# Patient Record
Sex: FEMALE | Race: WHITE | NOT HISPANIC OR LATINO | Employment: OTHER | URBAN - METROPOLITAN AREA
[De-identification: names, ages, dates, MRNs, and addresses within clinical notes are randomized per-mention and may not be internally consistent; named-entity substitution may affect disease eponyms.]

---

## 2022-09-14 ENCOUNTER — HOSPITAL ENCOUNTER (INPATIENT)
Facility: HOSPITAL | Age: 67
LOS: 15 days | Discharge: HOME HEALTH | End: 2022-09-29
Attending: EMERGENCY MEDICINE | Admitting: INTERNAL MEDICINE

## 2022-09-14 DIAGNOSIS — R53.1 GENERALIZED WEAKNESS: ICD-10-CM

## 2022-09-14 DIAGNOSIS — R09.02 HYPOXIA: ICD-10-CM

## 2022-09-14 DIAGNOSIS — N39.0 ACUTE UTI: Primary | ICD-10-CM

## 2022-09-14 DIAGNOSIS — M25.562 CHRONIC PAIN OF LEFT KNEE: ICD-10-CM

## 2022-09-14 DIAGNOSIS — Z09 FOLLOW-UP EXAM: ICD-10-CM

## 2022-09-14 DIAGNOSIS — M47.12 CERVICAL SPONDYLOSIS WITH MYELOPATHY: ICD-10-CM

## 2022-09-14 DIAGNOSIS — G89.29 CHRONIC PAIN OF LEFT KNEE: ICD-10-CM

## 2022-09-14 DIAGNOSIS — R20.2 PARESTHESIAS: ICD-10-CM

## 2022-09-14 LAB
ALBUMIN SERPL-MCNC: 4.5 G/DL (ref 3.5–5.2)
ALBUMIN/GLOB SERPL: 1.7 G/DL
ALP SERPL-CCNC: 59 U/L (ref 39–117)
ALT SERPL W P-5'-P-CCNC: 29 U/L (ref 1–33)
ANION GAP SERPL CALCULATED.3IONS-SCNC: 14 MMOL/L (ref 5–15)
AST SERPL-CCNC: 32 U/L (ref 1–32)
BACTERIA UR QL AUTO: ABNORMAL /HPF
BASOPHILS # BLD AUTO: 0.05 10*3/MM3 (ref 0–0.2)
BASOPHILS NFR BLD AUTO: 0.5 % (ref 0–1.5)
BILIRUB SERPL-MCNC: 0.4 MG/DL (ref 0–1.2)
BILIRUB UR QL STRIP: NEGATIVE
BUN SERPL-MCNC: 17 MG/DL (ref 8–23)
BUN/CREAT SERPL: 21.3 (ref 7–25)
CALCIUM SPEC-SCNC: 9.9 MG/DL (ref 8.6–10.5)
CHLORIDE SERPL-SCNC: 99 MMOL/L (ref 98–107)
CLARITY UR: ABNORMAL
CO2 SERPL-SCNC: 27 MMOL/L (ref 22–29)
COLOR UR: ABNORMAL
CREAT SERPL-MCNC: 0.8 MG/DL (ref 0.57–1)
DEPRECATED RDW RBC AUTO: 43 FL (ref 37–54)
EGFRCR SERPLBLD CKD-EPI 2021: 81.4 ML/MIN/1.73
EOSINOPHIL # BLD AUTO: 0.12 10*3/MM3 (ref 0–0.4)
EOSINOPHIL NFR BLD AUTO: 1.2 % (ref 0.3–6.2)
ERYTHROCYTE [DISTWIDTH] IN BLOOD BY AUTOMATED COUNT: 12.6 % (ref 12.3–15.4)
GLOBULIN UR ELPH-MCNC: 2.6 GM/DL
GLUCOSE BLDC GLUCOMTR-MCNC: 118 MG/DL (ref 70–130)
GLUCOSE BLDC GLUCOMTR-MCNC: 132 MG/DL (ref 70–130)
GLUCOSE SERPL-MCNC: 142 MG/DL (ref 65–99)
GLUCOSE UR STRIP-MCNC: NEGATIVE MG/DL
HCT VFR BLD AUTO: 43.4 % (ref 34–46.6)
HGB BLD-MCNC: 14.9 G/DL (ref 12–15.9)
HGB UR QL STRIP.AUTO: ABNORMAL
HYALINE CASTS UR QL AUTO: ABNORMAL /LPF
IMM GRANULOCYTES # BLD AUTO: 0.04 10*3/MM3 (ref 0–0.05)
IMM GRANULOCYTES NFR BLD AUTO: 0.4 % (ref 0–0.5)
KETONES UR QL STRIP: ABNORMAL
LEUKOCYTE ESTERASE UR QL STRIP.AUTO: ABNORMAL
LYMPHOCYTES # BLD AUTO: 1.84 10*3/MM3 (ref 0.7–3.1)
LYMPHOCYTES NFR BLD AUTO: 18.1 % (ref 19.6–45.3)
MCH RBC QN AUTO: 32.2 PG (ref 26.6–33)
MCHC RBC AUTO-ENTMCNC: 34.3 G/DL (ref 31.5–35.7)
MCV RBC AUTO: 93.7 FL (ref 79–97)
MONOCYTES # BLD AUTO: 0.66 10*3/MM3 (ref 0.1–0.9)
MONOCYTES NFR BLD AUTO: 6.5 % (ref 5–12)
NEUTROPHILS NFR BLD AUTO: 7.47 10*3/MM3 (ref 1.7–7)
NEUTROPHILS NFR BLD AUTO: 73.3 % (ref 42.7–76)
NITRITE UR QL STRIP: POSITIVE
NRBC BLD AUTO-RTO: 0 /100 WBC (ref 0–0.2)
PH UR STRIP.AUTO: 5.5 [PH] (ref 5–8)
PLATELET # BLD AUTO: 195 10*3/MM3 (ref 140–450)
PMV BLD AUTO: 11.1 FL (ref 6–12)
POTASSIUM SERPL-SCNC: 3.7 MMOL/L (ref 3.5–5.2)
PROT SERPL-MCNC: 7.1 G/DL (ref 6–8.5)
PROT UR QL STRIP: ABNORMAL
QT INTERVAL: 437 MS
RBC # BLD AUTO: 4.63 10*6/MM3 (ref 3.77–5.28)
RBC # UR STRIP: ABNORMAL /HPF
REF LAB TEST METHOD: ABNORMAL
SODIUM SERPL-SCNC: 140 MMOL/L (ref 136–145)
SP GR UR STRIP: 1.02 (ref 1–1.03)
SQUAMOUS #/AREA URNS HPF: ABNORMAL /HPF
UROBILINOGEN UR QL STRIP: ABNORMAL
WBC # UR STRIP: ABNORMAL /HPF
WBC NRBC COR # BLD: 10.18 10*3/MM3 (ref 3.4–10.8)

## 2022-09-14 PROCEDURE — 93010 ELECTROCARDIOGRAM REPORT: CPT | Performed by: INTERNAL MEDICINE

## 2022-09-14 PROCEDURE — 85025 COMPLETE CBC W/AUTO DIFF WBC: CPT | Performed by: EMERGENCY MEDICINE

## 2022-09-14 PROCEDURE — 25010000002 CEFTRIAXONE PER 250 MG: Performed by: EMERGENCY MEDICINE

## 2022-09-14 PROCEDURE — 80053 COMPREHEN METABOLIC PANEL: CPT | Performed by: EMERGENCY MEDICINE

## 2022-09-14 PROCEDURE — 87086 URINE CULTURE/COLONY COUNT: CPT | Performed by: EMERGENCY MEDICINE

## 2022-09-14 PROCEDURE — 36415 COLL VENOUS BLD VENIPUNCTURE: CPT

## 2022-09-14 PROCEDURE — 99285 EMERGENCY DEPT VISIT HI MDM: CPT

## 2022-09-14 PROCEDURE — 87040 BLOOD CULTURE FOR BACTERIA: CPT | Performed by: EMERGENCY MEDICINE

## 2022-09-14 PROCEDURE — 87186 SC STD MICRODIL/AGAR DIL: CPT | Performed by: EMERGENCY MEDICINE

## 2022-09-14 PROCEDURE — 87077 CULTURE AEROBIC IDENTIFY: CPT | Performed by: EMERGENCY MEDICINE

## 2022-09-14 PROCEDURE — 81001 URINALYSIS AUTO W/SCOPE: CPT | Performed by: EMERGENCY MEDICINE

## 2022-09-14 PROCEDURE — 93005 ELECTROCARDIOGRAM TRACING: CPT | Performed by: EMERGENCY MEDICINE

## 2022-09-14 PROCEDURE — 82962 GLUCOSE BLOOD TEST: CPT

## 2022-09-14 RX ORDER — INSULIN LISPRO 100 [IU]/ML
0-9 INJECTION, SOLUTION INTRAVENOUS; SUBCUTANEOUS
Status: DISCONTINUED | OUTPATIENT
Start: 2022-09-15 | End: 2022-09-29 | Stop reason: HOSPADM

## 2022-09-14 RX ORDER — BUPROPION HYDROCHLORIDE 150 MG/1
300 TABLET ORAL DAILY
Status: ON HOLD | COMMUNITY
End: 2022-09-20

## 2022-09-14 RX ORDER — ESCITALOPRAM OXALATE 10 MG/1
10 TABLET ORAL DAILY
Status: ON HOLD | COMMUNITY
End: 2022-09-20

## 2022-09-14 RX ORDER — LISINOPRIL 10 MG/1
10 TABLET ORAL DAILY
Status: ON HOLD | COMMUNITY
End: 2022-09-14

## 2022-09-14 RX ORDER — UREA 10 %
3 LOTION (ML) TOPICAL NIGHTLY PRN
Status: DISCONTINUED | OUTPATIENT
Start: 2022-09-14 | End: 2022-09-29 | Stop reason: HOSPADM

## 2022-09-14 RX ORDER — LOSARTAN POTASSIUM 50 MG/1
100 TABLET ORAL DAILY
Status: ON HOLD | COMMUNITY
End: 2022-09-20

## 2022-09-14 RX ORDER — DEXTROSE MONOHYDRATE 25 G/50ML
25 INJECTION, SOLUTION INTRAVENOUS
Status: DISCONTINUED | OUTPATIENT
Start: 2022-09-14 | End: 2022-09-29 | Stop reason: HOSPADM

## 2022-09-14 RX ORDER — LEVOTHYROXINE SODIUM 0.12 MG/1
125 TABLET ORAL DAILY
COMMUNITY

## 2022-09-14 RX ORDER — NICOTINE POLACRILEX 4 MG
15 LOZENGE BUCCAL
Status: DISCONTINUED | OUTPATIENT
Start: 2022-09-14 | End: 2022-09-29 | Stop reason: HOSPADM

## 2022-09-14 RX ORDER — ATORVASTATIN CALCIUM 20 MG/1
10 TABLET, FILM COATED ORAL DAILY
Status: DISCONTINUED | OUTPATIENT
Start: 2022-09-15 | End: 2022-09-29 | Stop reason: HOSPADM

## 2022-09-14 RX ORDER — ALBUTEROL SULFATE 90 UG/1
AEROSOL, METERED RESPIRATORY (INHALATION)
COMMUNITY
Start: 2022-08-11 | End: 2023-01-17 | Stop reason: HOSPADM

## 2022-09-14 RX ORDER — ONDANSETRON 4 MG/1
4 TABLET, FILM COATED ORAL EVERY 6 HOURS PRN
Status: DISCONTINUED | OUTPATIENT
Start: 2022-09-14 | End: 2022-09-29 | Stop reason: HOSPADM

## 2022-09-14 RX ORDER — NITROGLYCERIN 0.4 MG/1
0.4 TABLET SUBLINGUAL
Status: DISCONTINUED | OUTPATIENT
Start: 2022-09-14 | End: 2022-09-29 | Stop reason: HOSPADM

## 2022-09-14 RX ORDER — ONDANSETRON 2 MG/ML
4 INJECTION INTRAMUSCULAR; INTRAVENOUS EVERY 6 HOURS PRN
Status: DISCONTINUED | OUTPATIENT
Start: 2022-09-14 | End: 2022-09-29 | Stop reason: HOSPADM

## 2022-09-14 RX ORDER — LISINOPRIL 10 MG/1
10 TABLET ORAL DAILY
Status: DISCONTINUED | OUTPATIENT
Start: 2022-09-15 | End: 2022-09-15

## 2022-09-14 RX ORDER — LOVASTATIN 10 MG/1
40 TABLET ORAL NIGHTLY
Status: ON HOLD | COMMUNITY
End: 2022-09-20

## 2022-09-14 RX ORDER — ACETAMINOPHEN 325 MG/1
650 TABLET ORAL EVERY 4 HOURS PRN
Status: DISCONTINUED | OUTPATIENT
Start: 2022-09-14 | End: 2022-09-20 | Stop reason: SDUPTHER

## 2022-09-14 RX ORDER — BUPROPION HYDROCHLORIDE 150 MG/1
150 TABLET ORAL DAILY
Status: DISCONTINUED | OUTPATIENT
Start: 2022-09-15 | End: 2022-09-29 | Stop reason: HOSPADM

## 2022-09-14 RX ORDER — AMLODIPINE BESYLATE 5 MG/1
5 TABLET ORAL DAILY
COMMUNITY
End: 2022-09-29 | Stop reason: HOSPADM

## 2022-09-14 RX ADMIN — CEFTRIAXONE SODIUM 1 G: 1 INJECTION, POWDER, FOR SOLUTION INTRAMUSCULAR; INTRAVENOUS at 16:32

## 2022-09-14 RX ADMIN — Medication 3 MG: at 21:21

## 2022-09-15 PROBLEM — M13.0 HYPERTROPHIC POLYARTHRITIS: Status: ACTIVE | Noted: 2022-09-15

## 2022-09-15 PROBLEM — M47.12 CERVICAL SPONDYLOSIS WITH MYELOPATHY: Status: ACTIVE | Noted: 2022-06-27

## 2022-09-15 PROBLEM — I10 HTN (HYPERTENSION): Status: ACTIVE | Noted: 2022-09-15

## 2022-09-15 PROBLEM — K76.0 FATTY LIVER: Status: ACTIVE | Noted: 2022-09-15

## 2022-09-15 PROBLEM — E66.01 OBESITY, CLASS III, BMI 40-49.9 (MORBID OBESITY): Status: ACTIVE | Noted: 2022-09-15

## 2022-09-15 LAB
ANION GAP SERPL CALCULATED.3IONS-SCNC: 13 MMOL/L (ref 5–15)
BUN SERPL-MCNC: 16 MG/DL (ref 8–23)
BUN/CREAT SERPL: 20.8 (ref 7–25)
CALCIUM SPEC-SCNC: 9.3 MG/DL (ref 8.6–10.5)
CHLORIDE SERPL-SCNC: 101 MMOL/L (ref 98–107)
CO2 SERPL-SCNC: 25 MMOL/L (ref 22–29)
CREAT SERPL-MCNC: 0.77 MG/DL (ref 0.57–1)
DEPRECATED RDW RBC AUTO: 43 FL (ref 37–54)
EGFRCR SERPLBLD CKD-EPI 2021: 85.2 ML/MIN/1.73
ERYTHROCYTE [DISTWIDTH] IN BLOOD BY AUTOMATED COUNT: 12.6 % (ref 12.3–15.4)
GLUCOSE BLDC GLUCOMTR-MCNC: 136 MG/DL (ref 70–130)
GLUCOSE BLDC GLUCOMTR-MCNC: 150 MG/DL (ref 70–130)
GLUCOSE BLDC GLUCOMTR-MCNC: 178 MG/DL (ref 70–130)
GLUCOSE BLDC GLUCOMTR-MCNC: 221 MG/DL (ref 70–130)
GLUCOSE SERPL-MCNC: 159 MG/DL (ref 65–99)
HBA1C MFR BLD: 6.8 % (ref 4.8–5.6)
HCT VFR BLD AUTO: 42.1 % (ref 34–46.6)
HGB BLD-MCNC: 14 G/DL (ref 12–15.9)
MCH RBC QN AUTO: 31.3 PG (ref 26.6–33)
MCHC RBC AUTO-ENTMCNC: 33.3 G/DL (ref 31.5–35.7)
MCV RBC AUTO: 94 FL (ref 79–97)
PLATELET # BLD AUTO: 184 10*3/MM3 (ref 140–450)
PMV BLD AUTO: 10.7 FL (ref 6–12)
POTASSIUM SERPL-SCNC: 3.5 MMOL/L (ref 3.5–5.2)
RBC # BLD AUTO: 4.48 10*6/MM3 (ref 3.77–5.28)
SODIUM SERPL-SCNC: 139 MMOL/L (ref 136–145)
WBC NRBC COR # BLD: 10.69 10*3/MM3 (ref 3.4–10.8)

## 2022-09-15 PROCEDURE — 63710000001 INSULIN LISPRO (HUMAN) PER 5 UNITS: Performed by: INTERNAL MEDICINE

## 2022-09-15 PROCEDURE — 25010000002 CEFTRIAXONE PER 250 MG: Performed by: HOSPITALIST

## 2022-09-15 PROCEDURE — 83036 HEMOGLOBIN GLYCOSYLATED A1C: CPT | Performed by: INTERNAL MEDICINE

## 2022-09-15 PROCEDURE — 82962 GLUCOSE BLOOD TEST: CPT

## 2022-09-15 PROCEDURE — 80048 BASIC METABOLIC PNL TOTAL CA: CPT | Performed by: INTERNAL MEDICINE

## 2022-09-15 PROCEDURE — 85027 COMPLETE CBC AUTOMATED: CPT | Performed by: INTERNAL MEDICINE

## 2022-09-15 RX ORDER — LOSARTAN POTASSIUM 100 MG/1
100 TABLET ORAL DAILY
Status: DISCONTINUED | OUTPATIENT
Start: 2022-09-16 | End: 2022-09-29 | Stop reason: HOSPADM

## 2022-09-15 RX ORDER — LEVOTHYROXINE SODIUM 0.12 MG/1
125 TABLET ORAL DAILY
Status: DISCONTINUED | OUTPATIENT
Start: 2022-09-15 | End: 2022-09-29 | Stop reason: HOSPADM

## 2022-09-15 RX ORDER — IBUPROFEN 800 MG/1
800 TABLET ORAL EVERY 8 HOURS PRN
Status: DISCONTINUED | OUTPATIENT
Start: 2022-09-15 | End: 2022-09-20

## 2022-09-15 RX ORDER — DIPHENHYDRAMINE HCL 25 MG
25 CAPSULE ORAL EVERY 6 HOURS PRN
COMMUNITY

## 2022-09-15 RX ORDER — AMLODIPINE BESYLATE 5 MG/1
5 TABLET ORAL DAILY
Status: DISCONTINUED | OUTPATIENT
Start: 2022-09-15 | End: 2022-09-25

## 2022-09-15 RX ORDER — FLUTICASONE PROPIONATE 50 MCG
2 SPRAY, SUSPENSION (ML) NASAL DAILY
Status: DISCONTINUED | OUTPATIENT
Start: 2022-09-15 | End: 2022-09-29 | Stop reason: HOSPADM

## 2022-09-15 RX ORDER — IBUPROFEN 800 MG/1
800 TABLET ORAL
Status: DISCONTINUED | OUTPATIENT
Start: 2022-09-15 | End: 2022-09-15

## 2022-09-15 RX ORDER — PYRIDOXINE HCL (VITAMIN B6) 100 MG
100 TABLET ORAL DAILY
COMMUNITY
Start: 2022-04-30

## 2022-09-15 RX ORDER — FLUTICASONE PROPIONATE 50 MCG
SPRAY, SUSPENSION (ML) NASAL
COMMUNITY
Start: 2022-08-10

## 2022-09-15 RX ORDER — ESCITALOPRAM OXALATE 10 MG/1
10 TABLET ORAL DAILY
Status: DISCONTINUED | OUTPATIENT
Start: 2022-09-15 | End: 2022-09-29 | Stop reason: HOSPADM

## 2022-09-15 RX ORDER — DIMENHYDRINATE 50 MG
TABLET ORAL
COMMUNITY
End: 2023-01-17 | Stop reason: HOSPADM

## 2022-09-15 RX ORDER — IBUPROFEN 800 MG/1
TABLET ORAL
COMMUNITY
Start: 2013-02-15 | End: 2023-01-17 | Stop reason: HOSPADM

## 2022-09-15 RX ADMIN — IBUPROFEN 800 MG: 400 TABLET, FILM COATED ORAL at 20:42

## 2022-09-15 RX ADMIN — ATORVASTATIN CALCIUM 10 MG: 20 TABLET, FILM COATED ORAL at 08:40

## 2022-09-15 RX ADMIN — BUPROPION HYDROCHLORIDE 150 MG: 150 TABLET, EXTENDED RELEASE ORAL at 08:38

## 2022-09-15 RX ADMIN — CEFTRIAXONE SODIUM 1 G: 1 INJECTION, POWDER, FOR SOLUTION INTRAMUSCULAR; INTRAVENOUS at 12:43

## 2022-09-15 RX ADMIN — AMLODIPINE BESYLATE 5 MG: 5 TABLET ORAL at 11:19

## 2022-09-15 RX ADMIN — FLUTICASONE PROPIONATE 2 SPRAY: 50 SPRAY, METERED NASAL at 11:19

## 2022-09-15 RX ADMIN — INSULIN LISPRO 2 UNITS: 100 INJECTION, SOLUTION INTRAVENOUS; SUBCUTANEOUS at 08:38

## 2022-09-15 RX ADMIN — IBUPROFEN 800 MG: 800 TABLET, FILM COATED ORAL at 11:19

## 2022-09-15 RX ADMIN — LISINOPRIL 10 MG: 10 TABLET ORAL at 08:38

## 2022-09-15 RX ADMIN — Medication 3 MG: at 20:43

## 2022-09-15 RX ADMIN — INSULIN LISPRO 2 UNITS: 100 INJECTION, SOLUTION INTRAVENOUS; SUBCUTANEOUS at 12:43

## 2022-09-15 RX ADMIN — ESCITALOPRAM 10 MG: 10 TABLET, FILM COATED ORAL at 11:19

## 2022-09-15 RX ADMIN — LEVOTHYROXINE SODIUM 125 MCG: 0.12 TABLET ORAL at 11:19

## 2022-09-16 ENCOUNTER — APPOINTMENT (OUTPATIENT)
Dept: OTHER | Facility: HOSPITAL | Age: 67
End: 2022-09-16

## 2022-09-16 ENCOUNTER — APPOINTMENT (OUTPATIENT)
Dept: MRI IMAGING | Facility: HOSPITAL | Age: 67
End: 2022-09-16

## 2022-09-16 LAB
ALBUMIN SERPL-MCNC: 3.8 G/DL (ref 3.5–5.2)
ANION GAP SERPL CALCULATED.3IONS-SCNC: 10 MMOL/L (ref 5–15)
BACTERIA SPEC AEROBE CULT: ABNORMAL
BASOPHILS # BLD AUTO: 0.04 10*3/MM3 (ref 0–0.2)
BASOPHILS NFR BLD AUTO: 0.4 % (ref 0–1.5)
BUN SERPL-MCNC: 16 MG/DL (ref 8–23)
BUN/CREAT SERPL: 21.6 (ref 7–25)
CALCIUM SPEC-SCNC: 9.3 MG/DL (ref 8.6–10.5)
CHLORIDE SERPL-SCNC: 99 MMOL/L (ref 98–107)
CO2 SERPL-SCNC: 27 MMOL/L (ref 22–29)
CREAT SERPL-MCNC: 0.74 MG/DL (ref 0.57–1)
DEPRECATED RDW RBC AUTO: 43.4 FL (ref 37–54)
EGFRCR SERPLBLD CKD-EPI 2021: 89.4 ML/MIN/1.73
EOSINOPHIL # BLD AUTO: 0.24 10*3/MM3 (ref 0–0.4)
EOSINOPHIL NFR BLD AUTO: 2.5 % (ref 0.3–6.2)
ERYTHROCYTE [DISTWIDTH] IN BLOOD BY AUTOMATED COUNT: 12.5 % (ref 12.3–15.4)
GLUCOSE BLDC GLUCOMTR-MCNC: 126 MG/DL (ref 70–130)
GLUCOSE BLDC GLUCOMTR-MCNC: 143 MG/DL (ref 70–130)
GLUCOSE BLDC GLUCOMTR-MCNC: 157 MG/DL (ref 70–130)
GLUCOSE BLDC GLUCOMTR-MCNC: 181 MG/DL (ref 70–130)
GLUCOSE SERPL-MCNC: 248 MG/DL (ref 65–99)
HCT VFR BLD AUTO: 38.2 % (ref 34–46.6)
HGB BLD-MCNC: 12.8 G/DL (ref 12–15.9)
IMM GRANULOCYTES # BLD AUTO: 0.05 10*3/MM3 (ref 0–0.05)
IMM GRANULOCYTES NFR BLD AUTO: 0.5 % (ref 0–0.5)
LYMPHOCYTES # BLD AUTO: 1.87 10*3/MM3 (ref 0.7–3.1)
LYMPHOCYTES NFR BLD AUTO: 19.4 % (ref 19.6–45.3)
MCH RBC QN AUTO: 31.7 PG (ref 26.6–33)
MCHC RBC AUTO-ENTMCNC: 33.5 G/DL (ref 31.5–35.7)
MCV RBC AUTO: 94.6 FL (ref 79–97)
MONOCYTES # BLD AUTO: 0.75 10*3/MM3 (ref 0.1–0.9)
MONOCYTES NFR BLD AUTO: 7.8 % (ref 5–12)
NEUTROPHILS NFR BLD AUTO: 6.69 10*3/MM3 (ref 1.7–7)
NEUTROPHILS NFR BLD AUTO: 69.4 % (ref 42.7–76)
NRBC BLD AUTO-RTO: 0 /100 WBC (ref 0–0.2)
PHOSPHATE SERPL-MCNC: 3.3 MG/DL (ref 2.5–4.5)
PLATELET # BLD AUTO: 167 10*3/MM3 (ref 140–450)
PMV BLD AUTO: 11 FL (ref 6–12)
POTASSIUM SERPL-SCNC: 3.4 MMOL/L (ref 3.5–5.2)
RBC # BLD AUTO: 4.04 10*6/MM3 (ref 3.77–5.28)
SODIUM SERPL-SCNC: 136 MMOL/L (ref 136–145)
WBC NRBC COR # BLD: 9.64 10*3/MM3 (ref 3.4–10.8)

## 2022-09-16 PROCEDURE — 25010000002 CEFTRIAXONE PER 250 MG: Performed by: HOSPITALIST

## 2022-09-16 PROCEDURE — 85025 COMPLETE CBC W/AUTO DIFF WBC: CPT | Performed by: HOSPITALIST

## 2022-09-16 PROCEDURE — 82962 GLUCOSE BLOOD TEST: CPT

## 2022-09-16 PROCEDURE — 80069 RENAL FUNCTION PANEL: CPT | Performed by: HOSPITALIST

## 2022-09-16 PROCEDURE — 63710000001 INSULIN LISPRO (HUMAN) PER 5 UNITS: Performed by: INTERNAL MEDICINE

## 2022-09-16 PROCEDURE — 72141 MRI NECK SPINE W/O DYE: CPT

## 2022-09-16 PROCEDURE — 99221 1ST HOSP IP/OBS SF/LOW 40: CPT

## 2022-09-16 RX ADMIN — IBUPROFEN 800 MG: 400 TABLET, FILM COATED ORAL at 08:40

## 2022-09-16 RX ADMIN — LEVOTHYROXINE SODIUM 125 MCG: 0.12 TABLET ORAL at 08:32

## 2022-09-16 RX ADMIN — INSULIN LISPRO 2 UNITS: 100 INJECTION, SOLUTION INTRAVENOUS; SUBCUTANEOUS at 12:10

## 2022-09-16 RX ADMIN — CEFTRIAXONE SODIUM 1 G: 1 INJECTION, POWDER, FOR SOLUTION INTRAMUSCULAR; INTRAVENOUS at 12:10

## 2022-09-16 RX ADMIN — Medication 3 MG: at 20:58

## 2022-09-16 RX ADMIN — IBUPROFEN 800 MG: 400 TABLET, FILM COATED ORAL at 18:12

## 2022-09-16 RX ADMIN — FLUTICASONE PROPIONATE 2 SPRAY: 50 SPRAY, METERED NASAL at 08:32

## 2022-09-16 RX ADMIN — LOSARTAN POTASSIUM 100 MG: 100 TABLET, FILM COATED ORAL at 08:32

## 2022-09-16 RX ADMIN — ESCITALOPRAM 10 MG: 10 TABLET, FILM COATED ORAL at 08:32

## 2022-09-16 RX ADMIN — BUPROPION HYDROCHLORIDE 150 MG: 150 TABLET, EXTENDED RELEASE ORAL at 08:31

## 2022-09-16 RX ADMIN — ATORVASTATIN CALCIUM 10 MG: 20 TABLET, FILM COATED ORAL at 08:31

## 2022-09-16 RX ADMIN — AMLODIPINE BESYLATE 5 MG: 5 TABLET ORAL at 08:31

## 2022-09-17 ENCOUNTER — APPOINTMENT (OUTPATIENT)
Dept: GENERAL RADIOLOGY | Facility: HOSPITAL | Age: 67
End: 2022-09-17

## 2022-09-17 LAB
GLUCOSE BLDC GLUCOMTR-MCNC: 142 MG/DL (ref 70–130)
GLUCOSE BLDC GLUCOMTR-MCNC: 157 MG/DL (ref 70–130)
GLUCOSE BLDC GLUCOMTR-MCNC: 157 MG/DL (ref 70–130)
GLUCOSE BLDC GLUCOMTR-MCNC: 158 MG/DL (ref 70–130)

## 2022-09-17 PROCEDURE — 73560 X-RAY EXAM OF KNEE 1 OR 2: CPT

## 2022-09-17 PROCEDURE — 63710000001 INSULIN LISPRO (HUMAN) PER 5 UNITS: Performed by: INTERNAL MEDICINE

## 2022-09-17 PROCEDURE — 99233 SBSQ HOSP IP/OBS HIGH 50: CPT | Performed by: NEUROLOGICAL SURGERY

## 2022-09-17 PROCEDURE — 82962 GLUCOSE BLOOD TEST: CPT

## 2022-09-17 PROCEDURE — 25010000002 CEFTRIAXONE PER 250 MG: Performed by: HOSPITALIST

## 2022-09-17 RX ADMIN — ATORVASTATIN CALCIUM 10 MG: 20 TABLET, FILM COATED ORAL at 08:26

## 2022-09-17 RX ADMIN — INSULIN LISPRO 2 UNITS: 100 INJECTION, SOLUTION INTRAVENOUS; SUBCUTANEOUS at 08:26

## 2022-09-17 RX ADMIN — LEVOTHYROXINE SODIUM 125 MCG: 0.12 TABLET ORAL at 08:26

## 2022-09-17 RX ADMIN — INSULIN LISPRO 2 UNITS: 100 INJECTION, SOLUTION INTRAVENOUS; SUBCUTANEOUS at 12:14

## 2022-09-17 RX ADMIN — IBUPROFEN 800 MG: 400 TABLET, FILM COATED ORAL at 05:51

## 2022-09-17 RX ADMIN — AMLODIPINE BESYLATE 5 MG: 5 TABLET ORAL at 08:26

## 2022-09-17 RX ADMIN — CEFTRIAXONE SODIUM 1 G: 1 INJECTION, POWDER, FOR SOLUTION INTRAMUSCULAR; INTRAVENOUS at 12:18

## 2022-09-17 RX ADMIN — BUPROPION HYDROCHLORIDE 150 MG: 150 TABLET, EXTENDED RELEASE ORAL at 08:26

## 2022-09-17 RX ADMIN — LOSARTAN POTASSIUM 100 MG: 100 TABLET, FILM COATED ORAL at 08:26

## 2022-09-17 RX ADMIN — ESCITALOPRAM 10 MG: 10 TABLET, FILM COATED ORAL at 08:26

## 2022-09-17 RX ADMIN — Medication 3 MG: at 21:42

## 2022-09-17 RX ADMIN — IBUPROFEN 800 MG: 400 TABLET, FILM COATED ORAL at 15:01

## 2022-09-18 LAB
ANION GAP SERPL CALCULATED.3IONS-SCNC: 10.6 MMOL/L (ref 5–15)
BASOPHILS # BLD AUTO: 0.03 10*3/MM3 (ref 0–0.2)
BASOPHILS NFR BLD AUTO: 0.5 % (ref 0–1.5)
BUN SERPL-MCNC: 17 MG/DL (ref 8–23)
BUN/CREAT SERPL: 26.2 (ref 7–25)
CALCIUM SPEC-SCNC: 9 MG/DL (ref 8.6–10.5)
CHLORIDE SERPL-SCNC: 101 MMOL/L (ref 98–107)
CO2 SERPL-SCNC: 28.4 MMOL/L (ref 22–29)
CREAT SERPL-MCNC: 0.65 MG/DL (ref 0.57–1)
DEPRECATED RDW RBC AUTO: 43.2 FL (ref 37–54)
EGFRCR SERPLBLD CKD-EPI 2021: 97.2 ML/MIN/1.73
EOSINOPHIL # BLD AUTO: 0.16 10*3/MM3 (ref 0–0.4)
EOSINOPHIL NFR BLD AUTO: 2.5 % (ref 0.3–6.2)
ERYTHROCYTE [DISTWIDTH] IN BLOOD BY AUTOMATED COUNT: 12.5 % (ref 12.3–15.4)
GLUCOSE BLDC GLUCOMTR-MCNC: 128 MG/DL (ref 70–130)
GLUCOSE BLDC GLUCOMTR-MCNC: 132 MG/DL (ref 70–130)
GLUCOSE BLDC GLUCOMTR-MCNC: 167 MG/DL (ref 70–130)
GLUCOSE BLDC GLUCOMTR-MCNC: 179 MG/DL (ref 70–130)
GLUCOSE SERPL-MCNC: 162 MG/DL (ref 65–99)
HCT VFR BLD AUTO: 38.5 % (ref 34–46.6)
HGB BLD-MCNC: 13 G/DL (ref 12–15.9)
IMM GRANULOCYTES # BLD AUTO: 0.04 10*3/MM3 (ref 0–0.05)
IMM GRANULOCYTES NFR BLD AUTO: 0.6 % (ref 0–0.5)
LYMPHOCYTES # BLD AUTO: 0.55 10*3/MM3 (ref 0.7–3.1)
LYMPHOCYTES NFR BLD AUTO: 8.8 % (ref 19.6–45.3)
MCH RBC QN AUTO: 31.6 PG (ref 26.6–33)
MCHC RBC AUTO-ENTMCNC: 33.8 G/DL (ref 31.5–35.7)
MCV RBC AUTO: 93.4 FL (ref 79–97)
MONOCYTES # BLD AUTO: 0.63 10*3/MM3 (ref 0.1–0.9)
MONOCYTES NFR BLD AUTO: 10 % (ref 5–12)
NEUTROPHILS NFR BLD AUTO: 4.87 10*3/MM3 (ref 1.7–7)
NEUTROPHILS NFR BLD AUTO: 77.6 % (ref 42.7–76)
NRBC BLD AUTO-RTO: 0 /100 WBC (ref 0–0.2)
PLATELET # BLD AUTO: 146 10*3/MM3 (ref 140–450)
PMV BLD AUTO: 10.9 FL (ref 6–12)
POTASSIUM SERPL-SCNC: 3.8 MMOL/L (ref 3.5–5.2)
RBC # BLD AUTO: 4.12 10*6/MM3 (ref 3.77–5.28)
SODIUM SERPL-SCNC: 140 MMOL/L (ref 136–145)
WBC NRBC COR # BLD: 6.28 10*3/MM3 (ref 3.4–10.8)

## 2022-09-18 PROCEDURE — 82962 GLUCOSE BLOOD TEST: CPT

## 2022-09-18 PROCEDURE — 25010000002 CEFTRIAXONE PER 250 MG: Performed by: HOSPITALIST

## 2022-09-18 PROCEDURE — 63710000001 INSULIN LISPRO (HUMAN) PER 5 UNITS: Performed by: INTERNAL MEDICINE

## 2022-09-18 PROCEDURE — 85025 COMPLETE CBC W/AUTO DIFF WBC: CPT | Performed by: INTERNAL MEDICINE

## 2022-09-18 PROCEDURE — 80048 BASIC METABOLIC PNL TOTAL CA: CPT | Performed by: INTERNAL MEDICINE

## 2022-09-18 PROCEDURE — S0260 H&P FOR SURGERY: HCPCS | Performed by: NEUROLOGICAL SURGERY

## 2022-09-18 RX ADMIN — BUPROPION HYDROCHLORIDE 150 MG: 150 TABLET, EXTENDED RELEASE ORAL at 08:38

## 2022-09-18 RX ADMIN — CEFTRIAXONE SODIUM 1 G: 1 INJECTION, POWDER, FOR SOLUTION INTRAMUSCULAR; INTRAVENOUS at 12:17

## 2022-09-18 RX ADMIN — AMLODIPINE BESYLATE 5 MG: 5 TABLET ORAL at 08:39

## 2022-09-18 RX ADMIN — IBUPROFEN 800 MG: 400 TABLET, FILM COATED ORAL at 15:39

## 2022-09-18 RX ADMIN — ESCITALOPRAM 10 MG: 10 TABLET, FILM COATED ORAL at 08:38

## 2022-09-18 RX ADMIN — Medication 3 MG: at 20:41

## 2022-09-18 RX ADMIN — ATORVASTATIN CALCIUM 10 MG: 20 TABLET, FILM COATED ORAL at 08:38

## 2022-09-18 RX ADMIN — FLUTICASONE PROPIONATE 2 SPRAY: 50 SPRAY, METERED NASAL at 08:39

## 2022-09-18 RX ADMIN — LOSARTAN POTASSIUM 100 MG: 100 TABLET, FILM COATED ORAL at 08:38

## 2022-09-18 RX ADMIN — INSULIN LISPRO 2 UNITS: 100 INJECTION, SOLUTION INTRAVENOUS; SUBCUTANEOUS at 08:39

## 2022-09-18 RX ADMIN — LEVOTHYROXINE SODIUM 125 MCG: 0.12 TABLET ORAL at 08:38

## 2022-09-18 RX ADMIN — IBUPROFEN 800 MG: 400 TABLET, FILM COATED ORAL at 05:14

## 2022-09-19 LAB
ANION GAP SERPL CALCULATED.3IONS-SCNC: 8 MMOL/L (ref 5–15)
BACTERIA SPEC AEROBE CULT: NORMAL
BACTERIA SPEC AEROBE CULT: NORMAL
BASOPHILS # BLD AUTO: 0.02 10*3/MM3 (ref 0–0.2)
BASOPHILS NFR BLD AUTO: 0.5 % (ref 0–1.5)
BUN SERPL-MCNC: 16 MG/DL (ref 8–23)
BUN/CREAT SERPL: 25.4 (ref 7–25)
CALCIUM SPEC-SCNC: 9.3 MG/DL (ref 8.6–10.5)
CHLORIDE SERPL-SCNC: 100 MMOL/L (ref 98–107)
CO2 SERPL-SCNC: 30 MMOL/L (ref 22–29)
CREAT SERPL-MCNC: 0.63 MG/DL (ref 0.57–1)
DEPRECATED RDW RBC AUTO: 42.8 FL (ref 37–54)
EGFRCR SERPLBLD CKD-EPI 2021: 98 ML/MIN/1.73
EOSINOPHIL # BLD AUTO: 0.08 10*3/MM3 (ref 0–0.4)
EOSINOPHIL NFR BLD AUTO: 1.8 % (ref 0.3–6.2)
ERYTHROCYTE [DISTWIDTH] IN BLOOD BY AUTOMATED COUNT: 12.4 % (ref 12.3–15.4)
GLUCOSE BLDC GLUCOMTR-MCNC: 143 MG/DL (ref 70–130)
GLUCOSE BLDC GLUCOMTR-MCNC: 160 MG/DL (ref 70–130)
GLUCOSE BLDC GLUCOMTR-MCNC: 165 MG/DL (ref 70–130)
GLUCOSE SERPL-MCNC: 194 MG/DL (ref 65–99)
HCT VFR BLD AUTO: 39.7 % (ref 34–46.6)
HGB BLD-MCNC: 13.3 G/DL (ref 12–15.9)
IMM GRANULOCYTES # BLD AUTO: 0.02 10*3/MM3 (ref 0–0.05)
IMM GRANULOCYTES NFR BLD AUTO: 0.5 % (ref 0–0.5)
LYMPHOCYTES # BLD AUTO: 0.9 10*3/MM3 (ref 0.7–3.1)
LYMPHOCYTES NFR BLD AUTO: 20.6 % (ref 19.6–45.3)
MCH RBC QN AUTO: 31.4 PG (ref 26.6–33)
MCHC RBC AUTO-ENTMCNC: 33.5 G/DL (ref 31.5–35.7)
MCV RBC AUTO: 93.6 FL (ref 79–97)
MONOCYTES # BLD AUTO: 0.71 10*3/MM3 (ref 0.1–0.9)
MONOCYTES NFR BLD AUTO: 16.2 % (ref 5–12)
NEUTROPHILS NFR BLD AUTO: 2.64 10*3/MM3 (ref 1.7–7)
NEUTROPHILS NFR BLD AUTO: 60.4 % (ref 42.7–76)
NRBC BLD AUTO-RTO: 0 /100 WBC (ref 0–0.2)
PLATELET # BLD AUTO: 144 10*3/MM3 (ref 140–450)
PMV BLD AUTO: 10.7 FL (ref 6–12)
POTASSIUM SERPL-SCNC: 3.4 MMOL/L (ref 3.5–5.2)
RBC # BLD AUTO: 4.24 10*6/MM3 (ref 3.77–5.28)
SODIUM SERPL-SCNC: 138 MMOL/L (ref 136–145)
WBC NRBC COR # BLD: 4.37 10*3/MM3 (ref 3.4–10.8)

## 2022-09-19 PROCEDURE — 80048 BASIC METABOLIC PNL TOTAL CA: CPT | Performed by: INTERNAL MEDICINE

## 2022-09-19 PROCEDURE — S0260 H&P FOR SURGERY: HCPCS

## 2022-09-19 PROCEDURE — 85025 COMPLETE CBC W/AUTO DIFF WBC: CPT | Performed by: INTERNAL MEDICINE

## 2022-09-19 PROCEDURE — 63710000001 INSULIN LISPRO (HUMAN) PER 5 UNITS: Performed by: INTERNAL MEDICINE

## 2022-09-19 PROCEDURE — 82962 GLUCOSE BLOOD TEST: CPT

## 2022-09-19 RX ORDER — ALPRAZOLAM 0.25 MG/1
0.25 TABLET ORAL 3 TIMES DAILY PRN
Status: DISPENSED | OUTPATIENT
Start: 2022-09-19 | End: 2022-09-26

## 2022-09-19 RX ADMIN — AMLODIPINE BESYLATE 5 MG: 5 TABLET ORAL at 08:42

## 2022-09-19 RX ADMIN — FLUTICASONE PROPIONATE 2 SPRAY: 50 SPRAY, METERED NASAL at 08:43

## 2022-09-19 RX ADMIN — INSULIN LISPRO 2 UNITS: 100 INJECTION, SOLUTION INTRAVENOUS; SUBCUTANEOUS at 12:28

## 2022-09-19 RX ADMIN — IBUPROFEN 800 MG: 400 TABLET, FILM COATED ORAL at 04:08

## 2022-09-19 RX ADMIN — Medication 3 MG: at 21:05

## 2022-09-19 RX ADMIN — LOSARTAN POTASSIUM 100 MG: 100 TABLET, FILM COATED ORAL at 08:42

## 2022-09-19 RX ADMIN — ESCITALOPRAM 10 MG: 10 TABLET, FILM COATED ORAL at 08:42

## 2022-09-19 RX ADMIN — BUPROPION HYDROCHLORIDE 150 MG: 150 TABLET, EXTENDED RELEASE ORAL at 08:42

## 2022-09-19 RX ADMIN — LEVOTHYROXINE SODIUM 125 MCG: 0.12 TABLET ORAL at 08:42

## 2022-09-19 RX ADMIN — ALPRAZOLAM 0.25 MG: 0.25 TABLET ORAL at 21:05

## 2022-09-19 RX ADMIN — ALPRAZOLAM 0.25 MG: 0.25 TABLET ORAL at 12:28

## 2022-09-19 RX ADMIN — INSULIN LISPRO 2 UNITS: 100 INJECTION, SOLUTION INTRAVENOUS; SUBCUTANEOUS at 08:42

## 2022-09-19 RX ADMIN — IBUPROFEN 800 MG: 400 TABLET, FILM COATED ORAL at 12:28

## 2022-09-19 RX ADMIN — IBUPROFEN 800 MG: 400 TABLET, FILM COATED ORAL at 19:20

## 2022-09-19 RX ADMIN — ATORVASTATIN CALCIUM 10 MG: 20 TABLET, FILM COATED ORAL at 08:42

## 2022-09-20 ENCOUNTER — ANESTHESIA (OUTPATIENT)
Dept: PERIOP | Facility: HOSPITAL | Age: 67
End: 2022-09-20

## 2022-09-20 ENCOUNTER — ANESTHESIA EVENT (OUTPATIENT)
Dept: PERIOP | Facility: HOSPITAL | Age: 67
End: 2022-09-20

## 2022-09-20 ENCOUNTER — APPOINTMENT (OUTPATIENT)
Dept: GENERAL RADIOLOGY | Facility: HOSPITAL | Age: 67
End: 2022-09-20

## 2022-09-20 LAB
ABO GROUP BLD: NORMAL
ANION GAP SERPL CALCULATED.3IONS-SCNC: 9.2 MMOL/L (ref 5–15)
BASOPHILS # BLD AUTO: 0.02 10*3/MM3 (ref 0–0.2)
BASOPHILS NFR BLD AUTO: 0.5 % (ref 0–1.5)
BLD GP AB SCN SERPL QL: NEGATIVE
BUN SERPL-MCNC: 18 MG/DL (ref 8–23)
BUN/CREAT SERPL: 27.7 (ref 7–25)
CALCIUM SPEC-SCNC: 9.2 MG/DL (ref 8.6–10.5)
CHLORIDE SERPL-SCNC: 98 MMOL/L (ref 98–107)
CO2 SERPL-SCNC: 31.8 MMOL/L (ref 22–29)
CREAT SERPL-MCNC: 0.65 MG/DL (ref 0.57–1)
DEPRECATED RDW RBC AUTO: 43.5 FL (ref 37–54)
EGFRCR SERPLBLD CKD-EPI 2021: 97.2 ML/MIN/1.73
EOSINOPHIL # BLD AUTO: 0.16 10*3/MM3 (ref 0–0.4)
EOSINOPHIL NFR BLD AUTO: 3.7 % (ref 0.3–6.2)
ERYTHROCYTE [DISTWIDTH] IN BLOOD BY AUTOMATED COUNT: 12.5 % (ref 12.3–15.4)
GLUCOSE BLDC GLUCOMTR-MCNC: 177 MG/DL (ref 70–130)
GLUCOSE BLDC GLUCOMTR-MCNC: 200 MG/DL (ref 70–130)
GLUCOSE SERPL-MCNC: 171 MG/DL (ref 65–99)
HCT VFR BLD AUTO: 40.9 % (ref 34–46.6)
HGB BLD-MCNC: 13.6 G/DL (ref 12–15.9)
IMM GRANULOCYTES # BLD AUTO: 0.02 10*3/MM3 (ref 0–0.05)
IMM GRANULOCYTES NFR BLD AUTO: 0.5 % (ref 0–0.5)
LYMPHOCYTES # BLD AUTO: 1.02 10*3/MM3 (ref 0.7–3.1)
LYMPHOCYTES NFR BLD AUTO: 23.9 % (ref 19.6–45.3)
MCH RBC QN AUTO: 31.5 PG (ref 26.6–33)
MCHC RBC AUTO-ENTMCNC: 33.3 G/DL (ref 31.5–35.7)
MCV RBC AUTO: 94.7 FL (ref 79–97)
MONOCYTES # BLD AUTO: 0.8 10*3/MM3 (ref 0.1–0.9)
MONOCYTES NFR BLD AUTO: 18.7 % (ref 5–12)
NEUTROPHILS NFR BLD AUTO: 2.25 10*3/MM3 (ref 1.7–7)
NEUTROPHILS NFR BLD AUTO: 52.7 % (ref 42.7–76)
NRBC BLD AUTO-RTO: 0 /100 WBC (ref 0–0.2)
PLATELET # BLD AUTO: 147 10*3/MM3 (ref 140–450)
PMV BLD AUTO: 10.9 FL (ref 6–12)
POTASSIUM SERPL-SCNC: 3.8 MMOL/L (ref 3.5–5.2)
RBC # BLD AUTO: 4.32 10*6/MM3 (ref 3.77–5.28)
RH BLD: POSITIVE
SODIUM SERPL-SCNC: 139 MMOL/L (ref 136–145)
T&S EXPIRATION DATE: NORMAL
WBC NRBC COR # BLD: 4.27 10*3/MM3 (ref 3.4–10.8)

## 2022-09-20 PROCEDURE — L0120 CERV FLEX N/ADJ FOAM PRE OTS: HCPCS | Performed by: NEUROLOGICAL SURGERY

## 2022-09-20 PROCEDURE — 22585 ARTHRD ANT NTRBD MIN DSC EA: CPT | Performed by: SPECIALIST/TECHNOLOGIST, OTHER

## 2022-09-20 PROCEDURE — 00NW0ZZ RELEASE CERVICAL SPINAL CORD, OPEN APPROACH: ICD-10-PCS | Performed by: NEUROLOGICAL SURGERY

## 2022-09-20 PROCEDURE — 25010000002 DEXAMETHASONE SODIUM PHOSPHATE 20 MG/5ML SOLUTION: Performed by: NURSE ANESTHETIST, CERTIFIED REGISTERED

## 2022-09-20 PROCEDURE — 22854 INSJ BIOMECHANICAL DEVICE: CPT | Performed by: SPECIALIST/TECHNOLOGIST, OTHER

## 2022-09-20 PROCEDURE — C1713 ANCHOR/SCREW BN/BN,TIS/BN: HCPCS | Performed by: NEUROLOGICAL SURGERY

## 2022-09-20 PROCEDURE — 25010000002 HYDROMORPHONE PER 4 MG: Performed by: NEUROLOGICAL SURGERY

## 2022-09-20 PROCEDURE — 25010000002 CEFAZOLIN IN DEXTROSE 2-4 GM/100ML-% SOLUTION: Performed by: NEUROLOGICAL SURGERY

## 2022-09-20 PROCEDURE — 82962 GLUCOSE BLOOD TEST: CPT

## 2022-09-20 PROCEDURE — 22554 ARTHRD ANT NTRBD MIN DSC CRV: CPT | Performed by: SPECIALIST/TECHNOLOGIST, OTHER

## 2022-09-20 PROCEDURE — 25010000002 FENTANYL CITRATE (PF) 100 MCG/2ML SOLUTION: Performed by: NURSE ANESTHETIST, CERTIFIED REGISTERED

## 2022-09-20 PROCEDURE — 25010000002 ONDANSETRON PER 1 MG: Performed by: NURSE ANESTHETIST, CERTIFIED REGISTERED

## 2022-09-20 PROCEDURE — 94761 N-INVAS EAR/PLS OXIMETRY MLT: CPT

## 2022-09-20 PROCEDURE — 25010000002 PHENYLEPHRINE 10 MG/ML SOLUTION 5 ML VIAL: Performed by: NURSE ANESTHETIST, CERTIFIED REGISTERED

## 2022-09-20 PROCEDURE — 86901 BLOOD TYPING SEROLOGIC RH(D): CPT | Performed by: NEUROLOGICAL SURGERY

## 2022-09-20 PROCEDURE — 0RG20AJ FUSION OF 2 OR MORE CERVICAL VERTEBRAL JOINTS WITH INTERBODY FUSION DEVICE, POSTERIOR APPROACH, ANTERIOR COLUMN, OPEN APPROACH: ICD-10-PCS | Performed by: NEUROLOGICAL SURGERY

## 2022-09-20 PROCEDURE — 85025 COMPLETE CBC W/AUTO DIFF WBC: CPT | Performed by: INTERNAL MEDICINE

## 2022-09-20 PROCEDURE — 25010000002 METHOCARBAMOL 1000 MG/10ML SOLUTION: Performed by: NEUROLOGICAL SURGERY

## 2022-09-20 PROCEDURE — 22854 INSJ BIOMECHANICAL DEVICE: CPT | Performed by: NEUROLOGICAL SURGERY

## 2022-09-20 PROCEDURE — 22585 ARTHRD ANT NTRBD MIN DSC EA: CPT | Performed by: NEUROLOGICAL SURGERY

## 2022-09-20 PROCEDURE — 86900 BLOOD TYPING SEROLOGIC ABO: CPT | Performed by: NEUROLOGICAL SURGERY

## 2022-09-20 PROCEDURE — 25010000002 CEFAZOLIN PER 500 MG: Performed by: NEUROLOGICAL SURGERY

## 2022-09-20 PROCEDURE — 63081 REMOVE VERT BODY DCMPRN CRVL: CPT | Performed by: SPECIALIST/TECHNOLOGIST, OTHER

## 2022-09-20 PROCEDURE — 80048 BASIC METABOLIC PNL TOTAL CA: CPT | Performed by: INTERNAL MEDICINE

## 2022-09-20 PROCEDURE — 0RB30ZZ EXCISION OF CERVICAL VERTEBRAL DISC, OPEN APPROACH: ICD-10-PCS | Performed by: NEUROLOGICAL SURGERY

## 2022-09-20 PROCEDURE — 94640 AIRWAY INHALATION TREATMENT: CPT

## 2022-09-20 PROCEDURE — 63082 REMOVE VERTEBRAL BODY ADD-ON: CPT | Performed by: SPECIALIST/TECHNOLOGIST, OTHER

## 2022-09-20 PROCEDURE — 72040 X-RAY EXAM NECK SPINE 2-3 VW: CPT

## 2022-09-20 PROCEDURE — 25010000002 FENTANYL CITRATE (PF) 50 MCG/ML SOLUTION: Performed by: NURSE ANESTHETIST, CERTIFIED REGISTERED

## 2022-09-20 PROCEDURE — 25010000002 PROPOFOL 10 MG/ML EMULSION: Performed by: NURSE ANESTHETIST, CERTIFIED REGISTERED

## 2022-09-20 PROCEDURE — 63082 REMOVE VERTEBRAL BODY ADD-ON: CPT | Performed by: NEUROLOGICAL SURGERY

## 2022-09-20 PROCEDURE — 76000 FLUOROSCOPY <1 HR PHYS/QHP: CPT

## 2022-09-20 PROCEDURE — 63081 REMOVE VERT BODY DCMPRN CRVL: CPT | Performed by: NEUROLOGICAL SURGERY

## 2022-09-20 PROCEDURE — 86850 RBC ANTIBODY SCREEN: CPT | Performed by: NEUROLOGICAL SURGERY

## 2022-09-20 PROCEDURE — 22846 INSERT SPINE FIXATION DEVICE: CPT | Performed by: NEUROLOGICAL SURGERY

## 2022-09-20 PROCEDURE — 01N10ZZ RELEASE CERVICAL NERVE, OPEN APPROACH: ICD-10-PCS | Performed by: NEUROLOGICAL SURGERY

## 2022-09-20 PROCEDURE — 22846 INSERT SPINE FIXATION DEVICE: CPT | Performed by: SPECIALIST/TECHNOLOGIST, OTHER

## 2022-09-20 PROCEDURE — 22554 ARTHRD ANT NTRBD MIN DSC CRV: CPT | Performed by: NEUROLOGICAL SURGERY

## 2022-09-20 PROCEDURE — 94799 UNLISTED PULMONARY SVC/PX: CPT

## 2022-09-20 PROCEDURE — 25010000002 HYDROMORPHONE PER 4 MG: Performed by: NURSE ANESTHETIST, CERTIFIED REGISTERED

## 2022-09-20 DEVICE — PUTTY DBF GRAFTON 3CC: Type: IMPLANTABLE DEVICE | Site: SPINE CERVICAL | Status: FUNCTIONAL

## 2022-09-20 DEVICE — SCREW 3120314 4.0 X 14 SELF TAP VAR
Type: IMPLANTABLE DEVICE | Site: SPINE CERVICAL | Status: FUNCTIONAL
Brand: ATLANTIS® ANTERIOR CERVICAL PLATE SYSTEM

## 2022-09-20 DEVICE — SPACER 6287541 PSR LAT PORTS 5X14X11
Type: IMPLANTABLE DEVICE | Site: SPINE CERVICAL | Status: FUNCTIONAL
Brand: VERTE-STACK® SPINAL SYSTEM

## 2022-09-20 DEVICE — SSC BONE WAX
Type: IMPLANTABLE DEVICE | Site: SPINE CERVICAL | Status: FUNCTIONAL
Brand: SSC BONE WAX

## 2022-09-20 DEVICE — FLOSEAL HEMOSTATIC MATRIX, 10ML
Type: IMPLANTABLE DEVICE | Site: SPINE CERVICAL | Status: FUNCTIONAL
Brand: FLOSEAL HEMOSTATIC MATRIX

## 2022-09-20 RX ORDER — HYDROCODONE BITARTRATE AND ACETAMINOPHEN 7.5; 325 MG/1; MG/1
2 TABLET ORAL EVERY 4 HOURS PRN
Status: DISCONTINUED | OUTPATIENT
Start: 2022-09-20 | End: 2022-09-20 | Stop reason: HOSPADM

## 2022-09-20 RX ORDER — TRAZODONE HYDROCHLORIDE 50 MG/1
TABLET ORAL
Status: ON HOLD | COMMUNITY
Start: 2013-03-14 | End: 2022-09-20

## 2022-09-20 RX ORDER — AMLODIPINE BESYLATE 5 MG/1
1 TABLET ORAL DAILY
COMMUNITY
Start: 2022-08-10 | End: 2022-12-20

## 2022-09-20 RX ORDER — EPHEDRINE SULFATE 50 MG/ML
INJECTION INTRAVENOUS AS NEEDED
Status: DISCONTINUED | OUTPATIENT
Start: 2022-09-20 | End: 2022-09-20 | Stop reason: SURG

## 2022-09-20 RX ORDER — ONDANSETRON 2 MG/ML
4 INJECTION INTRAMUSCULAR; INTRAVENOUS ONCE AS NEEDED
Status: DISCONTINUED | OUTPATIENT
Start: 2022-09-20 | End: 2022-09-20 | Stop reason: HOSPADM

## 2022-09-20 RX ORDER — HYDRALAZINE HYDROCHLORIDE 20 MG/ML
5 INJECTION INTRAMUSCULAR; INTRAVENOUS
Status: DISCONTINUED | OUTPATIENT
Start: 2022-09-20 | End: 2022-09-20 | Stop reason: HOSPADM

## 2022-09-20 RX ORDER — LIDOCAINE HYDROCHLORIDE 20 MG/ML
INJECTION, SOLUTION EPIDURAL; INFILTRATION; INTRACAUDAL; PERINEURAL AS NEEDED
Status: DISCONTINUED | OUTPATIENT
Start: 2022-09-20 | End: 2022-09-20 | Stop reason: SURG

## 2022-09-20 RX ORDER — HYDROCODONE BITARTRATE AND ACETAMINOPHEN 7.5; 325 MG/1; MG/1
1 TABLET ORAL EVERY 4 HOURS PRN
Status: DISCONTINUED | OUTPATIENT
Start: 2022-09-20 | End: 2022-09-24

## 2022-09-20 RX ORDER — LEVOTHYROXINE SODIUM 0.12 MG/1
1 TABLET ORAL DAILY
COMMUNITY
Start: 2022-08-10 | End: 2022-12-20

## 2022-09-20 RX ORDER — IPRATROPIUM BROMIDE AND ALBUTEROL SULFATE 2.5; .5 MG/3ML; MG/3ML
3 SOLUTION RESPIRATORY (INHALATION) ONCE
Status: DISCONTINUED | OUTPATIENT
Start: 2022-09-20 | End: 2022-09-20 | Stop reason: HOSPADM

## 2022-09-20 RX ORDER — HYDROMORPHONE HYDROCHLORIDE 1 MG/ML
0.5 INJECTION, SOLUTION INTRAMUSCULAR; INTRAVENOUS; SUBCUTANEOUS
Status: DISCONTINUED | OUTPATIENT
Start: 2022-09-20 | End: 2022-09-20 | Stop reason: HOSPADM

## 2022-09-20 RX ORDER — METHOCARBAMOL 100 MG/ML
1000 INJECTION, SOLUTION INTRAMUSCULAR; INTRAVENOUS ONCE
Status: COMPLETED | OUTPATIENT
Start: 2022-09-20 | End: 2022-09-20

## 2022-09-20 RX ORDER — LABETALOL HYDROCHLORIDE 5 MG/ML
5 INJECTION, SOLUTION INTRAVENOUS
Status: DISCONTINUED | OUTPATIENT
Start: 2022-09-20 | End: 2022-09-20 | Stop reason: HOSPADM

## 2022-09-20 RX ORDER — PROMETHAZINE HYDROCHLORIDE 25 MG/1
25 SUPPOSITORY RECTAL ONCE AS NEEDED
Status: DISCONTINUED | OUTPATIENT
Start: 2022-09-20 | End: 2022-09-20 | Stop reason: HOSPADM

## 2022-09-20 RX ORDER — PROMETHAZINE HYDROCHLORIDE 25 MG/1
25 TABLET ORAL ONCE AS NEEDED
Status: DISCONTINUED | OUTPATIENT
Start: 2022-09-20 | End: 2022-09-20 | Stop reason: HOSPADM

## 2022-09-20 RX ORDER — HYDROCODONE BITARTRATE AND ACETAMINOPHEN 7.5; 325 MG/1; MG/1
1 TABLET ORAL ONCE AS NEEDED
Status: DISCONTINUED | OUTPATIENT
Start: 2022-09-20 | End: 2022-09-20 | Stop reason: HOSPADM

## 2022-09-20 RX ORDER — DOCUSATE SODIUM 100 MG/1
100 CAPSULE, LIQUID FILLED ORAL 2 TIMES DAILY PRN
Status: DISCONTINUED | OUTPATIENT
Start: 2022-09-20 | End: 2022-09-29 | Stop reason: HOSPADM

## 2022-09-20 RX ORDER — SODIUM CHLORIDE 0.9 % (FLUSH) 0.9 %
10 SYRINGE (ML) INJECTION AS NEEDED
Status: DISCONTINUED | OUTPATIENT
Start: 2022-09-20 | End: 2022-09-20 | Stop reason: HOSPADM

## 2022-09-20 RX ORDER — NALOXONE HCL 0.4 MG/ML
0.2 VIAL (ML) INJECTION AS NEEDED
Status: DISCONTINUED | OUTPATIENT
Start: 2022-09-20 | End: 2022-09-20 | Stop reason: HOSPADM

## 2022-09-20 RX ORDER — EPHEDRINE SULFATE 50 MG/ML
5 INJECTION, SOLUTION INTRAVENOUS ONCE AS NEEDED
Status: DISCONTINUED | OUTPATIENT
Start: 2022-09-20 | End: 2022-09-20 | Stop reason: HOSPADM

## 2022-09-20 RX ORDER — FLUMAZENIL 0.1 MG/ML
0.2 INJECTION INTRAVENOUS AS NEEDED
Status: DISCONTINUED | OUTPATIENT
Start: 2022-09-20 | End: 2022-09-20 | Stop reason: HOSPADM

## 2022-09-20 RX ORDER — AMOXICILLIN 250 MG
1 CAPSULE ORAL NIGHTLY PRN
Status: DISCONTINUED | OUTPATIENT
Start: 2022-09-20 | End: 2022-09-29 | Stop reason: HOSPADM

## 2022-09-20 RX ORDER — SODIUM CHLORIDE 0.9 % (FLUSH) 0.9 %
10 SYRINGE (ML) INJECTION EVERY 12 HOURS SCHEDULED
Status: DISCONTINUED | OUTPATIENT
Start: 2022-09-20 | End: 2022-09-29 | Stop reason: HOSPADM

## 2022-09-20 RX ORDER — SODIUM CHLORIDE 0.9 % (FLUSH) 0.9 %
10 SYRINGE (ML) INJECTION AS NEEDED
Status: DISCONTINUED | OUTPATIENT
Start: 2022-09-20 | End: 2022-09-29 | Stop reason: HOSPADM

## 2022-09-20 RX ORDER — CEFAZOLIN SODIUM 2 G/100ML
2 INJECTION, SOLUTION INTRAVENOUS EVERY 8 HOURS
Status: DISPENSED | OUTPATIENT
Start: 2022-09-20 | End: 2022-09-21

## 2022-09-20 RX ORDER — DIPHENHYDRAMINE HYDROCHLORIDE 50 MG/ML
12.5 INJECTION INTRAMUSCULAR; INTRAVENOUS
Status: DISCONTINUED | OUTPATIENT
Start: 2022-09-20 | End: 2022-09-20 | Stop reason: HOSPADM

## 2022-09-20 RX ORDER — CYCLOBENZAPRINE HCL 10 MG
10 TABLET ORAL 3 TIMES DAILY PRN
Status: DISCONTINUED | OUTPATIENT
Start: 2022-09-20 | End: 2022-09-24

## 2022-09-20 RX ORDER — LOVASTATIN 40 MG/1
TABLET ORAL
COMMUNITY
Start: 2022-07-06

## 2022-09-20 RX ORDER — FAMOTIDINE 10 MG/ML
20 INJECTION, SOLUTION INTRAVENOUS
Status: COMPLETED | OUTPATIENT
Start: 2022-09-20 | End: 2022-09-20

## 2022-09-20 RX ORDER — PRAVASTATIN SODIUM 40 MG
TABLET ORAL
Status: ON HOLD | COMMUNITY
Start: 2013-02-15 | End: 2022-09-20

## 2022-09-20 RX ORDER — HYDROCODONE BITARTRATE AND ACETAMINOPHEN 5; 325 MG/1; MG/1
1 TABLET ORAL ONCE AS NEEDED
Status: DISCONTINUED | OUTPATIENT
Start: 2022-09-20 | End: 2022-09-20 | Stop reason: HOSPADM

## 2022-09-20 RX ORDER — DIPHENHYDRAMINE HCL 25 MG
25 CAPSULE ORAL
Status: DISCONTINUED | OUTPATIENT
Start: 2022-09-20 | End: 2022-09-20 | Stop reason: HOSPADM

## 2022-09-20 RX ORDER — IBUPROFEN 600 MG/1
600 TABLET ORAL ONCE AS NEEDED
Status: DISCONTINUED | OUTPATIENT
Start: 2022-09-20 | End: 2022-09-20 | Stop reason: HOSPADM

## 2022-09-20 RX ORDER — OXYCODONE AND ACETAMINOPHEN 7.5; 325 MG/1; MG/1
1 TABLET ORAL EVERY 4 HOURS PRN
Status: DISCONTINUED | OUTPATIENT
Start: 2022-09-20 | End: 2022-09-20 | Stop reason: HOSPADM

## 2022-09-20 RX ORDER — ONDANSETRON 2 MG/ML
INJECTION INTRAMUSCULAR; INTRAVENOUS AS NEEDED
Status: DISCONTINUED | OUTPATIENT
Start: 2022-09-20 | End: 2022-09-20 | Stop reason: SURG

## 2022-09-20 RX ORDER — BUPROPION HYDROCHLORIDE 300 MG/1
1 TABLET ORAL DAILY
COMMUNITY
Start: 2022-08-11 | End: 2022-09-29 | Stop reason: HOSPADM

## 2022-09-20 RX ORDER — ROCURONIUM BROMIDE 10 MG/ML
INJECTION, SOLUTION INTRAVENOUS AS NEEDED
Status: DISCONTINUED | OUTPATIENT
Start: 2022-09-20 | End: 2022-09-20 | Stop reason: SURG

## 2022-09-20 RX ORDER — MIDAZOLAM HYDROCHLORIDE 1 MG/ML
0.5 INJECTION INTRAMUSCULAR; INTRAVENOUS
Status: DISCONTINUED | OUTPATIENT
Start: 2022-09-20 | End: 2022-09-20 | Stop reason: HOSPADM

## 2022-09-20 RX ORDER — FENTANYL CITRATE 50 UG/ML
50 INJECTION, SOLUTION INTRAMUSCULAR; INTRAVENOUS
Status: DISCONTINUED | OUTPATIENT
Start: 2022-09-20 | End: 2022-09-20 | Stop reason: HOSPADM

## 2022-09-20 RX ORDER — IPRATROPIUM BROMIDE AND ALBUTEROL SULFATE 2.5; .5 MG/3ML; MG/3ML
3 SOLUTION RESPIRATORY (INHALATION) ONCE
Status: COMPLETED | OUTPATIENT
Start: 2022-09-20 | End: 2022-09-20

## 2022-09-20 RX ORDER — NALOXONE HCL 0.4 MG/ML
0.4 VIAL (ML) INJECTION
Status: DISCONTINUED | OUTPATIENT
Start: 2022-09-20 | End: 2022-09-24

## 2022-09-20 RX ORDER — SODIUM CHLORIDE, SODIUM LACTATE, POTASSIUM CHLORIDE, CALCIUM CHLORIDE 600; 310; 30; 20 MG/100ML; MG/100ML; MG/100ML; MG/100ML
50 INJECTION, SOLUTION INTRAVENOUS CONTINUOUS
Status: DISCONTINUED | OUTPATIENT
Start: 2022-09-20 | End: 2022-09-23

## 2022-09-20 RX ORDER — HYDROMORPHONE HYDROCHLORIDE 1 MG/ML
0.5 INJECTION, SOLUTION INTRAMUSCULAR; INTRAVENOUS; SUBCUTANEOUS
Status: DISCONTINUED | OUTPATIENT
Start: 2022-09-20 | End: 2022-09-24

## 2022-09-20 RX ORDER — SODIUM CHLORIDE 0.9 % (FLUSH) 0.9 %
10 SYRINGE (ML) INJECTION EVERY 12 HOURS SCHEDULED
Status: DISCONTINUED | OUTPATIENT
Start: 2022-09-20 | End: 2022-09-20 | Stop reason: HOSPADM

## 2022-09-20 RX ORDER — FENTANYL CITRATE 50 UG/ML
INJECTION, SOLUTION INTRAMUSCULAR; INTRAVENOUS AS NEEDED
Status: DISCONTINUED | OUTPATIENT
Start: 2022-09-20 | End: 2022-09-20 | Stop reason: SURG

## 2022-09-20 RX ORDER — ACETAMINOPHEN 325 MG/1
650 TABLET ORAL EVERY 4 HOURS PRN
Status: DISCONTINUED | OUTPATIENT
Start: 2022-09-20 | End: 2022-09-24

## 2022-09-20 RX ORDER — ONDANSETRON 2 MG/ML
4 INJECTION INTRAMUSCULAR; INTRAVENOUS EVERY 6 HOURS PRN
Status: DISCONTINUED | OUTPATIENT
Start: 2022-09-20 | End: 2022-09-20 | Stop reason: HOSPADM

## 2022-09-20 RX ORDER — LOSARTAN POTASSIUM 100 MG/1
1 TABLET ORAL NIGHTLY
COMMUNITY
Start: 2022-06-08

## 2022-09-20 RX ORDER — SODIUM CHLORIDE, SODIUM LACTATE, POTASSIUM CHLORIDE, CALCIUM CHLORIDE 600; 310; 30; 20 MG/100ML; MG/100ML; MG/100ML; MG/100ML
9 INJECTION, SOLUTION INTRAVENOUS CONTINUOUS PRN
Status: DISCONTINUED | OUTPATIENT
Start: 2022-09-20 | End: 2022-09-20 | Stop reason: HOSPADM

## 2022-09-20 RX ORDER — DEXAMETHASONE SODIUM PHOSPHATE 4 MG/ML
INJECTION, SOLUTION INTRA-ARTICULAR; INTRALESIONAL; INTRAMUSCULAR; INTRAVENOUS; SOFT TISSUE AS NEEDED
Status: DISCONTINUED | OUTPATIENT
Start: 2022-09-20 | End: 2022-09-20 | Stop reason: SURG

## 2022-09-20 RX ORDER — ONDANSETRON 4 MG/1
4 TABLET, FILM COATED ORAL EVERY 6 HOURS PRN
Status: DISCONTINUED | OUTPATIENT
Start: 2022-09-20 | End: 2022-09-20 | Stop reason: HOSPADM

## 2022-09-20 RX ORDER — SODIUM CHLORIDE, SODIUM LACTATE, POTASSIUM CHLORIDE, CALCIUM CHLORIDE 600; 310; 30; 20 MG/100ML; MG/100ML; MG/100ML; MG/100ML
INJECTION, SOLUTION INTRAVENOUS CONTINUOUS PRN
Status: DISCONTINUED | OUTPATIENT
Start: 2022-09-20 | End: 2022-09-20 | Stop reason: SURG

## 2022-09-20 RX ORDER — VALSARTAN 160 MG/1
TABLET ORAL
Status: ON HOLD | COMMUNITY
Start: 2013-03-23 | End: 2022-09-20

## 2022-09-20 RX ORDER — CEFAZOLIN SODIUM 2 G/100ML
2 INJECTION, SOLUTION INTRAVENOUS ONCE
Status: COMPLETED | OUTPATIENT
Start: 2022-09-20 | End: 2022-09-20

## 2022-09-20 RX ORDER — PROPOFOL 10 MG/ML
VIAL (ML) INTRAVENOUS AS NEEDED
Status: DISCONTINUED | OUTPATIENT
Start: 2022-09-20 | End: 2022-09-20 | Stop reason: SURG

## 2022-09-20 RX ORDER — BUPIVACAINE HCL/0.9 % NACL/PF 0.125 %
PLASTIC BAG, INJECTION (ML) EPIDURAL AS NEEDED
Status: DISCONTINUED | OUTPATIENT
Start: 2022-09-20 | End: 2022-09-20 | Stop reason: SURG

## 2022-09-20 RX ADMIN — Medication 200 MCG: at 09:02

## 2022-09-20 RX ADMIN — EPHEDRINE SULFATE 10 MG: 50 INJECTION INTRAVENOUS at 09:03

## 2022-09-20 RX ADMIN — Medication 20 MG: at 08:02

## 2022-09-20 RX ADMIN — EPHEDRINE SULFATE 10 MG: 50 INJECTION INTRAVENOUS at 08:58

## 2022-09-20 RX ADMIN — PROPOFOL 130 MG: 10 INJECTION, EMULSION INTRAVENOUS at 08:20

## 2022-09-20 RX ADMIN — Medication 200 MCG: at 08:31

## 2022-09-20 RX ADMIN — ONDANSETRON 4 MG: 2 INJECTION INTRAMUSCULAR; INTRAVENOUS at 11:24

## 2022-09-20 RX ADMIN — LIDOCAINE HYDROCHLORIDE 80 MG: 20 INJECTION, SOLUTION EPIDURAL; INFILTRATION; INTRACAUDAL; PERINEURAL at 08:20

## 2022-09-20 RX ADMIN — SODIUM CHLORIDE, POTASSIUM CHLORIDE, SODIUM LACTATE AND CALCIUM CHLORIDE: 600; 310; 30; 20 INJECTION, SOLUTION INTRAVENOUS at 08:30

## 2022-09-20 RX ADMIN — HYDROMORPHONE HYDROCHLORIDE 0.5 MG: 1 INJECTION, SOLUTION INTRAMUSCULAR; INTRAVENOUS; SUBCUTANEOUS at 16:05

## 2022-09-20 RX ADMIN — HYDROMORPHONE HYDROCHLORIDE 0.5 MG: 1 INJECTION, SOLUTION INTRAMUSCULAR; INTRAVENOUS; SUBCUTANEOUS at 23:14

## 2022-09-20 RX ADMIN — IPRATROPIUM BROMIDE AND ALBUTEROL SULFATE 3 ML: .5; 3 SOLUTION RESPIRATORY (INHALATION) at 07:59

## 2022-09-20 RX ADMIN — PHENYLEPHRINE HYDROCHLORIDE 0.4 MCG/KG/MIN: 10 INJECTION INTRAVENOUS at 09:46

## 2022-09-20 RX ADMIN — Medication 100 MCG: at 08:58

## 2022-09-20 RX ADMIN — CEFAZOLIN SODIUM 2 G: 2 INJECTION, SOLUTION INTRAVENOUS at 23:15

## 2022-09-20 RX ADMIN — SODIUM CHLORIDE, POTASSIUM CHLORIDE, SODIUM LACTATE AND CALCIUM CHLORIDE 100 ML/HR: 600; 310; 30; 20 INJECTION, SOLUTION INTRAVENOUS at 15:41

## 2022-09-20 RX ADMIN — FENTANYL CITRATE 50 MCG: 50 INJECTION, SOLUTION INTRAMUSCULAR; INTRAVENOUS at 11:38

## 2022-09-20 RX ADMIN — FENTANYL CITRATE 50 MCG: 50 INJECTION INTRAMUSCULAR; INTRAVENOUS at 15:06

## 2022-09-20 RX ADMIN — DEXAMETHASONE SODIUM PHOSPHATE 10 MG: 4 INJECTION, SOLUTION INTRAMUSCULAR; INTRAVENOUS at 08:40

## 2022-09-20 RX ADMIN — Medication 200 MCG: at 09:42

## 2022-09-20 RX ADMIN — CEFAZOLIN SODIUM 2 G: 2 INJECTION, SOLUTION INTRAVENOUS at 15:20

## 2022-09-20 RX ADMIN — ROCURONIUM BROMIDE 20 MG: 100 INJECTION, SOLUTION INTRAVENOUS at 09:12

## 2022-09-20 RX ADMIN — SODIUM CHLORIDE, POTASSIUM CHLORIDE, SODIUM LACTATE AND CALCIUM CHLORIDE 9 ML/HR: 600; 310; 30; 20 INJECTION, SOLUTION INTRAVENOUS at 08:02

## 2022-09-20 RX ADMIN — SODIUM CHLORIDE, POTASSIUM CHLORIDE, SODIUM LACTATE AND CALCIUM CHLORIDE 100 ML/HR: 600; 310; 30; 20 INJECTION, SOLUTION INTRAVENOUS at 21:40

## 2022-09-20 RX ADMIN — SUGAMMADEX 200 MG: 100 INJECTION, SOLUTION INTRAVENOUS at 11:57

## 2022-09-20 RX ADMIN — IBUPROFEN 800 MG: 400 TABLET, FILM COATED ORAL at 04:14

## 2022-09-20 RX ADMIN — ROCURONIUM BROMIDE 50 MG: 100 INJECTION, SOLUTION INTRAVENOUS at 08:20

## 2022-09-20 RX ADMIN — HYDROMORPHONE HYDROCHLORIDE 0.5 MG: 1 INJECTION, SOLUTION INTRAMUSCULAR; INTRAVENOUS; SUBCUTANEOUS at 18:11

## 2022-09-20 RX ADMIN — ROCURONIUM BROMIDE 10 MG: 100 INJECTION, SOLUTION INTRAVENOUS at 10:30

## 2022-09-20 RX ADMIN — CEFAZOLIN SODIUM 2 G: 2 INJECTION, SOLUTION INTRAVENOUS at 08:05

## 2022-09-20 RX ADMIN — Medication 10 ML: at 21:40

## 2022-09-20 RX ADMIN — FENTANYL CITRATE 50 MCG: 50 INJECTION, SOLUTION INTRAMUSCULAR; INTRAVENOUS at 08:20

## 2022-09-20 RX ADMIN — METHOCARBAMOL 1000 MG: 100 INJECTION INTRAMUSCULAR; INTRAVENOUS at 13:25

## 2022-09-20 NOTE — ANESTHESIA PREPROCEDURE EVALUATION
Anesthesia Evaluation     Patient summary reviewed     NPO Liquid Status: > 8 hours           Airway   Mallampati: II  Neck ROM: limited  No difficulty expected  Dental    (+) edentulous    Pulmonary    (+) shortness of breath,   Cardiovascular     ECG reviewed  Rhythm: regular    (+) hypertension, CHF ,       Neuro/Psych  GI/Hepatic/Renal/Endo    (+) obesity,   diabetes mellitus, thyroid problem     Musculoskeletal     Abdominal    Substance History      OB/GYN          Other   arthritis,                      Anesthesia Plan    ASA 3     general       Anesthetic plan, risks, benefits, and alternatives have been provided, discussed and informed consent has been obtained with: patient.    Use of blood products discussed with patient .       CODE STATUS:    Code Status (Patient has no pulse and is not breathing): CPR (Attempt to Resuscitate)  Medical Interventions (Patient has pulse or is breathing): Full

## 2022-09-20 NOTE — ANESTHESIA PROCEDURE NOTES
Airway  Urgency: elective    Date/Time: 9/20/2022 8:23 AM  Airway not difficult    General Information and Staff    Patient location during procedure: OR  Anesthesiologist: Santos Curry MD  CRNA/CAA: Taylor Arambula CRNA    Indications and Patient Condition  Indications for airway management: airway protection    Preoxygenated: yes  Mask difficulty assessment: 1 - vent by mask    Final Airway Details  Final airway type: endotracheal airway      Successful airway: ETT  Cuffed: yes   Successful intubation technique: direct laryngoscopy  Facilitating devices/methods: intubating stylet  Endotracheal tube insertion site: oral  Blade: [place]  Blade size: 2  ETT size (mm): 7.0  Cormack-Lehane Classification: grade I - full view of glottis  Placement verified by: chest auscultation and capnometry   Cuff volume (mL): 6  Measured from: lips  ETT/EBT  to lips (cm): 21  Number of attempts at approach: 1  Assessment: lips, teeth, and gum same as pre-op and atraumatic intubation    Additional Comments  Airway exam prior to DL, teeth/lips inspected. Preoxygenated with 100% O2; sniffing position, easy mask ventilation. Eyes taped. Atraumatic intubation. Lips and teeth intact, no damage. ETT connected to vent. Confirmed EBBS, +EtCO2.

## 2022-09-20 NOTE — ANESTHESIA POSTPROCEDURE EVALUATION
Patient: Gabby Acosta    Procedure Summary     Date: 09/20/22 Room / Location: [facility] OR  / [facility] MAIN OR    Anesthesia Start: 0810 Anesthesia Stop: 1218    Procedure: Anterior cervical corpectomy, cervical four/five/six, with cage and plate from cervical three to cervical seven (N/A ) Diagnosis:       Cervical spondylosis with myelopathy      (Cervical spondylosis with myelopathy [M47.12])    Surgeons: David Cheung MD Provider: Santos Curry MD    Anesthesia Type: general ASA Status: 3          Anesthesia Type: general    Vitals  Vitals Value Taken Time   /72 09/20/22 1346   Temp 36.7 °C (98 °F) 09/20/22 1217   Pulse 81 09/20/22 1354   Resp 16 09/20/22 1315   SpO2 93 % 09/20/22 1354   Vitals shown include unvalidated device data.        Post Anesthesia Care and Evaluation    Patient location during evaluation: PACU  Patient participation: complete - patient participated  Level of consciousness: awake and alert  Pain management: adequate    Airway patency: patent  Anesthetic complications: No anesthetic complications    Cardiovascular status: acceptable  Respiratory status: acceptable  Hydration status: acceptable    Comments: --------------------            09/20/22               1315     --------------------   BP:       159/85     Pulse:      82       Resp:       16       Temp:                SpO2:      92%      --------------------

## 2022-09-21 DIAGNOSIS — M47.12 CERVICAL SPONDYLOSIS WITH MYELOPATHY: Primary | ICD-10-CM

## 2022-09-21 DIAGNOSIS — Z98.1 HISTORY OF FUSION OF CERVICAL SPINE: ICD-10-CM

## 2022-09-21 LAB
ANION GAP SERPL CALCULATED.3IONS-SCNC: 6 MMOL/L (ref 5–15)
BUN SERPL-MCNC: 18 MG/DL (ref 8–23)
BUN/CREAT SERPL: 31.6 (ref 7–25)
CALCIUM SPEC-SCNC: 8.3 MG/DL (ref 8.6–10.5)
CHLORIDE SERPL-SCNC: 99 MMOL/L (ref 98–107)
CO2 SERPL-SCNC: 32 MMOL/L (ref 22–29)
CREAT SERPL-MCNC: 0.57 MG/DL (ref 0.57–1)
DEPRECATED RDW RBC AUTO: 42.4 FL (ref 37–54)
EGFRCR SERPLBLD CKD-EPI 2021: 100.4 ML/MIN/1.73
ERYTHROCYTE [DISTWIDTH] IN BLOOD BY AUTOMATED COUNT: 12.3 % (ref 12.3–15.4)
GLUCOSE BLDC GLUCOMTR-MCNC: 122 MG/DL (ref 70–130)
GLUCOSE BLDC GLUCOMTR-MCNC: 157 MG/DL (ref 70–130)
GLUCOSE BLDC GLUCOMTR-MCNC: 177 MG/DL (ref 70–130)
GLUCOSE SERPL-MCNC: 135 MG/DL (ref 65–99)
HCT VFR BLD AUTO: 34.7 % (ref 34–46.6)
HGB BLD-MCNC: 11.5 G/DL (ref 12–15.9)
LYMPHOCYTES # BLD MANUAL: 1.37 10*3/MM3 (ref 0.7–3.1)
LYMPHOCYTES NFR BLD MANUAL: 11 % (ref 5–12)
MCH RBC QN AUTO: 31.2 PG (ref 26.6–33)
MCHC RBC AUTO-ENTMCNC: 33.1 G/DL (ref 31.5–35.7)
MCV RBC AUTO: 94 FL (ref 79–97)
MONOCYTES # BLD: 0.79 10*3/MM3 (ref 0.1–0.9)
NEUTROPHILS # BLD AUTO: 5.05 10*3/MM3 (ref 1.7–7)
NEUTROPHILS NFR BLD MANUAL: 70 % (ref 42.7–76)
NRBC BLD AUTO-RTO: 0 /100 WBC (ref 0–0.2)
PLAT MORPH BLD: NORMAL
PLATELET # BLD AUTO: 148 10*3/MM3 (ref 140–450)
PMV BLD AUTO: 10.7 FL (ref 6–12)
POTASSIUM SERPL-SCNC: 3.9 MMOL/L (ref 3.5–5.2)
RBC # BLD AUTO: 3.69 10*6/MM3 (ref 3.77–5.28)
RBC MORPH BLD: NORMAL
SODIUM SERPL-SCNC: 137 MMOL/L (ref 136–145)
VARIANT LYMPHS NFR BLD MANUAL: 19 % (ref 19.6–45.3)
WBC MORPH BLD: NORMAL
WBC NRBC COR # BLD: 7.22 10*3/MM3 (ref 3.4–10.8)

## 2022-09-21 PROCEDURE — 85025 COMPLETE CBC W/AUTO DIFF WBC: CPT | Performed by: NEUROLOGICAL SURGERY

## 2022-09-21 PROCEDURE — 63710000001 INSULIN LISPRO (HUMAN) PER 5 UNITS: Performed by: NEUROLOGICAL SURGERY

## 2022-09-21 PROCEDURE — 99024 POSTOP FOLLOW-UP VISIT: CPT

## 2022-09-21 PROCEDURE — 82962 GLUCOSE BLOOD TEST: CPT

## 2022-09-21 PROCEDURE — 25010000002 ENOXAPARIN PER 10 MG: Performed by: INTERNAL MEDICINE

## 2022-09-21 PROCEDURE — 25010000002 HYDROMORPHONE PER 4 MG: Performed by: NEUROLOGICAL SURGERY

## 2022-09-21 PROCEDURE — 97162 PT EVAL MOD COMPLEX 30 MIN: CPT

## 2022-09-21 PROCEDURE — 97530 THERAPEUTIC ACTIVITIES: CPT

## 2022-09-21 PROCEDURE — 85007 BL SMEAR W/DIFF WBC COUNT: CPT | Performed by: NEUROLOGICAL SURGERY

## 2022-09-21 PROCEDURE — 80048 BASIC METABOLIC PNL TOTAL CA: CPT | Performed by: NEUROLOGICAL SURGERY

## 2022-09-21 RX ORDER — ENOXAPARIN SODIUM 100 MG/ML
40 INJECTION SUBCUTANEOUS EVERY 24 HOURS
Status: DISCONTINUED | OUTPATIENT
Start: 2022-09-21 | End: 2022-09-23

## 2022-09-21 RX ADMIN — LEVOTHYROXINE SODIUM 125 MCG: 0.12 TABLET ORAL at 08:40

## 2022-09-21 RX ADMIN — HYDROMORPHONE HYDROCHLORIDE 0.5 MG: 1 INJECTION, SOLUTION INTRAMUSCULAR; INTRAVENOUS; SUBCUTANEOUS at 08:40

## 2022-09-21 RX ADMIN — HYDROMORPHONE HYDROCHLORIDE 0.5 MG: 1 INJECTION, SOLUTION INTRAMUSCULAR; INTRAVENOUS; SUBCUTANEOUS at 23:56

## 2022-09-21 RX ADMIN — Medication 10 ML: at 20:08

## 2022-09-21 RX ADMIN — ESCITALOPRAM 10 MG: 10 TABLET, FILM COATED ORAL at 08:40

## 2022-09-21 RX ADMIN — ATORVASTATIN CALCIUM 10 MG: 20 TABLET, FILM COATED ORAL at 08:39

## 2022-09-21 RX ADMIN — Medication 10 ML: at 08:51

## 2022-09-21 RX ADMIN — SODIUM CHLORIDE, POTASSIUM CHLORIDE, SODIUM LACTATE AND CALCIUM CHLORIDE 100 ML/HR: 600; 310; 30; 20 INJECTION, SOLUTION INTRAVENOUS at 17:13

## 2022-09-21 RX ADMIN — INSULIN LISPRO 2 UNITS: 100 INJECTION, SOLUTION INTRAVENOUS; SUBCUTANEOUS at 12:22

## 2022-09-21 RX ADMIN — INSULIN LISPRO 2 UNITS: 100 INJECTION, SOLUTION INTRAVENOUS; SUBCUTANEOUS at 17:06

## 2022-09-21 RX ADMIN — HYDROMORPHONE HYDROCHLORIDE 0.5 MG: 1 INJECTION, SOLUTION INTRAMUSCULAR; INTRAVENOUS; SUBCUTANEOUS at 03:19

## 2022-09-21 RX ADMIN — HYDROMORPHONE HYDROCHLORIDE 0.5 MG: 1 INJECTION, SOLUTION INTRAMUSCULAR; INTRAVENOUS; SUBCUTANEOUS at 20:07

## 2022-09-21 RX ADMIN — LOSARTAN POTASSIUM 100 MG: 100 TABLET, FILM COATED ORAL at 08:40

## 2022-09-21 RX ADMIN — HYDROMORPHONE HYDROCHLORIDE 0.5 MG: 1 INJECTION, SOLUTION INTRAMUSCULAR; INTRAVENOUS; SUBCUTANEOUS at 12:03

## 2022-09-21 RX ADMIN — BUPROPION HYDROCHLORIDE 150 MG: 150 TABLET, EXTENDED RELEASE ORAL at 08:40

## 2022-09-21 RX ADMIN — HYDROMORPHONE HYDROCHLORIDE 0.5 MG: 1 INJECTION, SOLUTION INTRAMUSCULAR; INTRAVENOUS; SUBCUTANEOUS at 17:07

## 2022-09-21 RX ADMIN — HYDROMORPHONE HYDROCHLORIDE 0.5 MG: 1 INJECTION, SOLUTION INTRAMUSCULAR; INTRAVENOUS; SUBCUTANEOUS at 05:57

## 2022-09-21 RX ADMIN — AMLODIPINE BESYLATE 5 MG: 5 TABLET ORAL at 08:40

## 2022-09-21 RX ADMIN — ENOXAPARIN SODIUM 40 MG: 100 INJECTION SUBCUTANEOUS at 17:06

## 2022-09-21 RX ADMIN — SODIUM CHLORIDE, POTASSIUM CHLORIDE, SODIUM LACTATE AND CALCIUM CHLORIDE 100 ML/HR: 600; 310; 30; 20 INJECTION, SOLUTION INTRAVENOUS at 08:35

## 2022-09-22 ENCOUNTER — APPOINTMENT (OUTPATIENT)
Dept: GENERAL RADIOLOGY | Facility: HOSPITAL | Age: 67
End: 2022-09-22

## 2022-09-22 LAB
ALBUMIN SERPL-MCNC: 3.2 G/DL (ref 3.5–5.2)
ANION GAP SERPL CALCULATED.3IONS-SCNC: 9 MMOL/L (ref 5–15)
BASOPHILS # BLD AUTO: 0.01 10*3/MM3 (ref 0–0.2)
BASOPHILS NFR BLD AUTO: 0.1 % (ref 0–1.5)
BUN SERPL-MCNC: 14 MG/DL (ref 8–23)
BUN/CREAT SERPL: 28 (ref 7–25)
CALCIUM SPEC-SCNC: 8.1 MG/DL (ref 8.6–10.5)
CHLORIDE SERPL-SCNC: 97 MMOL/L (ref 98–107)
CO2 SERPL-SCNC: 28 MMOL/L (ref 22–29)
CREAT SERPL-MCNC: 0.5 MG/DL (ref 0.57–1)
DEPRECATED RDW RBC AUTO: 41.6 FL (ref 37–54)
EGFRCR SERPLBLD CKD-EPI 2021: 103.6 ML/MIN/1.73
EOSINOPHIL # BLD AUTO: 0.02 10*3/MM3 (ref 0–0.4)
EOSINOPHIL NFR BLD AUTO: 0.3 % (ref 0.3–6.2)
ERYTHROCYTE [DISTWIDTH] IN BLOOD BY AUTOMATED COUNT: 12.3 % (ref 12.3–15.4)
GLUCOSE BLDC GLUCOMTR-MCNC: 139 MG/DL (ref 70–130)
GLUCOSE BLDC GLUCOMTR-MCNC: 151 MG/DL (ref 70–130)
GLUCOSE BLDC GLUCOMTR-MCNC: 152 MG/DL (ref 70–130)
GLUCOSE BLDC GLUCOMTR-MCNC: 152 MG/DL (ref 70–130)
GLUCOSE SERPL-MCNC: 156 MG/DL (ref 65–99)
HCT VFR BLD AUTO: 33.5 % (ref 34–46.6)
HGB BLD-MCNC: 11.4 G/DL (ref 12–15.9)
LYMPHOCYTES # BLD AUTO: 1.17 10*3/MM3 (ref 0.7–3.1)
LYMPHOCYTES NFR BLD AUTO: 16.2 % (ref 19.6–45.3)
MCH RBC QN AUTO: 31.6 PG (ref 26.6–33)
MCHC RBC AUTO-ENTMCNC: 34 G/DL (ref 31.5–35.7)
MCV RBC AUTO: 92.8 FL (ref 79–97)
MONOCYTES # BLD AUTO: 0.9 10*3/MM3 (ref 0.1–0.9)
MONOCYTES NFR BLD AUTO: 12.5 % (ref 5–12)
NEUTROPHILS NFR BLD AUTO: 5.09 10*3/MM3 (ref 1.7–7)
NEUTROPHILS NFR BLD AUTO: 70.5 % (ref 42.7–76)
NT-PROBNP SERPL-MCNC: 245 PG/ML (ref 0–900)
PHOSPHATE SERPL-MCNC: 2 MG/DL (ref 2.5–4.5)
PLATELET # BLD AUTO: 132 10*3/MM3 (ref 140–450)
PMV BLD AUTO: 10.6 FL (ref 6–12)
POTASSIUM SERPL-SCNC: 4 MMOL/L (ref 3.5–5.2)
RBC # BLD AUTO: 3.61 10*6/MM3 (ref 3.77–5.28)
SODIUM SERPL-SCNC: 134 MMOL/L (ref 136–145)
WBC NRBC COR # BLD: 7.22 10*3/MM3 (ref 3.4–10.8)

## 2022-09-22 PROCEDURE — 85025 COMPLETE CBC W/AUTO DIFF WBC: CPT | Performed by: NEUROLOGICAL SURGERY

## 2022-09-22 PROCEDURE — 63710000001 INSULIN LISPRO (HUMAN) PER 5 UNITS: Performed by: NEUROLOGICAL SURGERY

## 2022-09-22 PROCEDURE — 83880 ASSAY OF NATRIURETIC PEPTIDE: CPT | Performed by: INTERNAL MEDICINE

## 2022-09-22 PROCEDURE — 97167 OT EVAL HIGH COMPLEX 60 MIN: CPT

## 2022-09-22 PROCEDURE — 97530 THERAPEUTIC ACTIVITIES: CPT

## 2022-09-22 PROCEDURE — 99024 POSTOP FOLLOW-UP VISIT: CPT

## 2022-09-22 PROCEDURE — 25010000002 HYDROMORPHONE PER 4 MG: Performed by: NEUROLOGICAL SURGERY

## 2022-09-22 PROCEDURE — 80069 RENAL FUNCTION PANEL: CPT | Performed by: INTERNAL MEDICINE

## 2022-09-22 PROCEDURE — 82962 GLUCOSE BLOOD TEST: CPT

## 2022-09-22 PROCEDURE — 71045 X-RAY EXAM CHEST 1 VIEW: CPT

## 2022-09-22 PROCEDURE — 97535 SELF CARE MNGMENT TRAINING: CPT

## 2022-09-22 PROCEDURE — 25010000002 ONDANSETRON PER 1 MG: Performed by: NEUROLOGICAL SURGERY

## 2022-09-22 PROCEDURE — 25010000002 ENOXAPARIN PER 10 MG: Performed by: INTERNAL MEDICINE

## 2022-09-22 RX ORDER — METHYLPREDNISOLONE ACETATE 40 MG/ML
40 INJECTION, SUSPENSION INTRA-ARTICULAR; INTRALESIONAL; INTRAMUSCULAR; SOFT TISSUE ONCE
Status: DISCONTINUED | OUTPATIENT
Start: 2022-09-22 | End: 2022-09-27

## 2022-09-22 RX ORDER — LIDOCAINE HYDROCHLORIDE 10 MG/ML
10 INJECTION, SOLUTION INFILTRATION; PERINEURAL ONCE
Status: DISCONTINUED | OUTPATIENT
Start: 2022-09-22 | End: 2022-09-27

## 2022-09-22 RX ADMIN — CYCLOBENZAPRINE 10 MG: 10 TABLET, FILM COATED ORAL at 17:18

## 2022-09-22 RX ADMIN — SODIUM CHLORIDE, POTASSIUM CHLORIDE, SODIUM LACTATE AND CALCIUM CHLORIDE 100 ML/HR: 600; 310; 30; 20 INJECTION, SOLUTION INTRAVENOUS at 03:23

## 2022-09-22 RX ADMIN — ESCITALOPRAM 10 MG: 10 TABLET, FILM COATED ORAL at 08:24

## 2022-09-22 RX ADMIN — LOSARTAN POTASSIUM 100 MG: 100 TABLET, FILM COATED ORAL at 08:24

## 2022-09-22 RX ADMIN — INSULIN LISPRO 2 UNITS: 100 INJECTION, SOLUTION INTRAVENOUS; SUBCUTANEOUS at 17:18

## 2022-09-22 RX ADMIN — BUPROPION HYDROCHLORIDE 150 MG: 150 TABLET, EXTENDED RELEASE ORAL at 08:24

## 2022-09-22 RX ADMIN — AMLODIPINE BESYLATE 5 MG: 5 TABLET ORAL at 08:23

## 2022-09-22 RX ADMIN — ATORVASTATIN CALCIUM 10 MG: 20 TABLET, FILM COATED ORAL at 08:23

## 2022-09-22 RX ADMIN — Medication 10 ML: at 08:25

## 2022-09-22 RX ADMIN — LEVOTHYROXINE SODIUM 125 MCG: 0.12 TABLET ORAL at 08:24

## 2022-09-22 RX ADMIN — DOCUSATE SODIUM 50MG AND SENNOSIDES 8.6MG 1 TABLET: 8.6; 5 TABLET, FILM COATED ORAL at 19:58

## 2022-09-22 RX ADMIN — INSULIN LISPRO 2 UNITS: 100 INJECTION, SOLUTION INTRAVENOUS; SUBCUTANEOUS at 08:23

## 2022-09-22 RX ADMIN — ONDANSETRON 4 MG: 2 INJECTION INTRAMUSCULAR; INTRAVENOUS at 00:03

## 2022-09-22 RX ADMIN — HYDROMORPHONE HYDROCHLORIDE 0.5 MG: 1 INJECTION, SOLUTION INTRAMUSCULAR; INTRAVENOUS; SUBCUTANEOUS at 19:58

## 2022-09-22 RX ADMIN — AMLODIPINE BESYLATE 5 MG: 5 TABLET ORAL at 20:06

## 2022-09-22 RX ADMIN — CYCLOBENZAPRINE 10 MG: 10 TABLET, FILM COATED ORAL at 08:23

## 2022-09-22 RX ADMIN — HYDROMORPHONE HYDROCHLORIDE 0.5 MG: 1 INJECTION, SOLUTION INTRAMUSCULAR; INTRAVENOUS; SUBCUTANEOUS at 17:17

## 2022-09-22 RX ADMIN — HYDROMORPHONE HYDROCHLORIDE 0.5 MG: 1 INJECTION, SOLUTION INTRAMUSCULAR; INTRAVENOUS; SUBCUTANEOUS at 12:15

## 2022-09-22 RX ADMIN — ENOXAPARIN SODIUM 40 MG: 100 INJECTION SUBCUTANEOUS at 15:14

## 2022-09-22 RX ADMIN — Medication 3 MG: at 19:58

## 2022-09-22 RX ADMIN — HYDROCODONE BITARTRATE AND ACETAMINOPHEN 1 TABLET: 7.5; 325 TABLET ORAL at 08:24

## 2022-09-22 RX ADMIN — HYDROMORPHONE HYDROCHLORIDE 0.5 MG: 1 INJECTION, SOLUTION INTRAMUSCULAR; INTRAVENOUS; SUBCUTANEOUS at 03:23

## 2022-09-22 RX ADMIN — ALPRAZOLAM 0.25 MG: 0.25 TABLET ORAL at 17:18

## 2022-09-23 LAB
ANION GAP SERPL CALCULATED.3IONS-SCNC: 6 MMOL/L (ref 5–15)
BASOPHILS # BLD AUTO: 0.02 10*3/MM3 (ref 0–0.2)
BASOPHILS NFR BLD AUTO: 0.3 % (ref 0–1.5)
BUN SERPL-MCNC: 15 MG/DL (ref 8–23)
BUN/CREAT SERPL: 35.7 (ref 7–25)
CALCIUM SPEC-SCNC: 8 MG/DL (ref 8.6–10.5)
CHLORIDE SERPL-SCNC: 99 MMOL/L (ref 98–107)
CO2 SERPL-SCNC: 31 MMOL/L (ref 22–29)
CREAT SERPL-MCNC: 0.42 MG/DL (ref 0.57–1)
D DIMER PPP FEU-MCNC: 0.93 MCGFEU/ML (ref 0–0.49)
DEPRECATED RDW RBC AUTO: 42.9 FL (ref 37–54)
EGFRCR SERPLBLD CKD-EPI 2021: 108 ML/MIN/1.73
EOSINOPHIL # BLD AUTO: 0.03 10*3/MM3 (ref 0–0.4)
EOSINOPHIL NFR BLD AUTO: 0.4 % (ref 0.3–6.2)
ERYTHROCYTE [DISTWIDTH] IN BLOOD BY AUTOMATED COUNT: 12.4 % (ref 12.3–15.4)
GLUCOSE BLDC GLUCOMTR-MCNC: 136 MG/DL (ref 70–130)
GLUCOSE BLDC GLUCOMTR-MCNC: 144 MG/DL (ref 70–130)
GLUCOSE BLDC GLUCOMTR-MCNC: 156 MG/DL (ref 70–130)
GLUCOSE BLDC GLUCOMTR-MCNC: 175 MG/DL (ref 70–130)
GLUCOSE SERPL-MCNC: 126 MG/DL (ref 65–99)
HCT VFR BLD AUTO: 31.5 % (ref 34–46.6)
HGB BLD-MCNC: 10.5 G/DL (ref 12–15.9)
IMM GRANULOCYTES # BLD AUTO: 0.03 10*3/MM3 (ref 0–0.05)
IMM GRANULOCYTES NFR BLD AUTO: 0.4 % (ref 0–0.5)
LYMPHOCYTES # BLD AUTO: 1.53 10*3/MM3 (ref 0.7–3.1)
LYMPHOCYTES NFR BLD AUTO: 20.7 % (ref 19.6–45.3)
MCH RBC QN AUTO: 31.3 PG (ref 26.6–33)
MCHC RBC AUTO-ENTMCNC: 33.3 G/DL (ref 31.5–35.7)
MCV RBC AUTO: 94 FL (ref 79–97)
MONOCYTES # BLD AUTO: 0.97 10*3/MM3 (ref 0.1–0.9)
MONOCYTES NFR BLD AUTO: 13.1 % (ref 5–12)
NEUTROPHILS NFR BLD AUTO: 4.81 10*3/MM3 (ref 1.7–7)
NEUTROPHILS NFR BLD AUTO: 65.1 % (ref 42.7–76)
NRBC BLD AUTO-RTO: 0 /100 WBC (ref 0–0.2)
PLATELET # BLD AUTO: 120 10*3/MM3 (ref 140–450)
PMV BLD AUTO: 10.5 FL (ref 6–12)
POTASSIUM SERPL-SCNC: 3.8 MMOL/L (ref 3.5–5.2)
RBC # BLD AUTO: 3.35 10*6/MM3 (ref 3.77–5.28)
SODIUM SERPL-SCNC: 136 MMOL/L (ref 136–145)
WBC NRBC COR # BLD: 7.39 10*3/MM3 (ref 3.4–10.8)

## 2022-09-23 PROCEDURE — 25010000002 HYDROMORPHONE PER 4 MG: Performed by: NEUROLOGICAL SURGERY

## 2022-09-23 PROCEDURE — 85025 COMPLETE CBC W/AUTO DIFF WBC: CPT | Performed by: NEUROLOGICAL SURGERY

## 2022-09-23 PROCEDURE — 25010000002 ONDANSETRON PER 1 MG: Performed by: NEUROLOGICAL SURGERY

## 2022-09-23 PROCEDURE — 82962 GLUCOSE BLOOD TEST: CPT

## 2022-09-23 PROCEDURE — 94799 UNLISTED PULMONARY SVC/PX: CPT

## 2022-09-23 PROCEDURE — 87070 CULTURE OTHR SPECIMN AEROBIC: CPT | Performed by: INTERNAL MEDICINE

## 2022-09-23 PROCEDURE — 97530 THERAPEUTIC ACTIVITIES: CPT

## 2022-09-23 PROCEDURE — 92610 EVALUATE SWALLOWING FUNCTION: CPT

## 2022-09-23 PROCEDURE — 94664 DEMO&/EVAL PT USE INHALER: CPT

## 2022-09-23 PROCEDURE — 80048 BASIC METABOLIC PNL TOTAL CA: CPT | Performed by: NEUROLOGICAL SURGERY

## 2022-09-23 PROCEDURE — 63710000001 INSULIN LISPRO (HUMAN) PER 5 UNITS: Performed by: NEUROLOGICAL SURGERY

## 2022-09-23 PROCEDURE — 85379 FIBRIN DEGRADATION QUANT: CPT | Performed by: INTERNAL MEDICINE

## 2022-09-23 PROCEDURE — 94761 N-INVAS EAR/PLS OXIMETRY MLT: CPT

## 2022-09-23 PROCEDURE — 99024 POSTOP FOLLOW-UP VISIT: CPT | Performed by: NURSE PRACTITIONER

## 2022-09-23 PROCEDURE — 87205 SMEAR GRAM STAIN: CPT | Performed by: INTERNAL MEDICINE

## 2022-09-23 RX ORDER — IPRATROPIUM BROMIDE AND ALBUTEROL SULFATE 2.5; .5 MG/3ML; MG/3ML
3 SOLUTION RESPIRATORY (INHALATION)
Status: DISCONTINUED | OUTPATIENT
Start: 2022-09-23 | End: 2022-09-29 | Stop reason: HOSPADM

## 2022-09-23 RX ORDER — GUAIFENESIN 600 MG/1
600 TABLET, EXTENDED RELEASE ORAL EVERY 12 HOURS SCHEDULED
Status: DISCONTINUED | OUTPATIENT
Start: 2022-09-23 | End: 2022-09-29 | Stop reason: HOSPADM

## 2022-09-23 RX ADMIN — HYDROCODONE BITARTRATE AND ACETAMINOPHEN 1 TABLET: 7.5; 325 TABLET ORAL at 14:58

## 2022-09-23 RX ADMIN — BUPROPION HYDROCHLORIDE 150 MG: 150 TABLET, EXTENDED RELEASE ORAL at 09:28

## 2022-09-23 RX ADMIN — Medication 3 MG: at 20:32

## 2022-09-23 RX ADMIN — HYDROMORPHONE HYDROCHLORIDE 0.5 MG: 1 INJECTION, SOLUTION INTRAMUSCULAR; INTRAVENOUS; SUBCUTANEOUS at 03:59

## 2022-09-23 RX ADMIN — INSULIN LISPRO 2 UNITS: 100 INJECTION, SOLUTION INTRAVENOUS; SUBCUTANEOUS at 12:30

## 2022-09-23 RX ADMIN — IPRATROPIUM BROMIDE AND ALBUTEROL SULFATE 3 ML: .5; 3 SOLUTION RESPIRATORY (INHALATION) at 17:33

## 2022-09-23 RX ADMIN — LOSARTAN POTASSIUM 100 MG: 100 TABLET, FILM COATED ORAL at 09:28

## 2022-09-23 RX ADMIN — HYDROCODONE BITARTRATE AND ACETAMINOPHEN 1 TABLET: 7.5; 325 TABLET ORAL at 09:29

## 2022-09-23 RX ADMIN — ATORVASTATIN CALCIUM 10 MG: 20 TABLET, FILM COATED ORAL at 09:29

## 2022-09-23 RX ADMIN — CYCLOBENZAPRINE 10 MG: 10 TABLET, FILM COATED ORAL at 01:31

## 2022-09-23 RX ADMIN — Medication 10 ML: at 20:37

## 2022-09-23 RX ADMIN — ESCITALOPRAM 10 MG: 10 TABLET, FILM COATED ORAL at 09:28

## 2022-09-23 RX ADMIN — HYDROCODONE BITARTRATE AND ACETAMINOPHEN 1 TABLET: 7.5; 325 TABLET ORAL at 01:31

## 2022-09-23 RX ADMIN — GUAIFENESIN 600 MG: 600 TABLET, EXTENDED RELEASE ORAL at 20:32

## 2022-09-23 RX ADMIN — HYDROMORPHONE HYDROCHLORIDE 0.5 MG: 1 INJECTION, SOLUTION INTRAMUSCULAR; INTRAVENOUS; SUBCUTANEOUS at 20:32

## 2022-09-23 RX ADMIN — ALPRAZOLAM 0.25 MG: 0.25 TABLET ORAL at 01:31

## 2022-09-23 RX ADMIN — ALPRAZOLAM 0.25 MG: 0.25 TABLET ORAL at 20:32

## 2022-09-23 RX ADMIN — CYCLOBENZAPRINE 10 MG: 10 TABLET, FILM COATED ORAL at 20:32

## 2022-09-23 RX ADMIN — IPRATROPIUM BROMIDE AND ALBUTEROL SULFATE 3 ML: .5; 3 SOLUTION RESPIRATORY (INHALATION) at 20:21

## 2022-09-23 RX ADMIN — ONDANSETRON 4 MG: 2 INJECTION INTRAMUSCULAR; INTRAVENOUS at 03:59

## 2022-09-23 RX ADMIN — IPRATROPIUM BROMIDE AND ALBUTEROL SULFATE 3 ML: .5; 3 SOLUTION RESPIRATORY (INHALATION) at 07:54

## 2022-09-23 RX ADMIN — ALPRAZOLAM 0.25 MG: 0.25 TABLET ORAL at 09:29

## 2022-09-23 RX ADMIN — DOCUSATE SODIUM 50MG AND SENNOSIDES 8.6MG 1 TABLET: 8.6; 5 TABLET, FILM COATED ORAL at 20:32

## 2022-09-23 RX ADMIN — Medication 10 ML: at 09:40

## 2022-09-23 RX ADMIN — CYCLOBENZAPRINE 10 MG: 10 TABLET, FILM COATED ORAL at 09:29

## 2022-09-23 RX ADMIN — GUAIFENESIN 600 MG: 600 TABLET, EXTENDED RELEASE ORAL at 11:12

## 2022-09-23 RX ADMIN — LEVOTHYROXINE SODIUM 125 MCG: 0.12 TABLET ORAL at 09:28

## 2022-09-23 RX ADMIN — FLUTICASONE PROPIONATE 2 SPRAY: 50 SPRAY, METERED NASAL at 09:40

## 2022-09-23 RX ADMIN — AMLODIPINE BESYLATE 5 MG: 5 TABLET ORAL at 09:28

## 2022-09-24 LAB
ALBUMIN SERPL-MCNC: 3 G/DL (ref 3.5–5.2)
ANION GAP SERPL CALCULATED.3IONS-SCNC: 8.2 MMOL/L (ref 5–15)
BASOPHILS # BLD AUTO: 0.01 10*3/MM3 (ref 0–0.2)
BASOPHILS NFR BLD AUTO: 0.1 % (ref 0–1.5)
BUN SERPL-MCNC: 13 MG/DL (ref 8–23)
BUN/CREAT SERPL: 34.2 (ref 7–25)
CALCIUM SPEC-SCNC: 8.7 MG/DL (ref 8.6–10.5)
CHLORIDE SERPL-SCNC: 95 MMOL/L (ref 98–107)
CO2 SERPL-SCNC: 32.8 MMOL/L (ref 22–29)
CREAT SERPL-MCNC: 0.38 MG/DL (ref 0.57–1)
DEPRECATED RDW RBC AUTO: 38.7 FL (ref 37–54)
EGFRCR SERPLBLD CKD-EPI 2021: 110.7 ML/MIN/1.73
EOSINOPHIL # BLD AUTO: 0.03 10*3/MM3 (ref 0–0.4)
EOSINOPHIL NFR BLD AUTO: 0.4 % (ref 0.3–6.2)
ERYTHROCYTE [DISTWIDTH] IN BLOOD BY AUTOMATED COUNT: 11.8 % (ref 12.3–15.4)
GLUCOSE BLDC GLUCOMTR-MCNC: 137 MG/DL (ref 70–130)
GLUCOSE BLDC GLUCOMTR-MCNC: 148 MG/DL (ref 70–130)
GLUCOSE BLDC GLUCOMTR-MCNC: 169 MG/DL (ref 70–130)
GLUCOSE BLDC GLUCOMTR-MCNC: 170 MG/DL (ref 70–130)
GLUCOSE SERPL-MCNC: 176 MG/DL (ref 65–99)
HCT VFR BLD AUTO: 31.6 % (ref 34–46.6)
HGB BLD-MCNC: 10.9 G/DL (ref 12–15.9)
IMM GRANULOCYTES # BLD AUTO: 0.04 10*3/MM3 (ref 0–0.05)
IMM GRANULOCYTES NFR BLD AUTO: 0.6 % (ref 0–0.5)
LYMPHOCYTES # BLD AUTO: 0.69 10*3/MM3 (ref 0.7–3.1)
LYMPHOCYTES NFR BLD AUTO: 9.9 % (ref 19.6–45.3)
MCH RBC QN AUTO: 30.9 PG (ref 26.6–33)
MCHC RBC AUTO-ENTMCNC: 34.5 G/DL (ref 31.5–35.7)
MCV RBC AUTO: 89.5 FL (ref 79–97)
MONOCYTES # BLD AUTO: 0.76 10*3/MM3 (ref 0.1–0.9)
MONOCYTES NFR BLD AUTO: 10.9 % (ref 5–12)
NEUTROPHILS NFR BLD AUTO: 5.45 10*3/MM3 (ref 1.7–7)
NEUTROPHILS NFR BLD AUTO: 78.1 % (ref 42.7–76)
NRBC BLD AUTO-RTO: 0 /100 WBC (ref 0–0.2)
PHOSPHATE SERPL-MCNC: 1.5 MG/DL (ref 2.5–4.5)
PLATELET # BLD AUTO: 115 10*3/MM3 (ref 140–450)
PMV BLD AUTO: 11.1 FL (ref 6–12)
POTASSIUM SERPL-SCNC: 3.8 MMOL/L (ref 3.5–5.2)
RBC # BLD AUTO: 3.53 10*6/MM3 (ref 3.77–5.28)
SODIUM SERPL-SCNC: 136 MMOL/L (ref 136–145)
WBC NRBC COR # BLD: 6.98 10*3/MM3 (ref 3.4–10.8)

## 2022-09-24 PROCEDURE — 94799 UNLISTED PULMONARY SVC/PX: CPT

## 2022-09-24 PROCEDURE — 82962 GLUCOSE BLOOD TEST: CPT

## 2022-09-24 PROCEDURE — 94664 DEMO&/EVAL PT USE INHALER: CPT

## 2022-09-24 PROCEDURE — 63710000001 INSULIN LISPRO (HUMAN) PER 5 UNITS: Performed by: NEUROLOGICAL SURGERY

## 2022-09-24 PROCEDURE — 25010000002 HYDROMORPHONE PER 4 MG: Performed by: NEUROLOGICAL SURGERY

## 2022-09-24 PROCEDURE — 94761 N-INVAS EAR/PLS OXIMETRY MLT: CPT

## 2022-09-24 PROCEDURE — 85025 COMPLETE CBC W/AUTO DIFF WBC: CPT | Performed by: NEUROLOGICAL SURGERY

## 2022-09-24 PROCEDURE — 94760 N-INVAS EAR/PLS OXIMETRY 1: CPT

## 2022-09-24 PROCEDURE — 25010000002 ONDANSETRON PER 1 MG: Performed by: NEUROLOGICAL SURGERY

## 2022-09-24 PROCEDURE — 80069 RENAL FUNCTION PANEL: CPT | Performed by: INTERNAL MEDICINE

## 2022-09-24 RX ORDER — HYDROMORPHONE HYDROCHLORIDE 1 MG/ML
0.5 INJECTION, SOLUTION INTRAMUSCULAR; INTRAVENOUS; SUBCUTANEOUS
Status: DISCONTINUED | OUTPATIENT
Start: 2022-09-24 | End: 2022-09-24 | Stop reason: SDUPTHER

## 2022-09-24 RX ORDER — HYDROMORPHONE HYDROCHLORIDE 1 MG/ML
0.5 INJECTION, SOLUTION INTRAMUSCULAR; INTRAVENOUS; SUBCUTANEOUS EVERY 4 HOURS PRN
Status: DISCONTINUED | OUTPATIENT
Start: 2022-09-24 | End: 2022-09-29 | Stop reason: HOSPADM

## 2022-09-24 RX ORDER — HYDROCODONE BITARTRATE AND ACETAMINOPHEN 5; 325 MG/1; MG/1
1 TABLET ORAL EVERY 4 HOURS PRN
Status: DISCONTINUED | OUTPATIENT
Start: 2022-09-24 | End: 2022-09-29 | Stop reason: HOSPADM

## 2022-09-24 RX ADMIN — LEVOTHYROXINE SODIUM 125 MCG: 0.12 TABLET ORAL at 08:29

## 2022-09-24 RX ADMIN — HYDROCODONE BITARTRATE AND ACETAMINOPHEN 1 TABLET: 5; 325 TABLET ORAL at 18:56

## 2022-09-24 RX ADMIN — IPRATROPIUM BROMIDE AND ALBUTEROL SULFATE 3 ML: .5; 3 SOLUTION RESPIRATORY (INHALATION) at 19:28

## 2022-09-24 RX ADMIN — POTASSIUM PHOSPHATE, MONOBASIC AND POTASSIUM PHOSPHATE, DIBASIC 30 MMOL: 224; 236 INJECTION, SOLUTION, CONCENTRATE INTRAVENOUS at 13:28

## 2022-09-24 RX ADMIN — LOSARTAN POTASSIUM 100 MG: 100 TABLET, FILM COATED ORAL at 08:29

## 2022-09-24 RX ADMIN — FLUTICASONE PROPIONATE 2 SPRAY: 50 SPRAY, METERED NASAL at 08:30

## 2022-09-24 RX ADMIN — ESCITALOPRAM 10 MG: 10 TABLET, FILM COATED ORAL at 08:29

## 2022-09-24 RX ADMIN — GUAIFENESIN 600 MG: 600 TABLET, EXTENDED RELEASE ORAL at 20:45

## 2022-09-24 RX ADMIN — HYDROCODONE BITARTRATE AND ACETAMINOPHEN 1 TABLET: 7.5; 325 TABLET ORAL at 05:48

## 2022-09-24 RX ADMIN — ONDANSETRON 4 MG: 2 INJECTION INTRAMUSCULAR; INTRAVENOUS at 02:00

## 2022-09-24 RX ADMIN — CYCLOBENZAPRINE 10 MG: 10 TABLET, FILM COATED ORAL at 08:30

## 2022-09-24 RX ADMIN — BUPROPION HYDROCHLORIDE 150 MG: 150 TABLET, EXTENDED RELEASE ORAL at 08:30

## 2022-09-24 RX ADMIN — INSULIN LISPRO 2 UNITS: 100 INJECTION, SOLUTION INTRAVENOUS; SUBCUTANEOUS at 08:40

## 2022-09-24 RX ADMIN — AMLODIPINE BESYLATE 5 MG: 5 TABLET ORAL at 08:30

## 2022-09-24 RX ADMIN — ALPRAZOLAM 0.25 MG: 0.25 TABLET ORAL at 18:56

## 2022-09-24 RX ADMIN — ATORVASTATIN CALCIUM 10 MG: 20 TABLET, FILM COATED ORAL at 08:30

## 2022-09-24 RX ADMIN — HYDROMORPHONE HYDROCHLORIDE 0.5 MG: 1 INJECTION, SOLUTION INTRAMUSCULAR; INTRAVENOUS; SUBCUTANEOUS at 08:29

## 2022-09-24 RX ADMIN — ONDANSETRON 4 MG: 2 INJECTION INTRAMUSCULAR; INTRAVENOUS at 18:56

## 2022-09-24 RX ADMIN — IPRATROPIUM BROMIDE AND ALBUTEROL SULFATE 3 ML: .5; 3 SOLUTION RESPIRATORY (INHALATION) at 10:51

## 2022-09-24 RX ADMIN — ALPRAZOLAM 0.25 MG: 0.25 TABLET ORAL at 05:48

## 2022-09-24 RX ADMIN — HYDROCODONE BITARTRATE AND ACETAMINOPHEN 1 TABLET: 5; 325 TABLET ORAL at 23:34

## 2022-09-24 RX ADMIN — GUAIFENESIN 600 MG: 600 TABLET, EXTENDED RELEASE ORAL at 08:29

## 2022-09-24 RX ADMIN — Medication 10 ML: at 08:30

## 2022-09-24 RX ADMIN — HYDROMORPHONE HYDROCHLORIDE 0.5 MG: 1 INJECTION, SOLUTION INTRAMUSCULAR; INTRAVENOUS; SUBCUTANEOUS at 02:00

## 2022-09-24 RX ADMIN — IPRATROPIUM BROMIDE AND ALBUTEROL SULFATE 3 ML: .5; 3 SOLUTION RESPIRATORY (INHALATION) at 15:09

## 2022-09-25 ENCOUNTER — APPOINTMENT (OUTPATIENT)
Dept: CT IMAGING | Facility: HOSPITAL | Age: 67
End: 2022-09-25

## 2022-09-25 LAB
ANION GAP SERPL CALCULATED.3IONS-SCNC: 8.3 MMOL/L (ref 5–15)
BACTERIA SPEC RESP CULT: NORMAL
BASOPHILS # BLD AUTO: 0.02 10*3/MM3 (ref 0–0.2)
BASOPHILS NFR BLD AUTO: 0.3 % (ref 0–1.5)
BUN SERPL-MCNC: 12 MG/DL (ref 8–23)
BUN/CREAT SERPL: 31.6 (ref 7–25)
CALCIUM SPEC-SCNC: 8.9 MG/DL (ref 8.6–10.5)
CHLORIDE SERPL-SCNC: 96 MMOL/L (ref 98–107)
CO2 SERPL-SCNC: 31.7 MMOL/L (ref 22–29)
CREAT SERPL-MCNC: 0.38 MG/DL (ref 0.57–1)
D-LACTATE SERPL-SCNC: 0.9 MMOL/L (ref 0.5–2)
DEPRECATED RDW RBC AUTO: 41.7 FL (ref 37–54)
EGFRCR SERPLBLD CKD-EPI 2021: 110.7 ML/MIN/1.73
EOSINOPHIL # BLD AUTO: 0.05 10*3/MM3 (ref 0–0.4)
EOSINOPHIL NFR BLD AUTO: 0.7 % (ref 0.3–6.2)
ERYTHROCYTE [DISTWIDTH] IN BLOOD BY AUTOMATED COUNT: 12.2 % (ref 12.3–15.4)
GLUCOSE BLDC GLUCOMTR-MCNC: 146 MG/DL (ref 70–130)
GLUCOSE BLDC GLUCOMTR-MCNC: 146 MG/DL (ref 70–130)
GLUCOSE BLDC GLUCOMTR-MCNC: 161 MG/DL (ref 70–130)
GLUCOSE SERPL-MCNC: 135 MG/DL (ref 65–99)
GRAM STN SPEC: NORMAL
HCT VFR BLD AUTO: 34.3 % (ref 34–46.6)
HGB BLD-MCNC: 11.4 G/DL (ref 12–15.9)
IMM GRANULOCYTES # BLD AUTO: 0.03 10*3/MM3 (ref 0–0.05)
IMM GRANULOCYTES NFR BLD AUTO: 0.4 % (ref 0–0.5)
L PNEUMO1 AG UR QL IA: NEGATIVE
LYMPHOCYTES # BLD AUTO: 0.77 10*3/MM3 (ref 0.7–3.1)
LYMPHOCYTES NFR BLD AUTO: 11.4 % (ref 19.6–45.3)
MCH RBC QN AUTO: 30.8 PG (ref 26.6–33)
MCHC RBC AUTO-ENTMCNC: 33.2 G/DL (ref 31.5–35.7)
MCV RBC AUTO: 92.7 FL (ref 79–97)
MONOCYTES # BLD AUTO: 0.82 10*3/MM3 (ref 0.1–0.9)
MONOCYTES NFR BLD AUTO: 12.1 % (ref 5–12)
MRSA DNA SPEC QL NAA+PROBE: NORMAL
NEUTROPHILS NFR BLD AUTO: 5.06 10*3/MM3 (ref 1.7–7)
NEUTROPHILS NFR BLD AUTO: 75.1 % (ref 42.7–76)
NRBC BLD AUTO-RTO: 0.1 /100 WBC (ref 0–0.2)
PLATELET # BLD AUTO: 137 10*3/MM3 (ref 140–450)
PMV BLD AUTO: 10.7 FL (ref 6–12)
POTASSIUM SERPL-SCNC: 3.7 MMOL/L (ref 3.5–5.2)
PROCALCITONIN SERPL-MCNC: 0.07 NG/ML (ref 0–0.25)
RBC # BLD AUTO: 3.7 10*6/MM3 (ref 3.77–5.28)
S PNEUM AG SPEC QL LA: NEGATIVE
SODIUM SERPL-SCNC: 136 MMOL/L (ref 136–145)
WBC NRBC COR # BLD: 6.75 10*3/MM3 (ref 3.4–10.8)

## 2022-09-25 PROCEDURE — 94669 MECHANICAL CHEST WALL OSCILL: CPT

## 2022-09-25 PROCEDURE — 87449 NOS EACH ORGANISM AG IA: CPT | Performed by: INTERNAL MEDICINE

## 2022-09-25 PROCEDURE — 63710000001 INSULIN LISPRO (HUMAN) PER 5 UNITS: Performed by: NEUROLOGICAL SURGERY

## 2022-09-25 PROCEDURE — 94664 DEMO&/EVAL PT USE INHALER: CPT

## 2022-09-25 PROCEDURE — 25010000002 PIPERACILLIN SOD-TAZOBACTAM PER 1 G: Performed by: INTERNAL MEDICINE

## 2022-09-25 PROCEDURE — 94761 N-INVAS EAR/PLS OXIMETRY MLT: CPT

## 2022-09-25 PROCEDURE — 87899 AGENT NOS ASSAY W/OPTIC: CPT | Performed by: INTERNAL MEDICINE

## 2022-09-25 PROCEDURE — 0 IOPAMIDOL PER 1 ML: Performed by: INTERNAL MEDICINE

## 2022-09-25 PROCEDURE — 94799 UNLISTED PULMONARY SVC/PX: CPT

## 2022-09-25 PROCEDURE — 82962 GLUCOSE BLOOD TEST: CPT

## 2022-09-25 PROCEDURE — 83605 ASSAY OF LACTIC ACID: CPT | Performed by: INTERNAL MEDICINE

## 2022-09-25 PROCEDURE — 85025 COMPLETE CBC W/AUTO DIFF WBC: CPT | Performed by: NEUROLOGICAL SURGERY

## 2022-09-25 PROCEDURE — 84145 PROCALCITONIN (PCT): CPT | Performed by: INTERNAL MEDICINE

## 2022-09-25 PROCEDURE — 25010000002 HYDRALAZINE PER 20 MG: Performed by: INTERNAL MEDICINE

## 2022-09-25 PROCEDURE — 87040 BLOOD CULTURE FOR BACTERIA: CPT | Performed by: INTERNAL MEDICINE

## 2022-09-25 PROCEDURE — 87641 MR-STAPH DNA AMP PROBE: CPT | Performed by: INTERNAL MEDICINE

## 2022-09-25 PROCEDURE — 71275 CT ANGIOGRAPHY CHEST: CPT

## 2022-09-25 PROCEDURE — 25010000002 LORAZEPAM PER 2 MG: Performed by: INTERNAL MEDICINE

## 2022-09-25 PROCEDURE — 80048 BASIC METABOLIC PNL TOTAL CA: CPT | Performed by: NEUROLOGICAL SURGERY

## 2022-09-25 PROCEDURE — 99024 POSTOP FOLLOW-UP VISIT: CPT | Performed by: NURSE PRACTITIONER

## 2022-09-25 RX ORDER — AMLODIPINE BESYLATE 10 MG/1
10 TABLET ORAL DAILY
Status: DISCONTINUED | OUTPATIENT
Start: 2022-09-26 | End: 2022-09-29 | Stop reason: HOSPADM

## 2022-09-25 RX ORDER — LORAZEPAM 2 MG/ML
1 INJECTION INTRAMUSCULAR ONCE
Status: COMPLETED | OUTPATIENT
Start: 2022-09-25 | End: 2022-09-25

## 2022-09-25 RX ORDER — HYDRALAZINE HYDROCHLORIDE 20 MG/ML
20 INJECTION INTRAMUSCULAR; INTRAVENOUS ONCE
Status: COMPLETED | OUTPATIENT
Start: 2022-09-25 | End: 2022-09-25

## 2022-09-25 RX ORDER — SODIUM CHLORIDE FOR INHALATION 7 %
4 VIAL, NEBULIZER (ML) INHALATION
Status: DISCONTINUED | OUTPATIENT
Start: 2022-09-25 | End: 2022-09-29 | Stop reason: HOSPADM

## 2022-09-25 RX ORDER — ACETYLCYSTEINE 200 MG/ML
4 SOLUTION ORAL; RESPIRATORY (INHALATION)
Status: DISCONTINUED | OUTPATIENT
Start: 2022-09-25 | End: 2022-09-29 | Stop reason: HOSPADM

## 2022-09-25 RX ADMIN — ESCITALOPRAM 10 MG: 10 TABLET, FILM COATED ORAL at 09:27

## 2022-09-25 RX ADMIN — IPRATROPIUM BROMIDE AND ALBUTEROL SULFATE 3 ML: .5; 3 SOLUTION RESPIRATORY (INHALATION) at 20:23

## 2022-09-25 RX ADMIN — HYDRALAZINE HYDROCHLORIDE 20 MG: 20 INJECTION INTRAMUSCULAR; INTRAVENOUS at 15:10

## 2022-09-25 RX ADMIN — HYDROCODONE BITARTRATE AND ACETAMINOPHEN 1 TABLET: 5; 325 TABLET ORAL at 03:12

## 2022-09-25 RX ADMIN — LEVOTHYROXINE SODIUM 125 MCG: 0.12 TABLET ORAL at 09:27

## 2022-09-25 RX ADMIN — BUPROPION HYDROCHLORIDE 150 MG: 150 TABLET, EXTENDED RELEASE ORAL at 09:27

## 2022-09-25 RX ADMIN — HYDROCODONE BITARTRATE AND ACETAMINOPHEN 1 TABLET: 5; 325 TABLET ORAL at 09:42

## 2022-09-25 RX ADMIN — METOPROLOL TARTRATE 25 MG: 25 TABLET, FILM COATED ORAL at 16:38

## 2022-09-25 RX ADMIN — GUAIFENESIN 600 MG: 600 TABLET, EXTENDED RELEASE ORAL at 21:50

## 2022-09-25 RX ADMIN — IPRATROPIUM BROMIDE AND ALBUTEROL SULFATE 3 ML: .5; 3 SOLUTION RESPIRATORY (INHALATION) at 15:46

## 2022-09-25 RX ADMIN — IPRATROPIUM BROMIDE AND ALBUTEROL SULFATE 3 ML: .5; 3 SOLUTION RESPIRATORY (INHALATION) at 11:12

## 2022-09-25 RX ADMIN — ATORVASTATIN CALCIUM 10 MG: 20 TABLET, FILM COATED ORAL at 09:30

## 2022-09-25 RX ADMIN — IPRATROPIUM BROMIDE AND ALBUTEROL SULFATE 3 ML: .5; 3 SOLUTION RESPIRATORY (INHALATION) at 22:25

## 2022-09-25 RX ADMIN — TAZOBACTAM SODIUM AND PIPERACILLIN SODIUM 3.38 G: 375; 3 INJECTION, SOLUTION INTRAVENOUS at 21:30

## 2022-09-25 RX ADMIN — IPRATROPIUM BROMIDE AND ALBUTEROL SULFATE 3 ML: .5; 3 SOLUTION RESPIRATORY (INHALATION) at 08:01

## 2022-09-25 RX ADMIN — Medication 10 ML: at 21:50

## 2022-09-25 RX ADMIN — SODIUM CHLORIDE SOLN NEBU 7% 4 ML: 7 NEBU SOLN at 20:23

## 2022-09-25 RX ADMIN — AMLODIPINE BESYLATE 5 MG: 5 TABLET ORAL at 09:27

## 2022-09-25 RX ADMIN — LORAZEPAM 1 MG: 2 INJECTION INTRAMUSCULAR; INTRAVENOUS at 16:38

## 2022-09-25 RX ADMIN — GUAIFENESIN 600 MG: 600 TABLET, EXTENDED RELEASE ORAL at 09:27

## 2022-09-25 RX ADMIN — LOSARTAN POTASSIUM 100 MG: 100 TABLET, FILM COATED ORAL at 09:27

## 2022-09-25 RX ADMIN — TAZOBACTAM SODIUM AND PIPERACILLIN SODIUM 3.38 G: 375; 3 INJECTION, SOLUTION INTRAVENOUS at 14:06

## 2022-09-25 RX ADMIN — FLUTICASONE PROPIONATE 2 SPRAY: 50 SPRAY, METERED NASAL at 09:27

## 2022-09-25 RX ADMIN — HYDROCODONE BITARTRATE AND ACETAMINOPHEN 1 TABLET: 5; 325 TABLET ORAL at 14:06

## 2022-09-25 RX ADMIN — ALPRAZOLAM 0.25 MG: 0.25 TABLET ORAL at 21:50

## 2022-09-25 RX ADMIN — IOPAMIDOL 95 ML: 755 INJECTION, SOLUTION INTRAVENOUS at 11:34

## 2022-09-25 RX ADMIN — INSULIN LISPRO 2 UNITS: 100 INJECTION, SOLUTION INTRAVENOUS; SUBCUTANEOUS at 17:23

## 2022-09-26 PROBLEM — E87.6 HYPOKALEMIA: Status: ACTIVE | Noted: 2022-09-26

## 2022-09-26 PROBLEM — E83.39 HYPOPHOSPHATEMIA: Status: ACTIVE | Noted: 2022-09-26

## 2022-09-26 LAB
ABO GROUP BLD: NORMAL
ANION GAP SERPL CALCULATED.3IONS-SCNC: 11 MMOL/L (ref 5–15)
BASOPHILS # BLD AUTO: 0.01 10*3/MM3 (ref 0–0.2)
BASOPHILS NFR BLD AUTO: 0.2 % (ref 0–1.5)
BLD GP AB SCN SERPL QL: NEGATIVE
BUN SERPL-MCNC: 9 MG/DL (ref 8–23)
BUN/CREAT SERPL: 22 (ref 7–25)
CALCIUM SPEC-SCNC: 8.4 MG/DL (ref 8.6–10.5)
CHLORIDE SERPL-SCNC: 94 MMOL/L (ref 98–107)
CO2 SERPL-SCNC: 34 MMOL/L (ref 22–29)
CREAT SERPL-MCNC: 0.41 MG/DL (ref 0.57–1)
DEPRECATED RDW RBC AUTO: 39.7 FL (ref 37–54)
EGFRCR SERPLBLD CKD-EPI 2021: 108.7 ML/MIN/1.73
EOSINOPHIL # BLD AUTO: 0.04 10*3/MM3 (ref 0–0.4)
EOSINOPHIL NFR BLD AUTO: 0.7 % (ref 0.3–6.2)
ERYTHROCYTE [DISTWIDTH] IN BLOOD BY AUTOMATED COUNT: 11.9 % (ref 12.3–15.4)
GLUCOSE BLDC GLUCOMTR-MCNC: 123 MG/DL (ref 70–130)
GLUCOSE BLDC GLUCOMTR-MCNC: 144 MG/DL (ref 70–130)
GLUCOSE BLDC GLUCOMTR-MCNC: 153 MG/DL (ref 70–130)
GLUCOSE BLDC GLUCOMTR-MCNC: 154 MG/DL (ref 70–130)
GLUCOSE SERPL-MCNC: 147 MG/DL (ref 65–99)
HCT VFR BLD AUTO: 34 % (ref 34–46.6)
HGB BLD-MCNC: 11.5 G/DL (ref 12–15.9)
IMM GRANULOCYTES # BLD AUTO: 0.06 10*3/MM3 (ref 0–0.05)
IMM GRANULOCYTES NFR BLD AUTO: 1 % (ref 0–0.5)
LYMPHOCYTES # BLD AUTO: 0.9 10*3/MM3 (ref 0.7–3.1)
LYMPHOCYTES NFR BLD AUTO: 14.7 % (ref 19.6–45.3)
MCH RBC QN AUTO: 30.7 PG (ref 26.6–33)
MCHC RBC AUTO-ENTMCNC: 33.8 G/DL (ref 31.5–35.7)
MCV RBC AUTO: 90.7 FL (ref 79–97)
MONOCYTES # BLD AUTO: 0.79 10*3/MM3 (ref 0.1–0.9)
MONOCYTES NFR BLD AUTO: 12.9 % (ref 5–12)
NEUTROPHILS NFR BLD AUTO: 4.33 10*3/MM3 (ref 1.7–7)
NEUTROPHILS NFR BLD AUTO: 70.5 % (ref 42.7–76)
NRBC BLD AUTO-RTO: 0 /100 WBC (ref 0–0.2)
PHOSPHATE SERPL-MCNC: 1.8 MG/DL (ref 2.5–4.5)
PHOSPHATE SERPL-MCNC: 2.6 MG/DL (ref 2.5–4.5)
PLATELET # BLD AUTO: 171 10*3/MM3 (ref 140–450)
PMV BLD AUTO: 10.4 FL (ref 6–12)
POTASSIUM SERPL-SCNC: 3.3 MMOL/L (ref 3.5–5.2)
POTASSIUM SERPL-SCNC: 3.8 MMOL/L (ref 3.5–5.2)
RBC # BLD AUTO: 3.75 10*6/MM3 (ref 3.77–5.28)
RH BLD: POSITIVE
SODIUM SERPL-SCNC: 139 MMOL/L (ref 136–145)
T&S EXPIRATION DATE: NORMAL
WBC NRBC COR # BLD: 6.13 10*3/MM3 (ref 3.4–10.8)

## 2022-09-26 PROCEDURE — 94664 DEMO&/EVAL PT USE INHALER: CPT

## 2022-09-26 PROCEDURE — 94799 UNLISTED PULMONARY SVC/PX: CPT

## 2022-09-26 PROCEDURE — 94669 MECHANICAL CHEST WALL OSCILL: CPT

## 2022-09-26 PROCEDURE — 94761 N-INVAS EAR/PLS OXIMETRY MLT: CPT

## 2022-09-26 PROCEDURE — 97530 THERAPEUTIC ACTIVITIES: CPT

## 2022-09-26 PROCEDURE — 84132 ASSAY OF SERUM POTASSIUM: CPT | Performed by: INTERNAL MEDICINE

## 2022-09-26 PROCEDURE — 86850 RBC ANTIBODY SCREEN: CPT | Performed by: NURSE PRACTITIONER

## 2022-09-26 PROCEDURE — 86900 BLOOD TYPING SEROLOGIC ABO: CPT | Performed by: NURSE PRACTITIONER

## 2022-09-26 PROCEDURE — 82962 GLUCOSE BLOOD TEST: CPT

## 2022-09-26 PROCEDURE — 80048 BASIC METABOLIC PNL TOTAL CA: CPT | Performed by: NEUROLOGICAL SURGERY

## 2022-09-26 PROCEDURE — 94760 N-INVAS EAR/PLS OXIMETRY 1: CPT

## 2022-09-26 PROCEDURE — 97110 THERAPEUTIC EXERCISES: CPT

## 2022-09-26 PROCEDURE — 99024 POSTOP FOLLOW-UP VISIT: CPT | Performed by: NURSE PRACTITIONER

## 2022-09-26 PROCEDURE — 63710000001 INSULIN LISPRO (HUMAN) PER 5 UNITS: Performed by: NEUROLOGICAL SURGERY

## 2022-09-26 PROCEDURE — 84100 ASSAY OF PHOSPHORUS: CPT | Performed by: INTERNAL MEDICINE

## 2022-09-26 PROCEDURE — 25010000002 PIPERACILLIN SOD-TAZOBACTAM PER 1 G: Performed by: INTERNAL MEDICINE

## 2022-09-26 PROCEDURE — 86901 BLOOD TYPING SEROLOGIC RH(D): CPT | Performed by: NURSE PRACTITIONER

## 2022-09-26 PROCEDURE — 85025 COMPLETE CBC W/AUTO DIFF WBC: CPT | Performed by: NEUROLOGICAL SURGERY

## 2022-09-26 RX ORDER — POTASSIUM CHLORIDE 1.5 G/1.77G
40 POWDER, FOR SOLUTION ORAL AS NEEDED
Status: DISCONTINUED | OUTPATIENT
Start: 2022-09-26 | End: 2022-09-29 | Stop reason: HOSPADM

## 2022-09-26 RX ORDER — MAGNESIUM SULFATE HEPTAHYDRATE 40 MG/ML
4 INJECTION, SOLUTION INTRAVENOUS AS NEEDED
Status: DISCONTINUED | OUTPATIENT
Start: 2022-09-26 | End: 2022-09-29 | Stop reason: HOSPADM

## 2022-09-26 RX ORDER — MAGNESIUM SULFATE HEPTAHYDRATE 40 MG/ML
2 INJECTION, SOLUTION INTRAVENOUS AS NEEDED
Status: DISCONTINUED | OUTPATIENT
Start: 2022-09-26 | End: 2022-09-29 | Stop reason: HOSPADM

## 2022-09-26 RX ORDER — NYSTATIN 100000 [USP'U]/G
POWDER TOPICAL EVERY 12 HOURS SCHEDULED
Status: DISCONTINUED | OUTPATIENT
Start: 2022-09-26 | End: 2022-09-26

## 2022-09-26 RX ORDER — POTASSIUM CHLORIDE 750 MG/1
40 TABLET, FILM COATED, EXTENDED RELEASE ORAL AS NEEDED
Status: DISCONTINUED | OUTPATIENT
Start: 2022-09-26 | End: 2022-09-29 | Stop reason: HOSPADM

## 2022-09-26 RX ORDER — POTASSIUM CHLORIDE 7.45 MG/ML
10 INJECTION INTRAVENOUS
Status: DISCONTINUED | OUTPATIENT
Start: 2022-09-26 | End: 2022-09-29 | Stop reason: HOSPADM

## 2022-09-26 RX ADMIN — LOSARTAN POTASSIUM 100 MG: 100 TABLET, FILM COATED ORAL at 09:46

## 2022-09-26 RX ADMIN — ACETYLCYSTEINE 4 ML: 200 SOLUTION ORAL; RESPIRATORY (INHALATION) at 08:21

## 2022-09-26 RX ADMIN — ACETYLCYSTEINE 4 ML: 200 SOLUTION ORAL; RESPIRATORY (INHALATION) at 16:04

## 2022-09-26 RX ADMIN — Medication 10 ML: at 21:59

## 2022-09-26 RX ADMIN — AMLODIPINE BESYLATE 10 MG: 10 TABLET ORAL at 12:45

## 2022-09-26 RX ADMIN — INSULIN LISPRO 2 UNITS: 100 INJECTION, SOLUTION INTRAVENOUS; SUBCUTANEOUS at 12:46

## 2022-09-26 RX ADMIN — IPRATROPIUM BROMIDE AND ALBUTEROL SULFATE 3 ML: .5; 3 SOLUTION RESPIRATORY (INHALATION) at 23:30

## 2022-09-26 RX ADMIN — POTASSIUM CHLORIDE 40 MEQ: 750 TABLET, EXTENDED RELEASE ORAL at 12:45

## 2022-09-26 RX ADMIN — ALPRAZOLAM 0.25 MG: 0.25 TABLET ORAL at 06:05

## 2022-09-26 RX ADMIN — IPRATROPIUM BROMIDE AND ALBUTEROL SULFATE 3 ML: .5; 3 SOLUTION RESPIRATORY (INHALATION) at 16:03

## 2022-09-26 RX ADMIN — TAZOBACTAM SODIUM AND PIPERACILLIN SODIUM 3.38 G: 375; 3 INJECTION, SOLUTION INTRAVENOUS at 05:07

## 2022-09-26 RX ADMIN — IPRATROPIUM BROMIDE AND ALBUTEROL SULFATE 3 ML: .5; 3 SOLUTION RESPIRATORY (INHALATION) at 08:20

## 2022-09-26 RX ADMIN — IPRATROPIUM BROMIDE AND ALBUTEROL SULFATE 3 ML: .5; 3 SOLUTION RESPIRATORY (INHALATION) at 11:12

## 2022-09-26 RX ADMIN — GUAIFENESIN 600 MG: 600 TABLET, EXTENDED RELEASE ORAL at 21:59

## 2022-09-26 RX ADMIN — LEVOTHYROXINE SODIUM 125 MCG: 0.12 TABLET ORAL at 09:45

## 2022-09-26 RX ADMIN — BUPROPION HYDROCHLORIDE 150 MG: 150 TABLET, EXTENDED RELEASE ORAL at 09:45

## 2022-09-26 RX ADMIN — Medication 10 ML: at 09:46

## 2022-09-26 RX ADMIN — ACETYLCYSTEINE 4 ML: 200 SOLUTION ORAL; RESPIRATORY (INHALATION) at 23:31

## 2022-09-26 RX ADMIN — GUAIFENESIN 600 MG: 600 TABLET, EXTENDED RELEASE ORAL at 09:46

## 2022-09-26 RX ADMIN — SODIUM CHLORIDE SOLN NEBU 7% 4 ML: 7 NEBU SOLN at 11:13

## 2022-09-26 RX ADMIN — FLUTICASONE PROPIONATE 2 SPRAY: 50 SPRAY, METERED NASAL at 09:46

## 2022-09-26 RX ADMIN — INSULIN LISPRO 2 UNITS: 100 INJECTION, SOLUTION INTRAVENOUS; SUBCUTANEOUS at 17:59

## 2022-09-26 RX ADMIN — ATORVASTATIN CALCIUM 10 MG: 20 TABLET, FILM COATED ORAL at 09:45

## 2022-09-26 RX ADMIN — ESCITALOPRAM 10 MG: 10 TABLET, FILM COATED ORAL at 09:45

## 2022-09-26 RX ADMIN — HYDROCODONE BITARTRATE AND ACETAMINOPHEN 1 TABLET: 5; 325 TABLET ORAL at 18:09

## 2022-09-26 RX ADMIN — POTASSIUM PHOSPHATE, MONOBASIC AND POTASSIUM PHOSPHATE, DIBASIC 15 MMOL: 224; 236 INJECTION, SOLUTION, CONCENTRATE INTRAVENOUS at 16:15

## 2022-09-26 RX ADMIN — HYDROCODONE BITARTRATE AND ACETAMINOPHEN 1 TABLET: 5; 325 TABLET ORAL at 05:06

## 2022-09-26 RX ADMIN — POTASSIUM CHLORIDE 40 MEQ: 750 TABLET, EXTENDED RELEASE ORAL at 16:15

## 2022-09-27 ENCOUNTER — ANESTHESIA EVENT (OUTPATIENT)
Dept: PERIOP | Facility: HOSPITAL | Age: 67
End: 2022-09-27

## 2022-09-27 ENCOUNTER — ANESTHESIA (OUTPATIENT)
Dept: PERIOP | Facility: HOSPITAL | Age: 67
End: 2022-09-27

## 2022-09-27 LAB
ANION GAP SERPL CALCULATED.3IONS-SCNC: 8 MMOL/L (ref 5–15)
BASOPHILS # BLD AUTO: 0.02 10*3/MM3 (ref 0–0.2)
BASOPHILS NFR BLD AUTO: 0.3 % (ref 0–1.5)
BUN SERPL-MCNC: 10 MG/DL (ref 8–23)
BUN/CREAT SERPL: 25 (ref 7–25)
CALCIUM SPEC-SCNC: 8.5 MG/DL (ref 8.6–10.5)
CHLORIDE SERPL-SCNC: 96 MMOL/L (ref 98–107)
CO2 SERPL-SCNC: 33 MMOL/L (ref 22–29)
CREAT SERPL-MCNC: 0.4 MG/DL (ref 0.57–1)
DEPRECATED RDW RBC AUTO: 38 FL (ref 37–54)
EGFRCR SERPLBLD CKD-EPI 2021: 109.3 ML/MIN/1.73
EOSINOPHIL # BLD AUTO: 0.04 10*3/MM3 (ref 0–0.4)
EOSINOPHIL NFR BLD AUTO: 0.7 % (ref 0.3–6.2)
ERYTHROCYTE [DISTWIDTH] IN BLOOD BY AUTOMATED COUNT: 11.7 % (ref 12.3–15.4)
GLUCOSE BLDC GLUCOMTR-MCNC: 114 MG/DL (ref 70–130)
GLUCOSE BLDC GLUCOMTR-MCNC: 214 MG/DL (ref 70–130)
GLUCOSE BLDC GLUCOMTR-MCNC: 220 MG/DL (ref 70–130)
GLUCOSE SERPL-MCNC: 142 MG/DL (ref 65–99)
HCT VFR BLD AUTO: 35.1 % (ref 34–46.6)
HGB BLD-MCNC: 12.3 G/DL (ref 12–15.9)
IMM GRANULOCYTES # BLD AUTO: 0.08 10*3/MM3 (ref 0–0.05)
IMM GRANULOCYTES NFR BLD AUTO: 1.4 % (ref 0–0.5)
LYMPHOCYTES # BLD AUTO: 0.97 10*3/MM3 (ref 0.7–3.1)
LYMPHOCYTES NFR BLD AUTO: 16.4 % (ref 19.6–45.3)
MCH RBC QN AUTO: 30.9 PG (ref 26.6–33)
MCHC RBC AUTO-ENTMCNC: 35 G/DL (ref 31.5–35.7)
MCV RBC AUTO: 88.2 FL (ref 79–97)
MONOCYTES # BLD AUTO: 0.81 10*3/MM3 (ref 0.1–0.9)
MONOCYTES NFR BLD AUTO: 13.7 % (ref 5–12)
NEUTROPHILS NFR BLD AUTO: 3.99 10*3/MM3 (ref 1.7–7)
NEUTROPHILS NFR BLD AUTO: 67.5 % (ref 42.7–76)
NRBC BLD AUTO-RTO: 0 /100 WBC (ref 0–0.2)
PLATELET # BLD AUTO: 189 10*3/MM3 (ref 140–450)
PMV BLD AUTO: 9.9 FL (ref 6–12)
POTASSIUM SERPL-SCNC: 4 MMOL/L (ref 3.5–5.2)
RBC # BLD AUTO: 3.98 10*6/MM3 (ref 3.77–5.28)
SODIUM SERPL-SCNC: 137 MMOL/L (ref 136–145)
WBC NRBC COR # BLD: 5.91 10*3/MM3 (ref 3.4–10.8)

## 2022-09-27 PROCEDURE — 94799 UNLISTED PULMONARY SVC/PX: CPT

## 2022-09-27 PROCEDURE — G0463 HOSPITAL OUTPT CLINIC VISIT: HCPCS | Performed by: NEUROLOGICAL SURGERY

## 2022-09-27 PROCEDURE — 94664 DEMO&/EVAL PT USE INHALER: CPT

## 2022-09-27 PROCEDURE — 82962 GLUCOSE BLOOD TEST: CPT

## 2022-09-27 PROCEDURE — 80048 BASIC METABOLIC PNL TOTAL CA: CPT | Performed by: NEUROLOGICAL SURGERY

## 2022-09-27 PROCEDURE — 94761 N-INVAS EAR/PLS OXIMETRY MLT: CPT

## 2022-09-27 PROCEDURE — 63710000001 INSULIN LISPRO (HUMAN) PER 5 UNITS

## 2022-09-27 PROCEDURE — 94760 N-INVAS EAR/PLS OXIMETRY 1: CPT

## 2022-09-27 PROCEDURE — 85025 COMPLETE CBC W/AUTO DIFF WBC: CPT | Performed by: NEUROLOGICAL SURGERY

## 2022-09-27 PROCEDURE — 94669 MECHANICAL CHEST WALL OSCILL: CPT

## 2022-09-27 RX ORDER — FAMOTIDINE 10 MG/ML
20 INJECTION, SOLUTION INTRAVENOUS ONCE
Status: COMPLETED | OUTPATIENT
Start: 2022-09-27 | End: 2022-09-27

## 2022-09-27 RX ORDER — SODIUM CHLORIDE 0.9 % (FLUSH) 0.9 %
3 SYRINGE (ML) INJECTION EVERY 12 HOURS SCHEDULED
Status: DISCONTINUED | OUTPATIENT
Start: 2022-09-27 | End: 2022-09-27 | Stop reason: HOSPADM

## 2022-09-27 RX ORDER — MIDAZOLAM HYDROCHLORIDE 1 MG/ML
0.5 INJECTION INTRAMUSCULAR; INTRAVENOUS
Status: DISCONTINUED | OUTPATIENT
Start: 2022-09-27 | End: 2022-09-27 | Stop reason: HOSPADM

## 2022-09-27 RX ORDER — LIDOCAINE HYDROCHLORIDE 10 MG/ML
0.5 INJECTION, SOLUTION EPIDURAL; INFILTRATION; INTRACAUDAL; PERINEURAL ONCE AS NEEDED
Status: DISCONTINUED | OUTPATIENT
Start: 2022-09-27 | End: 2022-09-27 | Stop reason: HOSPADM

## 2022-09-27 RX ORDER — SODIUM CHLORIDE, SODIUM LACTATE, POTASSIUM CHLORIDE, CALCIUM CHLORIDE 600; 310; 30; 20 MG/100ML; MG/100ML; MG/100ML; MG/100ML
9 INJECTION, SOLUTION INTRAVENOUS CONTINUOUS
Status: DISCONTINUED | OUTPATIENT
Start: 2022-09-27 | End: 2022-09-29 | Stop reason: HOSPADM

## 2022-09-27 RX ORDER — FENTANYL CITRATE 50 UG/ML
50 INJECTION, SOLUTION INTRAMUSCULAR; INTRAVENOUS
Status: DISCONTINUED | OUTPATIENT
Start: 2022-09-27 | End: 2022-09-27 | Stop reason: HOSPADM

## 2022-09-27 RX ORDER — SODIUM CHLORIDE 0.9 % (FLUSH) 0.9 %
3-10 SYRINGE (ML) INJECTION AS NEEDED
Status: DISCONTINUED | OUTPATIENT
Start: 2022-09-27 | End: 2022-09-27 | Stop reason: HOSPADM

## 2022-09-27 RX ADMIN — LOSARTAN POTASSIUM 100 MG: 100 TABLET, FILM COATED ORAL at 09:00

## 2022-09-27 RX ADMIN — HYDROCODONE BITARTRATE AND ACETAMINOPHEN 1 TABLET: 5; 325 TABLET ORAL at 03:33

## 2022-09-27 RX ADMIN — SODIUM CHLORIDE SOLN NEBU 7% 4 ML: 7 NEBU SOLN at 22:02

## 2022-09-27 RX ADMIN — AMLODIPINE BESYLATE 10 MG: 10 TABLET ORAL at 08:58

## 2022-09-27 RX ADMIN — ESCITALOPRAM 10 MG: 10 TABLET, FILM COATED ORAL at 08:59

## 2022-09-27 RX ADMIN — IPRATROPIUM BROMIDE AND ALBUTEROL SULFATE 3 ML: .5; 3 SOLUTION RESPIRATORY (INHALATION) at 12:29

## 2022-09-27 RX ADMIN — FLUTICASONE PROPIONATE 2 SPRAY: 50 SPRAY, METERED NASAL at 08:59

## 2022-09-27 RX ADMIN — IPRATROPIUM BROMIDE AND ALBUTEROL SULFATE 3 ML: .5; 3 SOLUTION RESPIRATORY (INHALATION) at 08:11

## 2022-09-27 RX ADMIN — Medication 10 ML: at 20:12

## 2022-09-27 RX ADMIN — Medication 10 ML: at 09:00

## 2022-09-27 RX ADMIN — INSULIN LISPRO 4 UNITS: 100 INJECTION, SOLUTION INTRAVENOUS; SUBCUTANEOUS at 18:35

## 2022-09-27 RX ADMIN — GUAIFENESIN 600 MG: 600 TABLET, EXTENDED RELEASE ORAL at 09:00

## 2022-09-27 RX ADMIN — HYDROCODONE BITARTRATE AND ACETAMINOPHEN 1 TABLET: 5; 325 TABLET ORAL at 18:35

## 2022-09-27 RX ADMIN — IPRATROPIUM BROMIDE AND ALBUTEROL SULFATE 3 ML: .5; 3 SOLUTION RESPIRATORY (INHALATION) at 15:50

## 2022-09-27 RX ADMIN — GUAIFENESIN 600 MG: 600 TABLET, EXTENDED RELEASE ORAL at 20:13

## 2022-09-27 RX ADMIN — IPRATROPIUM BROMIDE AND ALBUTEROL SULFATE 3 ML: .5; 3 SOLUTION RESPIRATORY (INHALATION) at 21:56

## 2022-09-27 RX ADMIN — FAMOTIDINE 20 MG: 10 INJECTION INTRAVENOUS at 13:46

## 2022-09-27 RX ADMIN — ATORVASTATIN CALCIUM 10 MG: 20 TABLET, FILM COATED ORAL at 08:58

## 2022-09-27 RX ADMIN — BUPROPION HYDROCHLORIDE 150 MG: 150 TABLET, EXTENDED RELEASE ORAL at 08:58

## 2022-09-27 RX ADMIN — LEVOTHYROXINE SODIUM 125 MCG: 0.12 TABLET ORAL at 09:00

## 2022-09-27 RX ADMIN — ACETYLCYSTEINE 4 ML: 200 SOLUTION ORAL; RESPIRATORY (INHALATION) at 08:19

## 2022-09-27 NOTE — ANESTHESIA PREPROCEDURE EVALUATION
Anesthesia Evaluation     Patient summary reviewed   no history of anesthetic complications:  NPO Solid Status: > 6 hours  NPO Liquid Status: > 2 hours           Airway   Mallampati: II  Neck ROM: limited  Possible difficult intubation and Large neck circumference  Comment: Soft Neck brace  Dental    (+) edentulous    Pulmonary    (+) pneumonia , shortness of breath, rhonchi,   (-) recent URI, not a smoker    ROS comment: CT with lung consolidation, pulm following; potential PNA however no fever, white count; treating as atelectasis with incr pulm toliet; Steady O2 requirement 6L NC for sats >90%  Cardiovascular     ECG reviewed  Rhythm: regular  Rate: normal    (+) hypertension, CHF , hyperlipidemia,   (-) past MI, dysrhythmias, angina      Neuro/Psych  (-) seizures, CVA  GI/Hepatic/Renal/Endo    (+) morbid obesity,  liver disease fatty liver disease, diabetes mellitus type 2, thyroid problem hypothyroidism    Musculoskeletal     Abdominal    Substance History      OB/GYN          Other   arthritis,      ROS/Med Hx Other: Last PO 0900                    Anesthesia Plan    ASA 3     general and [facility]     ([facility] available    --------  Pt cancelled herself with Dr. BEEBE wished to wait for surgery)  intravenous induction     Anesthetic plan, risks, benefits, and alternatives have been provided, discussed and informed consent has been obtained with: patient.    Use of blood products discussed with patient .   Plan discussed with CRNA and attending.        CODE STATUS:

## 2022-09-28 ENCOUNTER — TELEPHONE (OUTPATIENT)
Dept: NEUROSURGERY | Facility: CLINIC | Age: 67
End: 2022-09-28

## 2022-09-28 DIAGNOSIS — Z98.1 HISTORY OF FUSION OF CERVICAL SPINE: ICD-10-CM

## 2022-09-28 DIAGNOSIS — M47.12 CERVICAL SPONDYLOSIS WITH MYELOPATHY: Primary | ICD-10-CM

## 2022-09-28 LAB
ANION GAP SERPL CALCULATED.3IONS-SCNC: 9.8 MMOL/L (ref 5–15)
BASOPHILS # BLD AUTO: 0.01 10*3/MM3 (ref 0–0.2)
BASOPHILS NFR BLD AUTO: 0.2 % (ref 0–1.5)
BUN SERPL-MCNC: 10 MG/DL (ref 8–23)
BUN/CREAT SERPL: 20.8 (ref 7–25)
CALCIUM SPEC-SCNC: 9.1 MG/DL (ref 8.6–10.5)
CHLORIDE SERPL-SCNC: 93 MMOL/L (ref 98–107)
CO2 SERPL-SCNC: 36.2 MMOL/L (ref 22–29)
CREAT SERPL-MCNC: 0.48 MG/DL (ref 0.57–1)
DEPRECATED RDW RBC AUTO: 38.3 FL (ref 37–54)
EGFRCR SERPLBLD CKD-EPI 2021: 104.6 ML/MIN/1.73
EOSINOPHIL # BLD AUTO: 0.09 10*3/MM3 (ref 0–0.4)
EOSINOPHIL NFR BLD AUTO: 1.6 % (ref 0.3–6.2)
ERYTHROCYTE [DISTWIDTH] IN BLOOD BY AUTOMATED COUNT: 11.8 % (ref 12.3–15.4)
GLUCOSE BLDC GLUCOMTR-MCNC: 113 MG/DL (ref 70–130)
GLUCOSE BLDC GLUCOMTR-MCNC: 138 MG/DL (ref 70–130)
GLUCOSE BLDC GLUCOMTR-MCNC: 156 MG/DL (ref 70–130)
GLUCOSE SERPL-MCNC: 116 MG/DL (ref 65–99)
HCT VFR BLD AUTO: 34.2 % (ref 34–46.6)
HGB BLD-MCNC: 12.1 G/DL (ref 12–15.9)
IMM GRANULOCYTES # BLD AUTO: 0.14 10*3/MM3 (ref 0–0.05)
IMM GRANULOCYTES NFR BLD AUTO: 2.4 % (ref 0–0.5)
LYMPHOCYTES # BLD AUTO: 1.22 10*3/MM3 (ref 0.7–3.1)
LYMPHOCYTES NFR BLD AUTO: 21.1 % (ref 19.6–45.3)
MCH RBC QN AUTO: 31.2 PG (ref 26.6–33)
MCHC RBC AUTO-ENTMCNC: 35.4 G/DL (ref 31.5–35.7)
MCV RBC AUTO: 88.1 FL (ref 79–97)
MONOCYTES # BLD AUTO: 0.86 10*3/MM3 (ref 0.1–0.9)
MONOCYTES NFR BLD AUTO: 14.9 % (ref 5–12)
NEUTROPHILS NFR BLD AUTO: 3.45 10*3/MM3 (ref 1.7–7)
NEUTROPHILS NFR BLD AUTO: 59.8 % (ref 42.7–76)
NRBC BLD AUTO-RTO: 0.2 /100 WBC (ref 0–0.2)
PLATELET # BLD AUTO: 215 10*3/MM3 (ref 140–450)
PMV BLD AUTO: 10.2 FL (ref 6–12)
POTASSIUM SERPL-SCNC: 3.5 MMOL/L (ref 3.5–5.2)
RBC # BLD AUTO: 3.88 10*6/MM3 (ref 3.77–5.28)
SODIUM SERPL-SCNC: 139 MMOL/L (ref 136–145)
WBC NRBC COR # BLD: 5.77 10*3/MM3 (ref 3.4–10.8)

## 2022-09-28 PROCEDURE — 97530 THERAPEUTIC ACTIVITIES: CPT

## 2022-09-28 PROCEDURE — 80048 BASIC METABOLIC PNL TOTAL CA: CPT | Performed by: NEUROLOGICAL SURGERY

## 2022-09-28 PROCEDURE — 94760 N-INVAS EAR/PLS OXIMETRY 1: CPT

## 2022-09-28 PROCEDURE — 94799 UNLISTED PULMONARY SVC/PX: CPT

## 2022-09-28 PROCEDURE — 85025 COMPLETE CBC W/AUTO DIFF WBC: CPT | Performed by: NEUROLOGICAL SURGERY

## 2022-09-28 PROCEDURE — 94761 N-INVAS EAR/PLS OXIMETRY MLT: CPT

## 2022-09-28 PROCEDURE — 82962 GLUCOSE BLOOD TEST: CPT

## 2022-09-28 PROCEDURE — 94664 DEMO&/EVAL PT USE INHALER: CPT

## 2022-09-28 PROCEDURE — 99024 POSTOP FOLLOW-UP VISIT: CPT

## 2022-09-28 PROCEDURE — 63710000001 INSULIN LISPRO (HUMAN) PER 5 UNITS

## 2022-09-28 PROCEDURE — 92526 ORAL FUNCTION THERAPY: CPT

## 2022-09-28 RX ADMIN — LOSARTAN POTASSIUM 100 MG: 100 TABLET, FILM COATED ORAL at 09:36

## 2022-09-28 RX ADMIN — GUAIFENESIN 600 MG: 600 TABLET, EXTENDED RELEASE ORAL at 09:36

## 2022-09-28 RX ADMIN — LEVOTHYROXINE SODIUM 125 MCG: 0.12 TABLET ORAL at 09:36

## 2022-09-28 RX ADMIN — IPRATROPIUM BROMIDE AND ALBUTEROL SULFATE 3 ML: .5; 3 SOLUTION RESPIRATORY (INHALATION) at 22:46

## 2022-09-28 RX ADMIN — IPRATROPIUM BROMIDE AND ALBUTEROL SULFATE 3 ML: .5; 3 SOLUTION RESPIRATORY (INHALATION) at 15:35

## 2022-09-28 RX ADMIN — HYDROCODONE BITARTRATE AND ACETAMINOPHEN 1 TABLET: 5; 325 TABLET ORAL at 00:33

## 2022-09-28 RX ADMIN — IPRATROPIUM BROMIDE AND ALBUTEROL SULFATE 3 ML: .5; 3 SOLUTION RESPIRATORY (INHALATION) at 11:39

## 2022-09-28 RX ADMIN — ACETYLCYSTEINE 4 ML: 200 SOLUTION ORAL; RESPIRATORY (INHALATION) at 07:24

## 2022-09-28 RX ADMIN — FLUTICASONE PROPIONATE 2 SPRAY: 50 SPRAY, METERED NASAL at 09:35

## 2022-09-28 RX ADMIN — HYDROCODONE BITARTRATE AND ACETAMINOPHEN 1 TABLET: 5; 325 TABLET ORAL at 10:36

## 2022-09-28 RX ADMIN — HYDROCODONE BITARTRATE AND ACETAMINOPHEN 1 TABLET: 5; 325 TABLET ORAL at 20:21

## 2022-09-28 RX ADMIN — INSULIN LISPRO 2 UNITS: 100 INJECTION, SOLUTION INTRAVENOUS; SUBCUTANEOUS at 17:54

## 2022-09-28 RX ADMIN — Medication 10 ML: at 09:35

## 2022-09-28 RX ADMIN — IPRATROPIUM BROMIDE AND ALBUTEROL SULFATE 3 ML: .5; 3 SOLUTION RESPIRATORY (INHALATION) at 07:21

## 2022-09-28 RX ADMIN — ESCITALOPRAM 10 MG: 10 TABLET, FILM COATED ORAL at 09:36

## 2022-09-28 RX ADMIN — AMLODIPINE BESYLATE 10 MG: 10 TABLET ORAL at 09:36

## 2022-09-28 RX ADMIN — HYDROCODONE BITARTRATE AND ACETAMINOPHEN 1 TABLET: 5; 325 TABLET ORAL at 04:23

## 2022-09-28 RX ADMIN — BUPROPION HYDROCHLORIDE 150 MG: 150 TABLET, EXTENDED RELEASE ORAL at 09:36

## 2022-09-28 RX ADMIN — Medication 3 MG: at 20:21

## 2022-09-28 RX ADMIN — GUAIFENESIN 600 MG: 600 TABLET, EXTENDED RELEASE ORAL at 20:21

## 2022-09-28 RX ADMIN — ATORVASTATIN CALCIUM 10 MG: 20 TABLET, FILM COATED ORAL at 09:37

## 2022-09-28 RX ADMIN — HYDROCODONE BITARTRATE AND ACETAMINOPHEN 1 TABLET: 5; 325 TABLET ORAL at 16:07

## 2022-09-28 RX ADMIN — Medication 10 ML: at 20:22

## 2022-09-28 RX ADMIN — ACETYLCYSTEINE 4 ML: 200 SOLUTION ORAL; RESPIRATORY (INHALATION) at 15:37

## 2022-09-28 RX ADMIN — SODIUM CHLORIDE SOLN NEBU 7% 4 ML: 7 NEBU SOLN at 22:52

## 2022-09-28 NOTE — TELEPHONE ENCOUNTER
----- Message from David Cheung MD sent at 9/28/2022  3:26 PM EDT -----  4 weeks with AP/lat C spine xrays  ----- Message -----  From: Christelle Kerns MA  Sent: 9/28/2022  12:23 PM EDT  To: David Cheung MD    When do you want to see her?  ----- Message -----  From: Taylor Jansen PA-C  Sent: 9/28/2022  12:16 PM EDT  To: Christelle Kerns MA    Schedule this patient for a 4 to 6-week follow-up with Dr. Cheung.  It needs to be with Dr. Cheung as they will be talking about the second stage of her surgery. Thank you!

## 2022-09-29 ENCOUNTER — READMISSION MANAGEMENT (OUTPATIENT)
Dept: CALL CENTER | Facility: HOSPITAL | Age: 67
End: 2022-09-29

## 2022-09-29 VITALS
TEMPERATURE: 98.2 F | WEIGHT: 275.57 LBS | HEART RATE: 89 BPM | BODY MASS INDEX: 50.71 KG/M2 | HEIGHT: 62 IN | RESPIRATION RATE: 20 BRPM | DIASTOLIC BLOOD PRESSURE: 95 MMHG | SYSTOLIC BLOOD PRESSURE: 157 MMHG | OXYGEN SATURATION: 93 %

## 2022-09-29 LAB
ANION GAP SERPL CALCULATED.3IONS-SCNC: 9.9 MMOL/L (ref 5–15)
BASOPHILS # BLD AUTO: 0.02 10*3/MM3 (ref 0–0.2)
BASOPHILS NFR BLD AUTO: 0.3 % (ref 0–1.5)
BUN SERPL-MCNC: 11 MG/DL (ref 8–23)
BUN/CREAT SERPL: 23.4 (ref 7–25)
CALCIUM SPEC-SCNC: 8.4 MG/DL (ref 8.6–10.5)
CHLORIDE SERPL-SCNC: 91 MMOL/L (ref 98–107)
CO2 SERPL-SCNC: 34.1 MMOL/L (ref 22–29)
CREAT SERPL-MCNC: 0.47 MG/DL (ref 0.57–1)
DEPRECATED RDW RBC AUTO: 39.1 FL (ref 37–54)
EGFRCR SERPLBLD CKD-EPI 2021: 105.1 ML/MIN/1.73
EOSINOPHIL # BLD AUTO: 0.16 10*3/MM3 (ref 0–0.4)
EOSINOPHIL NFR BLD AUTO: 2.3 % (ref 0.3–6.2)
ERYTHROCYTE [DISTWIDTH] IN BLOOD BY AUTOMATED COUNT: 11.9 % (ref 12.3–15.4)
GLUCOSE BLDC GLUCOMTR-MCNC: 136 MG/DL (ref 70–130)
GLUCOSE BLDC GLUCOMTR-MCNC: 258 MG/DL (ref 70–130)
GLUCOSE SERPL-MCNC: 121 MG/DL (ref 65–99)
HCT VFR BLD AUTO: 35 % (ref 34–46.6)
HGB BLD-MCNC: 12.3 G/DL (ref 12–15.9)
IMM GRANULOCYTES # BLD AUTO: 0.2 10*3/MM3 (ref 0–0.05)
IMM GRANULOCYTES NFR BLD AUTO: 2.8 % (ref 0–0.5)
LYMPHOCYTES # BLD AUTO: 1.36 10*3/MM3 (ref 0.7–3.1)
LYMPHOCYTES NFR BLD AUTO: 19.2 % (ref 19.6–45.3)
MCH RBC QN AUTO: 31.1 PG (ref 26.6–33)
MCHC RBC AUTO-ENTMCNC: 35.1 G/DL (ref 31.5–35.7)
MCV RBC AUTO: 88.4 FL (ref 79–97)
MONOCYTES # BLD AUTO: 0.95 10*3/MM3 (ref 0.1–0.9)
MONOCYTES NFR BLD AUTO: 13.4 % (ref 5–12)
NEUTROPHILS NFR BLD AUTO: 4.41 10*3/MM3 (ref 1.7–7)
NEUTROPHILS NFR BLD AUTO: 62 % (ref 42.7–76)
NRBC BLD AUTO-RTO: 0 /100 WBC (ref 0–0.2)
PLATELET # BLD AUTO: 246 10*3/MM3 (ref 140–450)
PMV BLD AUTO: 9.6 FL (ref 6–12)
POTASSIUM SERPL-SCNC: 3.5 MMOL/L (ref 3.5–5.2)
RBC # BLD AUTO: 3.96 10*6/MM3 (ref 3.77–5.28)
SODIUM SERPL-SCNC: 135 MMOL/L (ref 136–145)
WBC NRBC COR # BLD: 7.1 10*3/MM3 (ref 3.4–10.8)

## 2022-09-29 PROCEDURE — 97530 THERAPEUTIC ACTIVITIES: CPT

## 2022-09-29 PROCEDURE — 97110 THERAPEUTIC EXERCISES: CPT

## 2022-09-29 PROCEDURE — 0 LIDOCAINE 1 % SOLUTION: Performed by: ORTHOPAEDIC SURGERY

## 2022-09-29 PROCEDURE — 94799 UNLISTED PULMONARY SVC/PX: CPT

## 2022-09-29 PROCEDURE — 82962 GLUCOSE BLOOD TEST: CPT

## 2022-09-29 PROCEDURE — 94664 DEMO&/EVAL PT USE INHALER: CPT

## 2022-09-29 PROCEDURE — 85025 COMPLETE CBC W/AUTO DIFF WBC: CPT | Performed by: NEUROLOGICAL SURGERY

## 2022-09-29 PROCEDURE — 80048 BASIC METABOLIC PNL TOTAL CA: CPT | Performed by: NEUROLOGICAL SURGERY

## 2022-09-29 PROCEDURE — 25010000002 METHYLPREDNISOLONE PER 80 MG: Performed by: ORTHOPAEDIC SURGERY

## 2022-09-29 RX ORDER — IPRATROPIUM BROMIDE AND ALBUTEROL SULFATE 2.5; .5 MG/3ML; MG/3ML
3 SOLUTION RESPIRATORY (INHALATION)
Qty: 360 ML | Refills: 0 | Status: SHIPPED | OUTPATIENT
Start: 2022-09-29

## 2022-09-29 RX ORDER — ALPRAZOLAM 0.25 MG/1
0.25 TABLET ORAL 3 TIMES DAILY PRN
Status: DISCONTINUED | OUTPATIENT
Start: 2022-09-29 | End: 2022-09-29 | Stop reason: HOSPADM

## 2022-09-29 RX ORDER — BUPROPION HYDROCHLORIDE 150 MG/1
150 TABLET ORAL DAILY
Qty: 30 TABLET | Refills: 0 | Status: SHIPPED | OUTPATIENT
Start: 2022-09-30

## 2022-09-29 RX ORDER — AMLODIPINE BESYLATE 10 MG/1
10 TABLET ORAL DAILY
Qty: 30 TABLET | Refills: 1 | Status: SHIPPED | OUTPATIENT
Start: 2022-09-30 | End: 2023-02-02 | Stop reason: DRUGHIGH

## 2022-09-29 RX ORDER — HYDROCODONE BITARTRATE AND ACETAMINOPHEN 5; 325 MG/1; MG/1
1 TABLET ORAL EVERY 6 HOURS PRN
Qty: 12 TABLET | Refills: 0 | Status: SHIPPED | OUTPATIENT
Start: 2022-09-29 | End: 2022-10-02

## 2022-09-29 RX ORDER — METHYLPREDNISOLONE SODIUM SUCCINATE 125 MG/2ML
80 INJECTION, POWDER, LYOPHILIZED, FOR SOLUTION INTRAMUSCULAR; INTRAVENOUS ONCE
Status: DISCONTINUED | OUTPATIENT
Start: 2022-09-29 | End: 2022-09-29 | Stop reason: HOSPADM

## 2022-09-29 RX ORDER — GUAIFENESIN 600 MG/1
600 TABLET, EXTENDED RELEASE ORAL EVERY 12 HOURS SCHEDULED
Qty: 14 TABLET | Refills: 0 | Status: SHIPPED | OUTPATIENT
Start: 2022-09-29

## 2022-09-29 RX ORDER — METHYLPREDNISOLONE ACETATE 80 MG/ML
40 INJECTION, SUSPENSION INTRA-ARTICULAR; INTRALESIONAL; INTRAMUSCULAR; SOFT TISSUE ONCE
Status: COMPLETED | OUTPATIENT
Start: 2022-09-29 | End: 2022-09-29

## 2022-09-29 RX ORDER — LIDOCAINE HYDROCHLORIDE 10 MG/ML
10 INJECTION, SOLUTION INFILTRATION; PERINEURAL ONCE
Status: COMPLETED | OUTPATIENT
Start: 2022-09-29 | End: 2022-09-29

## 2022-09-29 RX ADMIN — HYDROCODONE BITARTRATE AND ACETAMINOPHEN 1 TABLET: 5; 325 TABLET ORAL at 05:23

## 2022-09-29 RX ADMIN — ACETYLCYSTEINE 4 ML: 200 SOLUTION ORAL; RESPIRATORY (INHALATION) at 07:50

## 2022-09-29 RX ADMIN — LOSARTAN POTASSIUM 100 MG: 100 TABLET, FILM COATED ORAL at 08:58

## 2022-09-29 RX ADMIN — Medication 10 ML: at 08:58

## 2022-09-29 RX ADMIN — FLUTICASONE PROPIONATE 2 SPRAY: 50 SPRAY, METERED NASAL at 09:00

## 2022-09-29 RX ADMIN — ATORVASTATIN CALCIUM 10 MG: 20 TABLET, FILM COATED ORAL at 08:58

## 2022-09-29 RX ADMIN — IPRATROPIUM BROMIDE AND ALBUTEROL SULFATE 3 ML: .5; 3 SOLUTION RESPIRATORY (INHALATION) at 07:50

## 2022-09-29 RX ADMIN — ACETYLCYSTEINE 4 ML: 200 SOLUTION ORAL; RESPIRATORY (INHALATION) at 16:10

## 2022-09-29 RX ADMIN — SODIUM CHLORIDE SOLN NEBU 7% 4 ML: 7 NEBU SOLN at 12:41

## 2022-09-29 RX ADMIN — LEVOTHYROXINE SODIUM 125 MCG: 0.12 TABLET ORAL at 08:58

## 2022-09-29 RX ADMIN — HYDROCODONE BITARTRATE AND ACETAMINOPHEN 1 TABLET: 5; 325 TABLET ORAL at 16:53

## 2022-09-29 RX ADMIN — BUPROPION HYDROCHLORIDE 150 MG: 150 TABLET, EXTENDED RELEASE ORAL at 08:58

## 2022-09-29 RX ADMIN — IPRATROPIUM BROMIDE AND ALBUTEROL SULFATE 3 ML: .5; 3 SOLUTION RESPIRATORY (INHALATION) at 16:10

## 2022-09-29 RX ADMIN — GUAIFENESIN 600 MG: 600 TABLET, EXTENDED RELEASE ORAL at 08:58

## 2022-09-29 RX ADMIN — LIDOCAINE HYDROCHLORIDE 10 ML: 10 INJECTION, SOLUTION INFILTRATION; PERINEURAL at 18:40

## 2022-09-29 RX ADMIN — IPRATROPIUM BROMIDE AND ALBUTEROL SULFATE 3 ML: .5; 3 SOLUTION RESPIRATORY (INHALATION) at 12:41

## 2022-09-29 RX ADMIN — ALPRAZOLAM 0.25 MG: 0.25 TABLET ORAL at 08:58

## 2022-09-29 RX ADMIN — ESCITALOPRAM 10 MG: 10 TABLET, FILM COATED ORAL at 08:58

## 2022-09-29 RX ADMIN — METHYLPREDNISOLONE ACETATE 40 MG: 80 INJECTION, SUSPENSION INTRA-ARTICULAR; INTRALESIONAL; INTRAMUSCULAR; SOFT TISSUE at 18:40

## 2022-09-29 RX ADMIN — AMLODIPINE BESYLATE 10 MG: 10 TABLET ORAL at 09:01

## 2022-09-30 ENCOUNTER — HOME HEALTH ADMISSION (OUTPATIENT)
Dept: HOME HEALTH SERVICES | Facility: HOME HEALTHCARE | Age: 67
End: 2022-09-30

## 2022-09-30 LAB — BACTERIA SPEC AEROBE CULT: NORMAL

## 2022-09-30 NOTE — OUTREACH NOTE
Prep Survey    Flowsheet Row Responses   [facility] facility patient discharged from? [place]   Is LACE score < 7 ? No   Emergency Room discharge w/ pulse ox? No   Eligibility Readm Mgmt   Discharge diagnosis Anterior cervical corpectomy   Does the patient have one of the following disease processes/diagnoses(primary or secondary)? General Surgery   Does the patient have Home health ordered? Yes   What is the Home health agency?  [facility]   Is there a DME ordered? Yes   What DME was ordered? Continuous O2, nebulizer, walker and bedside commode  via [employer]   Prep survey completed? Yes          SCOTT CUELLO - Registered Nurse

## 2022-10-03 ENCOUNTER — TELEPHONE (OUTPATIENT)
Dept: NEUROSURGERY | Facility: CLINIC | Age: 67
End: 2022-10-03

## 2022-10-03 NOTE — TELEPHONE ENCOUNTER
Pt notices that when she uses her bone growth stimulator, clear liquid oozes out of her incision site bandage.  Is this normal?  Otherwise she is doing great and has no pain. [phone].

## 2022-10-03 NOTE — TELEPHONE ENCOUNTER
Spoke with pt's daughter and relayed message that she will need to d/c BGS and come in to see an APC to check her incision site.

## 2022-10-04 NOTE — TELEPHONE ENCOUNTER
Per Ju Napoles:  Pt refused appt and will see Dr BEEBE at the end of the month.   Patient asymptomatic   VS stable   /70   HR 66  O2 95%  Resp 18

## 2022-10-14 ENCOUNTER — READMISSION MANAGEMENT (OUTPATIENT)
Dept: CALL CENTER | Facility: HOSPITAL | Age: 67
End: 2022-10-14

## 2022-10-14 NOTE — OUTREACH NOTE
General Surgery Week 3 Survey    Flowsheet Row Responses   [facility] patient discharged from? [place]   Does the patient have one of the following disease processes/diagnoses(primary or secondary)? General Surgery   Week 3 attempt successful? Yes   Call start time 1626   Call end time 1629   Discharge diagnosis Anterior cervical corpectomy   Does the patient have all medications related to this admission filled (includes all antibiotics, pain medications, etc.) Yes   Is the patient taking all medications as directed (includes completed medication regime)? Yes   Does the patient have a follow up appointment scheduled with their surgeon? Yes   Has the patient kept scheduled appointments due by today? N/A   Comments  f/u with neurosurgery on 10/27   What is the Home health agency?  [facility]   Has home health visited the patient within 72 hours of discharge? Yes   What DME was ordered? Continuous O2, nebulizer, walker and bedside commode  via [employer]   Has all DME been delivered? Yes   Psychosocial issues? No   Did the patient receive a copy of their discharge instructions? Yes   What is the patient's perception of their health status since discharge? Improving   Nursing interventions Nurse provided patient education  [advisedto follow discharge instructions]   Is the patient/caregiver able to teach back signs and symptoms of incisional infection? Increased drainage or bleeding, Increased redness, swelling or pain at the incisonal site, Incisional warmth, Pus or odor from incision   Is the patient/caregiver able to teach back the hierarchy of who to call/visit for symptoms/problems? PCP, Specialist, Home health nurse, Urgent Care, ED, 911 Yes   Week 3 call completed? Yes   Wrap up additional comments Doing well, no questions.          BRADLY GUTIERREZ - Registered Nurse

## 2022-10-18 ENCOUNTER — TELEPHONE (OUTPATIENT)
Dept: NEUROSURGERY | Facility: CLINIC | Age: 67
End: 2022-10-18

## 2022-10-18 NOTE — TELEPHONE ENCOUNTER
[facility] PT/ home therapy called about patients incision. She is s/p anterior cervical corpectomy C4-5,6 with cage plate from C3-C7  9/20/22. She states it is still draining at the incision the drainage is clear and constant.     I let her know per previous messages patient was asked to come into the office for incision check but she refused due to mobility at her home and stairs. She does not have [employer] to upload pictures.      Therapist is asking for home health wound care orders. She denies any fever or chills. There is no redness,but it is pink. The therapist said the bandage is saturated every time she see her twice a week( every other day) . Family has not been changing the bandage. She has a drain stitch that also needs to be removed.     Patient is scheduled for 1st p/o appointment 10/27/22.     Ree Eckert -[phone]

## 2022-10-18 NOTE — TELEPHONE ENCOUNTER
I spoke to therapist and patient. She is fearful of falling that is it reason why she does not want to come to the appointment. She has about 8 steps to get inside her apartment. Per Dr. Cheung he wanted her to be seen ASAP first thing tomorrow morning. Patient still refused. Phone call transferred to Dr. Smith.

## 2022-10-18 NOTE — TELEPHONE ENCOUNTER
"Patient's daughter called said there is no way they can get her to the appointment tomorrow. She asked if it was \"life or death\". I let her know that depending on what her mother incision looks like it's a big possibility that it could be infected and could lead to more issues. The incision has been draining for weeks. Initial phone call received to our office was 10/3/22.  Patients daughter said her car is small.     I spoke to Dr. Cheung,  he again voiced patient will need to be seen ASAP in the morning as scheduled in office for a wound check or come by ambulance to the ER to be evaluated.    "

## 2022-10-18 NOTE — TELEPHONE ENCOUNTER
Per Dr. BEEBE after he spoke with the patient she said she would come in just let her know when.     I called and advised Ms. Karla Cheung wanted her to come in the office first thing, and scheduled her with gretta. She voiced shes gonna try to get here.

## 2022-10-19 ENCOUNTER — TELEPHONE (OUTPATIENT)
Dept: NEUROSURGERY | Facility: CLINIC | Age: 67
End: 2022-10-19

## 2022-10-19 NOTE — TELEPHONE ENCOUNTER
I called and spoke with Ms. Acosta and she states that she told whoever she spoike with yesterday that she would not be coming to her appointment. She said she has not been able to use stairs in quite some time. She said that the physical therapist will be coming tomorrow and they are suppose to tackle the stairs then. She again said she would not be in the office today. I advised her she will need to go to the ER if she is not coming into the office. She said that she is not going to the ER either because she will not have a ride home. I informed her that there could be in infection going with her wound and it could cause her to become very sick. She verbally understood but still said that she is not going to the ER.    I did speak with Dr. Cheung and he would like patients daughter to send a picture of the incision. I called and LVM for Yolanda to call me back. Please transfer to me if she is to call back.    I also called Ree with patients home health. Please transfer call to me if she is to call back. I just wanted to give her an update and see if there was anything more that could be done.

## 2022-10-19 NOTE — TELEPHONE ENCOUNTER
Ree called me with home health. She said that the incision drainage is clear. She does not have a fever or any redness. I did give verbal order to remove drain stitch.

## 2022-10-27 ENCOUNTER — TELEPHONE (OUTPATIENT)
Dept: NEUROSURGERY | Facility: CLINIC | Age: 67
End: 2022-10-27

## 2022-10-27 ENCOUNTER — OFFICE VISIT (OUTPATIENT)
Dept: NEUROSURGERY | Facility: CLINIC | Age: 67
End: 2022-10-27

## 2022-10-27 ENCOUNTER — HOSPITAL ENCOUNTER (OUTPATIENT)
Dept: GENERAL RADIOLOGY | Facility: HOSPITAL | Age: 67
Discharge: HOME | End: 2022-10-27
Admitting: NEUROLOGICAL SURGERY

## 2022-10-27 VITALS
HEIGHT: 62 IN | SYSTOLIC BLOOD PRESSURE: 132 MMHG | HEART RATE: 78 BPM | TEMPERATURE: 96.4 F | DIASTOLIC BLOOD PRESSURE: 98 MMHG | WEIGHT: 275 LBS | BODY MASS INDEX: 50.61 KG/M2 | OXYGEN SATURATION: 94 %

## 2022-10-27 DIAGNOSIS — Z09 SURGICAL FOLLOWUP VISIT: Primary | ICD-10-CM

## 2022-10-27 DIAGNOSIS — Z98.1 HISTORY OF FUSION OF CERVICAL SPINE: ICD-10-CM

## 2022-10-27 DIAGNOSIS — R27.0 ATAXIA: ICD-10-CM

## 2022-10-27 DIAGNOSIS — M47.12 CERVICAL SPONDYLOSIS WITH MYELOPATHY: ICD-10-CM

## 2022-10-27 PROCEDURE — 99024 POSTOP FOLLOW-UP VISIT: CPT | Performed by: NURSE PRACTITIONER

## 2022-10-27 PROCEDURE — 72040 X-RAY EXAM NECK SPINE 2-3 VW: CPT

## 2022-10-27 NOTE — PROGRESS NOTES
"Subjective   Patient ID: Gabby Acosta is a 67 y.o. female is here today for follow-up of cervical spondylosis with myelopathy. She is status post anterior cervical corpectomy, cervical four/five/six, with cage and plate from cervical three to cervical seven on 09/20/2022 with Dr. Smith. She is here today with new cervical X-rays.      History of Present Illness     Ms. Acosta reports some soreness in her left shoulder but denies any arm pain. She denies any fever or chills. She denies any redness, swelling or drainage around incision.  She denies any shortness of breath or chest pain. The drainage that she was having from the old drain site has resolved.  She denies any difficulty with swallowing.  She reports that her gait difficulties are still present but overall she feels that she is doing about 50% better.  She is at least able to walk with a walker.  She is currently getting home health physical therapy.  She is wearing her cervical collar.  She reports she is taking ibuprofen for pain.  She also states that she is wearing a bone growth stimulator at home.      Review of Systems   Constitutional: Positive for activity change. Negative for chills and fever.   Musculoskeletal: Positive for neck pain.   Neurological: Positive for weakness and numbness.   Psychiatric/Behavioral: Positive for sleep disturbance.       Objective     Vitals:    10/27/22 1520   BP: 132/98   BP Location: Left arm   Patient Position: Sitting   Cuff Size: Adult   Pulse: 78   Temp: 96.4 °F (35.8 °C)   TempSrc: Infrared   SpO2: 94%   Weight: 125 kg (275 lb)   Height: 157.5 cm (62\")     Body mass index is 50.3 kg/m².      Physical Exam  Constitutional:       General: She is not in acute distress.     Appearance: She is well-developed.   Musculoskeletal:      Comments: No calf swelling or tenderness to bilateral palpation.   Skin:     General: Skin is warm and dry.      Comments: The right anterior cervical incision is well approximated. " No redness, swelling or drainage.        Assessment & Plan   Independent Review of Radiographic Studies:      I personally reviewed the images from the following studies.    Cervical spine x-rays performed today, 10/27/2022 were reviewed independently.  There is no radiographic report available as of yet however the x-ray showed good position of anterior plate C3 down through C7. The C3 vertebral body screw is in satisfactory position however the few the C7 screw is obscured.    Medical Decision Making:       is doing quite well.  She reports improvement in her ability to get around.  She is at least able to walk with a walker once she is assisted to a standing position.  She is currently getting home health but I think further outpatient therapy for gait strengthening is needed.  She will continue wearing the cervical collar and the bone growth stimulator.  Dr. Cheung spoke with the patient preoperatively about the possibility of a need for posterior cervical fusion.  We will see how she does over the next 6 weeks and have her follow-up at that point with repeat cervical spine x-rays.  She will see Dr. Cheung at the next appointment.    Diagnoses and all orders for this visit:    1. Surgical followup visit (Primary)  -     XR spine cervical ap and lat w flex and ext; Future  -     Ambulatory Referral to Physical Therapy Evaluate and treat (for gait strengthening and mild core strengthening ), POST OP; (currently using walker - needs gait strengthening due to cervical myelopathy)    2. Ataxia  -     Ambulatory Referral to Physical Therapy Evaluate and treat (for gait strengthening and mild core strengthening ), POST OP; (currently using walker - needs gait strengthening due to cervical myelopathy)      Return in about 6 weeks (around 12/8/2022) for Dr. Cheung to review Xrays and determine need for posterior cervical surgery.

## 2022-10-27 NOTE — TELEPHONE ENCOUNTER
Per Amber please schedule 6 week f/up with xrays prior with Dr. BEEBE. She had cervical corpectomy with Dr. BEEBE on 9/20/22. Thank you!

## 2022-10-27 NOTE — TELEPHONE ENCOUNTER
I S/W PT TO INFORM HER THAT DR ALMEIDA IS NOT GOING TO BE ABLE TO MAKE IT TO THE OFFICE TODAY FOR HER POST OP APPT AND PER DR ALMEIDA HE WANTS THE PT TO SEE DEMETRA ANNAD. PT VOICED UNDERSTANDING.

## 2022-11-10 ENCOUNTER — TELEPHONE (OUTPATIENT)
Dept: NEUROSURGERY | Facility: CLINIC | Age: 67
End: 2022-11-10

## 2022-11-10 NOTE — TELEPHONE ENCOUNTER
The [facility] received a fax that requires your attention. The document has been indexed to the patient’s chart for your review.      Reason for sending: SIGNATURE REQUEST    Documents Description: PLAN OF CARE_[facility]_11.03.22    Name of Sender: [facility]    Date Indexed: 11.10.22    Notes (if needed):

## 2022-11-15 NOTE — PROGRESS NOTES
"Subjective   History of Present Illness: Gabby Acosta is a 67 y.o. female is here today for follow-up of cervical spondylosis with myelopathy. She is status post anterior cervical corpectomy, cervical four/five/six, with cage and plate from cervical three to cervical seven on 09/20/2022 with Dr. Smith. She was last seen, in October, by CHENG Torres, she reported that her gait difficulties were still present but overall she feels that she was doing about 50% better.  She had been able to walk with a walker.  She was receiving home health physical therapy. She reported ibuprofen for pain.  She also stated that she had been wearing a bone growth stimulator at home.    Today Ms. Acosta reports she is not having any neck pain. She denies any arm pain. She reports of numbness and tingling in her hands. She reports of weakness in her arms and hands. She denies any new headaches. She reports she is not taking anything for pain. She is continuing to go to PT, and states that she is walking better.  She states that she lives in a small apartment and does continue to \"counter/furniture surf,\" but her gait is better and she feels that she is stronger.  She feels that she is in better shape, over all.  She states that she is walking with a walker, unable to use her cane, at this time. She will be d/c'd from PT next week, and she will continue to do her HEP.  She states that she is not taking any pain medications, other than Tylenol.  She states that she has not had any more drainage from her incision.      She states that she has had some issues with hallucinations, in the past, after anesthesia, that seem to get worse after every surgery.  She has not smoked in over 1 year.  She does state that she has a history of fibromyalgia, and she states that she does not have any pain from her surgery.  She continues to use her bone growth stimulator nightly, 4 hours.      The following portions of the patient's history were " "reviewed and updated as appropriate: allergies, current medications, past family history, past medical history, past social history, past surgical history and problem list.    Review of Systems   Constitutional: Positive for activity change.   Musculoskeletal: Negative for neck pain.   Neurological: Positive for weakness and numbness. Negative for headaches.   Psychiatric/Behavioral: Negative for sleep disturbance.   All other systems reviewed and are negative.        Objective     Vitals:    11/16/22 1155   BP: 132/72   BP Location: Left arm   Patient Position: Sitting   Cuff Size: Large Adult   Pulse: 72   Temp: 97.1 °F (36.2 °C)   TempSrc: Infrared   SpO2: 94%   Weight: 125 kg (275 lb)   Height: 157.5 cm (62\")     Body mass index is 50.3 kg/m².      Physical Exam  Vitals and nursing note reviewed.   Constitutional:       Appearance: Normal appearance. She is well-groomed.   HENT:      Head: Normocephalic and atraumatic.   Eyes:      Conjunctiva/sclera: Conjunctivae normal.      Pupils: Pupils are equal, round, and reactive to light.      Visual Fields: Right eye visual fields normal and left eye visual fields normal.   Cardiovascular:      Rate and Rhythm: Normal rate.   Pulmonary:      Effort: Pulmonary effort is normal.      Breath sounds: Normal air entry.   Musculoskeletal:         General: Normal range of motion.      Cervical back: Normal range of motion and neck supple.      Right lower leg: No edema.      Left lower leg: No edema.   Skin:     General: Skin is warm and dry.   Neurological:      Mental Status: She is alert and oriented to person, place, and time.      GCS: GCS eye subscore is 4. GCS verbal subscore is 5. GCS motor subscore is 6.      Deep Tendon Reflexes: Reflexes are normal and symmetric.      Comments: Presents in wheelchair   Psychiatric:         Attention and Perception: Attention normal.         Mood and Affect: Mood normal.         Speech: Speech normal.         Behavior: Behavior is " cooperative.         Cognition and Memory: Cognition normal.       Neurologic Exam     Mental Status   Oriented to person, place, and time.   Attention: normal.   Speech: speech is normal   Level of consciousness: alert    Cranial Nerves     CN III, IV, VI   Pupils are equal, round, and reactive to light.    Motor Exam   Muscle bulk: normal    Sensory Exam   Right leg light touch: decreased from toes  Left leg light touch: decreased from toes    Gait, Coordination, and Reflexes     Tremor   Resting tremor: absent  Intention tremor: absent  Action tremor: absent    Reflexes   Right Witt: absent  Left Witt: absent  Right ankle clonus: absent  Left ankle clonus: absent      Assessment & Plan   Independent Review of Radiographic Studies:      I personally reviewed the images from the cervical imaging with Dr. Cheung that revealed some shifting of the hardware at C7.     Medical Decision Making:    I have explained to her, and since there has been some shifting at the C7 level hardware, that she will need to start wearing her hard cervical collar again, she can wear the soft cervical collar at night, in bed.  I have explained that she needs to continue to use her stimulator nightly, as she has been doing.  Continue to refrain from smoking, she states that she has not smoked in 1 year. I have ordered a CT of the thoracic and cervical spine to further evaluate the hardware and have explained that she will need to follow up with Dr. Cheung in approximately 2 - 3 weeks, after the imaging to further discuss a treatment plan.  She will call for any new onset weakness, pain, or questions/concerns.      Diagnoses and all orders for this visit:    1. Status post cervical spinal fusion (Primary)  -     CT Cervical Spine Without Contrast; Future  -     CT Thoracic Spine Without Contrast; Future      Return in about 3 weeks (around 12/7/2022) for imaging, Dr. Cheung.

## 2022-11-16 ENCOUNTER — TELEPHONE (OUTPATIENT)
Dept: NEUROSURGERY | Facility: CLINIC | Age: 67
End: 2022-11-16

## 2022-11-16 ENCOUNTER — HOSPITAL ENCOUNTER (OUTPATIENT)
Dept: GENERAL RADIOLOGY | Facility: HOSPITAL | Age: 67
Discharge: HOME | End: 2022-11-16
Admitting: NURSE PRACTITIONER

## 2022-11-16 ENCOUNTER — OFFICE VISIT (OUTPATIENT)
Dept: NEUROSURGERY | Facility: CLINIC | Age: 67
End: 2022-11-16

## 2022-11-16 VITALS
WEIGHT: 275 LBS | HEART RATE: 72 BPM | SYSTOLIC BLOOD PRESSURE: 132 MMHG | OXYGEN SATURATION: 94 % | HEIGHT: 62 IN | TEMPERATURE: 97.1 F | DIASTOLIC BLOOD PRESSURE: 72 MMHG | BODY MASS INDEX: 50.61 KG/M2

## 2022-11-16 DIAGNOSIS — Z09 SURGICAL FOLLOWUP VISIT: ICD-10-CM

## 2022-11-16 DIAGNOSIS — Z98.1 STATUS POST CERVICAL SPINAL FUSION: Primary | ICD-10-CM

## 2022-11-16 PROCEDURE — 72050 X-RAY EXAM NECK SPINE 4/5VWS: CPT

## 2022-11-16 PROCEDURE — 99024 POSTOP FOLLOW-UP VISIT: CPT | Performed by: NURSE PRACTITIONER

## 2022-11-16 NOTE — TELEPHONE ENCOUNTER
----- Message from Christelle Kerns MA sent at 11/16/2022 12:59 PM EST -----  12-01-22 @ 12. Appt could change depending on OR.

## 2022-11-21 ENCOUNTER — APPOINTMENT (OUTPATIENT)
Dept: CT IMAGING | Facility: HOSPITAL | Age: 67
End: 2022-11-21

## 2022-11-22 ENCOUNTER — APPOINTMENT (OUTPATIENT)
Dept: CT IMAGING | Facility: HOSPITAL | Age: 67
End: 2022-11-22

## 2022-11-29 ENCOUNTER — HOSPITAL ENCOUNTER (OUTPATIENT)
Dept: CT IMAGING | Facility: HOSPITAL | Age: 67
Discharge: HOME | End: 2022-11-29
Admitting: NURSE PRACTITIONER

## 2022-11-29 DIAGNOSIS — Z98.1 STATUS POST CERVICAL SPINAL FUSION: ICD-10-CM

## 2022-11-29 PROCEDURE — 72125 CT NECK SPINE W/O DYE: CPT

## 2022-11-29 PROCEDURE — 72128 CT CHEST SPINE W/O DYE: CPT

## 2022-12-01 ENCOUNTER — OFFICE VISIT (OUTPATIENT)
Dept: NEUROSURGERY | Facility: CLINIC | Age: 67
End: 2022-12-01

## 2022-12-01 VITALS
WEIGHT: 275 LBS | SYSTOLIC BLOOD PRESSURE: 138 MMHG | HEIGHT: 62 IN | DIASTOLIC BLOOD PRESSURE: 74 MMHG | BODY MASS INDEX: 50.61 KG/M2

## 2022-12-01 DIAGNOSIS — Z98.1 STATUS POST CERVICAL SPINAL FUSION: Primary | ICD-10-CM

## 2022-12-01 DIAGNOSIS — M47.12 CERVICAL SPONDYLOSIS WITH MYELOPATHY: ICD-10-CM

## 2022-12-01 PROCEDURE — 99024 POSTOP FOLLOW-UP VISIT: CPT

## 2022-12-01 NOTE — PROGRESS NOTES
Subjective   Patient ID: Gabby Acosta is a 67 y.o. female is here today for follow-up with a new cervical and thoracic CT. Ms. Acosta had a anterior cervical corpectomy, cervical four/five/six, with cage and plate from cervical three to cervical seven on 09/20/2022.    Today, Ms. Acosta reports she is doing well. She denies any pain.     History of Present Illness    67-year-old female who is here for a follow-up on CT.  She had C4, 5, 6 corpectomy with ACDF C3-C7 with Dr. Cheung 9/20/2022.  X-ray performed last month revealed movement with one of her cages as well as the inferior portion of the plate.  Today she denies any new numbness/tingling, weakness, pain.  She states she has been doing home health physical therapy and she is now walking.  She never received the steroid injections in her knees ordered during her hospitalization.  During her initial hospitalization, the plan was for an anterior surgery followed by a posterior surgery a few days later however, she decided she did not want to do both surgeries at that time.  We told her at that time that she would likely need the posterior surgery and she said that she understood that but wanted to recover from the first surgery first.  Patient denies smoking and states that she has not smoked since November of last year.  I discussed with her that we are concerned for nicotine  decreasing the bone healing with her surgery.  She denied smoking and said that when she is around people who smoke, they only smoke outside.    Review of Systems   Respiratory: Negative for chest tightness and shortness of breath.    Cardiovascular: Negative for chest pain.       Tobacco Use: Medium Risk   • Smoking Tobacco Use: Former   • Smokeless Tobacco Use: Never   • Passive Exposure: Not on file     Gabby Acosta  reports that she quit smoking about 2 months ago. Her smoking use included cigarettes. She has never used smokeless tobacco.      Objective     Vitals:    12/01/22 1421  "  BP: 138/74   Weight: 125 kg (275 lb)   Height: 157.5 cm (62\")     Body mass index is 50.3 kg/m².    Tobacco Use: Medium Risk   • Smoking Tobacco Use: Former   • Smokeless Tobacco Use: Never   • Passive Exposure: Not on file          Physical Exam  Vitals reviewed.   Constitutional:       General: She is not in acute distress.     Appearance: Normal appearance. She is ill-appearing (chronically). She is not toxic-appearing or diaphoretic.   HENT:      Head: Normocephalic and atraumatic.   Neck:      Comments: Hard cervical collar in place  Incision edges well healed  Pulmonary:      Effort: Pulmonary effort is normal. No respiratory distress.   Neurological:      Mental Status: She is alert and oriented to person, place, and time.      Deep Tendon Reflexes:      Reflex Scores:       Tricep reflexes are 3+ on the right side and 3+ on the left side.       Bicep reflexes are 2+ on the right side and 2+ on the left side.       Brachioradialis reflexes are 2+ on the right side and 2+ on the left side.  Psychiatric:         Mood and Affect: Mood normal.         Speech: Speech normal.         Behavior: Behavior normal.         Thought Content: Thought content normal.         Judgment: Judgment normal.       Neurologic Exam     Mental Status   Oriented to person, place, and time.   Attention: normal. Concentration: normal.   Speech: speech is normal   Level of consciousness: alert  Knowledge: good.   Normal comprehension.     Motor Exam   Muscle bulk: normal  Overall muscle tone: normal  Right arm tone: normal  Left arm tone: normal  Right leg tone: normal  Left leg tone: normal    Strength   Right deltoid: 5/5  Left deltoid: 5/5  Right biceps: 5/5  Left biceps: 5/5  Right triceps: 5/5  Left triceps: 5/5  Right interossei: 5/5  Left interossei: 5/5  Right iliopsoas: 5/5  Left iliopsoas: 5/5  Right quadriceps: 5/5  Left quadriceps: 5/5  Right hamstrin/5  Left hamstrin/5    Sensory Exam   Right arm light touch: " normal  Left arm light touch: normal    Gait, Coordination, and Reflexes     Reflexes   Right brachioradialis: 2+  Left brachioradialis: 2+  Right biceps: 2+  Left biceps: 2+  Right triceps: 3+  Left triceps: 3+  Right Witt: absent  Left Witt: absent  Right ankle clonus: absent  Left ankle clonus: absent          Assessment & Plan   Independent Review of Radiographic Studies:      I personally reviewed the images from the CT cervical spine and x-ray cervical spine, agree with radiology.  I personally reviewed the report from the CT thoracic spine.    CT Cervical Spine Without Contrast    Result Date: 12/1/2022    1. Interval C4-C6 corpectomy with placement of a graft and anterior fusion plate.  The inferior screws appear to be within the C7-T1 disc and the plate is elevated from the anterior cortical surface by up to 13 mm inferiorly. 2. Residual cervical degenerative changes resulting in varying degrees of neural foraminal narrowing as described in detail above.     OSVALDO ARITA MD       Electronically Signed and Approved By: OSVALDO ARITA MD on 12/01/2022 at 8:34             CT Thoracic Spine Without Contrast    Result Date: 12/1/2022    1. Mild diffuse thoracic disc degeneration without evidence of neural foraminal or spinal canal stenosis. 2. No acute osseous abnormality. 3. Mildly enlarged right tracheoesophageal groove lymph node and markedly enlarged subcarinal lymph node.  Etiology is not entirely clear.  Consider dedicated imaging of the chest. 4. Extensive aortic valvular and coronary artery calcifications.  Correlate for symptoms of angina or aortic valve stenosis.     OSVALDO ARITA MD       Electronically Signed and Approved By: OSVALDO ARITA MD on 12/01/2022 at 8:40             XR spine cervical ap and lat w flex and ext  FINDINGS:     Intact appearing plate anterior plate and screw fusion hardware is seen  at C3, C7, with intervertebral disc spacers at C3/4, C6/7. No acute  fracture or  malalignment. Follow-up as indications persist.    Result Date: 11/16/2022   As described.  This report was finalized on 11/16/2022 1:45 PM by Dr. Harshad Castro M.D.        Medical Decision Making:      Shift in her plate and pistoning of cage appears to be stable from the x-ray to CT.  I discussed with this patient that she will need her posterior cervical fusion done within the next month.  Given that she has no new symptoms, we will have Dr. Cheung call her to discuss surgery.  He is planning to call her tomorrow to discuss the surgical plan.  She will need a C3-T4 posterior fusion.  Patient denies smoking but the room but the room smelled very pungently of smoke.  I discussed with her that it is very important and that she does not smoke and it could be part of the reason why her fusion did not heal properly.  She voiced understanding of this and continued to deny smoking since November 2021.    Diagnoses and all orders for this visit:    1. Status post cervical spinal fusion (Primary)    2. Cervical spondylosis with myelopathy      Return in about 1 day (around 12/2/2022) for To discuss surgery with Dr. Cheung.

## 2022-12-02 ENCOUNTER — TELEPHONE (OUTPATIENT)
Dept: NEUROSURGERY | Facility: CLINIC | Age: 67
End: 2022-12-02

## 2022-12-02 NOTE — TELEPHONE ENCOUNTER
I spoke with pt and moved her telephone message to Monday 12/5/22 because Dr. BEEBE has been called into and ER surgery. She was very kind and gracious.

## 2022-12-05 ENCOUNTER — OFFICE VISIT (OUTPATIENT)
Dept: NEUROSURGERY | Facility: CLINIC | Age: 67
End: 2022-12-05

## 2022-12-05 DIAGNOSIS — T84.226D DISPLACEMENT OF INTERNAL FIXATION DEVICE OF VERTEBRAE, SUBSEQUENT ENCOUNTER: ICD-10-CM

## 2022-12-05 DIAGNOSIS — Z98.1 HISTORY OF FUSION OF CERVICAL SPINE: Primary | ICD-10-CM

## 2022-12-05 DIAGNOSIS — M53.2X3: ICD-10-CM

## 2022-12-05 PROBLEM — T84.226A: Status: ACTIVE | Noted: 2022-12-05

## 2022-12-05 PROCEDURE — 99024 POSTOP FOLLOW-UP VISIT: CPT | Performed by: NEUROLOGICAL SURGERY

## 2022-12-05 RX ORDER — CEFAZOLIN SODIUM IN 0.9 % NACL 3 G/100 ML
3 INTRAVENOUS SOLUTION, PIGGYBACK (ML) INTRAVENOUS ONCE
Status: CANCELLED | OUTPATIENT
Start: 2023-01-12

## 2022-12-05 NOTE — PROGRESS NOTES
Subjective   Patient ID: Gabby Acosta is a 67 y.o. female is here today for follow-up via telephone visit to discuss surgery.    You have chosen to receive care through a telephone visit. Do you consent to use a telephone visit for your medical care today? Yes    Unable to complete visit using a video connection to the patient. A phone visit was used to complete this visits. Total time of discussion was 11 minutes.    I was calling from the office.  The patient and her daughter were at home.  I was unable to come to the office to see her last Thursday.  The CT scan of the cervical thoracic spine shows some further displacement of the end of body cage so that underscores the importance of moving forward with a C3-T3 instrumented fusion with lateral mass screws, thoracic pedicle screws and Stealth navigation.  She understands that we are doing this mainly to maintain the stability of her spine.  She is been otherwise making decent progress and is now walking.  She escaped fortunately any infectious issues in the front but that is going to be a major concern in the back.  She is likely going to need some inpatient rehab afterwards.  We will do this later this week.  The goals risks and benefits are described below.    History of Present Illness    The following portions of the patient's history were reviewed and updated as appropriate: allergies, current medications, past family history, past medical history, past social history, past surgical history and problem list.    Review of Systems        Objective     There were no vitals filed for this visit.  There is no height or weight on file to calculate BMI.    Tobacco Use: Medium Risk   • Smoking Tobacco Use: Former   • Smokeless Tobacco Use: Never   • Passive Exposure: Not on file          Physical Exam  Neurologic Exam        Assessment & Plan   Independent Review of Radiographic Studies:      I personally reviewed the images from the following studies.    The  cervical and thoracic CT scan showed some pistoning of the cage into the C7 vertebral body and angulation of the screw in C7 upward.  This shows some spinal instability.  Agree with the report.    Medical Decision Making:      I described and recommended a C3-T3 fusion using lateral mass screws, thoracic pedicle screws, rods, cross-links.  This will be done with Stealth navigation later on this week.  The goal of surgery is stabilization of the cervical thoracic spine to prevent further migration of the cage and spinal cord injury.  The risks include, but are not limited to, infection, hemorrhage requiring transfusion or reoperation, CSF leak requiring reoperation, incomplete relief of symptoms, potential need for additional surgeries in the future, stroke, paralysis, coma, and death.  She is not taking any blood thinners.  We will proceed with this later this week.      Diagnoses and all orders for this visit:    1. History of fusion of cervical spine (Primary)  -     Case Request; Standing  -     Type & Screen; Future  -     ceFAZolin (ANCEF) 2 g in sodium chloride 0.9 % 100 mL IVPB  -     Case Request    2. Displacement of internal fixation device of vertebrae, subsequent encounter  -     Case Request; Standing  -     Type & Screen; Future  -     ceFAZolin (ANCEF) 2 g in sodium chloride 0.9 % 100 mL IVPB  -     Case Request    3. Spinal instability of cervicothoracic region  -     Case Request; Standing  -     Type & Screen; Future  -     ceFAZolin (ANCEF) 2 g in sodium chloride 0.9 % 100 mL IVPB  -     Case Request    Other orders  -     Follow Anesthesia Guidelines / Protocol; Future  -     Obtain informed consent  -     Provide NPO Instructions to Patient; Future  -     Chlorhexidine Skin Prep; Future  -     Follow Anesthesia Guidelines / Protocol; Standing  -     SCD (sequential compression device)- to be placed on patient in Pre-op; Standing  -     Verify / Perform Chlorhexidine Skin Prep;  Standing      Return for 2 weeks after surgery with an APC.

## 2022-12-07 ENCOUNTER — TELEPHONE (OUTPATIENT)
Dept: NEUROSURGERY | Facility: CLINIC | Age: 67
End: 2022-12-07

## 2022-12-07 NOTE — TELEPHONE ENCOUNTER
Pt is calling to inquire about PAT's for her surgery on 12/9/22.  I do not see an appt for it.  [phone] (Home Phone)

## 2022-12-08 NOTE — TELEPHONE ENCOUNTER
Called patient and informed her that we are currently working on getting her admitted for the procedure and that we will call her back when we have more details.  She was understanding.

## 2022-12-12 ENCOUNTER — TELEPHONE (OUTPATIENT)
Dept: NEUROSURGERY | Facility: CLINIC | Age: 67
End: 2022-12-12

## 2022-12-19 NOTE — PROGRESS NOTES
Subjective   Patient ID: Gabby Acosta is a 67 y.o. female is here today for follow-up via telephone visit to discuss surgery.    Today, Ms. Acosta reports that she has gotten over her diarrhea.    You have chosen to receive care through a telephone visit. Do you consent to use a telephone visit for your medical care today? Yes    Unable to complete visit using a video connection to the patient. A phone visit was used to complete this visits. Total time of discussion was 11 minutes.    I was calling from the office.  The patient was at home.  She is gotten over her GI virus and her diarrhea.  She is ready to be brought into the hospital next week and move forward with a C3-T3 fusion.  She is still using her collar.      History of Present Illness    The following portions of the patient's history were reviewed and updated as appropriate: allergies, current medications, past family history, past medical history, past social history, past surgical history and problem list.    Review of Systems   Musculoskeletal: Positive for gait problem.   Neurological: Positive for weakness and numbness.           Objective     There were no vitals filed for this visit.  There is no height or weight on file to calculate BMI.    Tobacco Use: Medium Risk   • Smoking Tobacco Use: Former   • Smokeless Tobacco Use: Never   • Passive Exposure: Not on file          Physical Exam  Neurologic Exam        Assessment & Plan   Independent Review of Radiographic Studies:      I personally reviewed the images from the following studies.       The cervical and thoracic CT scan showed some pistoning of the cage into the C7 vertebral body and angulation of the screw in C7 upward.  This shows some spinal instability.  Agree with the report.       Medical Decision Making:      We will arrange for her to be brought in next week so that we can move forward with the C3-T3 fusion that I described in my previous note.      Diagnoses and all orders for this  visit:    1. History of fusion of cervical spine (Primary)    2. Spinal instability of cervicothoracic region    3. Displacement of internal fixation device of vertebrae, subsequent encounter      Return for We will set up the surgery and admission for next week.

## 2022-12-20 ENCOUNTER — OFFICE VISIT (OUTPATIENT)
Dept: NEUROSURGERY | Facility: CLINIC | Age: 67
End: 2022-12-20

## 2022-12-20 DIAGNOSIS — Z98.1 HISTORY OF FUSION OF CERVICAL SPINE: Primary | ICD-10-CM

## 2022-12-20 DIAGNOSIS — T84.226D DISPLACEMENT OF INTERNAL FIXATION DEVICE OF VERTEBRAE, SUBSEQUENT ENCOUNTER: ICD-10-CM

## 2022-12-20 DIAGNOSIS — M53.2X3: ICD-10-CM

## 2022-12-20 PROCEDURE — 99442 PR PHYS/QHP TELEPHONE EVALUATION 11-20 MIN: CPT | Performed by: NEUROLOGICAL SURGERY

## 2023-01-03 ENCOUNTER — TELEPHONE (OUTPATIENT)
Dept: NEUROSURGERY | Facility: CLINIC | Age: 68
End: 2023-01-03

## 2023-01-03 ENCOUNTER — TELEPHONE (OUTPATIENT)
Dept: NEUROSURGERY | Facility: CLINIC | Age: 68
End: 2023-01-03
Payer: MEDICARE

## 2023-01-03 NOTE — TELEPHONE ENCOUNTER
Called patient and patient wants to wait until next week or after to schedule surgery.  She does not want Dr. BEEBE to be mad but she is concern about the flu and covid.  She would like a call when Dr. BEEBE is able to do her surgery.

## 2023-01-05 ENCOUNTER — TELEPHONE (OUTPATIENT)
Dept: NEUROSURGERY | Facility: CLINIC | Age: 68
End: 2023-01-05

## 2023-01-05 NOTE — TELEPHONE ENCOUNTER
Patient's [birth year] is [birth year] the day you suggested [birth year]), How does some time around the 1st of February sound to reschedule her surgery? Do you have a time frame in mind?

## 2023-01-10 ENCOUNTER — HOSPITAL ENCOUNTER (INPATIENT)
Facility: HOSPITAL | Age: 68
LOS: 5 days | Discharge: HOME | DRG: 552 | End: 2023-01-17
Attending: STUDENT IN AN ORGANIZED HEALTH CARE EDUCATION/TRAINING PROGRAM
Payer: MEDICARE

## 2023-01-10 ENCOUNTER — APPOINTMENT (OUTPATIENT)
Dept: GENERAL RADIOLOGY | Facility: HOSPITAL | Age: 68
DRG: 552 | End: 2023-01-10
Payer: MEDICARE

## 2023-01-10 DIAGNOSIS — C34.90 NON-SMALL CELL LUNG CANCER, UNSPECIFIED LATERALITY: ICD-10-CM

## 2023-01-10 DIAGNOSIS — I35.0 NONRHEUMATIC AORTIC VALVE STENOSIS: Primary | ICD-10-CM

## 2023-01-10 PROBLEM — M48.02 CERVICAL STENOSIS OF SPINAL CANAL: Status: ACTIVE | Noted: 2023-01-10

## 2023-01-10 LAB
ALBUMIN SERPL-MCNC: 4 G/DL (ref 3.5–5.2)
ALP SERPL-CCNC: 57 U/L (ref 39–117)
ALT SERPL W P-5'-P-CCNC: 13 U/L (ref 1–33)
ANION GAP SERPL CALCULATED.3IONS-SCNC: 9.8 MMOL/L (ref 5–15)
AST SERPL-CCNC: 15 U/L (ref 1–32)
BASOPHILS # BLD AUTO: 0.04 10*3/MM3 (ref 0–0.2)
BASOPHILS NFR BLD AUTO: 0.5 % (ref 0–1.5)
BILIRUB CONJ SERPL-MCNC: <0.2 MG/DL (ref 0–0.3)
BILIRUB INDIRECT SERPL-MCNC: NORMAL MG/DL
BILIRUB SERPL-MCNC: 0.3 MG/DL (ref 0–1.2)
BUN SERPL-MCNC: 19 MG/DL (ref 8–23)
BUN/CREAT SERPL: 24.4 (ref 7–25)
CALCIUM SPEC-SCNC: 9.4 MG/DL (ref 8.6–10.5)
CHLORIDE SERPL-SCNC: 99 MMOL/L (ref 98–107)
CO2 SERPL-SCNC: 26.2 MMOL/L (ref 22–29)
CREAT SERPL-MCNC: 0.78 MG/DL (ref 0.57–1)
DEPRECATED RDW RBC AUTO: 41 FL (ref 37–54)
EGFRCR SERPLBLD CKD-EPI 2021: 83.9 ML/MIN/1.73
EOSINOPHIL # BLD AUTO: 0.2 10*3/MM3 (ref 0–0.4)
EOSINOPHIL NFR BLD AUTO: 2.3 % (ref 0.3–6.2)
ERYTHROCYTE [DISTWIDTH] IN BLOOD BY AUTOMATED COUNT: 11.8 % (ref 12.3–15.4)
GLUCOSE BLDC GLUCOMTR-MCNC: 101 MG/DL (ref 70–130)
GLUCOSE SERPL-MCNC: 86 MG/DL (ref 65–99)
HCT VFR BLD AUTO: 43.5 % (ref 34–46.6)
HGB BLD-MCNC: 14.4 G/DL (ref 12–15.9)
IMM GRANULOCYTES # BLD AUTO: 0.04 10*3/MM3 (ref 0–0.05)
IMM GRANULOCYTES NFR BLD AUTO: 0.5 % (ref 0–0.5)
LYMPHOCYTES # BLD AUTO: 2.11 10*3/MM3 (ref 0.7–3.1)
LYMPHOCYTES NFR BLD AUTO: 24.4 % (ref 19.6–45.3)
MAGNESIUM SERPL-MCNC: 1.3 MG/DL (ref 1.6–2.4)
MCH RBC QN AUTO: 31.2 PG (ref 26.6–33)
MCHC RBC AUTO-ENTMCNC: 33.1 G/DL (ref 31.5–35.7)
MCV RBC AUTO: 94.4 FL (ref 79–97)
MONOCYTES # BLD AUTO: 0.71 10*3/MM3 (ref 0.1–0.9)
MONOCYTES NFR BLD AUTO: 8.2 % (ref 5–12)
NEUTROPHILS NFR BLD AUTO: 5.55 10*3/MM3 (ref 1.7–7)
NEUTROPHILS NFR BLD AUTO: 64.1 % (ref 42.7–76)
NRBC BLD AUTO-RTO: 0 /100 WBC (ref 0–0.2)
PHOSPHATE SERPL-MCNC: 3.4 MG/DL (ref 2.5–4.5)
PLATELET # BLD AUTO: 194 10*3/MM3 (ref 140–450)
PMV BLD AUTO: 11.1 FL (ref 6–12)
POTASSIUM SERPL-SCNC: 4.3 MMOL/L (ref 3.5–5.2)
PROT SERPL-MCNC: 7.1 G/DL (ref 6–8.5)
RBC # BLD AUTO: 4.61 10*6/MM3 (ref 3.77–5.28)
SODIUM SERPL-SCNC: 135 MMOL/L (ref 136–145)
WBC NRBC COR # BLD: 8.65 10*3/MM3 (ref 3.4–10.8)

## 2023-01-10 PROCEDURE — 72040 X-RAY EXAM NECK SPINE 2-3 VW: CPT

## 2023-01-10 PROCEDURE — 85025 COMPLETE CBC W/AUTO DIFF WBC: CPT | Performed by: STUDENT IN AN ORGANIZED HEALTH CARE EDUCATION/TRAINING PROGRAM

## 2023-01-10 PROCEDURE — 80048 BASIC METABOLIC PNL TOTAL CA: CPT | Performed by: STUDENT IN AN ORGANIZED HEALTH CARE EDUCATION/TRAINING PROGRAM

## 2023-01-10 PROCEDURE — 71046 X-RAY EXAM CHEST 2 VIEWS: CPT

## 2023-01-10 PROCEDURE — 94761 N-INVAS EAR/PLS OXIMETRY MLT: CPT

## 2023-01-10 PROCEDURE — 99213 OFFICE O/P EST LOW 20 MIN: CPT | Performed by: NURSE PRACTITIONER

## 2023-01-10 PROCEDURE — 84100 ASSAY OF PHOSPHORUS: CPT | Performed by: STUDENT IN AN ORGANIZED HEALTH CARE EDUCATION/TRAINING PROGRAM

## 2023-01-10 PROCEDURE — 94799 UNLISTED PULMONARY SVC/PX: CPT

## 2023-01-10 PROCEDURE — 94640 AIRWAY INHALATION TREATMENT: CPT

## 2023-01-10 PROCEDURE — 82962 GLUCOSE BLOOD TEST: CPT

## 2023-01-10 PROCEDURE — 80076 HEPATIC FUNCTION PANEL: CPT | Performed by: STUDENT IN AN ORGANIZED HEALTH CARE EDUCATION/TRAINING PROGRAM

## 2023-01-10 PROCEDURE — 83735 ASSAY OF MAGNESIUM: CPT | Performed by: STUDENT IN AN ORGANIZED HEALTH CARE EDUCATION/TRAINING PROGRAM

## 2023-01-10 PROCEDURE — 94760 N-INVAS EAR/PLS OXIMETRY 1: CPT

## 2023-01-10 RX ORDER — NICOTINE POLACRILEX 4 MG
15 LOZENGE BUCCAL
Status: DISCONTINUED | OUTPATIENT
Start: 2023-01-10 | End: 2023-01-17 | Stop reason: HOSPADM

## 2023-01-10 RX ORDER — FLUTICASONE PROPIONATE 50 MCG
1 SPRAY, SUSPENSION (ML) NASAL 2 TIMES DAILY
Status: DISCONTINUED | OUTPATIENT
Start: 2023-01-10 | End: 2023-01-17 | Stop reason: HOSPADM

## 2023-01-10 RX ORDER — IPRATROPIUM BROMIDE AND ALBUTEROL SULFATE 2.5; .5 MG/3ML; MG/3ML
3 SOLUTION RESPIRATORY (INHALATION)
Status: DISCONTINUED | OUTPATIENT
Start: 2023-01-10 | End: 2023-01-17 | Stop reason: HOSPADM

## 2023-01-10 RX ORDER — ALBUTEROL SULFATE 2.5 MG/3ML
2.5 SOLUTION RESPIRATORY (INHALATION) EVERY 6 HOURS PRN
Status: DISCONTINUED | OUTPATIENT
Start: 2023-01-10 | End: 2023-01-17 | Stop reason: HOSPADM

## 2023-01-10 RX ORDER — MELATONIN
5000 DAILY
Status: DISCONTINUED | OUTPATIENT
Start: 2023-01-10 | End: 2023-01-17 | Stop reason: HOSPADM

## 2023-01-10 RX ORDER — INSULIN LISPRO 100 [IU]/ML
0-7 INJECTION, SOLUTION INTRAVENOUS; SUBCUTANEOUS
Status: DISCONTINUED | OUTPATIENT
Start: 2023-01-10 | End: 2023-01-17 | Stop reason: HOSPADM

## 2023-01-10 RX ORDER — BUPROPION HYDROCHLORIDE 150 MG/1
150 TABLET ORAL DAILY
Status: DISCONTINUED | OUTPATIENT
Start: 2023-01-10 | End: 2023-01-17 | Stop reason: HOSPADM

## 2023-01-10 RX ORDER — ATORVASTATIN CALCIUM 20 MG/1
10 TABLET, FILM COATED ORAL DAILY
Status: DISCONTINUED | OUTPATIENT
Start: 2023-01-10 | End: 2023-01-17 | Stop reason: HOSPADM

## 2023-01-10 RX ORDER — SODIUM CHLORIDE 0.9 % (FLUSH) 0.9 %
10 SYRINGE (ML) INJECTION EVERY 12 HOURS SCHEDULED
Status: DISCONTINUED | OUTPATIENT
Start: 2023-01-10 | End: 2023-01-17 | Stop reason: HOSPADM

## 2023-01-10 RX ORDER — LEVOTHYROXINE SODIUM 0.12 MG/1
125 TABLET ORAL DAILY
Status: DISCONTINUED | OUTPATIENT
Start: 2023-01-10 | End: 2023-01-17 | Stop reason: HOSPADM

## 2023-01-10 RX ORDER — SODIUM CHLORIDE 0.9 % (FLUSH) 0.9 %
10 SYRINGE (ML) INJECTION AS NEEDED
Status: DISCONTINUED | OUTPATIENT
Start: 2023-01-10 | End: 2023-01-17 | Stop reason: HOSPADM

## 2023-01-10 RX ORDER — LOSARTAN POTASSIUM 100 MG/1
100 TABLET ORAL DAILY
Status: DISCONTINUED | OUTPATIENT
Start: 2023-01-10 | End: 2023-01-17 | Stop reason: HOSPADM

## 2023-01-10 RX ORDER — DIPHENHYDRAMINE HCL 25 MG
25 CAPSULE ORAL EVERY 6 HOURS PRN
Status: DISCONTINUED | OUTPATIENT
Start: 2023-01-10 | End: 2023-01-17 | Stop reason: HOSPADM

## 2023-01-10 RX ORDER — DEXTROSE MONOHYDRATE 25 G/50ML
25 INJECTION, SOLUTION INTRAVENOUS
Status: DISCONTINUED | OUTPATIENT
Start: 2023-01-10 | End: 2023-01-17 | Stop reason: HOSPADM

## 2023-01-10 RX ORDER — GUAIFENESIN 600 MG/1
600 TABLET, EXTENDED RELEASE ORAL EVERY 12 HOURS SCHEDULED
Status: DISCONTINUED | OUTPATIENT
Start: 2023-01-10 | End: 2023-01-17 | Stop reason: HOSPADM

## 2023-01-10 RX ORDER — LANOLIN ALCOHOL/MO/W.PET/CERES
100 CREAM (GRAM) TOPICAL DAILY
Status: DISCONTINUED | OUTPATIENT
Start: 2023-01-10 | End: 2023-01-17 | Stop reason: HOSPADM

## 2023-01-10 RX ORDER — NITROGLYCERIN 0.4 MG/1
0.4 TABLET SUBLINGUAL
Status: DISCONTINUED | OUTPATIENT
Start: 2023-01-10 | End: 2023-01-17 | Stop reason: HOSPADM

## 2023-01-10 RX ORDER — SODIUM CHLORIDE 9 MG/ML
40 INJECTION, SOLUTION INTRAVENOUS AS NEEDED
Status: DISCONTINUED | OUTPATIENT
Start: 2023-01-10 | End: 2023-01-17 | Stop reason: HOSPADM

## 2023-01-10 RX ADMIN — ATORVASTATIN CALCIUM 10 MG: 20 TABLET, FILM COATED ORAL at 17:46

## 2023-01-10 RX ADMIN — IPRATROPIUM BROMIDE AND ALBUTEROL SULFATE 3 ML: 2.5; .5 SOLUTION RESPIRATORY (INHALATION) at 20:34

## 2023-01-10 RX ADMIN — BUPROPION HYDROCHLORIDE 150 MG: 150 TABLET, EXTENDED RELEASE ORAL at 17:47

## 2023-01-10 RX ADMIN — LEVOTHYROXINE SODIUM 125 MCG: 0.12 TABLET ORAL at 17:46

## 2023-01-10 RX ADMIN — Medication 10 ML: at 21:00

## 2023-01-10 RX ADMIN — Medication 5000 UNITS: at 17:46

## 2023-01-10 RX ADMIN — IPRATROPIUM BROMIDE AND ALBUTEROL SULFATE 3 ML: 2.5; .5 SOLUTION RESPIRATORY (INHALATION) at 15:36

## 2023-01-10 RX ADMIN — Medication 100 MG: at 17:46

## 2023-01-10 RX ADMIN — GUAIFENESIN 600 MG: 600 TABLET, EXTENDED RELEASE ORAL at 17:46

## 2023-01-10 RX ADMIN — LOSARTAN POTASSIUM 100 MG: 100 TABLET, FILM COATED ORAL at 17:46

## 2023-01-10 RX ADMIN — FLUTICASONE PROPIONATE 1 SPRAY: 50 SPRAY, METERED NASAL at 20:59

## 2023-01-10 RX ADMIN — GUAIFENESIN 600 MG: 600 TABLET, EXTENDED RELEASE ORAL at 20:59

## 2023-01-10 NOTE — PLAN OF CARE
Problem: Adult Inpatient Plan of Care  Goal: Plan of Care Review  Outcome: Ongoing, Progressing  Flowsheets (Taken 1/10/2023 1820)  Plan of Care Reviewed With:   patient   daughter  Goal: Patient-Specific Goal (Individualized)  Outcome: Ongoing, Progressing  Goal: Absence of Hospital-Acquired Illness or Injury  Outcome: Ongoing, Progressing  Intervention: Identify and Manage Fall Risk  Recent Flowsheet Documentation  Taken 1/10/2023 1800 by Joey Crowder, RN  Safety Promotion/Fall Prevention:   activity supervised   assistive device/personal items within reach   clutter free environment maintained   fall prevention program maintained   gait belt   lighting adjusted   mobility aid in reach   muscle strengthening facilitated   nonskid shoes/slippers when out of bed   room organization consistent   safety round/check completed  Taken 1/10/2023 1600 by Joey Crowder RN  Safety Promotion/Fall Prevention:   activity supervised   assistive device/personal items within reach   clutter free environment maintained   fall prevention program maintained   gait belt   lighting adjusted   mobility aid in reach   muscle strengthening facilitated   nonskid shoes/slippers when out of bed   room organization consistent   safety round/check completed  Taken 1/10/2023 1400 by Joey Crowder RN  Safety Promotion/Fall Prevention:   activity supervised   assistive device/personal items within reach   clutter free environment maintained   fall prevention program maintained   gait belt   lighting adjusted   mobility aid in reach   muscle strengthening facilitated   nonskid shoes/slippers when out of bed   room organization consistent   safety round/check completed  Intervention: Prevent and Manage VTE (Venous Thromboembolism) Risk  Recent Flowsheet Documentation  Taken 1/10/2023 1306 by Joey Crowder, RN  Activity Management: activity adjusted per tolerance  Goal: Optimal Comfort and Wellbeing  Outcome: Ongoing,  Progressing  Intervention: Provide Person-Centered Care  Recent Flowsheet Documentation  Taken 1/10/2023 1306 by Joey Crowder RN  Trust Relationship/Rapport:   care explained   choices provided   questions answered   questions encouraged  Goal: Readiness for Transition of Care  Outcome: Ongoing, Progressing  Intervention: Mutually Develop Transition Plan  Recent Flowsheet Documentation  Taken 1/10/2023 1320 by Joey Crowder RN  Transportation Anticipated: family or friend will provide  Patient/Family Anticipated Services at Transition:   Patient/Family Anticipates Transition to: home with family  Taken 1/10/2023 1316 by Joey Crowder RN  Equipment Currently Used at Home: walker, rolling     Problem: Fall Injury Risk  Goal: Absence of Fall and Fall-Related Injury  Outcome: Ongoing, Progressing  Intervention: Identify and Manage Contributors  Recent Flowsheet Documentation  Taken 1/10/2023 1600 by Joey Crowder RN  Medication Review/Management: medications reviewed  Taken 1/10/2023 1400 by Joey Crowder RN  Medication Review/Management: medications reviewed  Taken 1/10/2023 1306 by Joey Crowder RN  Medication Review/Management: medications reviewed  Intervention: Promote Injury-Free Environment  Recent Flowsheet Documentation  Taken 1/10/2023 1800 by Joey Crowder RN  Safety Promotion/Fall Prevention:   activity supervised   assistive device/personal items within reach   clutter free environment maintained   fall prevention program maintained   gait belt   lighting adjusted   mobility aid in reach   muscle strengthening facilitated   nonskid shoes/slippers when out of bed   room organization consistent   safety round/check completed  Taken 1/10/2023 1600 by Joey Crowder RN  Safety Promotion/Fall Prevention:   activity supervised   assistive device/personal items within reach   clutter free environment maintained   fall prevention program maintained   gait belt   lighting  adjusted   mobility aid in reach   muscle strengthening facilitated   nonskid shoes/slippers when out of bed   room organization consistent   safety round/check completed  Taken 1/10/2023 1400 by Joey Crowder RN  Safety Promotion/Fall Prevention:   activity supervised   assistive device/personal items within reach   clutter free environment maintained   fall prevention program maintained   gait belt   lighting adjusted   mobility aid in reach   muscle strengthening facilitated   nonskid shoes/slippers when out of bed   room organization consistent   safety round/check completed   Goal Outcome Evaluation:  Plan of Care Reviewed With: patient, daughter

## 2023-01-10 NOTE — H&P
Patient Name:  Gabby Acosta  YOB: 1955  MRN:  [id number]  Admit Date:  1/10/2023  Patient Care Team:  Danyelle Brush MD as PCP - General (Pediatrics)      Subjective   History Present Illness     67-year-old woman with a past medical history of type 2 diabetes and hypertension who presented to the hospital initially for numbness of her arms and legs as well as an inability to ambulate.  An MRI was done that showed multilevel stenosis of the cervical spine.  Neurosurgery was consulted and she underwent an anterior cervical corpectomy at C4, C5, C6 with a cage and anterior cervical plate from C3-C7 on 9/21/2022.  She was initially supposed to undergo a posterior cervical fusion on 9/27/2022 however she refused the procedure and so she was discharged with a hard cervical collar and he was supposed to follow-up with neurosurgery as an outpatient.    Of note, during that admission she underwent a CT of the chest that showed pulmonary nodules, largest of which being 1.4 cm in the left lower lobe and the recommendation at that time was to obtain a PET CT.      During one of the follow-up appointments neurosurgery, an x-ray was done that showed movement with one of the cages as well as an inferior portion of the plate.      Review of Systems   Constitutional: Negative for chills and fever.   Respiratory: Negative for chest tightness and shortness of breath.    Cardiovascular: Negative for chest pain and palpitations.   Gastrointestinal: Negative for abdominal pain and constipation.   Genitourinary: Negative for difficulty urinating and dysuria.   Musculoskeletal: Negative for arthralgias and joint swelling.   Skin: Negative for color change and wound.   Neurological: Positive for weakness and numbness.   Psychiatric/Behavioral: Negative for agitation and behavioral problems.        Personal History     Past Medical History:   Diagnosis Date   • Cervical spondylosis with myelopathy    • Cervical stenosis  of spine    • Diabetes mellitus (HCC)    • Diabetes mellitus with diabetic dermatitis (HCC)    • Disease of thyroid gland    • Elevated cholesterol    • Fatty liver      Past Surgical History:   Procedure Laterality Date   • ANTERIOR CHANNEL VERTEBRECTOMY/CORPECTOMY N/A 2022    Procedure: Anterior cervical corpectomy, cervical four/five/six, with cage and plate from cervical three to cervical seven;  Surgeon: David Cheung MD;  Location: [facility] OR;  Service: Neurosurgery;  Laterality: N/A;   • CATARACT EXTRACTION     •  SECTION     • CHOLECYSTECTOMY       Family History   Problem Relation Age of Onset   • Malig Hyperthermia Neg Hx      Social History     Tobacco Use   • Smoking status: Former     Types: Cigarettes     Quit date: 2022     Years since quittin.3   • Smokeless tobacco: Never   Vaping Use   • Vaping Use: Never used   Substance Use Topics   • Alcohol use: Never   • Drug use: Never     No current facility-administered medications on file prior to encounter.     Current Outpatient Medications on File Prior to Encounter   Medication Sig Dispense Refill   • albuterol sulfate  (90 Base) MCG/ACT inhaler INHALE TWO (2) PUFFS INTO THE LUNGS EVERY SIX (6) (SIX) HOURS AS NEEDED FOR WHEEZING.     • buPROPion XL (WELLBUTRIN XL) 150 MG 24 hr tablet Take 1 tablet by mouth Daily. 30 tablet 0   • diphenhydrAMINE (BENADRYL) 25 mg capsule Take 25 mg by mouth Every 6 (Six) Hours As Needed for Allergies.     • Flaxseed, Linseed, (Flax Seed Oil) 1000 MG capsule Take  by mouth.     • fluticasone (FLONASE) 50 MCG/ACT nasal spray INSTILL TWO (2) SPRAYS INTO NOSE DAILY.     • ibuprofen (ADVIL,MOTRIN) 800 MG tablet Take  by mouth.     • ipratropium-albuterol (DUO-NEB) 0.5-2.5 mg/3 ml nebulizer Take 3 mL by nebulization 4 (Four) Times a Day. 360 mL 0   • levothyroxine (SYNTHROID, LEVOTHROID) 125 MCG tablet Take 125 mcg by mouth Daily.     • losartan (COZAAR) 100 MG tablet Take 1 tablet by mouth  Daily.     • lovastatin (MEVACOR) 40 MG tablet TAKE ONE (1) TABLET BY MOUTH NIGHTLY.     • metFORMIN (GLUCOPHAGE) 850 MG tablet TAKE ONE (1) TABLET BY MOUTH TWO (2) (TWO) TIMES DAILY WITH MEALS SHOULD BE DUE 90 DAY SUPPLY.     • pyridoxine (VITAMIN B-6) 100 MG tablet Take 100 mg by mouth Daily.     • vitamin D3 125 MCG (5000 UT) capsule capsule Take 5,000 Units by mouth Daily.     • amLODIPine (NORVASC) 10 MG tablet Take 1 tablet by mouth Daily. 30 tablet 1   • guaiFENesin (MUCINEX) 600 MG 12 hr tablet Take 1 tablet by mouth Every 12 (Twelve) Hours. 14 tablet 0     No Known Allergies    Objective    Objective     Vital Signs  Temp:  [98.3 °F (36.8 °C)] 98.3 °F (36.8 °C)  Heart Rate:  [73] 73  Resp:  [18] 18  BP: (154)/(94) 154/94  SpO2:  [94 %] 94 %  on   ;      There is no height or weight on file to calculate BMI.    Physical Exam  Constitutional:       Appearance: Normal appearance.   HENT:      Head: Normocephalic and atraumatic.   Cardiovascular:      Rate and Rhythm: Normal rate and regular rhythm.   Pulmonary:      Effort: Pulmonary effort is normal. No respiratory distress.   Abdominal:      General: There is no distension.      Palpations: Abdomen is soft.      Tenderness: There is no abdominal tenderness.   Musculoskeletal:      Right lower leg: No edema.      Left lower leg: No edema.   Skin:     General: Skin is warm and dry.   Neurological:      General: No focal deficit present.      Mental Status: She is alert and oriented to person, place, and time.   Psychiatric:         Mood and Affect: Mood normal.         Behavior: Behavior normal.         Results Review:  I reviewed the patient's new clinical results.  I reviewed the patient's new imaging results and agree with the interpretation.  I reviewed the patient's other test results and agree with the interpretation  I personally viewed and interpreted the patient's EKG/Telemetry data  Discussed with ED provider.    Lab Results (last 24 hours)     ** No  results found for the last 24 hours. **          Imaging Results (Last 24 Hours)     ** No results found for the last 24 hours. **              No orders to display        Assessment/Plan     Active Hospital Problems    Diagnosis  POA   • **Cervical stenosis of spinal canal [M48.02]  Yes   • History of fusion of cervical spine [Z98.1]  Not Applicable   • HTN (hypertension) [I10]  Yes   • Obesity, Class III, BMI 40-49.9 (morbid obesity) ([facility]) [E66.01]  Yes   • Cervical spondylosis with myelopathy [M47.12]  Yes   • Hyperlipidemia associated with type 2 diabetes mellitus ([facility]) [E11.69, E78.5]  Yes   • Hypothyroidism [E03.9]  Yes      Resolved Hospital Problems   No resolved problems to display.       Assessment and plan:    Cervical spinal stenosis  Status post ACDF C3-C7 on 9/20/2022  Direct admission today for posterior approach with neurosurgery.    NSG consultation  Pain control     COPD  Continue albuterol as needed along with scheduled DuoNeb    Type 2 diabetes  Takes metformin 850 mg twice daily at home.  Hold while inpatient and start sliding scale insulin    Hypertension  Amlodipine 10 and losartan 100 mg.  Continue for now.    hyperlipidemia  Continue lovastatin      · I discussed the patient's findings and my recommendations with patient and consulting provider.    VTE Prophylaxis - SCDs.  Code Status - Full code.       Dariusz Marrufo MD  [facility]  01/10/23  13:34 EST

## 2023-01-11 ENCOUNTER — APPOINTMENT (OUTPATIENT)
Dept: CARDIOLOGY | Facility: HOSPITAL | Age: 68
DRG: 552 | End: 2023-01-11
Payer: MEDICARE

## 2023-01-11 LAB
ANION GAP SERPL CALCULATED.3IONS-SCNC: 12 MMOL/L (ref 5–15)
AORTIC DIMENSIONLESS INDEX: 0.3 (DI)
BASOPHILS # BLD AUTO: 0.04 10*3/MM3 (ref 0–0.2)
BASOPHILS NFR BLD AUTO: 0.5 % (ref 0–1.5)
BH CV ECHO MEAS - AO MAX PG: 77.5 MMHG
BH CV ECHO MEAS - AO MEAN PG: 39.2 MMHG
BH CV ECHO MEAS - AO V2 MAX: 440.1 CM/SEC
BH CV ECHO MEAS - AO V2 VTI: 74.7 CM
BH CV ECHO MEAS - AVA(I,D): 0.99 CM2
BH CV ECHO MEAS - EDV(MOD-SP2): 93 ML
BH CV ECHO MEAS - EDV(MOD-SP4): 93 ML
BH CV ECHO MEAS - EF(MOD-BP): 66.2 %
BH CV ECHO MEAS - EF(MOD-SP2): 67.7 %
BH CV ECHO MEAS - EF(MOD-SP4): 63.4 %
BH CV ECHO MEAS - ESV(MOD-SP2): 30 ML
BH CV ECHO MEAS - ESV(MOD-SP4): 34 ML
BH CV ECHO MEAS - LAT PEAK E' VEL: 6.7 CM/SEC
BH CV ECHO MEAS - LV MAX PG: 5.2 MMHG
BH CV ECHO MEAS - LV MEAN PG: 2.9 MMHG
BH CV ECHO MEAS - LV V1 MAX: 114.5 CM/SEC
BH CV ECHO MEAS - LV V1 VTI: 23.3 CM
BH CV ECHO MEAS - LVOT AREA: 3.2 CM2
BH CV ECHO MEAS - LVOT DIAM: 2.01 CM
BH CV ECHO MEAS - MED PEAK E' VEL: 6.1 CM/SEC
BH CV ECHO MEAS - MV A DUR: 0.13 SEC
BH CV ECHO MEAS - MV A MAX VEL: 112.6 CM/SEC
BH CV ECHO MEAS - MV DEC SLOPE: 295.8 CM/SEC2
BH CV ECHO MEAS - MV DEC TIME: 286 MSEC
BH CV ECHO MEAS - MV E MAX VEL: 80.8 CM/SEC
BH CV ECHO MEAS - MV E/A: 0.72
BH CV ECHO MEAS - MV MAX PG: 6.5 MMHG
BH CV ECHO MEAS - MV MEAN PG: 2.29 MMHG
BH CV ECHO MEAS - MV P1/2T: 98.1 MSEC
BH CV ECHO MEAS - MV V2 VTI: 23.3 CM
BH CV ECHO MEAS - MVA(P1/2T): 2.24 CM2
BH CV ECHO MEAS - MVA(VTI): 3.2 CM2
BH CV ECHO MEAS - PA ACC TIME: 0.07 SEC
BH CV ECHO MEAS - PA PR(ACCEL): 49.3 MMHG
BH CV ECHO MEAS - PA V2 MAX: 150.7 CM/SEC
BH CV ECHO MEAS - PULM A REVS DUR: 0.12 SEC
BH CV ECHO MEAS - PULM A REVS VEL: 27.4 CM/SEC
BH CV ECHO MEAS - PULM DIAS VEL: 42.1 CM/SEC
BH CV ECHO MEAS - PULM S/D: 1.28
BH CV ECHO MEAS - PULM SYS VEL: 53.9 CM/SEC
BH CV ECHO MEAS - RAP SYSTOLE: 3 MMHG
BH CV ECHO MEAS - RV MAX PG: 4.5 MMHG
BH CV ECHO MEAS - RV V1 MAX: 106.5 CM/SEC
BH CV ECHO MEAS - RV V1 VTI: 18.2 CM
BH CV ECHO MEAS - RVSP: 21 MMHG
BH CV ECHO MEAS - SV(LVOT): 73.9 ML
BH CV ECHO MEAS - SV(MOD-SP2): 63 ML
BH CV ECHO MEAS - SV(MOD-SP4): 59 ML
BH CV ECHO MEAS - TR MAX PG: 18.3 MMHG
BH CV ECHO MEAS - TR MAX VEL: 214.2 CM/SEC
BH CV ECHO MEASUREMENTS AVERAGE E/E' RATIO: 12.63
BH CV XLRA - TDI S': 15.3 CM/SEC
BUN SERPL-MCNC: 14 MG/DL (ref 8–23)
BUN/CREAT SERPL: 22.2 (ref 7–25)
CALCIUM SPEC-SCNC: 9.5 MG/DL (ref 8.6–10.5)
CHLORIDE SERPL-SCNC: 96 MMOL/L (ref 98–107)
CO2 SERPL-SCNC: 26 MMOL/L (ref 22–29)
CREAT SERPL-MCNC: 0.63 MG/DL (ref 0.57–1)
DEPRECATED RDW RBC AUTO: 38.9 FL (ref 37–54)
EGFRCR SERPLBLD CKD-EPI 2021: 98 ML/MIN/1.73
EOSINOPHIL # BLD AUTO: 0.1 10*3/MM3 (ref 0–0.4)
EOSINOPHIL NFR BLD AUTO: 1.3 % (ref 0.3–6.2)
ERYTHROCYTE [DISTWIDTH] IN BLOOD BY AUTOMATED COUNT: 11.7 % (ref 12.3–15.4)
GLUCOSE BLDC GLUCOMTR-MCNC: 121 MG/DL (ref 70–130)
GLUCOSE BLDC GLUCOMTR-MCNC: 165 MG/DL (ref 70–130)
GLUCOSE BLDC GLUCOMTR-MCNC: 192 MG/DL (ref 70–130)
GLUCOSE BLDC GLUCOMTR-MCNC: 213 MG/DL (ref 70–130)
GLUCOSE SERPL-MCNC: 166 MG/DL (ref 65–99)
HCT VFR BLD AUTO: 43.3 % (ref 34–46.6)
HGB BLD-MCNC: 14.8 G/DL (ref 12–15.9)
IMM GRANULOCYTES # BLD AUTO: 0.04 10*3/MM3 (ref 0–0.05)
IMM GRANULOCYTES NFR BLD AUTO: 0.5 % (ref 0–0.5)
LEFT ATRIUM VOLUME INDEX: 17.2 ML/M2
LYMPHOCYTES # BLD AUTO: 1.42 10*3/MM3 (ref 0.7–3.1)
LYMPHOCYTES NFR BLD AUTO: 18 % (ref 19.6–45.3)
MAGNESIUM SERPL-MCNC: 1.8 MG/DL (ref 1.6–2.4)
MAXIMAL PREDICTED HEART RATE: 154 BPM
MCH RBC QN AUTO: 31.5 PG (ref 26.6–33)
MCHC RBC AUTO-ENTMCNC: 34.2 G/DL (ref 31.5–35.7)
MCV RBC AUTO: 92.1 FL (ref 79–97)
MONOCYTES # BLD AUTO: 0.64 10*3/MM3 (ref 0.1–0.9)
MONOCYTES NFR BLD AUTO: 8.1 % (ref 5–12)
NEUTROPHILS NFR BLD AUTO: 5.67 10*3/MM3 (ref 1.7–7)
NEUTROPHILS NFR BLD AUTO: 71.6 % (ref 42.7–76)
NRBC BLD AUTO-RTO: 0 /100 WBC (ref 0–0.2)
PHOSPHATE SERPL-MCNC: 2.8 MG/DL (ref 2.5–4.5)
PLATELET # BLD AUTO: 167 10*3/MM3 (ref 140–450)
PMV BLD AUTO: 11.5 FL (ref 6–12)
POTASSIUM SERPL-SCNC: 3.9 MMOL/L (ref 3.5–5.2)
QT INTERVAL: 393 MS
RBC # BLD AUTO: 4.7 10*6/MM3 (ref 3.77–5.28)
SODIUM SERPL-SCNC: 134 MMOL/L (ref 136–145)
STRESS TARGET HR: 131 BPM
WBC NRBC COR # BLD: 7.91 10*3/MM3 (ref 3.4–10.8)

## 2023-01-11 PROCEDURE — 80048 BASIC METABOLIC PNL TOTAL CA: CPT | Performed by: STUDENT IN AN ORGANIZED HEALTH CARE EDUCATION/TRAINING PROGRAM

## 2023-01-11 PROCEDURE — 85025 COMPLETE CBC W/AUTO DIFF WBC: CPT | Performed by: STUDENT IN AN ORGANIZED HEALTH CARE EDUCATION/TRAINING PROGRAM

## 2023-01-11 PROCEDURE — 94799 UNLISTED PULMONARY SVC/PX: CPT

## 2023-01-11 PROCEDURE — 93306 TTE W/DOPPLER COMPLETE: CPT

## 2023-01-11 PROCEDURE — 93005 ELECTROCARDIOGRAM TRACING: CPT | Performed by: INTERNAL MEDICINE

## 2023-01-11 PROCEDURE — 25010000002 PERFLUTREN (DEFINITY) 8.476 MG IN SODIUM CHLORIDE (PF) 0.9 % 10 ML INJECTION: Performed by: INTERNAL MEDICINE

## 2023-01-11 PROCEDURE — 63710000001 INSULIN LISPRO (HUMAN) PER 5 UNITS: Performed by: STUDENT IN AN ORGANIZED HEALTH CARE EDUCATION/TRAINING PROGRAM

## 2023-01-11 PROCEDURE — 94761 N-INVAS EAR/PLS OXIMETRY MLT: CPT

## 2023-01-11 PROCEDURE — 93306 TTE W/DOPPLER COMPLETE: CPT | Performed by: INTERNAL MEDICINE

## 2023-01-11 PROCEDURE — 94664 DEMO&/EVAL PT USE INHALER: CPT

## 2023-01-11 PROCEDURE — 83735 ASSAY OF MAGNESIUM: CPT | Performed by: STUDENT IN AN ORGANIZED HEALTH CARE EDUCATION/TRAINING PROGRAM

## 2023-01-11 PROCEDURE — 63710000001 DIPHENHYDRAMINE PER 50 MG: Performed by: STUDENT IN AN ORGANIZED HEALTH CARE EDUCATION/TRAINING PROGRAM

## 2023-01-11 PROCEDURE — 94760 N-INVAS EAR/PLS OXIMETRY 1: CPT

## 2023-01-11 PROCEDURE — 99214 OFFICE O/P EST MOD 30 MIN: CPT | Performed by: NURSE PRACTITIONER

## 2023-01-11 PROCEDURE — 82962 GLUCOSE BLOOD TEST: CPT

## 2023-01-11 PROCEDURE — G0378 HOSPITAL OBSERVATION PER HR: HCPCS

## 2023-01-11 PROCEDURE — 99222 1ST HOSP IP/OBS MODERATE 55: CPT | Performed by: INTERNAL MEDICINE

## 2023-01-11 PROCEDURE — 84100 ASSAY OF PHOSPHORUS: CPT | Performed by: STUDENT IN AN ORGANIZED HEALTH CARE EDUCATION/TRAINING PROGRAM

## 2023-01-11 RX ORDER — IPRATROPIUM BROMIDE AND ALBUTEROL SULFATE 2.5; .5 MG/3ML; MG/3ML
3 SOLUTION RESPIRATORY (INHALATION)
Status: DISCONTINUED | OUTPATIENT
Start: 2023-01-11 | End: 2023-01-11 | Stop reason: SDUPTHER

## 2023-01-11 RX ORDER — BUDESONIDE AND FORMOTEROL FUMARATE DIHYDRATE 160; 4.5 UG/1; UG/1
2 AEROSOL RESPIRATORY (INHALATION)
Status: DISCONTINUED | OUTPATIENT
Start: 2023-01-11 | End: 2023-01-17 | Stop reason: HOSPADM

## 2023-01-11 RX ORDER — AMLODIPINE BESYLATE 10 MG/1
10 TABLET ORAL
Status: DISCONTINUED | OUTPATIENT
Start: 2023-01-11 | End: 2023-01-17 | Stop reason: HOSPADM

## 2023-01-11 RX ORDER — LORAZEPAM 0.5 MG/1
0.5 TABLET ORAL EVERY 6 HOURS PRN
Status: DISCONTINUED | OUTPATIENT
Start: 2023-01-11 | End: 2023-01-17

## 2023-01-11 RX ADMIN — BUDESONIDE AND FORMOTEROL FUMARATE DIHYDRATE 2 PUFF: 160; 4.5 AEROSOL RESPIRATORY (INHALATION) at 21:09

## 2023-01-11 RX ADMIN — LORAZEPAM 0.5 MG: 0.5 TABLET ORAL at 21:21

## 2023-01-11 RX ADMIN — Medication 10 ML: at 08:52

## 2023-01-11 RX ADMIN — AMLODIPINE BESYLATE 10 MG: 10 TABLET ORAL at 08:54

## 2023-01-11 RX ADMIN — INSULIN LISPRO 3 UNITS: 100 INJECTION, SOLUTION INTRAVENOUS; SUBCUTANEOUS at 08:51

## 2023-01-11 RX ADMIN — INSULIN LISPRO 2 UNITS: 100 INJECTION, SOLUTION INTRAVENOUS; SUBCUTANEOUS at 17:46

## 2023-01-11 RX ADMIN — Medication 5000 UNITS: at 08:51

## 2023-01-11 RX ADMIN — Medication 10 ML: at 21:24

## 2023-01-11 RX ADMIN — LEVOTHYROXINE SODIUM 125 MCG: 0.12 TABLET ORAL at 08:51

## 2023-01-11 RX ADMIN — IPRATROPIUM BROMIDE AND ALBUTEROL SULFATE 3 ML: 2.5; .5 SOLUTION RESPIRATORY (INHALATION) at 11:32

## 2023-01-11 RX ADMIN — GUAIFENESIN 600 MG: 600 TABLET, EXTENDED RELEASE ORAL at 08:50

## 2023-01-11 RX ADMIN — Medication 100 MG: at 08:51

## 2023-01-11 RX ADMIN — BUPROPION HYDROCHLORIDE 150 MG: 150 TABLET, EXTENDED RELEASE ORAL at 08:51

## 2023-01-11 RX ADMIN — FLUTICASONE PROPIONATE 1 SPRAY: 50 SPRAY, METERED NASAL at 08:52

## 2023-01-11 RX ADMIN — IPRATROPIUM BROMIDE AND ALBUTEROL SULFATE 3 ML: 2.5; .5 SOLUTION RESPIRATORY (INHALATION) at 07:51

## 2023-01-11 RX ADMIN — PERFLUTREN 2 ML: 6.52 INJECTION, SUSPENSION INTRAVENOUS at 15:25

## 2023-01-11 RX ADMIN — DIPHENHYDRAMINE HYDROCHLORIDE 25 MG: 25 CAPSULE ORAL at 21:21

## 2023-01-11 RX ADMIN — LOSARTAN POTASSIUM 100 MG: 100 TABLET, FILM COATED ORAL at 08:51

## 2023-01-11 RX ADMIN — GUAIFENESIN 600 MG: 600 TABLET, EXTENDED RELEASE ORAL at 21:21

## 2023-01-11 RX ADMIN — FLUTICASONE PROPIONATE 1 SPRAY: 50 SPRAY, METERED NASAL at 21:24

## 2023-01-11 RX ADMIN — INSULIN GLARGINE-YFGN 5 UNITS: 100 INJECTION, SOLUTION SUBCUTANEOUS at 21:20

## 2023-01-11 RX ADMIN — ATORVASTATIN CALCIUM 10 MG: 20 TABLET, FILM COATED ORAL at 08:51

## 2023-01-11 NOTE — DISCHARGE PLACEMENT REQUEST
"Gabby Kelly (67 y.o. Female)     Date of Birth   1955    Social Security Number       Address   [address] [place] [address] [address] [place] [id number]    Home Phone   [phone]    MRN   [id number]       Congregation   Confucianist    Marital Status                               Admission Date   1/10/23    Admission Type   Urgent    Admitting Provider   Dariusz Marrufo MD    Attending Provider   Dariusz Marrufo MD    Department, Room/Bed   [address], P591/1       Discharge Date       Discharge Disposition       Discharge Destination                               Attending Provider: Dariusz Marrufo MD    Allergies: No Known Allergies    Isolation: None   Infection: None   Code Status: CPR    Ht: 157.5 cm (62\")   Wt: 125 kg (275 lb 12.7 oz)    Admission Cmt: CHRISTOPHER REPL   Principal Problem: Cervical stenosis of spinal canal [M48.02]                 Active Insurance as of 1/10/2023     Primary Coverage     Payor Plan Insurance Group Employer/Plan Group    ANTHEM MEDICARE REPLACEMENT ANTHEM MEDICARE ADVANTAGE [id number]     Payor Plan Address Payor Plan Phone Number Payor Plan Fax Number Effective Dates    [address] [phone]  1/1/2022 - None Entered    [facility] [address]       Subscriber Name Subscriber Birth Date Member ID       GABBY KELLY 1955 [id number]           Secondary Coverage     Payor Plan Insurance Group Employer/Plan Group    [id number]      Payor Plan Address Payor Plan Phone Number Payor Plan Fax Number Effective Dates    [address]   1/1/2022 - None Entered    [facility] [id number]       Subscriber Name Subscriber Birth Date Member ID       GABBY KELLY 1955 [id number]                 Emergency Contacts      (Rel.) Home Phone Work Phone Mobile Phone    VANESSA GUERRERO (Significant Other) [phone] -- [phone]    Yolanda Amanda (Daughter) -- -- [phone]              "

## 2023-01-11 NOTE — PROGRESS NOTES
NEUROSURGERY PROGRESS NOTE     LOS: 1 day   Patient Care Team:  Danyelle Brush MD as PCP - General (Pediatrics)    Chief Complaint: cervical myelopathy with prior anterior cervical spine surgery.     Subjective     Interval History: Today's chest x-ray again shows hilar and subcarinal node enlargement.  Dr. Ring's note states patient refused chest CT for further work-up. Spoke with Dr. Fernandes by phone after his evaluation of the patient. His work-up reveals severe aortic stenosis.  In the setting of probable lung cancer and the severe aortic stenosis, she is at very high non-modifiable risk for surgery.         Vital Signs  Temp:  [97.7 °F (36.5 °C)-99 °F (37.2 °C)] 99 °F (37.2 °C)  Heart Rate:  [69-89] 89  Resp:  [18-20] 20  BP: (129-180)/(78-99) 139/82      Assessment & Plan       Cervical stenosis of spinal canal    HTN (hypertension)    Cervical spondylosis with myelopathy    Hypothyroidism    Hyperlipidemia associated with type 2 diabetes mellitus (HCC)    Obesity, Class III, BMI 40-49.9 (morbid obesity) (HCC)    History of fusion of cervical spine      Probable lung CA with evidence of hilar and subcarinal node enlargement.  Discussed with patient by pulmonary.  Patient refusing further work-up at this time but now agreeable when I spoke to patient. Patient is even agreeable to biopsy of lung if needed.    Newly diagnosed severe aortic stenosis per cardiology work-up.  Surgery would be of very high non-modifiable risk. Patient willing to consider surgical intervention for this if needed.    Above discussed with Dr. Cheung.    The probable lung cancer and aortic stenosis take a much higher priority and therefore OR for tomorrow is cancelled. Dr. Cheung plans to talk to patient further tomorrow. Patient is aware and in agreement.     Dr Cheung approved soft cervical collar for patient.      Await further recommendations from pulmonary and cardiology regarding further work up for the severe  aortic stenosis and lung mass.     Amber Lui, APRN  01/11/23  16:28 EST

## 2023-01-11 NOTE — CONSULTS
[facility]   Consult Note    Patient Name: Gabby Acosta  : 1955  MRN: [id number]  Primary Care Physician:  Danyelle Brush MD  Referring Physician: Dariusz Marrufo MD  Date of admission: 1/10/2023    Inpatient Neurosurgery Consult  Consult performed by: Amber Lui APRN  Consult ordered by: Dariusz Marrufo MD  Reason for consult: cervical myelopathy with prior anterior cervical spine surgery.        Subjective   Subjective     Reason for Consult/ Chief Complaint: cervical myelopatthy    History of Present Illness  Gabby Acosta is a 67 y.o. female who was seen initially by neurosurgery 2022 for chronic neck pain, upper extremity weakness, gait difficulties.  Work-up revealed two-level cervical stenosis with cord signal change.  She had myelopathic signs on exam.  She was taken to the operating room 2022 by Dr. Cheung for anterior cervical corpectomy C4, C5, C6 with cage and anterior cervical plate from C3-C7.  She had a difficult postoperative course due to acute exacerbation of her COPD, acute hypoxemic respiratory failure requiring 6 L of oxygen to maintain acceptable saturations.  She also had atelectasis.  She was hypertensive.  There was finding of pulmonary nodules and there is also finding of mediastinal adenopathy.  Pulmonary had recommended a PET scan to follow this at a later time.  The patient states that she has not smoked since 2021.  The patient was evaluated postoperatively by the pulmonary service and eventually her lung issues improved.  She has many risk factors including tobacco abuse, COPD, type 2 diabetes, hypertension, hyperlipidemia.  She has never undergone cardiac evaluation.  Given the severity of her stenosis, Dr. Cheung had discussed the possibility of the need for a posterior cervical fusion for further stabilization.  She was seen in the office in late October with a cervical spine x-ray which revealed good position of the C3 vertebral  body screws and no malalignment however the C7 screws were obscured from view.  Cervical spine x-rays were repeated and at the time of reevaluation 3 weeks later, the x-rays showed some shifting of hardware at C7.  At that point a CT of the cervical and thoracic spine was ordered.  These images were reviewed and the patient was evaluated at a office visit December 5, 2022.  The CT showed pistoning of cage into the C7 vertebral body with angulation of the screw of C7 into an upward position.  Due to this finding there was elevated concern for spinal instability and therefore Dr. Cheung discussed posterior cervical fusion surgery with the patient.  The surgery had been scheduled in December however the patient called to cancel due to an upper respiratory infection.  The surgery was rescheduled a couple of weeks later but then had to be canceled due to other emergency surgeries that came up.    Due to her multiple medical problems including diabetes, COPD, prior respiratory failure, hypertension.  The patient was admitted to the hospitalist for presurgical laboratory studies and evaluation to prepare for surgery on January 12, 2023.  Patient has been wearing a hard cervical collar since the time of her last surgery.  She reports that her cervical pain is doing much better.  She has also noticed some improvement in the strength of her right arm.  She is now able to write with her right hand and has been pleased with her progress up to this point.  Her only difficulty has continued to be some issues with balance although she feels that her gait is improving.    Review of Systems   Constitutional: Positive for activity change. Negative for chills, diaphoresis and fever.   HENT: Negative.  Negative for sinus pressure, sore throat and trouble swallowing.    Eyes: Negative.    Respiratory: Negative.  Negative for cough, choking, chest tightness and shortness of breath.    Cardiovascular: Negative for chest pain and  palpitations.   Gastrointestinal: Negative.  Negative for nausea and vomiting.   Endocrine: Negative.    Genitourinary: Negative.  Negative for difficulty urinating.   Musculoskeletal: Positive for gait problem and neck pain.   Skin: Negative.    Neurological: Negative for facial asymmetry and headaches.   Hematological: Negative.    All other systems reviewed and are negative.       Personal History     Past Medical History:   Diagnosis Date   • Cervical spondylosis with myelopathy    • Cervical stenosis of spine    • Diabetes mellitus (HCC)    • Diabetes mellitus with diabetic dermatitis (HCC)    • Disease of thyroid gland    • Elevated cholesterol    • Fatty liver        Past Surgical History:   Procedure Laterality Date   • ANTERIOR CHANNEL VERTEBRECTOMY/CORPECTOMY N/A 2022    Procedure: Anterior cervical corpectomy, cervical four/five/six, with cage and plate from cervical three to cervical seven;  Surgeon: David Cheung MD;  Location: [facility];  Service: Neurosurgery;  Laterality: N/A;   • CATARACT EXTRACTION     •  SECTION     • CHOLECYSTECTOMY         Family History: family history is not on file. Otherwise pertinent FHx was reviewed and not pertinent to current issue.    Social History:  reports that she quit smoking about 4 months ago. Her smoking use included cigarettes. She has never used smokeless tobacco. She reports that she does not drink alcohol and does not use drugs.    Home Medications:   Flax Seed Oil, albuterol sulfate HFA, amLODIPine, buPROPion XL, diphenhydrAMINE, fluticasone, guaiFENesin, ibuprofen, ipratropium-albuterol, levothyroxine, losartan, lovastatin, metFORMIN, pyridoxine, and vitamin D3    Allergies:  No Known Allergies    Objective    Objective     Vitals:  Temp:  [98.1 °F (36.7 °C)-98.3 °F (36.8 °C)] 98.1 °F (36.7 °C)  Heart Rate:  [70-78] 70  Resp:  [18] 18  BP: (129-154)/(89-94) 129/89    Physical Exam  Vitals reviewed.   Constitutional:       General:  She is not in acute distress.     Appearance: Normal appearance. She is well-developed. She is not ill-appearing or diaphoretic.      Comments: Wearing [place] collar.   HENT:      Head: Normocephalic and atraumatic.      Nose: Nose normal.      Mouth/Throat:      Mouth: Mucous membranes are moist.      Pharynx: Oropharynx is clear.   Eyes:      General:         Right eye: No discharge.         Left eye: No discharge.      Extraocular Movements: Extraocular movements intact.      Conjunctiva/sclera: Conjunctivae normal.   Neck:      Trachea: No tracheal deviation.   Cardiovascular:      Rate and Rhythm: Normal rate.   Pulmonary:      Effort: Pulmonary effort is normal. No respiratory distress.   Abdominal:      General: There is no distension.      Palpations: Abdomen is soft.      Tenderness: There is no abdominal tenderness.   Musculoskeletal:         General: No tenderness. Normal range of motion.      Cervical back: Normal range of motion and neck supple. No rigidity or tenderness.   Skin:     General: Skin is warm and dry.      Findings: No erythema.   Neurological:      General: No focal deficit present.      Mental Status: She is alert and oriented to person, place, and time.      GCS: GCS eye subscore is 4. GCS verbal subscore is 5. GCS motor subscore is 6.      Sensory: No sensory deficit.      Motor: No weakness or abnormal muscle tone.      Coordination: Coordination normal.      Deep Tendon Reflexes: Reflexes are normal and symmetric. Reflexes normal.      Comments: No motor or sensory deficits. DTR's normal. Negative Witt's; negative clonus.    Psychiatric:         Behavior: Behavior is cooperative.         Thought Content: Thought content normal.         Result Review    Result Review:  I have personally reviewed the results from the time of this admission to 1/10/2023 19:36 EST and agree with these findings:  [x]  Laboratory list / accordion  [x]  Microbiology  [x]  Radiology  []  EKG/Telemetry    []  Cardiology/Vascular   []  Pathology  []  Old records  []  Other:  Most notable findings include:     Results from last 7 days   Lab Units 01/10/23  1439   SODIUM mmol/L 135*   POTASSIUM mmol/L 4.3   CHLORIDE mmol/L 99   CO2 mmol/L 26.2   BUN mg/dL 19   CREATININE mg/dL 0.78   GLUCOSE mg/dL 86   CALCIUM mg/dL 9.4       Results from last 7 days   Lab Units 01/10/23  1439   WBC 10*3/mm3 8.65   HEMOGLOBIN g/dL 14.4   HEMATOCRIT % 43.5   PLATELETS 10*3/mm3 194       Assessment & Plan   Assessment / Plan     Brief Patient Summary:  Gabby Acosta is a 67 y.o. female who has a history of multiple medical problems as listed above.  She has a history of prior three-level anterior cervical corpectomy and fusion due to severe cervical stenosis with abnormal cord signal and myelopathy.  She is a heavy smoker although she states she quit smoking just over a year ago.  She had a difficult postoperative course with respiratory failure, hypoxia, COPD exacerbation, finding of abnormality in the mediastinum concerning for malignancy.  She unfortunately piston the cage at C7 and there is elevated concern about stability of the cervical spine.  The patient was evaluated by Dr. Cheung who recommended posterior cervical fusion for stabilization of the cervical spine.  Patient was admitted to the hospital today for evaluation and management of her medical problems in the hopes that she would be ready for surgery on 3/12/2023.  She has been followed by the hospitalist service.  She is wearing a hard cervical collar and has been doing so since the discovery of the piston cage on cervical CT.    Active Hospital Problems:  Active Hospital Problems    Diagnosis    • **Cervical stenosis of spinal canal    • History of fusion of cervical spine    • HTN (hypertension)    • Obesity, Class III, BMI 40-49.9 (morbid obesity) ([facility])    • Cervical spondylosis with myelopathy    • Hyperlipidemia associated with type 2 diabetes mellitus (HCC)    •  Hypothyroidism      Plan:     Due to patient's history of COPD and previous postoperative respiratory issues as well as the previous finding of possible malignancy in the chest, a pulmonology consult will be requested for surgical clearance. CXR will be ordered for pulmonology to review when they evaluate patient.     Cardiology will also be consulted given her multiple comorbidities for preoperative assessment as well. EKG is pending.       CHENG Elkins

## 2023-01-11 NOTE — PROGRESS NOTES
Discharge Planning Assessment  [facility]     Patient Name: Gabby Acosta  MRN: [id number]  Today's Date: 1/11/2023    Admit Date: 1/10/2023    Plan: Home with ex-, referral to [facility] home therapy if needed   Discharge Needs Assessment     Row Name 01/11/23 1501       Living Environment    People in Home other relative(s)    Name(s) of People in Home ex Prince harden    Current Living Arrangements home    Primary Care Provided by self    Provides Primary Care For no one    Family Caregiver if Needed child(neil), adult    Family Caregiver Names daughter Yolanda    Quality of Family Relationships helpful;involved;supportive    Able to Return to Prior Arrangements yes       Resource/Environmental Concerns    Resource/Environmental Concerns none       Transition Planning    Patient/Family Anticipates Transition to home with family;home with help/services    Patient/Family Anticipated Services at Transition home health care;rehabilitation services    Transportation Anticipated family or friend will provide       Discharge Needs Assessment    Equipment Currently Used at Home walker, rolling;wheelchair    Concerns to be Addressed discharge planning    Outpatient/Agency/Support Group Needs homecare agency    Discharge Facility/Level of Care Needs home with home health    Provided Post Acute Provider List? Yes    Post Acute Provider List Home Health;Outpatient Therapy    Discharge Coordination/Progress Referral to [facility]               Discharge Plan     Row Name 01/11/23 1502       Plan    Plan Home with ex-, referral to [facility] therapy if needed    Patient/Family in Agreement with Plan yes    Plan Comments CCP met with pt at bedside to discuss d/c planning. Pt resides with her ex- in a ground floor apartment with 5 exterior steps, and uses walker if needed with access to wheelchair for long distances. Pt has used [facility] therapy and prefers again if needed at d/c (referral pending). Pt has  no h/o SNF, and agrees to Meds to Beds enrollment. Plan noted for surgery 1/12 per [facility]. CCP to follow. Priscila Dow LCSW              Continued Care and Services - Admitted Since 1/10/2023     Home Medical Care     Service Provider Request Status Selected Services Address Phone Fax Patient Preferred    [facility] Accepted N/A [address] [facility] [id number] [phone] --              Expected Discharge Date and Time     Expected Discharge Date Expected Discharge Time    Jan 16, 2023          Demographic Summary     Row Name 01/11/23 1500       General Information    Admission Type inpatient    Arrived From home    Required Notices Provided Important Message from Medicare    Referral Source admission list    Reason for Consult discharge planning    Preferred Language English               Functional Status     Row Name 01/11/23 1500       Functional Status    Usual Activity Tolerance good    Current Activity Tolerance moderate       Functional Status, IADL    Medications independent    Meal Preparation independent    Housekeeping independent    Laundry independent    Shopping independent       Mental Status Summary    Recent Changes in Mental Status/Cognitive Functioning no changes               Psychosocial    No documentation.                Abuse/Neglect    No documentation.                Legal    No documentation.                Substance Abuse    No documentation.                Patient Forms    No documentation.                   Kia Dow LCSW

## 2023-01-11 NOTE — NURSING NOTE
"Placed consult call to Pulmonology . Placed pt on O2 during the night. Pt  Was dropping in the low 80\"s placed on 2-3 litters pt continue to drop placed call to Respiratory she voiced place pt on 5L. Pt maintain O2 90-92 percent. Pt sitting in chair this am with no on R/A Pt pulse ox  91-92 Respiratory at bedside he voiced he believes the cord has a shortage when he lift cord up pt respirations increase.  "

## 2023-01-11 NOTE — CONSULTS
CONSULT NOTE    Patient Identification:  Gabby Acosta  67 y.o.  female  1955  [id number]            Requesting physician: Amber ANAND    Reason for Consultation:  Lung nodule, lad, preop eval, copd    CC: Numbness in her arms    History of Present Illness:  Patient is a 67-year-old former smoker without a formal diagnosis of COPD diabetes hypertension as well as cervical spine stenosis who presented to the hospital with weakness and numbness.  She denied any worsening shortness of breath cough sputum production.     Prior hospitalization patient was found to have lung nodule and lymphadenopathy with PET scan as an outpatient recommended however patient failed to follow-up.    We have been consulted for preoperative pulmonary evaluation and lung nodules lymphadenopathy.    A current time the patient takes only albuterol as needed for respiratory issues    Patient denies formal pulmonary function testing.    Review of prior hospitalization she did have some respiratory insufficiency postoperatively previously postoperatively she said it was much improved when she sat upright in a chair instead of flat in bed.  She denies any formal testing for obstructive sleep apnea.  She actually says her biggest issue was reaction to anesthesia.  She says she felt as though she was hallucinating for up to 4 days after she was even discharged from the hospital.  At present time she has no worsening wheezing cough shortness of breath.  Carries on conversation without any shortness of breath.  She says at home prior to arrival she was able to ambulate and had no shortness of breath with her daily activities.    She says after hospital discharge she elected to follow-up with her primary care physician.  She did not have a formal CT scan of the chest.    She does share that she has multiple siblings with lung cancer.  She says that she does not want to know of any diagnosis.  I suggested a CT scan to further  evaluating tell her that this is highly suspicious for lung cancer.  She refuses CT scan imaging at this time.  I asked her if she has discussed this with her family she requested I do not mention this to any of her family members.    Have been very honest with her and let her know that if this is indeed lung cancer that by not evaluating this it would delay her care and potentially lead to shortening of her life.  She says he like to think about this.              Review of Systems:  CONSTITUTIONAL:  Denies fevers or chills  EYE:  No new vision changes  EAR:  No change in hearing  CARDIAC:  No chest pain  PULMONARY:  No productive cough or shortness of breath  GI:  No diarrhea, hematemesis or hematochezia,  RENAL:  No dysuria or urinary frequency  MUSCULOSKELETAL:  No musculoskeletal complaints  ENDOCRINE:  No heat or cold intolerance  INTEGUMENTARY: No skin rashes  NEUROLOGICAL:  No dizziness or confusion.  No seizure activity  PSYCHIATRIC:  No new anxiety or depression  12 system review of systems performed and all else negative    Past Medical History:   Diagnosis Date   • Cervical spondylosis with myelopathy    • Cervical stenosis of spine    • Diabetes mellitus (HCC)    • Diabetes mellitus with diabetic dermatitis (HCC)    • Disease of thyroid gland    • Elevated cholesterol    • Fatty liver        Past Surgical History:   Procedure Laterality Date   • ANTERIOR CHANNEL VERTEBRECTOMY/CORPECTOMY N/A 2022    Procedure: Anterior cervical corpectomy, cervical four/five/six, with cage and plate from cervical three to cervical seven;  Surgeon: David Cheung MD;  Location: [facility];  Service: Neurosurgery;  Laterality: N/A;   • CATARACT EXTRACTION     •  SECTION     • CHOLECYSTECTOMY          Medications Prior to Admission   Medication Sig Dispense Refill Last Dose   • albuterol sulfate  (90 Base) MCG/ACT inhaler INHALE TWO (2) PUFFS INTO THE LUNGS EVERY SIX (6) (SIX) HOURS AS NEEDED FOR  WHEEZING.   1/10/2023 at 0900   • buPROPion XL (WELLBUTRIN XL) 150 MG 24 hr tablet Take 1 tablet by mouth Daily. 30 tablet 0 1/10/2023 at 0900   • diphenhydrAMINE (BENADRYL) 25 mg capsule Take 25 mg by mouth Every 6 (Six) Hours As Needed for Allergies.   1/10/2023 at 0900   • fluticasone (FLONASE) 50 MCG/ACT nasal spray INSTILL TWO (2) SPRAYS INTO NOSE DAILY.   1/10/2023 at 090   • ibuprofen (ADVIL,MOTRIN) 800 MG tablet Take  by mouth.   1/10/2023 at 0900   • ipratropium-albuterol (DUO-NEB) 0.5-2.5 mg/3 ml nebulizer Take 3 mL by nebulization 4 (Four) Times a Day. 360 mL 0 Past Month   • levothyroxine (SYNTHROID, LEVOTHROID) 125 MCG tablet Take 125 mcg by mouth Daily.   1/10/2023 at 0900   • losartan (COZAAR) 100 MG tablet Take 1 tablet by mouth Daily.   1/10/2023 at 0900   • lovastatin (MEVACOR) 40 MG tablet TAKE ONE (1) TABLET BY MOUTH NIGHTLY.   Past Week at 2100   • metFORMIN (GLUCOPHAGE) 850 MG tablet TAKE ONE (1) TABLET BY MOUTH TWO (2) (TWO) TIMES DAILY WITH MEALS SHOULD BE DUE 90 DAY SUPPLY.   1/10/2023 at 0900   • pyridoxine (VITAMIN B-6) 100 MG tablet Take 100 mg by mouth Daily.   Past Week at    • vitamin D3 125 MCG (5000 UT) capsule capsule Take 5,000 Units by mouth Daily.   Past Week at    • amLODIPine (NORVASC) 10 MG tablet Take 1 tablet by mouth Daily. 30 tablet 1    • Flaxseed, Linseed, (Flax Seed Oil) 1000 MG capsule Take  by mouth.   More than a month at    • guaiFENesin (MUCINEX) 600 MG 12 hr tablet Take 1 tablet by mouth Every 12 (Twelve) Hours. 14 tablet 0 Unknown       No Known Allergies    Social History     Socioeconomic History   • Marital status:    Tobacco Use   • Smoking status: Former     Types: Cigarettes     Quit date: 2022     Years since quittin.3   • Smokeless tobacco: Never   Vaping Use   • Vaping Use: Never used   Substance and Sexual Activity   • Alcohol use: Never   • Drug use: Never   • Sexual activity: Yes       Family History   Problem Relation Age  "of Onset   • Malig Hyperthermia Neg Hx        Physical Exam:  /84   Pulse 86   Temp 97.7 °F (36.5 °C)   Resp 20   Ht 157.5 cm (62\")   Wt 125 kg (275 lb 12.7 oz)   SpO2 93%   BMI 50.44 kg/m²   Body mass index is 50.44 kg/m².   General appearance: NAD, conversant   Eyes: anicteric sclerae, moist conjunctivae; no lid-lag; PERRLA  HENT: Atraumatic; oropharynx clear with moist mucous membranes and no mucosal ulcerations; normal hard and soft palate  Neck: Trachea midline; FROM, supple, no thyromegaly or lymphadenopathy  Lungs: CTA, with normal respiratory effort and no intercostal retractions  CV: RRR, no MRGs   Abdomen: Soft, non-tender; no masses or HSM  Extremities: No peripheral edema or extremity lymphadenopathy  Skin: Normal temperature, turgor and texture; no rash, ulcers or subcutaneous nodules  Psych: Appropriate affect, alert and oriented to person, place and time    LABS:  Results from last 7 days   Lab Units 01/11/23  0609 01/10/23  1439   WBC 10*3/mm3 7.91 8.65   HEMOGLOBIN g/dL 14.8 14.4   PLATELETS 10*3/mm3 167 194     Results from last 7 days   Lab Units 01/11/23  0609 01/10/23  1439   SODIUM mmol/L 134* 135*   POTASSIUM mmol/L 3.9 4.3   CHLORIDE mmol/L 96* 99   CO2 mmol/L 26.0 26.2   BUN mg/dL 14 19   CREATININE mg/dL 0.63 0.78   GLUCOSE mg/dL 166* 86   CALCIUM mg/dL 9.5 9.4   MAGNESIUM mg/dL 1.8 1.3*   PHOSPHORUS mg/dL 2.8 3.4   Estimated Creatinine Clearance: 111.1 mL/min (by C-G formula based on SCr of 0.63 mg/dL).    Imaging: I personally visualized the images of scans/x-rays performed within last 3 days.  Imaging Results (Most Recent)     Procedure Component Value Units Date/Time    XR Chest PA & Lateral [[id number]] Collected: 01/10/23 2057     Updated: 01/10/23 2101    Narrative:      CHEST: 2 VIEWS     HISTORY: Preoperative exam.     COMPARISON: CT angiogram chest 09/25/2022     FINDINGS:Heart size within normal limits. The thoracic aorta appears  tortuous. There is elevation of the " right hemidiaphragm and there is  right basilar atelectasis. There is cervical collar limiting evaluation  of the lung apices. There is no evidence for pulmonary edema or pleural  effusion. On the lateral view, there is a bulbous configuration of the  calvin and this likely correlates with mild hilar and subcarinal jonn  enlargement demonstrated with previous CT.       Impression:      On the lateral view, there is a bulbous configuration of the  calvin and this likely correlates with hilar and subcarinal jonn  enlargement demonstrated on previous CT though this would be more  accurately directly compared with prior CT 09/25/2022. Lungs appear  clear.     This report was finalized on 1/10/2023 8:58 PM by Dr. Irving Christian M.D.       XR Spine Cervical 2 View [[id number]] Collected: 01/10/23 2031     Updated: 01/10/23 2042    Narrative:      CERVICAL SPINE: AP, LATERAL, SWIMMER'S VIEW     HISTORY: Evaluate alignment. Previous cervical spine surgery.     COMPARISON: Cervical spine 11/16/2022     FINDINGS: Unfortunately, this evaluation is very limited due to  difficulties with patient positioning and overlapping densities. There  has been anterior cervical fusion with placement of plates and screws  and anterior plate extends from C3 to C7. Interbody spacers are present  at C3-4 and C6-7. There is no evidence for loosening though the caudal  aspect of the plate visualization is severely limited. Cervical collar  is present contributing to artifact.       Impression:      Severely limited exam demonstrates that there has been  anterior plate and screw fusion from C3 to C7. The caudal aspect of the  plate is not well visualized though alignment appears grossly within  normal limits. Overlying cervical collar artifact is noted. CT may be  necessary for more definitive evaluation depending on the clinical  scenario.     This report was finalized on 1/10/2023 8:39 PM by Dr. Irving Christian M.D.             Assessment /  Recommendations:  Former smoker  Pulmonary nodule  Lymphadenopathy  Family history of lung cancer  Patient Active Problem List   Diagnosis   • Acute UTI   • HTN (hypertension)   • Cervical spondylosis with myelopathy   • Fibromyalgia   • Fatty liver   • Hypothyroidism   • Hyperlipidemia associated with type 2 diabetes mellitus (HCC)   • Hypertrophic polyarthritis   • Proteinuria   • Obesity, Class III, BMI 40-49.9 (morbid obesity) (HCC)   • Hypokalemia   • Hypophosphatemia   • History of fusion of cervical spine   • Displacement of internal fixation device of vertebrae (HCC)   • Spinal instability of cervicothoracic region   • Cervical stenosis of spinal canal       Needs repeat ct chest with contrast to evaluate LAD and lung nodules however despite extensive counseling the patient refusing CT scan.    She says that she does not want to know of any diagnosis.  I suggested a CT scan to further evaluating tell her that this is highly suspicious for lung cancer.  She refuses CT scan imaging at this time.  I asked her if she has discussed this with her family she requested I do not mention this to any of her family members.    Have been very honest with her and let her know that if this is indeed lung cancer that by not evaluating this it would delay her care and potentially lead to shortening of her life.  She says he like to think about this.    From a pulmonary perspective her smoking history, obesity, and likely underlying JUDY and COPD place her at higher risk for post op pulmonary complications.  Ill start duonebs prn and symbicort.  Would suggest IS and aggressive pulm toileting post operatively.            Dwight Ring MD  [facility]  01/11/23  09:18 EST

## 2023-01-11 NOTE — PLAN OF CARE
Goal Outcome Evaluation:     Problem: Adult Inpatient Plan of Care  Goal: Plan of Care Review  Outcome: Ongoing, Progressing     Problem: Fall Injury Risk  Goal: Absence of Fall and Fall-Related Injury  Outcome: Ongoing, Progressing  Intervention: Promote Injury-Free Environment  Recent Flowsheet Documentation  Taken 1/10/2023 2200 by Helio Mccracken RN  Safety Promotion/Fall Prevention:   activity supervised   safety round/check completed   toileting scheduled   nonskid shoes/slippers when out of bed   lighting adjusted  Taken 1/10/2023 2015 by Helio Mccracken RN  Safety Promotion/Fall Prevention: patient off unit     Pt alert x 4 no complaints of pain or discomfort. Pt had xray done this evening of cervical spine and chest.Will continue to monitor.

## 2023-01-11 NOTE — PROGRESS NOTES
Name: Gabby Acosta ADMIT: 1/10/2023   : 1955  PCP: Danyelle Brush MD    MRN: [id number] LOS: 1 days   AGE/SEX: 67 y.o. female  ROOM: [facility]     Subjective   Subjective   No acute events overnight. Patient does not have new complaints today     Review of Systems   Constitutional: Negative for chills and fever.   Respiratory: Negative for chest tightness and shortness of breath.    Cardiovascular: Negative for chest pain and palpitations.   Gastrointestinal: Negative for abdominal pain and constipation.        Objective   Objective   Vital Signs  Temp:  [97.7 °F (36.5 °C)-98.3 °F (36.8 °C)] 97.7 °F (36.5 °C)  Heart Rate:  [69-86] 86  Resp:  [18-20] 20  BP: (129-180)/(78-99) 180/84  SpO2:  [90 %-95 %] 93 %  on   ;   Device (Oxygen Therapy): room air  Body mass index is 50.44 kg/m².  Physical Exam  Constitutional:       Appearance: Normal appearance.   HENT:      Head: Normocephalic and atraumatic.   Neck:      Comments: In soft cervical collar   Cardiovascular:      Rate and Rhythm: Normal rate and regular rhythm.   Pulmonary:      Effort: Pulmonary effort is normal. No respiratory distress.   Abdominal:      General: There is no distension.      Palpations: Abdomen is soft.      Tenderness: There is no abdominal tenderness.   Neurological:      General: No focal deficit present.      Mental Status: She is alert and oriented to person, place, and time.         Results Review     I reviewed the patient's new clinical results.  Results from last 7 days   Lab Units 23  0609 01/10/23  1439   WBC 10*3/mm3 7.91 8.65   HEMOGLOBIN g/dL 14.8 14.4   PLATELETS 10*3/mm3 167 194     Results from last 7 days   Lab Units 23  0609 01/10/23  1439   SODIUM mmol/L 134* 135*   POTASSIUM mmol/L 3.9 4.3   CHLORIDE mmol/L 96* 99   CO2 mmol/L 26.0 26.2   BUN mg/dL 14 19   CREATININE mg/dL 0.63 0.78   GLUCOSE mg/dL 166* 86   Estimated Creatinine Clearance: 111.1 mL/min (by C-G formula based on SCr of 0.63  mg/dL).  Results from last 7 days   Lab Units 01/10/23  1439   ALBUMIN g/dL 4.0   BILIRUBIN mg/dL 0.3   ALK PHOS U/L 57   AST (SGOT) U/L 15   ALT (SGPT) U/L 13     Results from last 7 days   Lab Units 01/11/23  0609 01/10/23  1439   CALCIUM mg/dL 9.5 9.4   ALBUMIN g/dL  --  4.0   MAGNESIUM mg/dL 1.8 1.3*   PHOSPHORUS mg/dL 2.8 3.4     No results found for: COVID19  Glucose   Date/Time Value Ref Range Status   01/11/2023 0823 213 (H) 70 - 130 mg/dL Final     Comment:     Meter: [id number] : [id number] Matthew Clarke BAO   01/10/2023 2138 101 70 - 130 mg/dL Final     Comment:     Meter: [id number] : [email or web address] Nawaf Cadet RN       XR Chest PA & Lateral  Narrative: CHEST: 2 VIEWS     HISTORY: Preoperative exam.     COMPARISON: CT angiogram chest 09/25/2022     FINDINGS:Heart size within normal limits. The thoracic aorta appears  tortuous. There is elevation of the right hemidiaphragm and there is  right basilar atelectasis. There is cervical collar limiting evaluation  of the lung apices. There is no evidence for pulmonary edema or pleural  effusion. On the lateral view, there is a bulbous configuration of the  calvin and this likely correlates with mild hilar and subcarinal jonn  enlargement demonstrated with previous CT.     Impression: On the lateral view, there is a bulbous configuration of the  calvin and this likely correlates with hilar and subcarinal jonn  enlargement demonstrated on previous CT though this would be more  accurately directly compared with prior CT 09/25/2022. Lungs appear  clear.     This report was finalized on 1/10/2023 8:58 PM by Dr. Irving Christian M.D.     XR Spine Cervical 2 View  Narrative: CERVICAL SPINE: AP, LATERAL, SWIMMER'S VIEW     HISTORY: Evaluate alignment. Previous cervical spine surgery.     COMPARISON: Cervical spine 11/16/2022     FINDINGS: Unfortunately, this evaluation is very limited due to  difficulties with patient positioning and overlapping densities.  There  has been anterior cervical fusion with placement of plates and screws  and anterior plate extends from C3 to C7. Interbody spacers are present  at C3-4 and C6-7. There is no evidence for loosening though the caudal  aspect of the plate visualization is severely limited. Cervical collar  is present contributing to artifact.     Impression: Severely limited exam demonstrates that there has been  anterior plate and screw fusion from C3 to C7. The caudal aspect of the  plate is not well visualized though alignment appears grossly within  normal limits. Overlying cervical collar artifact is noted. CT may be  necessary for more definitive evaluation depending on the clinical  scenario.     This report was finalized on 1/10/2023 8:39 PM by Dr. Irving Christian M.D.       Scheduled Medications  amLODIPine, 10 mg, Oral, Q24H  atorvastatin, 10 mg, Oral, Daily  buPROPion XL, 150 mg, Oral, Daily  cholecalciferol, 5,000 Units, Oral, Daily  fluticasone, 1 spray, Each Nare, BID  guaiFENesin, 600 mg, Oral, Q12H  insulin lispro, 0-7 Units, Subcutaneous, TID AC  ipratropium-albuterol, 3 mL, Nebulization, 4x Daily - RT  levothyroxine, 125 mcg, Oral, Daily  losartan, 100 mg, Oral, Daily  sodium chloride, 10 mL, Intravenous, Q12H  pyridoxine, 100 mg, Oral, Daily    Infusions   Diet  Diet: Regular/House Diet; Texture: Regular Texture (IDDSI 7); Fluid Consistency: Thin (IDDSI 0)       Assessment/Plan     Active Hospital Problems    Diagnosis  POA   • **Cervical stenosis of spinal canal [M48.02]  Yes   • History of fusion of cervical spine [Z98.1]  Not Applicable   • HTN (hypertension) [I10]  Yes   • Obesity, Class III, BMI 40-49.9 (morbid obesity) ([facility]) [E66.01]  Yes   • Cervical spondylosis with myelopathy [M47.12]  Yes   • Hyperlipidemia associated with type 2 diabetes mellitus ([facility]) [E11.69, E78.5]  Yes   • Hypothyroidism [E03.9]  Yes      Resolved Hospital Problems   No resolved problems to display.       67 y.o. female  admitted with Cervical stenosis of spinal canal.       Type 2 diabetes  Takes metformin 850 mg twice daily at home.    Sugars elevated  Start glargine 5u Qhs + sliding scale     Cervical spinal stenosis  Status post ACDF C3-C7 on 9/20/2022  Direct admission today for posterior approach with neurosurgery.    Pain control   Pulm and Cardiology consulted for surgical clearance      COPD  Pulmonary nodules   Continue albuterol as needed along with scheduled DuoNeb  Pulm consulted for surgical clearance      Hypertension  Amlodipine 10 and losartan 100 mg.  Continue for now.     hyperlipidemia  Resume statin    · SCDs for DVT prophylaxis.  · Full code.  · Discussed with patient and nursing staff.  · Anticipate discharge home later this week.      Dariusz Marrufo MD  [facility]  01/11/23  09:05 EST    I wore protective equipment throughout this patient encounter including a face mask, gloves and protective eyewear.  Hand hygiene was performed before donning protective equipment and after removal when leaving the room.

## 2023-01-11 NOTE — PLAN OF CARE
Problem: Adult Inpatient Plan of Care  Goal: Plan of Care Review  Outcome: Ongoing, Not Progressing  Flowsheets (Taken 1/11/2023 1401)  Progress: no change  Plan of Care Reviewed With: patient  Goal: Patient-Specific Goal (Individualized)  Outcome: Ongoing, Not Progressing  Goal: Absence of Hospital-Acquired Illness or Injury  Outcome: Ongoing, Not Progressing  Intervention: Identify and Manage Fall Risk  Recent Flowsheet Documentation  Taken 1/11/2023 1400 by Estee Palomares RN  Safety Promotion/Fall Prevention:   safety round/check completed   room organization consistent   nonskid shoes/slippers when out of bed   lighting adjusted   fall prevention program maintained   clutter free environment maintained   assistive device/personal items within reach   activity supervised  Taken 1/11/2023 1200 by Estee Palomares RN  Safety Promotion/Fall Prevention:   safety round/check completed   room organization consistent   nonskid shoes/slippers when out of bed   lighting adjusted   fall prevention program maintained   clutter free environment maintained   assistive device/personal items within reach   activity supervised  Taken 1/11/2023 1000 by Estee Palomares, RN  Safety Promotion/Fall Prevention:   safety round/check completed   room organization consistent   nonskid shoes/slippers when out of bed   lighting adjusted   fall prevention program maintained   clutter free environment maintained   assistive device/personal items within reach   activity supervised  Taken 1/11/2023 0800 by Estee Palomares, RN  Safety Promotion/Fall Prevention:   safety round/check completed   room organization consistent   nonskid shoes/slippers when out of bed   lighting adjusted   fall prevention program maintained   clutter free environment maintained   activity supervised   assistive device/personal items within reach  Intervention: Prevent and Manage VTE (Venous Thromboembolism) Risk  Recent Flowsheet Documentation  Taken  1/11/2023 0800 by Estee Palomares RN  VTE Prevention/Management: patient refused intervention  Range of Motion: active ROM (range of motion) encouraged  Intervention: Prevent Infection  Recent Flowsheet Documentation  Taken 1/11/2023 1400 by Estee Palomares RN  Infection Prevention:   single patient room provided   hand hygiene promoted  Taken 1/11/2023 1200 by Estee Palomares RN  Infection Prevention:   single patient room provided   hand hygiene promoted  Taken 1/11/2023 1000 by Estee Palomares RN  Infection Prevention:   single patient room provided   hand hygiene promoted  Taken 1/11/2023 0800 by Estee Palomares RN  Infection Prevention:   single patient room provided   hand hygiene promoted  Goal: Optimal Comfort and Wellbeing  Outcome: Ongoing, Not Progressing  Intervention: Provide Person-Centered Care  Recent Flowsheet Documentation  Taken 1/11/2023 0800 by Estee Palomares RN  Trust Relationship/Rapport:   care explained   choices provided   emotional support provided   empathic listening provided   questions answered   questions encouraged   reassurance provided   thoughts/feelings acknowledged  Goal: Readiness for Transition of Care  Outcome: Ongoing, Not Progressing     Problem: Fall Injury Risk  Goal: Absence of Fall and Fall-Related Injury  Outcome: Ongoing, Not Progressing  Intervention: Identify and Manage Contributors  Recent Flowsheet Documentation  Taken 1/11/2023 1400 by Estee Palomares RN  Medication Review/Management: medications reviewed  Taken 1/11/2023 1200 by Estee Palomares RN  Medication Review/Management: medications reviewed  Taken 1/11/2023 1000 by Estee Palomares RN  Medication Review/Management: medications reviewed  Taken 1/11/2023 0800 by Estee Palomares RN  Medication Review/Management: medications reviewed  Intervention: Promote Injury-Free Environment  Recent Flowsheet Documentation  Taken 1/11/2023 1400 by Estee Palomares RN  Safety Promotion/Fall  Prevention:   safety round/check completed   room organization consistent   nonskid shoes/slippers when out of bed   lighting adjusted   fall prevention program maintained   clutter free environment maintained   assistive device/personal items within reach   activity supervised  Taken 1/11/2023 1200 by Estee Palomares RN  Safety Promotion/Fall Prevention:   safety round/check completed   room organization consistent   nonskid shoes/slippers when out of bed   lighting adjusted   fall prevention program maintained   clutter free environment maintained   assistive device/personal items within reach   activity supervised  Taken 1/11/2023 1000 by Estee Palomares, RN  Safety Promotion/Fall Prevention:   safety round/check completed   room organization consistent   nonskid shoes/slippers when out of bed   lighting adjusted   fall prevention program maintained   clutter free environment maintained   assistive device/personal items within reach   activity supervised  Taken 1/11/2023 0800 by Estee Palomares, RN  Safety Promotion/Fall Prevention:   safety round/check completed   room organization consistent   nonskid shoes/slippers when out of bed   lighting adjusted   fall prevention program maintained   clutter free environment maintained   activity supervised   assistive device/personal items within reach   Goal Outcome Evaluation:  Plan of Care Reviewed With: patient        Progress: no change

## 2023-01-11 NOTE — CONSULTS
Date of Consultation: 23    Referral Provider: Dariusz Marrufo MD     Reason for Consultation: Pre-operative clearance, murmur.    Encounter Provider: Grabiel Garcia MD    Group of Service: [facility]     Patient Name: Gabby Acosta    :1955    Chief complaint: Numbness in the arms and legs.    History of Present Illness:      This is a very pleasant 67 year-old female with a history of COPD, diabetes, and hypertension.  She recently underwent an anterior cervical corpectomy at C4, C5, and C6 with anterior cervical plate from C3-C7 on 2022.  She was initially supposed to undergo a posterior cervical fusion on 2022, but refused the procedure, and was discharged with a hard cervical collar.  She was admitted for the surgery as she has still had some numbness in her arms and legs.  Cardiology was consulted for preoperative clearance.    Dr. Dwight Ring also saw the patient from pulmonology for clearance.  He feels that she may have lung cancer based on lymphadenopathy and lung nodules on previous imaging.  However, the patient does not want a CT scan or further work-up for this per his note for now.  She evidently also had significant issues with her oxygenation and recovery from her initial surgery in 2022.  She has not had any chest pain.  However, she does have shortness of breath at baseline.  She had a significant murmur on examination, and a subsequent echocardiogram revealed severe aortic stenosis.    Past Medical History:   Diagnosis Date   • Cervical spondylosis with myelopathy    • Cervical stenosis of spine    • Diabetes mellitus (HCC)    • Diabetes mellitus with diabetic dermatitis (HCC)    • Disease of thyroid gland    • Elevated cholesterol    • Fatty liver          Past Surgical History:   Procedure Laterality Date   • ANTERIOR CHANNEL VERTEBRECTOMY/CORPECTOMY N/A 2022    Procedure: Anterior cervical corpectomy, cervical four/five/six, with  cage and plate from cervical three to cervical seven;  Surgeon: David Cheung MD;  Location: [facility] MAIN OR;  Service: Neurosurgery;  Laterality: N/A;   • CATARACT EXTRACTION     •  SECTION     • CHOLECYSTECTOMY           No Known Allergies      No current facility-administered medications on file prior to encounter.     Current Outpatient Medications on File Prior to Encounter   Medication Sig Dispense Refill   • albuterol sulfate  (90 Base) MCG/ACT inhaler INHALE TWO (2) PUFFS INTO THE LUNGS EVERY SIX (6) (SIX) HOURS AS NEEDED FOR WHEEZING.     • buPROPion XL (WELLBUTRIN XL) 150 MG 24 hr tablet Take 1 tablet by mouth Daily. 30 tablet 0   • diphenhydrAMINE (BENADRYL) 25 mg capsule Take 25 mg by mouth Every 6 (Six) Hours As Needed for Allergies.     • fluticasone (FLONASE) 50 MCG/ACT nasal spray INSTILL TWO (2) SPRAYS INTO NOSE DAILY.     • ibuprofen (ADVIL,MOTRIN) 800 MG tablet Take  by mouth.     • ipratropium-albuterol (DUO-NEB) 0.5-2.5 mg/3 ml nebulizer Take 3 mL by nebulization 4 (Four) Times a Day. 360 mL 0   • levothyroxine (SYNTHROID, LEVOTHROID) 125 MCG tablet Take 125 mcg by mouth Daily.     • losartan (COZAAR) 100 MG tablet Take 1 tablet by mouth Daily.     • lovastatin (MEVACOR) 40 MG tablet TAKE ONE (1) TABLET BY MOUTH NIGHTLY.     • metFORMIN (GLUCOPHAGE) 850 MG tablet TAKE ONE (1) TABLET BY MOUTH TWO (2) (TWO) TIMES DAILY WITH MEALS SHOULD BE DUE 90 DAY SUPPLY.     • pyridoxine (VITAMIN B-6) 100 MG tablet Take 100 mg by mouth Daily.     • vitamin D3 125 MCG (5000 UT) capsule capsule Take 5,000 Units by mouth Daily.     • amLODIPine (NORVASC) 10 MG tablet Take 1 tablet by mouth Daily. 30 tablet 1   • Flaxseed, Linseed, (Flax Seed Oil) 1000 MG capsule Take  by mouth.     • guaiFENesin (MUCINEX) 600 MG 12 hr tablet Take 1 tablet by mouth Every 12 (Twelve) Hours. 14 tablet 0         Social History     Socioeconomic History   • Marital status:    Tobacco Use   • Smoking status:  "Former     Types: Cigarettes     Quit date: 2022     Years since quittin.3   • Smokeless tobacco: Never   Vaping Use   • Vaping Use: Never used   Substance and Sexual Activity   • Alcohol use: Never   • Drug use: Never   • Sexual activity: Yes         Family History   Problem Relation Age of Onset   • Malig Hyperthermia Neg Hx        REVIEW OF SYSTEMS:   Pertinent positives were noted in the HPI above.  Otherwise, all other systems were reviewed, and are negative.     Objective:     Vitals:    23 0851 23 1132 23 1311 23 1524   BP: 180/84  139/82 139/82   BP Location:   Left arm Left arm   Patient Position:   Lying Sitting   Pulse: 86  89 89   Resp: 20 18 20 20   Temp: 97.9 °F (36.6 °C)  99 °F (37.2 °C) 98.5 °F (36.9 °C)   TempSrc: Oral  Oral Oral   SpO2:   92% 93%   Weight:    125 kg (275 lb)   Height:    157.5 cm (62\")     Body mass index is 50.3 kg/m².  Flowsheet Rows    Flowsheet Row First Filed Value   Admission Height 157.5 cm (62\") Documented at 01/10/2023 1255   Admission Weight 125 kg (275 lb 12.7 oz) Documented at 01/10/2023 1255           General:    No acute distress, alert and oriented x4, pleasant                   Head:    Normocephalic, atraumatic.   Eyes:          Conjunctivae and sclerae normal, no icterus, PERRLA   Throat:   No oral lesions, no thrush, oral mucosa moist.    Neck:   In c-collar.   Lungs:    Coarse breath sounds bilaterally    Heart:    Regular rhythm and normal rate. III/VI harsh SM RUSB and LUSB.   Abdomen:     Soft, non-tender, non-distended, positive bowel sounds.    Extremities:  Trace edema of the lower extremities   Pulses:   Pulses palpable and equal bilaterally.    Skin:   No bleeding or rash.   Neuro:   Non-focal.  Moves all extremities well.    Psychiatric:   Normal mood and affect.                 Lab Review:                Results from last 7 days   Lab Units 23  0609   SODIUM mmol/L 134*   POTASSIUM mmol/L 3.9   CHLORIDE mmol/L 96* "   CO2 mmol/L 26.0   BUN mg/dL 14   CREATININE mg/dL 0.63   GLUCOSE mg/dL 166*   CALCIUM mg/dL 9.5         Results from last 7 days   Lab Units 01/11/23  0609   WBC 10*3/mm3 7.91   HEMOGLOBIN g/dL 14.8   HEMATOCRIT % 43.3   PLATELETS 10*3/mm3 167             Results from last 7 days   Lab Units 01/11/23  0609   MAGNESIUM mg/dL 1.8                       EKG (reviewed by me personally):      Assessment:   1.  Status post anterior cervical corpectomy at C4, C5, and C6 with anterior cervical plate from C3-C7 on 9/21/2022.  With need for posterior cervical fusion.  2.  Postoperative respiratory failure in September 2022  3.  COPD, without acute exacerbation  4.  Lymphadenopathy and lung nodules, concerning for lung cancer  5.  Severe aortic stenosis (new diagnosis)  6.  Hypertension  7.  Diabetes    Plan:       Unfortunately, her harsh systolic murmur turned out to be from severe aortic stenosis.  After I saw her, I ordered the echocardiogram preoperatively, and confirmed the diagnosis.  I called CHENG Torres, and told her my concerns.  With severe aortic stenosis and her multiple other medical issues, I feel that she is at high risk for complications intraoperatively and postoperatively.  She evidently developed significant respiratory failure and had a very difficult time recovering from her surgery in September 2022, and I suspect that this would be as bad or worse.  The severe aortic stenosis also places her at higher risk for death with anesthesia.  Unless this is absolutely necessary and urgent, I would recommend delaying the surgery for further evaluation of a potential TAVR (if she is a candidate).  I would also highly recommend getting the potential diagnosis of lung cancer worked up, which Dr. Ring spoke with her about earlier today.    I called the floor nurse to let her know.  We will discuss the aortic stenosis with her further tomorrow.  I also asked CHENG Torres, to discuss this with   Jonatan.  I think there is a high likelihood that her surgery is going to be canceled tomorrow.    Thank you very much for this consult.    Kishan Garcia MD

## 2023-01-12 ENCOUNTER — APPOINTMENT (OUTPATIENT)
Dept: CT IMAGING | Facility: HOSPITAL | Age: 68
DRG: 552 | End: 2023-01-12
Payer: MEDICARE

## 2023-01-12 PROBLEM — I35.0 SEVERE AORTIC STENOSIS: Status: ACTIVE | Noted: 2023-01-12

## 2023-01-12 LAB
ANION GAP SERPL CALCULATED.3IONS-SCNC: 11 MMOL/L (ref 5–15)
BASOPHILS # BLD AUTO: 0.03 10*3/MM3 (ref 0–0.2)
BASOPHILS NFR BLD AUTO: 0.3 % (ref 0–1.5)
BUN SERPL-MCNC: 18 MG/DL (ref 8–23)
BUN/CREAT SERPL: 22.5 (ref 7–25)
CALCIUM SPEC-SCNC: 9.6 MG/DL (ref 8.6–10.5)
CHLORIDE SERPL-SCNC: 96 MMOL/L (ref 98–107)
CO2 SERPL-SCNC: 29 MMOL/L (ref 22–29)
CREAT SERPL-MCNC: 0.8 MG/DL (ref 0.57–1)
DEPRECATED RDW RBC AUTO: 40 FL (ref 37–54)
EGFRCR SERPLBLD CKD-EPI 2021: 81.4 ML/MIN/1.73
EOSINOPHIL # BLD AUTO: 0.15 10*3/MM3 (ref 0–0.4)
EOSINOPHIL NFR BLD AUTO: 1.6 % (ref 0.3–6.2)
ERYTHROCYTE [DISTWIDTH] IN BLOOD BY AUTOMATED COUNT: 11.6 % (ref 12.3–15.4)
GLUCOSE BLDC GLUCOMTR-MCNC: 112 MG/DL (ref 70–130)
GLUCOSE BLDC GLUCOMTR-MCNC: 117 MG/DL (ref 70–130)
GLUCOSE BLDC GLUCOMTR-MCNC: 133 MG/DL (ref 70–130)
GLUCOSE BLDC GLUCOMTR-MCNC: 161 MG/DL (ref 70–130)
GLUCOSE BLDC GLUCOMTR-MCNC: 164 MG/DL (ref 70–130)
GLUCOSE SERPL-MCNC: 160 MG/DL (ref 65–99)
HCT VFR BLD AUTO: 42.3 % (ref 34–46.6)
HGB BLD-MCNC: 14.2 G/DL (ref 12–15.9)
IMM GRANULOCYTES # BLD AUTO: 0.05 10*3/MM3 (ref 0–0.05)
IMM GRANULOCYTES NFR BLD AUTO: 0.5 % (ref 0–0.5)
LYMPHOCYTES # BLD AUTO: 1.6 10*3/MM3 (ref 0.7–3.1)
LYMPHOCYTES NFR BLD AUTO: 17 % (ref 19.6–45.3)
MAGNESIUM SERPL-MCNC: 2.1 MG/DL (ref 1.6–2.4)
MCH RBC QN AUTO: 31.5 PG (ref 26.6–33)
MCHC RBC AUTO-ENTMCNC: 33.6 G/DL (ref 31.5–35.7)
MCV RBC AUTO: 93.8 FL (ref 79–97)
MONOCYTES # BLD AUTO: 0.94 10*3/MM3 (ref 0.1–0.9)
MONOCYTES NFR BLD AUTO: 10 % (ref 5–12)
NEUTROPHILS NFR BLD AUTO: 6.66 10*3/MM3 (ref 1.7–7)
NEUTROPHILS NFR BLD AUTO: 70.6 % (ref 42.7–76)
NRBC BLD AUTO-RTO: 0 /100 WBC (ref 0–0.2)
NT-PROBNP SERPL-MCNC: 331 PG/ML (ref 0–900)
PHOSPHATE SERPL-MCNC: 4.4 MG/DL (ref 2.5–4.5)
PLATELET # BLD AUTO: 172 10*3/MM3 (ref 140–450)
PMV BLD AUTO: 11.4 FL (ref 6–12)
POTASSIUM SERPL-SCNC: 4.1 MMOL/L (ref 3.5–5.2)
RBC # BLD AUTO: 4.51 10*6/MM3 (ref 3.77–5.28)
SODIUM SERPL-SCNC: 136 MMOL/L (ref 136–145)
WBC NRBC COR # BLD: 9.43 10*3/MM3 (ref 3.4–10.8)

## 2023-01-12 PROCEDURE — 85025 COMPLETE CBC W/AUTO DIFF WBC: CPT | Performed by: STUDENT IN AN ORGANIZED HEALTH CARE EDUCATION/TRAINING PROGRAM

## 2023-01-12 PROCEDURE — G0378 HOSPITAL OBSERVATION PER HR: HCPCS

## 2023-01-12 PROCEDURE — 94799 UNLISTED PULMONARY SVC/PX: CPT

## 2023-01-12 PROCEDURE — 94761 N-INVAS EAR/PLS OXIMETRY MLT: CPT

## 2023-01-12 PROCEDURE — 84100 ASSAY OF PHOSPHORUS: CPT | Performed by: STUDENT IN AN ORGANIZED HEALTH CARE EDUCATION/TRAINING PROGRAM

## 2023-01-12 PROCEDURE — 83880 ASSAY OF NATRIURETIC PEPTIDE: CPT | Performed by: INTERNAL MEDICINE

## 2023-01-12 PROCEDURE — 99213 OFFICE O/P EST LOW 20 MIN: CPT | Performed by: NURSE PRACTITIONER

## 2023-01-12 PROCEDURE — 0 IOPAMIDOL PER 1 ML: Performed by: INTERNAL MEDICINE

## 2023-01-12 PROCEDURE — 71260 CT THORAX DX C+: CPT

## 2023-01-12 PROCEDURE — 83735 ASSAY OF MAGNESIUM: CPT | Performed by: STUDENT IN AN ORGANIZED HEALTH CARE EDUCATION/TRAINING PROGRAM

## 2023-01-12 PROCEDURE — 63710000001 INSULIN LISPRO (HUMAN) PER 5 UNITS: Performed by: STUDENT IN AN ORGANIZED HEALTH CARE EDUCATION/TRAINING PROGRAM

## 2023-01-12 PROCEDURE — 94664 DEMO&/EVAL PT USE INHALER: CPT

## 2023-01-12 PROCEDURE — 82962 GLUCOSE BLOOD TEST: CPT

## 2023-01-12 PROCEDURE — 80048 BASIC METABOLIC PNL TOTAL CA: CPT | Performed by: STUDENT IN AN ORGANIZED HEALTH CARE EDUCATION/TRAINING PROGRAM

## 2023-01-12 PROCEDURE — 99232 SBSQ HOSP IP/OBS MODERATE 35: CPT | Performed by: INTERNAL MEDICINE

## 2023-01-12 RX ADMIN — Medication 100 MG: at 08:06

## 2023-01-12 RX ADMIN — BUPROPION HYDROCHLORIDE 150 MG: 150 TABLET, EXTENDED RELEASE ORAL at 08:07

## 2023-01-12 RX ADMIN — LEVOTHYROXINE SODIUM 125 MCG: 0.12 TABLET ORAL at 08:07

## 2023-01-12 RX ADMIN — LORAZEPAM 0.5 MG: 0.5 TABLET ORAL at 08:19

## 2023-01-12 RX ADMIN — LOSARTAN POTASSIUM 100 MG: 100 TABLET, FILM COATED ORAL at 08:07

## 2023-01-12 RX ADMIN — FLUTICASONE PROPIONATE 1 SPRAY: 50 SPRAY, METERED NASAL at 08:08

## 2023-01-12 RX ADMIN — INSULIN LISPRO 2 UNITS: 100 INJECTION, SOLUTION INTRAVENOUS; SUBCUTANEOUS at 08:08

## 2023-01-12 RX ADMIN — FLUTICASONE PROPIONATE 1 SPRAY: 50 SPRAY, METERED NASAL at 20:35

## 2023-01-12 RX ADMIN — ATORVASTATIN CALCIUM 10 MG: 20 TABLET, FILM COATED ORAL at 08:07

## 2023-01-12 RX ADMIN — INSULIN GLARGINE-YFGN 5 UNITS: 100 INJECTION, SOLUTION SUBCUTANEOUS at 20:35

## 2023-01-12 RX ADMIN — GUAIFENESIN 600 MG: 600 TABLET, EXTENDED RELEASE ORAL at 20:36

## 2023-01-12 RX ADMIN — Medication 5000 UNITS: at 08:07

## 2023-01-12 RX ADMIN — Medication 10 ML: at 08:08

## 2023-01-12 RX ADMIN — IOPAMIDOL 75 ML: 755 INJECTION, SOLUTION INTRAVENOUS at 10:07

## 2023-01-12 RX ADMIN — BUDESONIDE AND FORMOTEROL FUMARATE DIHYDRATE 2 PUFF: 160; 4.5 AEROSOL RESPIRATORY (INHALATION) at 08:22

## 2023-01-12 RX ADMIN — AMLODIPINE BESYLATE 10 MG: 10 TABLET ORAL at 08:07

## 2023-01-12 RX ADMIN — Medication 10 ML: at 20:36

## 2023-01-12 RX ADMIN — BUDESONIDE AND FORMOTEROL FUMARATE DIHYDRATE 2 PUFF: 160; 4.5 AEROSOL RESPIRATORY (INHALATION) at 21:11

## 2023-01-12 RX ADMIN — LORAZEPAM 0.5 MG: 0.5 TABLET ORAL at 18:47

## 2023-01-12 RX ADMIN — GUAIFENESIN 600 MG: 600 TABLET, EXTENDED RELEASE ORAL at 08:07

## 2023-01-12 NOTE — PROGRESS NOTES
[facility], [place], operated by [facility] NEUROSURGERY PROGRESS NOTE      CC: cervical hardware failure      Subjective     Interval History: Patient's cervical surgery canceled secondary to lung and cardiac issues.  She has now agreed to proceed with CT chest to evaluate lung lesion as well as cardiac intervention if recommended.  She denies neck pain.  She states as compared to prior to her anterior cervical surgery in September, her right arm is much improved.  She is able to write again.  She has no significant neck pain.  She has some dexterity and coordination issues still with the left arm.  She continues to have imbalance issues.    ROS:  Constitutional: No fever, chills  Neck: no neck pain  GI: No nausea, vomiting, no swallow difficulties  Neuro: Imbalance  : no difficulty voiding, no incontinence    Objective     Vital signs in last 24 hours:  Temp:  [97.8 °F (36.6 °C)-98.6 °F (37 °C)] 98.6 °F (37 °C)  Heart Rate:  [84-91] 89  Resp:  [18-20] 20  BP: (122-171)/(69-92) 122/79    Intake/Output this shift:  No intake/output data recorded.    LABS:  Results from last 7 days   Lab Units 01/12/23  0741 01/11/23  0609 01/10/23  1439   WBC 10*3/mm3 9.43 7.91 8.65   HEMOGLOBIN g/dL 14.2 14.8 14.4   HEMATOCRIT % 42.3 43.3 43.5   PLATELETS 10*3/mm3 172 167 194     Results from last 7 days   Lab Units 01/12/23  0741 01/11/23  0609 01/10/23  1439   SODIUM mmol/L 136 134* 135*   POTASSIUM mmol/L 4.1 3.9 4.3   CHLORIDE mmol/L 96* 96* 99   CO2 mmol/L 29.0 26.0 26.2   BUN mg/dL 18 14 19   CREATININE mg/dL 0.80 0.63 0.78   CALCIUM mg/dL 9.6 9.5 9.4   BILIRUBIN mg/dL  --   --  0.3   ALK PHOS U/L  --   --  57   ALT (SGPT) U/L  --   --  13   AST (SGOT) U/L  --   --  15   GLUCOSE mg/dL 160* 166* 86     IMAGING STUDIES:  No new spine imaging  CT chest pending    I personally viewed and interpreted the patient's chart.    Meds reviewed/changed: Yes    Current Facility-Administered Medications:   •  albuterol (PROVENTIL) nebulizer solution 0.083% 2.5 mg/3mL, 2.5  mg, Nebulization, Q6H PRN, Dariusz Marrufo MD  •  amLODIPine (NORVASC) tablet 10 mg, 10 mg, Oral, Q24H, Dariusz Marrufo MD, 10 mg at 01/12/23 0807  •  atorvastatin (LIPITOR) tablet 10 mg, 10 mg, Oral, Daily, Dariusz Marrufo MD, 10 mg at 01/12/23 0807  •  budesonide-formoterol (SYMBICORT) 160-4.5 MCG/ACT inhaler 2 puff, 2 puff, Inhalation, BID - RT, Dwight Rnig MD, 2 puff at 01/12/23 0822  •  buPROPion XL (WELLBUTRIN XL) 24 hr tablet 150 mg, 150 mg, Oral, Daily, Dariusz Marrufo MD, 150 mg at 01/12/23 0807  •  cholecalciferol (VITAMIN D3) tablet 5,000 Units, 5,000 Units, Oral, Daily, Dariusz Marrufo MD, 5,000 Units at 01/12/23 0807  •  dextrose (D50W) (25 g/50 mL) IV injection 25 g, 25 g, Intravenous, Q15 Min PRN, Dariusz Marrufo MD  •  dextrose (GLUTOSE) oral gel 15 g, 15 g, Oral, Q15 Min PRN, Dariusz Marrufo MD  •  diphenhydrAMINE (BENADRYL) capsule 25 mg, 25 mg, Oral, Q6H PRN, Dariusz Marrufo MD, 25 mg at 01/11/23 2121  •  fluticasone (FLONASE) 50 MCG/ACT nasal spray 1 spray, 1 spray, Each Nare, BID, Dariusz Marrufo MD, 1 spray at 01/12/23 0808  •  glucagon (human recombinant) (GLUCAGEN DIAGNOSTIC) injection 1 mg, 1 mg, Intramuscular, Q15 Min PRN, Dariusz Marrufo MD  •  guaiFENesin (MUCINEX) 12 hr tablet 600 mg, 600 mg, Oral, Q12H, Dariusz Marrufo MD, 600 mg at 01/12/23 0807  •  insulin glargine (LANTUS, SEMGLEE) injection 5 Units, 5 Units, Subcutaneous, Nightly, Dariusz Marrufo MD, 5 Units at 01/11/23 2120  •  insulin lispro (ADMELOG) injection 0-7 Units, 0-7 Units, Subcutaneous, TID AC, Dariusz Marrufo MD, 2 Units at 01/12/23 0808  •  ipratropium-albuterol (DUO-NEB) nebulizer solution 3 mL, 3 mL, Nebulization, 4x Daily - RT, Dariusz Marrufo MD, 3 mL at 01/11/23 1132  •  levothyroxine (SYNTHROID, LEVOTHROID) tablet 125 mcg, 125 mcg, Oral, Daily, Dariusz Marrufo MD, 125 mcg at 01/12/23 0807  •  LORazepam (ATIVAN) tablet 0.5 mg, 0.5 mg, Oral, Q6H PRN, Dariusz Marrufo MD, 0.5 mg at 01/12/23 0819  •  losartan (COZAAR) tablet 100 mg, 100  mg, Oral, Daily, Dariusz Marrufo MD, 100 mg at 01/12/23 0807  •  nitroglycerin (NITROSTAT) SL tablet 0.4 mg, 0.4 mg, Sublingual, Q5 Min PRN, Dariusz Marrufo MD  •  sodium chloride 0.9 % flush 10 mL, 10 mL, Intravenous, Q12H, Dariusz Marrufo MD, 10 mL at 01/12/23 0808  •  sodium chloride 0.9 % flush 10 mL, 10 mL, Intravenous, PRN, Dariusz Marrufo MD  •  sodium chloride 0.9 % infusion 40 mL, 40 mL, Intravenous, PRN, Dariusz Marrufo MD  •  vitamin B-6 (PYRIDOXINE) tablet 100 mg, 100 mg, Oral, Daily, Dariusz Marrufo MD, 100 mg at 01/12/23 0806      Physical Exam:    General:   Awake, alert, oriented x3. Speech clear with no aphasia.  Sitting up in chair visiting with family  Neck:    Soft collar On ; ROM deferred; incision right anterior neck well approximated and healed.  Motor: Mild  strength weakness left hand otherwise good strength bilateral upper and lower extremity   reflexes: No Witt  Sensation: Normal to light touch except left hand diminished  Station and Gait:             Up with walker      Assessment & Plan     ASSESSMENT:      Cervical stenosis of spinal canal    HTN (hypertension)    Cervical spondylosis with myelopathy    Hypothyroidism    Hyperlipidemia associated with type 2 diabetes mellitus (HCC)    Obesity, Class III, BMI 40-49.9 (morbid obesity) ([facility])    History of fusion of cervical spine    Patient with history of cervical stenosis status post anterior corpectomy and fusion with chronic myelopathy.  Had hardware failure and surgery has been delayed for numerous reasons.  She was admitted to undergo posterior cervical fusion at this time; however work-up by pulmonary and cardiology has revealed significant lung and heart issues which preclude proceeding with surgery unless emergency situation which it is not.  Patient is stable neurologically however chronically myelopathic and has ongoing gait issues and fall risk.  Surgical intervention does not change this although would stabilize the hardware.  She  "is now on a soft collar.  She denies neck pain.  She has good strength except for mild left  weakness.  At this time, neurosurgical intervention is not prudent.  We will follow along the chart and plan for outpatient follow-up to determine if she will be able to have posterior cervical intervention down the road.  I did advise her to avoid lifting pushing pulling greater than 5 pounds, using her shoulders and arms significantly to pull herself up and down.  We will have PT see her to help with some instructions for patient and family regarding modifications to avoid further neck injury as possible    PLAN:   Soft collar at all times.  May be removed for showers  No lifting pushing pulling more than 5 pounds.  Avoid use of her arms for lifting and positioning.  No overhead activity.    I discussed the patient's findings and my recommendations with patient, family and Dr. Cheung       LOS: 1 day       Casie Cummings, APRN  1/12/2023  14:33 EST    \"Dictated utilizing Dragon dictation\".      "

## 2023-01-12 NOTE — PROGRESS NOTES
"[facility]    Patient Name: Gabby Acosta  :1955  67 y.o.  LOS: 1  Encounter Provider: Carmelo Gates Jr, MD      Patient Care Team:  Danyelle Brush MD as PCP - General (Pediatrics)    Chief Complaint: Follow-up severe aortic stenosis, multiple lung nodules and thoracic lymphadenopathy, COPD, hypoxic respiratory failure, cervical stenosis    Interval History: No acute issues overnight       Objective   Vital Signs  Temp:  [97.8 °F (36.6 °C)-99 °F (37.2 °C)] 98 °F (36.7 °C)  Heart Rate:  [84-91] 84  Resp:  [18-20] 18  BP: (139-171)/(69-92) 165/92  No intake or output data in the 24 hours ending 23 0951  Flowsheet Rows    Flowsheet Row First Filed Value   Admission Height 157.5 cm (62\") Documented at 01/10/2023 1255   Admission Weight 125 kg (275 lb 12.7 oz) Documented at 01/10/2023 1255            Vitals reviewed.   Constitutional:       Appearance: Not in distress. Frail. Chronically ill-appearing.   Neck:      Vascular: No JVR. JVD normal.   Pulmonary:      Effort: Pulmonary effort is normal.      Breath sounds: No wheezing. No rhonchi. No rales.      Comments: Poor air entry bibasilar zones  Chest:      Chest wall: Not tender to palpatation.   Cardiovascular:      PMI at left midclavicular line. Normal rate. Regular rhythm. Normal S1. Normal S2.      Murmurs: There is a grade 3/6 harsh midsystolic murmur at the URSB.      No gallop. No click. No rub.   Pulses:     Intact distal pulses.   Edema:     Peripheral edema absent.   Abdominal:      General: Bowel sounds are normal.      Palpations: Abdomen is soft.      Tenderness: There is no abdominal tenderness.   Musculoskeletal: Normal range of motion.         General: No tenderness. Skin:     General: Skin is warm and dry.   Neurological:      General: No focal deficit present.      Mental Status: Alert and oriented to person, place and time.           Pertinent Test Results:  Results from last 7 days   Lab Units 23  0741 " 01/11/23  0609 01/10/23  1439   SODIUM mmol/L 136 134* 135*   POTASSIUM mmol/L 4.1 3.9 4.3   CHLORIDE mmol/L 96* 96* 99   CO2 mmol/L 29.0 26.0 26.2   BUN mg/dL 18 14 19   CREATININE mg/dL 0.80 0.63 0.78   GLUCOSE mg/dL 160* 166* 86   CALCIUM mg/dL 9.6 9.5 9.4   AST (SGOT) U/L  --   --  15   ALT (SGPT) U/L  --   --  13         Results from last 7 days   Lab Units 01/12/23  0741 01/11/23  0609 01/10/23  1439   WBC 10*3/mm3 9.43 7.91 8.65   HEMOGLOBIN g/dL 14.2 14.8 14.4   HEMATOCRIT % 42.3 43.3 43.5   PLATELETS 10*3/mm3 172 167 194         Results from last 7 days   Lab Units 01/12/23  0741 01/11/23  0609 01/10/23  1439   MAGNESIUM mg/dL 2.1 1.8 1.3*           Invalid input(s): LDLCALC                Medication Review:   amLODIPine, 10 mg, Oral, Q24H  atorvastatin, 10 mg, Oral, Daily  budesonide-formoterol, 2 puff, Inhalation, BID - RT  buPROPion XL, 150 mg, Oral, Daily  cholecalciferol, 5,000 Units, Oral, Daily  fluticasone, 1 spray, Each Nare, BID  guaiFENesin, 600 mg, Oral, Q12H  insulin glargine, 5 Units, Subcutaneous, Nightly  insulin lispro, 0-7 Units, Subcutaneous, TID AC  ipratropium-albuterol, 3 mL, Nebulization, 4x Daily - RT  levothyroxine, 125 mcg, Oral, Daily  losartan, 100 mg, Oral, Daily  sodium chloride, 10 mL, Intravenous, Q12H  pyridoxine, 100 mg, Oral, Daily              Assessment & Plan     Active Hospital Problems    Diagnosis  POA   • **Cervical stenosis of spinal canal [M48.02]  Yes   • History of fusion of cervical spine [Z98.1]  Not Applicable   • HTN (hypertension) [I10]  Yes   • Obesity, Class III, BMI 40-49.9 (morbid obesity) (HCC) [E66.01]  Yes   • Cervical spondylosis with myelopathy [M47.12]  Yes   • Hyperlipidemia associated with type 2 diabetes mellitus (HCC) [E11.69, E78.5]  Yes   • Hypothyroidism [E03.9]  Yes      Resolved Hospital Problems   No resolved problems to display.        1. Severe aortic stenosis -Limited visualization of valve morphology on transthoracic echocardiogram.   Patient seems fairly euvolemic however sitting in the chair with the oxygen off her saturation drops to 80 to 85%.  We will add a BNP to the morning labs.  She does not seem significantly hypervolemic on exam but evaluation is limited by body habitus.  Although she refused CT chest yesterday proposed by Dr. Ring with conversation today she seems to understand the need for recommended testing.  We will follow images on CT.  Lung cancer work-up is absolutely necessary for prognostication prior to consideration for valvular intervention.  Once we have a better idea of lung pathology we will discuss case details with structural cardiology for work-up of SAVR versus TAVR.  Patient noted to have severe calcification in all coronary distributions and will need ischemic work-up prior to consideration of valve intervention.  2. Cervical stenosis -surgical intervention is on hold for now.  Dr. Garcia discussed case details with Dr. Cheung yesterday.  3. COPD  4. Lung nodules  5. Hypertension  6. Diabetes      Carmelo Gates Jr, MD  [facility]  01/12/23  09:51 EST

## 2023-01-12 NOTE — NURSING NOTE
[facility] consult called for Dr Dwight Ring to see the patient 1/12 per order @ [phone]. Message taken by answering service

## 2023-01-12 NOTE — PROGRESS NOTES
Name: Gabby Acosta ADMIT: 1/10/2023   : 1955  PCP: Danyelle Brush MD    MRN: [id number] LOS: 1 days   AGE/SEX: 67 y.o. female  ROOM: [facility]     Subjective   Subjective   CC: cervical myelopathy  No acute events. Patient has no new complaints. She denies pain, cough, and dyspnea. No f/c/n/v/d.    Objective   Objective   Vital Signs  Temp:  [97.8 °F (36.6 °C)-98.6 °F (37 °C)] 98.6 °F (37 °C)  Heart Rate:  [84-91] 89  Resp:  [18-20] 20  BP: (122-171)/(69-92) 122/79  SpO2:  [91 %-97 %] 92 %  on  Flow (L/min):  [3] 3;   Device (Oxygen Therapy): nasal cannula  Body mass index is 50.3 kg/m².  Physical Exam  Vitals and nursing note reviewed.   Constitutional:       General: She is not in acute distress.     Appearance: She is ill-appearing (chronically). She is not toxic-appearing or diaphoretic.   HENT:      Head: Normocephalic and atraumatic.      Nose: Nose normal.      Mouth/Throat:      Mouth: Mucous membranes are moist.      Pharynx: Oropharynx is clear.   Eyes:      Conjunctiva/sclera: Conjunctivae normal.      Pupils: Pupils are equal, round, and reactive to light.   Cardiovascular:      Rate and Rhythm: Normal rate and regular rhythm.      Pulses: Normal pulses.   Pulmonary:      Effort: Pulmonary effort is normal.      Breath sounds: Examination of the right-lower field reveals decreased breath sounds. Examination of the left-lower field reveals decreased breath sounds. Decreased breath sounds present.   Abdominal:      General: Bowel sounds are normal.      Palpations: Abdomen is soft.      Tenderness: There is no abdominal tenderness.   Musculoskeletal:         General: Swelling (trace BLE) present. No tenderness.      Cervical back: Normal range of motion and neck supple.   Skin:     General: Skin is warm and dry.      Capillary Refill: Capillary refill takes less than 2 seconds.   Neurological:      Mental Status: She is alert and oriented to person, place, and time.   Psychiatric:         Mood  and Affect: Mood normal.         Behavior: Behavior normal.       Results Review     I reviewed the patient's new clinical results.  I reviewed the patient's telemetry  I reviewed the patient's CT chest  Results from last 7 days   Lab Units 01/12/23  0741 01/11/23  0609 01/10/23  1439   WBC 10*3/mm3 9.43 7.91 8.65   HEMOGLOBIN g/dL 14.2 14.8 14.4   PLATELETS 10*3/mm3 172 167 194     Results from last 7 days   Lab Units 01/12/23  0741 01/11/23  0609 01/10/23  1439   SODIUM mmol/L 136 134* 135*   POTASSIUM mmol/L 4.1 3.9 4.3   CHLORIDE mmol/L 96* 96* 99   CO2 mmol/L 29.0 26.0 26.2   BUN mg/dL 18 14 19   CREATININE mg/dL 0.80 0.63 0.78   GLUCOSE mg/dL 160* 166* 86   EGFR mL/min/1.73 81.4 98.0 83.9     Results from last 7 days   Lab Units 01/10/23  1439   ALBUMIN g/dL 4.0   BILIRUBIN mg/dL 0.3   ALK PHOS U/L 57   AST (SGOT) U/L 15   ALT (SGPT) U/L 13     Results from last 7 days   Lab Units 01/12/23  0741 01/11/23  0609 01/10/23  1439   CALCIUM mg/dL 9.6 9.5 9.4   ALBUMIN g/dL  --   --  4.0   MAGNESIUM mg/dL 2.1 1.8 1.3*   PHOSPHORUS mg/dL 4.4 2.8 3.4       Glucose   Date/Time Value Ref Range Status   01/12/2023 1716 112 70 - 130 mg/dL Final     Comment:     Meter: [id number] : [id number] Matthew Tricia BAO   01/12/2023 1213 133 (H) 70 - 130 mg/dL Final     Comment:     Meter: [id number] : [id number] Matthew Tricia BAO   01/12/2023 0757 161 (H) 70 - 130 mg/dL Final     Comment:     Meter: [id number] : [id number] Matthew Tricia BAO   01/12/2023 0625 164 (H) 70 - 130 mg/dL Final     Comment:     Meter: [id number] : [id number] Gordo Fuller NA   01/11/2023 2119 192 (H) 70 - 130 mg/dL Final     Comment:     Meter: [id number] : [id number] Oscar Little RN   01/11/2023 1634 165 (H) 70 - 130 mg/dL Final     Comment:     Meter: [id number] : [id number] Matthew GORE   01/11/2023 1159 121 70 - 130 mg/dL Final     Comment:     Meter: [id number] : [id number] Matthew Clarke BAO       CT Chest With  Contrast Diagnostic    Result Date: 1/12/2023   1. Enlarging left lower lobe pulmonary nodule and right paratracheal/subcarinal adenopathy. Further characterization with PET/CT or tissue diagnosis could be considered if not previously performed. 2. Improved aeration of the lung bases. 3. Filling defect in the midthoracic trachea favoring retained secretions but can further be evaluated as indicated. 4. Please see above for additional findings.  This report was finalized on 1/12/2023 11:59 AM by Dr. Susan Greer M.D.      XR Chest PA & Lateral    Result Date: 1/10/2023  On the lateral view, there is a bulbous configuration of the calvin and this likely correlates with hilar and subcarinal jonn enlargement demonstrated on previous CT though this would be more accurately directly compared with prior CT 09/25/2022. Lungs appear clear.  This report was finalized on 1/10/2023 8:58 PM by Dr. Irving Christian M.D.      XR Spine Cervical 2 View    Result Date: 1/10/2023  Severely limited exam demonstrates that there has been anterior plate and screw fusion from C3 to C7. The caudal aspect of the plate is not well visualized though alignment appears grossly within normal limits. Overlying cervical collar artifact is noted. CT may be necessary for more definitive evaluation depending on the clinical scenario.  This report was finalized on 1/10/2023 8:39 PM by Dr. Irving Christian M.D.      Scheduled Medications  amLODIPine, 10 mg, Oral, Q24H  atorvastatin, 10 mg, Oral, Daily  budesonide-formoterol, 2 puff, Inhalation, BID - RT  buPROPion XL, 150 mg, Oral, Daily  cholecalciferol, 5,000 Units, Oral, Daily  fluticasone, 1 spray, Each Nare, BID  guaiFENesin, 600 mg, Oral, Q12H  insulin glargine, 5 Units, Subcutaneous, Nightly  insulin lispro, 0-7 Units, Subcutaneous, TID AC  ipratropium-albuterol, 3 mL, Nebulization, 4x Daily - RT  levothyroxine, 125 mcg, Oral, Daily  losartan, 100 mg, Oral, Daily  sodium chloride, 10 mL,  Intravenous, Q12H  pyridoxine, 100 mg, Oral, Daily    Infusions   Diet  Diet: Regular/House Diet; Texture: Regular Texture (IDDSI 7); Fluid Consistency: Thin (IDDSI 0)       Assessment/Plan     Active Hospital Problems    Diagnosis  POA   • **Cervical stenosis of spinal canal [M48.02]  Yes   • Severe aortic stenosis [I35.0]  Yes   • History of fusion of cervical spine [Z98.1]  Not Applicable   • HTN (hypertension) [I10]  Yes   • Obesity, Class III, BMI 40-49.9 (morbid obesity) (HCC) [E66.01]  Yes   • Cervical spondylosis with myelopathy [M47.12]  Yes   • Hyperlipidemia associated with type 2 diabetes mellitus (HCC) [E11.69, E78.5]  Yes   • Hypothyroidism [E03.9]  Yes      Resolved Hospital Problems   No resolved problems to display.   Cervical spinal stenosis with myelopathy  - Status post ACDF C3-C7 on 9/20/2022  - posterior fusion recommended, however this is delayed due to her multiple medical issues  - pain control as needed, PT/OT  - appreciate [facility] recs    Severe Aortic Stenosis  - monitor volume status closely-she looks euvolemic currently  - appreciate cardiology recs-possible evaluation for SAVR/TAVR pending workup for lung nodule    COPD  - no exacerbation  - continue bronchodilators  - appreciate pulm recs    LLL Pulmonary Nodule/Paratracheal Adenopathy  - enlargement demonstrated on CT  - further workup per pulm    Type 2 DM  - holding home metformin  - BG are acceptable  - continue lantus 5 units nightly  - continue ssi/hypoglycemia protocol     Hypertension  - BP acceptable  - continue on norvasc and losartan     Hyperlipidemia  - continue statin    Hypothyroidism  - clinically euthyroid  - continue levothyroxine    SCDs for DVT prophylaxis.  Full code.  Discussed with patient and nursing staff.  Anticipate discharge TBD timing yet to be determined.      John Khan MD  [facility]  01/12/23  17:41 EST

## 2023-01-12 NOTE — PROGRESS NOTES
"  Daily Progress Note.   [facility]  1/12/2023    Patient:  Name:  Gabby Acosta  MRN:  [id number]  1955  67 y.o.  female         Requesting physician: Amber ANAND  Reason for Consultation:  Lung nodule, lad, preop eval, copd  CC: Numbness in her arms    Interval History:    Denies any acute changes of worsening shortness of breath hemoptysis or difficulties breathing overnight.  Alert and oriented today.  She says she is now ready to pursue further work-up evaluation of her lung nodules      Physical Exam:  /92   Pulse 84   Temp 98 °F (36.7 °C) (Oral)   Resp 18   Ht 157.5 cm (62\")   Wt 125 kg (275 lb)   SpO2 94%   BMI 50.30 kg/m²   Body mass index is 50.3 kg/m².  No intake or output data in the 24 hours ending 01/12/23 0929  General appearance: NAD, conversant   Eyes: anicteric sclerae, moist conjunctivae; no lid-lag;   HENT: Atraumatic; oropharynx limited by soft collar  Neck: Limited by soft collar  Lungs: CTA, without wheeze or rhonchi with normal respiratory effort and no intercostal retractions  CV: RRR, no MRGs   Abdomen: Obese bowel sounds positive  Skin: Warm dry well-perfused  Psych: Appropriate affect, alert and oriented  Neuro cranials 2 through gross intact speech today moves all extremities       Data Review:  Notable Labs:  Results from last 7 days   Lab Units 01/12/23  0741 01/11/23  0609 01/10/23  1439   WBC 10*3/mm3 9.43 7.91 8.65   HEMOGLOBIN g/dL 14.2 14.8 14.4   PLATELETS 10*3/mm3 172 167 194     Results from last 7 days   Lab Units 01/12/23  0741 01/11/23  0609 01/10/23  1439   SODIUM mmol/L 136 134* 135*   POTASSIUM mmol/L 4.1 3.9 4.3   CHLORIDE mmol/L 96* 96* 99   CO2 mmol/L 29.0 26.0 26.2   BUN mg/dL 18 14 19   CREATININE mg/dL 0.80 0.63 0.78   GLUCOSE mg/dL 160* 166* 86   CALCIUM mg/dL 9.6 9.5 9.4   MAGNESIUM mg/dL 2.1 1.8 1.3*   PHOSPHORUS mg/dL 4.4 2.8 3.4   Estimated Creatinine Clearance: 87.5 mL/min (by C-G formula based on SCr of 0.8 " mg/dL).    Results from last 7 days   Lab Units 01/12/23  0741 01/11/23  0609 01/10/23  1439   AST (SGOT) U/L  --   --  15   ALT (SGPT) U/L  --   --  13   PLATELETS 10*3/mm3 172 167 194             Imaging:  Reviewed chest images personally from past 3 days    Assessment / Recommendations:  Former smoker  Pulmonary nodule  Lymphadenopathy  Family history of lung cancer      Patient Active Problem List   Diagnosis   • Acute UTI   • HTN (hypertension)   • Cervical spondylosis with myelopathy   • Fibromyalgia   • Fatty liver   • Hypothyroidism   • Hyperlipidemia associated with type 2 diabetes mellitus (HCC)   • Hypertrophic polyarthritis   • Proteinuria   • Obesity, Class III, BMI 40-49.9 (morbid obesity) (HCC)   • Hypokalemia   • Hypophosphatemia   • History of fusion of cervical spine   • Displacement of internal fixation device of vertebrae (HCC)   • Spinal instability of cervicothoracic region   • Cervical stenosis of spinal canal      Await the Ct chest with contrast to evaluate 14mm lung nodule lad in former smoker with + fam hx of lung cancer.      Dwight Ring MD  [facility]  01/12/23  10:45 EST

## 2023-01-13 ENCOUNTER — APPOINTMENT (OUTPATIENT)
Dept: GENERAL RADIOLOGY | Facility: HOSPITAL | Age: 68
DRG: 552 | End: 2023-01-13
Payer: MEDICARE

## 2023-01-13 ENCOUNTER — APPOINTMENT (OUTPATIENT)
Dept: CT IMAGING | Facility: HOSPITAL | Age: 68
DRG: 552 | End: 2023-01-13
Payer: MEDICARE

## 2023-01-13 LAB
ANION GAP SERPL CALCULATED.3IONS-SCNC: 6.8 MMOL/L (ref 5–15)
BASOPHILS # BLD AUTO: 0.05 10*3/MM3 (ref 0–0.2)
BASOPHILS NFR BLD AUTO: 0.6 % (ref 0–1.5)
BUN SERPL-MCNC: 19 MG/DL (ref 8–23)
BUN/CREAT SERPL: 22.9 (ref 7–25)
CALCIUM SPEC-SCNC: 9.1 MG/DL (ref 8.6–10.5)
CHLORIDE SERPL-SCNC: 99 MMOL/L (ref 98–107)
CO2 SERPL-SCNC: 31.2 MMOL/L (ref 22–29)
CREAT SERPL-MCNC: 0.83 MG/DL (ref 0.57–1)
DEPRECATED RDW RBC AUTO: 39 FL (ref 37–54)
EGFRCR SERPLBLD CKD-EPI 2021: 77.9 ML/MIN/1.73
EOSINOPHIL # BLD AUTO: 0.23 10*3/MM3 (ref 0–0.4)
EOSINOPHIL NFR BLD AUTO: 2.8 % (ref 0.3–6.2)
ERYTHROCYTE [DISTWIDTH] IN BLOOD BY AUTOMATED COUNT: 11.6 % (ref 12.3–15.4)
GLUCOSE BLDC GLUCOMTR-MCNC: 105 MG/DL (ref 70–130)
GLUCOSE BLDC GLUCOMTR-MCNC: 127 MG/DL (ref 70–130)
GLUCOSE BLDC GLUCOMTR-MCNC: 167 MG/DL (ref 70–130)
GLUCOSE BLDC GLUCOMTR-MCNC: 242 MG/DL (ref 70–130)
GLUCOSE SERPL-MCNC: 151 MG/DL (ref 65–99)
HCT VFR BLD AUTO: 41.6 % (ref 34–46.6)
HGB BLD-MCNC: 13.6 G/DL (ref 12–15.9)
IMM GRANULOCYTES # BLD AUTO: 0.04 10*3/MM3 (ref 0–0.05)
IMM GRANULOCYTES NFR BLD AUTO: 0.5 % (ref 0–0.5)
INR PPP: 0.96 (ref 0.9–1.1)
LYMPHOCYTES # BLD AUTO: 1.58 10*3/MM3 (ref 0.7–3.1)
LYMPHOCYTES NFR BLD AUTO: 19.6 % (ref 19.6–45.3)
MCH RBC QN AUTO: 30.4 PG (ref 26.6–33)
MCHC RBC AUTO-ENTMCNC: 32.7 G/DL (ref 31.5–35.7)
MCV RBC AUTO: 93.1 FL (ref 79–97)
MONOCYTES # BLD AUTO: 0.84 10*3/MM3 (ref 0.1–0.9)
MONOCYTES NFR BLD AUTO: 10.4 % (ref 5–12)
NEUTROPHILS NFR BLD AUTO: 5.34 10*3/MM3 (ref 1.7–7)
NEUTROPHILS NFR BLD AUTO: 66.1 % (ref 42.7–76)
NRBC BLD AUTO-RTO: 0 /100 WBC (ref 0–0.2)
PLATELET # BLD AUTO: 171 10*3/MM3 (ref 140–450)
PMV BLD AUTO: 11.1 FL (ref 6–12)
POTASSIUM SERPL-SCNC: 4 MMOL/L (ref 3.5–5.2)
PROTHROMBIN TIME: 12.9 SECONDS (ref 11.7–14.2)
RBC # BLD AUTO: 4.47 10*6/MM3 (ref 3.77–5.28)
SODIUM SERPL-SCNC: 137 MMOL/L (ref 136–145)
WBC NRBC COR # BLD: 8.08 10*3/MM3 (ref 3.4–10.8)

## 2023-01-13 PROCEDURE — 88341 IMHCHEM/IMCYTCHM EA ADD ANTB: CPT | Performed by: INTERNAL MEDICINE

## 2023-01-13 PROCEDURE — 94664 DEMO&/EVAL PT USE INHALER: CPT

## 2023-01-13 PROCEDURE — 82962 GLUCOSE BLOOD TEST: CPT

## 2023-01-13 PROCEDURE — 71045 X-RAY EXAM CHEST 1 VIEW: CPT

## 2023-01-13 PROCEDURE — 25010000002 FENTANYL CITRATE (PF) 50 MCG/ML SOLUTION: Performed by: RADIOLOGY

## 2023-01-13 PROCEDURE — 88342 IMHCHEM/IMCYTCHM 1ST ANTB: CPT | Performed by: INTERNAL MEDICINE

## 2023-01-13 PROCEDURE — 85025 COMPLETE CBC W/AUTO DIFF WBC: CPT | Performed by: INTERNAL MEDICINE

## 2023-01-13 PROCEDURE — 25010000002 MIDAZOLAM PER 1 MG: Performed by: RADIOLOGY

## 2023-01-13 PROCEDURE — 99153 MOD SED SAME PHYS/QHP EA: CPT

## 2023-01-13 PROCEDURE — 87205 SMEAR GRAM STAIN: CPT | Performed by: INTERNAL MEDICINE

## 2023-01-13 PROCEDURE — 88300 SURGICAL PATH GROSS: CPT | Performed by: INTERNAL MEDICINE

## 2023-01-13 PROCEDURE — 0BBJ3ZX EXCISION OF LEFT LOWER LUNG LOBE, PERCUTANEOUS APPROACH, DIAGNOSTIC: ICD-10-PCS | Performed by: RADIOLOGY

## 2023-01-13 PROCEDURE — 94761 N-INVAS EAR/PLS OXIMETRY MLT: CPT

## 2023-01-13 PROCEDURE — 88323 CONSLTJ&REPRT MATRL PREP SLD: CPT

## 2023-01-13 PROCEDURE — 94799 UNLISTED PULMONARY SVC/PX: CPT

## 2023-01-13 PROCEDURE — 85610 PROTHROMBIN TIME: CPT | Performed by: INTERNAL MEDICINE

## 2023-01-13 PROCEDURE — 87176 TISSUE HOMOGENIZATION CULTR: CPT | Performed by: INTERNAL MEDICINE

## 2023-01-13 PROCEDURE — 88341 IMHCHEM/IMCYTCHM EA ADD ANTB: CPT

## 2023-01-13 PROCEDURE — 88313 SPECIAL STAINS GROUP 2: CPT

## 2023-01-13 PROCEDURE — 80048 BASIC METABOLIC PNL TOTAL CA: CPT | Performed by: INTERNAL MEDICINE

## 2023-01-13 PROCEDURE — 87070 CULTURE OTHR SPECIMN AEROBIC: CPT | Performed by: INTERNAL MEDICINE

## 2023-01-13 PROCEDURE — 97530 THERAPEUTIC ACTIVITIES: CPT

## 2023-01-13 PROCEDURE — 88305 TISSUE EXAM BY PATHOLOGIST: CPT | Performed by: INTERNAL MEDICINE

## 2023-01-13 PROCEDURE — 97162 PT EVAL MOD COMPLEX 30 MIN: CPT

## 2023-01-13 PROCEDURE — 63710000001 INSULIN LISPRO (HUMAN) PER 5 UNITS: Performed by: STUDENT IN AN ORGANIZED HEALTH CARE EDUCATION/TRAINING PROGRAM

## 2023-01-13 PROCEDURE — 99152 MOD SED SAME PHYS/QHP 5/>YRS: CPT

## 2023-01-13 PROCEDURE — 88360 TUMOR IMMUNOHISTOCHEM/MANUAL: CPT

## 2023-01-13 RX ORDER — LIDOCAINE HYDROCHLORIDE 10 MG/ML
20 INJECTION, SOLUTION INFILTRATION; PERINEURAL ONCE
Status: DISCONTINUED | OUTPATIENT
Start: 2023-01-13 | End: 2023-01-17 | Stop reason: HOSPADM

## 2023-01-13 RX ORDER — MIDAZOLAM HYDROCHLORIDE 1 MG/ML
INJECTION INTRAMUSCULAR; INTRAVENOUS AS NEEDED
Status: COMPLETED | OUTPATIENT
Start: 2023-01-13 | End: 2023-01-13

## 2023-01-13 RX ORDER — FENTANYL CITRATE 50 UG/ML
INJECTION, SOLUTION INTRAMUSCULAR; INTRAVENOUS AS NEEDED
Status: COMPLETED | OUTPATIENT
Start: 2023-01-13 | End: 2023-01-13

## 2023-01-13 RX ORDER — ACETAMINOPHEN 325 MG/1
650 TABLET ORAL EVERY 6 HOURS PRN
Status: DISCONTINUED | OUTPATIENT
Start: 2023-01-13 | End: 2023-01-17 | Stop reason: HOSPADM

## 2023-01-13 RX ADMIN — Medication 10 ML: at 21:05

## 2023-01-13 RX ADMIN — INSULIN LISPRO 3 UNITS: 100 INJECTION, SOLUTION INTRAVENOUS; SUBCUTANEOUS at 17:15

## 2023-01-13 RX ADMIN — ACETAMINOPHEN 650 MG: 325 TABLET, FILM COATED ORAL at 16:34

## 2023-01-13 RX ADMIN — Medication 5000 UNITS: at 08:48

## 2023-01-13 RX ADMIN — LORAZEPAM 0.5 MG: 0.5 TABLET ORAL at 04:18

## 2023-01-13 RX ADMIN — FENTANYL CITRATE 25 MCG: 50 INJECTION INTRAMUSCULAR; INTRAVENOUS at 12:59

## 2023-01-13 RX ADMIN — FLUTICASONE PROPIONATE 1 SPRAY: 50 SPRAY, METERED NASAL at 21:05

## 2023-01-13 RX ADMIN — BUDESONIDE AND FORMOTEROL FUMARATE DIHYDRATE 2 PUFF: 160; 4.5 AEROSOL RESPIRATORY (INHALATION) at 08:56

## 2023-01-13 RX ADMIN — ATORVASTATIN CALCIUM 10 MG: 20 TABLET, FILM COATED ORAL at 08:47

## 2023-01-13 RX ADMIN — INSULIN GLARGINE-YFGN 5 UNITS: 100 INJECTION, SOLUTION SUBCUTANEOUS at 21:05

## 2023-01-13 RX ADMIN — BUPROPION HYDROCHLORIDE 150 MG: 150 TABLET, EXTENDED RELEASE ORAL at 08:49

## 2023-01-13 RX ADMIN — AMLODIPINE BESYLATE 10 MG: 10 TABLET ORAL at 08:48

## 2023-01-13 RX ADMIN — LORAZEPAM 0.5 MG: 0.5 TABLET ORAL at 09:56

## 2023-01-13 RX ADMIN — LORAZEPAM 0.5 MG: 0.5 TABLET ORAL at 21:05

## 2023-01-13 RX ADMIN — LOSARTAN POTASSIUM 100 MG: 100 TABLET, FILM COATED ORAL at 08:48

## 2023-01-13 RX ADMIN — GUAIFENESIN 600 MG: 600 TABLET, EXTENDED RELEASE ORAL at 08:49

## 2023-01-13 RX ADMIN — FLUTICASONE PROPIONATE 1 SPRAY: 50 SPRAY, METERED NASAL at 08:49

## 2023-01-13 RX ADMIN — LEVOTHYROXINE SODIUM 125 MCG: 0.12 TABLET ORAL at 08:49

## 2023-01-13 RX ADMIN — IPRATROPIUM BROMIDE AND ALBUTEROL SULFATE 3 ML: 2.5; .5 SOLUTION RESPIRATORY (INHALATION) at 16:12

## 2023-01-13 RX ADMIN — MIDAZOLAM 0.5 MG: 1 INJECTION INTRAMUSCULAR; INTRAVENOUS at 12:59

## 2023-01-13 RX ADMIN — GUAIFENESIN 600 MG: 600 TABLET, EXTENDED RELEASE ORAL at 21:05

## 2023-01-13 RX ADMIN — BUDESONIDE AND FORMOTEROL FUMARATE DIHYDRATE 2 PUFF: 160; 4.5 AEROSOL RESPIRATORY (INHALATION) at 20:10

## 2023-01-13 RX ADMIN — INSULIN LISPRO 2 UNITS: 100 INJECTION, SOLUTION INTRAVENOUS; SUBCUTANEOUS at 08:48

## 2023-01-13 RX ADMIN — Medication 10 ML: at 08:52

## 2023-01-13 RX ADMIN — Medication 100 MG: at 08:49

## 2023-01-13 NOTE — PLAN OF CARE
Problem: Adult Inpatient Plan of Care  Goal: Absence of Hospital-Acquired Illness or Injury  Outcome: Ongoing, Progressing  Intervention: Identify and Manage Fall Risk  Recent Flowsheet Documentation  Taken 1/13/2023 0540 by Suki Jaime RN  Safety Promotion/Fall Prevention: safety round/check completed  Taken 1/13/2023 0320 by Suki Jaime RN  Safety Promotion/Fall Prevention: safety round/check completed  Taken 1/13/2023 0135 by Suki Jaime RN  Safety Promotion/Fall Prevention: safety round/check completed  Taken 1/12/2023 2310 by Suki Jaime RN  Safety Promotion/Fall Prevention: safety round/check completed  Taken 1/12/2023 2115 by Suki Jaime RN  Safety Promotion/Fall Prevention:   safety round/check completed   fall prevention program maintained   clutter free environment maintained   assistive device/personal items within reach   activity supervised  Intervention: Prevent Skin Injury  Recent Flowsheet Documentation  Taken 1/12/2023 2115 by Suki Jaime RN  Body Position:   position changed independently   weight shifting  Intervention: Prevent and Manage VTE (Venous Thromboembolism) Risk  Recent Flowsheet Documentation  Taken 1/12/2023 2115 by Suki Jaime RN  Activity Management: up in chair  VTE Prevention/Management: patient refused intervention  Intervention: Prevent Infection  Recent Flowsheet Documentation  Taken 1/12/2023 2115 by Suki Jaime RN  Infection Prevention:   environmental surveillance performed   hand hygiene promoted   rest/sleep promoted   single patient room provided     Problem: Adult Inpatient Plan of Care  Goal: Optimal Comfort and Wellbeing  Outcome: Ongoing, Progressing  Intervention: Provide Person-Centered Care  Recent Flowsheet Documentation  Taken 1/12/2023 2115 by Suki Jaime RN  Trust Relationship/Rapport:   care explained   choices provided   questions answered   questions encouraged     Problem: Fall Injury Risk  Goal:  Absence of Fall and Fall-Related Injury  Outcome: Ongoing, Progressing  Intervention: Identify and Manage Contributors  Recent Flowsheet Documentation  Taken 1/12/2023 2115 by Suki Jaime, RN  Medication Review/Management: medications reviewed  Intervention: Promote Injury-Free Environment  Recent Flowsheet Documentation  Taken 1/13/2023 0540 by Suki Jaime, RN  Safety Promotion/Fall Prevention: safety round/check completed  Taken 1/13/2023 0320 by Suki Jaime, RN  Safety Promotion/Fall Prevention: safety round/check completed  Taken 1/13/2023 0135 by Suki Jaime, RN  Safety Promotion/Fall Prevention: safety round/check completed  Taken 1/12/2023 2310 by Suki Jaime, RN  Safety Promotion/Fall Prevention: safety round/check completed  Taken 1/12/2023 2115 by Suki Jaime, RN  Safety Promotion/Fall Prevention:   safety round/check completed   fall prevention program maintained   clutter free environment maintained   assistive device/personal items within reach   activity supervised   Goal Outcome Evaluation:

## 2023-01-13 NOTE — PROGRESS NOTES
Continued Stay Note  [facility]     Patient Name: Gabby Acosta  MRN: [id number]  Today's Date: 1/13/2023    Admit Date: 1/10/2023    Plan: Home with ex-, [facility] in home PT following   Discharge Plan     Row Name 01/13/23 1119       Plan    Plan Home with ex-, [facility] in home PT following    Patient/Family in Agreement with Plan yes    Plan Comments [facility] no longer plans scheduled surgery. Pulmonary plans lung biopsy. Pt plans home with family support and [facility] in home PT following if needed. CCP to follow for additional needs pending clinical course. Priscila Dow LCSW               Discharge Codes    No documentation.               Expected Discharge Date and Time     Expected Discharge Date Expected Discharge Time    Jan 16, 2023             Kia Dow LCSW

## 2023-01-13 NOTE — PROGRESS NOTES
"  Daily Progress Note.   [address]  1/13/2023    Patient:  Name:  Gabby Acosta  MRN:  [id number]  1955  67 y.o.  female         Requesting physician: Amber ANAND  Reason for Consultation:  Lung nodule, lad, preop eval, copd  CC: Numbness in her arms    Interval History:  No acute issues overnight.  Some anxiety.  No worsening shortness of breath. No confusion, she is awake alert appropriate affirms statements and asks insightful and appropriate questions.    Physical Exam:  /59 (BP Location: Left arm, Patient Position: Lying)   Pulse 83   Temp 98.1 °F (36.7 °C) (Oral)   Resp 18   Ht 157.5 cm (62\")   Wt 125 kg (275 lb)   SpO2 93%   BMI 50.30 kg/m²   Body mass index is 50.3 kg/m².  No intake or output data in the 24 hours ending 01/13/23 0820  General appearance: NAD, conversant   Eyes: anicteric sclerae, moist conjunctivae; no lid-lag;   HENT: Atraumatic; oropharynx limited by soft collar  Neck: Limited by soft collar  Lungs: CTA, without wheeze or rhonchi with normal respiratory effort and no intercostal retractions  CV: RRR, +anastasia mild holosystolic  Abdomen: Obese bowel sounds positive  Skin: Warm dry well-perfused  Psych: Appropriate affect, alert and oriented  Neuro cranials 2 through gross intact speech today moves all extremities       Data Review:  Notable Labs:  Results from last 7 days   Lab Units 01/13/23  0659 01/12/23  0741 01/11/23  0609 01/10/23  1439   WBC 10*3/mm3 8.08 9.43 7.91 8.65   HEMOGLOBIN g/dL 13.6 14.2 14.8 14.4   PLATELETS 10*3/mm3 171 172 167 194     Results from last 7 days   Lab Units 01/13/23  0659 01/12/23  0741 01/11/23  0609 01/10/23  1439   SODIUM mmol/L 137 136 134* 135*   POTASSIUM mmol/L 4.0 4.1 3.9 4.3   CHLORIDE mmol/L 99 96* 96* 99   CO2 mmol/L 31.2* 29.0 26.0 26.2   BUN mg/dL 19 18 14 19   CREATININE mg/dL 0.83 0.80 0.63 0.78   GLUCOSE mg/dL 151* 160* 166* 86   CALCIUM mg/dL 9.1 9.6 9.5 9.4   MAGNESIUM mg/dL  --  2.1 1.8 1.3* " "  PHOSPHORUS mg/dL  --  4.4 2.8 3.4   Estimated Creatinine Clearance: 84.3 mL/min (by C-G formula based on SCr of 0.83 mg/dL).    Results from last 7 days   Lab Units 01/13/23  0659 01/12/23  0741 01/11/23  0609 01/10/23  1439   AST (SGOT) U/L  --   --   --  15   ALT (SGPT) U/L  --   --   --  13   PLATELETS 10*3/mm3 171 172 167 194             Imaging:  Reviewed chest images personally from past 3 days    CT with lymphadenopathy mediastinum and hilar abnormal endobronchial airways and enlarging left lower lobe nodule    Assessment / Recommendations:  Former smoker  Pulmonary nodule  Lymphadenopathy  Family history of lung cancer      Patient Active Problem List   Diagnosis   • Acute UTI   • HTN (hypertension)   • Cervical spondylosis with myelopathy   • Fibromyalgia   • Fatty liver   • Hypothyroidism   • Hyperlipidemia associated with type 2 diabetes mellitus (HCC)   • Hypertrophic polyarthritis   • Proteinuria   • Obesity, Class III, BMI 40-49.9 (morbid obesity) (HCC)   • Hypokalemia   • Hypophosphatemia   • History of fusion of cervical spine   • Displacement of internal fixation device of vertebrae (HCC)   • Spinal instability of cervicothoracic region   • Cervical stenosis of spinal canal      Ct chest with LAD and enlarging 15mm left lower lobe nodule.  Ideally an EBUS TBNA would be an option but note that cardiology states that\"   With severe aortic stenosis and her multiple other medical issues, I feel that she is at high risk for complications intraoperatively and postoperatively.      The severe aortic stenosis also places her at higher risk for death with anesthesia.  Unless this is absolutely necessary and urgent, I would recommend delaying the surgery for further evaluation of a potential TAVR (if she is a candidate).  I would also highly recommend getting the potential diagnosis of lung cancer worked up, which Dr. Ring spoke with her about earlier today.\"    With this in mind we will send for ct " guided needle biopsy that will not need anesthesia to see if we can obtain a tissue diagnosis.    D/w pt procedure reasoning behind this approach and risks of the procedure which included bleeding and pneumothorax.      Dwight Ring MD  [facility]  01/13/23  09:01 EST

## 2023-01-13 NOTE — PLAN OF CARE
"Goal Outcome Evaluation:  Plan of Care Reviewed With: patient           Outcome Evaluation: Pt admitted with planned posterior cervical fusion. Upon workup, pt not cleared by cardio/pulm for sx due to need for TAVR and lung biopsy. Pt underwent anterior cervical fusion several months ago and has completed rehab. She reports using a walker, has 5 step to enter her home and has assist from family members as needed. Per [facility] notes, Pt \"is to wear a soft collar at all times. No lifting pushing pulling more than 5 pounds.  Avoid use of her arms for lifting and positioning.  No overhead activity.\" Pt initially declined working with PT today as she is emotionally drained and overwhelmed with the recent medical news. PT provided education regarding her precautions and the need to maintain them to minimize neck injury as her sx is delayed. Pt eventually was agreeable to complete sit<>stand. Cues provided to not use UE to come to standing, was educated on rocking for momentum. Unable to assess gait today as pt declined. PT will plan to follow up once this weekend to further address mobility. Pt is hopeful to return home at [place] with assist and HH.  "

## 2023-01-13 NOTE — THERAPY EVALUATION
"Patient Name: Gabby Acosta  : 1955    MRN: [id number]                              Today's Date: 2023       Admit Date: 1/10/2023    Visit Dx: No diagnosis found.  Patient Active Problem List   Diagnosis   • Acute UTI   • HTN (hypertension)   • Cervical spondylosis with myelopathy   • Fibromyalgia   • Fatty liver   • Hypothyroidism   • Hyperlipidemia associated with type 2 diabetes mellitus (HCC)   • Hypertrophic polyarthritis   • Proteinuria   • Obesity, Class III, BMI 40-49.9 (morbid obesity) (HCC)   • Hypokalemia   • Hypophosphatemia   • History of fusion of cervical spine   • Displacement of internal fixation device of vertebrae (HCC)   • Spinal instability of cervicothoracic region   • Cervical stenosis of spinal canal   • Severe aortic stenosis     Past Medical History:   Diagnosis Date   • Cervical spondylosis with myelopathy    • Cervical stenosis of spine    • Diabetes mellitus (HCC)    • Diabetes mellitus with diabetic dermatitis (HCC)    • Disease of thyroid gland    • Elevated cholesterol    • Fatty liver      Past Surgical History:   Procedure Laterality Date   • ANTERIOR CHANNEL VERTEBRECTOMY/CORPECTOMY N/A 2022    Procedure: Anterior cervical corpectomy, cervical four/five/six, with cage and plate from cervical three to cervical seven;  Surgeon: David Cheung MD;  Location: [facility];  Service: Neurosurgery;  Laterality: N/A;   • CATARACT EXTRACTION     •  SECTION     • CHOLECYSTECTOMY        General Information     Row Name 23 1510          Physical Therapy Time and Intention    Document Type evaluation  -CW     Mode of Treatment individual therapy;physical therapy  -CW     Row Name 23 1510          General Information    Prior Level of Function independent:;transfer;min assist:;all household mobility;bed mobility  uses rolling walker  -CW     Existing Precautions/Restrictions cervical collar;fall;spinal   \"Soft collar at all times. No lifting pushing " "pulling more than 5 pounds.  Avoid use of her arms for lifting and positioning.  No overhead activity\" per [facility] notes  -CW     Barriers to Rehab previous functional deficit;medically complex  -     Row Name 01/13/23 1510          Living Environment    People in Home other relative(s)  -[facility] Name 01/13/23 1510          Home Main Entrance    Number of Stairs, Main Entrance five  -CW     Row Name 01/13/23 1510          Stairs Within Home, Primary    Number of Stairs, Within Home, Primary none  -CW     [facility] Name 01/13/23 1510          Cognition    Orientation Status (Cognition) oriented x 3  -CW     Row Name 01/13/23 1510          Safety Issues, Functional Mobility    Safety Issues Affecting Function (Mobility) insight into deficits/self-awareness  -     Impairments Affecting Function (Mobility) endurance/activity tolerance;range of motion (ROM);strength;balance  -           User Key  (r) = Recorded By, (t) = Taken By, (c) = Cosigned By    Initials Name Provider Type    CW Jennifer Bailon, PT Physical Therapist               Mobility     [facility] Name 01/13/23 1511          Bed Mobility    Comment, (Bed Mobility) UIC at arrival  -[facility] Name 01/13/23 1511          Transfers    Comment, (Transfers) Pt not agreeable to further mobility, she is emotionally drained from recent bad medical news  -[facility] Name 01/13/23 1511          Bed-Chair Transfer    Bed-Chair [place] (Transfers) unable to assess  -[facility] Name 01/13/23 1511          Sit-Stand Transfer    Sit-Stand [place] (Transfers) verbal cues;minimum assist (75% patient effort);nonverbal cues (demo/gesture);contact guard  -     Comment, (Sit-Stand Transfer) x3 trials total, cues to rock for momentum to be able to stand without pushing through arms  -[facility] Name 01/13/23 1511          Gait/Stairs (Locomotion)    [place] Level (Gait) unable to assess  -           User Key  (r) = Recorded By, (t) = Taken By, (c) = Cosigned By    " Initials Name Provider Type    CW Jennifer Bailon PT Physical Therapist               Obj/Interventions     Row Name 01/13/23 1512          Range of Motion Comprehensive    General Range of Motion bilateral lower extremity ROM WFL  -CW     Row Name 01/13/23 1512          Strength Comprehensive (MMT)    Comment, General Manual Muscle Testing (MMT) Assessment BLE grossly 3+/5  -CW     Row Name 01/13/23 1512          Balance    Balance Assessment sitting static balance;sitting dynamic balance;standing static balance  -CW     Static Sitting Balance supervision  -CW     Dynamic Sitting Balance supervision  -CW     Static Standing Balance contact guard  -CW     Position/Device Used, Standing Balance supported;walker, front-wheeled  -CW     Row Name 01/13/23 1512          Sensory Assessment (Somatosensory)    Sensory Assessment (Somatosensory) other (see comments)  reports some L hand numbness  -CW           User Key  (r) = Recorded By, (t) = Taken By, (c) = Cosigned By    Initials Name Provider Type    CW Jennifer Bailon PT Physical Therapist               Goals/Plan     Row Name 01/13/23 1521          Bed Mobility Goal 1 (PT)    Activity/Assistive Device (Bed Mobility Goal 1, PT) bed mobility activities, all  -CW     [place] Level/Cues Needed (Bed Mobility Goal 1, PT) standby assist  -CW     Time Frame (Bed Mobility Goal 1, PT) 2 weeks  -CW     Row Name 01/13/23 1521          Transfer Goal 1 (PT)    Activity/Assistive Device (Transfer Goal 1, PT) sit-to-stand/stand-to-sit;bed-to-chair/chair-to-bed;walker, rolling  -CW     [place] Level/Cues Needed (Transfer Goal 1, PT) standby assist  -CW     Time Frame (Transfer Goal 1, PT) 2 weeks  -CW     Row Name 01/13/23 1521          Gait Training Goal 1 (PT)    Activity/Assistive Device (Gait Training Goal 1, PT) gait (walking locomotion);walker, rolling  -CW     [place] Level (Gait Training Goal 1, PT) standby assist  -CW     Distance (Gait Training Goal 1,  "PT) 50'  -CW     Time Frame (Gait Training Goal 1, PT) 2 weeks  -CW     Row Name 01/13/23 1521          Stairs Goal 1 (PT)    Activity/Assistive Device (Stairs Goal 1, PT) ascending stairs;descending stairs  -CW     [place] Level/Cues Needed (Stairs Goal 1, PT) contact guard required  -CW     Number of Stairs (Stairs Goal 1, PT) 5  -CW     Time Frame (Stairs Goal 1, PT) 2 weeks  -CW     Row Name 01/13/23 1521          Therapy Assessment/Plan (PT)    Planned Therapy Interventions (PT) balance training;bed mobility training;gait training;postural re-education;transfer training;ROM (range of motion);strengthening;patient/family education  -CW           User Key  (r) = Recorded By, (t) = Taken By, (c) = Cosigned By    Initials Name Provider Type    CW Jennifer Bailon, PT Physical Therapist               Clinical Impression     Row Name 01/13/23 1515          Pain    Pretreatment Pain Rating 0/10 - no pain  -CW     Posttreatment Pain Rating 0/10 - no pain  -CW     Pre/Posttreatment Pain Comment denies any cervical pain  -CW     Row Name 01/13/23 1515          Plan of Care Review    Plan of Care Reviewed With patient  -CW     Outcome Evaluation Pt admitted with planned posterior cervical fusion. Upon workup, pt not cleared by cardio/pulm for sx due to need for TAVR and lung biopsy. Pt underwent anterior cervical fusion several months ago and has completed rehab. She reports using a walker, has 5 step to enter her home and has assist from family members as needed. Per [facility] notes, Pt \"is to wear a soft collar at all times. No lifting pushing pulling more than 5 pounds.  Avoid use of her arms for lifting and positioning.  No overhead activity.\" Pt initially declined working with PT today as she is emotionally drained and overwhelmed with the recent medical news. PT provided education regarding her precautions and the need to maintain them to minimize neck injury as her sx is delayed. Pt eventually was agreeable to " complete sit<>stand. Cues provided to not use UE to come to standing, was educated on rocking for momentum. Unable to assess gait today as pt declined. PT will plan to follow up once this weekend to further address mobility. Pt is hopeful to return home at [place] with assist and HH.  -     Row Name 01/13/23 1515          Therapy Assessment/Plan (PT)    Rehab Potential (PT) good, to achieve stated therapy goals  -CW     Criteria for Skilled Interventions Met (PT) yes  -CW     Therapy Frequency (PT) 6 times/wk  -CW     Row Name 01/13/23 1515          Vital Signs    O2 Delivery Pre Treatment room air  -     Row Name 01/13/23 1515          Positioning and Restraints    Pre-Treatment Position sitting in chair/recliner  -CW     Post Treatment Position chair  -CW     In Chair reclined;call light within reach;encouraged to call for assist;exit alarm on;notified nsg;legs elevated  -CW           User Key  (r) = Recorded By, (t) = Taken By, (c) = Cosigned By    Initials Name Provider Type    CW Jennifer Bailon, PT Physical Therapist               Outcome Measures     Row Name 01/13/23 1522 01/13/23 0830       How much help from another person do you currently need...    Turning from your back to your side while in flat bed without using bedrails? 3  -CW 4  -JM    Moving from lying on back to sitting on the side of a flat bed without bedrails? 3  -CW 4  -JM    Moving to and from a bed to a chair (including a wheelchair)? 3  -CW 4  -JM    Standing up from a chair using your arms (e.g., wheelchair, bedside chair)? 3  -CW 4  -JM    Climbing 3-5 steps with a railing? 2  -CW 4  -JM    To walk in hospital room? 3  -CW 4  -JM    AM-PAC 6 Clicks Score (PT) 17  -CW 24  -JM    Highest level of mobility 5 --> Static standing  - 8 --> Walked 250 feet or more  -    Row Name 01/13/23 1522          Functional Assessment    Outcome Measure Options AM-PAC 6 Clicks Basic Mobility (PT)  -CW           User Key  (r) = Recorded By, (t) = Taken  "By, (c) = Cosigned By    Initials Name Provider Type    May Dean RN Registered Nurse    Jennifer Shetty PT Physical Therapist                             Physical Therapy Education     Title: PT OT SLP Therapies (Done)     Topic: Physical Therapy (Done)     Point: Mobility training (Done)     Learning Progress Summary           Patient Acceptance, E, VU,NR by CW at 1/13/2023 1522                   Point: Home exercise program (Done)     Learning Progress Summary           Patient Acceptance, E, VU,NR by CW at 1/13/2023 1522                   Point: Body mechanics (Done)     Learning Progress Summary           Patient Acceptance, E, VU,NR by CW at 1/13/2023 1522                   Point: Precautions (Done)     Learning Progress Summary           Patient Acceptance, E, VU,NR by CW at 1/13/2023 1522                               User Key     Initials Effective Dates Name Provider Type Discipline    MORENITA 12/13/22 -  Jennifer Bailon PT Physical Therapist PT              PT Recommendation and Plan  Planned Therapy Interventions (PT): balance training, bed mobility training, gait training, postural re-education, transfer training, ROM (range of motion), strengthening, patient/family education  Plan of Care Reviewed With: patient  Outcome Evaluation: Pt admitted with planned posterior cervical fusion. Upon workup, pt not cleared by cardio/pulm for sx due to need for TAVR and lung biopsy. Pt underwent anterior cervical fusion several months ago and has completed rehab. She reports using a walker, has 5 step to enter her home and has assist from family members as needed. Per [facility] notes, Pt \"is to wear a soft collar at all times. No lifting pushing pulling more than 5 pounds.  Avoid use of her arms for lifting and positioning.  No overhead activity.\" Pt initially declined working with PT today as she is emotionally drained and overwhelmed with the recent medical news. PT provided education regarding her " precautions and the need to maintain them to minimize neck injury as her sx is delayed. Pt eventually was agreeable to complete sit<>stand. Cues provided to not use UE to come to standing, was educated on rocking for momentum. Unable to assess gait today as pt declined. PT will plan to follow up once this weekend to further address mobility. Pt is hopeful to return home at [place] with assist and .     Time Calculation:    PT Charges     Row Name 01/13/23 1508             Time Calculation    Start Time 0945  -CW      Stop Time 1006  -CW      Time Calculation (min) 21 min  -CW      PT Received On 01/13/23  -CW      PT - Next Appointment 01/14/23  -CW      PT Goal Re-Cert Due Date 01/27/23  -CW         Time Calculation- PT    Total Timed Code Minutes- PT 12 minute(s)  -CW         Timed Charges    75340 - PT Therapeutic Activity Minutes 12  -CW         Total Minutes    Timed Charges Total Minutes 12  -CW       Total Minutes 12  -CW            User Key  (r) = Recorded By, (t) = Taken By, (c) = Cosigned By    Initials Name Provider Type    CW Jennifer Bailon, PT Physical Therapist              Therapy Charges for Today     Code Description Service Date Service Provider Modifiers Qty    16348338046 HC PT EVAL MOD COMPLEXITY 3 1/13/2023 Jennifer Bailon, PT GP 1    01588861425 HC PT THERAPEUTIC ACT EA 15 MIN 1/13/2023 Jennifer Bailon, PT GP 1          PT G-Codes  Outcome Measure Options: AM-PAC 6 Clicks Basic Mobility (PT)  AM-PAC 6 Clicks Score (PT): 17  PT Discharge Summary  Anticipated Discharge Disposition (PT): home with assist, home with home health    Jennifer Bailon PT  1/13/2023

## 2023-01-13 NOTE — POST-PROCEDURE NOTE
POST PROCEDURE NOTE    Procedure: ct left lung biopsy    Pre-Procedure Diagnosis: LLL nodule    Post-procedure Diagnosis: same    Findings: technically successful ct guided biopsy of left lung nodule    Complications: no immediate    Blood loss: none    Specimen Removed: multiple 19 gauge cores    Disposition:   Transfer back to inpatient room

## 2023-01-13 NOTE — PLAN OF CARE
Patient off the floor for CT-guided biopsy.  We will see the patient tomorrow to develop further planning and management of severe aortic stenosis.

## 2023-01-13 NOTE — PROGRESS NOTES
Progress note:    Post-biopsy CXR shows a 7mm left apical pneumothorax.  Vital signs, oxygen requirement unchanged. Patient is on room air.    Repeat CXR ordered

## 2023-01-14 ENCOUNTER — APPOINTMENT (OUTPATIENT)
Dept: GENERAL RADIOLOGY | Facility: HOSPITAL | Age: 68
DRG: 552 | End: 2023-01-14
Payer: MEDICARE

## 2023-01-14 PROBLEM — J95.811 POSTPROCEDURAL PNEUMOTHORAX: Status: ACTIVE | Noted: 2023-01-14

## 2023-01-14 LAB
ANION GAP SERPL CALCULATED.3IONS-SCNC: 11.5 MMOL/L (ref 5–15)
BASOPHILS # BLD AUTO: 0.05 10*3/MM3 (ref 0–0.2)
BASOPHILS NFR BLD AUTO: 0.6 % (ref 0–1.5)
BUN SERPL-MCNC: 19 MG/DL (ref 8–23)
BUN/CREAT SERPL: 24.4 (ref 7–25)
CALCIUM SPEC-SCNC: 8.9 MG/DL (ref 8.6–10.5)
CHLORIDE SERPL-SCNC: 99 MMOL/L (ref 98–107)
CO2 SERPL-SCNC: 28.5 MMOL/L (ref 22–29)
CREAT SERPL-MCNC: 0.78 MG/DL (ref 0.57–1)
DEPRECATED RDW RBC AUTO: 39.1 FL (ref 37–54)
EGFRCR SERPLBLD CKD-EPI 2021: 83.9 ML/MIN/1.73
EOSINOPHIL # BLD AUTO: 0.24 10*3/MM3 (ref 0–0.4)
EOSINOPHIL NFR BLD AUTO: 3 % (ref 0.3–6.2)
ERYTHROCYTE [DISTWIDTH] IN BLOOD BY AUTOMATED COUNT: 11.5 % (ref 12.3–15.4)
GLUCOSE BLDC GLUCOMTR-MCNC: 117 MG/DL (ref 70–130)
GLUCOSE BLDC GLUCOMTR-MCNC: 135 MG/DL (ref 70–130)
GLUCOSE BLDC GLUCOMTR-MCNC: 155 MG/DL (ref 70–130)
GLUCOSE BLDC GLUCOMTR-MCNC: 177 MG/DL (ref 70–130)
GLUCOSE SERPL-MCNC: 133 MG/DL (ref 65–99)
HCT VFR BLD AUTO: 40.4 % (ref 34–46.6)
HGB BLD-MCNC: 13.1 G/DL (ref 12–15.9)
IMM GRANULOCYTES # BLD AUTO: 0.03 10*3/MM3 (ref 0–0.05)
IMM GRANULOCYTES NFR BLD AUTO: 0.4 % (ref 0–0.5)
LYMPHOCYTES # BLD AUTO: 1.59 10*3/MM3 (ref 0.7–3.1)
LYMPHOCYTES NFR BLD AUTO: 20.2 % (ref 19.6–45.3)
MCH RBC QN AUTO: 30.4 PG (ref 26.6–33)
MCHC RBC AUTO-ENTMCNC: 32.4 G/DL (ref 31.5–35.7)
MCV RBC AUTO: 93.7 FL (ref 79–97)
MONOCYTES # BLD AUTO: 0.85 10*3/MM3 (ref 0.1–0.9)
MONOCYTES NFR BLD AUTO: 10.8 % (ref 5–12)
NEUTROPHILS NFR BLD AUTO: 5.11 10*3/MM3 (ref 1.7–7)
NEUTROPHILS NFR BLD AUTO: 65 % (ref 42.7–76)
NRBC BLD AUTO-RTO: 0 /100 WBC (ref 0–0.2)
PLATELET # BLD AUTO: 168 10*3/MM3 (ref 140–450)
PMV BLD AUTO: 11.6 FL (ref 6–12)
POTASSIUM SERPL-SCNC: 3.6 MMOL/L (ref 3.5–5.2)
RBC # BLD AUTO: 4.31 10*6/MM3 (ref 3.77–5.28)
SODIUM SERPL-SCNC: 139 MMOL/L (ref 136–145)
WBC NRBC COR # BLD: 7.87 10*3/MM3 (ref 3.4–10.8)

## 2023-01-14 PROCEDURE — 71045 X-RAY EXAM CHEST 1 VIEW: CPT

## 2023-01-14 PROCEDURE — 94799 UNLISTED PULMONARY SVC/PX: CPT

## 2023-01-14 PROCEDURE — 80048 BASIC METABOLIC PNL TOTAL CA: CPT | Performed by: INTERNAL MEDICINE

## 2023-01-14 PROCEDURE — 97530 THERAPEUTIC ACTIVITIES: CPT

## 2023-01-14 PROCEDURE — 94760 N-INVAS EAR/PLS OXIMETRY 1: CPT

## 2023-01-14 PROCEDURE — 99232 SBSQ HOSP IP/OBS MODERATE 35: CPT | Performed by: INTERNAL MEDICINE

## 2023-01-14 PROCEDURE — 94664 DEMO&/EVAL PT USE INHALER: CPT

## 2023-01-14 PROCEDURE — 82962 GLUCOSE BLOOD TEST: CPT

## 2023-01-14 PROCEDURE — 94761 N-INVAS EAR/PLS OXIMETRY MLT: CPT

## 2023-01-14 PROCEDURE — 85025 COMPLETE CBC W/AUTO DIFF WBC: CPT | Performed by: INTERNAL MEDICINE

## 2023-01-14 RX ADMIN — BUPROPION HYDROCHLORIDE 150 MG: 150 TABLET, EXTENDED RELEASE ORAL at 09:15

## 2023-01-14 RX ADMIN — Medication 10 ML: at 21:00

## 2023-01-14 RX ADMIN — IPRATROPIUM BROMIDE AND ALBUTEROL SULFATE 3 ML: 2.5; .5 SOLUTION RESPIRATORY (INHALATION) at 15:58

## 2023-01-14 RX ADMIN — INSULIN GLARGINE-YFGN 5 UNITS: 100 INJECTION, SOLUTION SUBCUTANEOUS at 20:37

## 2023-01-14 RX ADMIN — Medication 5000 UNITS: at 09:16

## 2023-01-14 RX ADMIN — FLUTICASONE PROPIONATE 1 SPRAY: 50 SPRAY, METERED NASAL at 09:17

## 2023-01-14 RX ADMIN — Medication 10 ML: at 09:17

## 2023-01-14 RX ADMIN — Medication 100 MG: at 09:16

## 2023-01-14 RX ADMIN — ATORVASTATIN CALCIUM 10 MG: 20 TABLET, FILM COATED ORAL at 09:15

## 2023-01-14 RX ADMIN — LORAZEPAM 0.5 MG: 0.5 TABLET ORAL at 20:37

## 2023-01-14 RX ADMIN — GUAIFENESIN 600 MG: 600 TABLET, EXTENDED RELEASE ORAL at 09:17

## 2023-01-14 RX ADMIN — IPRATROPIUM BROMIDE AND ALBUTEROL SULFATE 3 ML: 2.5; .5 SOLUTION RESPIRATORY (INHALATION) at 11:21

## 2023-01-14 RX ADMIN — LEVOTHYROXINE SODIUM 125 MCG: 0.12 TABLET ORAL at 09:15

## 2023-01-14 RX ADMIN — GUAIFENESIN 600 MG: 600 TABLET, EXTENDED RELEASE ORAL at 20:37

## 2023-01-14 RX ADMIN — BUDESONIDE AND FORMOTEROL FUMARATE DIHYDRATE 2 PUFF: 160; 4.5 AEROSOL RESPIRATORY (INHALATION) at 08:08

## 2023-01-14 RX ADMIN — AMLODIPINE BESYLATE 10 MG: 10 TABLET ORAL at 09:15

## 2023-01-14 RX ADMIN — FLUTICASONE PROPIONATE 1 SPRAY: 50 SPRAY, METERED NASAL at 20:37

## 2023-01-14 RX ADMIN — BUDESONIDE AND FORMOTEROL FUMARATE DIHYDRATE 2 PUFF: 160; 4.5 AEROSOL RESPIRATORY (INHALATION) at 21:07

## 2023-01-14 RX ADMIN — LOSARTAN POTASSIUM 100 MG: 100 TABLET, FILM COATED ORAL at 09:16

## 2023-01-14 NOTE — PROGRESS NOTES
LOS: 2 days   Patient Care Team:  Danyelle Brush MD as PCP - General (Pediatrics)    Chief Complaint:     F/u AS    Interval History:     She has no chest pain or difficulty breathing.  She does not feel heart racing or skipping.  She has had no syncope or falls.    Echo 1/11/23  Interpretation Summary       •  Left ventricular ejection fraction appears to be greater than 70%.  •  Left ventricular wall thickness is consistent with mild concentric hypertrophy.  •  Left ventricular diastolic function is consistent with (grade I) impaired relaxation.  •  Normal right ventricular cavity size noted. Hyperdynamic right ventricular systolic function noted.  •  The aortic valve leaflets are not well visualized, although appear heavily calcified  •  Severe aortic valve stenosis is present. Aortic valve area is 0.99 cm2. Aortic valve maximum pressure gradient is 77.5 mmHg. Aortic valve mean pressure gradient is 39.2 mmHg.  •  Calculated right ventricular systolic pressure from tricuspid regurgitation is 21 mmHg.  •  There is no evidence of pericardial effusion. . There is evidence of a fat pad present.         Objective   Vital Signs  Temp:  [97.4 °F (36.3 °C)-98.6 °F (37 °C)] 97.9 °F (36.6 °C)  Heart Rate:  [75-90] 83  Resp:  [10-24] 18  BP: ()/(41-93) 135/93  No intake or output data in the 24 hours ending 01/14/23 0858    Comfortable NAD, neck brace in place  Neck supple, no JVD or thyromegaly appreciated  S1/S2 RRR, no /r/g, 3/6 BENNETT  Lungs CTA B, normal effort  Abdomen S/NT/ND (+) BS, no HSM appreciated  Extremities warm, no clubbing, cyanosis, or edema  No visible or palpable skin lesions  A/Ox4, mood and affect appropriate    Results Review:      Results from last 7 days   Lab Units 01/13/23  0659 01/12/23  0741 01/11/23  0609   SODIUM mmol/L 137 136 134*   POTASSIUM mmol/L 4.0 4.1 3.9   CHLORIDE mmol/L 99 96* 96*   CO2 mmol/L 31.2* 29.0 26.0   BUN mg/dL 19 18 14   CREATININE mg/dL 0.83 0.80 0.63   GLUCOSE  mg/dL 151* 160* 166*   CALCIUM mg/dL 9.1 9.6 9.5         Results from last 7 days   Lab Units 01/13/23  0659 01/12/23  0741 01/11/23  0609   WBC 10*3/mm3 8.08 9.43 7.91   HEMOGLOBIN g/dL 13.6 14.2 14.8   HEMATOCRIT % 41.6 42.3 43.3   PLATELETS 10*3/mm3 171 172 167     Results from last 7 days   Lab Units 01/13/23  1117   INR  0.96         Results from last 7 days   Lab Units 01/12/23  0741   MAGNESIUM mg/dL 2.1           I reviewed the patient's new clinical results.  I personally viewed and interpreted the patient's EKG/Telemetry data        Medication Review:   amLODIPine, 10 mg, Oral, Q24H  atorvastatin, 10 mg, Oral, Daily  budesonide-formoterol, 2 puff, Inhalation, BID - RT  buPROPion XL, 150 mg, Oral, Daily  cholecalciferol, 5,000 Units, Oral, Daily  fluticasone, 1 spray, Each Nare, BID  guaiFENesin, 600 mg, Oral, Q12H  insulin glargine, 5 Units, Subcutaneous, Nightly  insulin lispro, 0-7 Units, Subcutaneous, TID AC  ipratropium-albuterol, 3 mL, Nebulization, 4x Daily - RT  levothyroxine, 125 mcg, Oral, Daily  lidocaine, 20 mL, Infiltration, Once  losartan, 100 mg, Oral, Daily  sodium chloride, 10 mL, Intravenous, Q12H  pyridoxine, 100 mg, Oral, Daily             Assessment & Plan       Cervical stenosis of spinal canal    HTN (hypertension)    Cervical spondylosis with myelopathy    Hypothyroidism    Hyperlipidemia associated with type 2 diabetes mellitus (HCC)    Obesity, Class III, BMI 40-49.9 (morbid obesity) (HCC)    History of fusion of cervical spine    Severe aortic stenosis    1. Severe aortic stenosis -Limited visualization of valve morphology on transthoracic echocardiogram.  Patient seems fairly euvolemic however sitting in the chair with the oxygen off her saturation drops to 80 to 85%.  normal BNP 1/13/23. Lung cancer work-up ongoing.  Once we have a better idea of lung pathology we will discuss case details with structural cardiology for work-up of SAVR versus TAVR.  Patient noted to have severe  calcification in all coronary distributions and will need ischemic work-up prior to consideration of valve intervention.  2. Cervical stenosis -surgical intervention is on hold for now.  Dr. Garcia discussed case details with Dr. Cheung yesterday.  3. COPD  4. Lung nodules - biopsy 1/13/23 -  PTX.   5. Hypertension  6. Diabetes    Her cardiac status is very stable and she is euvolemic.  No other testing needs to be done this weekend.  We will see Monday.    Rocio Galicia MD  01/14/23  08:58 EST

## 2023-01-14 NOTE — PAYOR COMM NOTE
"Gabby Kelly (67 y.o. Female)     ATTN: NURSE REVIEWER  RE: INITIAL INPATIENT AUTH REQUEST  AUTH: [id number]  PLEASE REPLY TO DEEPALI THOMPSON [phone] OR FAX# [phone]      Date of Birth   1955    Social Security Number       Address   [address] [place] [address] [address] [place] [id number]    Home Phone   [phone]    MRN   [id number]       Zoroastrian   Nondenominational    Marital Status                               Admission Date   1/10/23    Admission Type   Urgent    Admitting Provider   Dariusz Marrufo MD    Attending Provider   John Khan MD    Department, Room/Bed   [address], P591/1       Discharge Date       Discharge Disposition       Discharge Destination                               Attending Provider: John Khan MD    Allergies: No Known Allergies    Isolation: None   Infection: None   Code Status: CPR    Ht: 157.5 cm (62\")   Wt: 125 kg (275 lb)    Admission Cmt: ANTHEM REPL   Principal Problem: Cervical stenosis of spinal canal [M48.02]                 Active Insurance as of 1/10/2023     Primary Coverage     Payor Plan Insurance Group Employer/Plan Group    ANTHEM MEDICARE REPLACEMENT ANTHEM MEDICARE ADVANTAGE [id number]     Payor Plan Address Payor Plan Phone Number Payor Plan Fax Number Effective Dates    [address] [phone]  1/1/2022 - None Entered    [facility] [address]       Subscriber Name Subscriber Birth Date Member ID       GABBY KELLY 1955 [id number]           Secondary Coverage     Payor Plan Insurance Group Employer/Plan Group    [id number]      Payor Plan Address Payor Plan Phone Number Payor Plan Fax Number Effective Dates    [address]   1/1/2022 - None Entered    [facility] [address]       Subscriber Name Subscriber Birth Date Member ID       GABBY KELLY 1955 [id number]                 Emergency Contacts      (Rel.) Home Phone Work Phone Mobile Phone    VANESSA GUERRERO " (Significant Other) [phone] -- [phone]    Yolanda Amanda (Daughter) -- -- [phone]               History & Physical      Dariusz Marrufo MD at 01/10/23 1220              Patient Name:  Gabby Acosta  YOB: 1955  MRN:  [id number]  Admit Date:  1/10/2023  Patient Care Team:  Danyelle Brush MD as PCP - General (Pediatrics)      Subjective    History Present Illness     67-year-old woman with a past medical history of type 2 diabetes and hypertension who presented to the hospital initially for numbness of her arms and legs as well as an inability to ambulate.  An MRI was done that showed multilevel stenosis of the cervical spine.  Neurosurgery was consulted and she underwent an anterior cervical corpectomy at C4, C5, C6 with a cage and anterior cervical plate from C3-C7 on 9/21/2022.  She was initially supposed to undergo a posterior cervical fusion on 9/27/2022 however she refused the procedure and so she was discharged with a hard cervical collar and he was supposed to follow-up with neurosurgery as an outpatient.    Of note, during that admission she underwent a CT of the chest that showed pulmonary nodules, largest of which being 1.4 cm in the left lower lobe and the recommendation at that time was to obtain a PET CT.      During one of the follow-up appointments neurosurgery, an x-ray was done that showed movement with one of the cages as well as an inferior portion of the plate.      Review of Systems   Constitutional: Negative for chills and fever.   Respiratory: Negative for chest tightness and shortness of breath.    Cardiovascular: Negative for chest pain and palpitations.   Gastrointestinal: Negative for abdominal pain and constipation.   Genitourinary: Negative for difficulty urinating and dysuria.   Musculoskeletal: Negative for arthralgias and joint swelling.   Skin: Negative for color change and wound.   Neurological: Positive for weakness and numbness.    Psychiatric/Behavioral: Negative for agitation and behavioral problems.        Personal History     Past Medical History:   Diagnosis Date   • Cervical spondylosis with myelopathy    • Cervical stenosis of spine    • Diabetes mellitus (HCC)    • Diabetes mellitus with diabetic dermatitis (HCC)    • Disease of thyroid gland    • Elevated cholesterol    • Fatty liver      Past Surgical History:   Procedure Laterality Date   • ANTERIOR CHANNEL VERTEBRECTOMY/CORPECTOMY N/A 2022    Procedure: Anterior cervical corpectomy, cervical four/five/six, with cage and plate from cervical three to cervical seven;  Surgeon: David Cheung MD;  Location: [facility];  Service: Neurosurgery;  Laterality: N/A;   • CATARACT EXTRACTION     •  SECTION     • CHOLECYSTECTOMY       Family History   Problem Relation Age of Onset   • Malig Hyperthermia Neg Hx      Social History     Tobacco Use   • Smoking status: Former     Types: Cigarettes     Quit date: 2022     Years since quittin.3   • Smokeless tobacco: Never   Vaping Use   • Vaping Use: Never used   Substance Use Topics   • Alcohol use: Never   • Drug use: Never     No current facility-administered medications on file prior to encounter.     Current Outpatient Medications on File Prior to Encounter   Medication Sig Dispense Refill   • albuterol sulfate  (90 Base) MCG/ACT inhaler INHALE TWO (2) PUFFS INTO THE LUNGS EVERY SIX (6) (SIX) HOURS AS NEEDED FOR WHEEZING.     • buPROPion XL (WELLBUTRIN XL) 150 MG 24 hr tablet Take 1 tablet by mouth Daily. 30 tablet 0   • diphenhydrAMINE (BENADRYL) 25 mg capsule Take 25 mg by mouth Every 6 (Six) Hours As Needed for Allergies.     • Flaxseed, Linseed, (Flax Seed Oil) 1000 MG capsule Take  by mouth.     • fluticasone (FLONASE) 50 MCG/ACT nasal spray INSTILL TWO (2) SPRAYS INTO NOSE DAILY.     • ibuprofen (ADVIL,MOTRIN) 800 MG tablet Take  by mouth.     • ipratropium-albuterol (DUO-NEB) 0.5-2.5 mg/3 ml nebulizer  Take 3 mL by nebulization 4 (Four) Times a Day. 360 mL 0   • levothyroxine (SYNTHROID, LEVOTHROID) 125 MCG tablet Take 125 mcg by mouth Daily.     • losartan (COZAAR) 100 MG tablet Take 1 tablet by mouth Daily.     • lovastatin (MEVACOR) 40 MG tablet TAKE ONE (1) TABLET BY MOUTH NIGHTLY.     • metFORMIN (GLUCOPHAGE) 850 MG tablet TAKE ONE (1) TABLET BY MOUTH TWO (2) (TWO) TIMES DAILY WITH MEALS SHOULD BE DUE 90 DAY SUPPLY.     • pyridoxine (VITAMIN B-6) 100 MG tablet Take 100 mg by mouth Daily.     • vitamin D3 125 MCG (5000 UT) capsule capsule Take 5,000 Units by mouth Daily.     • amLODIPine (NORVASC) 10 MG tablet Take 1 tablet by mouth Daily. 30 tablet 1   • guaiFENesin (MUCINEX) 600 MG 12 hr tablet Take 1 tablet by mouth Every 12 (Twelve) Hours. 14 tablet 0     No Known Allergies    Objective     Objective     Vital Signs  Temp:  [98.3 °F (36.8 °C)] 98.3 °F (36.8 °C)  Heart Rate:  [73] 73  Resp:  [18] 18  BP: (154)/(94) 154/94  SpO2:  [94 %] 94 %  on   ;      There is no height or weight on file to calculate BMI.    Physical Exam  Constitutional:       Appearance: Normal appearance.   HENT:      Head: Normocephalic and atraumatic.   Cardiovascular:      Rate and Rhythm: Normal rate and regular rhythm.   Pulmonary:      Effort: Pulmonary effort is normal. No respiratory distress.   Abdominal:      General: There is no distension.      Palpations: Abdomen is soft.      Tenderness: There is no abdominal tenderness.   Musculoskeletal:      Right lower leg: No edema.      Left lower leg: No edema.   Skin:     General: Skin is warm and dry.   Neurological:      General: No focal deficit present.      Mental Status: She is alert and oriented to person, place, and time.   Psychiatric:         Mood and Affect: Mood normal.         Behavior: Behavior normal.         Results Review:  I reviewed the patient's new clinical results.  I reviewed the patient's new imaging results and agree with the interpretation.  I reviewed  the patient's other test results and agree with the interpretation  I personally viewed and interpreted the patient's EKG/Telemetry data  Discussed with ED provider.    Lab Results (last 24 hours)     ** No results found for the last 24 hours. **          Imaging Results (Last 24 Hours)     ** No results found for the last 24 hours. **              No orders to display       Assessment/Plan     Active Hospital Problems    Diagnosis  POA   • **Cervical stenosis of spinal canal [M48.02]  Yes   • History of fusion of cervical spine [Z98.1]  Not Applicable   • HTN (hypertension) [I10]  Yes   • Obesity, Class III, BMI 40-49.9 (morbid obesity) ([facility]) [E66.01]  Yes   • Cervical spondylosis with myelopathy [M47.12]  Yes   • Hyperlipidemia associated with type 2 diabetes mellitus ([facility]) [E11.69, E78.5]  Yes   • Hypothyroidism [E03.9]  Yes      Resolved Hospital Problems   No resolved problems to display.       Assessment and plan:    Cervical spinal stenosis  Status post ACDF C3-C7 on 9/20/2022  Direct admission today for posterior approach with neurosurgery.    NSG consultation  Pain control     COPD  Continue albuterol as needed along with scheduled DuoNeb    Type 2 diabetes  Takes metformin 850 mg twice daily at home.  Hold while inpatient and start sliding scale insulin    Hypertension  Amlodipine 10 and losartan 100 mg.  Continue for now.    hyperlipidemia  Continue lovastatin      · I discussed the patient's findings and my recommendations with patient and consulting provider.    VTE Prophylaxis - SCDs.  Code Status - Full code.       Dariusz Marrufo MD  [facility]  01/10/23  13:34 EST      Electronically signed by Dariusz Marrufo MD at 01/10/23 1335         Facility-Administered Medications as of 1/14/2023   Medication Dose Route Frequency Provider Last Rate Last Admin   • acetaminophen (TYLENOL) tablet 650 mg  650 mg Oral Q6H PRN John Khan MD   650 mg at 01/13/23 1634   • albuterol  (PROVENTIL) nebulizer solution 0.083% 2.5 mg/3mL  2.5 mg Nebulization Q6H PRN Dariusz Marrufo MD       • amLODIPine (NORVASC) tablet 10 mg  10 mg Oral Q24H Dariusz Marrufo MD   10 mg at 01/14/23 0915   • atorvastatin (LIPITOR) tablet 10 mg  10 mg Oral Daily Dariusz Marrufo MD   10 mg at 01/14/23 0915   • budesonide-formoterol (SYMBICORT) 160-4.5 MCG/ACT inhaler 2 puff  2 puff Inhalation BID - RT Dwight Ring MD   2 puff at 01/14/23 0808   • buPROPion XL (WELLBUTRIN XL) 24 hr tablet 150 mg  150 mg Oral Daily Dairusz Marrufo MD   150 mg at 01/14/23 0915   • cholecalciferol (VITAMIN D3) tablet 5,000 Units  5,000 Units Oral Daily Dariusz Marrufo MD   5,000 Units at 01/14/23 0916   • dextrose (D50W) (25 g/50 mL) IV injection 25 g  25 g Intravenous Q15 Min PRN Dariusz Marrufo MD       • dextrose (GLUTOSE) oral gel 15 g  15 g Oral Q15 Min PRN Dariusz Marrufo MD       • diphenhydrAMINE (BENADRYL) capsule 25 mg  25 mg Oral Q6H PRN Dariusz Marrufo MD   25 mg at 01/11/23 2121   • [COMPLETED] fentaNYL citrate (PF) (SUBLIMAZE) injection   Intravenous PRN Everette Chavez MD   25 mcg at 01/13/23 1259   • fluticasone (FLONASE) 50 MCG/ACT nasal spray 1 spray  1 spray Each Nare BID Dariusz Marrufo MD   1 spray at 01/14/23 0917   • glucagon (human recombinant) (GLUCAGEN DIAGNOSTIC) injection 1 mg  1 mg Intramuscular Q15 Min PRN Dariusz Marrufo MD       • guaiFENesin (MUCINEX) 12 hr tablet 600 mg  600 mg Oral Q12H Dariusz Marrufo MD   600 mg at 01/14/23 0917   • insulin glargine (LANTUS, SEMGLEE) injection 5 Units  5 Units Subcutaneous Nightly Dariusz Marrufo MD   5 Units at 01/13/23 2105   • insulin lispro (ADMELOG) injection 0-7 Units  0-7 Units Subcutaneous TID AC Dariusz Marrufo MD   3 Units at 01/13/23 1715   • [COMPLETED] iopamidol (ISOVUE-370) 76 % injection 100 mL  100 mL Intravenous Once in imaging John Khan MD   75 mL at 01/12/23 1007   • ipratropium-albuterol (DUO-NEB) nebulizer solution 3 mL  3 mL Nebulization 4x Daily - RT  Dariusz Marrufo MD   3 mL at 01/14/23 1558   • levothyroxine (SYNTHROID, LEVOTHROID) tablet 125 mcg  125 mcg Oral Daily Dariusz Marrufo MD   125 mcg at 01/14/23 0915   • lidocaine (XYLOCAINE) 1 % injection 20 mL  20 mL Infiltration Once Everette Chavez MD       • LORazepam (ATIVAN) tablet 0.5 mg  0.5 mg Oral Q6H PRN Dariusz Marrufo MD   0.5 mg at 01/13/23 2105   • losartan (COZAAR) tablet 100 mg  100 mg Oral Daily Dariusz Marrufo MD   100 mg at 01/14/23 0916   • [COMPLETED] midazolam (VERSED) injection   Intravenous PRN Everette Chavez MD   0.5 mg at 01/13/23 1259   • nitroglycerin (NITROSTAT) SL tablet 0.4 mg  0.4 mg Sublingual Q5 Min PRN Dariusz Marrufo MD       • [COMPLETED] perflutren (DEFINITY) 8.476 mg in Sodium Chloride (PF) 0.9 % 10 mL injection  2 mL Intravenous Once in imaging Grabiel Garcia MD   2 mL at 01/11/23 1525   • sodium chloride 0.9 % flush 10 mL  10 mL Intravenous Q12H Dariusz Marrufo MD   10 mL at 01/14/23 0917   • sodium chloride 0.9 % flush 10 mL  10 mL Intravenous PRN Dariusz Marrufo MD       • sodium chloride 0.9 % infusion 40 mL  40 mL Intravenous PRN Dariusz Marrufo MD       • vitamin B-6 (PYRIDOXINE) tablet 100 mg  100 mg Oral Daily Dariusz Marrufo MD   100 mg at 01/14/23 0916     Lab Results (last 72 hours)     Procedure Component Value Units Date/Time    POC Glucose Once [[id number]]  (Abnormal) Collected: 01/14/23 1659    Specimen: Blood Updated: 01/14/23 1716     Glucose 135 mg/dL      Comment: Meter: [id number] : [id number] Matthew GORE       Tissue / Bone Culture - Tissue, Lung, L [[id number]] Collected: 01/13/23 1329    Specimen: Tissue from Lung, L Updated: 01/14/23 1257     Tissue Culture No growth     Gram Stain Occasional WBCs per low power field      No organisms seen    POC Glucose Once [[id number]]  (Normal) Collected: 01/14/23 1157    Specimen: Blood Updated: 01/14/23 1219     Glucose 117 mg/dL      Comment: Meter: [id number] : [id number] Matthew GORE        Basic Metabolic Panel [[id number]]  (Abnormal) Collected: 01/14/23 0649    Specimen: Blood Updated: 01/14/23 1143     Glucose 133 mg/dL      BUN 19 mg/dL      Creatinine 0.78 mg/dL      Sodium 139 mmol/L      Potassium 3.6 mmol/L      Comment: Slight hemolysis detected by analyzer. Results may be affected.        Chloride 99 mmol/L      CO2 28.5 mmol/L      Calcium 8.9 mg/dL      BUN/Creatinine Ratio 24.4     Anion Gap 11.5 mmol/L      eGFR 83.9 mL/min/1.73      Comment: National Kidney Foundation and American Society of Nephrology (ASN) Task Force recommended calculation based on the Chronic Kidney Disease Epidemiology Collaboration (CKD-EPI) equation refit without adjustment for race.       Narrative:      GFR Normal >60  Chronic Kidney Disease <60  Kidney Failure <15      CBC & Differential [[id number]]  (Abnormal) Collected: 01/14/23 0649    Specimen: Blood Updated: 01/14/23 1126    Narrative:      The following orders were created for panel order CBC & Differential.  Procedure                               Abnormality         Status                     ---------                               -----------         ------                     CBC Auto Differential[[id number]]        Abnormal            Final result                 Please view results for these tests on the individual orders.    CBC Auto Differential [[id number]]  (Abnormal) Collected: 01/14/23 0649    Specimen: Blood Updated: 01/14/23 1126     WBC 7.87 10*3/mm3      RBC 4.31 10*6/mm3      Hemoglobin 13.1 g/dL      Hematocrit 40.4 %      MCV 93.7 fL      MCH 30.4 pg      MCHC 32.4 g/dL      RDW 11.5 %      RDW-SD 39.1 fl      MPV 11.6 fL      Platelets 168 10*3/mm3      Neutrophil % 65.0 %      Lymphocyte % 20.2 %      Monocyte % 10.8 %      Eosinophil % 3.0 %      Basophil % 0.6 %      Immature Grans % 0.4 %      Neutrophils, Absolute 5.11 10*3/mm3      Lymphocytes, Absolute 1.59 10*3/mm3      Monocytes, Absolute 0.85 10*3/mm3      Eosinophils, Absolute  0.24 10*3/mm3      Basophils, Absolute 0.05 10*3/mm3      Immature Grans, Absolute 0.03 10*3/mm3      nRBC 0.0 /100 WBC     POC Glucose Once [[id number]]  (Abnormal) Collected: 01/14/23 0744    Specimen: Blood Updated: 01/14/23 0755     Glucose 155 mg/dL      Comment: Meter: [id number] : [id number] Matthew Clarke BAO       POC Glucose Once [[id number]]  (Normal) Collected: 01/13/23 2034    Specimen: Blood Updated: 01/13/23 2035     Glucose 105 mg/dL      Comment: Meter: [id number] : [id number] Yony Riley NA       POC Glucose Once [[id number]]  (Abnormal) Collected: 01/13/23 1645    Specimen: Blood Updated: 01/13/23 1646     Glucose 242 mg/dL      Comment: Meter: [id number] : [id number] Hatchett Sherrish NA       Tissue Pathology Exam [[id number]] Collected: 01/13/23 1320    Specimen: Tissue from Lung, Left Lower Lobe; Tissue from Lung, Left Lower Lobe Updated: 01/13/23 1356    Protime-INR [[id number]]  (Normal) Collected: 01/13/23 1117    Specimen: Blood Updated: 01/13/23 1154     Protime 12.9 Seconds      INR 0.96    POC Glucose Once [[id number]]  (Normal) Collected: 01/13/23 1114    Specimen: Blood Updated: 01/13/23 1116     Glucose 127 mg/dL      Comment: Meter: [id number] : [id number] Clay GORE       Basic Metabolic Panel [[id number]]  (Abnormal) Collected: 01/13/23 0659    Specimen: Blood Updated: 01/13/23 0751     Glucose 151 mg/dL      BUN 19 mg/dL      Creatinine 0.83 mg/dL      Sodium 137 mmol/L      Potassium 4.0 mmol/L      Chloride 99 mmol/L      CO2 31.2 mmol/L      Calcium 9.1 mg/dL      BUN/Creatinine Ratio 22.9     Anion Gap 6.8 mmol/L      eGFR 77.9 mL/min/1.73      Comment: National Kidney Foundation and American Society of Nephrology (ASN) Task Force recommended calculation based on the Chronic Kidney Disease Epidemiology Collaboration (CKD-EPI) equation refit without adjustment for race.       Narrative:      GFR Normal >60  Chronic Kidney Disease <60  Kidney Failure <15      POC  Glucose Once [[id number]]  (Abnormal) Collected: 01/13/23 0739    Specimen: Blood Updated: 01/13/23 0740     Glucose 167 mg/dL      Comment: Meter: [id number] : [email or web address] Jc Hathaway RN       CBC & Differential [[id number]]  (Abnormal) Collected: 01/13/23 0659    Specimen: Blood Updated: 01/13/23 0733    Narrative:      The following orders were created for panel order CBC & Differential.  Procedure                               Abnormality         Status                     ---------                               -----------         ------                     CBC Auto Differential[[id number]]        Abnormal            Final result                 Please view results for these tests on the individual orders.    CBC Auto Differential [[id number]]  (Abnormal) Collected: 01/13/23 0659    Specimen: Blood Updated: 01/13/23 0733     WBC 8.08 10*3/mm3      RBC 4.47 10*6/mm3      Hemoglobin 13.6 g/dL      Hematocrit 41.6 %      MCV 93.1 fL      MCH 30.4 pg      MCHC 32.7 g/dL      RDW 11.6 %      RDW-SD 39.0 fl      MPV 11.1 fL      Platelets 171 10*3/mm3      Neutrophil % 66.1 %      Lymphocyte % 19.6 %      Monocyte % 10.4 %      Eosinophil % 2.8 %      Basophil % 0.6 %      Immature Grans % 0.5 %      Neutrophils, Absolute 5.34 10*3/mm3      Lymphocytes, Absolute 1.58 10*3/mm3      Monocytes, Absolute 0.84 10*3/mm3      Eosinophils, Absolute 0.23 10*3/mm3      Basophils, Absolute 0.05 10*3/mm3      Immature Grans, Absolute 0.04 10*3/mm3      nRBC 0.0 /100 WBC     POC Glucose Once [[id number]]  (Normal) Collected: 01/12/23 2047    Specimen: Blood Updated: 01/12/23 2050     Glucose 117 mg/dL      Comment: Meter: [id number] : [id number] Remy Ott CNA       POC Glucose Once [[id number]]  (Normal) Collected: 01/12/23 1716    Specimen: Blood Updated: 01/12/23 1721     Glucose 112 mg/dL      Comment: Meter: [id number] : [id number] Matthew GORE       POC Glucose Once [[id number]]  (Abnormal) Collected:  01/12/23 1213    Specimen: Blood Updated: 01/12/23 1218     Glucose 133 mg/dL      Comment: Meter: [id number] : [id number] Matthew GORE       BNP [[id number]]  (Normal) Collected: 01/12/23 0741    Specimen: Blood Updated: 01/12/23 1031     proBNP 331.0 pg/mL     Narrative:      Among patients with dyspnea, NT-proBNP is highly sensitive for the detection of acute congestive heart failure. In addition NT-proBNP of <300 pg/ml effectively rules out acute congestive heart failure with 99% negative predictive value.    Results may be falsely decreased if patient taking Biotin.      Phosphorus [[id number]]  (Normal) Collected: 01/12/23 0741    Specimen: Blood Updated: 01/12/23 0912     Phosphorus 4.4 mg/dL     Basic Metabolic Panel [[id number]]  (Abnormal) Collected: 01/12/23 0741    Specimen: Blood Updated: 01/12/23 0906     Glucose 160 mg/dL      BUN 18 mg/dL      Creatinine 0.80 mg/dL      Sodium 136 mmol/L      Potassium 4.1 mmol/L      Chloride 96 mmol/L      CO2 29.0 mmol/L      Calcium 9.6 mg/dL      BUN/Creatinine Ratio 22.5     Anion Gap 11.0 mmol/L      eGFR 81.4 mL/min/1.73      Comment: National Kidney Foundation and American Society of Nephrology (ASN) Task Force recommended calculation based on the Chronic Kidney Disease Epidemiology Collaboration (CKD-EPI) equation refit without adjustment for race.       Narrative:      GFR Normal >60  Chronic Kidney Disease <60  Kidney Failure <15      Magnesium [[id number]]  (Normal) Collected: 01/12/23 0741    Specimen: Blood Updated: 01/12/23 0906     Magnesium 2.1 mg/dL     CBC & Differential [[id number]]  (Abnormal) Collected: 01/12/23 0741    Specimen: Blood Updated: 01/12/23 0839    Narrative:      The following orders were created for panel order CBC & Differential.  Procedure                               Abnormality         Status                     ---------                               -----------         ------                     CBC Auto  Differential[[id number]]        Abnormal            Final result                 Please view results for these tests on the individual orders.    CBC Auto Differential [[id number]]  (Abnormal) Collected: 01/12/23 0741    Specimen: Blood Updated: 01/12/23 0839     WBC 9.43 10*3/mm3      RBC 4.51 10*6/mm3      Hemoglobin 14.2 g/dL      Hematocrit 42.3 %      MCV 93.8 fL      MCH 31.5 pg      MCHC 33.6 g/dL      RDW 11.6 %      RDW-SD 40.0 fl      MPV 11.4 fL      Platelets 172 10*3/mm3      Neutrophil % 70.6 %      Lymphocyte % 17.0 %      Monocyte % 10.0 %      Eosinophil % 1.6 %      Basophil % 0.3 %      Immature Grans % 0.5 %      Neutrophils, Absolute 6.66 10*3/mm3      Lymphocytes, Absolute 1.60 10*3/mm3      Monocytes, Absolute 0.94 10*3/mm3      Eosinophils, Absolute 0.15 10*3/mm3      Basophils, Absolute 0.03 10*3/mm3      Immature Grans, Absolute 0.05 10*3/mm3      nRBC 0.0 /100 WBC     POC Glucose Once [[id number]]  (Abnormal) Collected: 01/12/23 0757    Specimen: Blood Updated: 01/12/23 0812     Glucose 161 mg/dL      Comment: Meter: [id number] : [id number] Matthew GORE       POC Glucose Once [[id number]]  (Abnormal) Collected: 01/12/23 0625    Specimen: Blood Updated: 01/12/23 0628     Glucose 164 mg/dL      Comment: Meter: [id number] : [id number] oGrdo HERNANDEZ       POC Glucose Once [[id number]]  (Abnormal) Collected: 01/11/23 2119    Specimen: Blood Updated: 01/11/23 2203     Glucose 192 mg/dL      Comment: Meter: [id number] : [email or web address] Oscar Little RN             Imaging Results (Last 72 Hours)     Procedure Component Value Units Date/Time    XR Chest 1 View [[id number]] Collected: 01/14/23 1442     Updated: 01/14/23 1446    Narrative:      XR CHEST 1 VW-     HISTORY: Female who is 67 years-old,  pneumothorax     TECHNIQUE: Frontal view of the chest     COMPARISON: 01/13/2023     FINDINGS: The heart size is normal. Minimal left basilar atelectasis or  infiltrate. Minimal left  apical pneumothorax does not appear  significantly changed. No pleural effusion or right pneumothorax. No  acute osseous process.       Impression:      No significant change.     This report was finalized on 1/14/2023 2:43 PM by Dr. Harshad Castro M.D.       XR Chest 1 View [[id number]] Collected: 01/13/23 1852     Updated: 01/13/23 1901    Narrative:      PORTABLE CHEST X-RAY     HISTORY: Follow-up lung biopsy.     Portable chest x-ray is provided. Correlation: Chest x-ray earlier today  and preoperative CT chest yesterday.     FINDINGS: The cardiomediastinal silhouette is normal. Subtle density  posterior to the vascular markings in the left infrahilar region  corresponds to the mass lesion that was biopsied today. The costophrenic  sulci are dry and the bones appear normal.     A 5 mm left apical pneumothorax is unchanged.       Impression:      Stable small left apical pneumothorax following lung biopsy  earlier today.     This report was finalized on 1/13/2023 6:58 PM by Dr. Carmelo Farias M.D.       XR Chest 1 View [[id number]] Collected: 01/13/23 1703     Updated: 01/13/23 1710    Narrative:      AP CHEST     HISTORY: Status post left lung biopsy     COMPARISON: Earlier today     FINDINGS: There is a tiny left apical pneumothorax not clearly seen on  the comparison study. It measures about 7-8 mm. Nodular opacity in the  left lung base is less well apparent. Right lung is clear. Heart size  stable.       Impression:      Tiny left apical pneumothorax not clearly seen on the comparison study.  Follow-up chest x-ray recommended in about 1 hour to evaluate for  stability.      Findings were discussed with the patient's RN at 1709 hours on  01/13/2023     This report was finalized on 1/13/2023 5:07 PM by Dr. Everette Chavez M.D.       CT Needle Biopsy Lung [[id number]] Collected: 01/13/23 1406     Updated: 01/13/23 1505    Narrative:      PROCEDURE: CT guided left lung biopsy     HISTORY: Left lung nodule      TECHNIQUE:  Radiation dose reduction techniques were utilized, including automated  exposure control and exposure modulation based on body size.     The procedural risks, benefits, and alternatives were discussed with the  patient, including risks of pneumothorax and bleeding and possibility of  chest tube placement. Informed consent was obtained.        The patient was placed in the left side of position on the CT procedure  table. Axial CT scan was performed to localize the nodule in the left  lower lobe. The overlying skin was prepped and draped in the usual  sterile fashion. 1% buffered lidocaine was used for local anesthesia.     Next, a 19-gauge coaxial needle was advanced into the lesion under CT  guidance. Multiple 20-gauge core biopsies were then obtained and sent to  pathology. The needle was removed and sterile dressing applied. No  immediate complications.       Impression:      Technically successful CT guided left lung biopsy.     Moderate sedation was provided under my direct supervision using 0.5 mg  IV Versed and 25 mcg IV Fentanyl. The patient was independently  monitored by a trained Department of Radiology RN using automated blood  pressure, EKG, and pulse oximetry. My total intra-service time was 20  minutes.               Radiation dose reduction techniques were utilized, including automated  exposure control and exposure modulation based on body size.     This report was finalized on 1/13/2023 3:02 PM by Dr. Everette Chavez M.D.       XR Chest 1 View [[id number]] Collected: 01/13/23 1442     Updated: 01/13/23 1446    Narrative:      AP CHEST     HISTORY: Post left lung biopsy     COMPARISON: CT biopsy images from earlier today     FINDINGS: There is no appreciable pneumothorax. There is a nodule in the  base of the left lung that was biopsied earlier today. Right lung  appears clear. Right hemidiaphragm is elevated. Heart size within normal  limits. Postop changes of the cervical spine.        Impression:      No appreciable pneumothorax     This report was finalized on 1/13/2023 2:43 PM by Dr. Everette Chavez M.D.       CT Chest With Contrast Diagnostic [[id number]] Collected: 01/12/23 1144     Updated: 01/12/23 1202    Narrative:      CT CHEST WITH IV CONTRAST     HISTORY: Evaluate for adenopathy     TECHNIQUE: Radiation dose reduction techniques were utilized, including  automated exposure control and exposure modulation based on body size.   3 mm images were obtained through the chest after the administration of  IV contrast.      COMPARISON: Chest x-ray 1 2023, 09/25/2022     FINDINGS:     The thoracic inlet is within normal limits. Calcification right thyroid  lobe, unchanged. The heart size is stable. Atherosclerosis including  coronary artery calcifications. Filling defect in the midthoracic  trachea extending to the fly the fly favoring retained secretions  but could further be evaluated as clinically indicated.     Enlarging right posterior paratracheal node (1.5 x 2.3 cm (previously  1.4 x 2.1 cm) and subcarinal adenopathy measuring 3.2 x 5.8 cm  (previously 2.7 x 5.2 cm)), 1.4 cm left hilar node (previously 1.2 cm).  1.7 Cm left infrahilar node, unchanged.     Emphysema. No focal consolidations, effusions, or pneumothorax. 1.5 cm  left lower lobe nodule (image 62) previously 1.4 cm). Ground glass  nodule in the periphery of the left lower lobe is less conspicuous with  near complete resolution of previously described reticulonodular  opacities in the right lower lobe with residual mild bronchial wall  thickening. Hepatic steatosis with morphologic changes of chronic liver  disease. Cholecystectomy. Moderate calcific atherosclerosis abdominal  aorta and branch vessels. Stable indeterminate nodular thickening  adrenal glands.     Hypertrophic degenerative changes are again noted in thoracolumbar  spine. Postsurgical changes cervical spine.          Impression:         1. Enlarging left  lower lobe pulmonary nodule and right  paratracheal/subcarinal adenopathy. Further characterization with PET/CT  or tissue diagnosis could be considered if not previously performed.  2. Improved aeration of the lung bases.  3. Filling defect in the midthoracic trachea favoring retained  secretions but can further be evaluated as indicated.  4. Please see above for additional findings.     This report was finalized on 2023 11:59 AM by Dr. Susan Greer M.D.           ECG/EMG Results (last 72 hours)     ** No results found for the last 72 hours. **        Orders (last 72 hrs)      Start     Ordered    23 1717  POC Glucose Once  PROCEDURE ONCE         23 1659    23 1220  POC Glucose Once  PROCEDURE ONCE         23 1157    23 0934  XR Chest 1 View  1 Time Imaging         23 0933    23 0905  Standing weight today pls - place results in vitals  Nursing Communication  Once        Comments: Standing weight today pls - place results in vitals    23 0905    23 0756  POC Glucose Once  PROCEDURE ONCE         23 0744    23 0600  CBC Auto Differential  PROCEDURE ONCE         23 2203    23 2036  POC Glucose Once  PROCEDURE ONCE         23 2034    23 1800  XR Chest 1 View  1 Time Imaging         23 1710    23 1647  POC Glucose Once  PROCEDURE ONCE         23 1645    23 1544  acetaminophen (TYLENOL) tablet 650 mg  Every 6 Hours PRN         23 1544    23 1535  Inpatient Admission  Once         23 1534    23 1524  PT Plan of Care Cert / Re-Cert  Once        Comments: Physical Therapy Plan of Care  Initial Certification  Certification Period: 2023 - 2023    Patient Name: Gabby Acosta  : 1955    No diagnosis found.                  Assessment  PT Assessment  Rehab Potential (PT): good, to achieve stated therapy goals  Criteria for Skilled Interventions Met (PT): yes         PT  Rehab Goals     Row Name 01/13/23 1521             Bed Mobility Goal 1 (PT)    Activity/Assistive Device (Bed Mobility Goal 1, PT) bed mobility   activities, all  -CW      [place] Level/Cues Needed (Bed Mobility Goal 1, PT) standby assist    -CW      Time Frame (Bed Mobility Goal 1, PT) 2 weeks  -CW         Transfer Goal 1 (PT)    Activity/Assistive Device (Transfer Goal 1, PT)   sit-to-stand/stand-to-sit;bed-to-chair/chair-to-bed;walker, rolling  -CW        [place] Level/Cues Needed (Transfer Goal 1, PT) standby assist  -CW        Time Frame (Transfer Goal 1, PT) 2 weeks  -CW         Gait Training Goal 1 (PT)    Activity/Assistive Device (Gait Training Goal 1, PT) gait (walking   locomotion);walker, rolling  -CW      [place] Level (Gait Training Goal 1, PT) standby assist  -CW      Distance (Gait Training Goal 1, PT) 50'  -CW      Time Frame (Gait Training Goal 1, PT) 2 weeks  -CW         Stairs Goal 1 (PT)    Activity/Assistive Device (Stairs Goal 1, PT) ascending stairs;descending   stairs  -CW      [place] Level/Cues Needed (Stairs Goal 1, PT) contact guard required    -CW      Number of Stairs (Stairs Goal 1, PT) 5  -CW      Time Frame (Stairs Goal 1, PT) 2 weeks  -CW            User Key  (r) = Recorded By, (t) = Taken By, (c) = Cosigned By    Initials Name Provider Type Discipline    CW Jnenifer Bailon, PT Physical Therapist PT               PT OP Goals     Row Name 01/13/23 1508          Time Calculation    PT Goal Re-Cert Due Date 01/27/23  -CW           User Key  (r) = Recorded By, (t) = Taken By, (c) = Cosigned By    Initials Name Provider Type    CW Jennifer Bailon, PT Physical Therapist                  Plan  PT Plan  Therapy Frequency (PT): 6 times/wk  Planned Therapy Interventions (PT): balance training, bed mobility training, gait training, postural re-education, transfer training, ROM (range of motion), strengthening, patient/family education  Outcome Evaluation: Pt admitted  "with planned posterior cervical fusion. Upon workup, pt not cleared by cardio/pulm for sx due to need for TAVR and lung biopsy. Pt underwent anterior cervical fusion several months ago and has completed rehab. She reports using a walker, has 5 step to enter her home and has assist from family members as needed. Per [facility] notes, Pt \"is to wear a soft collar at all times. No lifting pushing pulling more than 5 pounds.  Avoid use of her arms for lifting and positioning.  No overhead activity.\" Pt initially declined working with PT today as she is emotionally drained and overwhelmed with the recent medical news. PT provided education regarding her precautions and the need to maintain them to minimize neck injury as her sx is delayed. Pt eventually was agreeable to complete sit<>stand. Cues provided to not use UE to come to standing, was educated on rocking for momentum. Unable to assess gait today as pt declined. PT will plan to follow up once this weekend to further address mobility. Pt is hopeful to return home at [place] with assist and HH.        Jennifer Bailon, PT  1/13/2023            By cosigning this order, either electronically or physically, I certify that the therapy services are furnished while this patient is under my care, the services outline above are required by this patient, and will be reviewed every 90 days.        M.D.:__________________________________________ Date: ______________    01/13/23 1524    01/13/23 1346  XR Chest 1 View  1 Time Imaging        Comments: 1 hour post bx    01/13/23 1345    01/13/23 1346  XR Chest 1 View  1 Time Imaging        Comments: 3 hours post bx    01/13/23 1346    01/13/23 1336  Vital Signs (Specify Frequency)  Per Order Details         01/13/23 1335    01/13/23 1330  Tissue Pathology Exam  Once         01/13/23 1329    01/13/23 1330  Flow Cytometry  Once,   Status:  Canceled        Comments: Left Lung      01/13/23 1329    01/13/23 1330  Tissue / Bone Culture - Tissue, " Lung, L  Once         01/13/23 1329    01/13/23 1300  lidocaine (XYLOCAINE) 1 % injection 20 mL  Once         01/13/23 1329    01/13/23 1259  fentaNYL citrate (PF) (SUBLIMAZE) injection  As Needed         01/13/23 1259    01/13/23 1259  midazolam (VERSED) injection  As Needed         01/13/23 1259    01/13/23 1117  POC Glucose Once  PROCEDURE ONCE         01/13/23 1114    01/13/23 1005  Protime-INR  STAT         01/13/23 1004    01/13/23 0858  CT Needle Biopsy Lung  1 Time Imaging         01/13/23 0900    01/13/23 0741  POC Glucose Once  PROCEDURE ONCE         01/13/23 0739    01/13/23 0600  CBC & Differential  Daily       01/12/23 1801    01/13/23 0600  Basic Metabolic Panel  Daily       01/12/23 1801    01/13/23 0600  CBC Auto Differential  PROCEDURE ONCE         01/12/23 2205    01/12/23 2051  POC Glucose Once  PROCEDURE ONCE         01/12/23 2047    01/12/23 1722  POC Glucose Once  PROCEDURE ONCE         01/12/23 1716    01/12/23 1518  PT Consult: Eval & Treat Other; See below  Once        Comments: Chronic myelopathy with gait issues.  Patient has cervical hardware issues but surgery has to be delayed due to cardiac and pulmonary issues.  Now with soft collar.  Please assist with training on getting up and down out of chair, and stair climbing to avoid as much pressure on neck muscles and neck use as possible    01/12/23 1518    01/12/23 1219  POC Glucose Once  PROCEDURE ONCE         01/12/23 1213    01/12/23 1100  iopamidol (ISOVUE-370) 76 % injection 100 mL  Once in Imaging         01/12/23 1007    01/12/23 0957  BNP  Once         01/12/23 0956    01/12/23 0813  POC Glucose Once  PROCEDURE ONCE         01/12/23 0757    01/12/23 0629  POC Glucose Once  PROCEDURE ONCE         01/12/23 0625    01/12/23 0600  CBC (No Diff)  Morning Draw,   Status:  Canceled         01/11/23 0909    01/12/23 0600  CBC Auto Differential  PROCEDURE ONCE         01/11/23 2203    01/11/23 2204  POC Glucose Once  PROCEDURE ONCE          01/11/23 2119    01/11/23 2100  insulin glargine (LANTUS, SEMGLEE) injection 5 Units  Nightly         01/11/23 0909    01/11/23 2048  CT Chest With Contrast Diagnostic  1 Time Imaging         01/11/23 2047 01/11/23 1903  Please contact Dr. Dwight Ring and let him know that patient now agrees to further work up of lung mass and also agree to biopsy or whatever is necessary. Ask pulmonary to see patient on 1/12.  Physician Communication Order  Per Order Details        Comments: Please contact Dr. Dwight Ring and let him know that patient now agrees to further work up of lung mass and also agree to biopsy or whatever is necessary. Ask pulmonary to see patient on 1/12.    01/11/23 1904 01/11/23 1902  Apply Collar  Until Discontinued        Comments: Continue in soft cervical collar at all times.    01/11/23 1904 01/11/23 1821  LORazepam (ATIVAN) tablet 0.5 mg  Every 6 Hours PRN         01/11/23 1821    01/11/23 1145  budesonide-formoterol (SYMBICORT) 160-4.5 MCG/ACT inhaler 2 puff  2 Times Daily - RT         01/11/23 1048    01/11/23 0900  amLODIPine (NORVASC) tablet 10 mg  Every 24 Hours Scheduled         01/11/23 0720    01/10/23 2100  fluticasone (FLONASE) 50 MCG/ACT nasal spray 1 spray  2 Times Daily         01/10/23 1334    01/10/23 1800  Oral Care  2 Times Daily       01/10/23 1327    01/10/23 1730  insulin lispro (ADMELOG) injection 0-7 Units  3 Times Daily Before Meals         01/10/23 1327    01/10/23 1712  nitroglycerin (NITROSTAT) SL tablet 0.4 mg  Every 5 Minutes PRN         01/10/23 1713    01/10/23 1700  POC Glucose TID AC  3 Times Daily Before Meals       01/10/23 1327    01/10/23 1630  ipratropium-albuterol (DUO-NEB) nebulizer solution 3 mL  4 Times Daily - RT         01/10/23 1334    01/10/23 1600  Vital Signs  Every 4 Hours       01/10/23 1327    01/10/23 1430  buPROPion XL (WELLBUTRIN XL) 24 hr tablet 150 mg  Daily         01/10/23 1334    01/10/23 1430  guaiFENesin (MUCINEX) 12 hr  tablet 600 mg  Every 12 Hours Scheduled         01/10/23 1334    01/10/23 1430  levothyroxine (SYNTHROID, LEVOTHROID) tablet 125 mcg  Daily         01/10/23 1334    01/10/23 1430  losartan (COZAAR) tablet 100 mg  Daily         01/10/23 1334    01/10/23 1430  atorvastatin (LIPITOR) tablet 10 mg  Daily         01/10/23 1334    01/10/23 1430  vitamin B-6 (PYRIDOXINE) tablet 100 mg  Daily         01/10/23 1334    01/10/23 1430  cholecalciferol (VITAMIN D3) tablet 5,000 Units  Daily         01/10/23 1334    01/10/23 1415  sodium chloride 0.9 % flush 10 mL  Every 12 Hours Scheduled         01/10/23 1327    01/10/23 1334  diphenhydrAMINE (BENADRYL) capsule 25 mg  Every 6 Hours PRN         01/10/23 1334    01/10/23 1333  albuterol (PROVENTIL) nebulizer solution 0.083% 2.5 mg/3mL  Every 6 Hours PRN         01/10/23 1334    01/10/23 1328  Intake & Output  Every Shift       01/10/23 1327    01/10/23 1328  Basic Metabolic Panel  Daily       01/10/23 1327    01/10/23 1328  CBC & Differential  Daily       01/10/23 1327    01/10/23 1328  Magnesium  Daily       01/10/23 1327    01/10/23 1328  Phosphorus  Daily       01/10/23 1327    01/10/23 1327  sodium chloride 0.9 % flush 10 mL  As Needed         01/10/23 1327    01/10/23 1327  sodium chloride 0.9 % infusion 40 mL  As Needed         01/10/23 1327    01/10/23 1327  dextrose (GLUTOSE) oral gel 15 g  Every 15 Minutes PRN         01/10/23 1327    01/10/23 1327  dextrose (D50W) (25 g/50 mL) IV injection 25 g  Every 15 Minutes PRN         01/10/23 1327    01/10/23 1327  glucagon (human recombinant) (GLUCAGEN DIAGNOSTIC) injection 1 mg  Every 15 Minutes PRN         01/10/23 1327    Unscheduled  Follow Hypoglycemia Standing Orders For Blood Glucose <70 & Notify Provider of Treatment  As Needed      Comments: Follow Hypoglycemia Orders As Outlined in Process Instructions (Open Order Report to View Full Instructions)  Notify Provider Any Time Hypoglycemia Treatment is Administered     01/10/23 1327    Unscheduled  Telemetry - Pulse Oximetry  Continuous PRN      Comments: If Patient Develops Unresponsiveness, Acute Dyspnea, Cyanosis or Suspected Hypoxemia Start Continuous Pulse Ox Monitoring, Apply Oxygen & Notify Provider    01/10/23 1713    Unscheduled  ECG 12 Lead Other; Acute Chest Pain or Dysrhythmia  As Needed      Comments: Nurse to Release if Patient Expericences Acute Chest Pain or Dysrhythmias    01/10/23 1713    Unscheduled  Potassium  As Needed      Comments: For Ventricular Arrhythmias      01/10/23 1713    Unscheduled  Troponin  As Needed      Comments: For Chest Pain      01/10/23 1713    Unscheduled  Blood Gas, Arterial -  As Needed      Comments: Draw for Acute Dyspnea, Cyanosis, Suspected Hypoxemia or UnresponsivenessNotify Provider of Results      01/10/23 1713    Unscheduled  Initiate ACLS Protocol For Life Threatening Dysrhythmias (If Appropriate for Patient)  As Needed       01/10/23 1713    Signed and Held  Obtain Informed Consent  Once         Signed and Held    Signed and Held  Bed Rest  Per Order Details      Comments: Post lung biopsy    Signed and Held                Operative/Procedure Notes (last 72 hours)  Notes from 23 1843 through 23 1843   No notes of this type exist for this encounter.            Physician Progress Notes (last 72 hours)      John Khan MD at 23 1503              Name: Gabby Acosta ADMIT: 1/10/2023   : 1955  PCP: Danyelle Brush MD    MRN: [id number] LOS: 2 days   AGE/SEX: 67 y.o. female  [facility]     Subjective   Subjective   CC: cervical myelopathy  No acute events. Patient denies new complaints. She denies pain, cough, and dyspnea. No f/c/n/v/d.  Her  is at bedside.    Objective   Objective   Vital Signs  Temp:  [97.4 °F (36.3 °C)-98.8 °F (37.1 °C)] 98.8 °F (37.1 °C)  Heart Rate:  [66-90] 80  Resp:  [16-20] 20  BP: (116-141)/(54-93) 120/54  SpO2:  [89 %-95 %] 89 %  on  Flow (L/min):  [3-4] 4;    Device (Oxygen Therapy): room air  Body mass index is 50.3 kg/m².  Physical Exam  Vitals and nursing note reviewed.   Constitutional:       General: She is not in acute distress.     Appearance: She is ill-appearing (chronically). She is not toxic-appearing or diaphoretic.   HENT:      Head: Normocephalic and atraumatic.      Nose: Nose normal.      Mouth/Throat:      Mouth: Mucous membranes are moist.      Pharynx: Oropharynx is clear.   Eyes:      Conjunctiva/sclera: Conjunctivae normal.      Pupils: Pupils are equal, round, and reactive to light.   Cardiovascular:      Rate and Rhythm: Normal rate and regular rhythm.      Pulses: Normal pulses.   Pulmonary:      Effort: Pulmonary effort is normal.      Breath sounds: Examination of the right-lower field reveals decreased breath sounds. Examination of the left-lower field reveals decreased breath sounds. Decreased breath sounds present.      Comments: Left-sided biopsy site dressed c/d/i no hematoma  Abdominal:      General: Bowel sounds are normal.      Palpations: Abdomen is soft.      Tenderness: There is no abdominal tenderness.   Musculoskeletal:         General: Swelling (trace BLE) present. No tenderness.      Cervical back: Normal range of motion and neck supple.   Skin:     General: Skin is warm and dry.      Capillary Refill: Capillary refill takes less than 2 seconds.   Neurological:      Mental Status: She is alert and oriented to person, place, and time.   Psychiatric:         Mood and Affect: Mood normal.         Behavior: Behavior normal.       Results Review     I reviewed the patient's new clinical results.  I reviewed the patient's telemetry  I reviewed the patient's chest xray  Results from last 7 days   Lab Units 01/14/23  0649 01/13/23  0659 01/12/23  0741 01/11/23  0609   WBC 10*3/mm3 7.87 8.08 9.43 7.91   HEMOGLOBIN g/dL 13.1 13.6 14.2 14.8   PLATELETS 10*3/mm3 168 171 172 167     Results from last 7 days   Lab Units 01/14/23  0649  01/13/23  0659 01/12/23  0741 01/11/23  0609   SODIUM mmol/L 139 137 136 134*   POTASSIUM mmol/L 3.6 4.0 4.1 3.9   CHLORIDE mmol/L 99 99 96* 96*   CO2 mmol/L 28.5 31.2* 29.0 26.0   BUN mg/dL 19 19 18 14   CREATININE mg/dL 0.78 0.83 0.80 0.63   GLUCOSE mg/dL 133* 151* 160* 166*   EGFR mL/min/1.73 83.9 77.9 81.4 98.0     Results from last 7 days   Lab Units 01/10/23  1439   ALBUMIN g/dL 4.0   BILIRUBIN mg/dL 0.3   ALK PHOS U/L 57   AST (SGOT) U/L 15   ALT (SGPT) U/L 13     Results from last 7 days   Lab Units 01/14/23  0649 01/13/23  0659 01/12/23  0741 01/11/23  0609 01/10/23  1439   CALCIUM mg/dL 8.9 9.1 9.6 9.5 9.4   ALBUMIN g/dL  --   --   --   --  4.0   MAGNESIUM mg/dL  --   --  2.1 1.8 1.3*   PHOSPHORUS mg/dL  --   --  4.4 2.8 3.4       Glucose   Date/Time Value Ref Range Status   01/14/2023 1157 117 70 - 130 mg/dL Final     Comment:     Meter: [id number] : [id number] Matthew Tricia BAO   01/14/2023 0744 155 (H) 70 - 130 mg/dL Final     Comment:     Meter: [id number] : [id number] Matthew Tricia BAO   01/13/2023 2034 105 70 - 130 mg/dL Final     Comment:     Meter: [id number] : [id number] Yony Riley NA   01/13/2023 1645 242 (H) 70 - 130 mg/dL Final     Comment:     Meter: [id number] : [id number] Hatchettmart Duarte NA   01/13/2023 1114 127 70 - 130 mg/dL Final     Comment:     Meter: [id number] : [id number] Clay Cardenas BAO   01/13/2023 0739 167 (H) 70 - 130 mg/dL Final     Comment:     Meter: [id number] : [email or web address] Jc Hathaway RN   01/12/2023 2047 117 70 - 130 mg/dL Final     Comment:     Meter: [id number] : [id number] Remy Ott CNA       XR Chest 1 View    Result Date: 1/14/2023  No significant change.  This report was finalized on 1/14/2023 2:43 PM by Dr. Harshad Castro M.D.      XR Chest 1 View    Result Date: 1/13/2023  Stable small left apical pneumothorax following lung biopsy earlier today.  This report was finalized on 1/13/2023 6:58 PM by Dr. Carmelo Farias M.D.       XR Chest 1 View    Result Date: 1/13/2023  Tiny left apical pneumothorax not clearly seen on the comparison study. Follow-up chest x-ray recommended in about 1 hour to evaluate for stability.  Findings were discussed with the patient's RN at 1709 hours on 01/13/2023  This report was finalized on 1/13/2023 5:07 PM by Dr. Everette Chavez M.D.      XR Chest 1 View    Result Date: 1/13/2023  No appreciable pneumothorax  This report was finalized on 1/13/2023 2:43 PM by Dr. Everette Chavez M.D.      CT Needle Biopsy Lung    Result Date: 1/13/2023  Technically successful CT guided left lung biopsy.  Moderate sedation was provided under my direct supervision using 0.5 mg IV Versed and 25 mcg IV Fentanyl. The patient was independently monitored by a trained Department of Radiology RN using automated blood pressure, EKG, and pulse oximetry. My total intra-service time was 20 minutes.     Radiation dose reduction techniques were utilized, including automated exposure control and exposure modulation based on body size.  This report was finalized on 1/13/2023 3:02 PM by Dr. Everette Chavez M.D.      Scheduled Medications  amLODIPine, 10 mg, Oral, Q24H  atorvastatin, 10 mg, Oral, Daily  budesonide-formoterol, 2 puff, Inhalation, BID - RT  buPROPion XL, 150 mg, Oral, Daily  cholecalciferol, 5,000 Units, Oral, Daily  fluticasone, 1 spray, Each Nare, BID  guaiFENesin, 600 mg, Oral, Q12H  insulin glargine, 5 Units, Subcutaneous, Nightly  insulin lispro, 0-7 Units, Subcutaneous, TID AC  ipratropium-albuterol, 3 mL, Nebulization, 4x Daily - RT  levothyroxine, 125 mcg, Oral, Daily  lidocaine, 20 mL, Infiltration, Once  losartan, 100 mg, Oral, Daily  sodium chloride, 10 mL, Intravenous, Q12H  pyridoxine, 100 mg, Oral, Daily    Infusions   Diet  Diet: Regular/House Diet; Texture: Regular Texture (IDDSI 7); Fluid Consistency: Thin (IDDSI 0)      Assessment/Plan     Active Hospital Problems    Diagnosis  POA   • **Cervical stenosis of  spinal canal [M48.02]  Yes   • Postprocedural pneumothorax [J95.811]  No   • Severe aortic stenosis [I35.0]  Yes   • History of fusion of cervical spine [Z98.1]  Not Applicable   • HTN (hypertension) [I10]  Yes   • Obesity, Class III, BMI 40-49.9 (morbid obesity) (HCC) [E66.01]  Yes   • Cervical spondylosis with myelopathy [M47.12]  Yes   • Hyperlipidemia associated with type 2 diabetes mellitus (HCC) [E11.69, E78.5]  Yes   • Hypothyroidism [E03.9]  Yes      Resolved Hospital Problems   No resolved problems to display.   Cervical spinal stenosis with myelopathy  - Status post ACDF C3-C7 on 9/20/2022  - posterior fusion recommended, however this is delayed due to her multiple medical issues  - pain control as needed, PT/OT  - appreciate RICH recs    Severe Aortic Stenosis  - monitor volume status closely-she looks euvolemic currently  - appreciate cardiology recs-possible evaluation for SAVR/TAVR pending workup for lung nodule    COPD  - no exacerbation  - continue bronchodilators  - appreciate pulm recs    LLL Pulmonary Nodule/Paratracheal Adenopathy  - enlargement demonstrated on CT  - s/p CT-guided biopsy 1/13/23, complication of small left apical pneumothorax which will be monitored-this appears stable on follow up chest xray and will likely resolve on its own  - appreciate pulm recs    Type 2 DM  - holding home metformin  - BG are acceptable  - continue lantus 5 units nightly  - continue ssi/hypoglycemia protocol     Hypertension  - BP acceptable  - continue on norvasc and losartan     Hyperlipidemia  - continue statin    Hypothyroidism  - clinically euthyroid  - continue levothyroxine    SCDs for DVT prophylaxis.  Full code.  Discussed with patient and nursing staff.  Anticipate discharge home when cleared by consultants.      John Khan MD  [facility]  01/14/23  15:06 EST      Electronically signed by John Khan MD at 01/14/23 3276     Nathan Ring MD at 01/14/23 3562         pulm stable, awaiting biopsy results  cxr ordered for today, but suspect small pntx requires nothing further  Will follow at a distance until biopsy back    Electronically signed by Nathan Ring MD at 01/14/23 1239     Rocio Galicia MD at 01/14/23 0858             LOS: 2 days   Patient Care Team:  Danyelle Brush MD as PCP - General (Pediatrics)    Chief Complaint:     F/u AS    Interval History:     She has no chest pain or difficulty breathing.  She does not feel heart racing or skipping.  She has had no syncope or falls.    Echo 1/11/23  Interpretation Summary       •  Left ventricular ejection fraction appears to be greater than 70%.  •  Left ventricular wall thickness is consistent with mild concentric hypertrophy.  •  Left ventricular diastolic function is consistent with (grade I) impaired relaxation.  •  Normal right ventricular cavity size noted. Hyperdynamic right ventricular systolic function noted.  •  The aortic valve leaflets are not well visualized, although appear heavily calcified  •  Severe aortic valve stenosis is present. Aortic valve area is 0.99 cm2. Aortic valve maximum pressure gradient is 77.5 mmHg. Aortic valve mean pressure gradient is 39.2 mmHg.  •  Calculated right ventricular systolic pressure from tricuspid regurgitation is 21 mmHg.  •  There is no evidence of pericardial effusion. . There is evidence of a fat pad present.         Objective   Vital Signs  Temp:  [97.4 °F (36.3 °C)-98.6 °F (37 °C)] 97.9 °F (36.6 °C)  Heart Rate:  [75-90] 83  Resp:  [10-24] 18  BP: ()/(41-93) 135/93  No intake or output data in the 24 hours ending 01/14/23 0858    Comfortable NAD, neck brace in place  Neck supple, no JVD or thyromegaly appreciated  S1/S2 RRR, no /r/g, 3/6 BENNETT  Lungs CTA B, normal effort  Abdomen S/NT/ND (+) BS, no HSM appreciated  Extremities warm, no clubbing, cyanosis, or edema  No visible or palpable skin lesions  A/Ox4, mood and affect appropriate    Results  Review:      Results from last 7 days   Lab Units 01/13/23  0659 01/12/23  0741 01/11/23  0609   SODIUM mmol/L 137 136 134*   POTASSIUM mmol/L 4.0 4.1 3.9   CHLORIDE mmol/L 99 96* 96*   CO2 mmol/L 31.2* 29.0 26.0   BUN mg/dL 19 18 14   CREATININE mg/dL 0.83 0.80 0.63   GLUCOSE mg/dL 151* 160* 166*   CALCIUM mg/dL 9.1 9.6 9.5         Results from last 7 days   Lab Units 01/13/23  0659 01/12/23  0741 01/11/23  0609   WBC 10*3/mm3 8.08 9.43 7.91   HEMOGLOBIN g/dL 13.6 14.2 14.8   HEMATOCRIT % 41.6 42.3 43.3   PLATELETS 10*3/mm3 171 172 167     Results from last 7 days   Lab Units 01/13/23  1117   INR  0.96         Results from last 7 days   Lab Units 01/12/23  0741   MAGNESIUM mg/dL 2.1           I reviewed the patient's new clinical results.  I personally viewed and interpreted the patient's EKG/Telemetry data        Medication Review:   amLODIPine, 10 mg, Oral, Q24H  atorvastatin, 10 mg, Oral, Daily  budesonide-formoterol, 2 puff, Inhalation, BID - RT  buPROPion XL, 150 mg, Oral, Daily  cholecalciferol, 5,000 Units, Oral, Daily  fluticasone, 1 spray, Each Nare, BID  guaiFENesin, 600 mg, Oral, Q12H  insulin glargine, 5 Units, Subcutaneous, Nightly  insulin lispro, 0-7 Units, Subcutaneous, TID AC  ipratropium-albuterol, 3 mL, Nebulization, 4x Daily - RT  levothyroxine, 125 mcg, Oral, Daily  lidocaine, 20 mL, Infiltration, Once  losartan, 100 mg, Oral, Daily  sodium chloride, 10 mL, Intravenous, Q12H  pyridoxine, 100 mg, Oral, Daily             Assessment & Plan       Cervical stenosis of spinal canal    HTN (hypertension)    Cervical spondylosis with myelopathy    Hypothyroidism    Hyperlipidemia associated with type 2 diabetes mellitus (HCC)    Obesity, Class III, BMI 40-49.9 (morbid obesity) (HCC)    History of fusion of cervical spine    Severe aortic stenosis    1. Severe aortic stenosis -Limited visualization of valve morphology on transthoracic echocardiogram.  Patient seems fairly euvolemic however sitting in  the chair with the oxygen off her saturation drops to 80 to 85%.  normal BNP 23. Lung cancer work-up ongoing.  Once we have a better idea of lung pathology we will discuss case details with structural cardiology for work-up of SAVR versus TAVR.  Patient noted to have severe calcification in all coronary distributions and will need ischemic work-up prior to consideration of valve intervention.  2. Cervical stenosis -surgical intervention is on hold for now.  Dr. Garcia discussed case details with Dr. Cheung yesterday.  3. COPD  4. Lung nodules - biopsy 23 - sm PTX.   5. Hypertension  6. Diabetes    Her cardiac status is very stable and she is euvolemic.  No other testing needs to be done this weekend.  We will see Monday.    Rocio Galicia MD  23  08:58 EST        Electronically signed by Rocio Galicia MD at 23 1055     John Khan MD at 23 191              Name: Gabby Acosta ADMIT: 1/10/2023   : 1955  PCP: Danyelle Brush MD    MRN: [id number] LOS: 1 days   AGE/SEX: 67 y.o. female  ROOM: [facility]     Subjective   Subjective   CC: cervical myelopathy  No acute events. Patient had CT guided lung biopsy today which she tolerated well. She has no new complaints. She denies pain, cough, and dyspnea. No f/c/n/v/d.    Objective   Objective   Vital Signs  Temp:  [97.8 °F (36.6 °C)-98.6 °F (37 °C)] 98.5 °F (36.9 °C)  Heart Rate:  [78-90] 89  Resp:  [10-24] 18  BP: ()/(41-89) 118/73  SpO2:  [90 %-97 %] 91 %  on  Flow (L/min):  [2-4] 4;   Device (Oxygen Therapy): room air  Body mass index is 50.3 kg/m².  Physical Exam  Vitals and nursing note reviewed.   Constitutional:       General: She is not in acute distress.     Appearance: She is ill-appearing (chronically). She is not toxic-appearing or diaphoretic.   HENT:      Head: Normocephalic and atraumatic.      Nose: Nose normal.      Mouth/Throat:      Mouth: Mucous membranes are moist.      Pharynx:  Oropharynx is clear.   Eyes:      Conjunctiva/sclera: Conjunctivae normal.      Pupils: Pupils are equal, round, and reactive to light.   Cardiovascular:      Rate and Rhythm: Normal rate and regular rhythm.      Pulses: Normal pulses.   Pulmonary:      Effort: Pulmonary effort is normal.      Breath sounds: Examination of the right-lower field reveals decreased breath sounds. Examination of the left-lower field reveals decreased breath sounds. Decreased breath sounds present.      Comments: Left-sided biopsy site dressed c/d/i no hematoma  Abdominal:      General: Bowel sounds are normal.      Palpations: Abdomen is soft.      Tenderness: There is no abdominal tenderness.   Musculoskeletal:         General: Swelling (trace BLE) present. No tenderness.      Cervical back: Normal range of motion and neck supple.   Skin:     General: Skin is warm and dry.      Capillary Refill: Capillary refill takes less than 2 seconds.   Neurological:      Mental Status: She is alert and oriented to person, place, and time.   Psychiatric:         Mood and Affect: Mood normal.         Behavior: Behavior normal.       Results Review     I reviewed the patient's new clinical results.  I reviewed the patient's telemetry  I reviewed the patient's chest xray  Results from last 7 days   Lab Units 01/13/23  0659 01/12/23  0741 01/11/23  0609 01/10/23  1439   WBC 10*3/mm3 8.08 9.43 7.91 8.65   HEMOGLOBIN g/dL 13.6 14.2 14.8 14.4   PLATELETS 10*3/mm3 171 172 167 194     Results from last 7 days   Lab Units 01/13/23  0659 01/12/23  0741 01/11/23  0609 01/10/23  1439   SODIUM mmol/L 137 136 134* 135*   POTASSIUM mmol/L 4.0 4.1 3.9 4.3   CHLORIDE mmol/L 99 96* 96* 99   CO2 mmol/L 31.2* 29.0 26.0 26.2   BUN mg/dL 19 18 14 19   CREATININE mg/dL 0.83 0.80 0.63 0.78   GLUCOSE mg/dL 151* 160* 166* 86   EGFR mL/min/1.73 77.9 81.4 98.0 83.9     Results from last 7 days   Lab Units 01/10/23  1439   ALBUMIN g/dL 4.0   BILIRUBIN mg/dL 0.3   ALK PHOS U/L  57   AST (SGOT) U/L 15   ALT (SGPT) U/L 13     Results from last 7 days   Lab Units 01/13/23  0659 01/12/23  0741 01/11/23  0609 01/10/23  1439   CALCIUM mg/dL 9.1 9.6 9.5 9.4   ALBUMIN g/dL  --   --   --  4.0   MAGNESIUM mg/dL  --  2.1 1.8 1.3*   PHOSPHORUS mg/dL  --  4.4 2.8 3.4       Glucose   Date/Time Value Ref Range Status   01/13/2023 1645 242 (H) 70 - 130 mg/dL Final     Comment:     Meter: [id number] : [id number] Hatchettmart Butcherdominique NA   01/13/2023 1114 127 70 - 130 mg/dL Final     Comment:     Meter: [id number] : [id number] Clay Cardenas BAO   01/13/2023 0739 167 (H) 70 - 130 mg/dL Final     Comment:     Meter: [id number] : [email or web address] Jc Hathaway RN   01/12/2023 2047 117 70 - 130 mg/dL Final     Comment:     Meter: [id number] : [id number] Remy Ott CNA   01/12/2023 1716 112 70 - 130 mg/dL Final     Comment:     Meter: [id number] : [id number] Matthew Tricia BAO   01/12/2023 1213 133 (H) 70 - 130 mg/dL Final     Comment:     Meter: [id number] : [id number] Matthew Tricia BAO   01/12/2023 0757 161 (H) 70 - 130 mg/dL Final     Comment:     Meter: [id number] : [id number] Matthew Tricia BAO       CT Chest With Contrast Diagnostic    Result Date: 1/12/2023   1. Enlarging left lower lobe pulmonary nodule and right paratracheal/subcarinal adenopathy. Further characterization with PET/CT or tissue diagnosis could be considered if not previously performed. 2. Improved aeration of the lung bases. 3. Filling defect in the midthoracic trachea favoring retained secretions but can further be evaluated as indicated. 4. Please see above for additional findings.  This report was finalized on 1/12/2023 11:59 AM by Dr. Susan Greer M.D.      XR Chest 1 View    Result Date: 1/13/2023  Stable small left apical pneumothorax following lung biopsy earlier today.  This report was finalized on 1/13/2023 6:58 PM by Dr. Carmelo Farias M.D.      XR Chest 1 View    Result Date: 1/13/2023  Tiny left apical  pneumothorax not clearly seen on the comparison study. Follow-up chest x-ray recommended in about 1 hour to evaluate for stability.  Findings were discussed with the patient's RN at 1709 hours on 01/13/2023  This report was finalized on 1/13/2023 5:07 PM by Dr. Everette Chavez M.D.      XR Chest 1 View    Result Date: 1/13/2023  No appreciable pneumothorax  This report was finalized on 1/13/2023 2:43 PM by Dr. Everette Chavez M.D.      CT Needle Biopsy Lung    Result Date: 1/13/2023  Technically successful CT guided left lung biopsy.  Moderate sedation was provided under my direct supervision using 0.5 mg IV Versed and 25 mcg IV Fentanyl. The patient was independently monitored by a trained Department of Radiology RN using automated blood pressure, EKG, and pulse oximetry. My total intra-service time was 20 minutes.     Radiation dose reduction techniques were utilized, including automated exposure control and exposure modulation based on body size.  This report was finalized on 1/13/2023 3:02 PM by Dr. Everette Chavez M.D.      Scheduled Medications  amLODIPine, 10 mg, Oral, Q24H  atorvastatin, 10 mg, Oral, Daily  budesonide-formoterol, 2 puff, Inhalation, BID - RT  buPROPion XL, 150 mg, Oral, Daily  cholecalciferol, 5,000 Units, Oral, Daily  fluticasone, 1 spray, Each Nare, BID  guaiFENesin, 600 mg, Oral, Q12H  insulin glargine, 5 Units, Subcutaneous, Nightly  insulin lispro, 0-7 Units, Subcutaneous, TID AC  ipratropium-albuterol, 3 mL, Nebulization, 4x Daily - RT  levothyroxine, 125 mcg, Oral, Daily  lidocaine, 20 mL, Infiltration, Once  losartan, 100 mg, Oral, Daily  sodium chloride, 10 mL, Intravenous, Q12H  pyridoxine, 100 mg, Oral, Daily    Infusions   Diet  Diet: Regular/House Diet; Texture: Regular Texture (IDDSI 7); Fluid Consistency: Thin (IDDSI 0)      Assessment/Plan     Active Hospital Problems    Diagnosis  POA   • **Cervical stenosis of spinal canal [M48.02]  Yes   • Severe aortic stenosis [I35.0]   Yes   • History of fusion of cervical spine [Z98.1]  Not Applicable   • HTN (hypertension) [I10]  Yes   • Obesity, Class III, BMI 40-49.9 (morbid obesity) ([facility]) [E66.01]  Yes   • Cervical spondylosis with myelopathy [M47.12]  Yes   • Hyperlipidemia associated with type 2 diabetes mellitus (HCC) [E11.69, E78.5]  Yes   • Hypothyroidism [E03.9]  Yes      Resolved Hospital Problems   No resolved problems to display.   Cervical spinal stenosis with myelopathy  - Status post ACDF C3-C7 on 9/20/2022  - posterior fusion recommended, however this is delayed due to her multiple medical issues  - pain control as needed, PT/OT  - appreciate RICH recs    Severe Aortic Stenosis  - monitor volume status closely-she looks euvolemic currently  - appreciate cardiology recs-possible evaluation for SAVR/TAVR pending workup for lung nodule    COPD  - no exacerbation  - continue bronchodilators  - appreciate pulm recs    LLL Pulmonary Nodule/Paratracheal Adenopathy  - enlargement demonstrated on CT  - s/p CT-guided biopsy 1/13/23  - follow-up chest xray shows small left apical pneumothorax which will be monitored  - appreciate pulm recs    Type 2 DM  - holding home metformin  - BG are acceptable  - continue lantus 5 units nightly  - continue ssi/hypoglycemia protocol     Hypertension  - BP acceptable  - continue on norvasc and losartan     Hyperlipidemia  - continue statin    Hypothyroidism  - clinically euthyroid  - continue levothyroxine    SCDs for DVT prophylaxis.  Full code.  Discussed with patient and nursing staff.  Anticipate discharge TBD timing yet to be determined.      John Khan MD  [facility]  01/13/23  19:10 EST      Electronically signed by John Khan MD at 01/13/23 1913     Casie Perez, CHENG at 01/13/23 1711        Progress note:    Post-biopsy CXR shows a 7mm left apical pneumothorax.  Vital signs, oxygen requirement unchanged. Patient is on room air.    Repeat CXR  "ordered    Electronically signed by Casie Perez APRN at 01/13/23 1712     Dwight Ring MD at 01/13/23 0820            Daily Progress Note.   [address]  1/13/2023    Patient:  Name:  Gabby Acosta  MRN:  [id number]  1955  67 y.o.  female         Requesting physician: Amber ANAND  Reason for Consultation:  Lung nodule, lad, preop eval, copd  CC: Numbness in her arms    Interval History:  No acute issues overnight.  Some anxiety.  No worsening shortness of breath. No confusion, she is awake alert appropriate affirms statements and asks insightful and appropriate questions.    Physical Exam:  /59 (BP Location: Left arm, Patient Position: Lying)   Pulse 83   Temp 98.1 °F (36.7 °C) (Oral)   Resp 18   Ht 157.5 cm (62\")   Wt 125 kg (275 lb)   SpO2 93%   BMI 50.30 kg/m²   Body mass index is 50.3 kg/m².  No intake or output data in the 24 hours ending 01/13/23 0820  General appearance: NAD, conversant   Eyes: anicteric sclerae, moist conjunctivae; no lid-lag;   HENT: Atraumatic; oropharynx limited by soft collar  Neck: Limited by soft collar  Lungs: CTA, without wheeze or rhonchi with normal respiratory effort and no intercostal retractions  CV: RRR, +anastasia mild holosystolic  Abdomen: Obese bowel sounds positive  Skin: Warm dry well-perfused  Psych: Appropriate affect, alert and oriented  Neuro cranials 2 through gross intact speech today moves all extremities       Data Review:  Notable Labs:  Results from last 7 days   Lab Units 01/13/23  0659 01/12/23  0741 01/11/23  0609 01/10/23  1439   WBC 10*3/mm3 8.08 9.43 7.91 8.65   HEMOGLOBIN g/dL 13.6 14.2 14.8 14.4   PLATELETS 10*3/mm3 171 172 167 194     Results from last 7 days   Lab Units 01/13/23  0659 01/12/23  0741 01/11/23  0609 01/10/23  1439   SODIUM mmol/L 137 136 134* 135*   POTASSIUM mmol/L 4.0 4.1 3.9 4.3   CHLORIDE mmol/L 99 96* 96* 99   CO2 mmol/L 31.2* 29.0 26.0 26.2   BUN mg/dL 19 18 14 19   CREATININE " "mg/dL 0.83 0.80 0.63 0.78   GLUCOSE mg/dL 151* 160* 166* 86   CALCIUM mg/dL 9.1 9.6 9.5 9.4   MAGNESIUM mg/dL  --  2.1 1.8 1.3*   PHOSPHORUS mg/dL  --  4.4 2.8 3.4   Estimated Creatinine Clearance: 84.3 mL/min (by C-G formula based on SCr of 0.83 mg/dL).    Results from last 7 days   Lab Units 01/13/23  0659 01/12/23  0741 01/11/23  0609 01/10/23  1439   AST (SGOT) U/L  --   --   --  15   ALT (SGPT) U/L  --   --   --  13   PLATELETS 10*3/mm3 171 172 167 194             Imaging:  Reviewed chest images personally from past 3 days    CT with lymphadenopathy mediastinum and hilar abnormal endobronchial airways and enlarging left lower lobe nodule    Assessment / Recommendations:  Former smoker  Pulmonary nodule  Lymphadenopathy  Family history of lung cancer      Patient Active Problem List   Diagnosis   • Acute UTI   • HTN (hypertension)   • Cervical spondylosis with myelopathy   • Fibromyalgia   • Fatty liver   • Hypothyroidism   • Hyperlipidemia associated with type 2 diabetes mellitus (HCC)   • Hypertrophic polyarthritis   • Proteinuria   • Obesity, Class III, BMI 40-49.9 (morbid obesity) (HCC)   • Hypokalemia   • Hypophosphatemia   • History of fusion of cervical spine   • Displacement of internal fixation device of vertebrae (HCC)   • Spinal instability of cervicothoracic region   • Cervical stenosis of spinal canal      Ct chest with LAD and enlarging 15mm left lower lobe nodule.  Ideally an EBUS TBNA would be an option but note that cardiology states that\"   With severe aortic stenosis and her multiple other medical issues, I feel that she is at high risk for complications intraoperatively and postoperatively.      The severe aortic stenosis also places her at higher risk for death with anesthesia.  Unless this is absolutely necessary and urgent, I would recommend delaying the surgery for further evaluation of a potential TAVR (if she is a candidate).  I would also highly recommend getting the potential " "diagnosis of lung cancer worked up, which Dr. Ring spoke with her about earlier today.\"    With this in mind we will send for ct guided needle biopsy that will not need anesthesia to see if we can obtain a tissue diagnosis.    D/w pt procedure reasoning behind this approach and risks of the procedure which included bleeding and pneumothorax.      Dwight Ring MD  [facility]  23  09:01 EST        Electronically signed by Dwight Ring MD at 23 0902     John Khan MD at 23 1741              Name: Gabby Acosta ADMIT: 1/10/2023   : 1955  PCP: Danyelle Brush MD    MRN: [id number] LOS: 1 days   AGE/SEX: 67 y.o. female  ROOM: [facility]     Subjective   Subjective   CC: cervical myelopathy  No acute events. Patient has no new complaints. She denies pain, cough, and dyspnea. No f/c/n/v/d.    Objective   Objective   Vital Signs  Temp:  [97.8 °F (36.6 °C)-98.6 °F (37 °C)] 98.6 °F (37 °C)  Heart Rate:  [84-91] 89  Resp:  [18-20] 20  BP: (122-171)/(69-92) 122/79  SpO2:  [91 %-97 %] 92 %  on  Flow (L/min):  [3] 3;   Device (Oxygen Therapy): nasal cannula  Body mass index is 50.3 kg/m².  Physical Exam  Vitals and nursing note reviewed.   Constitutional:       General: She is not in acute distress.     Appearance: She is ill-appearing (chronically). She is not toxic-appearing or diaphoretic.   HENT:      Head: Normocephalic and atraumatic.      Nose: Nose normal.      Mouth/Throat:      Mouth: Mucous membranes are moist.      Pharynx: Oropharynx is clear.   Eyes:      Conjunctiva/sclera: Conjunctivae normal.      Pupils: Pupils are equal, round, and reactive to light.   Cardiovascular:      Rate and Rhythm: Normal rate and regular rhythm.      Pulses: Normal pulses.   Pulmonary:      Effort: Pulmonary effort is normal.      Breath sounds: Examination of the right-lower field reveals decreased breath sounds. Examination of the left-lower field reveals decreased breath " sounds. Decreased breath sounds present.   Abdominal:      General: Bowel sounds are normal.      Palpations: Abdomen is soft.      Tenderness: There is no abdominal tenderness.   Musculoskeletal:         General: Swelling (trace BLE) present. No tenderness.      Cervical back: Normal range of motion and neck supple.   Skin:     General: Skin is warm and dry.      Capillary Refill: Capillary refill takes less than 2 seconds.   Neurological:      Mental Status: She is alert and oriented to person, place, and time.   Psychiatric:         Mood and Affect: Mood normal.         Behavior: Behavior normal.       Results Review     I reviewed the patient's new clinical results.  I reviewed the patient's telemetry  I reviewed the patient's CT chest  Results from last 7 days   Lab Units 01/12/23  0741 01/11/23  0609 01/10/23  1439   WBC 10*3/mm3 9.43 7.91 8.65   HEMOGLOBIN g/dL 14.2 14.8 14.4   PLATELETS 10*3/mm3 172 167 194     Results from last 7 days   Lab Units 01/12/23  0741 01/11/23  0609 01/10/23  1439   SODIUM mmol/L 136 134* 135*   POTASSIUM mmol/L 4.1 3.9 4.3   CHLORIDE mmol/L 96* 96* 99   CO2 mmol/L 29.0 26.0 26.2   BUN mg/dL 18 14 19   CREATININE mg/dL 0.80 0.63 0.78   GLUCOSE mg/dL 160* 166* 86   EGFR mL/min/1.73 81.4 98.0 83.9     Results from last 7 days   Lab Units 01/10/23  1439   ALBUMIN g/dL 4.0   BILIRUBIN mg/dL 0.3   ALK PHOS U/L 57   AST (SGOT) U/L 15   ALT (SGPT) U/L 13     Results from last 7 days   Lab Units 01/12/23  0741 01/11/23  0609 01/10/23  1439   CALCIUM mg/dL 9.6 9.5 9.4   ALBUMIN g/dL  --   --  4.0   MAGNESIUM mg/dL 2.1 1.8 1.3*   PHOSPHORUS mg/dL 4.4 2.8 3.4       Glucose   Date/Time Value Ref Range Status   01/12/2023 1716 112 70 - 130 mg/dL Final     Comment:     Meter: [id number] : [id number] Matthew Clarke BAO   01/12/2023 1213 133 (H) 70 - 130 mg/dL Final     Comment:     Meter: [id number] : [id number] Matthew GORE   01/12/2023 0757 161 (H) 70 - 130 mg/dL Final     Comment:      Meter: [id number] : [id number] Matthew Tricia BAO   01/12/2023 0625 164 (H) 70 - 130 mg/dL Final     Comment:     Meter: [id number] : [id number] Gordo Fuller NA   01/11/2023 2119 192 (H) 70 - 130 mg/dL Final     Comment:     Meter: [id number] : [email or web address] Oscar Little RN   01/11/2023 1634 165 (H) 70 - 130 mg/dL Final     Comment:     Meter: [id number] : [id number] Matthew Tricia BAO   01/11/2023 1159 121 70 - 130 mg/dL Final     Comment:     Meter: [id number] : [id number] Matthew Tricia BAO       CT Chest With Contrast Diagnostic    Result Date: 1/12/2023   1. Enlarging left lower lobe pulmonary nodule and right paratracheal/subcarinal adenopathy. Further characterization with PET/CT or tissue diagnosis could be considered if not previously performed. 2. Improved aeration of the lung bases. 3. Filling defect in the midthoracic trachea favoring retained secretions but can further be evaluated as indicated. 4. Please see above for additional findings.  This report was finalized on 1/12/2023 11:59 AM by Dr. Susan Greer M.D.      XR Chest PA & Lateral    Result Date: 1/10/2023  On the lateral view, there is a bulbous configuration of the calvin and this likely correlates with hilar and subcarinal jonn enlargement demonstrated on previous CT though this would be more accurately directly compared with prior CT 09/25/2022. Lungs appear clear.  This report was finalized on 1/10/2023 8:58 PM by Dr. Irving Christian M.D.      XR Spine Cervical 2 View    Result Date: 1/10/2023  Severely limited exam demonstrates that there has been anterior plate and screw fusion from C3 to C7. The caudal aspect of the plate is not well visualized though alignment appears grossly within normal limits. Overlying cervical collar artifact is noted. CT may be necessary for more definitive evaluation depending on the clinical scenario.  This report was finalized on 1/10/2023 8:39 PM by Dr. Irving Christian M.D.       Scheduled Medications  amLODIPine, 10 mg, Oral, Q24H  atorvastatin, 10 mg, Oral, Daily  budesonide-formoterol, 2 puff, Inhalation, BID - RT  buPROPion XL, 150 mg, Oral, Daily  cholecalciferol, 5,000 Units, Oral, Daily  fluticasone, 1 spray, Each Nare, BID  guaiFENesin, 600 mg, Oral, Q12H  insulin glargine, 5 Units, Subcutaneous, Nightly  insulin lispro, 0-7 Units, Subcutaneous, TID AC  ipratropium-albuterol, 3 mL, Nebulization, 4x Daily - RT  levothyroxine, 125 mcg, Oral, Daily  losartan, 100 mg, Oral, Daily  sodium chloride, 10 mL, Intravenous, Q12H  pyridoxine, 100 mg, Oral, Daily    Infusions   Diet  Diet: Regular/House Diet; Texture: Regular Texture (IDDSI 7); Fluid Consistency: Thin (IDDSI 0)      Assessment/Plan     Active Hospital Problems    Diagnosis  POA   • **Cervical stenosis of spinal canal [M48.02]  Yes   • Severe aortic stenosis [I35.0]  Yes   • History of fusion of cervical spine [Z98.1]  Not Applicable   • HTN (hypertension) [I10]  Yes   • Obesity, Class III, BMI 40-49.9 (morbid obesity) (HCC) [E66.01]  Yes   • Cervical spondylosis with myelopathy [M47.12]  Yes   • Hyperlipidemia associated with type 2 diabetes mellitus (HCC) [E11.69, E78.5]  Yes   • Hypothyroidism [E03.9]  Yes      Resolved Hospital Problems   No resolved problems to display.   Cervical spinal stenosis with myelopathy  - Status post ACDF C3-C7 on 9/20/2022  - posterior fusion recommended, however this is delayed due to her multiple medical issues  - pain control as needed, PT/OT  - appreciate RICH recs    Severe Aortic Stenosis  - monitor volume status closely-she looks euvolemic currently  - appreciate cardiology recs-possible evaluation for SAVR/TAVR pending workup for lung nodule    COPD  - no exacerbation  - continue bronchodilators  - appreciate pulm recs    LLL Pulmonary Nodule/Paratracheal Adenopathy  - enlargement demonstrated on CT  - further workup per pulm    Type 2 DM  - holding home metformin  - BG are acceptable  -  continue lantus 5 units nightly  - continue ssi/hypoglycemia protocol     Hypertension  - BP acceptable  - continue on norvasc and losartan     Hyperlipidemia  - continue statin    Hypothyroidism  - clinically euthyroid  - continue levothyroxine    SCDs for DVT prophylaxis.  Full code.  Discussed with patient and nursing staff.  Anticipate discharge TBD timing yet to be determined.      John Khan MD  [facility]  01/12/23  17:41 EST      Electronically signed by John Khan MD at 01/12/23 1800     Casie Cummings, APRN at 01/12/23 1433     Attestation signed by David Cheung MD at 01/12/23 1706    I have reviewed this documentation and agree.                  [facility] NEUROSURGERY PROGRESS NOTE      CC: cervical hardware failure      Subjective     Interval History: Patient's cervical surgery canceled secondary to lung and cardiac issues.  She has now agreed to proceed with CT chest to evaluate lung lesion as well as cardiac intervention if recommended.  She denies neck pain.  She states as compared to prior to her anterior cervical surgery in September, her right arm is much improved.  She is able to write again.  She has no significant neck pain.  She has some dexterity and coordination issues still with the left arm.  She continues to have imbalance issues.    ROS:  Constitutional: No fever, chills  Neck: no neck pain  GI: No nausea, vomiting, no swallow difficulties  Neuro: Imbalance  : no difficulty voiding, no incontinence    Objective     Vital signs in last 24 hours:  Temp:  [97.8 °F (36.6 °C)-98.6 °F (37 °C)] 98.6 °F (37 °C)  Heart Rate:  [84-91] 89  Resp:  [18-20] 20  BP: (122-171)/(69-92) 122/79    Intake/Output this shift:  No intake/output data recorded.    LABS:  Results from last 7 days   Lab Units 01/12/23  0741 01/11/23  0609 01/10/23  1439   WBC 10*3/mm3 9.43 7.91 8.65   HEMOGLOBIN g/dL 14.2 14.8 14.4   HEMATOCRIT % 42.3 43.3 43.5   PLATELETS 10*3/mm3  172 167 194     Results from last 7 days   Lab Units 01/12/23  0741 01/11/23  0609 01/10/23  1439   SODIUM mmol/L 136 134* 135*   POTASSIUM mmol/L 4.1 3.9 4.3   CHLORIDE mmol/L 96* 96* 99   CO2 mmol/L 29.0 26.0 26.2   BUN mg/dL 18 14 19   CREATININE mg/dL 0.80 0.63 0.78   CALCIUM mg/dL 9.6 9.5 9.4   BILIRUBIN mg/dL  --   --  0.3   ALK PHOS U/L  --   --  57   ALT (SGPT) U/L  --   --  13   AST (SGOT) U/L  --   --  15   GLUCOSE mg/dL 160* 166* 86     IMAGING STUDIES:  No new spine imaging  CT chest pending    I personally viewed and interpreted the patient's chart.    Meds reviewed/changed: Yes    Current Facility-Administered Medications:   •  albuterol (PROVENTIL) nebulizer solution 0.083% 2.5 mg/3mL, 2.5 mg, Nebulization, Q6H PRN, Dariusz Marrufo MD  •  amLODIPine (NORVASC) tablet 10 mg, 10 mg, Oral, Q24H, Dariusz Marrufo MD, 10 mg at 01/12/23 0807  •  atorvastatin (LIPITOR) tablet 10 mg, 10 mg, Oral, Daily, Dariusz Marrufo MD, 10 mg at 01/12/23 0807  •  budesonide-formoterol (SYMBICORT) 160-4.5 MCG/ACT inhaler 2 puff, 2 puff, Inhalation, BID - RT, Dwight Ring MD, 2 puff at 01/12/23 0822  •  buPROPion XL (WELLBUTRIN XL) 24 hr tablet 150 mg, 150 mg, Oral, Daily, Dariusz Marrufo MD, 150 mg at 01/12/23 0807  •  cholecalciferol (VITAMIN D3) tablet 5,000 Units, 5,000 Units, Oral, Daily, Dariusz Marrufo MD, 5,000 Units at 01/12/23 0807  •  dextrose (D50W) (25 g/50 mL) IV injection 25 g, 25 g, Intravenous, Q15 Min PRN, Dariusz Marrufo MD  •  dextrose (GLUTOSE) oral gel 15 g, 15 g, Oral, Q15 Min PRN, Dariusz Marrufo MD  •  diphenhydrAMINE (BENADRYL) capsule 25 mg, 25 mg, Oral, Q6H PRN, Dariusz Marrufo MD, 25 mg at 01/11/23 2121  •  fluticasone (FLONASE) 50 MCG/ACT nasal spray 1 spray, 1 spray, Each Nare, BID, Dariusz Marrufo MD, 1 spray at 01/12/23 0808  •  glucagon (human recombinant) (GLUCAGEN DIAGNOSTIC) injection 1 mg, 1 mg, Intramuscular, Q15 Min PRN, Dariusz Marrufo MD  •  guaiFENesin (MUCINEX) 12 hr tablet 600 mg, 600 mg,  Oral, Q12H, Dariusz Marrufo MD, 600 mg at 01/12/23 0807  •  insulin glargine (LANTUS, SEMGLEE) injection 5 Units, 5 Units, Subcutaneous, Nightly, Dariusz Marrufo MD, 5 Units at 01/11/23 2120  •  insulin lispro (ADMELOG) injection 0-7 Units, 0-7 Units, Subcutaneous, TID AC, Dariusz Marrufo MD, 2 Units at 01/12/23 0808  •  ipratropium-albuterol (DUO-NEB) nebulizer solution 3 mL, 3 mL, Nebulization, 4x Daily - RT, Dariusz Marrufo MD, 3 mL at 01/11/23 1132  •  levothyroxine (SYNTHROID, LEVOTHROID) tablet 125 mcg, 125 mcg, Oral, Daily, Dariusz Marrufo MD, 125 mcg at 01/12/23 0807  •  LORazepam (ATIVAN) tablet 0.5 mg, 0.5 mg, Oral, Q6H PRN, Dariusz Marrufo MD, 0.5 mg at 01/12/23 0819  •  losartan (COZAAR) tablet 100 mg, 100 mg, Oral, Daily, Dariusz Marrufo MD, 100 mg at 01/12/23 0807  •  nitroglycerin (NITROSTAT) SL tablet 0.4 mg, 0.4 mg, Sublingual, Q5 Min PRN, Dariusz Marrufo MD  •  sodium chloride 0.9 % flush 10 mL, 10 mL, Intravenous, Q12H, Dariusz Marrufo MD, 10 mL at 01/12/23 0808  •  sodium chloride 0.9 % flush 10 mL, 10 mL, Intravenous, PRN, Dariusz Marrufo MD  •  sodium chloride 0.9 % infusion 40 mL, 40 mL, Intravenous, PRN, Dariusz Marrufo MD  •  vitamin B-6 (PYRIDOXINE) tablet 100 mg, 100 mg, Oral, Daily, Dariusz Marrufo MD, 100 mg at 01/12/23 0806      Physical Exam:    General:   Awake, alert, oriented x3. Speech clear with no aphasia.  Sitting up in chair visiting with family  Neck:    Soft collar On ; ROM deferred; incision right anterior neck well approximated and healed.  Motor: Mild  strength weakness left hand otherwise good strength bilateral upper and lower extremity   reflexes: No Witt  Sensation: Normal to light touch except left hand diminished  Station and Gait:             Up with walker      Assessment & Plan     ASSESSMENT:      Cervical stenosis of spinal canal    HTN (hypertension)    Cervical spondylosis with myelopathy    Hypothyroidism    Hyperlipidemia associated with type 2 diabetes mellitus (HCC)     "Obesity, Class III, BMI 40-49.9 (morbid obesity) (HCC)    History of fusion of cervical spine    Patient with history of cervical stenosis status post anterior corpectomy and fusion with chronic myelopathy.  Had hardware failure and surgery has been delayed for numerous reasons.  She was admitted to undergo posterior cervical fusion at this time; however work-up by pulmonary and cardiology has revealed significant lung and heart issues which preclude proceeding with surgery unless emergency situation which it is not.  Patient is stable neurologically however chronically myelopathic and has ongoing gait issues and fall risk.  Surgical intervention does not change this although would stabilize the hardware.  She is now on a soft collar.  She denies neck pain.  She has good strength except for mild left  weakness.  At this time, neurosurgical intervention is not prudent.  We will follow along the chart and plan for outpatient follow-up to determine if she will be able to have posterior cervical intervention down the road.  I did advise her to avoid lifting pushing pulling greater than 5 pounds, using her shoulders and arms significantly to pull herself up and down.  We will have PT see her to help with some instructions for patient and family regarding modifications to avoid further neck injury as possible    PLAN:   Soft collar at all times.  May be removed for showers  No lifting pushing pulling more than 5 pounds.  Avoid use of her arms for lifting and positioning.  No overhead activity.    I discussed the patient's findings and my recommendations with patient, family and Dr. Cheung       LOS: 1 day       Casie Cummings, APRN  2023  14:33 EST    \"Dictated utilizing Dragon dictation\".        Electronically signed by David Cheung MD at 23 1706     Carmelo Gates Jr., MD at 23 0949                [facility]    Patient Name: Gabby Acosta  :1955  67 y.o.  LOS: " "1  Encounter Provider: Carmelo Gates Jr, MD      Patient Care Team:  Danyelle Brush MD as PCP - General (Pediatrics)    Chief Complaint: Follow-up severe aortic stenosis, multiple lung nodules and thoracic lymphadenopathy, COPD, hypoxic respiratory failure, cervical stenosis    Interval History: No acute issues overnight       Objective   Vital Signs  Temp:  [97.8 °F (36.6 °C)-99 °F (37.2 °C)] 98 °F (36.7 °C)  Heart Rate:  [84-91] 84  Resp:  [18-20] 18  BP: (139-171)/(69-92) 165/92  No intake or output data in the 24 hours ending 01/12/23 0951  Flowsheet Rows    Flowsheet Row First Filed Value   Admission Height 157.5 cm (62\") Documented at 01/10/2023 1255   Admission Weight 125 kg (275 lb 12.7 oz) Documented at 01/10/2023 1255            Vitals reviewed.   Constitutional:       Appearance: Not in distress. Frail. Chronically ill-appearing.   Neck:      Vascular: No JVR. JVD normal.   Pulmonary:      Effort: Pulmonary effort is normal.      Breath sounds: No wheezing. No rhonchi. No rales.      Comments: Poor air entry bibasilar zones  Chest:      Chest wall: Not tender to palpatation.   Cardiovascular:      PMI at left midclavicular line. Normal rate. Regular rhythm. Normal S1. Normal S2.      Murmurs: There is a grade 3/6 harsh midsystolic murmur at the URSB.      No gallop. No click. No rub.   Pulses:     Intact distal pulses.   Edema:     Peripheral edema absent.   Abdominal:      General: Bowel sounds are normal.      Palpations: Abdomen is soft.      Tenderness: There is no abdominal tenderness.   Musculoskeletal: Normal range of motion.         General: No tenderness. Skin:     General: Skin is warm and dry.   Neurological:      General: No focal deficit present.      Mental Status: Alert and oriented to person, place and time.           Pertinent Test Results:  Results from last 7 days   Lab Units 01/12/23  0741 01/11/23  0609 01/10/23  1439   SODIUM mmol/L 136 134* 135*   POTASSIUM mmol/L 4.1 3.9 4.3 "   CHLORIDE mmol/L 96* 96* 99   CO2 mmol/L 29.0 26.0 26.2   BUN mg/dL 18 14 19   CREATININE mg/dL 0.80 0.63 0.78   GLUCOSE mg/dL 160* 166* 86   CALCIUM mg/dL 9.6 9.5 9.4   AST (SGOT) U/L  --   --  15   ALT (SGPT) U/L  --   --  13         Results from last 7 days   Lab Units 01/12/23  0741 01/11/23  0609 01/10/23  1439   WBC 10*3/mm3 9.43 7.91 8.65   HEMOGLOBIN g/dL 14.2 14.8 14.4   HEMATOCRIT % 42.3 43.3 43.5   PLATELETS 10*3/mm3 172 167 194         Results from last 7 days   Lab Units 01/12/23  0741 01/11/23  0609 01/10/23  1439   MAGNESIUM mg/dL 2.1 1.8 1.3*           Invalid input(s): LDLCALC                Medication Review:   amLODIPine, 10 mg, Oral, Q24H  atorvastatin, 10 mg, Oral, Daily  budesonide-formoterol, 2 puff, Inhalation, BID - RT  buPROPion XL, 150 mg, Oral, Daily  cholecalciferol, 5,000 Units, Oral, Daily  fluticasone, 1 spray, Each Nare, BID  guaiFENesin, 600 mg, Oral, Q12H  insulin glargine, 5 Units, Subcutaneous, Nightly  insulin lispro, 0-7 Units, Subcutaneous, TID AC  ipratropium-albuterol, 3 mL, Nebulization, 4x Daily - RT  levothyroxine, 125 mcg, Oral, Daily  losartan, 100 mg, Oral, Daily  sodium chloride, 10 mL, Intravenous, Q12H  pyridoxine, 100 mg, Oral, Daily              Assessment & Plan     Active Hospital Problems    Diagnosis  POA   • **Cervical stenosis of spinal canal [M48.02]  Yes   • History of fusion of cervical spine [Z98.1]  Not Applicable   • HTN (hypertension) [I10]  Yes   • Obesity, Class III, BMI 40-49.9 (morbid obesity) ([facility]) [E66.01]  Yes   • Cervical spondylosis with myelopathy [M47.12]  Yes   • Hyperlipidemia associated with type 2 diabetes mellitus (HCC) [E11.69, E78.5]  Yes   • Hypothyroidism [E03.9]  Yes      Resolved Hospital Problems   No resolved problems to display.        1. Severe aortic stenosis -Limited visualization of valve morphology on transthoracic echocardiogram.  Patient seems fairly euvolemic however sitting in the chair with the oxygen off her  "saturation drops to 80 to 85%.  We will add a BNP to the morning labs.  She does not seem significantly hypervolemic on exam but evaluation is limited by body habitus.  Although she refused CT chest yesterday proposed by Dr. Ring with conversation today she seems to understand the need for recommended testing.  We will follow images on CT.  Lung cancer work-up is absolutely necessary for prognostication prior to consideration for valvular intervention.  Once we have a better idea of lung pathology we will discuss case details with structural cardiology for work-up of SAVR versus TAVR.  Patient noted to have severe calcification in all coronary distributions and will need ischemic work-up prior to consideration of valve intervention.  2. Cervical stenosis -surgical intervention is on hold for now.  Dr. Garcia discussed case details with Dr. Cheung yesterday.  3. COPD  4. Lung nodules  5. Hypertension  6. Diabetes      Carmelo Gates Jr, MD  [facility]  01/12/23  09:51 EST          Electronically signed by Carmelo Gates Jr., MD at 01/12/23 0956     Dwight Ring MD at 01/12/23 0929            Daily Progress Note.   [address]  1/12/2023    Patient:  Name:  Gabby Acosta  MRN:  [id number]  1955  67 y.o.  female         Requesting physician: Amber ANAND  Reason for Consultation:  Lung nodule, lad, preop eval, copd  CC: Numbness in her arms    Interval History:    Denies any acute changes of worsening shortness of breath hemoptysis or difficulties breathing overnight.  Alert and oriented today.  She says she is now ready to pursue further work-up evaluation of her lung nodules      Physical Exam:  /92   Pulse 84   Temp 98 °F (36.7 °C) (Oral)   Resp 18   Ht 157.5 cm (62\")   Wt 125 kg (275 lb)   SpO2 94%   BMI 50.30 kg/m²   Body mass index is 50.3 kg/m².  No intake or output data in the 24 hours ending 01/12/23 0929  General appearance: NAD, " conversant   Eyes: anicteric sclerae, moist conjunctivae; no lid-lag;   HENT: Atraumatic; oropharynx limited by soft collar  Neck: Limited by soft collar  Lungs: CTA, without wheeze or rhonchi with normal respiratory effort and no intercostal retractions  CV: RRR, no MRGs   Abdomen: Obese bowel sounds positive  Skin: Warm dry well-perfused  Psych: Appropriate affect, alert and oriented  Neuro cranials 2 through gross intact speech today moves all extremities       Data Review:  Notable Labs:  Results from last 7 days   Lab Units 01/12/23  0741 01/11/23  0609 01/10/23  1439   WBC 10*3/mm3 9.43 7.91 8.65   HEMOGLOBIN g/dL 14.2 14.8 14.4   PLATELETS 10*3/mm3 172 167 194     Results from last 7 days   Lab Units 01/12/23  0741 01/11/23  0609 01/10/23  1439   SODIUM mmol/L 136 134* 135*   POTASSIUM mmol/L 4.1 3.9 4.3   CHLORIDE mmol/L 96* 96* 99   CO2 mmol/L 29.0 26.0 26.2   BUN mg/dL 18 14 19   CREATININE mg/dL 0.80 0.63 0.78   GLUCOSE mg/dL 160* 166* 86   CALCIUM mg/dL 9.6 9.5 9.4   MAGNESIUM mg/dL 2.1 1.8 1.3*   PHOSPHORUS mg/dL 4.4 2.8 3.4   Estimated Creatinine Clearance: 87.5 mL/min (by C-G formula based on SCr of 0.8 mg/dL).    Results from last 7 days   Lab Units 01/12/23  0741 01/11/23  0609 01/10/23  1439   AST (SGOT) U/L  --   --  15   ALT (SGPT) U/L  --   --  13   PLATELETS 10*3/mm3 172 167 194             Imaging:  Reviewed chest images personally from past 3 days    Assessment / Recommendations:  Former smoker  Pulmonary nodule  Lymphadenopathy  Family history of lung cancer      Patient Active Problem List   Diagnosis   • Acute UTI   • HTN (hypertension)   • Cervical spondylosis with myelopathy   • Fibromyalgia   • Fatty liver   • Hypothyroidism   • Hyperlipidemia associated with type 2 diabetes mellitus (HCC)   • Hypertrophic polyarthritis   • Proteinuria   • Obesity, Class III, BMI 40-49.9 (morbid obesity) (HCC)   • Hypokalemia   • Hypophosphatemia   • History of fusion of cervical spine   • Displacement  of internal fixation device of vertebrae (HCC)   • Spinal instability of cervicothoracic region   • Cervical stenosis of spinal canal      Await the Ct chest with contrast to evaluate 14mm lung nodule lad in former smoker with + fam hx of lung cancer.      Dwight Ring MD  [facility]  01/12/23  10:45 EST      Electronically signed by Dwight Ring MD at 01/12/23 1045       Consult Notes (last 72 hours)  Notes from 01/11/23 1843 through 01/14/23 1843   No notes of this type exist for this encounter.

## 2023-01-14 NOTE — PLAN OF CARE
Goal Outcome Evaluation:  Plan of Care Reviewed With: patient        Progress: no change  Outcome Evaluation: Pt is 66 yo female admitted for planned posterior cervical fusion. Now awaiting clearance for surgery and lung biopsy results due to need for TAVR. Pt has been ambulating with RW for last few months avoiding UE push/pull for transfers and using only for balance. She has 3 steps to enter at home and has been using handrail and  assist to negotiate. She did well with ambulation distance of 150' using RW and CGA. No issues of LOB or pain. Pt remains appropriate for skilled PT services to ensure safety with gait and prepare for upcoming surgery. Recommend home with assist if d/c at this time. Reassess post-operatively to determine if this remains appropriate.

## 2023-01-14 NOTE — THERAPY TREATMENT NOTE
Patient Name: Gabby Acosta  : 1955    MRN: [id number]                              Today's Date: 2023       Admit Date: 1/10/2023    Visit Dx: No diagnosis found.  Patient Active Problem List   Diagnosis   • Acute UTI   • HTN (hypertension)   • Cervical spondylosis with myelopathy   • Fibromyalgia   • Fatty liver   • Hypothyroidism   • Hyperlipidemia associated with type 2 diabetes mellitus (HCC)   • Hypertrophic polyarthritis   • Proteinuria   • Obesity, Class III, BMI 40-49.9 (morbid obesity) (HCC)   • Hypokalemia   • Hypophosphatemia   • History of fusion of cervical spine   • Displacement of internal fixation device of vertebrae (HCC)   • Spinal instability of cervicothoracic region   • Cervical stenosis of spinal canal   • Severe aortic stenosis     Past Medical History:   Diagnosis Date   • Cervical spondylosis with myelopathy    • Cervical stenosis of spine    • Diabetes mellitus (HCC)    • Diabetes mellitus with diabetic dermatitis (HCC)    • Disease of thyroid gland    • Elevated cholesterol    • Fatty liver      Past Surgical History:   Procedure Laterality Date   • ANTERIOR CHANNEL VERTEBRECTOMY/CORPECTOMY N/A 2022    Procedure: Anterior cervical corpectomy, cervical four/five/six, with cage and plate from cervical three to cervical seven;  Surgeon: David Cheung MD;  Location: [facility];  Service: Neurosurgery;  Laterality: N/A;   • CATARACT EXTRACTION     •  SECTION     • CHOLECYSTECTOMY        General Information     Row Name 23 1448          Physical Therapy Time and Intention    Document Type therapy note (daily note)  -LB     Mode of Treatment individual therapy;physical therapy  -LB     Row Name 23 1448          General Information    Patient Profile Reviewed yes  -LB     Prior Level of Function independent:  using RW for gait only; not for transfers to avoid push/pull  -LB     Existing Precautions/Restrictions cervical collar;fall;spinal  -LB     Row  Name 01/14/23 1448          Cognition    Orientation Status (Cognition) oriented x 4  -LB     Row Name 01/14/23 1448          Safety Issues, Functional Mobility    Impairments Affecting Function (Mobility) endurance/activity tolerance;range of motion (ROM);strength;balance  -LB     Comment, Safety Issues/Impairments (Mobility) gait belt and non-skid socks donned throughout  -LB           User Key  (r) = Recorded By, (t) = Taken By, (c) = Cosigned By    Initials Name Provider Type    LB Latasha Garcia, PT Physical Therapist               Mobility     Row Name 01/14/23 1448          Bed Mobility    Bed Mobility bed mobility (all) activities  -LB     All Activities, [place] (Bed Mobility) not tested  -LB     Comment, (Bed Mobility) UIC and returned to chair  -LB     Row Name 01/14/23 1448          Bed-Chair Transfer    Bed-Chair [place] (Transfers) not tested  -LB     Row Name 01/14/23 1448          Sit-Stand Transfer    Sit-Stand [place] (Transfers) contact guard  -LB     Row Name 01/14/23 1448          Gait/Stairs (Locomotion)    [place]ner Level (Gait) contact guard  -LB     Assistive Device (Gait) walker, front-wheeled  -LB     Distance in Feet (Gait) 150'  -LB     Deviations/Abnormal Patterns (Gait) base of support, wide  -LB     Comment, (Gait/Stairs) no LOB, pt using RW for balance only, unable to use UE for pushing/pulling  -LB           User Key  (r) = Recorded By, (t) = Taken By, (c) = Cosigned By    Initials Name Provider Type    LB Latasha Garcia, PT Physical Therapist               Obj/Interventions    No documentation.                Goals/Plan     Row Name 01/14/23 1453          Gait Training Goal 1 (PT)    Activity/Assistive Device (Gait Training Goal 1, PT) gait (walking locomotion);walker, rolling  -LB     [place]ner Level (Gait Training Goal 1, PT) standby assist  -LB     Distance (Gait Training Goal 1, PT) 250'  -LB     Time Frame (Gait Training Goal 1, PT) 1 week  -LB            User Key  (r) = Recorded By, (t) = Taken By, (c) = Cosigned By    Initials Name Provider Type    LB Latasha Garcia, PT Physical Therapist               Clinical Impression     Row Name 01/14/23 1449          Pain    Pretreatment Pain Rating 0/10 - no pain  -LB     Posttreatment Pain Rating 0/10 - no pain  -LB     Pain Intervention(s) Repositioned;Ambulation/increased activity  -LB     Row Name 01/14/23 1449          Plan of Care Review    Plan of Care Reviewed With patient  -LB     Progress no change  -LB     Outcome Evaluation Pt is 66 yo female admitted for planned posterior cervical fusion. Now awaiting clearance for surgery and lung biopsy results due to need for TAVR. Pt has been ambulating with RW for last few months avoiding UE push/pull for transfers and using only for balance. She has 3 steps to enter at home and has been using handrail and  assist to negotiate. She did well with ambulation distance of 150' using RW and CGA. No issues of LOB or pain. Pt remains appropriate for skilled PT services to ensure safety with gait and prepare for upcoming surgery. Recommend home with assist if d/c at this time. Reassess post-operatively to determine if this remains appropriate.  -LB     Row Name 01/14/23 1449          Therapy Assessment/Plan (PT)    Patient/Family Therapy Goals Statement (PT) home with assist  -LB     Rehab Potential (PT) good, to achieve stated therapy goals  -LB     Criteria for Skilled Interventions Met (PT) yes  -LB     Therapy Frequency (PT) 3 times/wk  -LB     Row Name 01/14/23 1449          Vital Signs    O2 Delivery Pre Treatment room air  -LB     O2 Delivery Intra Treatment room air  -LB     O2 Delivery Post Treatment room air  -LB     Pre Patient Position Sitting  -LB     Intra Patient Position Standing  -LB     Post Patient Position Sitting  -LB     Row Name 01/14/23 1449          Positioning and Restraints    Pre-Treatment Position sitting in chair/recliner  -LB     Post Treatment  Position chair  -LB     In Chair sitting;call light within reach;encouraged to call for assist;with other staff  -LB           User Key  (r) = Recorded By, (t) = Taken By, (c) = Cosigned By    Initials Name Provider Type    Latasha Torres, PT Physical Therapist               Outcome Measures     Row Name 01/14/23 0900          How much help from another person do you currently need...    Turning from your back to your side while in flat bed without using bedrails? 3  -BC     Moving from lying on back to sitting on the side of a flat bed without bedrails? 3  -BC     Moving to and from a bed to a chair (including a wheelchair)? 3  -BC     Standing up from a chair using your arms (e.g., wheelchair, bedside chair)? 3  -BC     Climbing 3-5 steps with a railing? 2  -BC     To walk in hospital room? 3  -BC     AM-PAC 6 Clicks Score (PT) 17  -BC     Highest level of mobility 5 --> Static standing  -BC           User Key  (r) = Recorded By, (t) = Taken By, (c) = Cosigned By    Initials Name Provider Type    Shalini Cotter, RN Registered Nurse                             Physical Therapy Education     Title: PT OT SLP Therapies (Done)     Topic: Physical Therapy (Done)     Point: Mobility training (Done)     Learning Progress Summary           Patient Acceptance, TB, VU by LB at 1/14/2023 1453    Acceptance, E, VU,NR by CW at 1/13/2023 1522                   Point: Home exercise program (Done)     Learning Progress Summary           Patient Acceptance, TB, VU by LB at 1/14/2023 1453    Acceptance, E, VU,NR by CW at 1/13/2023 1522                   Point: Body mechanics (Done)     Learning Progress Summary           Patient Acceptance, TB, VU by LB at 1/14/2023 1453    Acceptance, E, VU,NR by CW at 1/13/2023 1522                   Point: Precautions (Done)     Learning Progress Summary           Patient Acceptance, TB, VU by LB at 1/14/2023 1453    Acceptance, E, VU,NR by CW at 1/13/2023 1522                                User Key     Initials Effective Dates Name Provider Type Discipline    LB 08/09/20 -  Latasha Garcia, PT Physical Therapist PT    CW 12/13/22 -  Jennifer Bailon PT Physical Therapist PT              PT Recommendation and Plan     Plan of Care Reviewed With: patient  Progress: no change  Outcome Evaluation: Pt is 68 yo female admitted for planned posterior cervical fusion. Now awaiting clearance for surgery and lung biopsy results due to need for TAVR. Pt has been ambulating with RW for last few months avoiding UE push/pull for transfers and using only for balance. She has 3 steps to enter at home and has been using handrail and  assist to negotiate. She did well with ambulation distance of 150' using RW and CGA. No issues of LOB or pain. Pt remains appropriate for skilled PT services to ensure safety with gait and prepare for upcoming surgery. Recommend home with assist if d/c at this time. Reassess post-operatively to determine if this remains appropriate.     Time Calculation:    PT Charges     Row Name 01/14/23 1454             Time Calculation    Start Time 1415  -LB      Stop Time 1430  -LB      Time Calculation (min) 15 min  -LB      PT Received On 01/14/23  -LB      PT - Next Appointment 01/16/23  -LB         Time Calculation- PT    Total Timed Code Minutes- PT 15 minute(s)  -LB            User Key  (r) = Recorded By, (t) = Taken By, (c) = Cosigned By    Initials Name Provider Type    Latasha Torres PT Physical Therapist              Therapy Charges for Today     Code Description Service Date Service Provider Modifiers Qty    [id number]  PT THERAPEUTIC ACT EA 15 MIN 1/14/2023 Latasha Garcia PT GP 1          PT G-Codes  Outcome Measure Options: AM-PAC 6 Clicks Basic Mobility (PT)  AM-PAC 6 Clicks Score (PT): 17  PT Discharge Summary  Anticipated Discharge Disposition (PT): home with assist, home with home health    Latasha Garcia PT  1/14/2023

## 2023-01-14 NOTE — PROGRESS NOTES
pulm stable, awaiting biopsy results  cxr ordered for today, but suspect small pntx requires nothing further  Will follow at a distance until biopsy back

## 2023-01-14 NOTE — PROGRESS NOTES
Name: Gabby Acosta ADMIT: 1/10/2023   : 1955  PCP: Danyelle Brush MD    MRN: [id number] LOS: 2 days   AGE/SEX: 67 y.o. female  ROOM: 911     Subjective   Subjective   CC: cervical myelopathy  No acute events. Patient denies new complaints. She denies pain, cough, and dyspnea. No f/c/n/v/d.  Her  is at bedside.    Objective   Objective   Vital Signs  Temp:  [97.4 °F (36.3 °C)-98.8 °F (37.1 °C)] 98.8 °F (37.1 °C)  Heart Rate:  [66-90] 80  Resp:  [16-20] 20  BP: (116-141)/(54-93) 120/54  SpO2:  [89 %-95 %] 89 %  on  Flow (L/min):  [3-4] 4;   Device (Oxygen Therapy): room air  Body mass index is 50.3 kg/m².  Physical Exam  Vitals and nursing note reviewed.   Constitutional:       General: She is not in acute distress.     Appearance: She is ill-appearing (chronically). She is not toxic-appearing or diaphoretic.   HENT:      Head: Normocephalic and atraumatic.      Nose: Nose normal.      Mouth/Throat:      Mouth: Mucous membranes are moist.      Pharynx: Oropharynx is clear.   Eyes:      Conjunctiva/sclera: Conjunctivae normal.      Pupils: Pupils are equal, round, and reactive to light.   Cardiovascular:      Rate and Rhythm: Normal rate and regular rhythm.      Pulses: Normal pulses.   Pulmonary:      Effort: Pulmonary effort is normal.      Breath sounds: Examination of the right-lower field reveals decreased breath sounds. Examination of the left-lower field reveals decreased breath sounds. Decreased breath sounds present.      Comments: Left-sided biopsy site dressed c/d/i no hematoma  Abdominal:      General: Bowel sounds are normal.      Palpations: Abdomen is soft.      Tenderness: There is no abdominal tenderness.   Musculoskeletal:         General: Swelling (trace BLE) present. No tenderness.      Cervical back: Normal range of motion and neck supple.   Skin:     General: Skin is warm and dry.      Capillary Refill: Capillary refill takes less than 2 seconds.   Neurological:      Mental  Status: She is alert and oriented to person, place, and time.   Psychiatric:         Mood and Affect: Mood normal.         Behavior: Behavior normal.       Results Review     I reviewed the patient's new clinical results.  I reviewed the patient's telemetry  I reviewed the patient's chest xray  Results from last 7 days   Lab Units 01/14/23  0649 01/13/23  0659 01/12/23  0741 01/11/23  0609   WBC 10*3/mm3 7.87 8.08 9.43 7.91   HEMOGLOBIN g/dL 13.1 13.6 14.2 14.8   PLATELETS 10*3/mm3 168 171 172 167     Results from last 7 days   Lab Units 01/14/23  0649 01/13/23  0659 01/12/23  0741 01/11/23  0609   SODIUM mmol/L 139 137 136 134*   POTASSIUM mmol/L 3.6 4.0 4.1 3.9   CHLORIDE mmol/L 99 99 96* 96*   CO2 mmol/L 28.5 31.2* 29.0 26.0   BUN mg/dL 19 19 18 14   CREATININE mg/dL 0.78 0.83 0.80 0.63   GLUCOSE mg/dL 133* 151* 160* 166*   EGFR mL/min/1.73 83.9 77.9 81.4 98.0     Results from last 7 days   Lab Units 01/10/23  1439   ALBUMIN g/dL 4.0   BILIRUBIN mg/dL 0.3   ALK PHOS U/L 57   AST (SGOT) U/L 15   ALT (SGPT) U/L 13     Results from last 7 days   Lab Units 01/14/23  0649 01/13/23  0659 01/12/23  0741 01/11/23  0609 01/10/23  1439   CALCIUM mg/dL 8.9 9.1 9.6 9.5 9.4   ALBUMIN g/dL  --   --   --   --  4.0   MAGNESIUM mg/dL  --   --  2.1 1.8 1.3*   PHOSPHORUS mg/dL  --   --  4.4 2.8 3.4       Glucose   Date/Time Value Ref Range Status   01/14/2023 1157 117 70 - 130 mg/dL Final     Comment:     Meter: [id number] : [id number] Matthew Clarke BAO   01/14/2023 0744 155 (H) 70 - 130 mg/dL Final     Comment:     Meter: [id number] : [id number] Matthewrobin Clarke BAO   01/13/2023 2034 105 70 - 130 mg/dL Final     Comment:     Meter: [id number] : [id number] Yony Riley NA   01/13/2023 1645 242 (H) 70 - 130 mg/dL Final     Comment:     Meter: [id number] : [id number] Hatchett Sherrish NA   01/13/2023 1114 127 70 - 130 mg/dL Final     Comment:     Meter: [id number] : [id number] Clay Cardenas BAO   01/13/2023 0739 167  (H) 70 - 130 mg/dL Final     Comment:     Meter: [id number] : [email or web address] Jc Mag RN   01/12/2023 2047 117 70 - 130 mg/dL Final     Comment:     Meter: [id number] : [id number] Remy Ott CNA       XR Chest 1 View    Result Date: 1/14/2023  No significant change.  This report was finalized on 1/14/2023 2:43 PM by Dr. Harshad Castro M.D.      XR Chest 1 View    Result Date: 1/13/2023  Stable small left apical pneumothorax following lung biopsy earlier today.  This report was finalized on 1/13/2023 6:58 PM by Dr. Carmelo Farias M.D.      XR Chest 1 View    Result Date: 1/13/2023  Tiny left apical pneumothorax not clearly seen on the comparison study. Follow-up chest x-ray recommended in about 1 hour to evaluate for stability.  Findings were discussed with the patient's RN at 1709 hours on 01/13/2023  This report was finalized on 1/13/2023 5:07 PM by Dr. Everetet Chavez M.D.      XR Chest 1 View    Result Date: 1/13/2023  No appreciable pneumothorax  This report was finalized on 1/13/2023 2:43 PM by Dr. Everette Chavez M.D.      CT Needle Biopsy Lung    Result Date: 1/13/2023  Technically successful CT guided left lung biopsy.  Moderate sedation was provided under my direct supervision using 0.5 mg IV Versed and 25 mcg IV Fentanyl. The patient was independently monitored by a trained Department of Radiology RN using automated blood pressure, EKG, and pulse oximetry. My total intra-service time was 20 minutes.     Radiation dose reduction techniques were utilized, including automated exposure control and exposure modulation based on body size.  This report was finalized on 1/13/2023 3:02 PM by Dr. Everette Chavez M.D.      Scheduled Medications  amLODIPine, 10 mg, Oral, Q24H  atorvastatin, 10 mg, Oral, Daily  budesonide-formoterol, 2 puff, Inhalation, BID - RT  buPROPion XL, 150 mg, Oral, Daily  cholecalciferol, 5,000 Units, Oral, Daily  fluticasone, 1 spray, Each Nare, BID  guaiFENesin, 600 mg,  Oral, Q12H  insulin glargine, 5 Units, Subcutaneous, Nightly  insulin lispro, 0-7 Units, Subcutaneous, TID AC  ipratropium-albuterol, 3 mL, Nebulization, 4x Daily - RT  levothyroxine, 125 mcg, Oral, Daily  lidocaine, 20 mL, Infiltration, Once  losartan, 100 mg, Oral, Daily  sodium chloride, 10 mL, Intravenous, Q12H  pyridoxine, 100 mg, Oral, Daily    Infusions   Diet  Diet: Regular/House Diet; Texture: Regular Texture (IDDSI 7); Fluid Consistency: Thin (IDDSI 0)       Assessment/Plan     Active Hospital Problems    Diagnosis  POA   • **Cervical stenosis of spinal canal [M48.02]  Yes   • Postprocedural pneumothorax [J95.811]  No   • Severe aortic stenosis [I35.0]  Yes   • History of fusion of cervical spine [Z98.1]  Not Applicable   • HTN (hypertension) [I10]  Yes   • Obesity, Class III, BMI 40-49.9 (morbid obesity) ([facility]) [E66.01]  Yes   • Cervical spondylosis with myelopathy [M47.12]  Yes   • Hyperlipidemia associated with type 2 diabetes mellitus ([facility]) [E11.69, E78.5]  Yes   • Hypothyroidism [E03.9]  Yes      Resolved Hospital Problems   No resolved problems to display.   Cervical spinal stenosis with myelopathy  - Status post ACDF C3-C7 on 9/20/2022  - posterior fusion recommended, however this is delayed due to her multiple medical issues  - pain control as needed, PT/OT  - appreciate RICH recs    Severe Aortic Stenosis  - monitor volume status closely-she looks euvolemic currently  - appreciate cardiology recs-possible evaluation for SAVR/TAVR pending workup for lung nodule    COPD  - no exacerbation  - continue bronchodilators  - appreciate pulm recs    LLL Pulmonary Nodule/Paratracheal Adenopathy  - enlargement demonstrated on CT  - s/p CT-guided biopsy 1/13/23, complication of small left apical pneumothorax which will be monitored-this appears stable on follow up chest xray and will likely resolve on its own  - appreciate pulm recs    Type 2 DM  - holding home metformin  - BG are acceptable  - continue lantus  5 units nightly  - continue ssi/hypoglycemia protocol     Hypertension  - BP acceptable  - continue on norvasc and losartan     Hyperlipidemia  - continue statin    Hypothyroidism  - clinically euthyroid  - continue levothyroxine    SCDs for DVT prophylaxis.  Full code.  Discussed with patient and nursing staff.  Anticipate discharge home when cleared by consultants.      John Khan MD  [facility]  01/14/23  15:06 EST

## 2023-01-14 NOTE — PROGRESS NOTES
Name: Gabby Acosta ADMIT: 1/10/2023   : 1955  PCP: Danyelle Brush MD    MRN: [id number] LOS: 1 days   AGE/SEX: 67 y.o. female  ROOM: [facility]     Subjective   Subjective   CC: cervical myelopathy  No acute events. Patient had CT guided lung biopsy today which she tolerated well. She has no new complaints. She denies pain, cough, and dyspnea. No f/c/n/v/d.    Objective   Objective   Vital Signs  Temp:  [97.8 °F (36.6 °C)-98.6 °F (37 °C)] 98.5 °F (36.9 °C)  Heart Rate:  [78-90] 89  Resp:  [10-24] 18  BP: ()/(41-89) 118/73  SpO2:  [90 %-97 %] 91 %  on  Flow (L/min):  [2-4] 4;   Device (Oxygen Therapy): room air  Body mass index is 50.3 kg/m².  Physical Exam  Vitals and nursing note reviewed.   Constitutional:       General: She is not in acute distress.     Appearance: She is ill-appearing (chronically). She is not toxic-appearing or diaphoretic.   HENT:      Head: Normocephalic and atraumatic.      Nose: Nose normal.      Mouth/Throat:      Mouth: Mucous membranes are moist.      Pharynx: Oropharynx is clear.   Eyes:      Conjunctiva/sclera: Conjunctivae normal.      Pupils: Pupils are equal, round, and reactive to light.   Cardiovascular:      Rate and Rhythm: Normal rate and regular rhythm.      Pulses: Normal pulses.   Pulmonary:      Effort: Pulmonary effort is normal.      Breath sounds: Examination of the right-lower field reveals decreased breath sounds. Examination of the left-lower field reveals decreased breath sounds. Decreased breath sounds present.      Comments: Left-sided biopsy site dressed c/d/i no hematoma  Abdominal:      General: Bowel sounds are normal.      Palpations: Abdomen is soft.      Tenderness: There is no abdominal tenderness.   Musculoskeletal:         General: Swelling (trace BLE) present. No tenderness.      Cervical back: Normal range of motion and neck supple.   Skin:     General: Skin is warm and dry.      Capillary Refill: Capillary refill takes less than 2  seconds.   Neurological:      Mental Status: She is alert and oriented to person, place, and time.   Psychiatric:         Mood and Affect: Mood normal.         Behavior: Behavior normal.       Results Review     I reviewed the patient's new clinical results.  I reviewed the patient's telemetry  I reviewed the patient's chest xray  Results from last 7 days   Lab Units 01/13/23  0659 01/12/23  0741 01/11/23  0609 01/10/23  1439   WBC 10*3/mm3 8.08 9.43 7.91 8.65   HEMOGLOBIN g/dL 13.6 14.2 14.8 14.4   PLATELETS 10*3/mm3 171 172 167 194     Results from last 7 days   Lab Units 01/13/23  0659 01/12/23  0741 01/11/23  0609 01/10/23  1439   SODIUM mmol/L 137 136 134* 135*   POTASSIUM mmol/L 4.0 4.1 3.9 4.3   CHLORIDE mmol/L 99 96* 96* 99   CO2 mmol/L 31.2* 29.0 26.0 26.2   BUN mg/dL 19 18 14 19   CREATININE mg/dL 0.83 0.80 0.63 0.78   GLUCOSE mg/dL 151* 160* 166* 86   EGFR mL/min/1.73 77.9 81.4 98.0 83.9     Results from last 7 days   Lab Units 01/10/23  1439   ALBUMIN g/dL 4.0   BILIRUBIN mg/dL 0.3   ALK PHOS U/L 57   AST (SGOT) U/L 15   ALT (SGPT) U/L 13     Results from last 7 days   Lab Units 01/13/23  0659 01/12/23  0741 01/11/23  0609 01/10/23  1439   CALCIUM mg/dL 9.1 9.6 9.5 9.4   ALBUMIN g/dL  --   --   --  4.0   MAGNESIUM mg/dL  --  2.1 1.8 1.3*   PHOSPHORUS mg/dL  --  4.4 2.8 3.4       Glucose   Date/Time Value Ref Range Status   01/13/2023 1645 242 (H) 70 - 130 mg/dL Final     Comment:     Meter: [id number] : [id number] Hatchett Sherrish NA   01/13/2023 1114 127 70 - 130 mg/dL Final     Comment:     Meter: [id number] : [id number] Clay Cardenas BAO   01/13/2023 0739 167 (H) 70 - 130 mg/dL Final     Comment:     Meter: [id number] : hwillileonardo Hathaway RN   01/12/2023 2047 117 70 - 130 mg/dL Final     Comment:     Meter: [id number] : [id number] Remy Ott CNA   01/12/2023 1716 112 70 - 130 mg/dL Final     Comment:     Meter: [id number] : [id number] Matthew Clarke BAO   01/12/2023 1213  133 (H) 70 - 130 mg/dL Final     Comment:     Meter: [id number] : [id number] Matthew Clarke BAO   01/12/2023 0757 161 (H) 70 - 130 mg/dL Final     Comment:     Meter: [id number] : [id number] Matthew Clarke BAO       CT Chest With Contrast Diagnostic    Result Date: 1/12/2023   1. Enlarging left lower lobe pulmonary nodule and right paratracheal/subcarinal adenopathy. Further characterization with PET/CT or tissue diagnosis could be considered if not previously performed. 2. Improved aeration of the lung bases. 3. Filling defect in the midthoracic trachea favoring retained secretions but can further be evaluated as indicated. 4. Please see above for additional findings.  This report was finalized on 1/12/2023 11:59 AM by Dr. Susan Greer M.D.      XR Chest 1 View    Result Date: 1/13/2023  Stable small left apical pneumothorax following lung biopsy earlier today.  This report was finalized on 1/13/2023 6:58 PM by Dr. Carmelo Farias M.D.      XR Chest 1 View    Result Date: 1/13/2023  Tiny left apical pneumothorax not clearly seen on the comparison study. Follow-up chest x-ray recommended in about 1 hour to evaluate for stability.  Findings were discussed with the patient's RN at 1709 hours on 01/13/2023  This report was finalized on 1/13/2023 5:07 PM by Dr. Everette Chavez M.D.      XR Chest 1 View    Result Date: 1/13/2023  No appreciable pneumothorax  This report was finalized on 1/13/2023 2:43 PM by Dr. Everette Chavez M.D.      CT Needle Biopsy Lung    Result Date: 1/13/2023  Technically successful CT guided left lung biopsy.  Moderate sedation was provided under my direct supervision using 0.5 mg IV Versed and 25 mcg IV Fentanyl. The patient was independently monitored by a trained Department of Radiology RN using automated blood pressure, EKG, and pulse oximetry. My total intra-service time was 20 minutes.     Radiation dose reduction techniques were utilized, including automated exposure control and  exposure modulation based on body size.  This report was finalized on 1/13/2023 3:02 PM by Dr. Everette Chavez M.D.      Scheduled Medications  amLODIPine, 10 mg, Oral, Q24H  atorvastatin, 10 mg, Oral, Daily  budesonide-formoterol, 2 puff, Inhalation, BID - RT  buPROPion XL, 150 mg, Oral, Daily  cholecalciferol, 5,000 Units, Oral, Daily  fluticasone, 1 spray, Each Nare, BID  guaiFENesin, 600 mg, Oral, Q12H  insulin glargine, 5 Units, Subcutaneous, Nightly  insulin lispro, 0-7 Units, Subcutaneous, TID AC  ipratropium-albuterol, 3 mL, Nebulization, 4x Daily - RT  levothyroxine, 125 mcg, Oral, Daily  lidocaine, 20 mL, Infiltration, Once  losartan, 100 mg, Oral, Daily  sodium chloride, 10 mL, Intravenous, Q12H  pyridoxine, 100 mg, Oral, Daily    Infusions   Diet  Diet: Regular/House Diet; Texture: Regular Texture (IDDSI 7); Fluid Consistency: Thin (IDDSI 0)       Assessment/Plan     Active Hospital Problems    Diagnosis  POA   • **Cervical stenosis of spinal canal [M48.02]  Yes   • Severe aortic stenosis [I35.0]  Yes   • History of fusion of cervical spine [Z98.1]  Not Applicable   • HTN (hypertension) [I10]  Yes   • Obesity, Class III, BMI 40-49.9 (morbid obesity) ([facility]) [E66.01]  Yes   • Cervical spondylosis with myelopathy [M47.12]  Yes   • Hyperlipidemia associated with type 2 diabetes mellitus ([facility]) [E11.69, E78.5]  Yes   • Hypothyroidism [E03.9]  Yes      Resolved Hospital Problems   No resolved problems to display.   Cervical spinal stenosis with myelopathy  - Status post ACDF C3-C7 on 9/20/2022  - posterior fusion recommended, however this is delayed due to her multiple medical issues  - pain control as needed, PT/OT  - appreciate RICH recs    Severe Aortic Stenosis  - monitor volume status closely-she looks euvolemic currently  - appreciate cardiology recs-possible evaluation for SAVR/TAVR pending workup for lung nodule    COPD  - no exacerbation  - continue bronchodilators  - appreciate pulm recs    LLL  Pulmonary Nodule/Paratracheal Adenopathy  - enlargement demonstrated on CT  - s/p CT-guided biopsy 1/13/23  - follow-up chest xray shows small left apical pneumothorax which will be monitored  - appreciate pulm recs    Type 2 DM  - holding home metformin  - BG are acceptable  - continue lantus 5 units nightly  - continue ssi/hypoglycemia protocol     Hypertension  - BP acceptable  - continue on norvasc and losartan     Hyperlipidemia  - continue statin    Hypothyroidism  - clinically euthyroid  - continue levothyroxine    SCDs for DVT prophylaxis.  Full code.  Discussed with patient and nursing staff.  Anticipate discharge TBD timing yet to be determined.      John Khan MD  [facility]  01/13/23  19:10 EST

## 2023-01-14 NOTE — PLAN OF CARE
Problem: Adult Inpatient Plan of Care  Goal: Absence of Hospital-Acquired Illness or Injury  Outcome: Ongoing, Progressing  Intervention: Identify and Manage Fall Risk  Recent Flowsheet Documentation  Taken 1/14/2023 0420 by Suki Jaime RN  Safety Promotion/Fall Prevention: safety round/check completed  Taken 1/14/2023 0200 by Suki Jaime RN  Safety Promotion/Fall Prevention: safety round/check completed  Taken 1/14/2023 0045 by Suki Jaime RN  Safety Promotion/Fall Prevention: safety round/check completed  Taken 1/13/2023 2240 by Suki Jaime RN  Safety Promotion/Fall Prevention: safety round/check completed  Taken 1/13/2023 2100 by Suki Jaime RN  Safety Promotion/Fall Prevention:   safety round/check completed   fall prevention program maintained   clutter free environment maintained   assistive device/personal items within reach   activity supervised  Taken 1/13/2023 2000 by Suki Jaime RN  Safety Promotion/Fall Prevention: safety round/check completed  Intervention: Prevent Skin Injury  Recent Flowsheet Documentation  Taken 1/13/2023 2100 by Suki Jaime RN  Body Position:   position changed independently   legs elevated  Intervention: Prevent and Manage VTE (Venous Thromboembolism) Risk  Recent Flowsheet Documentation  Taken 1/13/2023 2100 by Suki Jaime RN  Activity Management:   activity adjusted per tolerance   activity encouraged   sitting, edge of bed  VTE Prevention/Management: patient refused intervention  Intervention: Prevent Infection  Recent Flowsheet Documentation  Taken 1/13/2023 2100 by Suki Jaime RN  Infection Prevention:   environmental surveillance performed   hand hygiene promoted   rest/sleep promoted   single patient room provided     Problem: Adult Inpatient Plan of Care  Goal: Optimal Comfort and Wellbeing  Outcome: Ongoing, Progressing  Intervention: Provide Person-Centered Care  Recent Flowsheet Documentation  Taken 1/13/2023 2100  by Suki Jaime, RN  Trust Relationship/Rapport:   care explained   choices provided   questions answered   questions encouraged     Problem: Fall Injury Risk  Goal: Absence of Fall and Fall-Related Injury  Outcome: Ongoing, Progressing  Intervention: Identify and Manage Contributors  Recent Flowsheet Documentation  Taken 1/13/2023 2100 by Suki Jaime RN  Medication Review/Management: medications reviewed  Self-Care Promotion: independence encouraged  Intervention: Promote Injury-Free Environment  Recent Flowsheet Documentation  Taken 1/14/2023 0420 by Suki Jaime, RN  Safety Promotion/Fall Prevention: safety round/check completed  Taken 1/14/2023 0200 by Suki Jaime, RN  Safety Promotion/Fall Prevention: safety round/check completed  Taken 1/14/2023 0045 by Suki Jaime, RN  Safety Promotion/Fall Prevention: safety round/check completed  Taken 1/13/2023 2240 by Suki Jaime, RN  Safety Promotion/Fall Prevention: safety round/check completed  Taken 1/13/2023 2100 by Suki Jaime RN  Safety Promotion/Fall Prevention:   safety round/check completed   fall prevention program maintained   clutter free environment maintained   assistive device/personal items within reach   activity supervised  Taken 1/13/2023 2000 by Suki Jaime, RN  Safety Promotion/Fall Prevention: safety round/check completed   Goal Outcome Evaluation:

## 2023-01-15 LAB
ANION GAP SERPL CALCULATED.3IONS-SCNC: 8.7 MMOL/L (ref 5–15)
BASOPHILS # BLD AUTO: 0.03 10*3/MM3 (ref 0–0.2)
BASOPHILS NFR BLD AUTO: 0.4 % (ref 0–1.5)
BUN SERPL-MCNC: 17 MG/DL (ref 8–23)
BUN/CREAT SERPL: 20.7 (ref 7–25)
CALCIUM SPEC-SCNC: 8.9 MG/DL (ref 8.6–10.5)
CHLORIDE SERPL-SCNC: 99 MMOL/L (ref 98–107)
CO2 SERPL-SCNC: 27.3 MMOL/L (ref 22–29)
CREAT SERPL-MCNC: 0.82 MG/DL (ref 0.57–1)
DEPRECATED RDW RBC AUTO: 37 FL (ref 37–54)
EGFRCR SERPLBLD CKD-EPI 2021: 79 ML/MIN/1.73
EOSINOPHIL # BLD AUTO: 0.2 10*3/MM3 (ref 0–0.4)
EOSINOPHIL NFR BLD AUTO: 2.8 % (ref 0.3–6.2)
ERYTHROCYTE [DISTWIDTH] IN BLOOD BY AUTOMATED COUNT: 11.3 % (ref 12.3–15.4)
GLUCOSE BLDC GLUCOMTR-MCNC: 126 MG/DL (ref 70–130)
GLUCOSE BLDC GLUCOMTR-MCNC: 140 MG/DL (ref 70–130)
GLUCOSE BLDC GLUCOMTR-MCNC: 189 MG/DL (ref 70–130)
GLUCOSE SERPL-MCNC: 142 MG/DL (ref 65–99)
HCT VFR BLD AUTO: 37.5 % (ref 34–46.6)
HGB BLD-MCNC: 12.9 G/DL (ref 12–15.9)
IMM GRANULOCYTES # BLD AUTO: 0.05 10*3/MM3 (ref 0–0.05)
IMM GRANULOCYTES NFR BLD AUTO: 0.7 % (ref 0–0.5)
LYMPHOCYTES # BLD AUTO: 1.77 10*3/MM3 (ref 0.7–3.1)
LYMPHOCYTES NFR BLD AUTO: 25 % (ref 19.6–45.3)
MCH RBC QN AUTO: 30.9 PG (ref 26.6–33)
MCHC RBC AUTO-ENTMCNC: 34.4 G/DL (ref 31.5–35.7)
MCV RBC AUTO: 89.7 FL (ref 79–97)
MONOCYTES # BLD AUTO: 0.81 10*3/MM3 (ref 0.1–0.9)
MONOCYTES NFR BLD AUTO: 11.4 % (ref 5–12)
NEUTROPHILS NFR BLD AUTO: 4.23 10*3/MM3 (ref 1.7–7)
NEUTROPHILS NFR BLD AUTO: 59.7 % (ref 42.7–76)
NRBC BLD AUTO-RTO: 0 /100 WBC (ref 0–0.2)
PLATELET # BLD AUTO: 164 10*3/MM3 (ref 140–450)
PMV BLD AUTO: 10.9 FL (ref 6–12)
POTASSIUM SERPL-SCNC: 3.6 MMOL/L (ref 3.5–5.2)
RBC # BLD AUTO: 4.18 10*6/MM3 (ref 3.77–5.28)
SODIUM SERPL-SCNC: 135 MMOL/L (ref 136–145)
WBC NRBC COR # BLD: 7.09 10*3/MM3 (ref 3.4–10.8)

## 2023-01-15 PROCEDURE — 80048 BASIC METABOLIC PNL TOTAL CA: CPT | Performed by: INTERNAL MEDICINE

## 2023-01-15 PROCEDURE — 94664 DEMO&/EVAL PT USE INHALER: CPT

## 2023-01-15 PROCEDURE — 94799 UNLISTED PULMONARY SVC/PX: CPT

## 2023-01-15 PROCEDURE — 82962 GLUCOSE BLOOD TEST: CPT

## 2023-01-15 PROCEDURE — 85025 COMPLETE CBC W/AUTO DIFF WBC: CPT | Performed by: INTERNAL MEDICINE

## 2023-01-15 PROCEDURE — 63710000001 INSULIN LISPRO (HUMAN) PER 5 UNITS: Performed by: STUDENT IN AN ORGANIZED HEALTH CARE EDUCATION/TRAINING PROGRAM

## 2023-01-15 RX ADMIN — ATORVASTATIN CALCIUM 10 MG: 20 TABLET, FILM COATED ORAL at 09:46

## 2023-01-15 RX ADMIN — LORAZEPAM 0.5 MG: 0.5 TABLET ORAL at 09:54

## 2023-01-15 RX ADMIN — FLUTICASONE PROPIONATE 1 SPRAY: 50 SPRAY, METERED NASAL at 09:46

## 2023-01-15 RX ADMIN — Medication 100 MG: at 09:46

## 2023-01-15 RX ADMIN — LORAZEPAM 0.5 MG: 0.5 TABLET ORAL at 21:04

## 2023-01-15 RX ADMIN — INSULIN LISPRO 2 UNITS: 100 INJECTION, SOLUTION INTRAVENOUS; SUBCUTANEOUS at 17:45

## 2023-01-15 RX ADMIN — LEVOTHYROXINE SODIUM 125 MCG: 0.12 TABLET ORAL at 09:46

## 2023-01-15 RX ADMIN — LORAZEPAM 0.5 MG: 0.5 TABLET ORAL at 03:23

## 2023-01-15 RX ADMIN — BUDESONIDE AND FORMOTEROL FUMARATE DIHYDRATE 2 PUFF: 160; 4.5 AEROSOL RESPIRATORY (INHALATION) at 21:00

## 2023-01-15 RX ADMIN — IPRATROPIUM BROMIDE AND ALBUTEROL SULFATE 3 ML: 2.5; .5 SOLUTION RESPIRATORY (INHALATION) at 12:43

## 2023-01-15 RX ADMIN — GUAIFENESIN 600 MG: 600 TABLET, EXTENDED RELEASE ORAL at 21:04

## 2023-01-15 RX ADMIN — BUDESONIDE AND FORMOTEROL FUMARATE DIHYDRATE 2 PUFF: 160; 4.5 AEROSOL RESPIRATORY (INHALATION) at 09:34

## 2023-01-15 RX ADMIN — GUAIFENESIN 600 MG: 600 TABLET, EXTENDED RELEASE ORAL at 09:46

## 2023-01-15 RX ADMIN — BUPROPION HYDROCHLORIDE 150 MG: 150 TABLET, EXTENDED RELEASE ORAL at 09:46

## 2023-01-15 RX ADMIN — AMLODIPINE BESYLATE 10 MG: 10 TABLET ORAL at 09:46

## 2023-01-15 RX ADMIN — LOSARTAN POTASSIUM 100 MG: 100 TABLET, FILM COATED ORAL at 09:46

## 2023-01-15 RX ADMIN — Medication 10 ML: at 21:04

## 2023-01-15 RX ADMIN — FLUTICASONE PROPIONATE 1 SPRAY: 50 SPRAY, METERED NASAL at 21:04

## 2023-01-15 RX ADMIN — IPRATROPIUM BROMIDE AND ALBUTEROL SULFATE 3 ML: 2.5; .5 SOLUTION RESPIRATORY (INHALATION) at 16:30

## 2023-01-15 RX ADMIN — INSULIN GLARGINE-YFGN 5 UNITS: 100 INJECTION, SOLUTION SUBCUTANEOUS at 21:04

## 2023-01-15 RX ADMIN — Medication 5000 UNITS: at 09:46

## 2023-01-15 RX ADMIN — Medication 10 ML: at 09:46

## 2023-01-15 NOTE — PLAN OF CARE
Goal Outcome Evaluation:               VSS. Up to chair all day. Refuses SCDs. On room air to 3 liters depending on tolerance.

## 2023-01-15 NOTE — PROGRESS NOTES
Name: Gabby Acosta ADMIT: 1/10/2023   : 1955  PCP: Danyelle Brush MD    MRN: [id number] LOS: 3 days   AGE/SEX: 67 y.o. female  ROOM: [facility]     Subjective   Subjective   CC: cervical myelopathy  No acute events. Patient has no new complaints. She denies pain, cough, and dyspnea. No f/c/n/v/d.  No family at bedside.    Objective   Objective   Vital Signs  Temp:  [97.8 °F (36.6 °C)-98.6 °F (37 °C)] 98.5 °F (36.9 °C)  Heart Rate:  [68-85] 77  Resp:  [20] 20  BP: (104-163)/(51-79) 143/71  SpO2:  [85 %-100 %] 92 %  on  Flow (L/min):  [3] 3;   Device (Oxygen Therapy): room air  Body mass index is 50.3 kg/m².  Physical Exam  Vitals and nursing note reviewed.   Constitutional:       General: She is not in acute distress.     Appearance: She is ill-appearing (chronically). She is not toxic-appearing or diaphoretic.   HENT:      Head: Normocephalic and atraumatic.      Nose: Nose normal.      Mouth/Throat:      Mouth: Mucous membranes are moist.      Pharynx: Oropharynx is clear.   Eyes:      Conjunctiva/sclera: Conjunctivae normal.      Pupils: Pupils are equal, round, and reactive to light.   Cardiovascular:      Rate and Rhythm: Normal rate and regular rhythm.      Pulses: Normal pulses.   Pulmonary:      Effort: Pulmonary effort is normal.      Breath sounds: Examination of the right-lower field reveals decreased breath sounds. Examination of the left-lower field reveals decreased breath sounds. Decreased breath sounds present.      Comments: Left-sided biopsy site dressed c/d/i no hematoma  Abdominal:      General: Bowel sounds are normal.      Palpations: Abdomen is soft.      Tenderness: There is no abdominal tenderness.   Musculoskeletal:         General: Swelling (trace BLE) present. No tenderness.      Cervical back: Normal range of motion and neck supple.   Skin:     General: Skin is warm and dry.      Capillary Refill: Capillary refill takes less than 2 seconds.   Neurological:      Mental Status: She  is alert and oriented to person, place, and time.   Psychiatric:         Mood and Affect: Mood normal.         Behavior: Behavior normal.       Results Review     I reviewed the patient's new clinical results.  I reviewed the patient's telemetry  I reviewed the patient's chest xray  Results from last 7 days   Lab Units 01/15/23  0549 01/14/23  0649 01/13/23  0659 01/12/23  0741   WBC 10*3/mm3 7.09 7.87 8.08 9.43   HEMOGLOBIN g/dL 12.9 13.1 13.6 14.2   PLATELETS 10*3/mm3 164 168 171 172     Results from last 7 days   Lab Units 01/15/23  0549 01/14/23  0649 01/13/23  0659 01/12/23  0741   SODIUM mmol/L 135* 139 137 136   POTASSIUM mmol/L 3.6 3.6 4.0 4.1   CHLORIDE mmol/L 99 99 99 96*   CO2 mmol/L 27.3 28.5 31.2* 29.0   BUN mg/dL 17 19 19 18   CREATININE mg/dL 0.82 0.78 0.83 0.80   GLUCOSE mg/dL 142* 133* 151* 160*   EGFR mL/min/1.73 79.0 83.9 77.9 81.4     Results from last 7 days   Lab Units 01/10/23  1439   ALBUMIN g/dL 4.0   BILIRUBIN mg/dL 0.3   ALK PHOS U/L 57   AST (SGOT) U/L 15   ALT (SGPT) U/L 13     Results from last 7 days   Lab Units 01/15/23  0549 01/14/23  0649 01/13/23  0659 01/12/23  0741 01/11/23  0609 01/10/23  1439   CALCIUM mg/dL 8.9 8.9 9.1 9.6 9.5 9.4   ALBUMIN g/dL  --   --   --   --   --  4.0   MAGNESIUM mg/dL  --   --   --  2.1 1.8 1.3*   PHOSPHORUS mg/dL  --   --   --  4.4 2.8 3.4       Glucose   Date/Time Value Ref Range Status   01/15/2023 1704 189 (H) 70 - 130 mg/dL Final     Comment:     Meter: [id number] : [id number] Matthewrobin Clarke BAO   01/15/2023 1156 126 70 - 130 mg/dL Final     Comment:     Meter: [id number] : [id number] Matthew Stephensha BAO   01/15/2023 0841 140 (H) 70 - 130 mg/dL Final     Comment:     Meter: [id number] : [id number] Matthew Clarke BAO   01/14/2023 2103 177 (H) 70 - 130 mg/dL Final     Comment:     Meter: [id number] : [id number] Addison Cohn RN   01/14/2023 1659 135 (H) 70 - 130 mg/dL Final     Comment:     Meter: [id number] : [id number] Matthew Clarke  BAO   01/14/2023 1157 117 70 - 130 mg/dL Final     Comment:     Meter: [id number] : [id number] Matthew Clarke BAO   01/14/2023 0744 155 (H) 70 - 130 mg/dL Final     Comment:     Meter: [id number] : [id number] Matthew Clarke BAO       XR Chest 1 View    Result Date: 1/14/2023  No significant change.  This report was finalized on 1/14/2023 2:43 PM by Dr. Harshad Castro M.D.      XR Chest 1 View    Result Date: 1/13/2023  Stable small left apical pneumothorax following lung biopsy earlier today.  This report was finalized on 1/13/2023 6:58 PM by Dr. Carmelo Farias M.D.      Scheduled Medications  amLODIPine, 10 mg, Oral, Q24H  atorvastatin, 10 mg, Oral, Daily  budesonide-formoterol, 2 puff, Inhalation, BID - RT  buPROPion XL, 150 mg, Oral, Daily  cholecalciferol, 5,000 Units, Oral, Daily  fluticasone, 1 spray, Each Nare, BID  guaiFENesin, 600 mg, Oral, Q12H  insulin glargine, 5 Units, Subcutaneous, Nightly  insulin lispro, 0-7 Units, Subcutaneous, TID AC  ipratropium-albuterol, 3 mL, Nebulization, 4x Daily - RT  levothyroxine, 125 mcg, Oral, Daily  lidocaine, 20 mL, Infiltration, Once  losartan, 100 mg, Oral, Daily  sodium chloride, 10 mL, Intravenous, Q12H  pyridoxine, 100 mg, Oral, Daily    Infusions   Diet  Diet: Regular/House Diet; Texture: Regular Texture (IDDSI 7); Fluid Consistency: Thin (IDDSI 0)       Assessment/Plan     Active Hospital Problems    Diagnosis  POA   • **Cervical stenosis of spinal canal [M48.02]  Yes   • Postprocedural pneumothorax [J95.811]  No   • Severe aortic stenosis [I35.0]  Yes   • History of fusion of cervical spine [Z98.1]  Not Applicable   • HTN (hypertension) [I10]  Yes   • Obesity, Class III, BMI 40-49.9 (morbid obesity) ([facility]) [E66.01]  Yes   • Cervical spondylosis with myelopathy [M47.12]  Yes   • Hyperlipidemia associated with type 2 diabetes mellitus ([facility]) [E11.69, E78.5]  Yes   • Hypothyroidism [E03.9]  Yes      Resolved Hospital Problems   No resolved problems to  display.   Cervical spinal stenosis with myelopathy  - Status post ACDF C3-C7 on 9/20/2022  - posterior fusion recommended, however this is delayed due to her multiple medical issues  - pain control as needed, PT/OT  - appreciate RICH recs    Severe Aortic Stenosis  - monitor volume status closely-she looks euvolemic currently  - appreciate cardiology recs-possible evaluation for SAVR/TAVR pending workup for lung nodule    COPD  - no exacerbation  - continue bronchodilators  - appreciate pulm recs    LLL Pulmonary Nodule/Paratracheal Adenopathy  - enlargement demonstrated on CT  - s/p CT-guided biopsy 1/13/23, complication of small left apical pneumothorax-stable on chest xray  - appreciate pulm recs    Type 2 DM  - holding home metformin  - BG are acceptable  - continue lantus 5 units nightly  - continue ssi/hypoglycemia protocol     Hypertension  - BP acceptable  - continue on norvasc and losartan     Hyperlipidemia  - continue statin    Hypothyroidism  - clinically euthyroid  - continue levothyroxine    SCDs for DVT prophylaxis. (Patient refusing)  Full code.  Discussed with patient and nursing staff.  Anticipate discharge home when cleared by consultants.      John Khna MD  [facility]  01/15/23  17:29 EST

## 2023-01-16 ENCOUNTER — APPOINTMENT (OUTPATIENT)
Dept: GENERAL RADIOLOGY | Facility: HOSPITAL | Age: 68
DRG: 552 | End: 2023-01-16
Payer: MEDICARE

## 2023-01-16 LAB
ANION GAP SERPL CALCULATED.3IONS-SCNC: 11.3 MMOL/L (ref 5–15)
BACTERIA SPEC AEROBE CULT: NORMAL
BASOPHILS # BLD AUTO: 0.04 10*3/MM3 (ref 0–0.2)
BASOPHILS NFR BLD AUTO: 0.6 % (ref 0–1.5)
BUN SERPL-MCNC: 15 MG/DL (ref 8–23)
BUN/CREAT SERPL: 18.8 (ref 7–25)
CALCIUM SPEC-SCNC: 9.1 MG/DL (ref 8.6–10.5)
CHLORIDE SERPL-SCNC: 101 MMOL/L (ref 98–107)
CO2 SERPL-SCNC: 26.7 MMOL/L (ref 22–29)
CREAT SERPL-MCNC: 0.8 MG/DL (ref 0.57–1)
DEPRECATED RDW RBC AUTO: 36.6 FL (ref 37–54)
EGFRCR SERPLBLD CKD-EPI 2021: 81.4 ML/MIN/1.73
EOSINOPHIL # BLD AUTO: 0.21 10*3/MM3 (ref 0–0.4)
EOSINOPHIL NFR BLD AUTO: 3.3 % (ref 0.3–6.2)
ERYTHROCYTE [DISTWIDTH] IN BLOOD BY AUTOMATED COUNT: 11.3 % (ref 12.3–15.4)
GLUCOSE BLDC GLUCOMTR-MCNC: 120 MG/DL (ref 70–130)
GLUCOSE BLDC GLUCOMTR-MCNC: 157 MG/DL (ref 70–130)
GLUCOSE BLDC GLUCOMTR-MCNC: 207 MG/DL (ref 70–130)
GLUCOSE SERPL-MCNC: 130 MG/DL (ref 65–99)
GRAM STN SPEC: NORMAL
GRAM STN SPEC: NORMAL
HCT VFR BLD AUTO: 37 % (ref 34–46.6)
HGB BLD-MCNC: 12.6 G/DL (ref 12–15.9)
IMM GRANULOCYTES # BLD AUTO: 0.01 10*3/MM3 (ref 0–0.05)
IMM GRANULOCYTES NFR BLD AUTO: 0.2 % (ref 0–0.5)
LYMPHOCYTES # BLD AUTO: 1.74 10*3/MM3 (ref 0.7–3.1)
LYMPHOCYTES NFR BLD AUTO: 27.3 % (ref 19.6–45.3)
MCH RBC QN AUTO: 30.7 PG (ref 26.6–33)
MCHC RBC AUTO-ENTMCNC: 34.1 G/DL (ref 31.5–35.7)
MCV RBC AUTO: 90 FL (ref 79–97)
MONOCYTES # BLD AUTO: 0.73 10*3/MM3 (ref 0.1–0.9)
MONOCYTES NFR BLD AUTO: 11.5 % (ref 5–12)
NEUTROPHILS NFR BLD AUTO: 3.64 10*3/MM3 (ref 1.7–7)
NEUTROPHILS NFR BLD AUTO: 57.1 % (ref 42.7–76)
NRBC BLD AUTO-RTO: 0 /100 WBC (ref 0–0.2)
PLATELET # BLD AUTO: 157 10*3/MM3 (ref 140–450)
PMV BLD AUTO: 11.2 FL (ref 6–12)
POTASSIUM SERPL-SCNC: 3.9 MMOL/L (ref 3.5–5.2)
RBC # BLD AUTO: 4.11 10*6/MM3 (ref 3.77–5.28)
SODIUM SERPL-SCNC: 139 MMOL/L (ref 136–145)
WBC NRBC COR # BLD: 6.37 10*3/MM3 (ref 3.4–10.8)

## 2023-01-16 PROCEDURE — 63710000001 INSULIN LISPRO (HUMAN) PER 5 UNITS: Performed by: STUDENT IN AN ORGANIZED HEALTH CARE EDUCATION/TRAINING PROGRAM

## 2023-01-16 PROCEDURE — 94761 N-INVAS EAR/PLS OXIMETRY MLT: CPT

## 2023-01-16 PROCEDURE — 71045 X-RAY EXAM CHEST 1 VIEW: CPT

## 2023-01-16 PROCEDURE — 80048 BASIC METABOLIC PNL TOTAL CA: CPT | Performed by: INTERNAL MEDICINE

## 2023-01-16 PROCEDURE — 82962 GLUCOSE BLOOD TEST: CPT

## 2023-01-16 PROCEDURE — 94799 UNLISTED PULMONARY SVC/PX: CPT

## 2023-01-16 PROCEDURE — 63710000001 DIPHENHYDRAMINE PER 50 MG: Performed by: STUDENT IN AN ORGANIZED HEALTH CARE EDUCATION/TRAINING PROGRAM

## 2023-01-16 PROCEDURE — 94664 DEMO&/EVAL PT USE INHALER: CPT

## 2023-01-16 PROCEDURE — 85025 COMPLETE CBC W/AUTO DIFF WBC: CPT | Performed by: INTERNAL MEDICINE

## 2023-01-16 PROCEDURE — 99232 SBSQ HOSP IP/OBS MODERATE 35: CPT | Performed by: STUDENT IN AN ORGANIZED HEALTH CARE EDUCATION/TRAINING PROGRAM

## 2023-01-16 RX ADMIN — Medication 100 MG: at 08:23

## 2023-01-16 RX ADMIN — ATORVASTATIN CALCIUM 10 MG: 20 TABLET, FILM COATED ORAL at 08:23

## 2023-01-16 RX ADMIN — BUPROPION HYDROCHLORIDE 150 MG: 150 TABLET, EXTENDED RELEASE ORAL at 08:23

## 2023-01-16 RX ADMIN — GUAIFENESIN 600 MG: 600 TABLET, EXTENDED RELEASE ORAL at 21:16

## 2023-01-16 RX ADMIN — INSULIN LISPRO 2 UNITS: 100 INJECTION, SOLUTION INTRAVENOUS; SUBCUTANEOUS at 13:34

## 2023-01-16 RX ADMIN — LORAZEPAM 0.5 MG: 0.5 TABLET ORAL at 08:23

## 2023-01-16 RX ADMIN — LEVOTHYROXINE SODIUM 125 MCG: 0.12 TABLET ORAL at 08:24

## 2023-01-16 RX ADMIN — GUAIFENESIN 600 MG: 600 TABLET, EXTENDED RELEASE ORAL at 08:24

## 2023-01-16 RX ADMIN — BUDESONIDE AND FORMOTEROL FUMARATE DIHYDRATE 2 PUFF: 160; 4.5 AEROSOL RESPIRATORY (INHALATION) at 08:11

## 2023-01-16 RX ADMIN — DIPHENHYDRAMINE HYDROCHLORIDE 25 MG: 25 CAPSULE ORAL at 02:11

## 2023-01-16 RX ADMIN — Medication 5000 UNITS: at 08:23

## 2023-01-16 RX ADMIN — INSULIN GLARGINE-YFGN 5 UNITS: 100 INJECTION, SOLUTION SUBCUTANEOUS at 21:16

## 2023-01-16 RX ADMIN — IPRATROPIUM BROMIDE AND ALBUTEROL SULFATE 3 ML: 2.5; .5 SOLUTION RESPIRATORY (INHALATION) at 11:07

## 2023-01-16 RX ADMIN — IPRATROPIUM BROMIDE AND ALBUTEROL SULFATE 3 ML: 2.5; .5 SOLUTION RESPIRATORY (INHALATION) at 16:18

## 2023-01-16 RX ADMIN — Medication 10 ML: at 21:16

## 2023-01-16 RX ADMIN — LORAZEPAM 0.5 MG: 0.5 TABLET ORAL at 21:16

## 2023-01-16 RX ADMIN — Medication 10 ML: at 08:26

## 2023-01-16 RX ADMIN — LOSARTAN POTASSIUM 100 MG: 100 TABLET, FILM COATED ORAL at 08:24

## 2023-01-16 RX ADMIN — INSULIN LISPRO 3 UNITS: 100 INJECTION, SOLUTION INTRAVENOUS; SUBCUTANEOUS at 16:56

## 2023-01-16 RX ADMIN — AMLODIPINE BESYLATE 10 MG: 10 TABLET ORAL at 08:23

## 2023-01-16 RX ADMIN — BUDESONIDE AND FORMOTEROL FUMARATE DIHYDRATE 2 PUFF: 160; 4.5 AEROSOL RESPIRATORY (INHALATION) at 19:33

## 2023-01-16 RX ADMIN — LORAZEPAM 0.5 MG: 0.5 TABLET ORAL at 15:08

## 2023-01-16 RX ADMIN — FLUTICASONE PROPIONATE 1 SPRAY: 50 SPRAY, METERED NASAL at 08:22

## 2023-01-16 NOTE — PROGRESS NOTES
"   LOS: 4 days   Patient Care Team:  Danyelle Brush MD as PCP - General (Pediatrics)    Chief Complaint:     F/u AS    Interval History:     She is in good spirits today.  She is anxiously awaiting her biopsy results and states that she is \"nervous.\"    On further questioning, she states that she does have some dyspnea on exertion when she does rigorous activities like cleaning her bathtub.  She states that she usually has to stop jail through due to dyspnea.  But she does state that with small ambulation on her rolling walker, she does not have to stop due to shortness of breath.  She states that she could \"unhook herself from these monitors, walk to the bathroom, and walk back and not be short of breath\".    Echo 1/11/23  Interpretation Summary       •  Left ventricular ejection fraction appears to be greater than 70%.  •  Left ventricular wall thickness is consistent with mild concentric hypertrophy.  •  Left ventricular diastolic function is consistent with (grade I) impaired relaxation.  •  Normal right ventricular cavity size noted. Hyperdynamic right ventricular systolic function noted.  •  The aortic valve leaflets are not well visualized, although appear heavily calcified  •  Severe aortic valve stenosis is present. Aortic valve area is 0.99 cm2. Aortic valve maximum pressure gradient is 77.5 mmHg. Aortic valve mean pressure gradient is 39.2 mmHg.  •  Calculated right ventricular systolic pressure from tricuspid regurgitation is 21 mmHg.  •  There is no evidence of pericardial effusion. . There is evidence of a fat pad present.         Objective   Vital Signs  Temp:  [97.7 °F (36.5 °C)-98.5 °F (36.9 °C)] 98.5 °F (36.9 °C)  Heart Rate:  [65-85] 65  Resp:  [16-20] 16  BP: (105-143)/(51-71) 140/71  No intake or output data in the 24 hours ending 01/16/23 1037    Comfortable NAD, neck brace in place  Neck supple, no JVD or thyromegaly appreciated  S1/S2 RRR, no /r/g, 3/6 BENNETT  Lungs CTA B, normal " effort  Abdomen S/NT/ND (+) BS, no HSM appreciated  Extremities warm, no clubbing, cyanosis, or edema  No visible or palpable skin lesions  A/Ox4, mood and affect appropriate    Results Review:      Results from last 7 days   Lab Units 01/16/23  0700 01/15/23  0549 01/14/23  0649   SODIUM mmol/L 139 135* 139   POTASSIUM mmol/L 3.9 3.6 3.6   CHLORIDE mmol/L 101 99 99   CO2 mmol/L 26.7 27.3 28.5   BUN mg/dL 15 17 19   CREATININE mg/dL 0.80 0.82 0.78   GLUCOSE mg/dL 130* 142* 133*   CALCIUM mg/dL 9.1 8.9 8.9         Results from last 7 days   Lab Units 01/16/23  0700 01/15/23  0549 01/14/23  0649   WBC 10*3/mm3 6.37 7.09 7.87   HEMOGLOBIN g/dL 12.6 12.9 13.1   HEMATOCRIT % 37.0 37.5 40.4   PLATELETS 10*3/mm3 157 164 168     Results from last 7 days   Lab Units 01/13/23  1117   INR  0.96         Results from last 7 days   Lab Units 01/12/23  0741   MAGNESIUM mg/dL 2.1           I reviewed the patient's new clinical results.  I personally viewed and interpreted the patient's EKG/Telemetry data        Medication Review:   amLODIPine, 10 mg, Oral, Q24H  atorvastatin, 10 mg, Oral, Daily  budesonide-formoterol, 2 puff, Inhalation, BID - RT  buPROPion XL, 150 mg, Oral, Daily  cholecalciferol, 5,000 Units, Oral, Daily  fluticasone, 1 spray, Each Nare, BID  guaiFENesin, 600 mg, Oral, Q12H  insulin glargine, 5 Units, Subcutaneous, Nightly  insulin lispro, 0-7 Units, Subcutaneous, TID AC  ipratropium-albuterol, 3 mL, Nebulization, 4x Daily - RT  levothyroxine, 125 mcg, Oral, Daily  lidocaine, 20 mL, Infiltration, Once  losartan, 100 mg, Oral, Daily  sodium chloride, 10 mL, Intravenous, Q12H  pyridoxine, 100 mg, Oral, Daily             Assessment & Plan       Cervical stenosis of spinal canal    HTN (hypertension)    Cervical spondylosis with myelopathy    Hypothyroidism    Hyperlipidemia associated with type 2 diabetes mellitus (HCC)    Obesity, Class III, BMI 40-49.9 (morbid obesity) (HCC)    History of fusion of cervical  spine    Severe aortic stenosis    Postprocedural pneumothorax    1. Severe aortic stenosis   1. Limited visualization of valve morphology on transthoracic echocardiogram.     2. normal BNP 1/13/23, intracardiac pressures normal, no signs of heart failure.  3. Lung cancer work-up ongoing.  I think at this point, we will follow peripherally regarding the lung biopsy results.  This could  drastically.  4. Will discuss with our structural heart team once lung biopsy results are back.   Her aortic pressure regimens are consistent with severe AS, although on the lower side of severe.  Given her overall clinical status, assessment of true symptomatology of this aortic stenosis is nearly impossible.  The preoperative echocardiogram was not obtained for reasons of severe dyspnea or heart failure, rather it was done for murmur and we essentially stumbled upon severe aortic stenosis.    5. I will discuss this with our structural cardiologist, after biopsy results, to determine if intervention is required prior to her potential cervical spine operation.  Patient noted to have severe calcification in all coronary distributions and will likely need ischemic work-up prior to consideration of valve intervention.  2. Cervical stenosis -surgical intervention is on hold for now pending above workup  3. COPD  4. Lung nodules - biopsy 1/13/23 -- path pending   5. Hypertension  6. Diabetes         Lawson Clement MD  01/16/23  10:37 EST

## 2023-01-16 NOTE — PLAN OF CARE
Problem: Adult Inpatient Plan of Care  Goal: Plan of Care Review  Outcome: Ongoing, Progressing  Goal: Patient-Specific Goal (Individualized)  Outcome: Ongoing, Progressing  Goal: Absence of Hospital-Acquired Illness or Injury  Outcome: Ongoing, Progressing  Intervention: Identify and Manage Fall Risk  Recent Flowsheet Documentation  Taken 1/16/2023 0017 by Tevin Joshi RN  Safety Promotion/Fall Prevention:   activity supervised   assistive device/personal items within reach   safety round/check completed  Taken 1/15/2023 2210 by Tevin Joshi RN  Safety Promotion/Fall Prevention:   activity supervised   assistive device/personal items within reach   safety round/check completed  Taken 1/15/2023 2010 by Tevin Joshi RN  Safety Promotion/Fall Prevention:   activity supervised   assistive device/personal items within reach   safety round/check completed  Intervention: Prevent Skin Injury  Recent Flowsheet Documentation  Taken 1/16/2023 0017 by Tevin Joshi RN  Skin Protection: adhesive use limited  Taken 1/15/2023 2010 by Tevin Joshi RN  Skin Protection:   adhesive use limited   incontinence pads utilized  Intervention: Prevent and Manage VTE (Venous Thromboembolism) Risk  Recent Flowsheet Documentation  Taken 1/16/2023 0017 by Tevin Joshi RN  VTE Prevention/Management: patient refused intervention  Range of Motion: active ROM (range of motion) encouraged  Taken 1/15/2023 2010 by Tevin Joshi RN  VTE Prevention/Management: patient refused intervention  Range of Motion: active ROM (range of motion) encouraged  Intervention: Prevent Infection  Recent Flowsheet Documentation  Taken 1/16/2023 0017 by Tevin Joshi RN  Infection Prevention: rest/sleep promoted  Taken 1/15/2023 2210 by Tevin Joshi RN  Infection Prevention: rest/sleep promoted  Taken 1/15/2023 2010 by Tevin Joshi RN  Infection Prevention: rest/sleep promoted  Goal: Optimal Comfort and Wellbeing  Outcome:  Ongoing, Progressing  Intervention: Monitor Pain and Promote Comfort  Recent Flowsheet Documentation  Taken 1/15/2023 2010 by Tevin Joshi RN  Pain Management Interventions: (medication available as needed) see MAR  Intervention: Provide Person-Centered Care  Recent Flowsheet Documentation  Taken 1/16/2023 0017 by Tevin Joshi RN  Trust Relationship/Rapport:   care explained   choices provided   questions answered   questions encouraged  Taken 1/15/2023 2010 by Tevin Joshi RN  Trust Relationship/Rapport:   care explained   choices provided   questions answered   questions encouraged  Goal: Readiness for Transition of Care  Outcome: Ongoing, Progressing     Problem: Fall Injury Risk  Goal: Absence of Fall and Fall-Related Injury  Outcome: Ongoing, Progressing  Intervention: Identify and Manage Contributors  Recent Flowsheet Documentation  Taken 1/16/2023 0017 by Tevin Joshi RN  Medication Review/Management: medications reviewed  Taken 1/15/2023 2210 by Tevin Joshi RN  Medication Review/Management: medications reviewed  Taken 1/15/2023 2010 by Tevin Joshi RN  Medication Review/Management: medications reviewed  Intervention: Promote Injury-Free Environment  Recent Flowsheet Documentation  Taken 1/16/2023 0017 by Tevin Joshi RN  Safety Promotion/Fall Prevention:   activity supervised   assistive device/personal items within reach   safety round/check completed  Taken 1/15/2023 2210 by Tevin Joshi RN  Safety Promotion/Fall Prevention:   activity supervised   assistive device/personal items within reach   safety round/check completed  Taken 1/15/2023 2010 by Tevin Joshi RN  Safety Promotion/Fall Prevention:   activity supervised   assistive device/personal items within reach   safety round/check completed   Goal Outcome Evaluation:

## 2023-01-16 NOTE — PROGRESS NOTES
LOS: 4 days   Patient Care Team:  Danyelle Brush MD as PCP - General (Pediatrics)    Subjective     Following patient for lung nodule with mediastinal lymphadenopathy, COPD.  Patient new to me today I have reviewed records former smoker with known COPD with a pulmonary nodule and the left lower lobe 15 mm and enlarging with mediastinal adenopathy she also has severe aortic stenosis and multiple other issues cardiology felt she was high risk for complications intraoperatively or postoperatively therefore she was not taken to the OR for EBUS biopsy she had a percutaneous CT-guided biopsy of the nodule the pathology is still pending.  Patient reports just a little shortness of breath and little bit of cough she says mostly she is just anxious waiting for the pathology results.  Review of Systems:          Objective     Vital Signs  Vital Sign Min/Max for last 24 hours  Temp  Min: 97.7 °F (36.5 °C)  Max: 98.5 °F (36.9 °C)   BP  Min: 105/62  Max: 143/71   Pulse  Min: 65  Max: 85   Resp  Min: 16  Max: 20   SpO2  Min: 89 %  Max: 100 %   Flow (L/min)  Min: 3  Max: 3   No data recorded        Ventilator/Non-Invasive Ventilation Settings (From admission, onward)    None                       Body mass index is 50.3 kg/m².  No intake/output data recorded.  No intake/output data recorded.        Physical Exam:  General Appearance: Obese white female sitting in a chair on room air oxygen saturations 97 to 98% and does not appear in acute distress  Eyes: Conjunctiva are clear and anicteric  ENT: Mucous membranes are moist she is a dentulous no exudates Mallampati type II airway, nasal septum midline  Neck: No adenopathy or thyromegaly no visible jugular venous tension trachea midline  Lungs: Clear I do not hear any wheezes rales or rhonchi nonlabored symmetric expansion no dullness on percussion  Cardiac: Regular rate rhythm no murmur  Abdomen: Obese soft nontender no palpable hepatosplenomegaly or masses  : Not  examined  Musculoskeletal: Mild thoracic kyphosis  Skin: Warm dry no jaundice no petechiae  Neuro: Alert and oriented cooperative following commands and grossly intact  Extremities/P Vascular: No clubbing no cyanosis no edema palpable radial dorsalis pedis pulses  MSE:  she is she is anxious very anxious about the biopsy results.       Labs:  Results from last 7 days   Lab Units 01/16/23  0700 01/15/23  0549 01/14/23  0649 01/13/23  0659 01/12/23  0741 01/11/23  0609 01/10/23  1439   GLUCOSE mg/dL 130* 142* 133* 151* 160* 166* 86   SODIUM mmol/L 139 135* 139 137 136 134* 135*   POTASSIUM mmol/L 3.9 3.6 3.6 4.0 4.1 3.9 4.3   MAGNESIUM mg/dL  --   --   --   --  2.1 1.8 1.3*   CO2 mmol/L 26.7 27.3 28.5 31.2* 29.0 26.0 26.2   CHLORIDE mmol/L 101 99 99 99 96* 96* 99   ANION GAP mmol/L 11.3 8.7 11.5 6.8 11.0 12.0 9.8   CREATININE mg/dL 0.80 0.82 0.78 0.83 0.80 0.63 0.78   BUN mg/dL 15 17 19 19 18 14 19   BUN / CREAT RATIO  18.8 20.7 24.4 22.9 22.5 22.2 24.4   CALCIUM mg/dL 9.1 8.9 8.9 9.1 9.6 9.5 9.4   ALK PHOS U/L  --   --   --   --   --   --  57   TOTAL PROTEIN g/dL  --   --   --   --   --   --  7.1   ALT (SGPT) U/L  --   --   --   --   --   --  13   AST (SGOT) U/L  --   --   --   --   --   --  15   BILIRUBIN mg/dL  --   --   --   --   --   --  0.3   ALBUMIN g/dL  --   --   --   --   --   --  4.0     Estimated Creatinine Clearance: 87.5 mL/min (by C-G formula based on SCr of 0.8 mg/dL).      Results from last 7 days   Lab Units 01/16/23  0700 01/15/23  0549 01/14/23  0649 01/13/23  0659 01/12/23  0741 01/11/23  0609 01/10/23  1439   WBC 10*3/mm3 6.37 7.09 7.87 8.08 9.43 7.91 8.65   RBC 10*6/mm3 4.11 4.18 4.31 4.47 4.51 4.70 4.61   HEMOGLOBIN g/dL 12.6 12.9 13.1 13.6 14.2 14.8 14.4   HEMATOCRIT % 37.0 37.5 40.4 41.6 42.3 43.3 43.5   MCV fL 90.0 89.7 93.7 93.1 93.8 92.1 94.4   MCH pg 30.7 30.9 30.4 30.4 31.5 31.5 31.2   MCHC g/dL 34.1 34.4 32.4 32.7 33.6 34.2 33.1   RDW % 11.3* 11.3* 11.5* 11.6* 11.6* 11.7* 11.8*   RDW-SD  fl 36.6* 37.0 39.1 39.0 40.0 38.9 41.0   MPV fL 11.2 10.9 11.6 11.1 11.4 11.5 11.1   PLATELETS 10*3/mm3 157 164 168 171 172 167 194   NEUTROPHIL % % 57.1 59.7 65.0 66.1 70.6 71.6 64.1   LYMPHOCYTE % % 27.3 25.0 20.2 19.6 17.0* 18.0* 24.4   MONOCYTES % % 11.5 11.4 10.8 10.4 10.0 8.1 8.2   EOSINOPHIL % % 3.3 2.8 3.0 2.8 1.6 1.3 2.3   BASOPHIL % % 0.6 0.4 0.6 0.6 0.3 0.5 0.5   IMM GRAN % % 0.2 0.7* 0.4 0.5 0.5 0.5 0.5   NEUTROS ABS 10*3/mm3 3.64 4.23 5.11 5.34 6.66 5.67 5.55   LYMPHS ABS 10*3/mm3 1.74 1.77 1.59 1.58 1.60 1.42 2.11   MONOS ABS 10*3/mm3 0.73 0.81 0.85 0.84 0.94* 0.64 0.71   EOS ABS 10*3/mm3 0.21 0.20 0.24 0.23 0.15 0.10 0.20   BASOS ABS 10*3/mm3 0.04 0.03 0.05 0.05 0.03 0.04 0.04   IMMATURE GRANS (ABS) 10*3/mm3 0.01 0.05 0.03 0.04 0.05 0.04 0.04   NRBC /100 WBC 0.0 0.0 0.0 0.0 0.0 0.0 0.0             Results from last 7 days   Lab Units 01/12/23  0741   PROBNP pg/mL 331.0             Results from last 7 days   Lab Units 01/13/23  1117   INR  0.96     Microbiology Results (last 10 days)     Procedure Component Value - Date/Time    Tissue / Bone Culture - Tissue, Lung, L [[id number]] Collected: 01/13/23 1329    Lab Status: Final result Specimen: Tissue from Lung, L Updated: 01/16/23 0859     Tissue Culture No growth at 3 days     Gram Stain Occasional WBCs per low power field      No organisms seen              amLODIPine, 10 mg, Oral, Q24H  atorvastatin, 10 mg, Oral, Daily  budesonide-formoterol, 2 puff, Inhalation, BID - RT  buPROPion XL, 150 mg, Oral, Daily  cholecalciferol, 5,000 Units, Oral, Daily  fluticasone, 1 spray, Each Nare, BID  guaiFENesin, 600 mg, Oral, Q12H  insulin glargine, 5 Units, Subcutaneous, Nightly  insulin lispro, 0-7 Units, Subcutaneous, TID AC  ipratropium-albuterol, 3 mL, Nebulization, 4x Daily - RT  levothyroxine, 125 mcg, Oral, Daily  lidocaine, 20 mL, Infiltration, Once  losartan, 100 mg, Oral, Daily  sodium chloride, 10 mL, Intravenous, Q12H  pyridoxine, 100 mg, Oral,  Daily           Diagnostics:  Adult Transthoracic Echo Complete W/ Cont if Necessary Per Protocol    Result Date: 1/11/2023  •  Left ventricular ejection fraction appears to be greater than 70%. •  Left ventricular wall thickness is consistent with mild concentric hypertrophy. •  Left ventricular diastolic function is consistent with (grade I) impaired relaxation. •  Normal right ventricular cavity size noted. Hyperdynamic right ventricular systolic function noted. •  The aortic valve leaflets are not well visualized, although appear heavily calcified •  Severe aortic valve stenosis is present. Aortic valve area is 0.99 cm2. Aortic valve maximum pressure gradient is 77.5 mmHg. Aortic valve mean pressure gradient is 39.2 mmHg. •  Calculated right ventricular systolic pressure from tricuspid regurgitation is 21 mmHg. •  There is no evidence of pericardial effusion. . There is evidence of a fat pad present.     CT Chest With Contrast Diagnostic    Result Date: 1/12/2023  CT CHEST WITH IV CONTRAST  HISTORY: Evaluate for adenopathy  TECHNIQUE: Radiation dose reduction techniques were utilized, including automated exposure control and exposure modulation based on body size. 3 mm images were obtained through the chest after the administration of IV contrast.  COMPARISON: Chest x-ray 1 2023, 09/25/2022  FINDINGS:  The thoracic inlet is within normal limits. Calcification right thyroid lobe, unchanged. The heart size is stable. Atherosclerosis including coronary artery calcifications. Filling defect in the midthoracic trachea extending to the fly the fly favoring retained secretions but could further be evaluated as clinically indicated.  Enlarging right posterior paratracheal node (1.5 x 2.3 cm (previously 1.4 x 2.1 cm) and subcarinal adenopathy measuring 3.2 x 5.8 cm (previously 2.7 x 5.2 cm)), 1.4 cm left hilar node (previously 1.2 cm). 1.7 Cm left infrahilar node, unchanged.  Emphysema. No focal consolidations,  effusions, or pneumothorax. 1.5 cm left lower lobe nodule (image 62) previously 1.4 cm). Ground glass nodule in the periphery of the left lower lobe is less conspicuous with near complete resolution of previously described reticulonodular opacities in the right lower lobe with residual mild bronchial wall thickening. Hepatic steatosis with morphologic changes of chronic liver disease. Cholecystectomy. Moderate calcific atherosclerosis abdominal aorta and branch vessels. Stable indeterminate nodular thickening adrenal glands.  Hypertrophic degenerative changes are again noted in thoracolumbar spine. Postsurgical changes cervical spine.        1. Enlarging left lower lobe pulmonary nodule and right paratracheal/subcarinal adenopathy. Further characterization with PET/CT or tissue diagnosis could be considered if not previously performed. 2. Improved aeration of the lung bases. 3. Filling defect in the midthoracic trachea favoring retained secretions but can further be evaluated as indicated. 4. Please see above for additional findings.  This report was finalized on 1/12/2023 11:59 AM by Dr. Susan Greer M.D.      XR Chest 1 View    Result Date: 1/14/2023  XR CHEST 1 VW-  HISTORY: Female who is 67 years-old,  pneumothorax  TECHNIQUE: Frontal view of the chest  COMPARISON: 01/13/2023  FINDINGS: The heart size is normal. Minimal left basilar atelectasis or infiltrate. Minimal left apical pneumothorax does not appear significantly changed. No pleural effusion or right pneumothorax. No acute osseous process.      No significant change.  This report was finalized on 1/14/2023 2:43 PM by Dr. Harshad Castro M.D.      XR Chest 1 View    Result Date: 1/13/2023  PORTABLE CHEST X-RAY  HISTORY: Follow-up lung biopsy.  Portable chest x-ray is provided. Correlation: Chest x-ray earlier today and preoperative CT chest yesterday.  FINDINGS: The cardiomediastinal silhouette is normal. Subtle density posterior to the vascular  markings in the left infrahilar region corresponds to the mass lesion that was biopsied today. The costophrenic sulci are dry and the bones appear normal.  A 5 mm left apical pneumothorax is unchanged.      Stable small left apical pneumothorax following lung biopsy earlier today.  This report was finalized on 1/13/2023 6:58 PM by Dr. Carmelo Farias M.D.      XR Chest 1 View    Result Date: 1/13/2023  AP CHEST  HISTORY: Status post left lung biopsy  COMPARISON: Earlier today  FINDINGS: There is a tiny left apical pneumothorax not clearly seen on the comparison study. It measures about 7-8 mm. Nodular opacity in the left lung base is less well apparent. Right lung is clear. Heart size stable.      Tiny left apical pneumothorax not clearly seen on the comparison study. Follow-up chest x-ray recommended in about 1 hour to evaluate for stability.  Findings were discussed with the patient's RN at 1709 hours on 01/13/2023  This report was finalized on 1/13/2023 5:07 PM by Dr. Everette Chavez M.D.      XR Chest 1 View    Result Date: 1/13/2023  AP CHEST  HISTORY: Post left lung biopsy  COMPARISON: CT biopsy images from earlier today  FINDINGS: There is no appreciable pneumothorax. There is a nodule in the base of the left lung that was biopsied earlier today. Right lung appears clear. Right hemidiaphragm is elevated. Heart size within normal limits. Postop changes of the cervical spine.      No appreciable pneumothorax  This report was finalized on 1/13/2023 2:43 PM by Dr. Everette Chavez M.D.      CT Needle Biopsy Lung    Result Date: 1/13/2023  PROCEDURE: CT guided left lung biopsy  HISTORY: Left lung nodule  TECHNIQUE: Radiation dose reduction techniques were utilized, including automated exposure control and exposure modulation based on body size.  The procedural risks, benefits, and alternatives were discussed with the patient, including risks of pneumothorax and bleeding and possibility of chest tube placement.  Informed consent was obtained.    The patient was placed in the left side of position on the CT procedure table. Axial CT scan was performed to localize the nodule in the left lower lobe. The overlying skin was prepped and draped in the usual sterile fashion. 1% buffered lidocaine was used for local anesthesia.  Next, a 19-gauge coaxial needle was advanced into the lesion under CT guidance. Multiple 20-gauge core biopsies were then obtained and sent to pathology. The needle was removed and sterile dressing applied. No immediate complications.      Technically successful CT guided left lung biopsy.  Moderate sedation was provided under my direct supervision using 0.5 mg IV Versed and 25 mcg IV Fentanyl. The patient was independently monitored by a trained Department of Radiology RN using automated blood pressure, EKG, and pulse oximetry. My total intra-service time was 20 minutes.     Radiation dose reduction techniques were utilized, including automated exposure control and exposure modulation based on body size.  This report was finalized on 1/13/2023 3:02 PM by Dr. Everette Chavez M.D.      XR Chest PA & Lateral    Result Date: 1/10/2023  CHEST: 2 VIEWS  HISTORY: Preoperative exam.  COMPARISON: CT angiogram chest 09/25/2022  FINDINGS:Heart size within normal limits. The thoracic aorta appears tortuous. There is elevation of the right hemidiaphragm and there is right basilar atelectasis. There is cervical collar limiting evaluation of the lung apices. There is no evidence for pulmonary edema or pleural effusion. On the lateral view, there is a bulbous configuration of the calvin and this likely correlates with mild hilar and subcarinal jonn enlargement demonstrated with previous CT.      On the lateral view, there is a bulbous configuration of the calvin and this likely correlates with hilar and subcarinal jonn enlargement demonstrated on previous CT though this would be more accurately directly compared with prior  CT 09/25/2022. Lungs appear clear.  This report was finalized on 1/10/2023 8:58 PM by Dr. Irving Christian M.D.      XR Spine Cervical 2 View    Result Date: 1/10/2023  CERVICAL SPINE: AP, LATERAL, SWIMMER'S VIEW  HISTORY: Evaluate alignment. Previous cervical spine surgery.  COMPARISON: Cervical spine 11/16/2022  FINDINGS: Unfortunately, this evaluation is very limited due to difficulties with patient positioning and overlapping densities. There has been anterior cervical fusion with placement of plates and screws and anterior plate extends from C3 to C7. Interbody spacers are present at C3-4 and C6-7. There is no evidence for loosening though the caudal aspect of the plate visualization is severely limited. Cervical collar is present contributing to artifact.      Severely limited exam demonstrates that there has been anterior plate and screw fusion from C3 to C7. The caudal aspect of the plate is not well visualized though alignment appears grossly within normal limits. Overlying cervical collar artifact is noted. CT may be necessary for more definitive evaluation depending on the clinical scenario.  This report was finalized on 1/10/2023 8:39 PM by Dr. Irving Christian M.D.      Results for orders placed during the hospital encounter of 01/10/23    Adult Transthoracic Echo Complete W/ Cont if Necessary Per Protocol    Interpretation Summary  •  Left ventricular ejection fraction appears to be greater than 70%.  •  Left ventricular wall thickness is consistent with mild concentric hypertrophy.  •  Left ventricular diastolic function is consistent with (grade I) impaired relaxation.  •  Normal right ventricular cavity size noted. Hyperdynamic right ventricular systolic function noted.  •  The aortic valve leaflets are not well visualized, although appear heavily calcified  •  Severe aortic valve stenosis is present. Aortic valve area is 0.99 cm2. Aortic valve maximum pressure gradient is 77.5 mmHg. Aortic valve  mean pressure gradient is 39.2 mmHg.  •  Calculated right ventricular systolic pressure from tricuspid regurgitation is 21 mmHg.  •  There is no evidence of pericardial effusion. . There is evidence of a fat pad present.      I did review chest radiographs    Active Hospital Problems    Diagnosis  POA   • **Cervical stenosis of spinal canal [M48.02]  Yes   • Postprocedural pneumothorax [J95.811]  No   • Severe aortic stenosis [I35.0]  Yes   • History of fusion of cervical spine [Z98.1]  Not Applicable   • HTN (hypertension) [I10]  Yes   • Obesity, Class III, BMI 40-49.9 (morbid obesity) (HCC) [E66.01]  Yes   • Cervical spondylosis with myelopathy [M47.12]  Yes   • Hyperlipidemia associated with type 2 diabetes mellitus (HCC) [E11.69, E78.5]  Yes   • Hypothyroidism [E03.9]  Yes      Resolved Hospital Problems   No resolved problems to display.         Assessment & Plan     1. Enlarging 15 mm left lower lobe nodule with mediastinal lymphadenopathy status post CT-guided biopsy.  Pathology still pending awaiting results.  If pathology positive she will need a PET scan to evaluate adenopathy but if there was any thoughts of resecting her she did have to have her aortic valve fixed for so I probably would not do the PET scan until after that is done and she if she gets her aortic valve done might be been a candidate for EBUS biopsy of lymphadenopathy.  Particularly if it is PET positive.  2. Minimal stable left upper pneumothorax postbiopsy plan follow-up chest x-ray  3. Severe aortic stenosis per cardiology  4. Cervical spondylosis with myelopathy  5. Hypertension  6. Hypothyroidism  7. 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  I did discuss the findings with the patient we will consult oncology to get them involved.  I think if she is going to have a TAVR we  should get that done then get a PET scane and TAVR goes well EBUS biopsy of nodes to stage disease.  As this would greatly determine her treatment options.  This is providing she would be considered a surgical candidate for resection after TAVR.  She still has her cervical issues sounds like her symptoms have improved some whether that can be put on hold long enough to get chemotherapy and/or surgery completed I am not certain.  She has a whole lot of factors.          Plan for disposition:    Justin Diaz Jr, MD  01/16/23  10:34 EST    Time:

## 2023-01-16 NOTE — PROGRESS NOTES
Name: Gabby Acosta ADMIT: 1/10/2023   : 1955  PCP: Danyelle Brush MD    MRN: [id number] LOS: 4 days   AGE/SEX: 67 y.o. female  ROOM: [facility]     Subjective   Subjective   Patient appears comfortable & in no apparent distress. Consumed [employer] for lunch.  Significant other at bedside.      Review of Systems   Respiratory: Negative for cough and shortness of breath.    Cardiovascular: Negative for chest pain and leg swelling.   Gastrointestinal: Negative for abdominal pain, constipation, diarrhea, nausea and vomiting.   Genitourinary: Negative for difficulty urinating and dysuria.   Musculoskeletal: Positive for gait problem (due to generalized weakness & gait intolerance requiring walker). Negative for myalgias.        Objective   Objective   Vital Signs  Temp:  [97.7 °F (36.5 °C)-98.5 °F (36.9 °C)] 98.4 °F (36.9 °C)  Heart Rate:  [65-84] 76  Resp:  [16-20] 18  BP: (105-143)/(51-71) 126/62  SpO2:  [89 %-100 %] 90 %  on  Flow (L/min):  [3] 3;   Device (Oxygen Therapy): room air  Body mass index is 50.3 kg/m².     Physical Exam  Constitutional:       General: She is not in acute distress.     Appearance: She is obese. She is not toxic-appearing.   Cardiovascular:      Rate and Rhythm: Normal rate and regular rhythm.   Pulmonary:      Effort: Pulmonary effort is normal.      Breath sounds: Normal breath sounds.   Abdominal:      General: Bowel sounds are normal.      Palpations: Abdomen is soft.   Musculoskeletal:      Right lower leg: No edema.      Left lower leg: No edema.   Skin:     General: Skin is warm and dry.   Neurological:      Mental Status: She is alert.       Results Review     I reviewed the patient's new clinical results.  Results from last 7 days   Lab Units 23  0700 01/15/23  0549 23  0649 23  0659   WBC 10*3/mm3 6.37 7.09 7.87 8.08   HEMOGLOBIN g/dL 12.6 12.9 13.1 13.6   PLATELETS 10*3/mm3 157 164 168 171     Results from last 7 days   Lab Units  01/16/23  0700 01/15/23  0549 01/14/23  0649 01/13/23  0659   SODIUM mmol/L 139 135* 139 137   POTASSIUM mmol/L 3.9 3.6 3.6 4.0   CHLORIDE mmol/L 101 99 99 99   CO2 mmol/L 26.7 27.3 28.5 31.2*   BUN mg/dL 15 17 19 19   CREATININE mg/dL 0.80 0.82 0.78 0.83   GLUCOSE mg/dL 130* 142* 133* 151*   EGFR mL/min/1.73 81.4 79.0 83.9 77.9     Results from last 7 days   Lab Units 01/10/23  1439   ALBUMIN g/dL 4.0   BILIRUBIN mg/dL 0.3   ALK PHOS U/L 57   AST (SGOT) U/L 15   ALT (SGPT) U/L 13     Results from last 7 days   Lab Units 01/16/23  0700 01/15/23  0549 01/14/23  0649 01/13/23  0659 01/12/23  0741 01/11/23  0609 01/10/23  1439   CALCIUM mg/dL 9.1 8.9 8.9 9.1 9.6 9.5 9.4   ALBUMIN g/dL  --   --   --   --   --   --  4.0   MAGNESIUM mg/dL  --   --   --   --  2.1 1.8 1.3*   PHOSPHORUS mg/dL  --   --   --   --  4.4 2.8 3.4       Glucose   Date/Time Value Ref Range Status   01/16/2023 1117 157 (H) 70 - 130 mg/dL Final     Comment:     Meter: [id number] : [id number] Clay MoreZoe BAO   01/16/2023 0729 120 70 - 130 mg/dL Final     Comment:     Meter: [id number] : [id number] Clay MoreZoe BAO   01/15/2023 1704 189 (H) 70 - 130 mg/dL Final     Comment:     Meter: [id number] : [id number] Matthew Tricia BAO   01/15/2023 1156 126 70 - 130 mg/dL Final     Comment:     Meter: [id number] : [id number] Matthew Tricia BAO   01/15/2023 0841 140 (H) 70 - 130 mg/dL Final     Comment:     Meter: [id number] : [id number] Matthew Clarke BAO   01/14/2023 2103 177 (H) 70 - 130 mg/dL Final     Comment:     Meter: [id number] : [id number] Addison Cohn RN   01/14/2023 1659 135 (H) 70 - 130 mg/dL Final     Comment:     Meter: [id number] : [id number] Matthew Clarke BAO       No radiology results for the last day  Scheduled Medications  amLODIPine, 10 mg, Oral, Q24H  atorvastatin, 10 mg, Oral, Daily  budesonide-formoterol, 2 puff, Inhalation, BID - RT  buPROPion XL, 150 mg, Oral, Daily  cholecalciferol, 5,000 Units, Oral,  Daily  fluticasone, 1 spray, Each Nare, BID  guaiFENesin, 600 mg, Oral, Q12H  insulin glargine, 5 Units, Subcutaneous, Nightly  insulin lispro, 0-7 Units, Subcutaneous, TID AC  ipratropium-albuterol, 3 mL, Nebulization, 4x Daily - RT  levothyroxine, 125 mcg, Oral, Daily  lidocaine, 20 mL, Infiltration, Once  losartan, 100 mg, Oral, Daily  sodium chloride, 10 mL, Intravenous, Q12H  pyridoxine, 100 mg, Oral, Daily    Infusions   Diet  Diet: Regular/House Diet; Texture: Regular Texture (IDDSI 7); Fluid Consistency: Thin (IDDSI 0)       Assessment/Plan     Active Hospital Problems    Diagnosis  POA   • **Cervical stenosis of spinal canal [M48.02]  Yes   • Postprocedural pneumothorax [J95.811]  No   • Severe aortic stenosis [I35.0]  Yes   • History of fusion of cervical spine [Z98.1]  Not Applicable   • HTN (hypertension) [I10]  Yes   • Obesity, Class III, BMI 40-49.9 (morbid obesity) ([facility]) [E66.01]  Yes   • Cervical spondylosis with myelopathy [M47.12]  Yes   • Hyperlipidemia associated with type 2 diabetes mellitus (HCC) [E11.69, E78.5]  Yes   • Hypothyroidism [E03.9]  Yes      Resolved Hospital Problems   No resolved problems to display.       67 y.o. female admitted with Cervical stenosis of spinal canal.    Cervical spinal stenosis with myelopathy  - Status post ACDF C3-C7 on 9/20/2022  - posterior fusion recommended yet delayed due to multiple medical issues to include severe aortic stenosis & severe calcification in all coronary distributions (needs ischemic work-up prior to valve intervention--per cardiology)  - pain control as needed, PT/OT  - appreciate RICH recs     Severe Aortic Stenosis  - monitor volume status closely; euvolemic currently  - appreciate cardiology recs-possible evaluation for SAVR/TAVR pending workup for lung nodule & discussion with structural heart team     COPD  - no exacerbation  - continue bronchodilators  - appreciate pulm recs     LLL Pulmonary Nodule/Paratracheal Adenopathy  -  enlargement demonstrated on CT  - s/p CT-guided biopsy 1/13/23 with findings of invasive poorly differentiated non-small cell carcinoma (pulmonology recommend PET scan AFTER aortic valve repair) & possibly candidate for EBUS with biopsy of lymphadenopathy at later date.  Complication of small left apical pneumothorax-stable on chest xray  - appreciate pulm recs     Type 2 DM  - holding home metformin  - BG are acceptable  - continue lantus 5 units nightly  - continue ssi/hypoglycemia protocol     Hypertension  - BP acceptable  - continue on norvasc and losartan     Hyperlipidemia  - continue statin     Hypothyroidism  - clinically euthyroid  - continue levothyroxine    · SCDs for DVT prophylaxis  · Full code.  · Discussed with patient & significant other.  · Anticipate discharge pending consultants' recommendations based on cytology results.      CHENG Lerner  [facility]  01/16/23  14:58 EST

## 2023-01-16 NOTE — PROGRESS NOTES
Continued Stay Note  [facility]     Patient Name: Gabby Acosta  MRN: [id number]  Today's Date: 1/16/2023    Admit Date: 1/10/2023    Plan: Home with ex-, [facility] in home therapy if needed   Discharge Plan     Row Name 01/16/23 1529       Plan    Plan Home with ex-, [facility] in home therapy if needed    Patient/Family in Agreement with Plan yes    Plan Comments Pt's plan remains home with ex- to assist as needed. [facility] in home PT following if needed. CCP to continue to follow for needs pending clinical course. Priscila Dow LCSW               Discharge Codes    No documentation.               Expected Discharge Date and Time     Expected Discharge Date Expected Discharge Time    Jan 19, 2023             Kia Dow LCSW

## 2023-01-17 ENCOUNTER — READMISSION MANAGEMENT (OUTPATIENT)
Dept: CALL CENTER | Facility: HOSPITAL | Age: 68
End: 2023-01-17
Payer: MEDICARE

## 2023-01-17 VITALS
RESPIRATION RATE: 18 BRPM | DIASTOLIC BLOOD PRESSURE: 82 MMHG | WEIGHT: 275 LBS | HEIGHT: 62 IN | OXYGEN SATURATION: 99 % | SYSTOLIC BLOOD PRESSURE: 148 MMHG | TEMPERATURE: 98.9 F | HEART RATE: 70 BPM | BODY MASS INDEX: 50.61 KG/M2

## 2023-01-17 LAB
ANION GAP SERPL CALCULATED.3IONS-SCNC: 9.4 MMOL/L (ref 5–15)
BASOPHILS # BLD AUTO: 0.03 10*3/MM3 (ref 0–0.2)
BASOPHILS NFR BLD AUTO: 0.5 % (ref 0–1.5)
BUN SERPL-MCNC: 12 MG/DL (ref 8–23)
BUN/CREAT SERPL: 19 (ref 7–25)
CALCIUM SPEC-SCNC: 8.9 MG/DL (ref 8.6–10.5)
CHLORIDE SERPL-SCNC: 102 MMOL/L (ref 98–107)
CO2 SERPL-SCNC: 28.6 MMOL/L (ref 22–29)
CREAT SERPL-MCNC: 0.63 MG/DL (ref 0.57–1)
DEPRECATED RDW RBC AUTO: 38.6 FL (ref 37–54)
EGFRCR SERPLBLD CKD-EPI 2021: 98 ML/MIN/1.73
EOSINOPHIL # BLD AUTO: 0.21 10*3/MM3 (ref 0–0.4)
EOSINOPHIL NFR BLD AUTO: 3.6 % (ref 0.3–6.2)
ERYTHROCYTE [DISTWIDTH] IN BLOOD BY AUTOMATED COUNT: 11.5 % (ref 12.3–15.4)
GLUCOSE BLDC GLUCOMTR-MCNC: 125 MG/DL (ref 70–130)
GLUCOSE BLDC GLUCOMTR-MCNC: 130 MG/DL (ref 70–130)
GLUCOSE SERPL-MCNC: 129 MG/DL (ref 65–99)
HCT VFR BLD AUTO: 37.6 % (ref 34–46.6)
HGB BLD-MCNC: 12.4 G/DL (ref 12–15.9)
IMM GRANULOCYTES # BLD AUTO: 0.03 10*3/MM3 (ref 0–0.05)
IMM GRANULOCYTES NFR BLD AUTO: 0.5 % (ref 0–0.5)
LYMPHOCYTES # BLD AUTO: 1.32 10*3/MM3 (ref 0.7–3.1)
LYMPHOCYTES NFR BLD AUTO: 22.4 % (ref 19.6–45.3)
MCH RBC QN AUTO: 30.6 PG (ref 26.6–33)
MCHC RBC AUTO-ENTMCNC: 33 G/DL (ref 31.5–35.7)
MCV RBC AUTO: 92.8 FL (ref 79–97)
MONOCYTES # BLD AUTO: 0.7 10*3/MM3 (ref 0.1–0.9)
MONOCYTES NFR BLD AUTO: 11.9 % (ref 5–12)
NEUTROPHILS NFR BLD AUTO: 3.6 10*3/MM3 (ref 1.7–7)
NEUTROPHILS NFR BLD AUTO: 61.1 % (ref 42.7–76)
NRBC BLD AUTO-RTO: 0 /100 WBC (ref 0–0.2)
PLATELET # BLD AUTO: 162 10*3/MM3 (ref 140–450)
PMV BLD AUTO: 11.1 FL (ref 6–12)
POTASSIUM SERPL-SCNC: 3.6 MMOL/L (ref 3.5–5.2)
RBC # BLD AUTO: 4.05 10*6/MM3 (ref 3.77–5.28)
SODIUM SERPL-SCNC: 140 MMOL/L (ref 136–145)
WBC NRBC COR # BLD: 5.89 10*3/MM3 (ref 3.4–10.8)

## 2023-01-17 PROCEDURE — 99222 1ST HOSP IP/OBS MODERATE 55: CPT | Performed by: INTERNAL MEDICINE

## 2023-01-17 PROCEDURE — 82962 GLUCOSE BLOOD TEST: CPT

## 2023-01-17 PROCEDURE — 85025 COMPLETE CBC W/AUTO DIFF WBC: CPT | Performed by: INTERNAL MEDICINE

## 2023-01-17 PROCEDURE — 94799 UNLISTED PULMONARY SVC/PX: CPT

## 2023-01-17 PROCEDURE — 99223 1ST HOSP IP/OBS HIGH 75: CPT | Performed by: INTERNAL MEDICINE

## 2023-01-17 PROCEDURE — 94664 DEMO&/EVAL PT USE INHALER: CPT

## 2023-01-17 PROCEDURE — 80048 BASIC METABOLIC PNL TOTAL CA: CPT | Performed by: INTERNAL MEDICINE

## 2023-01-17 RX ORDER — HYDROXYZINE HYDROCHLORIDE 25 MG/1
25 TABLET, FILM COATED ORAL 4 TIMES DAILY PRN
Qty: 80 TABLET | Refills: 0 | Status: SHIPPED | OUTPATIENT
Start: 2023-01-17 | End: 2023-02-06

## 2023-01-17 RX ORDER — LORAZEPAM 0.5 MG/1
0.5 TABLET ORAL 3 TIMES DAILY PRN
Status: DISCONTINUED | OUTPATIENT
Start: 2023-01-17 | End: 2023-01-17 | Stop reason: HOSPADM

## 2023-01-17 RX ORDER — LORAZEPAM 0.5 MG/1
0.5 TABLET ORAL 2 TIMES DAILY PRN
Qty: 6 TABLET | Refills: 0 | Status: SHIPPED | OUTPATIENT
Start: 2023-01-17 | End: 2023-01-20

## 2023-01-17 RX ORDER — HYDROXYZINE HYDROCHLORIDE 25 MG/1
25 TABLET, FILM COATED ORAL 4 TIMES DAILY PRN
Status: DISCONTINUED | OUTPATIENT
Start: 2023-01-17 | End: 2023-01-17 | Stop reason: HOSPADM

## 2023-01-17 RX ORDER — BUDESONIDE AND FORMOTEROL FUMARATE DIHYDRATE 160; 4.5 UG/1; UG/1
2 AEROSOL RESPIRATORY (INHALATION)
Qty: 10.2 G | Refills: 0 | Status: ON HOLD | OUTPATIENT
Start: 2023-01-17 | End: 2023-02-14

## 2023-01-17 RX ADMIN — IPRATROPIUM BROMIDE AND ALBUTEROL SULFATE 3 ML: 2.5; .5 SOLUTION RESPIRATORY (INHALATION) at 11:51

## 2023-01-17 RX ADMIN — BUPROPION HYDROCHLORIDE 150 MG: 150 TABLET, EXTENDED RELEASE ORAL at 08:35

## 2023-01-17 RX ADMIN — Medication 100 MG: at 08:34

## 2023-01-17 RX ADMIN — LORAZEPAM 0.5 MG: 0.5 TABLET ORAL at 04:24

## 2023-01-17 RX ADMIN — FLUTICASONE PROPIONATE 1 SPRAY: 50 SPRAY, METERED NASAL at 08:33

## 2023-01-17 RX ADMIN — AMLODIPINE BESYLATE 10 MG: 10 TABLET ORAL at 08:35

## 2023-01-17 RX ADMIN — Medication 10 ML: at 08:36

## 2023-01-17 RX ADMIN — GUAIFENESIN 600 MG: 600 TABLET, EXTENDED RELEASE ORAL at 08:35

## 2023-01-17 RX ADMIN — Medication 5000 UNITS: at 08:35

## 2023-01-17 RX ADMIN — LOSARTAN POTASSIUM 100 MG: 100 TABLET, FILM COATED ORAL at 08:35

## 2023-01-17 RX ADMIN — LORAZEPAM 0.5 MG: 0.5 TABLET ORAL at 10:57

## 2023-01-17 RX ADMIN — HYDROXYZINE HYDROCHLORIDE 25 MG: 25 TABLET ORAL at 16:08

## 2023-01-17 RX ADMIN — LEVOTHYROXINE SODIUM 125 MCG: 0.12 TABLET ORAL at 08:35

## 2023-01-17 RX ADMIN — BUDESONIDE AND FORMOTEROL FUMARATE DIHYDRATE 2 PUFF: 160; 4.5 AEROSOL RESPIRATORY (INHALATION) at 08:57

## 2023-01-17 RX ADMIN — ATORVASTATIN CALCIUM 10 MG: 20 TABLET, FILM COATED ORAL at 08:34

## 2023-01-17 NOTE — PROGRESS NOTES
LOS: 5 days   Patient Care Team:  Danyelle Brush MD as PCP - General (Pediatrics)    Subjective     Following patient for lung nodule with mediastinal lymphadenopathy, COPD.  Patient new to me today I have reviewed records former smoker with known COPD with a pulmonary nodule and the left lower lobe 15 mm and enlarging with mediastinal adenopathy she also has severe aortic stenosis and multiple other issues cardiology felt she was high risk for complications intraoperatively or postoperatively therefore she was not taken to the OR for EBUS biopsy she had a percutaneous CT-guided biopsy of the nodule the pathology is still pending.  At rest patient does not feel short of breath no significant cough no chest pain.  Still anxious  Review of Systems:          Objective     Vital Signs  Vital Sign Min/Max for last 24 hours  Temp  Min: 97.9 °F (36.6 °C)  Max: 98.9 °F (37.2 °C)   BP  Min: 117/58  Max: 148/82   Pulse  Min: 66  Max: 85   Resp  Min: 16  Max: 18   SpO2  Min: 90 %  Max: 100 %   Flow (L/min)  Min: 2  Max: 2   No data recorded        Ventilator/Non-Invasive Ventilation Settings (From admission, onward)    None                       Body mass index is 50.3 kg/m².  No intake/output data recorded.  No intake/output data recorded.        Physical Exam:  General Appearance: Obese white female sitting in a chair on room air oxygen saturations 97 % and does not appear in acute distress  Eyes: Conjunctiva are clear and anicteric  ENT: Mucous membranes are moist she is a dentulous no exudates Mallampati type II airway, nasal septum midline  Neck: No adenopathy or thyromegaly no visible jugular venous tension trachea midline  Lungs: Clear I do not hear any wheezes rales or rhonchi nonlabored symmetric expansion no dullness on percussion  Cardiac: Regular rate rhythm no murmur  Abdomen: Obese soft nontender no palpable hepatosplenomegaly or masses  : Not examined  Musculoskeletal: Mild thoracic kyphosis  Skin:  Warm dry no jaundice no petechiae  Neuro: Alert and oriented cooperative following commands and grossly intact  Extremities/P Vascular: No clubbing no cyanosis no edema palpable radial dorsalis pedis pulses  MSE: She remains a little anxious.       Labs:  Results from last 7 days   Lab Units 01/17/23  0610 01/16/23  0700 01/15/23  0549 01/14/23  0649 01/13/23  0659 01/12/23  0741 01/11/23  0609 01/10/23  1439   GLUCOSE mg/dL 129* 130* 142* 133* 151* 160* 166* 86   SODIUM mmol/L 140 139 135* 139 137 136 134* 135*   POTASSIUM mmol/L 3.6 3.9 3.6 3.6 4.0 4.1 3.9 4.3   MAGNESIUM mg/dL  --   --   --   --   --  2.1 1.8 1.3*   CO2 mmol/L 28.6 26.7 27.3 28.5 31.2* 29.0 26.0 26.2   CHLORIDE mmol/L 102 101 99 99 99 96* 96* 99   ANION GAP mmol/L 9.4 11.3 8.7 11.5 6.8 11.0 12.0 9.8   CREATININE mg/dL 0.63 0.80 0.82 0.78 0.83 0.80 0.63 0.78   BUN mg/dL 12 15 17 19 19 18 14 19   BUN / CREAT RATIO  19.0 18.8 20.7 24.4 22.9 22.5 22.2 24.4   CALCIUM mg/dL 8.9 9.1 8.9 8.9 9.1 9.6 9.5 9.4   ALK PHOS U/L  --   --   --   --   --   --   --  57   TOTAL PROTEIN g/dL  --   --   --   --   --   --   --  7.1   ALT (SGPT) U/L  --   --   --   --   --   --   --  13   AST (SGOT) U/L  --   --   --   --   --   --   --  15   BILIRUBIN mg/dL  --   --   --   --   --   --   --  0.3   ALBUMIN g/dL  --   --   --   --   --   --   --  4.0     Estimated Creatinine Clearance: 111.1 mL/min (by C-G formula based on SCr of 0.63 mg/dL).      Results from last 7 days   Lab Units 01/17/23  0610 01/16/23  0700 01/15/23  0549 01/14/23  0649 01/13/23  0659 01/12/23  0741 01/11/23  0609   WBC 10*3/mm3 5.89 6.37 7.09 7.87 8.08 9.43 7.91   RBC 10*6/mm3 4.05 4.11 4.18 4.31 4.47 4.51 4.70   HEMOGLOBIN g/dL 12.4 12.6 12.9 13.1 13.6 14.2 14.8   HEMATOCRIT % 37.6 37.0 37.5 40.4 41.6 42.3 43.3   MCV fL 92.8 90.0 89.7 93.7 93.1 93.8 92.1   MCH pg 30.6 30.7 30.9 30.4 30.4 31.5 31.5   MCHC g/dL 33.0 34.1 34.4 32.4 32.7 33.6 34.2   RDW % 11.5* 11.3* 11.3* 11.5* 11.6* 11.6* 11.7*    RDW-SD fl 38.6 36.6* 37.0 39.1 39.0 40.0 38.9   MPV fL 11.1 11.2 10.9 11.6 11.1 11.4 11.5   PLATELETS 10*3/mm3 162 157 164 168 171 172 167   NEUTROPHIL % % 61.1 57.1 59.7 65.0 66.1 70.6 71.6   LYMPHOCYTE % % 22.4 27.3 25.0 20.2 19.6 17.0* 18.0*   MONOCYTES % % 11.9 11.5 11.4 10.8 10.4 10.0 8.1   EOSINOPHIL % % 3.6 3.3 2.8 3.0 2.8 1.6 1.3   BASOPHIL % % 0.5 0.6 0.4 0.6 0.6 0.3 0.5   IMM GRAN % % 0.5 0.2 0.7* 0.4 0.5 0.5 0.5   NEUTROS ABS 10*3/mm3 3.60 3.64 4.23 5.11 5.34 6.66 5.67   LYMPHS ABS 10*3/mm3 1.32 1.74 1.77 1.59 1.58 1.60 1.42   MONOS ABS 10*3/mm3 0.70 0.73 0.81 0.85 0.84 0.94* 0.64   EOS ABS 10*3/mm3 0.21 0.21 0.20 0.24 0.23 0.15 0.10   BASOS ABS 10*3/mm3 0.03 0.04 0.03 0.05 0.05 0.03 0.04   IMMATURE GRANS (ABS) 10*3/mm3 0.03 0.01 0.05 0.03 0.04 0.05 0.04   NRBC /100 WBC 0.0 0.0 0.0 0.0 0.0 0.0 0.0             Results from last 7 days   Lab Units 01/12/23  0741   PROBNP pg/mL 331.0             Results from last 7 days   Lab Units 01/13/23  1117   INR  0.96     Microbiology Results (last 10 days)     Procedure Component Value - Date/Time    Tissue / Bone Culture - Tissue, Lung, L [[id number]] Collected: 01/13/23 1329    Lab Status: Final result Specimen: Tissue from Lung, L Updated: 01/16/23 0859     Tissue Culture No growth at 3 days     Gram Stain Occasional WBCs per low power field      No organisms seen              amLODIPine, 10 mg, Oral, Q24H  atorvastatin, 10 mg, Oral, Daily  budesonide-formoterol, 2 puff, Inhalation, BID - RT  buPROPion XL, 150 mg, Oral, Daily  cholecalciferol, 5,000 Units, Oral, Daily  fluticasone, 1 spray, Each Nare, BID  guaiFENesin, 600 mg, Oral, Q12H  insulin glargine, 5 Units, Subcutaneous, Nightly  insulin lispro, 0-7 Units, Subcutaneous, TID AC  ipratropium-albuterol, 3 mL, Nebulization, 4x Daily - RT  levothyroxine, 125 mcg, Oral, Daily  lidocaine, 20 mL, Infiltration, Once  losartan, 100 mg, Oral, Daily  sodium chloride, 10 mL, Intravenous, Q12H  pyridoxine, 100 mg,  Oral, Daily           Diagnostics:  Adult Transthoracic Echo Complete W/ Cont if Necessary Per Protocol    Result Date: 1/11/2023  •  Left ventricular ejection fraction appears to be greater than 70%. •  Left ventricular wall thickness is consistent with mild concentric hypertrophy. •  Left ventricular diastolic function is consistent with (grade I) impaired relaxation. •  Normal right ventricular cavity size noted. Hyperdynamic right ventricular systolic function noted. •  The aortic valve leaflets are not well visualized, although appear heavily calcified •  Severe aortic valve stenosis is present. Aortic valve area is 0.99 cm2. Aortic valve maximum pressure gradient is 77.5 mmHg. Aortic valve mean pressure gradient is 39.2 mmHg. •  Calculated right ventricular systolic pressure from tricuspid regurgitation is 21 mmHg. •  There is no evidence of pericardial effusion. . There is evidence of a fat pad present.     CT Chest With Contrast Diagnostic    Result Date: 1/12/2023  CT CHEST WITH IV CONTRAST  HISTORY: Evaluate for adenopathy  TECHNIQUE: Radiation dose reduction techniques were utilized, including automated exposure control and exposure modulation based on body size. 3 mm images were obtained through the chest after the administration of IV contrast.  COMPARISON: Chest x-ray 1 2023, 09/25/2022  FINDINGS:  The thoracic inlet is within normal limits. Calcification right thyroid lobe, unchanged. The heart size is stable. Atherosclerosis including coronary artery calcifications. Filling defect in the midthoracic trachea extending to the fly the fly favoring retained secretions but could further be evaluated as clinically indicated.  Enlarging right posterior paratracheal node (1.5 x 2.3 cm (previously 1.4 x 2.1 cm) and subcarinal adenopathy measuring 3.2 x 5.8 cm (previously 2.7 x 5.2 cm)), 1.4 cm left hilar node (previously 1.2 cm). 1.7 Cm left infrahilar node, unchanged.  Emphysema. No focal  consolidations, effusions, or pneumothorax. 1.5 cm left lower lobe nodule (image 62) previously 1.4 cm). Ground glass nodule in the periphery of the left lower lobe is less conspicuous with near complete resolution of previously described reticulonodular opacities in the right lower lobe with residual mild bronchial wall thickening. Hepatic steatosis with morphologic changes of chronic liver disease. Cholecystectomy. Moderate calcific atherosclerosis abdominal aorta and branch vessels. Stable indeterminate nodular thickening adrenal glands.  Hypertrophic degenerative changes are again noted in thoracolumbar spine. Postsurgical changes cervical spine.        1. Enlarging left lower lobe pulmonary nodule and right paratracheal/subcarinal adenopathy. Further characterization with PET/CT or tissue diagnosis could be considered if not previously performed. 2. Improved aeration of the lung bases. 3. Filling defect in the midthoracic trachea favoring retained secretions but can further be evaluated as indicated. 4. Please see above for additional findings.  This report was finalized on 1/12/2023 11:59 AM by Dr. Susan Greer M.D.      XR Chest 1 View    Result Date: 1/14/2023  XR CHEST 1 VW-  HISTORY: Female who is 67 years-old,  pneumothorax  TECHNIQUE: Frontal view of the chest  COMPARISON: 01/13/2023  FINDINGS: The heart size is normal. Minimal left basilar atelectasis or infiltrate. Minimal left apical pneumothorax does not appear significantly changed. No pleural effusion or right pneumothorax. No acute osseous process.      No significant change.  This report was finalized on 1/14/2023 2:43 PM by Dr. Harshad Castro M.D.      XR Chest 1 View    Result Date: 1/13/2023  PORTABLE CHEST X-RAY  HISTORY: Follow-up lung biopsy.  Portable chest x-ray is provided. Correlation: Chest x-ray earlier today and preoperative CT chest yesterday.  FINDINGS: The cardiomediastinal silhouette is normal. Subtle density posterior to the  vascular markings in the left infrahilar region corresponds to the mass lesion that was biopsied today. The costophrenic sulci are dry and the bones appear normal.  A 5 mm left apical pneumothorax is unchanged.      Stable small left apical pneumothorax following lung biopsy earlier today.  This report was finalized on 1/13/2023 6:58 PM by Dr. Carmelo Farias M.D.      XR Chest 1 View    Result Date: 1/13/2023  AP CHEST  HISTORY: Status post left lung biopsy  COMPARISON: Earlier today  FINDINGS: There is a tiny left apical pneumothorax not clearly seen on the comparison study. It measures about 7-8 mm. Nodular opacity in the left lung base is less well apparent. Right lung is clear. Heart size stable.      Tiny left apical pneumothorax not clearly seen on the comparison study. Follow-up chest x-ray recommended in about 1 hour to evaluate for stability.  Findings were discussed with the patient's RN at 1709 hours on 01/13/2023  This report was finalized on 1/13/2023 5:07 PM by Dr. Everette Chavez M.D.      XR Chest 1 View    Result Date: 1/13/2023  AP CHEST  HISTORY: Post left lung biopsy  COMPARISON: CT biopsy images from earlier today  FINDINGS: There is no appreciable pneumothorax. There is a nodule in the base of the left lung that was biopsied earlier today. Right lung appears clear. Right hemidiaphragm is elevated. Heart size within normal limits. Postop changes of the cervical spine.      No appreciable pneumothorax  This report was finalized on 1/13/2023 2:43 PM by Dr. Everette Chavez M.D.      CT Needle Biopsy Lung    Result Date: 1/13/2023  PROCEDURE: CT guided left lung biopsy  HISTORY: Left lung nodule  TECHNIQUE: Radiation dose reduction techniques were utilized, including automated exposure control and exposure modulation based on body size.  The procedural risks, benefits, and alternatives were discussed with the patient, including risks of pneumothorax and bleeding and possibility of chest tube  placement. Informed consent was obtained.    The patient was placed in the left side of position on the CT procedure table. Axial CT scan was performed to localize the nodule in the left lower lobe. The overlying skin was prepped and draped in the usual sterile fashion. 1% buffered lidocaine was used for local anesthesia.  Next, a 19-gauge coaxial needle was advanced into the lesion under CT guidance. Multiple 20-gauge core biopsies were then obtained and sent to pathology. The needle was removed and sterile dressing applied. No immediate complications.      Technically successful CT guided left lung biopsy.  Moderate sedation was provided under my direct supervision using 0.5 mg IV Versed and 25 mcg IV Fentanyl. The patient was independently monitored by a trained Department of Radiology RN using automated blood pressure, EKG, and pulse oximetry. My total intra-service time was 20 minutes.     Radiation dose reduction techniques were utilized, including automated exposure control and exposure modulation based on body size.  This report was finalized on 1/13/2023 3:02 PM by Dr. Everette Chavez M.D.      XR Chest PA & Lateral    Result Date: 1/10/2023  CHEST: 2 VIEWS  HISTORY: Preoperative exam.  COMPARISON: CT angiogram chest 09/25/2022  FINDINGS:Heart size within normal limits. The thoracic aorta appears tortuous. There is elevation of the right hemidiaphragm and there is right basilar atelectasis. There is cervical collar limiting evaluation of the lung apices. There is no evidence for pulmonary edema or pleural effusion. On the lateral view, there is a bulbous configuration of the calvin and this likely correlates with mild hilar and subcarinal jonn enlargement demonstrated with previous CT.      On the lateral view, there is a bulbous configuration of the calvin and this likely correlates with hilar and subcarinal jonn enlargement demonstrated on previous CT though this would be more accurately directly compared  with prior CT 09/25/2022. Lungs appear clear.  This report was finalized on 1/10/2023 8:58 PM by Dr. Irving Christian M.D.      XR Spine Cervical 2 View    Result Date: 1/10/2023  CERVICAL SPINE: AP, LATERAL, SWIMMER'S VIEW  HISTORY: Evaluate alignment. Previous cervical spine surgery.  COMPARISON: Cervical spine 11/16/2022  FINDINGS: Unfortunately, this evaluation is very limited due to difficulties with patient positioning and overlapping densities. There has been anterior cervical fusion with placement of plates and screws and anterior plate extends from C3 to C7. Interbody spacers are present at C3-4 and C6-7. There is no evidence for loosening though the caudal aspect of the plate visualization is severely limited. Cervical collar is present contributing to artifact.      Severely limited exam demonstrates that there has been anterior plate and screw fusion from C3 to C7. The caudal aspect of the plate is not well visualized though alignment appears grossly within normal limits. Overlying cervical collar artifact is noted. CT may be necessary for more definitive evaluation depending on the clinical scenario.  This report was finalized on 1/10/2023 8:39 PM by Dr. Irving Christian M.D.      Results for orders placed during the hospital encounter of 01/10/23    Adult Transthoracic Echo Complete W/ Cont if Necessary Per Protocol    Interpretation Summary  •  Left ventricular ejection fraction appears to be greater than 70%.  •  Left ventricular wall thickness is consistent with mild concentric hypertrophy.  •  Left ventricular diastolic function is consistent with (grade I) impaired relaxation.  •  Normal right ventricular cavity size noted. Hyperdynamic right ventricular systolic function noted.  •  The aortic valve leaflets are not well visualized, although appear heavily calcified  •  Severe aortic valve stenosis is present. Aortic valve area is 0.99 cm2. Aortic valve maximum pressure gradient is 77.5 mmHg.  Aortic valve mean pressure gradient is 39.2 mmHg.  •  Calculated right ventricular systolic pressure from tricuspid regurgitation is 21 mmHg.  •  There is no evidence of pericardial effusion. . There is evidence of a fat pad present.      I did review chest radiographs    Active Hospital Problems    Diagnosis  POA   • **Cervical stenosis of spinal canal [M48.02]  Yes   • Postprocedural pneumothorax [J95.811]  No   • Severe aortic stenosis [I35.0]  Yes   • History of fusion of cervical spine [Z98.1]  Not Applicable   • HTN (hypertension) [I10]  Yes   • Obesity, Class III, BMI 40-49.9 (morbid obesity) (HCC) [E66.01]  Yes   • Cervical spondylosis with myelopathy [M47.12]  Yes   • Hyperlipidemia associated with type 2 diabetes mellitus (HCC) [E11.69, E78.5]  Yes   • Hypothyroidism [E03.9]  Yes      Resolved Hospital Problems   No resolved problems to display.         Assessment & Plan     1. Enlarging 15 mm left lower lobe nodule with mediastinal lymphadenopathy status post CT-guided biopsy.     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  She will need a PET scan to evaluate adenopathy but if there was any thoughts of resecting her she would have to have her aortic valve fixed before ;but if adenopathy is consistent with metastatic disease of course she would not be a resection candidate.  Discussed with Dr. Lombardi he recommends getting MRI head and PET scan and getting the tumor markers back to see as best we can which stage and what treatment options might be available.  2. Minimal stable left upper pneumothorax postbiopsy plan follow-up chest x-ray  3. Severe aortic stenosis per cardiology to evaluate for candidacy for TAVR  4. Cervical spondylosis with myelopathy she was looking at needing surgery for this as well not sure how well this fits in the timeline of  treatments..  5. Hypertension  6. Hypothyroidism  7. Morbid obesity with a BMI greater than 50              Plan for disposition: From a pulmonary standpoint once all arrangements are put in place she could be discharged home unless there is something acute but sounds like we will get a need to wait a week or 2 for the tumor markers and we need a PET scan which is outpatient.    Justin Diaz Jr, MD  01/17/23  12:32 EST    Time:

## 2023-01-17 NOTE — CONSULTS
[facility] INITIAL INPATIENT CONSULTATION NOTE    REASON FOR CONSULTATION:    Non-small cell lung cancer    HISTORY OF PRESENT ILLNESS:  Gabby Acosta is a 67 y.o. female who we are asked to see today in consultation for the above issue.    Patient has history of diabetes mellitus, hypertension, cervical spine stenosis with associated myelopathy, hepatic steatosis, hyperlipidemia, COPD.  She underwent surgery on 9/20/2022 with anterior cervical corpectomy with cage.  Patient has history of smoking 1 pack/day x 45 years, quit in November 2021.  Following her surgery in September 2022, she experienced acute hypoxemic respiratory failure.  CT angiogram chest 9/25/2022 showed a concerning 1.4 cm left lower lobe pulmonary nodule, mediastinal lymphadenopathy with infracarinal lymph node 2.7 cm, left hilar lymph node 1.2 cm, small right hilar lymph nodes up from 1.1 cm and additional enlarged mediastinal nodes in the right paratracheal, precarinal, infracarinal spaces.  There was a wedge-shaped area of consolidation/atelectasis in the right middle lobe base, subpleural density left costophrenic sulcus felt to represent atelectasis.  He was recommended for her to undergo subsequent PET scan by pulmonary.    The patient has had ongoing follow-up with neurosurgery and on CT scans 11/29/2022 of cervical and thoracic spine, there was concern regarding spinal instability with potential need for posterior cervical fusion.  This was scheduled in December 2022 however was delayed due to upper respiratory infection.  Patient admitted on 1/10/2023 to address multiple medical issues in order to prepare for potential neurosurgical procedure on 3/12/2023.    Patient underwent echocardiogram on 1/11/2023 with ejection fraction greater than 70%, grade 1 diastolic dysfunction, severe aortic stenosis.  Patient is being followed by cardiology with consideration of need for TAVR versus SAVR for moderate to severe aortic  stenosis.    Patient did undergo repeat CT chest on 1/12/2023 which showed slight increase in size of the left lower lobe pulmonary nodule increased from 1.4 to 1.5 cm.  Groundglass nodule peripheral left lower lobe less conspicuous and resolution of previous reticulonodular opacities right lower lobe.  There was further enlargement of mediastinal lymph nodes with right posterior paratracheal lymph node 1.4 x 2.1 up to 1.5 x 2.3 cm, subcarinal lymph node increased from 2.7 x 5.2 up to 3.2 x 5.8 cm, left hilar lymph node increased from 1.2 up to 1.4 cm, and is stable 1.7 cm left infrahilar lymph node.  There was also comment regarding hepatic steatosis with changes of chronic liver disease and stable indeterminate nodular thickening of the adrenal glands.    CT-guided lung biopsy on 1/13/2023 with pathology that showed invasive poorly differentiated non-small cell carcinoma with focal neuroendocrine differentiation.  Staining pattern and morphology favor poorly differentiated adenocarcinoma however there was focal CD56 staining, could not entirely exclude large cell neuroendocrine carcinoma.  PD-L1 pending.    It was felt that there would be increased risk for bronchoscopy from a cardiac standpoint due to her aortic stenosis.    Patient notes that she was having severe symptoms related to her cervical stenosis with reduced ability to ambulate previously and significant limitations in movement in her hands, dropping objects.  More recently however she has improved and has been able to walk with the use of a walker, symptoms in the upper extremities have improved and her dexterity is better.  She notes normal appetite, denies any weight loss.        Past Medical History:   Diagnosis Date   • Cervical spondylosis with myelopathy    • Cervical stenosis of spine    • Diabetes mellitus (HCC)    • Diabetes mellitus with diabetic dermatitis (HCC)    • Disease of thyroid gland    • Elevated cholesterol    • Fatty liver         Past Surgical History:   Procedure Laterality Date   • ANTERIOR CHANNEL VERTEBRECTOMY/CORPECTOMY N/A 2022    Procedure: Anterior cervical corpectomy, cervical four/five/six, with cage and plate from cervical three to cervical seven;  Surgeon: David Cheung MD;  Location: [facility];  Service: Neurosurgery;  Laterality: N/A;   • CATARACT EXTRACTION     •  SECTION     • CHOLECYSTECTOMY         SOCIAL HISTORY:   reports that she quit smoking about 4 months ago. Her smoking use included cigarettes. She has never used smokeless tobacco. She reports that she does not drink alcohol and does not use drugs.    FAMILY HISTORY:  family history is not on file.    ALLERGIES:  No Known Allergies    MEDICATIONS:  As listed in the electronic medical record.    Review of Systems   A comprehensive review of systems was obtained with pertinent positive findings as noted in the interval history above.  All other systems negative.    Vitals:    23 1636 23 1934 23 0506   BP: 137/64  117/58 131/72   BP Location: Left arm  Right arm Left arm   Patient Position: Sitting  Lying Sitting   Pulse:   79 66   Resp: 18 16 18 16   Temp: 98.9 °F (37.2 °C)  97.9 °F (36.6 °C) 98.2 °F (36.8 °C)   TempSrc: Oral  Oral Oral   SpO2:   90% 95%   Weight:       Height:           Physical Exam  Constitutional:       Appearance: She is well-developed.   Eyes:      Conjunctiva/sclera: Conjunctivae normal.   Neck:      Thyroid: No thyromegaly.   Cardiovascular:      Rate and Rhythm: Normal rate and regular rhythm.      Heart sounds: No murmur heard.    No friction rub. No gallop.   Pulmonary:      Effort: No respiratory distress.      Comments: Diminished breath sounds bilaterally with few scattered rhonchi  Abdominal:      General: Bowel sounds are normal. There is no distension.      Palpations: Abdomen is soft.      Tenderness: There is no abdominal tenderness.   Lymphadenopathy:      Head:      Right  side of head: No submandibular adenopathy.      Cervical: No cervical adenopathy.      Upper Body:      Right upper body: No supraclavicular adenopathy.      Left upper body: No supraclavicular adenopathy.   Skin:     General: Skin is warm and dry.      Findings: No rash.   Neurological:      Mental Status: She is alert and oriented to person, place, and time.      Cranial Nerves: No cranial nerve deficit.      Motor: No abnormal muscle tone.      Deep Tendon Reflexes: Reflexes normal.   Psychiatric:         Behavior: Behavior normal.         DIAGNOSTIC DATA:    Results from last 7 days   Lab Units 01/17/23  0610 01/16/23  0700 01/15/23  0549   WBC 10*3/mm3 5.89 6.37 7.09   HEMOGLOBIN g/dL 12.4 12.6 12.9   HEMATOCRIT % 37.6 37.0 37.5   PLATELETS 10*3/mm3 162 157 164      Results from last 7 days   Lab Units 01/17/23  0610 01/16/23  0700 01/15/23  0549 01/11/23  0609 01/10/23  1439   SODIUM mmol/L 140 139 135*   < > 135*   POTASSIUM mmol/L 3.6 3.9 3.6   < > 4.3   CHLORIDE mmol/L 102 101 99   < > 99   CO2 mmol/L 28.6 26.7 27.3   < > 26.2   BUN mg/dL 12 15 17   < > 19   CREATININE mg/dL 0.63 0.80 0.82   < > 0.78   CALCIUM mg/dL 8.9 9.1 8.9   < > 9.4   BILIRUBIN mg/dL  --   --   --   --  0.3   ALK PHOS U/L  --   --   --   --  57   ALT (SGPT) U/L  --   --   --   --  13   AST (SGOT) U/L  --   --   --   --  15   GLUCOSE mg/dL 129* 130* 142*   < > 86    < > = values in this interval not displayed.            Assessment & Plan   ASSESSMENT:  This is a 67 y.o. female with:    *Non-small cell lung cancer, clinical stage IIIA ([id number])  · Patient has history of smoking 1 pack/day x 45 years, quit in November 2021.    · Following anterior cervical corpectomy in September 2022, she experienced acute hypoxemic respiratory failure.    · CT angiogram chest 9/25/2022 showed a concerning 1.4 cm left lower lobe pulmonary nodule, mediastinal lymphadenopathy with infracarinal lymph node 2.7 cm, left hilar lymph node 1.2 cm, small right  hilar lymph nodes up from 1.1 cm and additional enlarged mediastinal nodes in the right paratracheal, precarinal, infracarinal spaces.  There was a wedge-shaped area of consolidation/atelectasis in the right middle lobe base, subpleural density left costophrenic sulcus felt to represent atelectasis.  It was recommended for her to undergo subsequent PET scan by pulmonary.  · The patient has had ongoing follow-up with neurosurgery and on CT scans 11/29/2022 of cervical and thoracic spine, there was concern regarding spinal instability with potential need for posterior cervical fusion.  This was scheduled in December 2022 however was delayed due to upper respiratory infection.    · Patient admitted on 1/10/2023 to address multiple medical issues in order to prepare for potential neurosurgical procedure on 3/12/2023.  · Patient underwent echocardiogram on 1/11/2023 with ejection fraction greater than 70%, grade 1 diastolic dysfunction, severe aortic stenosis.  Patient is being followed by cardiology with consideration of need for TAVR versus SAVR for moderate to severe aortic stenosis.  · Repeat CT chest on 1/12/2023 showed slight increase in size of the left lower lobe pulmonary nodule increased from 1.4 to 1.5 cm.  Groundglass nodule peripheral left lower lobe less conspicuous and resolution of previous reticulonodular opacities right lower lobe.  There was further enlargement of mediastinal lymph nodes with right posterior paratracheal lymph node 1.4 x 2.1 up to 1.5 x 2.3 cm, subcarinal lymph node increased from 2.7 x 5.2 up to 3.2 x 5.8 cm, left hilar lymph node increased from 1.2 up to 1.4 cm, and is stable 1.7 cm left infrahilar lymph node.  There was also comment regarding hepatic steatosis with changes of chronic liver disease and stable indeterminate nodular thickening of the adrenal glands.  · CT-guided lung biopsy on 1/13/2023 with pathology that showed invasive poorly differentiated non-small cell carcinoma  with focal neuroendocrine differentiation.  Staining pattern and morphology favor poorly differentiated adenocarcinoma however there was focal CD56 staining, could not entirely exclude large cell neuroendocrine carcinoma.  PD-L1 pending.  · It was felt that there would be increased risk for bronchoscopy from a cardiac standpoint due to her aortic stenosis.  · I had a lengthy conversation with the patient and her  today.  We reviewed her scan findings and pathology.  We discussed that currently she has stage IIIA disease however has not undergone full staging evaluation to rule out evidence of metastatic disease.  She is being discharged home today and we will schedule as outpatient with a PET scan and MRI brain.  I will meet with her shortly after the scans are performed and review results.  We discussed that if she has no evidence of distant metastatic disease, treatment for stage IIIA non-small cell lung cancer would be with concurrent chemoradiation with weekly low-dose carboplatin and Taxol followed by immunotherapy with  durvalumab for 1 year.  If however she has evidence of metastatic disease, treatment would be palliative in nature and we would need to discuss options based on PD-L1 and molecular analysis.  We will request that Caris analysis be sent on patient's biopsy and PD-L1 is currently pending.  The patient made it clear that she wishes to focus on quality of life issues and if she has an incurable metastatic malignancy she may not wish to pursue active treatment and focus on maintaining quality of life.  We will discuss all of these issues when I see her back in follow-up once we have all of the information available to make decisions in regards to management of her disease.  I would recommend holding on any procedures for her aortic stenosis or cervical spine stenosis until we have a full picture of extent of her malignancy.  Of note, if she does require treatment, we would have significant  concern regarding use of agents that may cause neuropathy in light of her underlying neurologic compromise from her spinal stenosis.    *Aortic stenosis  · Patient underwent echocardiogram on 1/11/2023 with ejection fraction greater than 70%, grade 1 diastolic dysfunction, severe aortic stenosis.    · Patient is being followed by cardiology with consideration of need for TAVR versus SAVR for moderate to severe aortic stenosis.  · Await staging evaluation of patient's malignancy in regards to subsequent decisions about treatment of her aortic stenosis    *Cervical spine stenosis  · Patient with cervical spine stenosis with associated myelopathy.   · She underwent surgery on 9/20/2022 with anterior cervical corpectomy with cage.      · The patient has had ongoing follow-up with neurosurgery and on CT scans 11/29/2022 of cervical and thoracic spine, there was concern regarding spinal instability with potential need for posterior cervical fusion.  This was scheduled in December 2022 however was delayed due to upper respiratory infection.    · Patient admitted on 1/10/2023 to address multiple medical issues in order to prepare for potential neurosurgical procedure on 3/12/2023.  · Patient notes that she was having severe symptoms related to her cervical stenosis with reduced ability to ambulate previously and significant limitations in movement in her hands, dropping objects.  More recently however she has improved and has been able to walk with the use of a walker, symptoms in the upper extremities have improved and her dexterity is better.      *COPD  · Patient has history of smoking 1 pack/day x 45 years, quit in November 2021.  · Patient has not undergone formal PFTs  · Patient being followed by pulmonary, currently receiving Symbicort inhaler, duo nebs 4 times daily    *Hepatic steatosis  • Identified on prior scans  • Last LFTs on 1/10/2023 were normal    *Diabetes mellitus  • Currently receiving Lantus 5 units  nightly, sliding scale insulin      PLAN:   1. Patient is being discharged home today  2. We will request that biopsy be sent for Caris analysis  3. Request outpatient staging PET scan and MRI brain  4. Patient will return for follow-up in the next 2 weeks to review scan results and discuss recommendations for further management of her malignancy.  This will then help determine strategies for addressing her aortic stenosis and cervical spine stenosis    Discussed with patient and  at bedside.  Discussed with Dr. Diaz.        Carmelo Lombardi MD

## 2023-01-17 NOTE — PLAN OF CARE
Problem: Adult Inpatient Plan of Care  Goal: Absence of Hospital-Acquired Illness or Injury  Outcome: Ongoing, Progressing  Intervention: Identify and Manage Fall Risk  Recent Flowsheet Documentation  Taken 1/17/2023 0410 by Suki Jaime RN  Safety Promotion/Fall Prevention: safety round/check completed  Taken 1/17/2023 0220 by Suki Jaime RN  Safety Promotion/Fall Prevention: safety round/check completed  Taken 1/17/2023 0015 by Suki Jaime RN  Safety Promotion/Fall Prevention: safety round/check completed  Taken 1/16/2023 2215 by Suki Jaime RN  Safety Promotion/Fall Prevention: safety round/check completed  Taken 1/16/2023 2115 by Suki Jaime RN  Safety Promotion/Fall Prevention:   safety round/check completed   fall prevention program maintained   clutter free environment maintained   assistive device/personal items within reach   activity supervised  Taken 1/16/2023 2000 by Suki Jaime RN  Safety Promotion/Fall Prevention: safety round/check completed  Intervention: Prevent Skin Injury  Recent Flowsheet Documentation  Taken 1/16/2023 2115 by Suki Jaime RN  Body Position:   position changed independently   legs elevated  Intervention: Prevent and Manage VTE (Venous Thromboembolism) Risk  Recent Flowsheet Documentation  Taken 1/16/2023 2115 by Suki Jaime RN  Activity Management: activity adjusted per tolerance  Intervention: Prevent Infection  Recent Flowsheet Documentation  Taken 1/16/2023 2115 by Suki Jaime RN  Infection Prevention:   environmental surveillance performed   hand hygiene promoted   rest/sleep promoted   single patient room provided     Problem: Adult Inpatient Plan of Care  Goal: Optimal Comfort and Wellbeing  Outcome: Ongoing, Progressing  Intervention: Provide Person-Centered Care  Recent Flowsheet Documentation  Taken 1/16/2023 2115 by Suki Jaime RN  Trust Relationship/Rapport:   care explained   choices provided   questions  answered   questions encouraged   reassurance provided   thoughts/feelings acknowledged     Problem: Fall Injury Risk  Goal: Absence of Fall and Fall-Related Injury  Outcome: Ongoing, Progressing  Intervention: Identify and Manage Contributors  Recent Flowsheet Documentation  Taken 1/16/2023 2115 by Suki Jaime RN  Medication Review/Management: medications reviewed  Self-Care Promotion: independence encouraged  Intervention: Promote Injury-Free Environment  Recent Flowsheet Documentation  Taken 1/17/2023 0410 by Suki Jaime RN  Safety Promotion/Fall Prevention: safety round/check completed  Taken 1/17/2023 0220 by Suki Jaime RN  Safety Promotion/Fall Prevention: safety round/check completed  Taken 1/17/2023 0015 by Suki Jaime RN  Safety Promotion/Fall Prevention: safety round/check completed  Taken 1/16/2023 2215 by Suki Jaime RN  Safety Promotion/Fall Prevention: safety round/check completed  Taken 1/16/2023 2115 by Suki Jaime RN  Safety Promotion/Fall Prevention:   safety round/check completed   fall prevention program maintained   clutter free environment maintained   assistive device/personal items within reach   activity supervised  Taken 1/16/2023 2000 by Suki Jaime RN  Safety Promotion/Fall Prevention: safety round/check completed     Problem: Skin Injury Risk Increased  Goal: Skin Health and Integrity  Outcome: Ongoing, Progressing  Intervention: Optimize Skin Protection  Recent Flowsheet Documentation  Taken 1/16/2023 2115 by Suki Jaime RN  Head of Bed (HOB) Positioning: HOB elevated   Goal Outcome Evaluation:

## 2023-01-17 NOTE — DISCHARGE SUMMARY
Patient Name: Gabby Acosta  : 1955  MRN: [id number]    Date of Admission: 1/10/2023  Date of Discharge:  2023  Primary Care Physician: Danyelle Brush MD      Chief Complaint:   No chief complaint on file.      Discharge Diagnoses     Active Hospital Problems    Diagnosis  POA   • **Cervical stenosis of spinal canal [M48.02]  Yes   • Postprocedural pneumothorax [J95.811]  No   • Severe aortic stenosis [I35.0]  Yes   • History of fusion of cervical spine [Z98.1]  Not Applicable   • HTN (hypertension) [I10]  Yes   • Obesity, Class III, BMI 40-49.9 (morbid obesity) (HCC) [E66.01]  Yes   • Cervical spondylosis with myelopathy [M47.12]  Yes   • Hyperlipidemia associated with type 2 diabetes mellitus (HCC) [E11.69, E78.5]  Yes   • Hypothyroidism [E03.9]  Yes      Resolved Hospital Problems   No resolved problems to display.        Hospital Course     Ms. Acosta is a 67 y.o. female with a history of diabetes mellitus type 2, obesity, cervical spondylosis with myelopathy, hyperlipidemia, hypothyroidism, fatty liver who presented to [facility] initially complaining of numbness of arms and legs as well as inability to ambulate.  Please see the admitting history and physical for further details.  She was found to have multilevel stenosis of cervical spine and was admitted to the hospital for further evaluation and treatment.      She underwent an anterior cervical corpectomy at C4, C5, C6 with a cage and anterior cervical plate from C3-C7 on 2022 & avoided posterior approach due to previous post-operative complications.      Cardiology & pulmonology consulted for surgical clearance noting enlarging 15 mm left lung nodule with mediastinal lymphadenopathy.  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  Recommendation for PET scan to evaluate  adenopathy.    Cardiology approves additional work-up in outpatient setting for possible heart catheterization prior to possible aortic valve repair.    Oncology consulted given pathology results approved outpatient work-up as well.    Patient eager to discharge home on 1/17/2023 and prefers outpatient work-up and management moving forward.  Appears medically stable for discharge home as well--tolerating diet & physical activity.  Recommend avoid driving given cervical spinal stenosis with myelopathy.    Day of Discharge       Physical Exam:  Temp:  [97.9 °F (36.6 °C)-98.9 °F (37.2 °C)] 98.9 °F (37.2 °C)  Heart Rate:  [66-85] 70  Resp:  [16-18] 18  BP: (117-148)/(58-82) 148/82  Body mass index is 50.3 kg/m².     Physical Exam  Constitutional:       General: She is not in acute distress.     Appearance: She is obese. She is not toxic-appearing.   HENT:      Head: Normocephalic and atraumatic.   Eyes:      Extraocular Movements: Extraocular movements intact.      Conjunctiva/sclera: Conjunctivae normal.   Cardiovascular:      Rate and Rhythm: Normal rate and regular rhythm.   Pulmonary:      Effort: Pulmonary effort is normal.      Comments: Diminished on expiration  Abdominal:      General: Bowel sounds are normal.      Palpations: Abdomen is soft.   Musculoskeletal:         General: No tenderness.      Cervical back: Normal range of motion and neck supple.      Right lower leg: No edema.      Left lower leg: No edema.   Skin:     General: Skin is warm and dry.   Neurological:      Mental Status: She is alert and oriented to person, place, and time.   Psychiatric:         Behavior: Behavior normal.         Thought Content: Thought content normal.         Consultants     Consult Orders (all) (From admission, onward)     Start     Ordered    01/16/23 1533  Hematology & Oncology Inpatient Consult  Once        Specialty:  Hematology and Oncology  Provider:  Carmelo Lombardi Jr., MD    01/16/23 1533    01/10/23 2006   Inpatient Pulmonology Consult  Once        Specialty:  Pulmonary Disease  Provider:  Justin Diaz Jr., MD    01/10/23 2006    01/10/23 1931  Inpatient Cardiology Consult  Once        Specialty:  Cardiology  Provider:  Sepideh Goldstein MD    01/10/23 1934    01/10/23 1923  Inpatient Pulmonology Consult  Once,   Status:  Canceled        Specialty:  Pulmonary Disease  Provider:  Justin Diaz Jr., MD    01/10/23 1934    01/10/23 1329  Inpatient Neurosurgery Consult  Once        Specialty:  Neurosurgery  Provider:  David Cheung MD    01/10/23 1328              Procedures     * Surgery not found *      Imaging Results (All)     Procedure Component Value Units Date/Time    XR Chest 1 View [[id number]] Collected: 01/16/23 1242     Updated: 01/16/23 1246    Narrative:      XR CHEST 1 VW-     Clinical: Follow-up left apical pneumothorax     COMPARISON 1/14/2023     FINDINGS: Left apical pneumothorax is no longer seen. The  cardiomediastinal silhouette is stable. Minimal left basilar infiltrate  or atelectasis without interval change. The right lung is clear. The  remainder is unremarkable.     This report was finalized on 1/16/2023 12:43 PM by Dr. Calin Leger M.D.       XR Chest 1 View [[id number]] Collected: 01/14/23 1442     Updated: 01/14/23 1446    Narrative:      XR CHEST 1 VW-     HISTORY: Female who is 67 years-old,  pneumothorax     TECHNIQUE: Frontal view of the chest     COMPARISON: 01/13/2023     FINDINGS: The heart size is normal. Minimal left basilar atelectasis or  infiltrate. Minimal left apical pneumothorax does not appear  significantly changed. No pleural effusion or right pneumothorax. No  acute osseous process.       Impression:      No significant change.     This report was finalized on 1/14/2023 2:43 PM by Dr. Harshad Castro M.D.       XR Chest 1 View [[id number]] Collected: 01/13/23 1852     Updated: 01/13/23 1901    Narrative:      PORTABLE CHEST X-RAY     HISTORY: Follow-up lung  biopsy.     Portable chest x-ray is provided. Correlation: Chest x-ray earlier today  and preoperative CT chest yesterday.     FINDINGS: The cardiomediastinal silhouette is normal. Subtle density  posterior to the vascular markings in the left infrahilar region  corresponds to the mass lesion that was biopsied today. The costophrenic  sulci are dry and the bones appear normal.     A 5 mm left apical pneumothorax is unchanged.       Impression:      Stable small left apical pneumothorax following lung biopsy  earlier today.     This report was finalized on 1/13/2023 6:58 PM by Dr. Carmelo Farias M.D.       XR Chest 1 View [[id number]] Collected: 01/13/23 1703     Updated: 01/13/23 1710    Narrative:      AP CHEST     HISTORY: Status post left lung biopsy     COMPARISON: Earlier today     FINDINGS: There is a tiny left apical pneumothorax not clearly seen on  the comparison study. It measures about 7-8 mm. Nodular opacity in the  left lung base is less well apparent. Right lung is clear. Heart size  stable.       Impression:      Tiny left apical pneumothorax not clearly seen on the comparison study.  Follow-up chest x-ray recommended in about 1 hour to evaluate for  stability.      Findings were discussed with the patient's RN at 1709 hours on  01/13/2023     This report was finalized on 1/13/2023 5:07 PM by Dr. Everette Chavez M.D.       CT Needle Biopsy Lung [[id number]] Collected: 01/13/23 1406     Updated: 01/13/23 1505    Narrative:      PROCEDURE: CT guided left lung biopsy     HISTORY: Left lung nodule     TECHNIQUE:  Radiation dose reduction techniques were utilized, including automated  exposure control and exposure modulation based on body size.     The procedural risks, benefits, and alternatives were discussed with the  patient, including risks of pneumothorax and bleeding and possibility of  chest tube placement. Informed consent was obtained.        The patient was placed in the left side of position  on the CT procedure  table. Axial CT scan was performed to localize the nodule in the left  lower lobe. The overlying skin was prepped and draped in the usual  sterile fashion. 1% buffered lidocaine was used for local anesthesia.     Next, a 19-gauge coaxial needle was advanced into the lesion under CT  guidance. Multiple 20-gauge core biopsies were then obtained and sent to  pathology. The needle was removed and sterile dressing applied. No  immediate complications.       Impression:      Technically successful CT guided left lung biopsy.     Moderate sedation was provided under my direct supervision using 0.5 mg  IV Versed and 25 mcg IV Fentanyl. The patient was independently  monitored by a trained Department of Radiology RN using automated blood  pressure, EKG, and pulse oximetry. My total intra-service time was 20  minutes.               Radiation dose reduction techniques were utilized, including automated  exposure control and exposure modulation based on body size.     This report was finalized on 1/13/2023 3:02 PM by Dr. Everette Chavez M.D.       XR Chest 1 View [[id number]] Collected: 01/13/23 1442     Updated: 01/13/23 1446    Narrative:      AP CHEST     HISTORY: Post left lung biopsy     COMPARISON: CT biopsy images from earlier today     FINDINGS: There is no appreciable pneumothorax. There is a nodule in the  base of the left lung that was biopsied earlier today. Right lung  appears clear. Right hemidiaphragm is elevated. Heart size within normal  limits. Postop changes of the cervical spine.       Impression:      No appreciable pneumothorax     This report was finalized on 1/13/2023 2:43 PM by Dr. Everette Chavez M.D.       CT Chest With Contrast Diagnostic [[id number]] Collected: 01/12/23 1144     Updated: 01/12/23 1202    Narrative:      CT CHEST WITH IV CONTRAST     HISTORY: Evaluate for adenopathy     TECHNIQUE: Radiation dose reduction techniques were utilized, including  automated exposure  control and exposure modulation based on body size.   3 mm images were obtained through the chest after the administration of  IV contrast.      COMPARISON: Chest x-ray 1 2023, 09/25/2022     FINDINGS:     The thoracic inlet is within normal limits. Calcification right thyroid  lobe, unchanged. The heart size is stable. Atherosclerosis including  coronary artery calcifications. Filling defect in the midthoracic  trachea extending to the fly the fly favoring retained secretions  but could further be evaluated as clinically indicated.     Enlarging right posterior paratracheal node (1.5 x 2.3 cm (previously  1.4 x 2.1 cm) and subcarinal adenopathy measuring 3.2 x 5.8 cm  (previously 2.7 x 5.2 cm)), 1.4 cm left hilar node (previously 1.2 cm).  1.7 Cm left infrahilar node, unchanged.     Emphysema. No focal consolidations, effusions, or pneumothorax. 1.5 cm  left lower lobe nodule (image 62) previously 1.4 cm). Ground glass  nodule in the periphery of the left lower lobe is less conspicuous with  near complete resolution of previously described reticulonodular  opacities in the right lower lobe with residual mild bronchial wall  thickening. Hepatic steatosis with morphologic changes of chronic liver  disease. Cholecystectomy. Moderate calcific atherosclerosis abdominal  aorta and branch vessels. Stable indeterminate nodular thickening  adrenal glands.     Hypertrophic degenerative changes are again noted in thoracolumbar  spine. Postsurgical changes cervical spine.          Impression:         1. Enlarging left lower lobe pulmonary nodule and right  paratracheal/subcarinal adenopathy. Further characterization with PET/CT  or tissue diagnosis could be considered if not previously performed.  2. Improved aeration of the lung bases.  3. Filling defect in the midthoracic trachea favoring retained  secretions but can further be evaluated as indicated.  4. Please see above for additional findings.     This report was  finalized on 1/12/2023 11:59 AM by Dr. Susan Greer M.D.       XR Chest PA & Lateral [[id number]] Collected: 01/10/23 2057     Updated: 01/10/23 2101    Narrative:      CHEST: 2 VIEWS     HISTORY: Preoperative exam.     COMPARISON: CT angiogram chest 09/25/2022     FINDINGS:Heart size within normal limits. The thoracic aorta appears  tortuous. There is elevation of the right hemidiaphragm and there is  right basilar atelectasis. There is cervical collar limiting evaluation  of the lung apices. There is no evidence for pulmonary edema or pleural  effusion. On the lateral view, there is a bulbous configuration of the  calvin and this likely correlates with mild hilar and subcarinal jonn  enlargement demonstrated with previous CT.       Impression:      On the lateral view, there is a bulbous configuration of the  calvin and this likely correlates with hilar and subcarinal jonn  enlargement demonstrated on previous CT though this would be more  accurately directly compared with prior CT 09/25/2022. Lungs appear  clear.     This report was finalized on 1/10/2023 8:58 PM by Dr. Irving Christian M.D.       XR Spine Cervical 2 View [[id number]] Collected: 01/10/23 2031     Updated: 01/10/23 2042    Narrative:      CERVICAL SPINE: AP, LATERAL, SWIMMER'S VIEW     HISTORY: Evaluate alignment. Previous cervical spine surgery.     COMPARISON: Cervical spine 11/16/2022     FINDINGS: Unfortunately, this evaluation is very limited due to  difficulties with patient positioning and overlapping densities. There  has been anterior cervical fusion with placement of plates and screws  and anterior plate extends from C3 to C7. Interbody spacers are present  at C3-4 and C6-7. There is no evidence for loosening though the caudal  aspect of the plate visualization is severely limited. Cervical collar  is present contributing to artifact.       Impression:      Severely limited exam demonstrates that there has been  anterior plate and screw  fusion from C3 to C7. The caudal aspect of the  plate is not well visualized though alignment appears grossly within  normal limits. Overlying cervical collar artifact is noted. CT may be  necessary for more definitive evaluation depending on the clinical  scenario.     This report was finalized on 1/10/2023 8:39 PM by Dr. Irving Christian M.D.             Results for orders placed during the hospital encounter of 01/10/23    Adult Transthoracic Echo Complete W/ Cont if Necessary Per Protocol    Interpretation Summary  •  Left ventricular ejection fraction appears to be greater than 70%.  •  Left ventricular wall thickness is consistent with mild concentric hypertrophy.  •  Left ventricular diastolic function is consistent with (grade I) impaired relaxation.  •  Normal right ventricular cavity size noted. Hyperdynamic right ventricular systolic function noted.  •  The aortic valve leaflets are not well visualized, although appear heavily calcified  •  Severe aortic valve stenosis is present. Aortic valve area is 0.99 cm2. Aortic valve maximum pressure gradient is 77.5 mmHg. Aortic valve mean pressure gradient is 39.2 mmHg.  •  Calculated right ventricular systolic pressure from tricuspid regurgitation is 21 mmHg.  •  There is no evidence of pericardial effusion. . There is evidence of a fat pad present.    Pertinent Labs     Results from last 7 days   Lab Units 01/17/23  0610 01/16/23  0700 01/15/23  0549 01/14/23  0649   WBC 10*3/mm3 5.89 6.37 7.09 7.87   HEMOGLOBIN g/dL 12.4 12.6 12.9 13.1   PLATELETS 10*3/mm3 162 157 164 168     Results from last 7 days   Lab Units 01/17/23  0610 01/16/23  0700 01/15/23  0549 01/14/23  0649   SODIUM mmol/L 140 139 135* 139   POTASSIUM mmol/L 3.6 3.9 3.6 3.6   CHLORIDE mmol/L 102 101 99 99   CO2 mmol/L 28.6 26.7 27.3 28.5   BUN mg/dL 12 15 17 19   CREATININE mg/dL 0.63 0.80 0.82 0.78   GLUCOSE mg/dL 129* 130* 142* 133*   EGFR mL/min/1.73 98.0 81.4 79.0 83.9     Results from last  7 days   Lab Units 01/10/23  1439   ALBUMIN g/dL 4.0   BILIRUBIN mg/dL 0.3   ALK PHOS U/L 57   AST (SGOT) U/L 15   ALT (SGPT) U/L 13     Results from last 7 days   Lab Units 01/17/23  0610 01/16/23  0700 01/15/23  0549 01/14/23  0649 01/13/23  0659 01/12/23  0741 01/11/23  0609 01/10/23  1439   CALCIUM mg/dL 8.9 9.1 8.9 8.9   < > 9.6 9.5 9.4   ALBUMIN g/dL  --   --   --   --   --   --   --  4.0   MAGNESIUM mg/dL  --   --   --   --   --  2.1 1.8 1.3*   PHOSPHORUS mg/dL  --   --   --   --   --  4.4 2.8 3.4    < > = values in this interval not displayed.       Results from last 7 days   Lab Units 01/12/23  0741   PROBNP pg/mL 331.0           Invalid input(s): LDLCALC          Test Results Pending at Discharge       Discharge Details        Discharge Medications      New Medications      Instructions Start Date   hydrOXYzine 25 MG tablet  Commonly known as: ATARAX   Take 1 tablet by mouth 4 (Four) Times a Day As Needed for Anxiety.      LORazepam 0.5 MG tablet  Commonly known as: ATIVAN   0.5 mg, Oral, 2 Times Daily PRN      Symbicort 160-4.5 MCG/ACT inhaler  Generic drug: budesonide-formoterol   Inhale 2 puffs 2 (Two) Times a Day.         Continue These Medications      Instructions Start Date   amLODIPine 10 MG tablet  Commonly known as: NORVASC   10 mg, Oral, Daily      buPROPion  MG 24 hr tablet  Commonly known as: WELLBUTRIN XL   150 mg, Oral, Daily      diphenhydrAMINE 25 mg capsule  Commonly known as: BENADRYL   25 mg, Oral, Every 6 Hours PRN      fluticasone 50 MCG/ACT nasal spray  Commonly known as: FLONASE   INSTILL TWO (2) SPRAYS INTO NOSE DAILY.      guaiFENesin 600 MG 12 hr tablet  Commonly known as: MUCINEX   600 mg, Oral, Every 12 Hours Scheduled      ipratropium-albuterol 0.5-2.5 mg/3 ml nebulizer  Commonly known as: DUO-NEB   3 mL, Nebulization, 4 Times Daily - RT      levothyroxine 125 MCG tablet  Commonly known as: SYNTHROID, LEVOTHROID   125 mcg, Oral, Daily      losartan 100 MG  tablet  Commonly known as: COZAAR   1 tablet, Oral, Daily      lovastatin 40 MG tablet  Commonly known as: MEVACOR   TAKE ONE (1) TABLET BY MOUTH NIGHTLY.      metFORMIN 850 MG tablet  Commonly known as: GLUCOPHAGE   TAKE ONE (1) TABLET BY MOUTH TWO (2) (TWO) TIMES DAILY WITH MEALS SHOULD BE DUE 90 DAY SUPPLY.      pyridoxine 100 MG tablet  Commonly known as: VITAMIN B-6   100 mg, Oral, Daily      vitamin D3 125 MCG (5000 UT) capsule capsule   5,000 Units, Oral, Daily         Stop These Medications    albuterol sulfate  (90 Base) MCG/ACT inhaler  Commonly known as: PROVENTIL HFA;VENTOLIN HFA;PROAIR HFA     Flax Seed Oil 1000 MG capsule     ibuprofen 800 MG tablet  Commonly known as: ADVIL,MOTRIN            No Known Allergies    Discharge Disposition:  Home or Self Care      Discharge Diet:  Diet Order   Procedures   • Diet: Regular/House Diet; Texture: Regular Texture (IDDSI 7); Fluid Consistency: Thin (IDDSI 0)       Discharge Activity:   Activity Instructions     Activity as Tolerated      Driving Restrictions      Type of Restriction: Driving    Driving Restrictions: No Driving    Up WIth Assist            CODE STATUS:    Code Status and Medical Interventions:   Ordered at: 01/10/23 1919     Code Status (Patient has no pulse and is not breathing):    CPR (Attempt to Resuscitate)     Medical Interventions (Patient has pulse or is breathing):    Full Support     Release to patient:    Routine Release       Future Appointments   Date Time Provider Department Center   3/1/2023 10:45 AM David Cheung MD [facility]     Additional Instructions for the Follow-ups that You Need to Schedule     Discharge Follow-up with PCP   As directed       Currently Documented PCP:    Danyelle Brush MD    PCP Phone Number:    [phone]     Follow Up Details: Please call to schedule a one week or earliest available follow-up appointment with PCP; monitor anxiety response to anxiolytics as needed, glucose, BP             Follow-up Information     Danyelle Brush MD .    Specialty: Pediatrics  Why: Please call to schedule a one week or earliest available follow-up appointment with PCP; monitor anxiety response to anxiolytics as needed, glucose, BP  Contact information:  [address] [facility] [id number] [phone]             Justin Diaz Jr., MD. Call.    Specialty: Pulmonary Disease  Contact information:  [address] [facility] [address]  [place] [id number]  [phone]             Carmelo Lombardi Jr., MD. Call.    Specialties: Hematology and Oncology, Oncology, Hematology  Contact information:  [address] [facility] [address]  [place] [address]  [phone]             Pan Mendoza MD. Call.    Specialty: Cardiology  Contact information:  [address]  [place] [address]  [phone]                         Additional Instructions for the Follow-ups that You Need to Schedule     Discharge Follow-up with PCP   As directed       Currently Documented PCP:    Danyelle Brush MD    PCP Phone Number:    [phone]     Follow Up Details: Please call to schedule a one week or earliest available follow-up appointment with PCP; monitor anxiety response to anxiolytics as needed, glucose, BP           Time Spent on Discharge:  Greater than 30 minutes      CHENG Lerner  [facility]  01/17/23  09:46 EST

## 2023-01-17 NOTE — PROGRESS NOTES
Case Management Discharge Note      Final Note: Home with family support.    Provided Post Acute Provider List?: Yes  Post Acute Provider List: Home Health, Outpatient Therapy    Selected Continued Care - Admitted Since 1/10/2023     Destination    No services have been selected for the patient.              Durable Medical Equipment    No services have been selected for the patient.              Dialysis/Infusion    No services have been selected for the patient.              Home Medical Care    No services have been selected for the patient.              Therapy    No services have been selected for the patient.              Community Resources    No services have been selected for the patient.              Community & DME    No services have been selected for the patient.                  Transportation Services  Private: Car    Final Discharge Disposition Code: 01 - home or self-care

## 2023-01-17 NOTE — CONSULTS
[facility] Consult Note       Encounter Date:23  Patient:Gabby Acosta  :1955  MRN:[id number]    Date of Admission: 1/10/2023  Date of Encounter Visit: 23  Encounter Provider: Pan Mendoza MD  Referring Provider: Dariusz Marrufo MD  Place of Service: [facility]  Patient Care Team:  Danyelle Brush MD as PCP - General (Pediatrics)      Consulted for: Aortic stenosis and possible TAVR    Chief Complaint: Here for elective neck surgery      History of Presenting Illness:      Ms. Acosta is a 67 y.o. woman with past medical history notable for heart murmur since age 15, COPD, diabetes, hypertension, prior tobacco use, and cervical spine stenoses with recent surgery earlier this year who was being evaluated for a posterior approach to address her spinal stenoses.  Due to ongoing neck pain this was originally scheduled for December but patient has had multiple upper respiratory infections and other illnesses which resulted in her surgery being canceled and thus she was directly admitted to the hospitalist service earlier this month for preadmission tune up prior to proceeding forward with potential surgery which was scheduled for 2023.  During her evaluation she was noted to have a heart murmur which prompted an echocardiogram and cardiology consultation as well as to help for preop clearance.  She was found to have moderate to severe aortic stenosis by gradients and valve area and for this reason I was asked to further evaluate her.  In addition to this there is also concerns for non-small cell lung cancer which was diagnosed this admission through our pulmonary group and biopsy.  Oncology is following and does have plans for outpatient PET scan to help further stage to delineate next steps and prognosis.    From a cardiac standpoint patient is doing okay but obviously is fairly limited in her activity levels.  She unfortunately has not been very mobile or  active over the last year due to her spinal stenoses.  After her surgery in September she is a little bit more mobile and can use a walker to get around and has more leg strength but obviously does not do a whole lot as far as strenuous activity or walking.  She did tolerate surgery fairly well from a cardiovascular standpoint but from a pulmonary standpoint had a lot of atelectasis breathing issues which actually what prompted the further testing and found the significant adenopathy and lung cancer.    She reports having a heart murmur since age 15 but has not had much in the way of work-up for this for a number of years.  She denies any syncope, chest pain, presyncope, labile blood pressures.  Again denies any dyspnea on exertion but also does not do a whole lot besides limited ambulation with a walker around her house.      Review of Systems:  Review of Systems   Constitutional: Positive for malaise/fatigue.   HENT: Negative.    Eyes: Negative.    Cardiovascular: Negative.  Negative for chest pain, dyspnea on exertion and syncope.   Respiratory: Negative.  Negative for shortness of breath.    Endocrine: Negative.    Hematologic/Lymphatic: Negative.    Skin: Negative.    Musculoskeletal: Negative.    Gastrointestinal: Negative.    Genitourinary: Negative.    Neurological: Negative.    Psychiatric/Behavioral: Negative.    Allergic/Immunologic: Negative.        Medications:    Current Facility-Administered Medications:   •  acetaminophen (TYLENOL) tablet 650 mg, 650 mg, Oral, Q6H PRN, John Khan MD, 650 mg at 01/13/23 1634  •  albuterol (PROVENTIL) nebulizer solution 0.083% 2.5 mg/3mL, 2.5 mg, Nebulization, Q6H PRN, Dariusz Marrufo MD  •  amLODIPine (NORVASC) tablet 10 mg, 10 mg, Oral, Q24H, Dariusz Marrufo MD, 10 mg at 01/17/23 0835  •  atorvastatin (LIPITOR) tablet 10 mg, 10 mg, Oral, Daily, Dariusz Marrufo MD, 10 mg at 01/17/23 0834  •  budesonide-formoterol (SYMBICORT) 160-4.5 MCG/ACT inhaler 2 puff, 2 puff,  Inhalation, BID - RT, Dwight Ring MD, 2 puff at 01/17/23 0857  •  buPROPion XL (WELLBUTRIN XL) 24 hr tablet 150 mg, 150 mg, Oral, Daily, Dariusz Marrufo MD, 150 mg at 01/17/23 0835  •  cholecalciferol (VITAMIN D3) tablet 5,000 Units, 5,000 Units, Oral, Daily, Dariusz Marrufo MD, 5,000 Units at 01/17/23 0835  •  dextrose (D50W) (25 g/50 mL) IV injection 25 g, 25 g, Intravenous, Q15 Min PRN, Dariusz Marrufo MD  •  dextrose (GLUTOSE) oral gel 15 g, 15 g, Oral, Q15 Min PRN, Dariusz Marrufo MD  •  diphenhydrAMINE (BENADRYL) capsule 25 mg, 25 mg, Oral, Q6H PRN, Dariusz Marrufo MD, 25 mg at 01/16/23 0211  •  fluticasone (FLONASE) 50 MCG/ACT nasal spray 1 spray, 1 spray, Each Nare, BID, Dariusz Marrufo MD, 1 spray at 01/17/23 0833  •  glucagon (human recombinant) (GLUCAGEN DIAGNOSTIC) injection 1 mg, 1 mg, Intramuscular, Q15 Min PRN, Dariusz Marrufo MD  •  guaiFENesin (MUCINEX) 12 hr tablet 600 mg, 600 mg, Oral, Q12H, Dariusz Marrufo MD, 600 mg at 01/17/23 0835  •  hydrOXYzine (ATARAX) tablet 25 mg, 25 mg, Oral, 4x Daily PRN, Royer Valencia APRN  •  insulin glargine (LANTUS, SEMGLEE) injection 5 Units, 5 Units, Subcutaneous, Nightly, Dariusz Marrufo MD, 5 Units at 01/16/23 2116  •  insulin lispro (ADMELOG) injection 0-7 Units, 0-7 Units, Subcutaneous, TID AC, Dariusz Marrufo MD, 3 Units at 01/16/23 1656  •  ipratropium-albuterol (DUO-NEB) nebulizer solution 3 mL, 3 mL, Nebulization, 4x Daily - RT, Dariusz Marrufo MD, 3 mL at 01/17/23 1151  •  levothyroxine (SYNTHROID, LEVOTHROID) tablet 125 mcg, 125 mcg, Oral, Daily, Dariusz Marrufo MD, 125 mcg at 01/17/23 0835  •  lidocaine (XYLOCAINE) 1 % injection 20 mL, 20 mL, Infiltration, Once, Everette Chavez MD  •  LORazepam (ATIVAN) tablet 0.5 mg, 0.5 mg, Oral, TID PRN, Royer Valencia APRN, 0.5 mg at 01/17/23 1057  •  losartan (COZAAR) tablet 100 mg, 100 mg, Oral, Daily, Dariusz Marrufo MD, 100 mg at 01/17/23 0835  •  nitroglycerin (NITROSTAT) SL tablet 0.4 mg, 0.4 mg, Sublingual, Q5 Min  PRN, Dariusz Marrufo MD  •  sodium chloride 0.9 % flush 10 mL, 10 mL, Intravenous, Q12H, Dariusz Marrufo MD, 10 mL at 23 0836  •  sodium chloride 0.9 % flush 10 mL, 10 mL, Intravenous, PRN, Dariusz Marrufo MD  •  sodium chloride 0.9 % infusion 40 mL, 40 mL, Intravenous, PRNiru AGEE Aakash, MD  •  vitamin B-6 (PYRIDOXINE) tablet 100 mg, 100 mg, Oral, Daily, Dariusz Marrufo MD, 100 mg at 23 0834    No Known Allergies    Past Medical History:   Diagnosis Date   • Cervical spondylosis with myelopathy    • Cervical stenosis of spine    • Diabetes mellitus (HCC)    • Diabetes mellitus with diabetic dermatitis (HCC)    • Disease of thyroid gland    • Elevated cholesterol    • Fatty liver        Past Surgical History:   Procedure Laterality Date   • ANTERIOR CHANNEL VERTEBRECTOMY/CORPECTOMY N/A 2022    Procedure: Anterior cervical corpectomy, cervical four/five/six, with cage and plate from cervical three to cervical seven;  Surgeon: David Cheung MD;  Location: [facility];  Service: Neurosurgery;  Laterality: N/A;   • CATARACT EXTRACTION     •  SECTION     • CHOLECYSTECTOMY         Social History     Socioeconomic History   • Marital status:    Tobacco Use   • Smoking status: Former     Types: Cigarettes     Quit date: 2022     Years since quittin.3   • Smokeless tobacco: Never   Vaping Use   • Vaping Use: Never used   Substance and Sexual Activity   • Alcohol use: Never   • Drug use: Never   • Sexual activity: Yes       Family History   Problem Relation Age of Onset   • Malig Hyperthermia Neg Hx        The following portions of the patient's history were reviewed and updated as appropriate: allergies, current medications, past family history, past medical history, past social history, past surgical history and problem list.         Objective:      Temp:  [97.9 °F (36.6 °C)-98.9 °F (37.2 °C)] 98.9 °F (37.2 °C)  Heart Rate:  [66-85] 70  Resp:  [16-18] 18  BP: (117-148)/(58-82)  148/82   No intake or output data in the 24 hours ending 01/17/23 1358  Body mass index is 50.3 kg/m².      01/10/23  1255 01/11/23  1524   Weight: 125 kg (275 lb 12.7 oz) 125 kg (275 lb)           Physical Exam:  Constitutional: Well appearing, well developed, no acute distress   HENT: Oropharynx clear and membrane moist  Eyes: Normal conjunctiva, no sclera icterus.  Neck:  Neck brace in place.  Cardiovascular: Regular rate and rhythm, Late peaking systolic murmur over the right upper sternal border, No bilateral lower extremity edema.  Pulmonary: Normal respiratory effort, normal lung sounds, no wheezing.  Abdominal: Soft, nontender, no hepatosplenomegaly, liver is non-pulsatile.  Neurological: Alert and orient x 3.   Skin: Warm, dry, no ecchymosis, no rash.  Psych: Appropriate mood and affect. Normal judgment and insight.         Lab Review:   Results from last 7 days   Lab Units 01/17/23  0610 01/16/23  0700 01/15/23  0549 01/14/23  0649 01/13/23  0659 01/12/23  0741 01/11/23  0609 01/10/23  1439   SODIUM mmol/L 140 139 135* 139 137 136 134* 135*   POTASSIUM mmol/L 3.6 3.9 3.6 3.6 4.0 4.1 3.9 4.3   CHLORIDE mmol/L 102 101 99 99 99 96* 96* 99   CO2 mmol/L 28.6 26.7 27.3 28.5 31.2* 29.0 26.0 26.2   BUN mg/dL 12 15 17 19 19 18 14 19   CREATININE mg/dL 0.63 0.80 0.82 0.78 0.83 0.80 0.63 0.78   GLUCOSE mg/dL 129* 130* 142* 133* 151* 160* 166* 86   CALCIUM mg/dL 8.9 9.1 8.9 8.9 9.1 9.6 9.5 9.4   AST (SGOT) U/L  --   --   --   --   --   --   --  15   ALT (SGPT) U/L  --   --   --   --   --   --   --  13         Results from last 7 days   Lab Units 01/17/23  0610 01/16/23  0700 01/15/23  0549 01/14/23  0649 01/13/23  0659 01/12/23  0741 01/11/23  0609   WBC 10*3/mm3 5.89 6.37 7.09 7.87 8.08 9.43 7.91   HEMOGLOBIN g/dL 12.4 12.6 12.9 13.1 13.6 14.2 14.8   HEMATOCRIT % 37.6 37.0 37.5 40.4 41.6 42.3 43.3   PLATELETS 10*3/mm3 162 157 164 168 171 172 167     Results from last 7 days   Lab Units 01/13/23  1117   INR  0.96      Results from last 7 days   Lab Units 01/12/23  0741 01/11/23  0609 01/10/23  1439   MAGNESIUM mg/dL 2.1 1.8 1.3*           Invalid input(s): LDLCALC  The 10-year ASCVD risk score (Dottie ABRAHAM, et al., 2019) is: 19.7%    Values used to calculate the score:      Age: 67 years      Sex: Female      Is Non- : No      Diabetic: Yes      Tobacco smoker: No      Systolic Blood Pressure: 148 mmHg      Is BP treated: Yes      HDL Cholesterol: 54 mg/dL      Total Cholesterol: 188 mg/dL     Results from last 7 days   Lab Units 01/12/23  0741   PROBNP pg/mL 331.0           Echocardiogram 1/11/2023 with images reviewed by myself:  · Left ventricular ejection fraction appears to be greater than 70%.  · Left ventricular wall thickness is consistent with mild concentric hypertrophy.  · Left ventricular diastolic function is consistent with (grade I) impaired relaxation.  · Normal right ventricular cavity size noted. Hyperdynamic right ventricular systolic function noted.  · The aortic valve leaflets are not well visualized, although appear heavily calcified  · Severe aortic valve stenosis is present. Aortic valve area is 0.99 cm2. Aortic valve maximum pressure gradient is 77.5 mmHg. Aortic valve mean pressure gradient is 39.2 mmHg.  · Calculated right ventricular systolic pressure from tricuspid regurgitation is 21 mmHg.  · There is no evidence of pericardial effusion. . There is evidence of a fat pad present.         Assessment:           Cervical stenosis of spinal canal    HTN (hypertension)    Cervical spondylosis with myelopathy    Hypothyroidism    Hyperlipidemia associated with type 2 diabetes mellitus (HCC)    Obesity, Class III, BMI 40-49.9 (morbid obesity) (HCC)    History of fusion of cervical spine    Severe aortic stenosis    Postprocedural pneumothorax         Plan:       Ms. Acosta is a 67 y.o. woman with past medical history notable for heart murmur since age 15, COPD, diabetes,  hypertension, prior tobacco use, and cervical spine stenoses with recent surgery earlier this year who was brought in for elective preoperative assessment prior to proceeding forward with posterior neck surgery to address her spinal stenosis.  She was found to have aortic stenosis which is in the moderate to severe range.  I did review her echocardiogram and her valve area is approximately 0.99-1 cm2 with a mean gradient at most 39 mmHg but most of them are in the mid 30s range.  She likely has moderate to severe aortic stenosis her dimensionless valve index also suggest moderate at 0.3 that being said she likely also has a bicuspid valve which can be hard to get good gradient assessments on and sometimes can have an eccentric jet.  Clinically it is also hard to assess how she is doing given her spinal stenosis and minimal activity level.  I would recommend her neck step would be a right and left heart catheterization to further define the severity of her valvular heart disease.  She does not have any high risk features clinically to suggest that we are dealing with critical or severe aortic stenosis with major symptoms but again her clinical status can be somewhat deceiving.  If on cardiac catheterization she is found to have moderate aortic stenosis can obviously have what ever surgery she may need and or oncology treatment however if found to have severe aortic stenosis might need to delineate next best steps with TAVR versus SAVR pending her oncologic work-up which is also being planned as an outpatient.  Patient is actually wanting to go home given that she was brought in as an elective outpatient I can schedule her right and left heart catheterization as an outpatient for further delineation of severity of her valvular heart disease and she may be discharged home today from my standpoint.      Nonrheumatic aortic stenosis:  · Likely due to bicuspid valve  · Would recommend right left heart catheterization to  further define hemodynamics and figure out severity of valvular heart disease which would help guidance regarding neck steps regarding monitoring her valvular heart disease versus proceeding forward with consideration of valve therapies  · Such as TAVR or SAVR          Thank you for allowing me to participate in the care of Gabby Acosta. Feel free to contact me directly with any further questions or concerns.    Pan Mendoza MD  [facility]  01/17/23  13:58 EST       Aortic Stenosis education material has been provided to the patient. This included a printed brochure detailing pathophysiology of aortic stenosis, patient specific aortic stenosis symptoms, and treatment options (medical therapy, surgical aortic valve replacement, and transcatheter aortic valve replacement).   We reviewed the Shared Decision Making Tool for treatment of Aortic Stenosis to compare/contrast the options of TAVR/SAVR/medical management. We discussed patient's goals of care: To feel better overall and address all of her comorbidities. Patient has expressed following concerns regarding Aortic Stenosis and/or treatment options: Wants to do what is right and get the best treatment option and get back to her previous level of functionality  Patient has a Living Will: no  Patient has a Power of : no

## 2023-01-18 DIAGNOSIS — C34.32 MALIGNANT NEOPLASM OF LOWER LOBE OF LEFT LUNG: Primary | ICD-10-CM

## 2023-01-18 DIAGNOSIS — C7A.8 NEUROENDOCRINE CARCINOMA OF LUNG: Primary | ICD-10-CM

## 2023-01-18 NOTE — OUTREACH NOTE
Prep Survey    Flowsheet Row Responses   [facility] patient discharged from? [place]   Is LACE score < 7 ? No   Eligibility Readm Mgmt   Discharge diagnosis anterior cervical corpectomy at C4, C5, C6 with a cage    Does the patient have one of the following disease processes/diagnoses(primary or secondary)? General Surgery   Does the patient have Home health ordered? Yes   What is the Home health agency?  [facility]    Is there a DME ordered? No   Prep survey completed? Yes          NENA STOKES - Registered Nurse

## 2023-01-19 ENCOUNTER — READMISSION MANAGEMENT (OUTPATIENT)
Dept: CALL CENTER | Facility: HOSPITAL | Age: 68
End: 2023-01-19
Payer: MEDICARE

## 2023-01-19 NOTE — OUTREACH NOTE
General Surgery Week 1 Survey    Flowsheet Row Responses   [facility] patient discharged from? [place]   Does the patient have one of the following disease processes/diagnoses(primary or secondary)? General Surgery   Week 1 attempt successful? Yes   Call start time 1248   Call end time 1250   Discharge diagnosis anterior cervical corpectomy at C4, C5, C6 with a cage    Does the patient have all medications related to this admission filled (includes all antibiotics, pain medications, etc.) Yes   Is the patient taking all medications as directed (includes completed medication regime)? Yes   Does the patient have a follow up appointment scheduled with their surgeon? Yes   Has the patient kept scheduled appointments due by today? N/A   Comments has a f/u scheduled with her doctor   What is the Home health agency?  [facility]    Has home health visited the patient within 72 hours of discharge? Call prior to 72 hours   Psychosocial issues? No   Did the patient receive a copy of their discharge instructions? Yes   What is the patient's perception of their health status since discharge? Improving   Nursing interventions Nurse provided patient education  [advised to follow discharge instructions]   Is the patient/caregiver able to teach back signs and symptoms of incisional infection? Pus or odor from incision, Incisional warmth, Increased drainage or bleeding, Increased redness, swelling or pain at the incisonal site, Fever   Is the patient/caregiver able to teach back the hierarchy of who to call/visit for symptoms/problems? PCP, Specialist, Home health nurse, Urgent Care, ED, 911 Yes   Week 1 call completed? Yes   Wrap up additional comments Doing well, no questions.          BRADLY GUTIERREZ - Registered Nurse

## 2023-01-26 ENCOUNTER — HOSPITAL ENCOUNTER (OUTPATIENT)
Dept: PET IMAGING | Facility: HOSPITAL | Age: 68
Discharge: HOME | End: 2023-01-26
Payer: MEDICARE

## 2023-01-26 DIAGNOSIS — C34.32 MALIGNANT NEOPLASM OF LOWER LOBE OF LEFT LUNG: ICD-10-CM

## 2023-01-26 LAB — GLUCOSE BLDC GLUCOMTR-MCNC: 103 MG/DL (ref 70–130)

## 2023-01-26 PROCEDURE — 82962 GLUCOSE BLOOD TEST: CPT

## 2023-01-26 PROCEDURE — A9552 F18 FDG: HCPCS | Performed by: INTERNAL MEDICINE

## 2023-01-26 PROCEDURE — 78815 PET IMAGE W/CT SKULL-THIGH: CPT

## 2023-01-26 PROCEDURE — 0 FLUDEOXYGLUCOSE F18 SOLUTION: Performed by: INTERNAL MEDICINE

## 2023-01-26 RX ADMIN — FLUDEOXYGLUCOSE F18 1 DOSE: 300 INJECTION INTRAVENOUS at 12:14

## 2023-01-27 ENCOUNTER — READMISSION MANAGEMENT (OUTPATIENT)
Dept: CALL CENTER | Facility: HOSPITAL | Age: 68
End: 2023-01-27
Payer: MEDICARE

## 2023-01-27 NOTE — OUTREACH NOTE
General Surgery Week 2 Survey    Flowsheet Row Responses   [facility] patient discharged from? [place]   Does the patient have one of the following disease processes/diagnoses(primary or secondary)? General Surgery   Week 2 attempt successful? No   Unsuccessful attempts Attempt 1          LUIS Mckeon Nurse

## 2023-01-29 ENCOUNTER — HOSPITAL ENCOUNTER (OUTPATIENT)
Dept: MRI IMAGING | Facility: HOSPITAL | Age: 68
Discharge: HOME | End: 2023-01-29
Admitting: INTERNAL MEDICINE
Payer: MEDICARE

## 2023-01-29 DIAGNOSIS — C7A.8 NEUROENDOCRINE CARCINOMA OF LUNG: ICD-10-CM

## 2023-01-29 PROCEDURE — 70553 MRI BRAIN STEM W/O & W/DYE: CPT

## 2023-01-29 PROCEDURE — A9577 INJ MULTIHANCE: HCPCS | Performed by: INTERNAL MEDICINE

## 2023-01-29 PROCEDURE — 82565 ASSAY OF CREATININE: CPT

## 2023-01-29 PROCEDURE — 0 GADOBENATE DIMEGLUMINE 529 MG/ML SOLUTION: Performed by: INTERNAL MEDICINE

## 2023-01-29 RX ADMIN — GADOBENATE DIMEGLUMINE 20 ML: 529 INJECTION, SOLUTION INTRAVENOUS at 11:00

## 2023-01-31 LAB — CREAT BLDA-MCNC: 1.1 MG/DL (ref 0.6–1.3)

## 2023-02-01 ENCOUNTER — READMISSION MANAGEMENT (OUTPATIENT)
Dept: CALL CENTER | Facility: HOSPITAL | Age: 68
End: 2023-02-01
Payer: MEDICARE

## 2023-02-01 NOTE — OUTREACH NOTE
General Surgery Week 2 Survey    Flowsheet Row Responses   [facility] patient discharged from? [place]   Does the patient have one of the following disease processes/diagnoses(primary or secondary)? General Surgery   Week 2 attempt successful? No  [Patient taking nap at time of call. ]   Unsuccessful attempts Attempt 2          VIVI DALY - Registered Nurse

## 2023-02-02 ENCOUNTER — LAB (OUTPATIENT)
Dept: LAB | Facility: HOSPITAL | Age: 68
End: 2023-02-02
Payer: MEDICARE

## 2023-02-02 ENCOUNTER — OFFICE VISIT (OUTPATIENT)
Dept: ONCOLOGY | Facility: CLINIC | Age: 68
End: 2023-02-02
Payer: MEDICARE

## 2023-02-02 VITALS
TEMPERATURE: 96.8 F | BODY MASS INDEX: 45.4 KG/M2 | RESPIRATION RATE: 18 BRPM | DIASTOLIC BLOOD PRESSURE: 81 MMHG | WEIGHT: 246.7 LBS | HEIGHT: 62 IN | SYSTOLIC BLOOD PRESSURE: 127 MMHG | HEART RATE: 75 BPM | OXYGEN SATURATION: 91 %

## 2023-02-02 DIAGNOSIS — Z45.2 ENCOUNTER FOR ADJUSTMENT AND MANAGEMENT OF VASCULAR ACCESS DEVICE: ICD-10-CM

## 2023-02-02 DIAGNOSIS — C34.32 MALIGNANT NEOPLASM OF LOWER LOBE OF LEFT LUNG: ICD-10-CM

## 2023-02-02 DIAGNOSIS — R93.89 ABNORMAL IMAGING OF THYROID: ICD-10-CM

## 2023-02-02 DIAGNOSIS — R93.5 ABNORMAL MRI OF ABDOMEN: ICD-10-CM

## 2023-02-02 DIAGNOSIS — C34.32 MALIGNANT NEOPLASM OF LOWER LOBE OF LEFT LUNG: Primary | ICD-10-CM

## 2023-02-02 LAB
ALBUMIN SERPL-MCNC: 4.2 G/DL (ref 3.5–5.2)
ALBUMIN/GLOB SERPL: 1.2 G/DL (ref 1.1–2.4)
ALP SERPL-CCNC: 75 U/L (ref 38–116)
ALT SERPL W P-5'-P-CCNC: 20 U/L (ref 0–33)
ANION GAP SERPL CALCULATED.3IONS-SCNC: 11.2 MMOL/L (ref 5–15)
AST SERPL-CCNC: 25 U/L (ref 0–32)
BASOPHILS # BLD AUTO: 0.05 10*3/MM3 (ref 0–0.2)
BASOPHILS NFR BLD AUTO: 0.6 % (ref 0–1.5)
BILIRUB SERPL-MCNC: 0.3 MG/DL (ref 0.2–1.2)
BUN SERPL-MCNC: 21 MG/DL (ref 6–20)
BUN/CREAT SERPL: 22.3 (ref 7.3–30)
CALCIUM SPEC-SCNC: 10.1 MG/DL (ref 8.5–10.2)
CHLORIDE SERPL-SCNC: 97 MMOL/L (ref 98–107)
CO2 SERPL-SCNC: 29.8 MMOL/L (ref 22–29)
CREAT SERPL-MCNC: 0.94 MG/DL (ref 0.6–1.1)
DEPRECATED RDW RBC AUTO: 39.1 FL (ref 37–54)
EGFRCR SERPLBLD CKD-EPI 2021: 66.6 ML/MIN/1.73
EOSINOPHIL # BLD AUTO: 0.14 10*3/MM3 (ref 0–0.4)
EOSINOPHIL NFR BLD AUTO: 1.8 % (ref 0.3–6.2)
ERYTHROCYTE [DISTWIDTH] IN BLOOD BY AUTOMATED COUNT: 11.5 % (ref 12.3–15.4)
GLOBULIN UR ELPH-MCNC: 3.4 GM/DL (ref 1.8–3.5)
GLUCOSE SERPL-MCNC: 160 MG/DL (ref 74–124)
HCT VFR BLD AUTO: 42.9 % (ref 34–46.6)
HGB BLD-MCNC: 14.6 G/DL (ref 12–15.9)
IMM GRANULOCYTES # BLD AUTO: 0.02 10*3/MM3 (ref 0–0.05)
IMM GRANULOCYTES NFR BLD AUTO: 0.3 % (ref 0–0.5)
LYMPHOCYTES # BLD AUTO: 1.82 10*3/MM3 (ref 0.7–3.1)
LYMPHOCYTES NFR BLD AUTO: 22.8 % (ref 19.6–45.3)
MCH RBC QN AUTO: 31.3 PG (ref 26.6–33)
MCHC RBC AUTO-ENTMCNC: 34 G/DL (ref 31.5–35.7)
MCV RBC AUTO: 92.1 FL (ref 79–97)
MONOCYTES # BLD AUTO: 0.73 10*3/MM3 (ref 0.1–0.9)
MONOCYTES NFR BLD AUTO: 9.2 % (ref 5–12)
NEUTROPHILS NFR BLD AUTO: 5.21 10*3/MM3 (ref 1.7–7)
NEUTROPHILS NFR BLD AUTO: 65.3 % (ref 42.7–76)
NRBC BLD AUTO-RTO: 0 /100 WBC (ref 0–0.2)
PLATELET # BLD AUTO: 201 10*3/MM3 (ref 140–450)
PMV BLD AUTO: 10 FL (ref 6–12)
POTASSIUM SERPL-SCNC: 4.7 MMOL/L (ref 3.5–4.7)
PROT SERPL-MCNC: 7.6 G/DL (ref 6.3–8)
RBC # BLD AUTO: 4.66 10*6/MM3 (ref 3.77–5.28)
SODIUM SERPL-SCNC: 138 MMOL/L (ref 134–145)
WBC NRBC COR # BLD: 7.97 10*3/MM3 (ref 3.4–10.8)

## 2023-02-02 PROCEDURE — G2212 PROLONG OUTPT/OFFICE VIS: HCPCS | Performed by: INTERNAL MEDICINE

## 2023-02-02 PROCEDURE — 99215 OFFICE O/P EST HI 40 MIN: CPT | Performed by: INTERNAL MEDICINE

## 2023-02-02 PROCEDURE — 80053 COMPREHEN METABOLIC PANEL: CPT

## 2023-02-02 PROCEDURE — 36415 COLL VENOUS BLD VENIPUNCTURE: CPT

## 2023-02-02 PROCEDURE — 85025 COMPLETE CBC W/AUTO DIFF WBC: CPT

## 2023-02-02 RX ORDER — AMLODIPINE BESYLATE 5 MG/1
5 TABLET ORAL DAILY
COMMUNITY
Start: 2022-12-27

## 2023-02-02 NOTE — PROGRESS NOTES
Chief Complaint  Non-small cell lung cancer, clinical stage IIIA ([id number]), aortic stenosis, cervical spine stenosis, COPD, hepatic steatosis, diabetes mellitus    Subjective        History of Present Illness    Patient is a 68-year-old female here today in follow-up after being seen previously and consultation during hospitalization on 1/17/2023.  The patient has multiple underlying comorbidities.  She has had difficulty with cervical spine stenosis, has experienced significant myelopathy associated with this.  She required surgery on 9/20/2022 with anterior cervical corpectomy and cage.  She has followed up with neurosurgery with CT scans 11/29/2022 showing concern for spinal instability with need for posterior cervical fusion.  This has been delayed due to respiratory infection and subsequently additional medical issues including diagnosis with non-small cell lung cancer and moderate to severe aortic stenosis.    The patient was hospitalized 1/10 - 1/17/2023 to address medical issues in preparation for potential neurosurgical procedure on 3/12/2023.  She was found to have moderate to severe aortic stenosis on echocardiogram 1/11/2023 and was seen by cardiology with consideration of possible need for TAVR versus SAVR.  She had previous findings on CT scan and September 2022 of 1.4 cm left lower lobe nodule and mediastinal lymphadenopathy.  Subsequent follow-up CT chest on 1/12/2023 showed slight increase in left lower lobe pulmonary nodule 1.5 cm, progressive mediastinal lymphadenopathy and left hilar lymphadenopathy.  CT-guided lung biopsy on 1/13/2023 did show invasive poorly differentiated non-small cell lung cancer with focal neuroendocrine differentiation.  Staining pattern and morphology favor poorly differentiated adenocarcinoma, focal CD56 staining and difficult to exclude large cell neuroendocrine carcinoma.  PD-L1 was 35%, Caris analysis pending.    Patient now follows up with PET scan and MRI brain to  "review for further discussion regarding recommendations for treatment of her malignancy.  She remains in a wheelchair today, has a soft neck brace in place.  She does continue with significant symptoms related to her cervical stenosis with reduced ability to ambulate and limited movement in her hands, dropping objects.  She has experienced some improvement over time, has been able to walk with use of a walker and her dexterity has improved.  She has a fairly normal appetite, however has lost significant weight down from 275 to 246 pounds.      Objective   Vital Signs:   /81   Pulse 75   Temp 96.8 °F (36 °C) (Temporal)   Resp 18   Ht 157.5 cm (62.01\")   Wt 112 kg (246 lb 11.2 oz)   SpO2 91%   BMI 45.11 kg/m²     Physical Exam  Constitutional:       Comments: Patient is seated in a wheelchair today, no distress   Eyes:      Conjunctiva/sclera: Conjunctivae normal.   Neck:      Thyroid: No thyromegaly.      Comments: Soft cervical collar in place  Cardiovascular:      Rate and Rhythm: Normal rate and regular rhythm.      Heart sounds: Murmur heard.     No friction rub. No gallop.   Pulmonary:      Effort: No respiratory distress.      Comments: Diminished breath sounds bilaterally  Abdominal:      General: Bowel sounds are normal. There is no distension.      Palpations: Abdomen is soft.      Tenderness: There is no abdominal tenderness.   Musculoskeletal:         General: Swelling present.      Comments: Trace bilateral lower extremity edema   Lymphadenopathy:      Head:      Right side of head: No submandibular adenopathy.      Cervical: No cervical adenopathy.      Upper Body:      Right upper body: No supraclavicular adenopathy.      Left upper body: No supraclavicular adenopathy.   Skin:     General: Skin is warm and dry.      Findings: No rash.   Neurological:      Mental Status: She is alert and oriented to person, place, and time.      Cranial Nerves: No cranial nerve deficit.      Motor: No " abnormal muscle tone.      Deep Tendon Reflexes: Reflexes normal.   Psychiatric:         Behavior: Behavior normal.        Result Review : Reviewed CBC, CMP from today.  Reviewed MRI brain 1/29/2023.  Reviewed PET scan 1/26/2023.  Reviewed pathology results.       Assessment and Plan     *Non-small cell lung cancer, clinical stage IIIA ([id number])  • Patient has history of smoking 1 pack/day x 45 years, quit in November 2021.    • Following anterior cervical corpectomy in September 2022, she experienced acute hypoxemic respiratory failure.    • CT angiogram chest 9/25/2022 showed a concerning 1.4 cm left lower lobe pulmonary nodule, mediastinal lymphadenopathy with infracarinal lymph node 2.7 cm, left hilar lymph node 1.2 cm, small right hilar lymph nodes up from 1.1 cm and additional enlarged mediastinal nodes in the right paratracheal, precarinal, infracarinal spaces.  There was a wedge-shaped area of consolidation/atelectasis in the right middle lobe base, subpleural density left costophrenic sulcus felt to represent atelectasis.  It was recommended for her to undergo subsequent PET scan by pulmonary.  • The patient has had ongoing follow-up with neurosurgery and on CT scans 11/29/2022 of cervical and thoracic spine, there was concern regarding spinal instability with potential need for posterior cervical fusion.  This was scheduled in December 2022 however was delayed due to upper respiratory infection.  • Patient admitted on 1/10/2023 to address multiple medical issues in order to prepare for potential neurosurgical procedure on 3/12/2023.  • Patient underwent echocardiogram on 1/11/2023 with ejection fraction greater than 70%, grade 1 diastolic dysfunction, severe aortic stenosis.  Patient is being followed by cardiology with consideration of need for TAVR versus SAVR for moderate to severe aortic stenosis.  • Repeat CT chest on 1/12/2023 showed slight increase in size of the left lower lobe pulmonary nodule  increased from 1.4 to 1.5 cm.  Groundglass nodule peripheral left lower lobe less conspicuous and resolution of previous reticulonodular opacities right lower lobe.  There was further enlargement of mediastinal lymph nodes with right posterior paratracheal lymph node 1.4 x 2.1 up to 1.5 x 2.3 cm, subcarinal lymph node increased from 2.7 x 5.2 up to 3.2 x 5.8 cm, left hilar lymph node increased from 1.2 up to 1.4 cm, and is stable 1.7 cm left infrahilar lymph node.  There was also comment regarding hepatic steatosis with changes of chronic liver disease and stable indeterminate nodular thickening of the adrenal glands.  • CT-guided lung biopsy on 1/13/2023 with pathology that showed invasive poorly differentiated non-small cell carcinoma with focal neuroendocrine differentiation.  Staining pattern and morphology favor poorly differentiated adenocarcinoma however there was focal CD56 staining, could not entirely exclude large cell neuroendocrine carcinoma.  PD-L1 35%, Caris analysis pending.  • It was felt that there would be increased risk for bronchoscopy from a cardiac standpoint due to her aortic stenosis.  • Staging MRI brain 1/29/2023 showed no evidence of metastatic disease  • Staging PET scan 1/26/2023 showed hypermetabolic left lower lobe pulmonary nodule, size difficult to determine on PET however on recent CT was 1.6 cm (SUV 14.4).  Mediastinal and left hilar hypermetabolic lymphadenopathy (subcarinal lymph node 3.2 cm, SUV 31.7, left paratracheal 2.2 cm lymph node SUV 12.7).  Sub-6 mm pulmonary nodule left upper lobe unchanged and below PET resolution.  Focal uptake posterior right hepatic lobe SUV 7.2 of uncertain significance.  Recommended MRI to evaluate.  Right thyroid activity SUV 5.3, recommended ultrasound.  Long segment uptake distal esophagus consistent with esophagitis.  Uptake posterior larynx, nonspecific, recommended direct visualization.  • The patient is seen back today in follow-up with the  above MRI brain and PET scan to review.  She remains in a wheelchair today, is able to ambulate short distances with a walker.  She reports that her issues with dexterity in her hands have improved to some extent over time.  It is felt that she will require an additional neurosurgical procedure that had been tentatively scheduled for 3/12/2023 pending management of her other medical issues including her lung cancer and aortic stenosis.  Unfortunately it appears that her lung cancer is a significant issue that will need to be addressed in a timely manner and likely will preclude her from proceeding with her neurosurgery at least for now.  We will need to discuss with cardiology in regards to timing of procedure for her aortic stenosis, possibly with TAVR in relation to treatment of her lung cancer.  At this time it appears that she likely has stage IIIA disease with left lower lobe primary nodule and left hilar and mediastinal lymphadenopathy.  There is not appear to be any definitive evidence of distant metastatic disease on her scans, MRI brain was negative as well.  There are a number of incidental findings on her PET scan that will need investigation.  We will need to pursue thyroid ultrasound to evaluate the right thyroid uptake.  We will need to refer to ENT to investigate the laryngeal uptake endoscopically.  We will need to obtain MRI liver to investigate the hypermetabolic focus seen on PET scan.  She does have uptake in the distal esophagus however it is felt that may be related to esophagitis and for now we will not plan to pursue EGD given the multitude of other evaluations she will require.  Options for treatment of her disease are complicated by her underlying neurologic symptoms with neuropathy and myelopathy from her cervical stenosis involving the upper and lower extremities.  This makes consideration of neuropathy inducing hemotherapy agents problematic.  Recommended treatment for her stage disease  "would be concurrent chemoradiation for 6 to 7 weeks followed by subsequent immunotherapy with durvalumab x1 year.  The patient and her family had questions regarding expected outcomes with treatment of stage III non-small cell lung cancer and we discussed with historical series and expected 15% 5-year survival rate.  Results however in the recent [place] trial with use of durvalumab have shown significant improvements, unclear how this relates to real world findings.  I have recommended to consider pursuit of this regimen.  We did discuss options for systemic therapy concurrent with radiation.  I do have some reservations regarding her ability to tolerate full dose chemotherapy given her comorbidities.  Weekly carboplatin and Taxol would be much better tolerated however there is concern regarding potential for Taxol to worsen her neuropathic symptoms.  We could consider a reduced dose of Taxol at 50 mg/m² rather than 80 mg/m² in this situation.  Other options would be for full dose carboplatin and Alimta in the setting of adenocarcinoma or possibly carboplatin and etoposide given the question of neuroendocrine differentiation of her tumor.  We can discuss these issues further when she returns for follow-up in 2 weeks.  We did discuss tentative plans to begin treatment with concurrent chemoradiation on 2/21/2023 realizing that this may require delay if she needs to undergo TAVR prior to starting treatment.  She will need to see radiation oncology in short interval in order to prepare for upcoming potential treatment.  As she did discuss in the hospital, the patient did wish to at least consider the possibility of pursuing palliative/supportive care alone for her malignancy as well as her other medical conditions.  At this point she is \"99%\" sure that she will proceed with treatment for her disease but wishes to at least consider the issue.  She will notify us by early next week of her intention to proceed as planned. "  We did discuss that if she chooses to pursue palliative/supportive care we would want to involve hospice at home.  She is quite familiar with hospice services.     *Aortic stenosis  • Patient underwent echocardiogram on 1/11/2023 with ejection fraction greater than 70%, grade 1 diastolic dysfunction, severe aortic stenosis.    • Patient is being followed by cardiology with consideration of need for TAVR versus SAVR for moderate to severe aortic stenosis.  • I will contact Dr. Mendoza in cardiology to discuss recommendations for timing of TAVR in relation to initiating treatment for her lung cancer.     *Cervical spine stenosis  • Patient with cervical spine stenosis with associated myelopathy.   • She underwent surgery on 9/20/2022 with anterior cervical corpectomy with cage.      • The patient has had ongoing follow-up with neurosurgery and on CT scans 11/29/2022 of cervical and thoracic spine, there was concern regarding spinal instability with potential need for posterior cervical fusion.  This was scheduled in December 2022 however was delayed due to upper respiratory infection.  • Patient admitted on 1/10/2023 to address multiple medical issues in order to prepare for potential neurosurgical procedure on 3/12/2023.  • Patient was previously having severe symptoms related to her cervical stenosis with reduced ability to ambulate previously and significant limitations in movement in her hands, dropping objects.  More recently however she has improved and has been able to walk with the use of a walker, symptoms in the upper extremities have improved and her dexterity is better.    • It appears that her neurosurgical procedure will need to be delayed until we can at least complete the concurrent chemoradiation portion of her treatment.  Surgery would be feasible while continuing on immunotherapy in the future.     *COPD  • Patient has history of smoking 1 pack/day x 45 years, quit in November 2021.  • Patient has  not undergone formal PFTs  • Patient being followed by pulmonary, currently receiving Symbicort inhaler, duo nebs 4 times daily     *Hepatic steatosis  • Identified on prior scans  • PET scan 1/26/2023 with questionable area of activity seen but no corresponding CT abnormality.  We will obtain MRI liver as suggested.  • LFTs today normal     *Diabetes mellitus  • Currently receiving metformin 850 mg twice daily    *Situational depression  · Patient reports feeling overwhelmed regarding the amount of information presented today in regards to her malignancy as well as her other medical issues.  We did discuss referral to supportive oncology and she was agreeable to proceed.    *Right thyroid uptake on PET scan 1/26/2023  · Plan thyroid ultrasound    *Laryngeal uptake on PET scan 1/26/2023  · Referral to ENT for laryngoscopy    *Esophageal uptake on PET scan 1/26/2023  · Felt to be consistent with esophagitis in the distal esophagus.  We will hold on referral for EGD given the multitude of additional procedures and consults required above.    *Venous access  · Referral to general surgery for Mediport placement prior to initiation of upcoming chemotherapy         PLAN:  1. The patient will notify us early next week to verify that she wishes to proceed with active treatment of her malignancy as planned.  If not we would proceed with palliative/supportive care with hospice involvement.  2. I am contacting Dr. Mendoza regarding recommendations for timing of possible TAVR in light of upcoming concurrent chemoradiation for lung cancer.  3. I will present the patient at thoracic oncology conference next week  4. Caris analysis pending on pathology from CT-guided lung biopsy specimen 1/13/2023  5. Referral to general surgery for Mediport placement  6. Referral to radiation oncology for anticipated upcoming concurrent chemoradiation for stage IIIA non-small cell lung cancer  7. Referral to ENT to pursue laryngoscopy to  evaluate area of hypermetabolic activity seen on PET scan  8. Thyroid ultrasound to evaluate area of hypermetabolic activity in the right thyroid seen on PET scan  9. MRI abdomen to evaluate area of hypermetabolic activity seen in the liver on the PET scan  10. Referral to supportive oncology  11. Chemo education early week of 2/13/2023.  Regimen to be determined from discussion at thoracic oncology conference (weekly carboplatin/reduced dose Taxol versus full dose carboplatin/Alimta versus full dose carboplatin/etoposide)  12. Return visit in 2 weeks with CBC, CMP.  We will review the above findings and recommendations and formulate definitive plan for concurrent chemoradiation in addition to timing in regards to need for TAVR.  We will have tentative start date for concurrent chemoradiation on 2/21/2023.    I did spend 95 minutes in total time caring for the patient today, time spent in review of records, face-to-face consultation, coordination of care, placement of orders, completion of documentation.    Addendum 2/3/2023: I did speak with Dr. Mendoza in cardiology.  He is going to proceed with right and left heart catheterization to further assess her aortic valve and any potential additional cardiac issues.  Procedure likely next week and will notify us regarding valve status in relation to upcoming treatment for her lung cancer.

## 2023-02-03 LAB
LAB AP CASE REPORT: NORMAL
LAB AP CLINICAL INFORMATION: NORMAL
LAB AP DIAGNOSIS COMMENT: NORMAL
LAB AP FLOW CYTOMETRY SUMMARY: NORMAL
LAB AP SPECIAL STAINS: NORMAL
Lab: NORMAL
Lab: NORMAL
PATH REPORT.ADDENDUM SPEC: NORMAL
PATH REPORT.FINAL DX SPEC: NORMAL
PATH REPORT.GROSS SPEC: NORMAL

## 2023-02-06 ENCOUNTER — TELEPHONE (OUTPATIENT)
Dept: CARDIOLOGY | Facility: CLINIC | Age: 68
End: 2023-02-06
Payer: MEDICARE

## 2023-02-06 ENCOUNTER — HOSPITAL ENCOUNTER (OUTPATIENT)
Dept: MRI IMAGING | Facility: HOSPITAL | Age: 68
Discharge: HOME | End: 2023-02-06
Admitting: INTERNAL MEDICINE
Payer: MEDICARE

## 2023-02-06 DIAGNOSIS — C34.32 MALIGNANT NEOPLASM OF LOWER LOBE OF LEFT LUNG: ICD-10-CM

## 2023-02-06 DIAGNOSIS — R93.5 ABNORMAL MRI OF ABDOMEN: ICD-10-CM

## 2023-02-06 PROCEDURE — 74183 MRI ABD W/O CNTR FLWD CNTR: CPT

## 2023-02-06 PROCEDURE — A9577 INJ MULTIHANCE: HCPCS | Performed by: INTERNAL MEDICINE

## 2023-02-06 PROCEDURE — 0 GADOBENATE DIMEGLUMINE 529 MG/ML SOLUTION: Performed by: INTERNAL MEDICINE

## 2023-02-06 RX ADMIN — GADOBENATE DIMEGLUMINE 20 ML: 529 INJECTION, SOLUTION INTRAVENOUS at 16:28

## 2023-02-08 DIAGNOSIS — C34.32 MALIGNANT NEOPLASM OF LOWER LOBE OF LEFT LUNG: Primary | ICD-10-CM

## 2023-02-09 ENCOUNTER — TELEPHONE (OUTPATIENT)
Dept: ONCOLOGY | Facility: CLINIC | Age: 68
End: 2023-02-09

## 2023-02-09 ENCOUNTER — PATIENT OUTREACH (OUTPATIENT)
Dept: OTHER | Facility: HOSPITAL | Age: 68
End: 2023-02-09
Payer: MEDICARE

## 2023-02-09 NOTE — TELEPHONE ENCOUNTER
Caller: Gabby Acosta    Relationship to patient: SELF    Best call back number: [phone]    Patient is needing: TO REQUEST CALL FROM NURSE OR DOCTOR. PT HAD 2ND MRI DONE AND PT WANTS TO TALK TO SOMEONE ABOUT FINDING OUT HER BAD HEART. SHE WANTS TO KNOW IF HER HEART WILL BE ABLE TO TAKE CHEMO.

## 2023-02-09 NOTE — TELEPHONE ENCOUNTER
Returned call to patient and daughter, she is scheduled for multiple appointments in the next 2 weeks.  She is wanting to have those all placed on hold till she has her cardiac catheterization performed on 2/24/2023.  She had a MRI performed and was determined that she has Aortic Stenosis.  She is concerned that if her heart is not strong enough to undergo chemotherapy at this time and she will not know till after her cardiac cath is done.  I told her that I would send this message to Dr. Lombardi to find out about canceling all of her oncology appointments.

## 2023-02-10 ENCOUNTER — TELEPHONE (OUTPATIENT)
Dept: CARDIOLOGY | Facility: HOSPITAL | Age: 68
End: 2023-02-10
Payer: MEDICARE

## 2023-02-10 ENCOUNTER — PATIENT OUTREACH (OUTPATIENT)
Dept: OTHER | Facility: HOSPITAL | Age: 68
End: 2023-02-10
Payer: MEDICARE

## 2023-02-10 ENCOUNTER — TELEPHONE (OUTPATIENT)
Dept: ONCOLOGY | Facility: CLINIC | Age: 68
End: 2023-02-10
Payer: MEDICARE

## 2023-02-10 NOTE — TELEPHONE ENCOUNTER
Phoned patient to inform her that her port placement has been put on hold at this time, and that Dr. Lombardi will review recommendations from cardiology and results from her upcoming right and left heart catheterization at her office visit next week before deciding how to proceed with her treatment. Patient v/u.

## 2023-02-10 NOTE — TELEPHONE ENCOUNTER
I called and spoke with Ms Acosta introducing the structural heart program. We discussed the evaluation process and I mailed her our introductory packet including information on shared decision making She will have a cardiac cath 2/14/23 with Dr David and we will call her with recommendations after that. I have provided our contact information and answered her questions

## 2023-02-10 NOTE — PROGRESS NOTES
"Referral rec'd from Dr. Lombardi. Called patient, introduced myself, explained navigational services; pt was receptive.     Patient was diagnosed with invasive poorly differentiated non-small cell carcinoma with focal neuroendocrine differentiation on 1/13/23. She had previous spinal surgery 9/2022. She was supposed to have a posterior cervical spinal fusion, although needs cardiac clearance first. She is having a cardiac cath on 2/14. She is scheduled to meet with radiation oncology and have chemo teaching 2/15/23.    The patient is alert and oriented.  She lives with her ex-, Fidel; they have been back together as a couple for 20 years. They have two grown children and grandchildren who help with transportation and attend appts. She has stairs leaving her home and needs assistance getting down those stairs (states they are uneven). She doesn't have a ramp. She had trouble getting in and out of certain cars.    The patient uses a wheelchair when out, which she is borrowing from her daughter.  She is wanting to get a new one. She uses a walker at home. She is interested in getting a lift chair if possible, providing her insurance covers it. She has numbness and motor deficits in her upper and lower extremities.     The patient is states she will need some assistance with transportation if she needs daily radiation treatment. Her son works out of town, although he is off on Mondays and can help on that day.  Requested [facility] referral from Dr. Lombardi for transportation issues and equipment needs.  She has [employer] replacement and Medicaid.     The patient reports some recent weight loss although states it was on purpose. She states her current weight is 300 lbs.    The patient reports an adequate support system. She stated \"I have a wonderful family and some great friends\".      Dr. Lombardi already referred the patient to [facility]. Her appt is scheduled 2/27/23. She has been taking Wellbutrin and states a new medication " Atarax was added on 1/17, which the patient stopped taking. She stated she did not feel it was working.    The patient smoked 1 PPD x 45 years; she quit 11/2021.     She does not have an ACP on file. She declined an appt with [facility] for ACP or additional information regarding ACP. She states her family knows her wishes.    [facility]ative therapies and [facility]  I will provide a navigation folder when I meet her in person prior to her rad onc appt next week.     Patient expressed gratitude for my support and denied any additional needs at this time. I gave her my information; she will call as needed

## 2023-02-13 DIAGNOSIS — C34.32 MALIGNANT NEOPLASM OF LOWER LOBE OF LEFT LUNG: Primary | ICD-10-CM

## 2023-02-14 ENCOUNTER — HOSPITAL ENCOUNTER (OUTPATIENT)
Facility: HOSPITAL | Age: 68
Setting detail: HOSPITAL OUTPATIENT SURGERY
Discharge: HOME | End: 2023-02-14
Attending: INTERNAL MEDICINE | Admitting: INTERNAL MEDICINE
Payer: MEDICARE

## 2023-02-14 ENCOUNTER — PATIENT OUTREACH (OUTPATIENT)
Dept: OTHER | Facility: HOSPITAL | Age: 68
End: 2023-02-14
Payer: MEDICARE

## 2023-02-14 ENCOUNTER — TELEPHONE (OUTPATIENT)
Dept: RADIATION ONCOLOGY | Facility: HOSPITAL | Age: 68
End: 2023-02-14
Payer: MEDICARE

## 2023-02-14 VITALS
SYSTOLIC BLOOD PRESSURE: 148 MMHG | HEART RATE: 68 BPM | TEMPERATURE: 98 F | WEIGHT: 243 LBS | HEIGHT: 62 IN | RESPIRATION RATE: 16 BRPM | DIASTOLIC BLOOD PRESSURE: 94 MMHG | OXYGEN SATURATION: 92 % | BODY MASS INDEX: 44.72 KG/M2

## 2023-02-14 DIAGNOSIS — I35.0 NONRHEUMATIC AORTIC VALVE STENOSIS: ICD-10-CM

## 2023-02-14 LAB
ACT BLD: 281 SECONDS (ref 82–152)
HCT VFR BLDA CALC: 39 % (ref 38–51)
HCT VFR BLDA CALC: 40 % (ref 38–51)
HCT VFR BLDA CALC: 40 % (ref 38–51)
HGB BLDA-MCNC: 13.3 G/DL (ref 12–17)
HGB BLDA-MCNC: 13.6 G/DL (ref 12–17)
HGB BLDA-MCNC: 13.6 G/DL (ref 12–17)
SAO2 % BLDA: 74 % (ref 95–98)
SAO2 % BLDA: 76 % (ref 95–98)
SAO2 % BLDA: 99 % (ref 95–98)

## 2023-02-14 PROCEDURE — 85018 HEMOGLOBIN: CPT

## 2023-02-14 PROCEDURE — C1769 GUIDE WIRE: HCPCS | Performed by: INTERNAL MEDICINE

## 2023-02-14 PROCEDURE — C1894 INTRO/SHEATH, NON-LASER: HCPCS | Performed by: INTERNAL MEDICINE

## 2023-02-14 PROCEDURE — 82810 BLOOD GASES O2 SAT ONLY: CPT

## 2023-02-14 PROCEDURE — 93571 IV DOP VEL&/PRESS C FLO 1ST: CPT | Performed by: INTERNAL MEDICINE

## 2023-02-14 PROCEDURE — C1887 CATHETER, GUIDING: HCPCS | Performed by: INTERNAL MEDICINE

## 2023-02-14 PROCEDURE — 93460 R&L HRT ART/VENTRICLE ANGIO: CPT | Performed by: INTERNAL MEDICINE

## 2023-02-14 PROCEDURE — 0 IOPAMIDOL PER 1 ML: Performed by: INTERNAL MEDICINE

## 2023-02-14 PROCEDURE — 85014 HEMATOCRIT: CPT

## 2023-02-14 PROCEDURE — 25010000002 HEPARIN (PORCINE) PER 1000 UNITS: Performed by: INTERNAL MEDICINE

## 2023-02-14 PROCEDURE — 25010000002 FENTANYL CITRATE (PF) 50 MCG/ML SOLUTION: Performed by: INTERNAL MEDICINE

## 2023-02-14 PROCEDURE — 85347 COAGULATION TIME ACTIVATED: CPT

## 2023-02-14 PROCEDURE — 25010000002 MIDAZOLAM PER 1 MG: Performed by: INTERNAL MEDICINE

## 2023-02-14 RX ORDER — ATORVASTATIN CALCIUM 20 MG/1
40 TABLET, FILM COATED ORAL NIGHTLY
Status: DISCONTINUED | OUTPATIENT
Start: 2023-02-14 | End: 2023-02-14

## 2023-02-14 RX ORDER — HEPARIN SODIUM 1000 [USP'U]/ML
INJECTION, SOLUTION INTRAVENOUS; SUBCUTANEOUS
Status: DISCONTINUED | OUTPATIENT
Start: 2023-02-14 | End: 2023-02-14 | Stop reason: HOSPADM

## 2023-02-14 RX ORDER — VERAPAMIL HYDROCHLORIDE 2.5 MG/ML
INJECTION, SOLUTION INTRAVENOUS
Status: DISCONTINUED | OUTPATIENT
Start: 2023-02-14 | End: 2023-02-14 | Stop reason: HOSPADM

## 2023-02-14 RX ORDER — SODIUM CHLORIDE 0.9 % (FLUSH) 0.9 %
10 SYRINGE (ML) INJECTION AS NEEDED
Status: DISCONTINUED | OUTPATIENT
Start: 2023-02-14 | End: 2023-02-14 | Stop reason: HOSPADM

## 2023-02-14 RX ORDER — FENTANYL CITRATE 50 UG/ML
INJECTION, SOLUTION INTRAMUSCULAR; INTRAVENOUS
Status: DISCONTINUED | OUTPATIENT
Start: 2023-02-14 | End: 2023-02-14 | Stop reason: HOSPADM

## 2023-02-14 RX ORDER — ONDANSETRON 2 MG/ML
4 INJECTION INTRAMUSCULAR; INTRAVENOUS EVERY 6 HOURS PRN
Status: DISCONTINUED | OUTPATIENT
Start: 2023-02-14 | End: 2023-02-15 | Stop reason: HOSPADM

## 2023-02-14 RX ORDER — LIDOCAINE HYDROCHLORIDE 20 MG/ML
INJECTION, SOLUTION INFILTRATION; PERINEURAL
Status: DISCONTINUED | OUTPATIENT
Start: 2023-02-14 | End: 2023-02-14 | Stop reason: HOSPADM

## 2023-02-14 RX ORDER — SODIUM CHLORIDE 0.9 % (FLUSH) 0.9 %
10 SYRINGE (ML) INJECTION EVERY 12 HOURS SCHEDULED
Status: DISCONTINUED | OUTPATIENT
Start: 2023-02-14 | End: 2023-02-14 | Stop reason: HOSPADM

## 2023-02-14 RX ORDER — SODIUM CHLORIDE 9 MG/ML
50 INJECTION, SOLUTION INTRAVENOUS CONTINUOUS
Status: DISCONTINUED | OUTPATIENT
Start: 2023-02-14 | End: 2023-02-15 | Stop reason: HOSPADM

## 2023-02-14 RX ORDER — ONDANSETRON 4 MG/1
4 TABLET, FILM COATED ORAL EVERY 6 HOURS PRN
Status: DISCONTINUED | OUTPATIENT
Start: 2023-02-14 | End: 2023-02-15 | Stop reason: HOSPADM

## 2023-02-14 RX ORDER — MIDAZOLAM HYDROCHLORIDE 1 MG/ML
INJECTION INTRAMUSCULAR; INTRAVENOUS
Status: DISCONTINUED | OUTPATIENT
Start: 2023-02-14 | End: 2023-02-14 | Stop reason: HOSPADM

## 2023-02-14 RX ORDER — SODIUM CHLORIDE 9 MG/ML
75 INJECTION, SOLUTION INTRAVENOUS CONTINUOUS
Status: DISCONTINUED | OUTPATIENT
Start: 2023-02-14 | End: 2023-02-15 | Stop reason: HOSPADM

## 2023-02-14 RX ORDER — HYDROXYZINE HYDROCHLORIDE 10 MG/1
5 TABLET, FILM COATED ORAL AS NEEDED
COMMUNITY

## 2023-02-14 RX ORDER — HYDROCODONE BITARTRATE AND ACETAMINOPHEN 5; 325 MG/1; MG/1
1 TABLET ORAL EVERY 4 HOURS PRN
Status: DISCONTINUED | OUTPATIENT
Start: 2023-02-14 | End: 2023-02-15 | Stop reason: HOSPADM

## 2023-02-14 RX ORDER — ACETAMINOPHEN 325 MG/1
650 TABLET ORAL EVERY 4 HOURS PRN
Status: DISCONTINUED | OUTPATIENT
Start: 2023-02-14 | End: 2023-02-15 | Stop reason: HOSPADM

## 2023-02-14 RX ADMIN — SODIUM CHLORIDE 50 ML/HR: 9 INJECTION, SOLUTION INTRAVENOUS at 14:38

## 2023-02-14 RX ADMIN — SODIUM CHLORIDE 75 ML/HR: 9 INJECTION, SOLUTION INTRAVENOUS at 11:52

## 2023-02-14 NOTE — DISCHARGE INSTRUCTIONS
[address] [place] [address]    Coronary Angiogram (Radial/Ulnar Approach) After Care    Refer to this sheet in the next few weeks. These instructions provide you with information on caring for yourself after your procedure. Your caregiver may also give you more specific instructions. Your treatment has been planned according to current medical practices, but problems sometimes occur. Call your caregiver if you have any problems or questions after your procedure.    Home Care Instructions:  You may shower the day after the procedure. Remove the bandage (dressing) and gently wash the site with plain soap and water. Gently pat the site dry. You may apply a band aid daily for 2 days if desired.    Do not apply powder or lotion to the site.  Do not submerge the affected site in water for 3 to 5 days or until the site is completely healed.   Do not lift, push or pull anything over 5 pounds for 5 days after your procedure or as directed by your physician.  As a reference, a gallon of milk weighs 8 pounds.   Inspect the site at least twice daily. You may notice some bruising at the site and it may be tender for 1 to 2 weeks.     Increase your fluid intake for the next 2 days.    Keep arm elevated for 24 hours. For the remainder of the day, keep your arm in “Pledge of Allegiance” position when up and about.     You may drive 24 hours after the procedure unless otherwise instructed by your caregiver.  Do not operate machinery or power tools for 24 hours.  A responsible adult should be with you for the first 24 hours after you arrive home. Do not make any important legal decisions or sign legal papers for 24 hours.  Do not drink alcohol for 24 hours.    Metformin or any medications containing Metformin should not be taken for 48 hours after your procedure.      Call Your Doctor if:   You have unusual pain at the radial/ulnar (wrist) site.  You have redness, warmth, swelling, or pain at the  radial/ulnar (wrist) site.  You have drainage (other than a small amount of blood on the dressing).  `You have chills or a fever > 101.  Your arm becomes pale or dark, cool, tingly, or numb.  You develop chest pain, shortness of breath, feel faint or pass out.    You have heavy bleeding from the site, hold pressure on the site for 20 minutes.  If the bleeding stops, apply a fresh bandage and call your cardiologist.  However, if you        continue to have bleeding, call 911 and continue to apply pressure to the site.   You have any symptoms of a stroke.  Remember BE FAST  B-balance. Sudden trouble walking or loss of balance.  E-eyes.  Sudden changes in how you see or a sudden onset of a very bad headache.   F-face. Sudden weakness or loss of feeling of the face or facial droop on one side.   A-arms Sudden weakness or numbness in one arm.  One arm drifts down if they are both held out in front of you. This happens suddenly and usually on one side of the body.   S-speech.  Sudden trouble speaking, slurred speech or trouble understanding what are saying.   T-time  Time to call emergency services.  Write down the symptoms and the time they started.

## 2023-02-14 NOTE — PROGRESS NOTES
Rec'd vm from patient. She is going to her cardiac cath today and rec'd a message that she missed an infusion this morning. Requested cb.    Called patient, I don't see any appts today other than her cardiac cath. Unless the infusion was related to her cardiac cath, I am not sure what it could be. She has appts with rad onc and med onc tomorrow, which she is aware of.

## 2023-02-15 ENCOUNTER — TELEPHONE (OUTPATIENT)
Dept: ONCOLOGY | Facility: CLINIC | Age: 68
End: 2023-02-15
Payer: MEDICARE

## 2023-02-15 ENCOUNTER — TELEPHONE (OUTPATIENT)
Dept: ONCOLOGY | Facility: CLINIC | Age: 68
End: 2023-02-15

## 2023-02-15 ENCOUNTER — PATIENT OUTREACH (OUTPATIENT)
Dept: OTHER | Facility: HOSPITAL | Age: 68
End: 2023-02-15
Payer: MEDICARE

## 2023-02-15 NOTE — TELEPHONE ENCOUNTER
Caller: Gabby Acosta    Relationship: Self    Best call back number: [phone]    What is the best time to reach you: ANYTIME     CAN LEAVE VOICEMAIL IF UNABLE TO REACH     Who are you requesting to speak with (clinical staff, provider,  specific staff member): DR WEISS        What was the call regarding: NEEDING TO RESCHEDULE 02/16 APPT, CANCELLED DUE TO WOULD LIKE TO RESCHEDULE AFTER GETS PORT PUT IN     Do you require a callback: YES ONCE RESCHEDULED      ADDITIONAL: TRANSFERRED PATIENT TO  GENERAL SURGERY TO RESCHEDULE PORT PLACEMENT SURGERY

## 2023-02-15 NOTE — PROGRESS NOTES
"Call from patient. She had heart cath yesterday and states her \"heart can tolerate chemotherapy and radiation\". She can't use her arm for 48 hours following the procedure so she can't make her radiation appt today.  I can't access their schedule, will transfer to rad onc dept to reschedule.  I will try to meet her when she is rescheduled. She was appreciative.  Transferred to rad onc to reschedule appt.      Called patient back since she had chemo teaching scheduled today.  She said she just talked to [facility]; she is waiting to hear back about when her port will be placed and then she will reschedule her rad and med onc appts.   "

## 2023-02-15 NOTE — TELEPHONE ENCOUNTER
LEFT MSG ON PT VM  REGARDING R/S CHEMO ED FROM 2/15 TO 2/16 - PT IS SEEING DR. WEISS ON 2/16 AND CANNOT DO BOTH ON THE SAME DAY,  R/S FOR 2/20 AFTER HER U/S

## 2023-02-16 ENCOUNTER — OFFICE VISIT (OUTPATIENT)
Dept: ONCOLOGY | Facility: CLINIC | Age: 68
End: 2023-02-16
Payer: MEDICARE

## 2023-02-16 DIAGNOSIS — C34.32 MALIGNANT NEOPLASM OF LOWER LOBE OF LEFT LUNG: Primary | ICD-10-CM

## 2023-02-16 PROCEDURE — 99442 PR PHYS/QHP TELEPHONE EVALUATION 11-20 MIN: CPT | Performed by: INTERNAL MEDICINE

## 2023-02-16 NOTE — PROGRESS NOTES
Chief Complaint  Non-small cell lung cancer, clinical stage IIIA ([id number]), aortic stenosis, cervical spine stenosis, COPD, hepatic steatosis, diabetes mellitus    Subjective        History of Present Illness  The patient is evaluated today via telephone visit as she did not feel up to coming into the office today given all of her recent medical appointments and activities.  She has MRI abdomen from 2/6/2023 and right and left heart catheterization from 2/14/2023 to review with further decisions regarding management of her stage IIIA non-small cell lung cancer.  Patient reports today that she has been relatively stable in terms of her chronic issues related to her cervical spine stenosis.  She has ongoing difficulty with dexterity in her handsas well as difficulty with ambulation and balance related to both sensory and motor deficits.  She is able to ambulate with the use of a walker for short distances at this point and use of her hands has improved to some extent after her surgery in September but does remain challenging.  She did undergo right and left heart catheterization with Dr. Mendoza in cardiology on 2/14/2023 with findings of coronary artery disease, severe aortic stenosis, mild pulmonary hypertension.  Patient reports that he discussed with her his recommendation to proceed with treatment for her lung cancer and hold off on any intervention in regards to her aortic stenosis for now.  The patient was to have seen radiation oncology yesterday however that visit was canceled.  She has not yet been scheduled to see ENT.  She is scheduled for thyroid ultrasound on 2/20/2023 to follow-up on activity seen on PET scan however does not feel that she would be able to do the procedure due to pressure issues on her neck.  She has had ongoing anxiety related to her diagnosis and multiple medical issues and is scheduled to see supportive oncology on 2/27/2023.  She is scheduled to see general surgery on 2/22/2023 to  discuss Mediport placement.  The patient does note that she has challenges regarding transportation issues and we have consulted oncology social work.        Objective   Vital Signs:   There were no vitals taken for this visit.    Physical Exam    Patient was evaluated via telephone visit today and therefore physical exam was not performed.      Result Review : Reviewed MRI abdomen 2/6/2023, right and left heart catheterization report 2/14/2023.       Assessment and Plan     *Non-small cell lung cancer, clinical stage IIIA ([id number])  • Patient has history of smoking 1 pack/day x 45 years, quit in November 2021.    • Following anterior cervical corpectomy in September 2022, she experienced acute hypoxemic respiratory failure.    • CT angiogram chest 9/25/2022 showed a concerning 1.4 cm left lower lobe pulmonary nodule, mediastinal lymphadenopathy with infracarinal lymph node 2.7 cm, left hilar lymph node 1.2 cm, small right hilar lymph nodes up from 1.1 cm and additional enlarged mediastinal nodes in the right paratracheal, precarinal, infracarinal spaces.  There was a wedge-shaped area of consolidation/atelectasis in the right middle lobe base, subpleural density left costophrenic sulcus felt to represent atelectasis.  It was recommended for her to undergo subsequent PET scan by pulmonary.  • The patient has had ongoing follow-up with neurosurgery and on CT scans 11/29/2022 of cervical and thoracic spine, there was concern regarding spinal instability with potential need for posterior cervical fusion.  This was scheduled in December 2022 however was delayed due to upper respiratory infection.  • Patient admitted on 1/10/2023 to address multiple medical issues in order to prepare for potential neurosurgical procedure on 3/12/2023.  • Patient underwent echocardiogram on 1/11/2023 with ejection fraction greater than 70%, grade 1 diastolic dysfunction, severe aortic stenosis.  Patient is being followed by cardiology  with consideration of need for TAVR versus SAVR for moderate to severe aortic stenosis.  • Repeat CT chest on 1/12/2023 showed slight increase in size of the left lower lobe pulmonary nodule increased from 1.4 to 1.5 cm.  Groundglass nodule peripheral left lower lobe less conspicuous and resolution of previous reticulonodular opacities right lower lobe.  There was further enlargement of mediastinal lymph nodes with right posterior paratracheal lymph node 1.4 x 2.1 up to 1.5 x 2.3 cm, subcarinal lymph node increased from 2.7 x 5.2 up to 3.2 x 5.8 cm, left hilar lymph node increased from 1.2 up to 1.4 cm, and is stable 1.7 cm left infrahilar lymph node.  There was also comment regarding hepatic steatosis with changes of chronic liver disease and stable indeterminate nodular thickening of the adrenal glands.  • CT-guided lung biopsy on 1/13/2023 with pathology that showed invasive poorly differentiated non-small cell carcinoma with focal neuroendocrine differentiation.  Staining pattern and morphology favor poorly differentiated adenocarcinoma however there was focal CD56 staining, could not entirely exclude large cell neuroendocrine carcinoma.  PD-L1 35%, Caris analysis pending.  • It was felt that there would be increased risk for bronchoscopy from a cardiac standpoint due to her aortic stenosis.  • Staging MRI brain 1/29/2023 showed no evidence of metastatic disease  • Staging PET scan 1/26/2023 showed hypermetabolic left lower lobe pulmonary nodule, size difficult to determine on PET however on recent CT was 1.6 cm (SUV 14.4).  Mediastinal and left hilar hypermetabolic lymphadenopathy (subcarinal lymph node 3.2 cm, SUV 31.7, left paratracheal 2.2 cm lymph node SUV 12.7).  Sub-6 mm pulmonary nodule left upper lobe unchanged and below PET resolution.  Focal uptake posterior right hepatic lobe SUV 7.2 of uncertain significance.  Recommended MRI to evaluate.  Right thyroid activity SUV 5.3, recommended ultrasound.   Long segment uptake distal esophagus consistent with esophagitis.  Uptake posterior larynx, nonspecific, recommended direct visualization.  • MRI abdomen 2/6/2023 with no evidence to suggest metastatic disease in the liver.  Subcentimeter nonenhancing lesions favor cyst or hemangiomas.  • Patient underwent right and left heart catheterization with cardiology on 2/14/2023.  Identification of coronary artery disease, mild pulmonary hypertension, severe aortic stenosis.  Recommendation per cardiology to proceed with treatment for lung cancer and defer any intervention regarding aortic stenosis.  • Concern regarding underlying neurologic issues from cervical stenosis and potential neuropathic effects from Taxol chemotherapy.  Concern regarding patient's overall performance status and ability to tolerate full dose chemotherapy.  Patient discussed at thoracic oncology tumor board with consensus to treat with weekly low-dose carboplatin concurrent with radiation therapy and omit Taxol.  Subsequent plan for 1 year durvalumab following concurrent chemoradiation.  • Tentative plan to initiate concurrent chemoradiation with weekly carboplatin (AUC 2) on 2/28/2023.  • Patient is evaluated today via telephone visit as outlined above.  She has had a multitude of recent medical appointments and did not feel up to coming into the office today.  She also has transportation issues.  We reviewed results from her MRI abdomen 2/6/2023 which showed no evidence of metastatic disease in the liver corresponding to the area of activity on PET scan.  Therefore there is no evidence of distant metastatic disease.  She also underwent cardiac catheterization on 2/14/2023 and it was recommended by cardiology to forego treatment of her aortic stenosis for now and pursue treatment of her lung cancer.  She has multiple other appointments which need to be scheduled in order to proceed with treatment.  She needs to be rescheduled to see radiation  oncology as soon as possible.  She is scheduled to see general surgery to discuss Mediport placement 2/22/2023.  She is scheduled for chemotherapy education on 2/20/2023.  We will check on status of her ENT appointment to evaluate the area of laryngeal activity seen on PET scan which is felt to likely be artifactual.  She does not feel able to undergo thyroid ultrasound and we will cancel this for 2/20/2023 and consider checking this at some point in the future as this does not appear to be a significant factor related to her lung cancer.  We reviewed plans for treatment as outlined above.  We discussed limitations in administering concurrent chemotherapy with radiation.  She is not a candidate to administer any drugs with potential to cause neuropathy in the setting of her pre-existing neurologic dysfunction from her cervical spine stenosis.  She has a somewhat limited performance status and I do not feel that she would be a good candidate for full dose chemotherapy concurrent with radiation.  Therefore consensus at thoracic oncology tumor board was to administer weekly low-dose single agent carboplatin with radiation therapy and to follow concurrent chemoradiation with 1 year of durvalumab.  Patient was in agreement with this approach.  We will try to facilitate the above appointments and potentially be ready to begin treatment on 2/28/2023.  Lung nurse navigator will be assisting.     *Aortic stenosis  • Patient underwent echocardiogram on 1/11/2023 with ejection fraction greater than 70%, grade 1 diastolic dysfunction, severe aortic stenosis.    • Patient followed by cardiology with consideration of need for TAVR versus SAVR for moderate to severe aortic stenosis.  • Patient underwent cardiac catheterization 2/14/2023 with identification of coronary artery disease, mild pulmonary hypertension, severe aortic stenosis.  • Per cardiology, recommendation to proceed with treatment for lung cancer and defer any  consideration of intervention for aortic stenosis.     *Cervical spine stenosis  • Patient with cervical spine stenosis with associated myelopathy.   • She underwent surgery on 9/20/2022 with anterior cervical corpectomy with cage.      • The patient has had ongoing follow-up with neurosurgery and on CT scans 11/29/2022 of cervical and thoracic spine, there was concern regarding spinal instability with potential need for posterior cervical fusion.  This was scheduled in December 2022 however was delayed due to upper respiratory infection.  • Patient admitted on 1/10/2023 to address multiple medical issues in order to prepare for potential neurosurgical procedure on 3/12/2023.  • Patient was previously having severe symptoms related to her cervical stenosis with reduced ability to ambulate previously and significant limitations in movement in her hands, dropping objects.  More recently however she has improved and has been able to walk with the use of a walker, symptoms in the upper extremities have improved and her dexterity is better.    • It appears that her neurosurgical procedure will need to be delayed until we can at least complete the concurrent chemoradiation portion of her treatment.  Surgery would be feasible while continuing on immunotherapy in the future.  • Patient is scheduled to follow-up with neurosurgery on 3/1/2023     *COPD  • Patient has history of smoking 1 pack/day x 45 years, quit in November 2021.  • Patient has not undergone formal PFTs  • Patient being followed by pulmonary, currently receiving Symbicort inhaler, duo nebs 4 times daily     *Hepatic steatosis  • Identified on prior scans  • PET scan 1/26/2023 with questionable area of activity seen but no corresponding CT abnormality.    • MRI abdomen 2/6/2023 with no evidence to suggest metastatic disease in the liver.  Subcentimeter nonenhancing lesions favor cyst or hemangiomas.  • LFTs have been normal     *Diabetes mellitus  • Currently  receiving metformin 850 mg daily    *Situational depression  · Patient reports feeling overwhelmed regarding the amount of information presented today in regards to her malignancy as well as her other medical issues.    · Patient is scheduled to see supportive oncology on 2/27/2023    *Right thyroid uptake on PET scan 1/26/2023  · Patient was scheduled for thyroid ultrasound however does not feel that she will be able to undergo the procedure due to her neck issues and we will cancel this and consider at future date    *Laryngeal uptake on PET scan 1/26/2023  · Referral to ENT for laryngoscopy  · Patient has not yet heard regarding appointment with ENT and we will investigate this further.    *Esophageal uptake on PET scan 1/26/2023  · Felt to be consistent with esophagitis in the distal esophagus.  We will hold on referral for EGD given the multitude of additional procedures and consults required above.    *Venous access  · Patient scheduled to see Dr. Shook on 2/22/2023 to discuss pursuit of port placement.    *Social  · Patient reports that she has significant transportation issues.  Oncology social work has been consulted to help facilitate transportation for upcoming concurrent chemoradiation         PLAN:  1. The patient has been cleared by cardiology to move forward with treatment for her lung cancer, no current plans to intervene for her severe aortic stenosis.  2. Plan for definitive treatment of the patient's stage IIIA non-small cell lung cancer with concurrent chemoradiation with weekly single agent carboplatin to be followed by 1 year of durvalumab.  3. Reschedule visit within the next week with radiation oncology (visit was canceled from yesterday)  4. Patient is scheduled to see Dr. Shook on 2/22/2023 with subsequent Mediport placement  5. Patient is scheduled for chemo education on 2/20/2023 for single agent weekly carboplatin AUC 2 for the duration of radiation therapy (omitting Taxol due to  risk for neuropathy)  6. We will cancel thyroid ultrasound 2/20/2023 (patient does not feel that she can physically do the procedure)  7. We will find out from ENT when she is scheduled for evaluation of laryngeal activity seen on PET scan  8. Patient is scheduled to see supportive oncology on 2/27/2023  9. We will work with lung navigation and social work regarding transportation issues for upcoming concurrent chemoradiation  10. We will ask lung navigation to contact patient regarding the above schedule and appointments  11. Patient is scheduled for further follow-up with neurosurgery on 3/1/2023.  Further neurosurgical procedure will need to be delayed until recovery from concurrent chemoradiation (can be performed while she is receiving immunotherapy subsequently).  12. We will schedule nurse practitioner visit with LEA IVERSON and begin concurrent chemoradiation on 2/28/2023.  We will schedule weekly alternating visits between MD and NP with LEA IVERSON and carboplatin for 7 weeks.    The patient did consent to telephone visit today      I did spend 20 minutes in telephone conversation with the patient today.  I did spend 50 minutes in total time caring for the patient today, time spent in review of records, telephone conversation, placement of orders, coordination of care, completion of documentation.

## 2023-02-20 ENCOUNTER — TELEPHONE (OUTPATIENT)
Dept: ONCOLOGY | Facility: CLINIC | Age: 68
End: 2023-02-20
Payer: MEDICARE

## 2023-02-20 NOTE — TELEPHONE ENCOUNTER
Patient says she did not know anything about the appointment for today until she got a reminder call.  She cannot come in today and needs to re-schedule

## 2023-02-22 ENCOUNTER — TELEPHONE (OUTPATIENT)
Dept: RADIATION ONCOLOGY | Facility: HOSPITAL | Age: 68
End: 2023-02-22
Payer: MEDICARE

## 2023-02-23 ENCOUNTER — CONSULT (OUTPATIENT)
Dept: RADIATION ONCOLOGY | Facility: HOSPITAL | Age: 68
End: 2023-02-23
Payer: MEDICARE

## 2023-02-23 ENCOUNTER — PATIENT OUTREACH (OUTPATIENT)
Dept: OTHER | Facility: HOSPITAL | Age: 68
End: 2023-02-23
Payer: MEDICARE

## 2023-02-23 ENCOUNTER — TELEPHONE (OUTPATIENT)
Dept: SURGERY | Facility: CLINIC | Age: 68
End: 2023-02-23
Payer: MEDICARE

## 2023-02-23 ENCOUNTER — APPOINTMENT (OUTPATIENT)
Dept: RADIATION ONCOLOGY | Facility: HOSPITAL | Age: 68
End: 2023-02-23
Payer: MEDICARE

## 2023-02-23 ENCOUNTER — PREP FOR SURGERY (OUTPATIENT)
Dept: OTHER | Facility: HOSPITAL | Age: 68
End: 2023-02-23
Payer: MEDICARE

## 2023-02-23 VITALS
DIASTOLIC BLOOD PRESSURE: 85 MMHG | WEIGHT: 247 LBS | BODY MASS INDEX: 45.18 KG/M2 | HEART RATE: 67 BPM | OXYGEN SATURATION: 95 % | SYSTOLIC BLOOD PRESSURE: 181 MMHG

## 2023-02-23 DIAGNOSIS — C34.32 MALIGNANT NEOPLASM OF LOWER LOBE OF LEFT LUNG: Primary | ICD-10-CM

## 2023-02-23 PROCEDURE — G0463 HOSPITAL OUTPT CLINIC VISIT: HCPCS | Performed by: RADIOLOGY

## 2023-02-23 PROCEDURE — 99205 OFFICE O/P NEW HI 60 MIN: CPT | Performed by: RADIOLOGY

## 2023-02-23 RX ORDER — CELECOXIB 200 MG/1
200 CAPSULE ORAL ONCE
OUTPATIENT
Start: 2023-02-24 | End: 2023-02-23

## 2023-02-23 RX ORDER — GABAPENTIN 300 MG/1
300 CAPSULE ORAL ONCE
OUTPATIENT
Start: 2023-02-24 | End: 2023-02-23

## 2023-02-23 RX ORDER — ACETAMINOPHEN 500 MG
1000 TABLET ORAL ONCE
OUTPATIENT
Start: 2023-02-23 | End: 2023-02-23

## 2023-02-23 NOTE — PROGRESS NOTES
Met patient, daughter and grandson in radiation center. Reintroduced myself.  Her appt with general surgery was rescheduled to 3/1.  She met with Dr. Banerjee today to discuss radiation and will have simulation next week. Her expected start date of radiation is 3/13 or 3/14. She is scheduled for chemo teaching 2/27 and will be starting treatment 2/28/23.     The patient has an appt scheduled with Cherelle on 2/27. A referral has already been placed to social work for transportation issues. She is in a wheelchair and requires assistance of several people to get her down the 4 steps from her home.      She does not have an ACP although declined an appt with Ree for planning.     We discussed OAFA and Survivorship visits. Provided patient with pamphlet for both of these visits.    We discussed the resources in the [facility]. She received a navigation folder with the following information today: [facility], Cancer and Restorative Exercise - CARE, [facility] exercise program, Living with a Diagnosis of Lung Cancer, Lung Cancer [place] Support [facility], Cancer Resource Center, Message Therapy, Reiki Therapy, [employer], Cancer Nutrition, Smoking Cessation and Survivorship Clinic.      I called Dr. Shook (general surgery) office. She is scheduled for a consult 3/1 @ 10:10. Unclear when the port placement will occur.    Called Dr. Harjit Renner's nurse. They were aware that her appt with Dr. Shook was moved back. She informed Dr. Lombardi and was waiting to hear back to see if a peripheral line would work or how he wanted to proceed.  I offered to contact Dr. Brown to see if he could possibly place the port prior to 2/28.  Called Dr. Brown. He thinks he may be able to place the port 2/24 in the afternoon. He will check with his  and get back with me.  Informed Jud of this.   Call from Dr. Brown. Porsche is trying to get her on the schedule tomorrow afternoon.  Porsche will call the patient with time.    Called patient, left message about port placement tomorrow if possible so she has it before start of treatment next week. Porsche will call her. Requested she call me back as well with questions.   Called Jud @ Dr. Lombardi's office, explained Dr. Brown should be able to place port tomorrow.     I will f/u with patient in several weeks.     Rec'd message from Dr. Brown that the patient did not want to proceed with port placement tomorrow. She elected to keep her general surgery appt 3/1.  This was communicated to Dr. Harjit Renner's nurse. Explained to Jud that radiation would not start until 3/13 or 3/14 per Dr. Banerjee. Jud communicated this to Dr. Lombardi.

## 2023-02-23 NOTE — PROGRESS NOTES
Subjective     Carmelo Lombardi Jr., MD    Cancer Staging   No matching staging information was found for the patient.    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   The patient is a 68 y.o.  female with a complicated medical history including cervical spondylosis, s/p anterior cervical corpectomy in September 2022 with post op acute respiratory failure.  She had a CT angiogram 9/25/22 which showed a 1.4cm left lower lobe nodule, mediastinal adenopathy  enlarged bilateral hilar lymph nodes, a tiny sub-5 mm left lower lobe nodule also seen, subpleural density in the left costophrenic sulcus which probably likely atelectasis, somewhat nodular in configuration, short-term follow-up advised.    She had a CT cervical and thoracic spine on 11/29/22 which showed C4-C6 corpectomy with placement of a graft and anterior fusion plate.  The inferior screws appear to be within the C7-T1 disc and the plate is elevated from the anterior cortical surface by up to 13 mm inferiorly. Residual cervical degenerative changes resulting in varying degrees of neural foraminal narrowing as described in detail above.  She was scheduled for possible cervical spinal fusion and had an echocardiogram which showed EF of 70% but severe aortic stenosis.         She had a repeat CT chest on 1/12/23 which showed an enlarging left lower lobe pulmonary nodule and right paratracheal/subcarinal adenopathy.  She had a CT guided biopsy of the left lung nodule on 1/13/23 with pathology revealing   Poorly differentiated non small cell carcinoma with focal neuroendocrine differentiation with staining pattern favoring adenocarcinoma, pdl 1 35% but focus of large cell could not be ruled out.      She had a PET scan on 1/27/23 which showed mediastinal and left hilar adenopathy is intensely hypermetabolic with  index nodes: Subcarinal node measuring 3.2 cm in short axis dimension max SUV of 31.7.  Left posterior paratracheal jonn conglomerate measuring 2.2 x 1.6 cm with max SUV of 12.7.  Left lower lobe pulmonary nodule max SUV of 14.4. Sub-6 mm pulmonary nodule within the left upper lobe grossly unchanged since 09/25/2022 and below PET resolution.  There is intense uptake centered along posterior aspect of the larynx demonstrating a max SUV of 8.4.  There is asymmetric intense uptake which appears to overlie the right thyroid gland demonstrating max SUV of 5.3.  There is a long segment of moderate  to intense uptake is present within the distal esophagus demonstrating max SUV of 6.3.    She had a brain mri which was negative for metastatic disease.  She had a mri of the abdomen which showed subcentimeter T2 nonenhancing lesions favor cysts or hemangiomas but are too small to characterize. Recommend attention on conservative oncologic surveillance imaging recommended.. Hepatomegaly with mild hepatic steatosis.    She underwent heart catherization on 2/14/23 which showed severe aortic stenosis and coronary artery disease.  Cardiology has recommended proceeding with treating the lung cancer first. Her case was discussed at the Multidisciplinary lung conference with recommendation for concurrent low dose carboplatin with radiation followed by durvulamab.      She has a hx of smoking 1 ppd x 45 years.     She is referred today for evaluation for radiation.  She was scheduled for a port placement last week but was unable to keep this appointment.  She was also scheduled for ENT evaluation but has not had this yet either.  She reports fatigue and some numbness of her extremities as well as weakness.  She uses a wheelchair for ambulation outside the home.  She denies any significant cough or soa out of normal.             Review of Systems   Constitutional: Positive for fatigue.   HENT: Negative.    Respiratory: Negative.     Cardiovascular: Negative.    Gastrointestinal: Positive for diarrhea.   Genitourinary: Negative.    Musculoskeletal: Positive for arthralgias, myalgias and neck pain.   Skin: Negative.    Neurological: Positive for numbness.   Psychiatric/Behavioral: The patient is nervous/anxious.          Past Medical History:   Diagnosis Date   • Cervical spondylosis with myelopathy    • Cervical stenosis of spine    • COPD (chronic obstructive pulmonary disease) (HCC)    • Diabetes mellitus (HCC)    • Diabetes mellitus with diabetic dermatitis (HCC)    • Disease of thyroid gland     Hypothyroid   • Elevated cholesterol    • Fatty liver    • Hepatic steatosis    • Hyperlipidemia    • Hypertension    • Lung cancer (HCC)     Stage IIIA non-small cell lung cancer         Past Surgical History:   Procedure Laterality Date   • ANTERIOR CHANNEL VERTEBRECTOMY/CORPECTOMY N/A 9/20/2022    Procedure: Anterior cervical corpectomy, cervical four/five/six, with cage and plate from cervical three to cervical seven;  Surgeon: David Cheung MD;  Location: [facility] OR;  Service: Neurosurgery;  Laterality: N/A;   • CARDIAC CATHETERIZATION N/A 2/14/2023    Procedure: Right and Left Heart Cath;  Surgeon: Kostas David MD;  Location: [facility] CATH INVASIVE LOCATION;  Service: Cardiovascular;  Laterality: N/A;   • CARDIAC CATHETERIZATION N/A 2/14/2023    Procedure: Coronary angiography;  Surgeon: Kostas David MD;  Location: [facility] CATH INVASIVE LOCATION;  Service: Cardiovascular;  Laterality: N/A;   • CARDIAC CATHETERIZATION N/A 2/14/2023    Procedure: Left ventriculography;  Surgeon: Kostas David MD;  Location: [facility] CATH INVASIVE LOCATION;  Service: Cardiovascular;  Laterality: N/A;   • CARDIAC CATHETERIZATION N/A 2/14/2023    Procedure: Resting Full Cycle Ratio;  Surgeon: Kostas David MD;  Location: [facility] CATH INVASIVE LOCATION;  Service: Cardiovascular;  Laterality: N/A;   • CATARACT EXTRACTION      •  SECTION     • CHOLECYSTECTOMY           Social History     Socioeconomic History   • Marital status:    Tobacco Use   • Smoking status: Former     Packs/day: 1.00     Years: 45.00     Pack years: 45.00     Types: Cigarettes     Quit date: 2022     Years since quittin.4   • Smokeless tobacco: Never   Vaping Use   • Vaping Use: Never used   Substance and Sexual Activity   • Alcohol use: Never   • Drug use: Never   • Sexual activity: Yes         Family History   Problem Relation Age of Onset   • Malig Hyperthermia Neg Hx           Objective    Physical Exam  Constitutional:       Appearance: She is obese.   HENT:      Head: Atraumatic.   Neck:      Comments: Decreased rom neck  Cardiovascular:      Rate and Rhythm: Normal rate.   Pulmonary:      Effort: Pulmonary effort is normal. No respiratory distress.      Breath sounds: Normal breath sounds. No wheezing or rales.   Musculoskeletal:      Comments: Decreased rom of neck   Neurological:      Mental Status: She is alert.      Sensory: Sensory deficit present.      Motor: Weakness present.   Psychiatric:         Mood and Affect: Mood normal.           Current Outpatient Medications on File Prior to Visit   Medication Sig Dispense Refill   • amLODIPine (NORVASC) 5 MG tablet      • buPROPion XL (WELLBUTRIN XL) 150 MG 24 hr tablet Take 1 tablet by mouth Daily. 30 tablet 0   • diphenhydrAMINE (BENADRYL) 25 mg capsule Take 25 mg by mouth Every 6 (Six) Hours As Needed for Allergies.     • fluticasone (FLONASE) 50 MCG/ACT nasal spray INSTILL TWO (2) SPRAYS INTO NOSE DAILY.     • guaiFENesin (MUCINEX) 600 MG 12 hr tablet Take 1 tablet by mouth Every 12 (Twelve) Hours. 14 tablet 0   • hydrOXYzine (ATARAX) 10 MG tablet Take 5 mg by mouth As Needed for Itching.     • ipratropium-albuterol (DUO-NEB) 0.5-2.5 mg/3 ml nebulizer Take 3 mL by nebulization 4 (Four) Times a Day. 360 mL 0   • levothyroxine (SYNTHROID, LEVOTHROID) 125 MCG tablet Take 125 mcg by  mouth Daily.     • losartan (COZAAR) 100 MG tablet Take 1 tablet by mouth Daily.     • lovastatin (MEVACOR) 40 MG tablet TAKE ONE (1) TABLET BY MOUTH NIGHTLY.     • metFORMIN (GLUCOPHAGE) 850 MG tablet Take 1 tablet by mouth Daily.     • pyridoxine (VITAMIN B-6) 100 MG tablet Take 100 mg by mouth Daily.     • vitamin D3 125 MCG (5000 UT) capsule capsule Take 5,000 Units by mouth Daily.       No current facility-administered medications on file prior to visit.       ALLERGIES:  No Known Allergies    There were no vitals taken for this visit.     Current Status 2/2/2023   ECOG score 1         Assessment & Plan     Gabby Acosta is a 68 y.o. female with newly diagnosed stage IIIA non small cell carcinoma of the left lung with multiple comorbidities.  Her case was discussed at the multidisciplinary lung conference with recommendation for concurrent low dose carboplatin/radiation followed by durvulamab.  She still needs to see ENT for laryngoscopy to evaluate the uptake in the larynx and she still needs a port.  I have personally reviewed her imaging.  I discussed with her my recommendation for radiation therapy. I discussed with her today the risks, benefits and rationale of radiation therapy to the lung.  I discussed both the acute and long term side effects to include but not limited to the following:    Acute:  skin erythema, fatigue, lowered blood counts, pneumonitis resulting in shortness of breath, cough or pain wtih inspiration, esophagitiis resulting in pain or difficulty with swallowing    Late:  permanent skin changes, late pneumonitis or pulmonary fibrosis resulting in permanent shortness of breath, chronic cough or oxygen dependency, esophageal stricture, late cardiac damage and the remote risk of second malignancies.    Gabby Acosta  voiced understanding and wishes to proceed with the treatments here at [facility].  I have scheduled her to return for CT simulation for treatment planning Wednesday, March 1.   I tentatively think the earliest we could begin radiation would be wed March 8 or possibly the following Monday, march 13.  I plan to deliver a dose of approximately 60-66Gy in 30-33fractions. I have also requested the lung nurse navigator to speak with her today to help coordinate her many appointments and schedule.    I personally spent greater than 60 minutes today assessing, managing, discussing and documenting my visit with the patient. That time includes review of records, imaging and pathology reports, obtaining my own history, performing a medically appropriate evaluation, counseling and educating the patient, discussing goals, logistics, alternatives and risks of my recommendations, surveillance options and potential outcomes. It also includes the time documenting the clinical information in the EMR and communicating my recommendations to the other involved physicians.                Thank you very much for allowing me to participate in the care of this very pleasant patient.    Sincerely,      Ree Banerjee MD     Subjective

## 2023-02-23 NOTE — TELEPHONE ENCOUNTER
I called Yolanda, Mrs. Acosta's daughter, and spoke with her about the port placement scheduled for tomorrow, 2/24.  She said Mrs. Acosta does not want the port placed tomorrow.  She wants to wait and see Dr. Shook on 3/1 and then schedule the surgery with him.  I asked her to call our office if she changed her mind or if she needs us in the future.  gb 2/23/23

## 2023-02-24 ENCOUNTER — PATIENT OUTREACH (OUTPATIENT)
Dept: OTHER | Facility: HOSPITAL | Age: 68
End: 2023-02-24
Payer: MEDICARE

## 2023-02-24 ENCOUNTER — TELEPHONE (OUTPATIENT)
Dept: ONCOLOGY | Facility: CLINIC | Age: 68
End: 2023-02-24
Payer: MEDICARE

## 2023-02-24 NOTE — PROGRESS NOTES
Call from patient. Left message that Harjit Renteria's office cancelled her appts. Needs to know when Cherelle's appt is. Called her back, Sees Cherelle Monday at 1PM. Gave location of appt as well

## 2023-02-24 NOTE — PROGRESS NOTES
"Rec'd message from patient after hours apologizing that it didn't work to have her port placed Friday although  she may be able to get her port placed on Monday before or after her appts. Requested call back.  Contacted Dr. Brown and Porsche,  about his availability for port placement Monday. He is not available although could do it another day next week.  Spoke with patient. She said she may not pursue port at all since her treatment is once a week.  Encouraged her to discuss this with Dr. Lombardi or his APRN when she has chemo teaching next week. Some of the agents can irritate veins. She reported that she is a \"good stick\".    We went over her appts next week including Cherelle @ 1pm on Monday.  She sees Dr. Shook, gen surgeon 3/1. Explained this is a consult appt not the actual port placement.  Explained Dr. Brown would be able to place her port next week if she would like to pursue that. She also has radiation simulation next week.    Updated Dr. Harjit Renner's nurse regarding above.     "

## 2023-02-24 NOTE — PROGRESS NOTES
Rec'd message from Dr. Harjit Renner's nurse. They will be rescheduling the patient's appts since radiation can't start until 3/13 or 3/14.  Patient will keep Dr. Shook appt and proceed with port placement if patient agrees prior to starting therapy.    Rec'd call from Ju  in Dr. Lombardi's office. They will call patient regarding cancellation of upcoming appts. Chemo teaching will be rescheduled for week of 3/6.

## 2023-02-24 NOTE — TELEPHONE ENCOUNTER
PER STAFF MSG:We need to modify this patient's schedule somewhat. Please take her off chemo ed on 2/27 and NP/treatment on 2/28.      I called pt and cx 2/27 and 2/28 appts

## 2023-02-27 ENCOUNTER — OFFICE VISIT (OUTPATIENT)
Dept: PSYCHIATRY | Facility: HOSPITAL | Age: 68
End: 2023-02-27
Payer: MEDICARE

## 2023-02-27 ENCOUNTER — TELEPHONE (OUTPATIENT)
Dept: SURGERY | Facility: CLINIC | Age: 68
End: 2023-02-27
Payer: MEDICARE

## 2023-02-27 DIAGNOSIS — F41.1 GAD (GENERALIZED ANXIETY DISORDER): Primary | ICD-10-CM

## 2023-02-27 PROCEDURE — 90792 PSYCH DIAG EVAL W/MED SRVCS: CPT | Performed by: NURSE PRACTITIONER

## 2023-02-27 RX ORDER — GABAPENTIN 300 MG/1
300 CAPSULE ORAL 3 TIMES DAILY
Qty: 90 CAPSULE | Refills: 2 | Status: SHIPPED | OUTPATIENT
Start: 2023-02-27 | End: 2024-02-27

## 2023-02-27 NOTE — TELEPHONE ENCOUNTER
2/27/23 We canceled the port placement surgery for 2/28 according to the letter in the chart from Anjana.  Mrs. Acosta is going to see Dr. Shook for the surgery.gb

## 2023-03-01 ENCOUNTER — PATIENT OUTREACH (OUTPATIENT)
Dept: OTHER | Facility: HOSPITAL | Age: 68
End: 2023-03-01
Payer: MEDICARE

## 2023-03-01 ENCOUNTER — APPOINTMENT (OUTPATIENT)
Dept: RADIATION ONCOLOGY | Facility: HOSPITAL | Age: 68
End: 2023-03-01
Payer: MEDICARE

## 2023-03-01 ENCOUNTER — OFFICE VISIT (OUTPATIENT)
Dept: SURGERY | Facility: CLINIC | Age: 68
End: 2023-03-01
Payer: MEDICARE

## 2023-03-01 ENCOUNTER — TELEPHONE (OUTPATIENT)
Dept: ONCOLOGY | Facility: CLINIC | Age: 68
End: 2023-03-01

## 2023-03-01 VITALS — WEIGHT: 247 LBS | HEIGHT: 62 IN | BODY MASS INDEX: 45.45 KG/M2

## 2023-03-01 DIAGNOSIS — C34.32 MALIGNANT NEOPLASM OF LOWER LOBE OF LEFT LUNG: Primary | ICD-10-CM

## 2023-03-01 PROCEDURE — 99203 OFFICE O/P NEW LOW 30 MIN: CPT | Performed by: SURGERY

## 2023-03-01 RX ORDER — SODIUM CHLORIDE 0.9 % (FLUSH) 0.9 %
10 SYRINGE (ML) INJECTION EVERY 12 HOURS SCHEDULED
Status: CANCELLED | OUTPATIENT
Start: 2023-03-01

## 2023-03-01 RX ORDER — SODIUM CHLORIDE 9 MG/ML
40 INJECTION, SOLUTION INTRAVENOUS AS NEEDED
Status: CANCELLED | OUTPATIENT
Start: 2023-03-01

## 2023-03-01 RX ORDER — SODIUM CHLORIDE 0.9 % (FLUSH) 0.9 %
10 SYRINGE (ML) INJECTION AS NEEDED
Status: CANCELLED | OUTPATIENT
Start: 2023-03-01

## 2023-03-01 RX ORDER — ALBUTEROL SULFATE 90 UG/1
AEROSOL, METERED RESPIRATORY (INHALATION)
COMMUNITY
Start: 2023-02-26

## 2023-03-01 NOTE — TELEPHONE ENCOUNTER
Caller: Gabby Acosta    Relationship: Self    Best call back number: [phone]    What is the best time to reach you: ANYTIME    Who are you requesting to speak with (clinical staff, provider, specific staff member): JOSE A BARTH RN - PATIENT OUTREACH    What was the call regarding: PT WANTED TO SPEAK WITH JOSE A REGARDING SOME QUESTIONS SHE HAS. PLEASE CALL PT TO ADVISE.     Do you require a callback: YES

## 2023-03-01 NOTE — PROGRESS NOTES
Called patient. I received a message from Dr. Lombardi's office that she was trying to reach me. Left message that I was returning her call. Requested call back.

## 2023-03-01 NOTE — H&P (VIEW-ONLY)
Cc: Lung cancer    History of presenting illness:   This is a nice 68-year-old female recently diagnosed with cancer of the left lung presenting for port placement.  She has never had a port previously.  No history of DVT.  She is right-handed.  No plans for surgical intervention, there are plans for chemotherapy and as such a port was requested.    Past Medical History: Lung cancer, hyperlipidemia, diabetes, aortic stenosis, back and neck pain    Past Surgical History: Cardiac catheterization 2023, neck surgery,  section, cholecystectomy    Medications: Albuterol, amlodipine, bupropion, Benadryl, gabapentin, Atarax, DuoNeb, levothyroxine, losartan, lovastatin, metformin, vitamin D    Allergies: None known    Social History: Former smoker who quit in     Family History: Negative for colorectal cancer    Review of Systems:  Constitutional: Negative for fever, chills, change in weight  Neck: no swollen glands or dysphagia or odynophagia  Respiratory: negative for SOB, cough, hemoptysis or wheezing  Cardiovascular: negative for chest pain, palpitations or peripheral edema  Gastrointestinal: Negative for nausea, vomiting, abdominal pain      Physical Exam:  BMI: 45.2  General: alert and oriented, appropriate, no acute distress  Eyes: No scleral icterus, extraocular movements are intact  Neck: Supple without lymphadenopathy or thyromegaly, trachea is in the midline  Respiratory: There is good bilateral chest expansion, no use of accessory muscles is noted  Cardiovascular: No jugular venous distention or peripheral edema is seen  Gastrointestinal: Soft, obese, nontender, no mass or hernia felt    Laboratory data: CT-guided biopsies from 2023 demonstrated non-small cell carcinoma    Imaging data: CT images reviewed including PET suggesting left lower lobe cancer and evidence of metastatic disease to the lymph nodes      Assessment and plan:   -Surgically unresectable lung cancer, non-small cell  type  -Request made for port placement for chemotherapy  -Options discussed and we will plan to proceed with [id number] placement.  Risks including bleeding, infection, pneumothorax, device failure, DVT discussed and patient is agreeable to proceed.      Collin Shook MD, FACS  General, Minimally Invasive and Endoscopic Surgery  [facility]    [address], [address]  [place], [place], [address]  P: [phone]  F: [phone]

## 2023-03-01 NOTE — PROGRESS NOTES
Cc: Lung cancer    History of presenting illness:   This is a nice 68-year-old female recently diagnosed with cancer of the left lung presenting for port placement.  She has never had a port previously.  No history of DVT.  She is right-handed.  No plans for surgical intervention, there are plans for chemotherapy and as such a port was requested.    Past Medical History: Lung cancer, hyperlipidemia, diabetes, aortic stenosis, back and neck pain    Past Surgical History: Cardiac catheterization 2023, neck surgery,  section, cholecystectomy    Medications: Albuterol, amlodipine, bupropion, Benadryl, gabapentin, Atarax, DuoNeb, levothyroxine, losartan, lovastatin, metformin, vitamin D    Allergies: None known    Social History: Former smoker who quit in     Family History: Negative for colorectal cancer    Review of Systems:  Constitutional: Negative for fever, chills, change in weight  Neck: no swollen glands or dysphagia or odynophagia  Respiratory: negative for SOB, cough, hemoptysis or wheezing  Cardiovascular: negative for chest pain, palpitations or peripheral edema  Gastrointestinal: Negative for nausea, vomiting, abdominal pain      Physical Exam:  BMI: 45.2  General: alert and oriented, appropriate, no acute distress  Eyes: No scleral icterus, extraocular movements are intact  Neck: Supple without lymphadenopathy or thyromegaly, trachea is in the midline  Respiratory: There is good bilateral chest expansion, no use of accessory muscles is noted  Cardiovascular: No jugular venous distention or peripheral edema is seen  Gastrointestinal: Soft, obese, nontender, no mass or hernia felt    Laboratory data: CT-guided biopsies from 2023 demonstrated non-small cell carcinoma    Imaging data: CT images reviewed including PET suggesting left lower lobe cancer and evidence of metastatic disease to the lymph nodes      Assessment and plan:   -Surgically unresectable lung cancer, non-small cell  type  -Request made for port placement for chemotherapy  -Options discussed and we will plan to proceed with [id number] placement.  Risks including bleeding, infection, pneumothorax, device failure, DVT discussed and patient is agreeable to proceed.      Collin Shook MD, FACS  General, Minimally Invasive and Endoscopic Surgery  [facility]    [address], [address]  [place], [place], [address]  P: [phone]  F: [phone]

## 2023-03-01 NOTE — PROGRESS NOTES
Call from patient. She is confused about her appts.  She thinks she missed an appt today. I see an appt with general surgeon today, which she went to. She is having a port today.  She missed rad appt today.  I called radiation oncology, s/w Anabel, she missed her rad planning appt. They called the patient but hadn't heard back.  The patient needs to call them again.  Called patient. Encouraged her to call rad onc dept and reschedule radiation planning.      We also discussed  referral for transportation assistance and upcoming CBC appts.  I will message Dr. Lombardi's nurse.   In-basket to Dr. Lombardi's nurse about this

## 2023-03-02 ENCOUNTER — ANESTHESIA EVENT (OUTPATIENT)
Dept: PERIOP | Facility: HOSPITAL | Age: 68
End: 2023-03-02
Payer: MEDICARE

## 2023-03-02 ENCOUNTER — ANESTHESIA (OUTPATIENT)
Dept: PERIOP | Facility: HOSPITAL | Age: 68
End: 2023-03-02
Payer: MEDICARE

## 2023-03-02 ENCOUNTER — APPOINTMENT (OUTPATIENT)
Dept: GENERAL RADIOLOGY | Facility: HOSPITAL | Age: 68
End: 2023-03-02
Payer: MEDICARE

## 2023-03-02 ENCOUNTER — HOSPITAL ENCOUNTER (OUTPATIENT)
Facility: HOSPITAL | Age: 68
Setting detail: HOSPITAL OUTPATIENT SURGERY
Discharge: HOME | End: 2023-03-02
Attending: SURGERY | Admitting: SURGERY
Payer: MEDICARE

## 2023-03-02 VITALS
TEMPERATURE: 97.5 F | DIASTOLIC BLOOD PRESSURE: 88 MMHG | HEART RATE: 66 BPM | SYSTOLIC BLOOD PRESSURE: 136 MMHG | OXYGEN SATURATION: 93 % | RESPIRATION RATE: 18 BRPM

## 2023-03-02 DIAGNOSIS — C34.32 MALIGNANT NEOPLASM OF LOWER LOBE OF LEFT LUNG: ICD-10-CM

## 2023-03-02 LAB
GLUCOSE BLDC GLUCOMTR-MCNC: 94 MG/DL (ref 70–130)
GLUCOSE BLDC GLUCOMTR-MCNC: 97 MG/DL (ref 70–130)

## 2023-03-02 PROCEDURE — 25010000002 MIDAZOLAM PER 1 MG: Performed by: NURSE ANESTHETIST, CERTIFIED REGISTERED

## 2023-03-02 PROCEDURE — C1788 PORT, INDWELLING, IMP: HCPCS | Performed by: SURGERY

## 2023-03-02 PROCEDURE — 25010000002 PROPOFOL 10 MG/ML EMULSION: Performed by: NURSE ANESTHETIST, CERTIFIED REGISTERED

## 2023-03-02 PROCEDURE — 25010000002 MIDAZOLAM PER 1 MG: Performed by: ANESTHESIOLOGY

## 2023-03-02 PROCEDURE — 77001 FLUOROGUIDE FOR VEIN DEVICE: CPT | Performed by: SURGERY

## 2023-03-02 PROCEDURE — 25010000002 HEPARIN (PORCINE) PER 1000 UNITS: Performed by: SURGERY

## 2023-03-02 PROCEDURE — 82962 GLUCOSE BLOOD TEST: CPT

## 2023-03-02 PROCEDURE — 36561 INSERT TUNNELED CV CATH: CPT | Performed by: SURGERY

## 2023-03-02 PROCEDURE — 25010000002 CEFAZOLIN IN DEXTROSE 2-4 GM/100ML-% SOLUTION: Performed by: SURGERY

## 2023-03-02 PROCEDURE — 76000 FLUOROSCOPY <1 HR PHYS/QHP: CPT

## 2023-03-02 DEVICE — PRT INTRO VASC/INTERV VORTEX FILL/HL DETACH/POLYURET/CATH 8F: Type: IMPLANTABLE DEVICE | Site: CHEST | Status: FUNCTIONAL

## 2023-03-02 RX ORDER — SODIUM CHLORIDE 0.9 % (FLUSH) 0.9 %
3 SYRINGE (ML) INJECTION EVERY 12 HOURS SCHEDULED
Status: DISCONTINUED | OUTPATIENT
Start: 2023-03-02 | End: 2023-03-02 | Stop reason: HOSPADM

## 2023-03-02 RX ORDER — LIDOCAINE HYDROCHLORIDE 10 MG/ML
0.5 INJECTION, SOLUTION EPIDURAL; INFILTRATION; INTRACAUDAL; PERINEURAL ONCE AS NEEDED
Status: DISCONTINUED | OUTPATIENT
Start: 2023-03-02 | End: 2023-03-02 | Stop reason: HOSPADM

## 2023-03-02 RX ORDER — PROMETHAZINE HYDROCHLORIDE 25 MG/1
25 TABLET ORAL ONCE AS NEEDED
Status: DISCONTINUED | OUTPATIENT
Start: 2023-03-02 | End: 2023-03-02 | Stop reason: HOSPADM

## 2023-03-02 RX ORDER — PROMETHAZINE HYDROCHLORIDE 25 MG/1
25 SUPPOSITORY RECTAL ONCE AS NEEDED
Status: DISCONTINUED | OUTPATIENT
Start: 2023-03-02 | End: 2023-03-02 | Stop reason: HOSPADM

## 2023-03-02 RX ORDER — SODIUM CHLORIDE 0.9 % (FLUSH) 0.9 %
10 SYRINGE (ML) INJECTION EVERY 12 HOURS SCHEDULED
Status: DISCONTINUED | OUTPATIENT
Start: 2023-03-02 | End: 2023-03-02 | Stop reason: HOSPADM

## 2023-03-02 RX ORDER — MIDAZOLAM HYDROCHLORIDE 1 MG/ML
0.5 INJECTION INTRAMUSCULAR; INTRAVENOUS
Status: DISCONTINUED | OUTPATIENT
Start: 2023-03-02 | End: 2023-03-02 | Stop reason: HOSPADM

## 2023-03-02 RX ORDER — ONDANSETRON 2 MG/ML
4 INJECTION INTRAMUSCULAR; INTRAVENOUS ONCE AS NEEDED
Status: DISCONTINUED | OUTPATIENT
Start: 2023-03-02 | End: 2023-03-02 | Stop reason: HOSPADM

## 2023-03-02 RX ORDER — PROPOFOL 10 MG/ML
VIAL (ML) INTRAVENOUS AS NEEDED
Status: DISCONTINUED | OUTPATIENT
Start: 2023-03-02 | End: 2023-03-02 | Stop reason: SURG

## 2023-03-02 RX ORDER — OXYCODONE AND ACETAMINOPHEN 7.5; 325 MG/1; MG/1
1 TABLET ORAL EVERY 4 HOURS PRN
Status: DISCONTINUED | OUTPATIENT
Start: 2023-03-02 | End: 2023-03-02 | Stop reason: HOSPADM

## 2023-03-02 RX ORDER — FLUMAZENIL 0.1 MG/ML
0.2 INJECTION INTRAVENOUS AS NEEDED
Status: DISCONTINUED | OUTPATIENT
Start: 2023-03-02 | End: 2023-03-02 | Stop reason: HOSPADM

## 2023-03-02 RX ORDER — SODIUM CHLORIDE 0.9 % (FLUSH) 0.9 %
3-10 SYRINGE (ML) INJECTION AS NEEDED
Status: DISCONTINUED | OUTPATIENT
Start: 2023-03-02 | End: 2023-03-02 | Stop reason: HOSPADM

## 2023-03-02 RX ORDER — MAGNESIUM HYDROXIDE 1200 MG/15ML
LIQUID ORAL AS NEEDED
Status: DISCONTINUED | OUTPATIENT
Start: 2023-03-02 | End: 2023-03-02 | Stop reason: HOSPADM

## 2023-03-02 RX ORDER — DIPHENHYDRAMINE HYDROCHLORIDE 50 MG/ML
12.5 INJECTION INTRAMUSCULAR; INTRAVENOUS
Status: DISCONTINUED | OUTPATIENT
Start: 2023-03-02 | End: 2023-03-02 | Stop reason: HOSPADM

## 2023-03-02 RX ORDER — EPHEDRINE SULFATE 50 MG/ML
5 INJECTION, SOLUTION INTRAVENOUS ONCE AS NEEDED
Status: DISCONTINUED | OUTPATIENT
Start: 2023-03-02 | End: 2023-03-02 | Stop reason: HOSPADM

## 2023-03-02 RX ORDER — MIDAZOLAM HYDROCHLORIDE 1 MG/ML
INJECTION INTRAMUSCULAR; INTRAVENOUS AS NEEDED
Status: DISCONTINUED | OUTPATIENT
Start: 2023-03-02 | End: 2023-03-02 | Stop reason: SURG

## 2023-03-02 RX ORDER — SODIUM CHLORIDE, SODIUM LACTATE, POTASSIUM CHLORIDE, CALCIUM CHLORIDE 600; 310; 30; 20 MG/100ML; MG/100ML; MG/100ML; MG/100ML
9 INJECTION, SOLUTION INTRAVENOUS CONTINUOUS
Status: DISCONTINUED | OUTPATIENT
Start: 2023-03-02 | End: 2023-03-02 | Stop reason: HOSPADM

## 2023-03-02 RX ORDER — SODIUM CHLORIDE 9 MG/ML
40 INJECTION, SOLUTION INTRAVENOUS AS NEEDED
Status: DISCONTINUED | OUTPATIENT
Start: 2023-03-02 | End: 2023-03-02 | Stop reason: HOSPADM

## 2023-03-02 RX ORDER — LABETALOL HYDROCHLORIDE 5 MG/ML
5 INJECTION, SOLUTION INTRAVENOUS
Status: DISCONTINUED | OUTPATIENT
Start: 2023-03-02 | End: 2023-03-02 | Stop reason: HOSPADM

## 2023-03-02 RX ORDER — NALOXONE HCL 0.4 MG/ML
0.2 VIAL (ML) INJECTION AS NEEDED
Status: DISCONTINUED | OUTPATIENT
Start: 2023-03-02 | End: 2023-03-02 | Stop reason: HOSPADM

## 2023-03-02 RX ORDER — LIDOCAINE HYDROCHLORIDE 20 MG/ML
INJECTION, SOLUTION INFILTRATION; PERINEURAL AS NEEDED
Status: DISCONTINUED | OUTPATIENT
Start: 2023-03-02 | End: 2023-03-02 | Stop reason: SURG

## 2023-03-02 RX ORDER — FENTANYL CITRATE 50 UG/ML
50 INJECTION, SOLUTION INTRAMUSCULAR; INTRAVENOUS
Status: DISCONTINUED | OUTPATIENT
Start: 2023-03-02 | End: 2023-03-02 | Stop reason: HOSPADM

## 2023-03-02 RX ORDER — HYDROCODONE BITARTRATE AND ACETAMINOPHEN 7.5; 325 MG/1; MG/1
1 TABLET ORAL ONCE AS NEEDED
Status: DISCONTINUED | OUTPATIENT
Start: 2023-03-02 | End: 2023-03-02 | Stop reason: HOSPADM

## 2023-03-02 RX ORDER — CEFAZOLIN SODIUM 2 G/100ML
2 INJECTION, SOLUTION INTRAVENOUS ONCE
Status: COMPLETED | OUTPATIENT
Start: 2023-03-02 | End: 2023-03-02

## 2023-03-02 RX ORDER — TRAMADOL HYDROCHLORIDE 50 MG/1
50 TABLET ORAL EVERY 6 HOURS PRN
Qty: 12 TABLET | Refills: 0 | Status: SHIPPED | OUTPATIENT
Start: 2023-03-02

## 2023-03-02 RX ORDER — SODIUM CHLORIDE 0.9 % (FLUSH) 0.9 %
10 SYRINGE (ML) INJECTION AS NEEDED
Status: DISCONTINUED | OUTPATIENT
Start: 2023-03-02 | End: 2023-03-02 | Stop reason: HOSPADM

## 2023-03-02 RX ORDER — IPRATROPIUM BROMIDE AND ALBUTEROL SULFATE 2.5; .5 MG/3ML; MG/3ML
3 SOLUTION RESPIRATORY (INHALATION) ONCE AS NEEDED
Status: DISCONTINUED | OUTPATIENT
Start: 2023-03-02 | End: 2023-03-02 | Stop reason: HOSPADM

## 2023-03-02 RX ORDER — DROPERIDOL 2.5 MG/ML
0.62 INJECTION, SOLUTION INTRAMUSCULAR; INTRAVENOUS
Status: DISCONTINUED | OUTPATIENT
Start: 2023-03-02 | End: 2023-03-02 | Stop reason: HOSPADM

## 2023-03-02 RX ORDER — HYDRALAZINE HYDROCHLORIDE 20 MG/ML
5 INJECTION INTRAMUSCULAR; INTRAVENOUS
Status: DISCONTINUED | OUTPATIENT
Start: 2023-03-02 | End: 2023-03-02 | Stop reason: HOSPADM

## 2023-03-02 RX ORDER — HYDROMORPHONE HYDROCHLORIDE 1 MG/ML
0.5 INJECTION, SOLUTION INTRAMUSCULAR; INTRAVENOUS; SUBCUTANEOUS
Status: DISCONTINUED | OUTPATIENT
Start: 2023-03-02 | End: 2023-03-02 | Stop reason: HOSPADM

## 2023-03-02 RX ADMIN — MIDAZOLAM 2 MG: 1 INJECTION INTRAMUSCULAR; INTRAVENOUS at 14:01

## 2023-03-02 RX ADMIN — LIDOCAINE HYDROCHLORIDE 60 MG: 20 INJECTION, SOLUTION INFILTRATION; PERINEURAL at 14:04

## 2023-03-02 RX ADMIN — PROPOFOL 50 MCG/KG/MIN: 10 INJECTION, EMULSION INTRAVENOUS at 14:06

## 2023-03-02 RX ADMIN — PROPOFOL 50 MG: 10 INJECTION, EMULSION INTRAVENOUS at 14:04

## 2023-03-02 RX ADMIN — MIDAZOLAM 0.5 MG: 1 INJECTION INTRAMUSCULAR; INTRAVENOUS at 13:28

## 2023-03-02 RX ADMIN — SODIUM CHLORIDE, POTASSIUM CHLORIDE, SODIUM LACTATE AND CALCIUM CHLORIDE 9 ML/HR: 600; 310; 30; 20 INJECTION, SOLUTION INTRAVENOUS at 13:28

## 2023-03-02 RX ADMIN — CEFAZOLIN SODIUM 2 G: 2 INJECTION, SOLUTION INTRAVENOUS at 13:51

## 2023-03-02 NOTE — ANESTHESIA POSTPROCEDURE EVALUATION
Patient: Gabby Acosta    Procedure Summary     Date: 03/02/23 Room / Location: [facility] OR [address] [place] [address] MAIN OR    Anesthesia Start: 1355 Anesthesia Stop: 1441    Procedure: INSERTION OF PORTACATH (Left: Chest) Diagnosis:       Malignant neoplasm of lower lobe of left lung (HCC)      (Malignant neoplasm of lower lobe of left lung (HCC) [C34.32])    Surgeons: Collin Shook MD Provider: Kostas Glez MD    Anesthesia Type: MAC ASA Status: 4          Anesthesia Type: MAC    Vitals  Vitals Value Taken Time   /112 03/02/23 1445   Temp     Pulse     Resp     SpO2     Vitals shown include unvalidated device data.        Anesthesia Post Evaluation

## 2023-03-02 NOTE — ANESTHESIA POSTPROCEDURE EVALUATION
Patient: Gabby Acosta    Procedure Summary     Date: 03/02/23 Room / Location: [facility] OR  / [facility] MAIN OR    Anesthesia Start: 1355 Anesthesia Stop: 1441    Procedure: INSERTION OF PORTACATH (Left: Chest) Diagnosis:       Malignant neoplasm of lower lobe of left lung (HCC)      (Malignant neoplasm of lower lobe of left lung (HCC) [C34.32])    Surgeons: Collin Shook MD Provider: Kostas Glez MD    Anesthesia Type: MAC ASA Status: 4          Anesthesia Type: MAC    Vitals  Vitals Value Taken Time   /112 03/02/23 1445   Temp     Pulse     Resp     SpO2     Vitals shown include unvalidated device data.        Post Anesthesia Care and Evaluation    Patient location during evaluation: PACU  Patient participation: complete - patient participated  Level of consciousness: awake and alert  Pain management: adequate    Airway patency: patent  Anesthetic complications: No anesthetic complications    Cardiovascular status: acceptable  Respiratory status: acceptable  Hydration status: acceptable    Comments: ---------------------------               03/02/23                      1252         ---------------------------   BP:          154/73         Pulse:         62           Resp:          16           Temp:   36.9 °C (98.5 °F)   SpO2:          95%         ---------------------------

## 2023-03-02 NOTE — ANESTHESIA POSTPROCEDURE EVALUATION
Patient: Gabby Acosta    Procedure Summary     Date: 03/02/23 Room / Location: [facility] OR  / [facility] MAIN OR    Anesthesia Start: 1355 Anesthesia Stop: 1441    Procedure: INSERTION OF PORTACATH (Left: Chest) Diagnosis:       Malignant neoplasm of lower lobe of left lung (HCC)      (Malignant neoplasm of lower lobe of left lung (HCC) [C34.32])    Surgeons: Collin Shook MD Provider: Kostas Glez MD    Anesthesia Type: MAC ASA Status: 4          Anesthesia Type: MAC    Vitals  Vitals Value Taken Time   /112 03/02/23 1445   Temp     Pulse     Resp     SpO2     Vitals shown include unvalidated device data.        Post Anesthesia Care and Evaluation    Patient location during evaluation: PACU  Patient participation: complete - patient participated  Level of consciousness: awake and alert  Pain management: adequate    Airway patency: patent  Anesthetic complications: No anesthetic complications    Cardiovascular status: acceptable  Respiratory status: acceptable  Hydration status: acceptable    Comments: ---------------------------               03/02/23                      1252         ---------------------------   BP:          154/73         Pulse:         62           Resp:          16           Temp:   36.9 °C (98.5 °F)   SpO2:          95%         ---------------------------

## 2023-03-02 NOTE — ANESTHESIA PREPROCEDURE EVALUATION
Anesthesia Evaluation     Patient summary reviewed and Nursing notes reviewed   NPO Solid Status: > 6 hours  NPO Liquid Status: > 6 hours           Airway   Mallampati: II  TM distance: >3 FB  Neck ROM: full  Dental    (+) edentulous    Pulmonary    (+) a smoker Former, lung cancer, COPD (Daily inhaler use. No recent exacerbations.) moderate,   (-) asthma, sleep apnea  Cardiovascular     ECG reviewed    (+) hypertension, valvular problems/murmurs AS, hyperlipidemia,   (-) dysrhythmias, angina, cardiac stents, CABG    ROS comment: 1/2023 TTE showed severe AS. Otherwise nl LV/RV systolic fxn, no other major valve issues.    1/2023 LHC/RHC showed nonobstructive CAD.    Pt has discussed AS with her cardiologist who recommended cancer tx prior to intervention on aortic valve. Pt planned for chemo -- no surgical intervention planned.     Neuro/Psych  (-) seizures, CVA  GI/Hepatic/Renal/Endo    (+) morbid obesity,  liver disease fatty liver disease, diabetes mellitus type 2, thyroid problem hypothyroidism  (-) GERD, no renal disease    Musculoskeletal     Abdominal    Substance History      OB/GYN          Other   arthritis,    history of cancer active                    Anesthesia Plan    ASA 4     MAC       Anesthetic plan, risks, benefits, and alternatives have been provided, discussed and informed consent has been obtained with: patient.        CODE STATUS:

## 2023-03-02 NOTE — OP NOTE
Operative Note :   Collin Shook MD    Gabby Ortegalonnie  1955    Procedure Date: 03/02/23    Pre-op Diagnosis:  Malignant neoplasm of lower lobe of left lung (HCC) [C34.32]    Post-Op Diagnosis:  Same    Procedure:   · Left subclavian vein approach [id number] placement    Surgeon: Collin Shook MD    Assistant: None    Anesthesia:  MAC (monitored anesthetic care)    EBL:   minimal    Specimens:   None    Indications:  · 68-year-old lady with lung cancer planning for chemotherapy    Findings:   · None    Recommendations:   · Routine port care    Description of procedure:    After obtaining informed consent, the patient was brought to the procedure room and sedated.  She was positioned appropriately and her chest and neck were sterilely and bilaterally prepped and draped.  She was then oriented in Trendelenburg position.  The area beneath the left clavicle was anesthetized with a mixture of lidocaine and Marcaine.  The left subclavian vein was easily accessed.  Wire was introduced and initially the wire seems to go into the internal jugular vein towards the head.  Withdrew this.  I went with a stick site a little more medial and was able at this point to the wire to pass appropriately into the superior vena cava.  The wire was clamped into position.  Site for the pocket was marked and transversely oriented incision was made.  Subcutaneous tissues were  and a blunt pocket was created.  Hemostasis was gained with electrocautery.  The stick site was enlarged with a #11 blade.  The vein dilator and sheath were passed over the wire under fluoroscopic guidance into the superior vena cava.  The vein dilator and wire were then withdrawn and replaced with the 8 Serbian catheter which was advanced to the atriocaval junction.  Peel-away sheath was removed.  The catheter was tunneled into the pocket, cut to the appropriate length and then attached to the AngioD[employer] Smart port plastic.  The port was secured to  the chest wall with 3-0 Prolene.  I was able to easily withdraw blood and flushed the catheter.  I then locked the catheter with the concentrated heparin solution.  Subcutaneous tissues were reapproximated with 3-0 Vicryl.  Skin was closed with glue.  She tolerated this well    Collin Shook MD  General and Endoscopic Surgery  [facility]    [address], [address]  [place], [place], [address]  P: [phone]  F: [phone]

## 2023-03-03 ENCOUNTER — TELEPHONE (OUTPATIENT)
Dept: OTHER | Facility: HOSPITAL | Age: 68
End: 2023-03-03
Payer: MEDICARE

## 2023-03-03 NOTE — TELEPHONE ENCOUNTER
Oncology Social Work  Case Management - Stu     Chief Complaint:  Transportation concerns in regards to radiation treatment     Diagnosis: Lung Cancer  Treatment:  Chemotherapy and Radiation     Physical Concerns: Patient lives with Fidel, her significant other.  She uses and walker and w/c.  She is able to walk small distances.      Practical Problems: getting in and out of her home due to steps and her physical limitations.     Emotional Concerns: Patient reports good support system . Has seen CHENG Ricci in Behavioral Oncology.  Does know about support groups and peer support     Family Concerns: None     Spiritual Concerns: None      Interventions: OSW called and spoke to patient via phone.  Explained role of OSW and services we can assist with.  Patient states that her biggest worry and concern right now is getting back and forth to radiation daily.  She relies on her family to help her get in and out of her home - an apartment that is on the first floor but there are steps to navigate and she can not do this alone.  Her family works which presents a barrier as to when they are off and can get her from [facility] to [place] for tx.  Patient states that getting back and forth for chemo is much easier b/c it is not as frequent.   Discussed Medicaid transport and Wheels Transportation.  TARC 3, LYFT and ACS RTR is not an option due to many factors.     Patient also reports needing a new wheelchair.  Will work with RN to get order.     Comments:    Patient's next appt in radiation is: 3/8 at 1pm for CT sim appt.     Maria Elena Enrique, MSSW, CSW

## 2023-03-06 PROBLEM — Z45.2 ENCOUNTER FOR ADJUSTMENT OR MANAGEMENT OF VASCULAR ACCESS DEVICE: Status: ACTIVE | Noted: 2023-03-06

## 2023-03-06 RX ORDER — HEPARIN SODIUM (PORCINE) LOCK FLUSH IV SOLN 100 UNIT/ML 100 UNIT/ML
500 SOLUTION INTRAVENOUS AS NEEDED
Status: CANCELLED | OUTPATIENT
Start: 2023-03-07

## 2023-03-06 RX ORDER — SODIUM CHLORIDE 0.9 % (FLUSH) 0.9 %
10 SYRINGE (ML) INJECTION AS NEEDED
Status: CANCELLED | OUTPATIENT
Start: 2023-03-07

## 2023-03-08 ENCOUNTER — OFFICE VISIT (OUTPATIENT)
Dept: ONCOLOGY | Facility: CLINIC | Age: 68
End: 2023-03-08
Payer: MEDICARE

## 2023-03-08 VITALS
HEART RATE: 77 BPM | BODY MASS INDEX: 45.01 KG/M2 | OXYGEN SATURATION: 93 % | HEIGHT: 62 IN | RESPIRATION RATE: 18 BRPM | WEIGHT: 244.6 LBS | SYSTOLIC BLOOD PRESSURE: 132 MMHG | DIASTOLIC BLOOD PRESSURE: 70 MMHG | TEMPERATURE: 97.5 F

## 2023-03-08 DIAGNOSIS — C34.32 MALIGNANT NEOPLASM OF LOWER LOBE OF LEFT LUNG: Primary | ICD-10-CM

## 2023-03-08 PROCEDURE — 77293 RESPIRATOR MOTION MGMT SIMUL: CPT | Performed by: RADIOLOGY

## 2023-03-08 PROCEDURE — 99215 OFFICE O/P EST HI 40 MIN: CPT | Performed by: NURSE PRACTITIONER

## 2023-03-08 PROCEDURE — 77263 THER RADIOLOGY TX PLNG CPLX: CPT | Performed by: RADIOLOGY

## 2023-03-08 PROCEDURE — 3078F DIAST BP <80 MM HG: CPT | Performed by: NURSE PRACTITIONER

## 2023-03-08 PROCEDURE — 1125F AMNT PAIN NOTED PAIN PRSNT: CPT | Performed by: NURSE PRACTITIONER

## 2023-03-08 PROCEDURE — 77334 RADIATION TREATMENT AID(S): CPT | Performed by: RADIOLOGY

## 2023-03-08 PROCEDURE — 3075F SYST BP GE 130 - 139MM HG: CPT | Performed by: NURSE PRACTITIONER

## 2023-03-08 RX ORDER — ONDANSETRON HYDROCHLORIDE 8 MG/1
8 TABLET, FILM COATED ORAL EVERY 8 HOURS PRN
Qty: 30 TABLET | Refills: 2 | Status: SHIPPED | OUTPATIENT
Start: 2023-03-08 | End: 2023-03-20 | Stop reason: SDUPTHER

## 2023-03-08 RX ORDER — LIDOCAINE AND PRILOCAINE 25; 25 MG/G; MG/G
CREAM TOPICAL
Qty: 30 G | Refills: 2 | Status: SHIPPED | OUTPATIENT
Start: 2023-03-08

## 2023-03-08 NOTE — PROGRESS NOTES
[facility] Hematology/Oncology Treatment Plan Summary    Name: Gabby Acosta  [facility]# [id number]  MD: Dr. Lombardi    Diagnosis:     ICD-10-CM ICD-9-CM   1. Malignant neoplasm of lower lobe of left lung (HCC)  C34.32 162.5     Goal of treatment: curative intent    Treatment Medication(s):   1. Carboplatin  2. Durvalumab    Number of cycles: 5-6 weeks of Carboplatin followed by 1 year of Durvalumab given every 2 weeks    Starting on: 3/21/2023     Items for home use: Senokot-S (for constipation), Milk of Magnesia (for constipation), Imodium AD (for diarrhea), Tylenol (for fever and/or pain) and Thermometer    Rx written for: [x] Nausea    [] Pre-Treatment   ondansetron 8 mg by mouth every 8 hours as needed for nausea    Notes:     Next Steps: PORT     Completing Provider: CHENG Nelson           Date/time: 03/08/2023      Please note: You will be seen by a provider frequently with your treatment plan. This plan may change depending on many factors, if so, this will be discussed with you by your physician.  Last update 03/2022.

## 2023-03-08 NOTE — PROGRESS NOTES
TREATMENT  PREPARATION    Gabby Acosta  [id number]  1955    Chief Complaint: Treatment preparation and needs assessment    History of present illness:  Gabby Acosta is a 68 y.o. year old female who is here today for treatment preparation and needs assessment.  The patient has been diagnosed with   Encounter Diagnosis   Name Primary?   • Malignant neoplasm of lower lobe of left lung (HCC) Yes    and is scheduled to begin treatment with:     Oncology History:    Oncology/Hematology History   Malignant neoplasm of lower lobe of left lung (HCC)   2/16/2023 Initial Diagnosis    Malignant neoplasm of lower lobe of left lung (HCC)     3/14/2023 -  Chemotherapy    OP LUNG PACLitaxel / CARBOplatin AUC=2 (Weekly) + XRT          The current medication list and allergy list were reviewed and reconciled.     Past Medical History, Past Surgical History, Social History, Family History have been reviewed and are without significant changes except as mentioned.    Physical Exam:    Vitals:    03/08/23 1440   BP: 132/70   Pulse: 77   Resp: 18   Temp: 97.5 °F (36.4 °C)   SpO2: 93%     Vitals:    03/08/23 1440   PainSc:   4   PainLoc: Head        ECOG score: 2             Physical Exam  HENT:      Head: Normocephalic and atraumatic.   Eyes:      Extraocular Movements: Extraocular movements intact.      Conjunctiva/sclera: Conjunctivae normal.   Pulmonary:      Effort: Pulmonary effort is normal. No respiratory distress.   Neurological:      General: No focal deficit present.      Mental Status: She is alert and oriented to person, place, and time.   Psychiatric:         Mood and Affect: Mood normal.         Behavior: Behavior normal.           NEEDS ASSESSMENTS    Genetics  The patient's new diagnosis and family history have been reviewed for genetic counseling needs. A genetic referral is not recommended.     Psychosocial and Barriers to care  The patient has completed a PHQ-9 Depression Screening and the Distress Thermometer  (DT) today.  PHQ-9 results show PHQ-2 Total Score: 5 PHQ-9 Total Score: PHQ-9 Total Score: 5     The patient scored their distress today as   on a scale of 0-10 with 0 being no distress and 10 being extreme distress. Problems marked by the patient as being an issue for them within the last week include   .      Results were reviewed along with psychosocial resources offered by our cancer center.  Our Supportive Oncology team will be flagged for a score of 4 or above, and a same day call will be made for a score of 9 or 10.  A mental health referral is offered at that time. Patients who score less than 4 have been educated on our support services and can be referred to our  upon request.  The patient will not be referred to our .       Nutrition  The patient has completed the malnutrition screening today. They scored Malnutrition Screening Tool  Have you recently lost weight without trying?  If yes, how much weight have you lost?: 0--> No  Have you been eating poorly because of a decreased appetite?: 0--> No  MST score: 0   with a score of 0-1 meaning not at risk in a score of 2 or greater meaning at risk.  Patients with a score of 3 or higher will be referred to our oncology dietitian for support. Patients beginning at risk treatment regimens or who have dietary concerns will also be referred to our oncology dietitian. The patient will not be referred.    Functional Assessment  Persons who are age 70 or greater will be screened for qualification of a comprehensive geriatric assessment by our survivorship nurse practitioner.  Older adults with cancer face unique challenges. These may include an increased risk of drug reactions, financial burdens, and caregiver stress. The patient scored   . Patients scoring 14 or lower will referred for an older adult functional assessment with the survivorship advanced practice registered nurse to ensure all needed support is provided as patients plan for  "their treatments. The patient will not be referred.    Intravenous Access Assessment  The patient and I discussed planned intravenous chemo/biotherapy as well as other IV treatments that are often needed throughout the course of treatment. These may include, but are not limited to blood transfusions, antibiotics, and IV hydration. Discussed that depending on selected treatment and vein assessment, patient may require venous access device (VAD) which could include but not limited to a Mediport or PICC line. Risks and benefits of VADs reviewed. The patient will be treated via Port.    Reproductive/Sexual Activity   People should avoid becoming pregnant and should not get a partner pregnant while undergoing chemo/biotherapy.  People of childbearing age should use effective contraception during active therapy. The best recommendation for all people is to use a barrier method for a minimum of 1 week after the last infusion of chemo/biotherapy to prevent your partner being exposed to byproducts from treatment medications in bodily fluids. Effective contraception should be discussed with your oncology team to make sure it is safe to take based on your diagnosis. Possible options include oral contraceptives, barrier methods. Chemo/biotherapy can change your ability to reproduce children in the future.  There are options for fertility preservation. The patient will not be referred.    Advanced Care Planning  Advance Care Planning   The patient and I discussed advanced care planning, \"Conversations that Matter\".   This service is offered for development of advance directives with a certified ACP facilitator.  The patient does not have an up-to-date advanced directive. This document is not on file with our office. The patient is not interested in an appointment with one of our facilitators to create or update their advanced directives.     Smoking cessation  Tobacco Use: Medium Risk   • Smoking Tobacco Use: Former   • " Smokeless Tobacco Use: Never   • Passive Exposure: Not on file       Patient and I discussed their tobacco use history. Referral will not be made for smoking cessation.      Palliative Care  When appropriate, the patient and I discussed the availability palliative care services and when appropriate Hospice care. Palliative care is not the same as Hospice care which was explained to the patient.NOT APPLICABLE.    Survivorship   When appropriate, we discussed that we will refer the patient to survivorship clinic to discuss next steps following completion of planned treatment.  Reviewed this visit will include assessment of your physical, psychological, functional, and spiritual needs as a survivor and the need at attend this visit when scheduled.    TREATMENT EDUCATION    Today I met with the patient to discuss the chemo/biotherapy regimen recommended for treatment of Malignant neoplasm of lower lobe of left lung (HCC)  .  The patient was given explanation of treatment premed side effects including office policy that prohibits patients to drive if sedating medications are administered, MD explanation given regarding benefits, side effects, toxicities and goals of treatment.  The patient received a Chemotherapy/Biotherapy Plan Summary including diagnosis and explanation of specific treatment plan.    SIDE EFFECTS:  Common side effects were discussed with the patient and/or significant other.  Discussion included where applicable hair loss/discoloration, anemia/fatigue, infection/chills/fever, appetite, bleeding risk/precautions, constipation, diarrhea, mouth sores, taste alteration, loss of appetite, nausea/vomiting, peripheral neuropathy, skin/nail changes, rash, muscle aches/weakness, photosensitivity, weight gain/loss, hearing loss, dizziness, menopausal symptoms, menstrual irregularity, sterility, high blood pressure, heart damage, liver damage, lung damage, kidney damage, DVT/PE risk, fluid retention,  pleural/pericardial effusion, somnolence, electrolyte/LFT imbalance, vein exercises and/or the possible need for vascular access/port placement.  The patient was advised that although uncommon, leakage of an infused medication from the vein or venous access device may lead to skin breakdown and/or other tissue damage.  The patient was advised that he/she may have pain, bleeding, and/or bruising from the insertion of a needle in their vein or venous access device (port).  The patient was further advised that, in spite of proper technique, infection with redness and irritation may rarely occur at the site where the needle was inserted.  The patient was advised that if complications occur, additional medical treatment is available.  Finally, where applicable we have reviewed rare but potential immune mediated side effects including shortness of breath, cough, chest pain (pneumonitis), abdominal pain, diarrhea (colitis), thyroiditis (hypothyroid or hyperthyroid), hepatitis and liver dysfunction, nephritis and renal dysfunction.    Discussion also included side effects specific to drugs in the treatment plan, specifically:    Treatment Plans     Name Type Hold Status Plan Dates Plan Provider       Active    OP LUNG PACLitaxel / CARBOplatin AUC=2 (Weekly) + XRT  ONCOLOGY TREATMENT On Automatic Hold  2/27/2023 - Present Carmelo Lombardi Jr., MD                   Questions answered and additional information discussed on topics including:  Anemia, Thrombocytopenia, Neutropenia, Nutrition and appetite changes, Constipation, Diarrhea, Nausea & vomiting, Mouth sores, Nervous system changes, Skin & nail changes, Organ toxicities and Home care       Assessment and Plan:    Diagnoses and all orders for this visit:    1. Malignant neoplasm of lower lobe of left lung (HCC) (Primary)    Other orders  -     ondansetron (ZOFRAN) 8 MG tablet; Take 1 tablet by mouth Every 8 (Eight) Hours As Needed for Nausea or Vomiting.  Dispense: 30  tablet; Refill: 2  -     lidocaine-prilocaine (EMLA) 2.5-2.5 % cream; Apply maria g-sized amount to Mediport site 30 min prior to port access. Do not rub in.  Dispense: 30 g; Refill: 2      No orders of the defined types were placed in this encounter.        1. The patient and I have reviewed their diagnosis and scheduled treatment plan. Needs assessment was completed where applicable including genetics, psychosocial needs, barriers to care, VAD evaluation, advanced care planning, survivorship, and palliative care services where indicated. Referrals have been ordered as appropriate based upon evaluation today and patient desires.   2. Chemo/biotherapy teaching was completed today and consent obtained. See separate documentation for further details.  3. Adequate time was given to answer questions.  Patient made aware of their care team members and contact information if they have questions or problems throughout the treatment course.  4. Discussion held and written information provided describing frequency of office visits and ongoing monitoring throughout the treatment plan.     5. Reviewed with patient any prescribed medication sent to pharmacy.  Education provided regarding proper storage, safe handling, and proper disposal of unused medication.  6. Proper handling of body fluids and waste discussed and written information provided.  7. If appropriate, patient had pretreatment labs drawn today.    Learning assessment completed at initial patient encounter. See separate flowsheet. Chemo/biotherapy education comprehension assessed at today's visit.    I spent 45 minutes caring for Gabby on this date of service. This time includes time spent by me in the following activities: preparing for the visit, reviewing tests, obtaining and/or reviewing a separately obtained history, counseling and educating the patient/family/caregiver, ordering medications, tests, or procedures, documenting information in the medical record  and care coordination.     Taylor Rodriguez, APRN   03/08/23

## 2023-03-15 ENCOUNTER — PATIENT OUTREACH (OUTPATIENT)
Dept: OTHER | Facility: HOSPITAL | Age: 68
End: 2023-03-15
Payer: MEDICARE

## 2023-03-17 ENCOUNTER — PATIENT OUTREACH (OUTPATIENT)
Dept: OTHER | Facility: HOSPITAL | Age: 68
End: 2023-03-17
Payer: MEDICARE

## 2023-03-17 NOTE — PROGRESS NOTES
Called patient. She is doing well. She had a port placed last week. She is supposed to start chemo next Tuesday. We discussed her upcoming appts. She already had chemo teaching. She is concerned that she can't get back to her radiation appt at 3:30.  Once we finished talking, transferred her to radiation oncology to discuss her appt 3/21.     The patient spoke with Maria Elena  recently. They discussed potential transportation issues.  She needs help getting down stairs from her home.    The patient denies any other resource or supportive care needs at this time. I will call in several weeks; she will call as needed

## 2023-03-20 ENCOUNTER — TELEPHONE (OUTPATIENT)
Dept: ONCOLOGY | Facility: CLINIC | Age: 68
End: 2023-03-20
Payer: MEDICARE

## 2023-03-20 PROCEDURE — 77338 DESIGN MLC DEVICE FOR IMRT: CPT | Performed by: RADIOLOGY

## 2023-03-20 PROCEDURE — 77300 RADIATION THERAPY DOSE PLAN: CPT | Performed by: RADIOLOGY

## 2023-03-20 PROCEDURE — 77301 RADIOTHERAPY DOSE PLAN IMRT: CPT | Performed by: RADIOLOGY

## 2023-03-20 PROCEDURE — 77293 RESPIRATOR MOTION MGMT SIMUL: CPT | Performed by: RADIOLOGY

## 2023-03-20 RX ORDER — ONDANSETRON HYDROCHLORIDE 8 MG/1
8 TABLET, FILM COATED ORAL EVERY 8 HOURS PRN
Qty: 30 TABLET | Refills: 2 | Status: SHIPPED | OUTPATIENT
Start: 2023-03-20

## 2023-03-20 NOTE — PROGRESS NOTES
"Chief Complaint  Non-small cell lung cancer, clinical stage IIIA ([id number]), aortic stenosis, cervical spine stenosis, COPD, hepatic steatosis, diabetes mellitus    Subjective        History of Present Illness  Patient returns today in much delayed follow-up to finally begin concurrent chemoradiation with weekly low-dose carboplatin alone (we are not administering Taxol due to her pre-existing neurologic dysfunction related to her cervical spine issues and concern to cause peripheral neuropathy which may further debilitate her.  The patient did undergo Mediport placement on 3/2/2023 with Dr. Shook without any difficulties.  She was seen by supportive oncology on 2/27/2023 and was started on gabapentin 300 mg 3 times daily as needed, reports that she has been taking this once or twice per day.  She also met with social work regarding transportation for her treatments.  She underwent chemo education on 3/8/2023.  Today, the patient reports that she has improved over time in terms of her mobility.  She remains in a wheelchair today but reports that she has been ambulating better and doing more on her own.  Her neuropathic symptoms in her upper extremities have improved slightly on the right, are unchanged on the left.  She reports that she has been trying to lose weight, has declined from 244 to 237 pounds today.        Objective   Vital Signs:   /85   Pulse 85   Temp 96.4 °F (35.8 °C) (Temporal)   Resp 18   Ht 157.5 cm (62.01\")   Wt 108 kg (237 lb 3.2 oz)   SpO2 94%   BMI 43.37 kg/m²     Physical Exam  Constitutional:       Appearance: She is well-developed.      Comments: Patient is seated in wheelchair today   Eyes:      Conjunctiva/sclera: Conjunctivae normal.   Neck:      Thyroid: No thyromegaly.   Cardiovascular:      Rate and Rhythm: Normal rate and regular rhythm.      Heart sounds: No murmur heard.    No friction rub. No gallop.   Pulmonary:      Effort: No respiratory distress.      Breath " sounds: Normal breath sounds.   Abdominal:      General: Bowel sounds are normal. There is no distension.      Palpations: Abdomen is soft.      Tenderness: There is no abdominal tenderness.   Lymphadenopathy:      Head:      Right side of head: No submandibular adenopathy.      Cervical: No cervical adenopathy.      Upper Body:      Right upper body: No supraclavicular adenopathy.      Left upper body: No supraclavicular adenopathy.   Skin:     General: Skin is warm and dry.      Findings: No rash.   Neurological:      Mental Status: She is alert and oriented to person, place, and time.      Cranial Nerves: No cranial nerve deficit.      Motor: No abnormal muscle tone.      Deep Tendon Reflexes: Reflexes normal.   Psychiatric:         Behavior: Behavior normal.           Result Review : Reviewed CBC, CMP from today.  Reviewed records from port placement, oncology social work, supportive oncology.       Assessment and Plan     *Non-small cell lung cancer, clinical stage IIIA ([id number])  • Patient has history of smoking 1 pack/day x 45 years, quit in November 2021.    • Following anterior cervical corpectomy in September 2022, she experienced acute hypoxemic respiratory failure.    • CT angiogram chest 9/25/2022 showed a concerning 1.4 cm left lower lobe pulmonary nodule, mediastinal lymphadenopathy with infracarinal lymph node 2.7 cm, left hilar lymph node 1.2 cm, small right hilar lymph nodes up from 1.1 cm and additional enlarged mediastinal nodes in the right paratracheal, precarinal, infracarinal spaces.  There was a wedge-shaped area of consolidation/atelectasis in the right middle lobe base, subpleural density left costophrenic sulcus felt to represent atelectasis.  It was recommended for her to undergo subsequent PET scan by pulmonary.  • The patient has had ongoing follow-up with neurosurgery and on CT scans 11/29/2022 of cervical and thoracic spine, there was concern regarding spinal instability with  potential need for posterior cervical fusion.  This was scheduled in December 2022 however was delayed due to upper respiratory infection.  • Patient admitted on 1/10/2023 to address multiple medical issues in order to prepare for potential neurosurgical procedure on 3/12/2023.  • Patient underwent echocardiogram on 1/11/2023 with ejection fraction greater than 70%, grade 1 diastolic dysfunction, severe aortic stenosis.  Patient is being followed by cardiology with consideration of need for TAVR versus SAVR for moderate to severe aortic stenosis.  • Repeat CT chest on 1/12/2023 showed slight increase in size of the left lower lobe pulmonary nodule increased from 1.4 to 1.5 cm.  Groundglass nodule peripheral left lower lobe less conspicuous and resolution of previous reticulonodular opacities right lower lobe.  There was further enlargement of mediastinal lymph nodes with right posterior paratracheal lymph node 1.4 x 2.1 up to 1.5 x 2.3 cm, subcarinal lymph node increased from 2.7 x 5.2 up to 3.2 x 5.8 cm, left hilar lymph node increased from 1.2 up to 1.4 cm, and is stable 1.7 cm left infrahilar lymph node.  There was also comment regarding hepatic steatosis with changes of chronic liver disease and stable indeterminate nodular thickening of the adrenal glands.  • CT-guided lung biopsy on 1/13/2023 with pathology that showed invasive poorly differentiated non-small cell carcinoma with focal neuroendocrine differentiation.  Staining pattern and morphology favor poorly differentiated adenocarcinoma however there was focal CD56 staining, could not entirely exclude large cell neuroendocrine carcinoma.  PD-L1 35%, Caris analysis pending.  • It was felt that there would be increased risk for bronchoscopy from a cardiac standpoint due to her aortic stenosis.  • Staging MRI brain 1/29/2023 showed no evidence of metastatic disease  • Staging PET scan 1/26/2023 showed hypermetabolic left lower lobe pulmonary nodule, size  difficult to determine on PET however on recent CT was 1.6 cm (SUV 14.4).  Mediastinal and left hilar hypermetabolic lymphadenopathy (subcarinal lymph node 3.2 cm, SUV 31.7, left paratracheal 2.2 cm lymph node SUV 12.7).  Sub-6 mm pulmonary nodule left upper lobe unchanged and below PET resolution.  Focal uptake posterior right hepatic lobe SUV 7.2 of uncertain significance.  Recommended MRI to evaluate.  Right thyroid activity SUV 5.3, recommended ultrasound.  Long segment uptake distal esophagus consistent with esophagitis.  Uptake posterior larynx, nonspecific, recommended direct visualization.  • MRI abdomen 2/6/2023 with no evidence to suggest metastatic disease in the liver.  Subcentimeter nonenhancing lesions favor cyst or hemangiomas.  • Patient underwent right and left heart catheterization with cardiology on 2/14/2023.  Identification of coronary artery disease, mild pulmonary hypertension, severe aortic stenosis.  Recommendation per cardiology to proceed with treatment for lung cancer and defer any intervention regarding aortic stenosis.  • Concern regarding underlying neurologic issues from cervical stenosis and potential neuropathic effects from Taxol chemotherapy.  Concern regarding patient's overall performance status and ability to tolerate full dose chemotherapy.  Patient discussed at thoracic oncology tumor board with consensus to treat with weekly low-dose carboplatin concurrent with radiation therapy and omit Taxol.  Subsequent plan for 1 year durvalumab following concurrent chemoradiation.  • Significant delay in initiating concurrent chemoradiation due to logistics with scheduling Mediport placement, patient canceled appointments and delayed initiation of treatment.  • On 3/21/2023 initiated concurrent chemoradiation with weekly carboplatin (AUC 2) on 2/28/2023.  • Patient returns today in delayed follow-up.  We had multiple delays in scheduling Mediport placement due to patient canceling  appointments.  Rosana was finally placed on 3/2/2023.  She has undergone chemotherapy education as well and is ready to begin radiation portion of her treatment.  She was seen by supportive oncology and oncology social work in the interval.  She is ready to begin therapy today.  We reviewed the delay in treatment which could potentially increase risk of systemic disease.  I would not however recommend holding therapy for any further scans and would recommend proceeding on today as planned.  We are omitting Taxol due to concerns that it would exacerbate her pre-existing neuropathic symptoms in her extremities related to her cervical spine degenerative/disc disease.  We discussed side effects of chemotherapy in addition to concurrent chemoradiation today.  We discussed the need to notify us with any new symptoms during the course of her treatment.  We will make arrangements for weekly visits when she is due for carboplatin for the next 6 weeks.  I will see her at the end of treatment and we will discuss timeframe for repeat scans and subsequently initiation of immunotherapy with durvalumab.    *Aortic stenosis  • Patient underwent echocardiogram on 1/11/2023 with ejection fraction greater than 70%, grade 1 diastolic dysfunction, severe aortic stenosis.    • Patient followed by cardiology with consideration of need for TAVR versus SAVR for moderate to severe aortic stenosis.  • Patient underwent cardiac catheterization 2/14/2023 with identification of coronary artery disease, mild pulmonary hypertension, severe aortic stenosis.  • Per cardiology, recommendation to proceed with treatment for lung cancer and defer any consideration of intervention for aortic stenosis.     *Cervical spine stenosis  • Patient with cervical spine stenosis with associated myelopathy.   • She underwent surgery on 9/20/2022 with anterior cervical corpectomy with cage.      • The patient has had ongoing follow-up with neurosurgery and on CT  scans 11/29/2022 of cervical and thoracic spine, there was concern regarding spinal instability with potential need for posterior cervical fusion.  This was scheduled in December 2022 however was delayed due to upper respiratory infection.  • Patient admitted on 1/10/2023 to address multiple medical issues in order to prepare for potential neurosurgical procedure on 3/12/2023.  • Patient was previously having severe symptoms related to her cervical stenosis with reduced ability to ambulate previously and significant limitations in movement in her hands, dropping objects.  More recently however she has improved and has been able to walk with the use of a walker, symptoms in the upper extremities have improved and her dexterity is better.    • It appears that her neurosurgical procedure will need to be delayed until we can at least complete the concurrent chemoradiation portion of her treatment.  Surgery would be feasible while continuing on immunotherapy in the future.  • Neurosurgical follow-up has been placed on hold.     *COPD  • Patient has history of smoking 1 pack/day x 45 years, quit in November 2021.  • Patient has not undergone formal PFTs  • Patient being followed by pulmonary, currently receiving Symbicort inhaler, duo nebs 4 times daily     *Hepatic steatosis  • Identified on prior scans  • PET scan 1/26/2023 with questionable area of activity seen but no corresponding CT abnormality.    • MRI abdomen 2/6/2023 with no evidence to suggest metastatic disease in the liver.  Subcentimeter nonenhancing lesions favor cyst or hemangiomas.  • LFTs have been normal     *Diabetes mellitus  • Currently receiving metformin 850 mg daily    *Situational depression  · Patient reports feeling overwhelmed regarding the amount of information presented today in regards to her malignancy as well as her other medical issues.    · Patient is scheduled to see supportive oncology on 2/27/2023    *Right thyroid uptake on PET scan  1/26/2023  · Patient was scheduled for thyroid ultrasound however does not feel that she will be able to undergo the procedure due to her neck issues and therefore was canceled, consider at future date    *Laryngeal uptake on PET scan 1/26/2023  · Referral to ENT for laryngoscopy  · Patient canceled visit with ENT and we will reschedule this for her.    *Esophageal uptake on PET scan 1/26/2023  · Felt to be consistent with esophagitis in the distal esophagus.  We will hold on referral for EGD given the multitude of additional procedures and consults required above.    *Venous access  · Patient scheduled to see Dr. Shook on 2/22/2023 to discuss pursuit of port placement.    *Social  · Patient reports that she has significant transportation issues.  Oncology social work has been involved to help facilitate transportation for concurrent chemoradiation         PLAN:  1. Initiate definitive treatment of the patient's stage IIIA non-small cell lung cancer with concurrent chemoradiation with weekly single agent carboplatin to be followed by 1 year of durvalumab.  Patient will begin week 1 single agent carboplatin today (AUC 2).  Taxol omitted to avoid neurotoxicity as above.  2. Thyroid ultrasound was canceled on 2/20/2023 (patient did not feel that she could physically do the procedure)  3. Reschedule visit with ENT to evaluate laryngeal activity seen on PET scan  4. Referral to oncology nutrition.  Patient encouraged to maintain stable weight at this point and avoid further weight loss for now.  5. In 1 week MD visit with CBC, CMP and week 2 single agent carboplatin concurrent with thoracic radiation therapy  6. In 2, 3, 4 weeks NP visit with CBC, CMP and weekly low-dose carboplatin  7. In 5 weeks MD visit with CBC, CMP and week 6 carboplatin.  We will discuss timeframe for follow-up scans after completion of therapy as well as timeframe to begin subsequent immunotherapy with durvalumab.    The patient is continuing on  high risk medication requiring intensive monitoring    I did spend 40 minutes in total time caring for the patient today, time spent in review of records, face-to-face consultation, coordination of care, placement of orders, completion of documentation.

## 2023-03-20 NOTE — TELEPHONE ENCOUNTER
Caller: Yolanda Amanda    Relationship: Emergency Contact    Best call back number: [phone] OR    [phone]    What is the best time to reach you: ANYTIME     Who are you requesting to speak with (clinical staff, provider,  specific staff member): DR WEISS OR NURSE        What was the call regarding:     DOMITILA HAS LOST HER SCRIPT FOR ZOFRAN IS MISPLACED AND CANNOT FIND IT AT ALL,     DOMITILA IS WANTING TO SEE IF DR WEISS OR HIS NURSE CAN WRITE NEW SCRIPT  TO SEND TO PHARMACY TODAY PLEASE.    SINCE DOES NOT HAVE ANY TO TAKE.     PREFERRED PHARMACY : [facility] - [place], [place] - [address] [place] [address] [phone] [facility] [phone]   [phone]    Do you require a callback: YES TO DISCUSS

## 2023-03-21 ENCOUNTER — RADIATION ONCOLOGY WEEKLY ASSESSMENT (OUTPATIENT)
Dept: RADIATION ONCOLOGY | Facility: HOSPITAL | Age: 68
End: 2023-03-21
Payer: MEDICARE

## 2023-03-21 ENCOUNTER — INFUSION (OUTPATIENT)
Dept: ONCOLOGY | Facility: HOSPITAL | Age: 68
End: 2023-03-21
Payer: MEDICARE

## 2023-03-21 ENCOUNTER — OFFICE VISIT (OUTPATIENT)
Dept: ONCOLOGY | Facility: CLINIC | Age: 68
End: 2023-03-21
Payer: MEDICARE

## 2023-03-21 ENCOUNTER — TREATMENT (OUTPATIENT)
Dept: RADIATION ONCOLOGY | Facility: HOSPITAL | Age: 68
End: 2023-03-21
Payer: MEDICARE

## 2023-03-21 VITALS
TEMPERATURE: 96.4 F | WEIGHT: 237.2 LBS | DIASTOLIC BLOOD PRESSURE: 85 MMHG | HEIGHT: 62 IN | HEART RATE: 85 BPM | OXYGEN SATURATION: 94 % | SYSTOLIC BLOOD PRESSURE: 148 MMHG | BODY MASS INDEX: 43.65 KG/M2 | RESPIRATION RATE: 18 BRPM

## 2023-03-21 DIAGNOSIS — C34.32 MALIGNANT NEOPLASM OF LOWER LOBE OF LEFT LUNG: Primary | ICD-10-CM

## 2023-03-21 DIAGNOSIS — C34.32 MALIGNANT NEOPLASM OF LOWER LOBE OF LEFT LUNG: ICD-10-CM

## 2023-03-21 LAB
ALBUMIN SERPL-MCNC: 4.4 G/DL (ref 3.5–5.2)
ALBUMIN/GLOB SERPL: 1.3 G/DL (ref 1.1–2.4)
ALP SERPL-CCNC: 78 U/L (ref 38–116)
ALT SERPL W P-5'-P-CCNC: 16 U/L (ref 0–33)
ANION GAP SERPL CALCULATED.3IONS-SCNC: 12.6 MMOL/L (ref 5–15)
AST SERPL-CCNC: 23 U/L (ref 0–32)
BASOPHILS # BLD AUTO: 0.04 10*3/MM3 (ref 0–0.2)
BASOPHILS NFR BLD AUTO: 0.6 % (ref 0–1.5)
BILIRUB SERPL-MCNC: 0.6 MG/DL (ref 0.2–1.2)
BUN SERPL-MCNC: 12 MG/DL (ref 6–20)
BUN/CREAT SERPL: 13.5 (ref 7.3–30)
CALCIUM SPEC-SCNC: 10.1 MG/DL (ref 8.5–10.2)
CHLORIDE SERPL-SCNC: 98 MMOL/L (ref 98–107)
CO2 SERPL-SCNC: 27.4 MMOL/L (ref 22–29)
CREAT SERPL-MCNC: 0.89 MG/DL (ref 0.6–1.1)
DEPRECATED RDW RBC AUTO: 41.2 FL (ref 37–54)
EGFRCR SERPLBLD CKD-EPI 2021: 71.2 ML/MIN/1.73
EOSINOPHIL # BLD AUTO: 0.1 10*3/MM3 (ref 0–0.4)
EOSINOPHIL NFR BLD AUTO: 1.4 % (ref 0.3–6.2)
ERYTHROCYTE [DISTWIDTH] IN BLOOD BY AUTOMATED COUNT: 12.6 % (ref 12.3–15.4)
GLOBULIN UR ELPH-MCNC: 3.4 GM/DL (ref 1.8–3.5)
GLUCOSE SERPL-MCNC: 142 MG/DL (ref 74–124)
HCT VFR BLD AUTO: 44.4 % (ref 34–46.6)
HGB BLD-MCNC: 14.7 G/DL (ref 12–15.9)
IMM GRANULOCYTES # BLD AUTO: 0.04 10*3/MM3 (ref 0–0.05)
IMM GRANULOCYTES NFR BLD AUTO: 0.6 % (ref 0–0.5)
LYMPHOCYTES # BLD AUTO: 1.6 10*3/MM3 (ref 0.7–3.1)
LYMPHOCYTES NFR BLD AUTO: 22.6 % (ref 19.6–45.3)
MCH RBC QN AUTO: 29.6 PG (ref 26.6–33)
MCHC RBC AUTO-ENTMCNC: 33.1 G/DL (ref 31.5–35.7)
MCV RBC AUTO: 89.3 FL (ref 79–97)
MONOCYTES # BLD AUTO: 0.62 10*3/MM3 (ref 0.1–0.9)
MONOCYTES NFR BLD AUTO: 8.8 % (ref 5–12)
NEUTROPHILS NFR BLD AUTO: 4.68 10*3/MM3 (ref 1.7–7)
NEUTROPHILS NFR BLD AUTO: 66 % (ref 42.7–76)
NRBC BLD AUTO-RTO: 0 /100 WBC (ref 0–0.2)
PLATELET # BLD AUTO: 192 10*3/MM3 (ref 140–450)
PMV BLD AUTO: 10.2 FL (ref 6–12)
POTASSIUM SERPL-SCNC: 4.6 MMOL/L (ref 3.5–4.7)
PROT SERPL-MCNC: 7.8 G/DL (ref 6.3–8)
RAD ONC ARIA COURSE ID: NORMAL
RAD ONC ARIA COURSE INTENT: NORMAL
RAD ONC ARIA COURSE LAST TREATMENT DATE: NORMAL
RAD ONC ARIA COURSE START DATE: NORMAL
RAD ONC ARIA COURSE TREATMENT ELAPSED DAYS: 0
RAD ONC ARIA FIRST TREATMENT DATE: NORMAL
RAD ONC ARIA PLAN FRACTIONS TREATED TO DATE: 1
RAD ONC ARIA PLAN ID: NORMAL
RAD ONC ARIA PLAN PRESCRIBED DOSE PER FRACTION: 2 GY
RAD ONC ARIA PLAN PRIMARY REFERENCE POINT: NORMAL
RAD ONC ARIA PLAN TOTAL FRACTIONS PRESCRIBED: 30
RAD ONC ARIA PLAN TOTAL PRESCRIBED DOSE: 6000 CGY
RAD ONC ARIA REFERENCE POINT DOSAGE GIVEN TO DATE: 2 GY
RAD ONC ARIA REFERENCE POINT DOSAGE GIVEN TO DATE: 2.01 GY
RAD ONC ARIA REFERENCE POINT ID: NORMAL
RAD ONC ARIA REFERENCE POINT ID: NORMAL
RAD ONC ARIA REFERENCE POINT SESSION DOSAGE GIVEN: 2 GY
RAD ONC ARIA REFERENCE POINT SESSION DOSAGE GIVEN: 2.01 GY
RBC # BLD AUTO: 4.97 10*6/MM3 (ref 3.77–5.28)
SODIUM SERPL-SCNC: 138 MMOL/L (ref 134–145)
WBC NRBC COR # BLD: 7.08 10*3/MM3 (ref 3.4–10.8)

## 2023-03-21 PROCEDURE — 85025 COMPLETE CBC W/AUTO DIFF WBC: CPT

## 2023-03-21 PROCEDURE — 3077F SYST BP >= 140 MM HG: CPT | Performed by: INTERNAL MEDICINE

## 2023-03-21 PROCEDURE — 99215 OFFICE O/P EST HI 40 MIN: CPT | Performed by: INTERNAL MEDICINE

## 2023-03-21 PROCEDURE — 25010000002 CARBOPLATIN PER 50 MG: Performed by: INTERNAL MEDICINE

## 2023-03-21 PROCEDURE — 1160F RVW MEDS BY RX/DR IN RCRD: CPT | Performed by: INTERNAL MEDICINE

## 2023-03-21 PROCEDURE — 96413 CHEMO IV INFUSION 1 HR: CPT

## 2023-03-21 PROCEDURE — 77386: CPT | Performed by: RADIOLOGY

## 2023-03-21 PROCEDURE — 3079F DIAST BP 80-89 MM HG: CPT | Performed by: INTERNAL MEDICINE

## 2023-03-21 PROCEDURE — 25010000002 PALONOSETRON PER 25 MCG: Performed by: INTERNAL MEDICINE

## 2023-03-21 PROCEDURE — 1126F AMNT PAIN NOTED NONE PRSNT: CPT | Performed by: INTERNAL MEDICINE

## 2023-03-21 PROCEDURE — 77427 RADIATION TX MANAGEMENT X5: CPT | Performed by: RADIOLOGY

## 2023-03-21 PROCEDURE — 77014 CHG CT GUIDANCE RADIATION THERAPY FLDS PLACEMENT: CPT | Performed by: RADIOLOGY

## 2023-03-21 PROCEDURE — 80053 COMPREHEN METABOLIC PANEL: CPT

## 2023-03-21 PROCEDURE — 1159F MED LIST DOCD IN RCRD: CPT | Performed by: INTERNAL MEDICINE

## 2023-03-21 PROCEDURE — 77386 CHG INTENSITY MODULATED RADIATION TX DLVR COMPLEX: CPT | Performed by: RADIOLOGY

## 2023-03-21 PROCEDURE — 96375 TX/PRO/DX INJ NEW DRUG ADDON: CPT

## 2023-03-21 PROCEDURE — 25010000002 DEXAMETHASONE SODIUM PHOSPHATE 100 MG/10ML SOLUTION: Performed by: INTERNAL MEDICINE

## 2023-03-21 RX ORDER — PALONOSETRON 0.05 MG/ML
0.25 INJECTION, SOLUTION INTRAVENOUS ONCE
Status: COMPLETED | OUTPATIENT
Start: 2023-03-21 | End: 2023-03-21

## 2023-03-21 RX ORDER — SODIUM CHLORIDE 9 MG/ML
250 INJECTION, SOLUTION INTRAVENOUS ONCE
Status: CANCELLED | OUTPATIENT
Start: 2023-03-21

## 2023-03-21 RX ORDER — PALONOSETRON 0.05 MG/ML
0.25 INJECTION, SOLUTION INTRAVENOUS ONCE
Status: CANCELLED | OUTPATIENT
Start: 2023-03-21

## 2023-03-21 RX ORDER — SODIUM CHLORIDE 9 MG/ML
250 INJECTION, SOLUTION INTRAVENOUS ONCE
Status: COMPLETED | OUTPATIENT
Start: 2023-03-21 | End: 2023-03-21

## 2023-03-21 RX ADMIN — SODIUM CHLORIDE 250 ML: 9 INJECTION, SOLUTION INTRAVENOUS at 11:16

## 2023-03-21 RX ADMIN — CARBOPLATIN 260 MG: 10 INJECTION, SOLUTION INTRAVENOUS at 11:36

## 2023-03-21 RX ADMIN — PALONOSETRON 0.25 MG: 0.05 INJECTION, SOLUTION INTRAVENOUS at 11:16

## 2023-03-21 RX ADMIN — DEXAMETHASONE SODIUM PHOSPHATE 12 MG: 10 INJECTION, SOLUTION INTRAMUSCULAR; INTRAVENOUS at 11:17

## 2023-03-22 ENCOUNTER — TREATMENT (OUTPATIENT)
Dept: RADIATION ONCOLOGY | Facility: HOSPITAL | Age: 68
End: 2023-03-22
Payer: MEDICARE

## 2023-03-22 LAB
RAD ONC ARIA COURSE ID: NORMAL
RAD ONC ARIA COURSE INTENT: NORMAL
RAD ONC ARIA COURSE LAST TREATMENT DATE: NORMAL
RAD ONC ARIA COURSE START DATE: NORMAL
RAD ONC ARIA COURSE TREATMENT ELAPSED DAYS: 1
RAD ONC ARIA FIRST TREATMENT DATE: NORMAL
RAD ONC ARIA PLAN FRACTIONS TREATED TO DATE: 2
RAD ONC ARIA PLAN ID: NORMAL
RAD ONC ARIA PLAN PRESCRIBED DOSE PER FRACTION: 2 GY
RAD ONC ARIA PLAN PRIMARY REFERENCE POINT: NORMAL
RAD ONC ARIA PLAN TOTAL FRACTIONS PRESCRIBED: 30
RAD ONC ARIA PLAN TOTAL PRESCRIBED DOSE: 6000 CGY
RAD ONC ARIA REFERENCE POINT DOSAGE GIVEN TO DATE: 4 GY
RAD ONC ARIA REFERENCE POINT DOSAGE GIVEN TO DATE: 4.02 GY
RAD ONC ARIA REFERENCE POINT ID: NORMAL
RAD ONC ARIA REFERENCE POINT ID: NORMAL
RAD ONC ARIA REFERENCE POINT SESSION DOSAGE GIVEN: 2 GY
RAD ONC ARIA REFERENCE POINT SESSION DOSAGE GIVEN: 2.01 GY

## 2023-03-22 PROCEDURE — 77386 CHG INTENSITY MODULATED RADIATION TX DLVR COMPLEX: CPT | Performed by: RADIOLOGY

## 2023-03-22 PROCEDURE — 77014 CHG CT GUIDANCE RADIATION THERAPY FLDS PLACEMENT: CPT | Performed by: RADIOLOGY

## 2023-03-22 PROCEDURE — 77386: CPT | Performed by: RADIOLOGY

## 2023-03-23 ENCOUNTER — TREATMENT (OUTPATIENT)
Dept: RADIATION ONCOLOGY | Facility: HOSPITAL | Age: 68
End: 2023-03-23
Payer: MEDICARE

## 2023-03-23 LAB
RAD ONC ARIA COURSE ID: NORMAL
RAD ONC ARIA COURSE INTENT: NORMAL
RAD ONC ARIA COURSE LAST TREATMENT DATE: NORMAL
RAD ONC ARIA COURSE START DATE: NORMAL
RAD ONC ARIA COURSE TREATMENT ELAPSED DAYS: 2
RAD ONC ARIA FIRST TREATMENT DATE: NORMAL
RAD ONC ARIA PLAN FRACTIONS TREATED TO DATE: 3
RAD ONC ARIA PLAN ID: NORMAL
RAD ONC ARIA PLAN PRESCRIBED DOSE PER FRACTION: 2 GY
RAD ONC ARIA PLAN PRIMARY REFERENCE POINT: NORMAL
RAD ONC ARIA PLAN TOTAL FRACTIONS PRESCRIBED: 30
RAD ONC ARIA PLAN TOTAL PRESCRIBED DOSE: 6000 CGY
RAD ONC ARIA REFERENCE POINT DOSAGE GIVEN TO DATE: 6 GY
RAD ONC ARIA REFERENCE POINT DOSAGE GIVEN TO DATE: 6.03 GY
RAD ONC ARIA REFERENCE POINT ID: NORMAL
RAD ONC ARIA REFERENCE POINT ID: NORMAL
RAD ONC ARIA REFERENCE POINT SESSION DOSAGE GIVEN: 2 GY
RAD ONC ARIA REFERENCE POINT SESSION DOSAGE GIVEN: 2.01 GY

## 2023-03-23 PROCEDURE — 77386 CHG INTENSITY MODULATED RADIATION TX DLVR COMPLEX: CPT | Performed by: RADIOLOGY

## 2023-03-23 PROCEDURE — 77014 CHG CT GUIDANCE RADIATION THERAPY FLDS PLACEMENT: CPT | Performed by: RADIOLOGY

## 2023-03-23 PROCEDURE — 77336 RADIATION PHYSICS CONSULT: CPT | Performed by: RADIOLOGY

## 2023-03-23 PROCEDURE — 77386: CPT | Performed by: RADIOLOGY

## 2023-03-24 ENCOUNTER — TREATMENT (OUTPATIENT)
Dept: RADIATION ONCOLOGY | Facility: HOSPITAL | Age: 68
End: 2023-03-24
Payer: MEDICARE

## 2023-03-24 LAB
RAD ONC ARIA COURSE ID: NORMAL
RAD ONC ARIA COURSE INTENT: NORMAL
RAD ONC ARIA COURSE LAST TREATMENT DATE: NORMAL
RAD ONC ARIA COURSE START DATE: NORMAL
RAD ONC ARIA COURSE TREATMENT ELAPSED DAYS: 3
RAD ONC ARIA FIRST TREATMENT DATE: NORMAL
RAD ONC ARIA PLAN FRACTIONS TREATED TO DATE: 4
RAD ONC ARIA PLAN ID: NORMAL
RAD ONC ARIA PLAN PRESCRIBED DOSE PER FRACTION: 2 GY
RAD ONC ARIA PLAN PRIMARY REFERENCE POINT: NORMAL
RAD ONC ARIA PLAN TOTAL FRACTIONS PRESCRIBED: 30
RAD ONC ARIA PLAN TOTAL PRESCRIBED DOSE: 6000 CGY
RAD ONC ARIA REFERENCE POINT DOSAGE GIVEN TO DATE: 8 GY
RAD ONC ARIA REFERENCE POINT DOSAGE GIVEN TO DATE: 8.05 GY
RAD ONC ARIA REFERENCE POINT ID: NORMAL
RAD ONC ARIA REFERENCE POINT ID: NORMAL
RAD ONC ARIA REFERENCE POINT SESSION DOSAGE GIVEN: 2 GY
RAD ONC ARIA REFERENCE POINT SESSION DOSAGE GIVEN: 2.01 GY

## 2023-03-24 PROCEDURE — 77386: CPT | Performed by: RADIOLOGY

## 2023-03-24 PROCEDURE — 77386 CHG INTENSITY MODULATED RADIATION TX DLVR COMPLEX: CPT | Performed by: RADIOLOGY

## 2023-03-24 PROCEDURE — 77014 CHG CT GUIDANCE RADIATION THERAPY FLDS PLACEMENT: CPT | Performed by: RADIOLOGY

## 2023-03-27 ENCOUNTER — TREATMENT (OUTPATIENT)
Dept: RADIATION ONCOLOGY | Facility: HOSPITAL | Age: 68
End: 2023-03-27
Payer: MEDICARE

## 2023-03-27 LAB
RAD ONC ARIA COURSE ID: NORMAL
RAD ONC ARIA COURSE INTENT: NORMAL
RAD ONC ARIA COURSE LAST TREATMENT DATE: NORMAL
RAD ONC ARIA COURSE START DATE: NORMAL
RAD ONC ARIA COURSE TREATMENT ELAPSED DAYS: 6
RAD ONC ARIA FIRST TREATMENT DATE: NORMAL
RAD ONC ARIA PLAN FRACTIONS TREATED TO DATE: 5
RAD ONC ARIA PLAN ID: NORMAL
RAD ONC ARIA PLAN PRESCRIBED DOSE PER FRACTION: 2 GY
RAD ONC ARIA PLAN PRIMARY REFERENCE POINT: NORMAL
RAD ONC ARIA PLAN TOTAL FRACTIONS PRESCRIBED: 30
RAD ONC ARIA PLAN TOTAL PRESCRIBED DOSE: 6000 CGY
RAD ONC ARIA REFERENCE POINT DOSAGE GIVEN TO DATE: 10 GY
RAD ONC ARIA REFERENCE POINT DOSAGE GIVEN TO DATE: 10.06 GY
RAD ONC ARIA REFERENCE POINT ID: NORMAL
RAD ONC ARIA REFERENCE POINT ID: NORMAL
RAD ONC ARIA REFERENCE POINT SESSION DOSAGE GIVEN: 2 GY
RAD ONC ARIA REFERENCE POINT SESSION DOSAGE GIVEN: 2.01 GY

## 2023-03-27 PROCEDURE — 77014 CHG CT GUIDANCE RADIATION THERAPY FLDS PLACEMENT: CPT | Performed by: RADIOLOGY

## 2023-03-27 PROCEDURE — 77386 CHG INTENSITY MODULATED RADIATION TX DLVR COMPLEX: CPT | Performed by: RADIOLOGY

## 2023-03-27 PROCEDURE — 77386: CPT | Performed by: RADIOLOGY

## 2023-03-27 NOTE — PROGRESS NOTES
"Chief Complaint  Non-small cell lung cancer, clinical stage IIIA ([id number]), aortic stenosis, cervical spine stenosis, COPD, hepatic steatosis, diabetes mellitus    Subjective        History of Present Illness  Patient returns today in follow-up due for week 2 concurrent chemoradiation with low-dose weekly carboplatin alone.  Taxol was omitted to avoid neurotoxicity due to her underlying neuropathic changes from her severe cervical spine stenosis.  She reports tolerating treatment well so far.  She has continued to lose a significant amount of weight, has declined from 237 down to 231 pounds today.  She reports that she is not actively trying to lose weight as she had previously but is unaware what she should do in terms of caloric support.  She is scheduled to see oncology nutrition today.  She does note that her respiratory status has improved and she is requiring use of her inhaler less often.  She notes that her mobility has improved gradually over time.  She does remain in a wheelchair today.  She does note having a few episodes of minor diarrhea usually in the mornings and reports that she has a \"nervous stomach\".  She does not take anything for the diarrhea.        Objective   Vital Signs:   /82   Pulse 91   Temp 96.9 °F (36.1 °C) (Temporal)   Resp 16   Ht 157.5 cm (62.01\")   Wt 105 kg (231 lb 6.4 oz)   SpO2 92%   BMI 42.31 kg/m²     Physical Exam  Constitutional:       Appearance: She is well-developed.      Comments: Patient is seated in wheelchair today   Eyes:      Conjunctiva/sclera: Conjunctivae normal.   Neck:      Thyroid: No thyromegaly.   Cardiovascular:      Rate and Rhythm: Normal rate and regular rhythm.      Heart sounds: No murmur heard.    No friction rub. No gallop.   Pulmonary:      Effort: No respiratory distress.      Breath sounds: Normal breath sounds.      Comments: Slightly diminished breath sounds bilaterally  Abdominal:      General: Bowel sounds are normal. There is no " distension.      Palpations: Abdomen is soft.      Tenderness: There is no abdominal tenderness.   Lymphadenopathy:      Head:      Right side of head: No submandibular adenopathy.      Cervical: No cervical adenopathy.      Upper Body:      Right upper body: No supraclavicular adenopathy.      Left upper body: No supraclavicular adenopathy.   Skin:     General: Skin is warm and dry.      Findings: No rash.   Neurological:      Mental Status: She is alert and oriented to person, place, and time.      Cranial Nerves: No cranial nerve deficit.      Motor: No abnormal muscle tone.      Deep Tendon Reflexes: Reflexes normal.   Psychiatric:         Behavior: Behavior normal.           Result Review : Reviewed CBC, CMP from today       Assessment and Plan     *Non-small cell lung cancer, clinical stage IIIA ([id number])  • Patient has history of smoking 1 pack/day x 45 years, quit in November 2021.    • Following anterior cervical corpectomy in September 2022, she experienced acute hypoxemic respiratory failure.    • CT angiogram chest 9/25/2022 showed a concerning 1.4 cm left lower lobe pulmonary nodule, mediastinal lymphadenopathy with infracarinal lymph node 2.7 cm, left hilar lymph node 1.2 cm, small right hilar lymph nodes up from 1.1 cm and additional enlarged mediastinal nodes in the right paratracheal, precarinal, infracarinal spaces.  There was a wedge-shaped area of consolidation/atelectasis in the right middle lobe base, subpleural density left costophrenic sulcus felt to represent atelectasis.  It was recommended for her to undergo subsequent PET scan by pulmonary.  • The patient has had ongoing follow-up with neurosurgery and on CT scans 11/29/2022 of cervical and thoracic spine, there was concern regarding spinal instability with potential need for posterior cervical fusion.  This was scheduled in December 2022 however was delayed due to upper respiratory infection.  • Patient admitted on 1/10/2023 to address  multiple medical issues in order to prepare for potential neurosurgical procedure on 3/12/2023.  • Patient underwent echocardiogram on 1/11/2023 with ejection fraction greater than 70%, grade 1 diastolic dysfunction, severe aortic stenosis.  Patient is being followed by cardiology with consideration of need for TAVR versus SAVR for moderate to severe aortic stenosis.  • Repeat CT chest on 1/12/2023 showed slight increase in size of the left lower lobe pulmonary nodule increased from 1.4 to 1.5 cm.  Groundglass nodule peripheral left lower lobe less conspicuous and resolution of previous reticulonodular opacities right lower lobe.  There was further enlargement of mediastinal lymph nodes with right posterior paratracheal lymph node 1.4 x 2.1 up to 1.5 x 2.3 cm, subcarinal lymph node increased from 2.7 x 5.2 up to 3.2 x 5.8 cm, left hilar lymph node increased from 1.2 up to 1.4 cm, and is stable 1.7 cm left infrahilar lymph node.  There was also comment regarding hepatic steatosis with changes of chronic liver disease and stable indeterminate nodular thickening of the adrenal glands.  • CT-guided lung biopsy on 1/13/2023 with pathology that showed invasive poorly differentiated non-small cell carcinoma with focal neuroendocrine differentiation.  Staining pattern and morphology favor poorly differentiated adenocarcinoma however there was focal CD56 staining, could not entirely exclude large cell neuroendocrine carcinoma.  PD-L1 35%, Caris analysis pending.  • It was felt that there would be increased risk for bronchoscopy from a cardiac standpoint due to her aortic stenosis.  • Staging MRI brain 1/29/2023 showed no evidence of metastatic disease  • Staging PET scan 1/26/2023 showed hypermetabolic left lower lobe pulmonary nodule, size difficult to determine on PET however on recent CT was 1.6 cm (SUV 14.4).  Mediastinal and left hilar hypermetabolic lymphadenopathy (subcarinal lymph node 3.2 cm, SUV 31.7, left  paratracheal 2.2 cm lymph node SUV 12.7).  Sub-6 mm pulmonary nodule left upper lobe unchanged and below PET resolution.  Focal uptake posterior right hepatic lobe SUV 7.2 of uncertain significance.  Recommended MRI to evaluate.  Right thyroid activity SUV 5.3, recommended ultrasound.  Long segment uptake distal esophagus consistent with esophagitis.  Uptake posterior larynx, nonspecific, recommended direct visualization.  • MRI abdomen 2/6/2023 with no evidence to suggest metastatic disease in the liver.  Subcentimeter nonenhancing lesions favor cyst or hemangiomas.  • Patient underwent right and left heart catheterization with cardiology on 2/14/2023.  Identification of coronary artery disease, mild pulmonary hypertension, severe aortic stenosis.  Recommendation per cardiology to proceed with treatment for lung cancer and defer any intervention regarding aortic stenosis.  • Concern regarding underlying neurologic issues from cervical stenosis and potential neuropathic effects from Taxol chemotherapy.  Concern regarding patient's overall performance status and ability to tolerate full dose chemotherapy.  Patient discussed at thoracic oncology tumor board with consensus to treat with weekly low-dose carboplatin concurrent with radiation therapy and omit Taxol.  Subsequent plan for 1 year durvalumab following concurrent chemoradiation.  • Significant delay in initiating concurrent chemoradiation due to logistics with scheduling Mediport placement, patient canceled appointments and delayed initiation of treatment.  • On 3/21/2023 initiated concurrent chemoradiation with weekly carboplatin (AUC 2) on 2/28/2023.  • Patient returns today due for week 2 single agent low-dose carboplatin concurrent with thoracic radiation therapy.  She tolerated the first week of treatment well.  Her counts are stable today.  We will proceed on with treatment as planned.  She will be returning for weekly visits for the duration of her  concurrent therapy and we will discuss at the completion of treatment timeframe to obtain follow-up scans and begin subsequent durvalumab.    *Aortic stenosis  • Patient underwent echocardiogram on 1/11/2023 with ejection fraction greater than 70%, grade 1 diastolic dysfunction, severe aortic stenosis.    • Patient followed by cardiology with consideration of need for TAVR versus SAVR for moderate to severe aortic stenosis.  • Patient underwent cardiac catheterization 2/14/2023 with identification of coronary artery disease, mild pulmonary hypertension, severe aortic stenosis.  • Per cardiology, recommendation to proceed with treatment for lung cancer and defer any consideration of intervention for aortic stenosis.     *Cervical spine stenosis  • Patient with cervical spine stenosis with associated myelopathy.   • She underwent surgery on 9/20/2022 with anterior cervical corpectomy with cage.      • The patient has had ongoing follow-up with neurosurgery and on CT scans 11/29/2022 of cervical and thoracic spine, there was concern regarding spinal instability with potential need for posterior cervical fusion.  This was scheduled in December 2022 however was delayed due to upper respiratory infection.  • Patient admitted on 1/10/2023 to address multiple medical issues in order to prepare for potential neurosurgical procedure on 3/12/2023.  • Patient was previously having severe symptoms related to her cervical stenosis with reduced ability to ambulate previously and significant limitations in movement in her hands, dropping objects.  More recently however she has improved and has been able to walk with the use of a walker, symptoms in the upper extremities have improved and her dexterity is better.    • It appears that her neurosurgical procedure will need to be delayed until we can at least complete the concurrent chemoradiation portion of her treatment.  Surgery would be feasible while continuing on immunotherapy in  the future.  • Neurosurgical follow-up has been placed on hold.  • Patient notes that her mobility and symptoms in her extremities have improved somewhat over time     *COPD  • Patient has history of smoking 1 pack/day x 45 years, quit in November 2021.  • Patient has not undergone formal PFTs  • Patient being followed by pulmonary, currently receiving Symbicort inhaler, duo nebs 4 times daily     *Hepatic steatosis  • Identified on prior scans  • PET scan 1/26/2023 with questionable area of activity seen but no corresponding CT abnormality.    • MRI abdomen 2/6/2023 with no evidence to suggest metastatic disease in the liver.  Subcentimeter nonenhancing lesions favor cyst or hemangiomas.  • LFTs have been normal     *Diabetes mellitus  • Currently receiving metformin 850 mg daily    *Situational depression  · Patient reports feeling overwhelmed regarding the amount of information presented today in regards to her malignancy as well as her other medical issues.    · Patient was seen by supportive oncology on 2/27/2023, added gabapentin 300 mg 3 times daily.    · Patient notes that symptoms are under fair control currently.    *Right thyroid uptake on PET scan 1/26/2023  · Patient was scheduled for thyroid ultrasound however does not feel that she will be able to undergo the procedure due to her neck issues and therefore was canceled, consider at future date    *Laryngeal uptake on PET scan 1/26/2023  · Referral to ENT for laryngoscopy  · Patient canceled visit with ENT and appointment was rescheduled for later today    *Esophageal uptake on PET scan 1/26/2023  · Felt to be consistent with esophagitis in the distal esophagus.  We will hold on referral for EGD given the multitude of additional procedures and consults required above.    *Venous access  · Patient scheduled to see Dr. Shook on 2/22/2023 to discuss pursuit of port placement.    *Nutrition/weight loss  · Patient with ongoing significant weight loss,  decreased from 237 down to 231 pounds over 1 week.  Patient reports that she is not necessarily trying to lose weight but is glad that she is losing weight.  We discussed that she is losing lean muscle mass at this point and I encouraged her to attempt stabilizing her weight.  She is being seen today by oncology nutrition.    *Social  · Patient reports that she has significant transportation issues.  Oncology social work has been involved to help facilitate transportation for concurrent chemoradiation         PLAN:  1. Continue definitive treatment for stage IIIA non-small cell lung cancer with concurrent chemoradiation with weekly single agent carboplatin to be followed by 1 year of durvalumab.  Patient due for week 2 single agent carboplatin today (AUC 2).  Taxol omitted to avoid neurotoxicity as above.  2. Patient scheduled today to be seen by ENT to evaluate laryngeal activity seen on PET scan  3. Patient will be seen by oncology nutrition today in regards to ongoing weight loss  4. In 1, 2, 3 weeks NP visit with CBC, CMP and weekly low-dose carboplatin  5. In 5 weeks MD visit with CBC, CMP and week 6 carboplatin.  We will discuss timeframe for follow-up scans after completion of therapy as well as timeframe to begin subsequent immunotherapy with durvalumab.    The patient is continuing on high risk medication requiring intensive monitoring    I did spend 40 minutes in total time caring for the patient today, time spent in review of records, face-to-face consultation, coordination of care, placement of orders, completion of documentation.

## 2023-03-28 ENCOUNTER — RADIATION ONCOLOGY WEEKLY ASSESSMENT (OUTPATIENT)
Dept: RADIATION ONCOLOGY | Facility: HOSPITAL | Age: 68
End: 2023-03-28
Payer: MEDICARE

## 2023-03-28 ENCOUNTER — OFFICE VISIT (OUTPATIENT)
Dept: OTHER | Facility: HOSPITAL | Age: 68
End: 2023-03-28
Payer: MEDICARE

## 2023-03-28 ENCOUNTER — OFFICE VISIT (OUTPATIENT)
Dept: ONCOLOGY | Facility: CLINIC | Age: 68
End: 2023-03-28
Payer: MEDICARE

## 2023-03-28 ENCOUNTER — INFUSION (OUTPATIENT)
Dept: ONCOLOGY | Facility: HOSPITAL | Age: 68
End: 2023-03-28
Payer: MEDICARE

## 2023-03-28 ENCOUNTER — TREATMENT (OUTPATIENT)
Dept: RADIATION ONCOLOGY | Facility: HOSPITAL | Age: 68
End: 2023-03-28
Payer: MEDICARE

## 2023-03-28 VITALS
OXYGEN SATURATION: 92 % | HEART RATE: 91 BPM | RESPIRATION RATE: 16 BRPM | TEMPERATURE: 96.9 F | WEIGHT: 231.4 LBS | BODY MASS INDEX: 42.58 KG/M2 | HEIGHT: 62 IN | DIASTOLIC BLOOD PRESSURE: 82 MMHG | SYSTOLIC BLOOD PRESSURE: 127 MMHG

## 2023-03-28 DIAGNOSIS — C34.32 MALIGNANT NEOPLASM OF LOWER LOBE OF LEFT LUNG: Primary | ICD-10-CM

## 2023-03-28 DIAGNOSIS — C34.32 MALIGNANT NEOPLASM OF LOWER LOBE OF LEFT LUNG: ICD-10-CM

## 2023-03-28 DIAGNOSIS — Z45.2 ENCOUNTER FOR ADJUSTMENT OR MANAGEMENT OF VASCULAR ACCESS DEVICE: Primary | ICD-10-CM

## 2023-03-28 LAB
ALBUMIN SERPL-MCNC: 4.4 G/DL (ref 3.5–5.2)
ALBUMIN/GLOB SERPL: 1.5 G/DL (ref 1.1–2.4)
ALP SERPL-CCNC: 60 U/L (ref 38–116)
ALT SERPL W P-5'-P-CCNC: 15 U/L (ref 0–33)
ANION GAP SERPL CALCULATED.3IONS-SCNC: 12.5 MMOL/L (ref 5–15)
AST SERPL-CCNC: 22 U/L (ref 0–32)
BASOPHILS # BLD AUTO: 0.02 10*3/MM3 (ref 0–0.2)
BASOPHILS NFR BLD AUTO: 0.4 % (ref 0–1.5)
BILIRUB SERPL-MCNC: 0.5 MG/DL (ref 0.2–1.2)
BUN SERPL-MCNC: 18 MG/DL (ref 6–20)
BUN/CREAT SERPL: 20 (ref 7.3–30)
CALCIUM SPEC-SCNC: 9.9 MG/DL (ref 8.5–10.2)
CHLORIDE SERPL-SCNC: 100 MMOL/L (ref 98–107)
CO2 SERPL-SCNC: 24.5 MMOL/L (ref 22–29)
CREAT SERPL-MCNC: 0.9 MG/DL (ref 0.6–1.1)
DEPRECATED RDW RBC AUTO: 39.3 FL (ref 37–54)
EGFRCR SERPLBLD CKD-EPI 2021: 70.2 ML/MIN/1.73
EOSINOPHIL # BLD AUTO: 0.11 10*3/MM3 (ref 0–0.4)
EOSINOPHIL NFR BLD AUTO: 2.2 % (ref 0.3–6.2)
ERYTHROCYTE [DISTWIDTH] IN BLOOD BY AUTOMATED COUNT: 12.1 % (ref 12.3–15.4)
GLOBULIN UR ELPH-MCNC: 2.9 GM/DL (ref 1.8–3.5)
GLUCOSE SERPL-MCNC: 120 MG/DL (ref 74–124)
HCT VFR BLD AUTO: 42.6 % (ref 34–46.6)
HGB BLD-MCNC: 14.1 G/DL (ref 12–15.9)
IMM GRANULOCYTES # BLD AUTO: 0.01 10*3/MM3 (ref 0–0.05)
IMM GRANULOCYTES NFR BLD AUTO: 0.2 % (ref 0–0.5)
LYMPHOCYTES # BLD AUTO: 1.04 10*3/MM3 (ref 0.7–3.1)
LYMPHOCYTES NFR BLD AUTO: 21.2 % (ref 19.6–45.3)
MCH RBC QN AUTO: 29.2 PG (ref 26.6–33)
MCHC RBC AUTO-ENTMCNC: 33.1 G/DL (ref 31.5–35.7)
MCV RBC AUTO: 88.2 FL (ref 79–97)
MONOCYTES # BLD AUTO: 0.43 10*3/MM3 (ref 0.1–0.9)
MONOCYTES NFR BLD AUTO: 8.8 % (ref 5–12)
NEUTROPHILS NFR BLD AUTO: 3.29 10*3/MM3 (ref 1.7–7)
NEUTROPHILS NFR BLD AUTO: 67.2 % (ref 42.7–76)
NRBC BLD AUTO-RTO: 0 /100 WBC (ref 0–0.2)
PLATELET # BLD AUTO: 163 10*3/MM3 (ref 140–450)
PMV BLD AUTO: 10.5 FL (ref 6–12)
POTASSIUM SERPL-SCNC: 4.5 MMOL/L (ref 3.5–4.7)
PROT SERPL-MCNC: 7.3 G/DL (ref 6.3–8)
RAD ONC ARIA COURSE ID: NORMAL
RAD ONC ARIA COURSE INTENT: NORMAL
RAD ONC ARIA COURSE LAST TREATMENT DATE: NORMAL
RAD ONC ARIA COURSE START DATE: NORMAL
RAD ONC ARIA COURSE TREATMENT ELAPSED DAYS: 7
RAD ONC ARIA FIRST TREATMENT DATE: NORMAL
RAD ONC ARIA PLAN FRACTIONS TREATED TO DATE: 6
RAD ONC ARIA PLAN ID: NORMAL
RAD ONC ARIA PLAN PRESCRIBED DOSE PER FRACTION: 2 GY
RAD ONC ARIA PLAN PRIMARY REFERENCE POINT: NORMAL
RAD ONC ARIA PLAN TOTAL FRACTIONS PRESCRIBED: 30
RAD ONC ARIA PLAN TOTAL PRESCRIBED DOSE: 6000 CGY
RAD ONC ARIA REFERENCE POINT DOSAGE GIVEN TO DATE: 12 GY
RAD ONC ARIA REFERENCE POINT DOSAGE GIVEN TO DATE: 12.07 GY
RAD ONC ARIA REFERENCE POINT ID: NORMAL
RAD ONC ARIA REFERENCE POINT ID: NORMAL
RAD ONC ARIA REFERENCE POINT SESSION DOSAGE GIVEN: 2 GY
RAD ONC ARIA REFERENCE POINT SESSION DOSAGE GIVEN: 2.01 GY
RBC # BLD AUTO: 4.83 10*6/MM3 (ref 3.77–5.28)
SODIUM SERPL-SCNC: 137 MMOL/L (ref 134–145)
WBC NRBC COR # BLD: 4.9 10*3/MM3 (ref 3.4–10.8)

## 2023-03-28 PROCEDURE — 3074F SYST BP LT 130 MM HG: CPT | Performed by: INTERNAL MEDICINE

## 2023-03-28 PROCEDURE — 25010000002 HEPARIN LOCK FLUSH PER 10 UNITS: Performed by: INTERNAL MEDICINE

## 2023-03-28 PROCEDURE — 1160F RVW MEDS BY RX/DR IN RCRD: CPT | Performed by: INTERNAL MEDICINE

## 2023-03-28 PROCEDURE — 77427 RADIATION TX MANAGEMENT X5: CPT | Performed by: RADIOLOGY

## 2023-03-28 PROCEDURE — 25010000002 DEXAMETHASONE SODIUM PHOSPHATE 100 MG/10ML SOLUTION: Performed by: INTERNAL MEDICINE

## 2023-03-28 PROCEDURE — 96413 CHEMO IV INFUSION 1 HR: CPT

## 2023-03-28 PROCEDURE — 25010000002 PALONOSETRON PER 25 MCG: Performed by: INTERNAL MEDICINE

## 2023-03-28 PROCEDURE — 80053 COMPREHEN METABOLIC PANEL: CPT

## 2023-03-28 PROCEDURE — 77014 CHG CT GUIDANCE RADIATION THERAPY FLDS PLACEMENT: CPT | Performed by: RADIOLOGY

## 2023-03-28 PROCEDURE — 25010000002 CARBOPLATIN PER 50 MG: Performed by: INTERNAL MEDICINE

## 2023-03-28 PROCEDURE — 1126F AMNT PAIN NOTED NONE PRSNT: CPT | Performed by: INTERNAL MEDICINE

## 2023-03-28 PROCEDURE — 3079F DIAST BP 80-89 MM HG: CPT | Performed by: INTERNAL MEDICINE

## 2023-03-28 PROCEDURE — 96375 TX/PRO/DX INJ NEW DRUG ADDON: CPT

## 2023-03-28 PROCEDURE — 77386: CPT | Performed by: RADIOLOGY

## 2023-03-28 PROCEDURE — 99215 OFFICE O/P EST HI 40 MIN: CPT | Performed by: INTERNAL MEDICINE

## 2023-03-28 PROCEDURE — 77386 CHG INTENSITY MODULATED RADIATION TX DLVR COMPLEX: CPT | Performed by: RADIOLOGY

## 2023-03-28 PROCEDURE — 85025 COMPLETE CBC W/AUTO DIFF WBC: CPT

## 2023-03-28 PROCEDURE — 1159F MED LIST DOCD IN RCRD: CPT | Performed by: INTERNAL MEDICINE

## 2023-03-28 RX ORDER — ESCITALOPRAM OXALATE 10 MG/1
10 TABLET ORAL DAILY
COMMUNITY
Start: 2023-03-27

## 2023-03-28 RX ORDER — PALONOSETRON 0.05 MG/ML
0.25 INJECTION, SOLUTION INTRAVENOUS ONCE
Status: COMPLETED | OUTPATIENT
Start: 2023-03-28 | End: 2023-03-28

## 2023-03-28 RX ORDER — SODIUM CHLORIDE 9 MG/ML
250 INJECTION, SOLUTION INTRAVENOUS ONCE
Status: COMPLETED | OUTPATIENT
Start: 2023-03-28 | End: 2023-03-28

## 2023-03-28 RX ORDER — PALONOSETRON 0.05 MG/ML
0.25 INJECTION, SOLUTION INTRAVENOUS ONCE
Status: CANCELLED | OUTPATIENT
Start: 2023-03-28

## 2023-03-28 RX ORDER — HEPARIN SODIUM (PORCINE) LOCK FLUSH IV SOLN 100 UNIT/ML 100 UNIT/ML
500 SOLUTION INTRAVENOUS AS NEEDED
Status: DISCONTINUED | OUTPATIENT
Start: 2023-03-28 | End: 2023-03-28 | Stop reason: HOSPADM

## 2023-03-28 RX ORDER — HEPARIN SODIUM (PORCINE) LOCK FLUSH IV SOLN 100 UNIT/ML 100 UNIT/ML
500 SOLUTION INTRAVENOUS AS NEEDED
Status: CANCELLED | OUTPATIENT
Start: 2023-03-28

## 2023-03-28 RX ORDER — SODIUM CHLORIDE 0.9 % (FLUSH) 0.9 %
10 SYRINGE (ML) INJECTION AS NEEDED
Status: DISCONTINUED | OUTPATIENT
Start: 2023-03-28 | End: 2023-03-28 | Stop reason: HOSPADM

## 2023-03-28 RX ORDER — SODIUM CHLORIDE 0.9 % (FLUSH) 0.9 %
10 SYRINGE (ML) INJECTION AS NEEDED
Status: CANCELLED | OUTPATIENT
Start: 2023-03-28

## 2023-03-28 RX ORDER — SODIUM CHLORIDE 9 MG/ML
250 INJECTION, SOLUTION INTRAVENOUS ONCE
Status: CANCELLED | OUTPATIENT
Start: 2023-03-28

## 2023-03-28 RX ADMIN — DEXAMETHASONE SODIUM PHOSPHATE 12 MG: 10 INJECTION, SOLUTION INTRAMUSCULAR; INTRAVENOUS at 11:44

## 2023-03-28 RX ADMIN — SODIUM CHLORIDE 250 ML: 9 INJECTION, SOLUTION INTRAVENOUS at 11:39

## 2023-03-28 RX ADMIN — Medication 500 UNITS: at 13:09

## 2023-03-28 RX ADMIN — Medication 10 ML: at 13:08

## 2023-03-28 RX ADMIN — CARBOPLATIN 250 MG: 10 INJECTION, SOLUTION INTRAVENOUS at 12:31

## 2023-03-28 RX ADMIN — PALONOSETRON 0.25 MG: 0.05 INJECTION, SOLUTION INTRAVENOUS at 11:39

## 2023-03-28 NOTE — PROGRESS NOTES
"OUTPATIENT ONCOLOGY NUTRITION ASSESSMENT    Patient Name: Gabby Acosta  YOB: 1955  MRN: [id number]  Assessment Date: 3/28/2023    COMMENTS: Referral for diet education. The patient was seen during infusion today. The patient intentionally lost about 40 lb beginning last fall by cutting back on portions. Weight is decreased 6 lb x 1 week although the patient is not trying to lose weight she admits she is eating less then she used to due to \"nervous stomach\". Eats two meals a day usually. Not able to cook or spend much time preparing meals. Grandson is helping out. He was present during conversation.  Patient is snacking on chips and popcorn. Discussed food choices that would provide protein and other nutrients. Provided patient with a list of easy meals and snacks as well as good protein sources. Suggested she aim for 5-6 small meals per day. Also suggested she try carnation essentials as an alternative to other oral nutrition supplements.        Reason for Assessment Physician referral, Education      Diagnosis/Problem   Non-small cell lung cancer, clinical stage IIIA ([id number]), aortic stenosis, cervical spine stenosis, COPD, hepatic steatosis, diabetes mellitus   Treatment Plan Chemotherapy Radiation, weekly carboplatin, daily radiation   Frequency    Goal of cancer treatment Currative   Comments:        Encounter Information        Nutrition/Diet History:     Oral Nutrition Supplements:    Factors/Symptoms Affecting Intake: No factors at this time   Comments:      Medical/Surgical History Past Medical History:   Diagnosis Date   • Cervical spondylosis with myelopathy    • Cervical stenosis of spine    • COPD (chronic obstructive pulmonary disease) (HCC)    • Diabetes mellitus (HCC)    • Diabetes mellitus with diabetic dermatitis (HCC)    • Disease of thyroid gland     Hypothyroid   • Elevated cholesterol    • Fatty liver    • Hepatic steatosis    • Hyperlipidemia    • Hypertension    • Lung cancer " "(HCC)     Stage IIIA non-small cell lung cancer   • Malignant neoplasm of lower lobe of left lung (HCC) 2023   • Severe aortic stenosis     followed by    • Slow to wake up after anesthesia        Past Surgical History:   Procedure Laterality Date   • ANTERIOR CHANNEL VERTEBRECTOMY/CORPECTOMY N/A 2022    Procedure: Anterior cervical corpectomy, cervical four/five/six, with cage and plate from cervical three to cervical seven;  Surgeon: David Cheung MD;  Location: [facility] MAIN OR;  Service: Neurosurgery;  Laterality: N/A;   • CARDIAC CATHETERIZATION N/A 2023    Procedure: Right and Left Heart Cath;  Surgeon: Kostas David MD;  [facility];  Service: Cardiovascular;  Laterality: N/A;   • CARDIAC CATHETERIZATION N/A 2023    Procedure: Coronary angiography;  Surgeon: Kostas David MD;  [facility];  Service: Cardiovascular;  Laterality: N/A;   • CARDIAC CATHETERIZATION N/A 2023    Procedure: Left ventriculography;  Surgeon: Kostas David MD;  Location: [facility] CATH INVASIVE LOCATION;  Service: Cardiovascular;  Laterality: N/A;   • CARDIAC CATHETERIZATION N/A 2023    Procedure: Resting Full Cycle Ratio;  Surgeon: Kostas David MD;  Location: [facility] CATH INVASIVE LOCATION;  Service: Cardiovascular;  Laterality: N/A;   • CATARACT EXTRACTION     •  SECTION     • CHOLECYSTECTOMY     • VENOUS ACCESS DEVICE (PORT) INSERTION Left 3/2/2023    Procedure: INSERTION OF PORTACATH;  Surgeon: Collin Shook MD;  Location: [facility] MAIN OR;  Service: General;  Laterality: Left;          Anthropometrics        Current Height Ht Readings from Last 1 Encounters:   23 157.5 cm (62.01\")      Current Weight Wt Readings from Last 1 Encounters:   23 105 kg (231 lb 6.4 oz)      BMI  42   Ideal Body Weight (IBW) 110 lb   Usual Body Weight (UBW) 275 lb   Weight Change/Trend Loss, Other: " intentional weight loss   Weight History Wt Readings from Last 30 Encounters:   03/28/23 1027 105 kg (231 lb 6.4 oz)   03/21/23 1018 108 kg (237 lb 3.2 oz)   03/08/23 1440 111 kg (244 lb 9.6 oz)   03/01/23 1027 112 kg (247 lb)   02/23/23 0906 112 kg (247 lb)   02/14/23 1153 110 kg (243 lb)   02/02/23 1207 112 kg (246 lb 11.2 oz)   01/11/23 1524 125 kg (275 lb)   01/10/23 1255 125 kg (275 lb 12.7 oz)   12/01/22 1421 125 kg (275 lb)   11/16/22 1155 125 kg (275 lb)   10/27/22 1520 125 kg (275 lb)   09/27/22 1300 125 kg (275 lb 9.2 oz)   09/27/22 0620 125 kg (275 lb 2.2 oz)   09/22/22 0532 124 kg (272 lb 7.8 oz)   09/21/22 0655 122 kg (269 lb 10 oz)   09/20/22 0332 121 kg (267 lb)   09/19/22 0157 120 kg (264 lb 3.2 oz)   09/18/22 0536 120 kg (263 lb 11.2 oz)   09/17/22 0644 118 kg (259 lb 8 oz)   09/16/22 0600 120 kg (265 lb)   09/15/22 0003 121 kg (266 lb 1.6 oz)   09/14/22 1743 120 kg (264 lb 3.2 oz)   09/14/22 1321 135 kg (297 lb)   04/05/13 1615 135 kg (297 lb 0.1 oz)          Medications           Current medications: albuterol sulfate HFA, amLODIPine, buPROPion XL, diphenhydrAMINE, escitalopram, fluticasone, gabapentin, guaiFENesin, hydrOXYzine, ipratropium-albuterol, levothyroxine, lidocaine-prilocaine, losartan, lovastatin, metFORMIN, ondansetron, pyridoxine, traMADol, and vitamin D3                 Tests/Procedures        Tests/Procedures Chemotherapy, Radiation     Labs       Pertinent Labs    Results from last 7 days   Lab Units 03/28/23  1016   SODIUM mmol/L 137   POTASSIUM mmol/L 4.5   CHLORIDE mmol/L 100   CO2 mmol/L 24.5   BUN mg/dL 18   CREATININE mg/dL 0.90   CALCIUM mg/dL 9.9   BILIRUBIN mg/dL 0.5   ALK PHOS U/L 60   ALT (SGPT) U/L 15   AST (SGOT) U/L 22   GLUCOSE mg/dL 120     Results from last 7 days   Lab Units 03/28/23  1016   HEMOGLOBIN g/dL 14.1   HEMATOCRIT % 42.6   WBC 10*3/mm3 4.90   ALBUMIN g/dL 4.4     Results from last 7 days   Lab Units 03/28/23  1016   PLATELETS 10*3/mm3 163     No  results found for: COVID19  Lab Results   Component Value Date    HGBA1C 6.80 (H) 09/15/2022          Physical Findings        Physical Appearance alert wheelchair     Edema  no edema   Gastrointestinal None   Tubes/Drains none   Oral/Mouth Cavity WNL   Skin        Estimated/Assessed Needs        Energy Requirements    Height for Calculation      Weight for Calculation 105 kg   Method for Estimation  20 kcal/kg   EST Needs (kcal/day) 1572-5154       Protein Requirements    Weight for Calculation 105 kg   EST Protein Needs (g/kg) 0.8 gm/kg   EST Daily Needs (g/day) 80-90 g       Fluid Requirements     Method for Estimation 1 mL/kcal    Estimated Needs (mL/day) 2000           PES STATEMENT / NUTRITION DIAGNOSIS      Nutrition Dx Problem Problem:    Knowledge Deficit, Overweight/Obesity, Unintentional Weight Loss and Altered GI Function    Etiology:  Medical diagnosis: Lung cancer  Factors affecting nutrition: Reported/Observed By, Appetite, Food Habit/Preferences      Signs/Symptoms:  Information Deficit and Report/Observation eating smaller portions and not as often     Comment:      NUTRITION INTERVENTION / PLAN OF CARE      Intervention Goal(s) Provide information, Reduce/improve symptoms, Meet estimated needs, Disease management/therapy and Maintain weight         RD Intervention/Action Encouraged intake, Follow Tx progress, Recommended/ordered         Recommendations:       PO Diet 5-6 meals per day, protein at meals at snacks      Supplements CIB      Snacks          Monitor/Evaluation PO intake, Supplement intake, Weight, Symptoms, Follow up as needed   Education Education provided   --    RD to follow per protocol.    Electronically signed by:  Mag Waterman RD, LD  03/28/23 15:08 EDT

## 2023-03-28 NOTE — PROGRESS NOTES
Physician Weekly Management Note    Diagnosis:     Diagnosis Plan   1. Malignant neoplasm of lower lobe of left lung (HCC)            RT Details:  fx 6/30 mediastinuem and left lung 12/60Gy    Notes on Treatment course, Films, Medical progress:  kps 70% doing ok, no pain out of normal, cont on saw ENT and normal scope, losing weight, followed by nuritionarlet Escudero on     Weekly Management:  Medication reviewed?   Yes  New medications given?   No  Problemlist reviewed?   Yes  Problem added?   No  Issues raised requiring referral to support services - task assigned to:  na    Technical aspects reviewed:  Weekly OBI approved?   Yes  Weekly port films approved?   Yes  Change requests noted on port film?   No  Patient setup and plan reviewed?   Yes    Chart Reviewed:  Continue current treatment plan?   Yes  Treatment plan change requested?   No  CBC reviewed?   Yes  Concurrent Chemo?   Yes    Objective     Toxicities:   none     Review of Systems   Constitutional: Positive for unexpected weight change.   Respiratory: Negative.    Musculoskeletal: Positive for arthralgias.   Psychiatric/Behavioral: Negative.           There were no vitals filed for this visit.    Current Status 3/21/2023   ECOG score 1       Physical Exam  Constitutional:       Appearance: Normal appearance.   Pulmonary:      Effort: Pulmonary effort is normal.      Breath sounds: Normal breath sounds.   Neurological:      General: No focal deficit present.      Mental Status: She is alert.             Problem Summary List    Diagnosis:     Diagnosis Plan   1. Malignant neoplasm of lower lobe of left lung (HCC)          Pathology:   Non small cell lung cancer     Past Medical History:   Diagnosis Date   • Cervical spondylosis with myelopathy    • Cervical stenosis of spine    • COPD (chronic obstructive pulmonary disease) (HCC)    • Diabetes mellitus (HCC)    • Diabetes mellitus with diabetic dermatitis (HCC)    • Disease of thyroid gland      Hypothyroid   • Elevated cholesterol    • Fatty liver    • Hepatic steatosis    • Hyperlipidemia    • Hypertension    • Lung cancer (HCC)     Stage IIIA non-small cell lung cancer   • Malignant neoplasm of lower lobe of left lung (HCC) 2023   • Severe aortic stenosis     followed by    • Slow to wake up after anesthesia          Past Surgical History:   Procedure Laterality Date   • ANTERIOR CHANNEL VERTEBRECTOMY/CORPECTOMY N/A 2022    Procedure: Anterior cervical corpectomy, cervical four/five/six, with cage and plate from cervical three to cervical seven;  Surgeon: David hCeung MD;  Location: [facility] MAIN OR;  Service: Neurosurgery;  Laterality: N/A;   • CARDIAC CATHETERIZATION N/A 2023    Procedure: Right and Left Heart Cath;  Surgeon: Kostas David MD;  [facility];  Service: Cardiovascular;  Laterality: N/A;   • CARDIAC CATHETERIZATION N/A 2023    Procedure: Coronary angiography;  Surgeon: Kostas David MD;  [facility];  Service: Cardiovascular;  Laterality: N/A;   • CARDIAC CATHETERIZATION N/A 2023    Procedure: Left ventriculography;  Surgeon: Kostas David MD;  [facility];  Service: Cardiovascular;  Laterality: N/A;   • CARDIAC CATHETERIZATION N/A 2023    Procedure: Resting Full Cycle Ratio;  Surgeon: Kostas David MD;  [facility];  Service: Cardiovascular;  Laterality: N/A;   • CATARACT EXTRACTION     •  SECTION     • CHOLECYSTECTOMY     • VENOUS ACCESS DEVICE (PORT) INSERTION Left 3/2/2023    Procedure: INSERTION OF PORTACATH;  Surgeon: Collin Shook MD;  Location: [facility] MAIN OR;  Service: General;  Laterality: Left;         Current Outpatient Medications on File Prior to Visit   Medication Sig Dispense Refill   • albuterol sulfate  (90 Base) MCG/ACT inhaler      • amLODIPine (NORVASC) 5 MG  tablet Take 1 tablet by mouth Daily.     • buPROPion XL (WELLBUTRIN XL) 150 MG 24 hr tablet Take 1 tablet by mouth Daily. 30 tablet 0   • diphenhydrAMINE (BENADRYL) 25 mg capsule Take 1 capsule by mouth Every 6 (Six) Hours As Needed for Allergies.     • fluticasone (FLONASE) 50 MCG/ACT nasal spray INSTILL TWO (2) SPRAYS INTO NOSE DAILY.     • gabapentin (Neurontin) 300 MG capsule Take 1 capsule by mouth 3 (Three) Times a Day. 90 capsule 2   • guaiFENesin (MUCINEX) 600 MG 12 hr tablet Take 1 tablet by mouth Every 12 (Twelve) Hours. 14 tablet 0   • hydrOXYzine (ATARAX) 10 MG tablet Take 5 mg by mouth As Needed for Itching.     • ipratropium-albuterol (DUO-NEB) 0.5-2.5 mg/3 ml nebulizer Take 3 mL by nebulization 4 (Four) Times a Day. 360 mL 0   • levothyroxine (SYNTHROID, LEVOTHROID) 125 MCG tablet Take 1 tablet by mouth Daily.     • lidocaine-prilocaine (EMLA) 2.5-2.5 % cream Apply maria g-sized amount to Mediport site 30 min prior to port access. Do not rub in. 30 g 2   • losartan (COZAAR) 100 MG tablet Take 1 tablet by mouth Every Night.     • lovastatin (MEVACOR) 40 MG tablet TAKE ONE (1) TABLET BY MOUTH NIGHTLY.     • metFORMIN (GLUCOPHAGE) 850 MG tablet Take 1 tablet by mouth Daily.     • ondansetron (ZOFRAN) 8 MG tablet Take 1 tablet by mouth Every 8 (Eight) Hours As Needed for Nausea or Vomiting. 30 tablet 2   • pyridoxine (VITAMIN B-6) 100 MG tablet Take 1 tablet by mouth Daily.     • traMADol (ULTRAM) 50 MG tablet Take 1 tablet by mouth Every 6 (Six) Hours As Needed for Moderate Pain. 12 tablet 0   • vitamin D3 125 MCG (5000 UT) capsule capsule Take 1 capsule by mouth Daily.       No current facility-administered medications on file prior to visit.       Allergies   Allergen Reactions   • Nsaids Other (See Comments)     No NSAIDs per Cardiology.         Referring Provider:    No referring provider defined for this encounter.    Oncologist:  No primary care provider on file.      Seen and approved by:  Ree  MONTSERRAT Banerjee MD  03/28/2023  Subjective     No ref. provider found

## 2023-03-29 LAB
RAD ONC ARIA COURSE ID: NORMAL
RAD ONC ARIA COURSE INTENT: NORMAL
RAD ONC ARIA COURSE LAST TREATMENT DATE: NORMAL
RAD ONC ARIA COURSE START DATE: NORMAL
RAD ONC ARIA COURSE TREATMENT ELAPSED DAYS: 8
RAD ONC ARIA FIRST TREATMENT DATE: NORMAL
RAD ONC ARIA PLAN FRACTIONS TREATED TO DATE: 7
RAD ONC ARIA PLAN ID: NORMAL
RAD ONC ARIA PLAN PRESCRIBED DOSE PER FRACTION: 2 GY
RAD ONC ARIA PLAN PRIMARY REFERENCE POINT: NORMAL
RAD ONC ARIA PLAN TOTAL FRACTIONS PRESCRIBED: 30
RAD ONC ARIA PLAN TOTAL PRESCRIBED DOSE: 6000 CGY
RAD ONC ARIA REFERENCE POINT DOSAGE GIVEN TO DATE: 14 GY
RAD ONC ARIA REFERENCE POINT DOSAGE GIVEN TO DATE: 14.08 GY
RAD ONC ARIA REFERENCE POINT ID: NORMAL
RAD ONC ARIA REFERENCE POINT ID: NORMAL
RAD ONC ARIA REFERENCE POINT SESSION DOSAGE GIVEN: 2 GY
RAD ONC ARIA REFERENCE POINT SESSION DOSAGE GIVEN: 2.01 GY

## 2023-03-29 PROCEDURE — 77014 CHG CT GUIDANCE RADIATION THERAPY FLDS PLACEMENT: CPT | Performed by: RADIOLOGY

## 2023-03-29 PROCEDURE — 77386: CPT | Performed by: RADIOLOGY

## 2023-03-29 PROCEDURE — 77386 CHG INTENSITY MODULATED RADIATION TX DLVR COMPLEX: CPT | Performed by: RADIOLOGY

## 2023-03-30 LAB
RAD ONC ARIA COURSE ID: NORMAL
RAD ONC ARIA COURSE INTENT: NORMAL
RAD ONC ARIA COURSE LAST TREATMENT DATE: NORMAL
RAD ONC ARIA COURSE START DATE: NORMAL
RAD ONC ARIA COURSE TREATMENT ELAPSED DAYS: 9
RAD ONC ARIA FIRST TREATMENT DATE: NORMAL
RAD ONC ARIA PLAN FRACTIONS TREATED TO DATE: 8
RAD ONC ARIA PLAN ID: NORMAL
RAD ONC ARIA PLAN PRESCRIBED DOSE PER FRACTION: 2 GY
RAD ONC ARIA PLAN PRIMARY REFERENCE POINT: NORMAL
RAD ONC ARIA PLAN TOTAL FRACTIONS PRESCRIBED: 30
RAD ONC ARIA PLAN TOTAL PRESCRIBED DOSE: 6000 CGY
RAD ONC ARIA REFERENCE POINT DOSAGE GIVEN TO DATE: 16 GY
RAD ONC ARIA REFERENCE POINT DOSAGE GIVEN TO DATE: 16.09 GY
RAD ONC ARIA REFERENCE POINT ID: NORMAL
RAD ONC ARIA REFERENCE POINT ID: NORMAL
RAD ONC ARIA REFERENCE POINT SESSION DOSAGE GIVEN: 2 GY
RAD ONC ARIA REFERENCE POINT SESSION DOSAGE GIVEN: 2.01 GY

## 2023-03-30 PROCEDURE — 77336 RADIATION PHYSICS CONSULT: CPT | Performed by: RADIOLOGY

## 2023-03-30 PROCEDURE — 77014 CHG CT GUIDANCE RADIATION THERAPY FLDS PLACEMENT: CPT | Performed by: RADIOLOGY

## 2023-03-30 PROCEDURE — 77386: CPT | Performed by: RADIOLOGY

## 2023-03-30 PROCEDURE — 77386 CHG INTENSITY MODULATED RADIATION TX DLVR COMPLEX: CPT | Performed by: RADIOLOGY

## 2023-03-31 ENCOUNTER — TREATMENT (OUTPATIENT)
Dept: RADIATION ONCOLOGY | Facility: HOSPITAL | Age: 68
End: 2023-03-31
Payer: MEDICARE

## 2023-03-31 LAB
RAD ONC ARIA COURSE ID: NORMAL
RAD ONC ARIA COURSE INTENT: NORMAL
RAD ONC ARIA COURSE LAST TREATMENT DATE: NORMAL
RAD ONC ARIA COURSE START DATE: NORMAL
RAD ONC ARIA COURSE TREATMENT ELAPSED DAYS: 10
RAD ONC ARIA FIRST TREATMENT DATE: NORMAL
RAD ONC ARIA PLAN FRACTIONS TREATED TO DATE: 9
RAD ONC ARIA PLAN ID: NORMAL
RAD ONC ARIA PLAN PRESCRIBED DOSE PER FRACTION: 2 GY
RAD ONC ARIA PLAN PRIMARY REFERENCE POINT: NORMAL
RAD ONC ARIA PLAN TOTAL FRACTIONS PRESCRIBED: 30
RAD ONC ARIA PLAN TOTAL PRESCRIBED DOSE: 6000 CGY
RAD ONC ARIA REFERENCE POINT DOSAGE GIVEN TO DATE: 18 GY
RAD ONC ARIA REFERENCE POINT DOSAGE GIVEN TO DATE: 18.1 GY
RAD ONC ARIA REFERENCE POINT ID: NORMAL
RAD ONC ARIA REFERENCE POINT ID: NORMAL
RAD ONC ARIA REFERENCE POINT SESSION DOSAGE GIVEN: 2 GY
RAD ONC ARIA REFERENCE POINT SESSION DOSAGE GIVEN: 2.01 GY

## 2023-03-31 PROCEDURE — 77386: CPT | Performed by: RADIOLOGY

## 2023-03-31 PROCEDURE — 77386 CHG INTENSITY MODULATED RADIATION TX DLVR COMPLEX: CPT | Performed by: RADIOLOGY

## 2023-03-31 PROCEDURE — 77014 CHG CT GUIDANCE RADIATION THERAPY FLDS PLACEMENT: CPT | Performed by: RADIOLOGY

## 2023-04-01 ENCOUNTER — APPOINTMENT (OUTPATIENT)
Dept: RADIATION ONCOLOGY | Facility: HOSPITAL | Age: 68
End: 2023-04-01
Payer: MEDICARE

## 2023-04-03 ENCOUNTER — TREATMENT (OUTPATIENT)
Dept: RADIATION ONCOLOGY | Facility: HOSPITAL | Age: 68
End: 2023-04-03
Payer: MEDICARE

## 2023-04-03 LAB
RAD ONC ARIA COURSE ID: NORMAL
RAD ONC ARIA COURSE INTENT: NORMAL
RAD ONC ARIA COURSE LAST TREATMENT DATE: NORMAL
RAD ONC ARIA COURSE START DATE: NORMAL
RAD ONC ARIA COURSE TREATMENT ELAPSED DAYS: 13
RAD ONC ARIA FIRST TREATMENT DATE: NORMAL
RAD ONC ARIA PLAN FRACTIONS TREATED TO DATE: 10
RAD ONC ARIA PLAN ID: NORMAL
RAD ONC ARIA PLAN PRESCRIBED DOSE PER FRACTION: 2 GY
RAD ONC ARIA PLAN PRIMARY REFERENCE POINT: NORMAL
RAD ONC ARIA PLAN TOTAL FRACTIONS PRESCRIBED: 30
RAD ONC ARIA PLAN TOTAL PRESCRIBED DOSE: 6000 CGY
RAD ONC ARIA REFERENCE POINT DOSAGE GIVEN TO DATE: 20 GY
RAD ONC ARIA REFERENCE POINT DOSAGE GIVEN TO DATE: 20.11 GY
RAD ONC ARIA REFERENCE POINT ID: NORMAL
RAD ONC ARIA REFERENCE POINT ID: NORMAL
RAD ONC ARIA REFERENCE POINT SESSION DOSAGE GIVEN: 2 GY
RAD ONC ARIA REFERENCE POINT SESSION DOSAGE GIVEN: 2.01 GY

## 2023-04-03 PROCEDURE — 77014 CHG CT GUIDANCE RADIATION THERAPY FLDS PLACEMENT: CPT | Performed by: STUDENT IN AN ORGANIZED HEALTH CARE EDUCATION/TRAINING PROGRAM

## 2023-04-03 PROCEDURE — 77386: CPT | Performed by: STUDENT IN AN ORGANIZED HEALTH CARE EDUCATION/TRAINING PROGRAM

## 2023-04-04 ENCOUNTER — INFUSION (OUTPATIENT)
Dept: ONCOLOGY | Facility: HOSPITAL | Age: 68
End: 2023-04-04
Payer: MEDICARE

## 2023-04-04 ENCOUNTER — OFFICE VISIT (OUTPATIENT)
Dept: ONCOLOGY | Facility: CLINIC | Age: 68
End: 2023-04-04
Payer: MEDICARE

## 2023-04-04 ENCOUNTER — TREATMENT (OUTPATIENT)
Dept: RADIATION ONCOLOGY | Facility: HOSPITAL | Age: 68
End: 2023-04-04
Payer: MEDICARE

## 2023-04-04 ENCOUNTER — RADIATION ONCOLOGY WEEKLY ASSESSMENT (OUTPATIENT)
Dept: RADIATION ONCOLOGY | Facility: HOSPITAL | Age: 68
End: 2023-04-04
Payer: MEDICARE

## 2023-04-04 VITALS
HEART RATE: 87 BPM | DIASTOLIC BLOOD PRESSURE: 85 MMHG | WEIGHT: 238 LBS | OXYGEN SATURATION: 96 % | BODY MASS INDEX: 43.52 KG/M2 | SYSTOLIC BLOOD PRESSURE: 141 MMHG

## 2023-04-04 VITALS
BODY MASS INDEX: 43.61 KG/M2 | HEIGHT: 62 IN | DIASTOLIC BLOOD PRESSURE: 61 MMHG | TEMPERATURE: 96.9 F | HEART RATE: 87 BPM | SYSTOLIC BLOOD PRESSURE: 100 MMHG | OXYGEN SATURATION: 93 % | RESPIRATION RATE: 16 BRPM | WEIGHT: 237 LBS

## 2023-04-04 DIAGNOSIS — D69.59 CHEMOTHERAPY-INDUCED THROMBOCYTOPENIA: ICD-10-CM

## 2023-04-04 DIAGNOSIS — Z45.2 ENCOUNTER FOR ADJUSTMENT OR MANAGEMENT OF VASCULAR ACCESS DEVICE: ICD-10-CM

## 2023-04-04 DIAGNOSIS — T45.1X5A CHEMOTHERAPY-INDUCED THROMBOCYTOPENIA: ICD-10-CM

## 2023-04-04 DIAGNOSIS — C34.32 MALIGNANT NEOPLASM OF LOWER LOBE OF LEFT LUNG: Primary | ICD-10-CM

## 2023-04-04 DIAGNOSIS — C34.32 MALIGNANT NEOPLASM OF LOWER LOBE OF LEFT LUNG: ICD-10-CM

## 2023-04-04 LAB
ALBUMIN SERPL-MCNC: 3.9 G/DL (ref 3.5–5.2)
ALBUMIN/GLOB SERPL: 1.5 G/DL (ref 1.1–2.4)
ALP SERPL-CCNC: 52 U/L (ref 38–116)
ALT SERPL W P-5'-P-CCNC: 14 U/L (ref 0–33)
ANION GAP SERPL CALCULATED.3IONS-SCNC: 10.7 MMOL/L (ref 5–15)
AST SERPL-CCNC: 17 U/L (ref 0–32)
BASOPHILS # BLD AUTO: 0.03 10*3/MM3 (ref 0–0.2)
BASOPHILS NFR BLD AUTO: 0.9 % (ref 0–1.5)
BILIRUB SERPL-MCNC: 0.3 MG/DL (ref 0.2–1.2)
BUN SERPL-MCNC: 22 MG/DL (ref 6–20)
BUN/CREAT SERPL: 24.2 (ref 7.3–30)
CALCIUM SPEC-SCNC: 9.4 MG/DL (ref 8.5–10.2)
CHLORIDE SERPL-SCNC: 101 MMOL/L (ref 98–107)
CO2 SERPL-SCNC: 26.3 MMOL/L (ref 22–29)
CREAT SERPL-MCNC: 0.91 MG/DL (ref 0.6–1.1)
DEPRECATED RDW RBC AUTO: 40.2 FL (ref 37–54)
EGFRCR SERPLBLD CKD-EPI 2021: 69.3 ML/MIN/1.73
EOSINOPHIL # BLD AUTO: 0.05 10*3/MM3 (ref 0–0.4)
EOSINOPHIL NFR BLD AUTO: 1.5 % (ref 0.3–6.2)
ERYTHROCYTE [DISTWIDTH] IN BLOOD BY AUTOMATED COUNT: 12.5 % (ref 12.3–15.4)
GLOBULIN UR ELPH-MCNC: 2.6 GM/DL (ref 1.8–3.5)
GLUCOSE SERPL-MCNC: 150 MG/DL (ref 74–124)
HCT VFR BLD AUTO: 37.9 % (ref 34–46.6)
HGB BLD-MCNC: 12.5 G/DL (ref 12–15.9)
IMM GRANULOCYTES # BLD AUTO: 0.01 10*3/MM3 (ref 0–0.05)
IMM GRANULOCYTES NFR BLD AUTO: 0.3 % (ref 0–0.5)
LYMPHOCYTES # BLD AUTO: 0.63 10*3/MM3 (ref 0.7–3.1)
LYMPHOCYTES NFR BLD AUTO: 18.4 % (ref 19.6–45.3)
MCH RBC QN AUTO: 29.6 PG (ref 26.6–33)
MCHC RBC AUTO-ENTMCNC: 33 G/DL (ref 31.5–35.7)
MCV RBC AUTO: 89.6 FL (ref 79–97)
MONOCYTES # BLD AUTO: 0.4 10*3/MM3 (ref 0.1–0.9)
MONOCYTES NFR BLD AUTO: 11.7 % (ref 5–12)
NEUTROPHILS NFR BLD AUTO: 2.31 10*3/MM3 (ref 1.7–7)
NEUTROPHILS NFR BLD AUTO: 67.2 % (ref 42.7–76)
NRBC BLD AUTO-RTO: 0 /100 WBC (ref 0–0.2)
PLATELET # BLD AUTO: 97 10*3/MM3 (ref 140–450)
PMV BLD AUTO: 10.4 FL (ref 6–12)
POTASSIUM SERPL-SCNC: 4.2 MMOL/L (ref 3.5–4.7)
PROT SERPL-MCNC: 6.5 G/DL (ref 6.3–8)
RAD ONC ARIA COURSE ID: NORMAL
RAD ONC ARIA COURSE INTENT: NORMAL
RAD ONC ARIA COURSE LAST TREATMENT DATE: NORMAL
RAD ONC ARIA COURSE START DATE: NORMAL
RAD ONC ARIA COURSE TREATMENT ELAPSED DAYS: 14
RAD ONC ARIA FIRST TREATMENT DATE: NORMAL
RAD ONC ARIA PLAN FRACTIONS TREATED TO DATE: 11
RAD ONC ARIA PLAN ID: NORMAL
RAD ONC ARIA PLAN PRESCRIBED DOSE PER FRACTION: 2 GY
RAD ONC ARIA PLAN PRIMARY REFERENCE POINT: NORMAL
RAD ONC ARIA PLAN TOTAL FRACTIONS PRESCRIBED: 30
RAD ONC ARIA PLAN TOTAL PRESCRIBED DOSE: 6000 CGY
RAD ONC ARIA REFERENCE POINT DOSAGE GIVEN TO DATE: 22 GY
RAD ONC ARIA REFERENCE POINT DOSAGE GIVEN TO DATE: 22.13 GY
RAD ONC ARIA REFERENCE POINT ID: NORMAL
RAD ONC ARIA REFERENCE POINT ID: NORMAL
RAD ONC ARIA REFERENCE POINT SESSION DOSAGE GIVEN: 2 GY
RAD ONC ARIA REFERENCE POINT SESSION DOSAGE GIVEN: 2.01 GY
RBC # BLD AUTO: 4.23 10*6/MM3 (ref 3.77–5.28)
SODIUM SERPL-SCNC: 138 MMOL/L (ref 134–145)
WBC NRBC COR # BLD: 3.43 10*3/MM3 (ref 3.4–10.8)

## 2023-04-04 PROCEDURE — 1159F MED LIST DOCD IN RCRD: CPT | Performed by: NURSE PRACTITIONER

## 2023-04-04 PROCEDURE — 3074F SYST BP LT 130 MM HG: CPT | Performed by: NURSE PRACTITIONER

## 2023-04-04 PROCEDURE — 77014 CHG CT GUIDANCE RADIATION THERAPY FLDS PLACEMENT: CPT | Performed by: RADIOLOGY

## 2023-04-04 PROCEDURE — 1160F RVW MEDS BY RX/DR IN RCRD: CPT | Performed by: NURSE PRACTITIONER

## 2023-04-04 PROCEDURE — 96375 TX/PRO/DX INJ NEW DRUG ADDON: CPT

## 2023-04-04 PROCEDURE — 25010000002 HEPARIN LOCK FLUSH PER 10 UNITS: Performed by: INTERNAL MEDICINE

## 2023-04-04 PROCEDURE — 80053 COMPREHEN METABOLIC PANEL: CPT

## 2023-04-04 PROCEDURE — 77386: CPT | Performed by: RADIOLOGY

## 2023-04-04 PROCEDURE — 25010000002 DEXAMETHASONE SODIUM PHOSPHATE 100 MG/10ML SOLUTION: Performed by: NURSE PRACTITIONER

## 2023-04-04 PROCEDURE — 99214 OFFICE O/P EST MOD 30 MIN: CPT | Performed by: NURSE PRACTITIONER

## 2023-04-04 PROCEDURE — 3078F DIAST BP <80 MM HG: CPT | Performed by: NURSE PRACTITIONER

## 2023-04-04 PROCEDURE — 85025 COMPLETE CBC W/AUTO DIFF WBC: CPT

## 2023-04-04 PROCEDURE — 1126F AMNT PAIN NOTED NONE PRSNT: CPT | Performed by: NURSE PRACTITIONER

## 2023-04-04 PROCEDURE — 96413 CHEMO IV INFUSION 1 HR: CPT

## 2023-04-04 PROCEDURE — 77386 CHG INTENSITY MODULATED RADIATION TX DLVR COMPLEX: CPT | Performed by: RADIOLOGY

## 2023-04-04 PROCEDURE — 25010000002 CARBOPLATIN PER 50 MG: Performed by: NURSE PRACTITIONER

## 2023-04-04 PROCEDURE — 25010000002 PALONOSETRON PER 25 MCG: Performed by: NURSE PRACTITIONER

## 2023-04-04 RX ORDER — SODIUM CHLORIDE 9 MG/ML
250 INJECTION, SOLUTION INTRAVENOUS ONCE
Status: COMPLETED | OUTPATIENT
Start: 2023-04-04 | End: 2023-04-04

## 2023-04-04 RX ORDER — PALONOSETRON 0.05 MG/ML
0.25 INJECTION, SOLUTION INTRAVENOUS ONCE
Status: COMPLETED | OUTPATIENT
Start: 2023-04-04 | End: 2023-04-04

## 2023-04-04 RX ORDER — SODIUM CHLORIDE 0.9 % (FLUSH) 0.9 %
10 SYRINGE (ML) INJECTION AS NEEDED
Status: DISCONTINUED | OUTPATIENT
Start: 2023-04-04 | End: 2023-04-04 | Stop reason: HOSPADM

## 2023-04-04 RX ORDER — SODIUM CHLORIDE 0.9 % (FLUSH) 0.9 %
10 SYRINGE (ML) INJECTION AS NEEDED
Status: CANCELLED | OUTPATIENT
Start: 2023-04-04

## 2023-04-04 RX ORDER — HEPARIN SODIUM (PORCINE) LOCK FLUSH IV SOLN 100 UNIT/ML 100 UNIT/ML
500 SOLUTION INTRAVENOUS AS NEEDED
Status: DISCONTINUED | OUTPATIENT
Start: 2023-04-04 | End: 2023-04-04 | Stop reason: HOSPADM

## 2023-04-04 RX ORDER — PALONOSETRON 0.05 MG/ML
0.25 INJECTION, SOLUTION INTRAVENOUS ONCE
Status: CANCELLED | OUTPATIENT
Start: 2023-04-04

## 2023-04-04 RX ORDER — SODIUM CHLORIDE 9 MG/ML
250 INJECTION, SOLUTION INTRAVENOUS ONCE
Status: CANCELLED | OUTPATIENT
Start: 2023-04-04

## 2023-04-04 RX ORDER — HEPARIN SODIUM (PORCINE) LOCK FLUSH IV SOLN 100 UNIT/ML 100 UNIT/ML
500 SOLUTION INTRAVENOUS AS NEEDED
Status: CANCELLED | OUTPATIENT
Start: 2023-04-04

## 2023-04-04 RX ADMIN — CARBOPLATIN 250 MG: 10 INJECTION, SOLUTION INTRAVENOUS at 12:53

## 2023-04-04 RX ADMIN — SODIUM CHLORIDE 250 ML: 9 INJECTION, SOLUTION INTRAVENOUS at 12:34

## 2023-04-04 RX ADMIN — Medication 500 UNITS: at 13:27

## 2023-04-04 RX ADMIN — DEXAMETHASONE SODIUM PHOSPHATE 12 MG: 10 INJECTION, SOLUTION INTRAMUSCULAR; INTRAVENOUS at 12:34

## 2023-04-04 RX ADMIN — Medication 10 ML: at 13:27

## 2023-04-04 RX ADMIN — PALONOSETRON 0.25 MG: 0.05 INJECTION, SOLUTION INTRAVENOUS at 12:34

## 2023-04-04 NOTE — PROGRESS NOTES
"Chief Complaint  Non-small cell lung cancer, clinical stage IIIA ([id number]), aortic stenosis, cervical spine stenosis, COPD, hepatic steatosis, diabetes mellitus    Subjective        History of Present Illness   Patient is here today for lab review and evaluation prior to week 3 concurrent chemoradiation with low-dose single agent carboplatin.  Taxol omitted to avoid neurotoxicity due to underlying neuropathic changes from severe cervical stenosis.  Overall, she continues to tolerate therapy quite well.  Her only noticeable side effect is some mild fatigue.  She also reports some occasional episodes of diarrhea however this is a chronic issue for her.  She otherwise denies any problems or concerns today.  She denies bleeding issues.        Objective   Vital Signs:   /61   Pulse 87   Temp 96.9 °F (36.1 °C) (Temporal)   Resp 16   Ht 157.5 cm (62.01\")   Wt 108 kg (237 lb) Comment: states  SpO2 93%   BMI 43.34 kg/m²     Physical Exam  Vitals reviewed.   Constitutional:       General: She is not in acute distress.     Appearance: Normal appearance. She is well-developed. She is obese.      Comments: Seated in wheelchair   HENT:      Head: Normocephalic and atraumatic.   Eyes:      Pupils: Pupils are equal, round, and reactive to light.   Cardiovascular:      Rate and Rhythm: Normal rate and regular rhythm.      Heart sounds: Normal heart sounds. No murmur heard.  Pulmonary:      Effort: Pulmonary effort is normal. No respiratory distress.      Breath sounds: Normal breath sounds.   Musculoskeletal:         General: Normal range of motion.      Cervical back: Normal range of motion.   Skin:     General: Skin is warm and dry.      Findings: No rash.   Neurological:      Mental Status: She is alert and oriented to person, place, and time.           Result Review :   CBC and Differential (04/04/2023 11:09)       Assessment and Plan     *Non-small cell lung cancer, clinical stage IIIA ([id number])  • Patient has " history of smoking 1 pack/day x 45 years, quit in November 2021.    • Following anterior cervical corpectomy in September 2022, she experienced acute hypoxemic respiratory failure.    • CT angiogram chest 9/25/2022 showed a concerning 1.4 cm left lower lobe pulmonary nodule, mediastinal lymphadenopathy with infracarinal lymph node 2.7 cm, left hilar lymph node 1.2 cm, small right hilar lymph nodes up from 1.1 cm and additional enlarged mediastinal nodes in the right paratracheal, precarinal, infracarinal spaces.  There was a wedge-shaped area of consolidation/atelectasis in the right middle lobe base, subpleural density left costophrenic sulcus felt to represent atelectasis.  It was recommended for her to undergo subsequent PET scan by pulmonary.  • The patient has had ongoing follow-up with neurosurgery and on CT scans 11/29/2022 of cervical and thoracic spine, there was concern regarding spinal instability with potential need for posterior cervical fusion.  This was scheduled in December 2022 however was delayed due to upper respiratory infection.  • Patient admitted on 1/10/2023 to address multiple medical issues in order to prepare for potential neurosurgical procedure on 3/12/2023.  • Patient underwent echocardiogram on 1/11/2023 with ejection fraction greater than 70%, grade 1 diastolic dysfunction, severe aortic stenosis.  Patient is being followed by cardiology with consideration of need for TAVR versus SAVR for moderate to severe aortic stenosis.  • Repeat CT chest on 1/12/2023 showed slight increase in size of the left lower lobe pulmonary nodule increased from 1.4 to 1.5 cm.  Groundglass nodule peripheral left lower lobe less conspicuous and resolution of previous reticulonodular opacities right lower lobe.  There was further enlargement of mediastinal lymph nodes with right posterior paratracheal lymph node 1.4 x 2.1 up to 1.5 x 2.3 cm, subcarinal lymph node increased from 2.7 x 5.2 up to 3.2 x 5.8 cm,  left hilar lymph node increased from 1.2 up to 1.4 cm, and is stable 1.7 cm left infrahilar lymph node.  There was also comment regarding hepatic steatosis with changes of chronic liver disease and stable indeterminate nodular thickening of the adrenal glands.  • CT-guided lung biopsy on 1/13/2023 with pathology that showed invasive poorly differentiated non-small cell carcinoma with focal neuroendocrine differentiation.  Staining pattern and morphology favor poorly differentiated adenocarcinoma however there was focal CD56 staining, could not entirely exclude large cell neuroendocrine carcinoma.  PD-L1 35%, Caris analysis pending.  • It was felt that there would be increased risk for bronchoscopy from a cardiac standpoint due to her aortic stenosis.  • Staging MRI brain 1/29/2023 showed no evidence of metastatic disease  • Staging PET scan 1/26/2023 showed hypermetabolic left lower lobe pulmonary nodule, size difficult to determine on PET however on recent CT was 1.6 cm (SUV 14.4).  Mediastinal and left hilar hypermetabolic lymphadenopathy (subcarinal lymph node 3.2 cm, SUV 31.7, left paratracheal 2.2 cm lymph node SUV 12.7).  Sub-6 mm pulmonary nodule left upper lobe unchanged and below PET resolution.  Focal uptake posterior right hepatic lobe SUV 7.2 of uncertain significance.  Recommended MRI to evaluate.  Right thyroid activity SUV 5.3, recommended ultrasound.  Long segment uptake distal esophagus consistent with esophagitis.  Uptake posterior larynx, nonspecific, recommended direct visualization.  • MRI abdomen 2/6/2023 with no evidence to suggest metastatic disease in the liver.  Subcentimeter nonenhancing lesions favor cyst or hemangiomas.  • Patient underwent right and left heart catheterization with cardiology on 2/14/2023.  Identification of coronary artery disease, mild pulmonary hypertension, severe aortic stenosis.  Recommendation per cardiology to proceed with treatment for lung cancer and defer any  intervention regarding aortic stenosis.  • Concern regarding underlying neurologic issues from cervical stenosis and potential neuropathic effects from Taxol chemotherapy.  Concern regarding patient's overall performance status and ability to tolerate full dose chemotherapy.  Patient discussed at thoracic oncology tumor board with consensus to treat with weekly low-dose carboplatin concurrent with radiation therapy and omit Taxol.  Subsequent plan for 1 year durvalumab following concurrent chemoradiation.  • Significant delay in initiating concurrent chemoradiation due to logistics with scheduling Mediport placement, patient canceled appointments and delayed initiation of treatment.  • On 3/21/2023 initiated concurrent chemoradiation with weekly carboplatin (AUC 2) on 2/28/2023.  • 4/4/2023 due for week 3 single agent carboplatin with concurrent radiation, continuing to tolerate well.  Platelet count today is 97,000.  She denies bleeding issues.  I have reviewed with Dr. Lombardi and we will proceed with treatment today.    *Aortic stenosis  • Patient underwent echocardiogram on 1/11/2023 with ejection fraction greater than 70%, grade 1 diastolic dysfunction, severe aortic stenosis.    • Patient followed by cardiology with consideration of need for TAVR versus SAVR for moderate to severe aortic stenosis.  • Patient underwent cardiac catheterization 2/14/2023 with identification of coronary artery disease, mild pulmonary hypertension, severe aortic stenosis.  • Per cardiology, recommendation to proceed with treatment for lung cancer and defer any consideration of intervention for aortic stenosis.     *Cervical spine stenosis  • Patient with cervical spine stenosis with associated myelopathy.   • She underwent surgery on 9/20/2022 with anterior cervical corpectomy with cage.      • The patient has had ongoing follow-up with neurosurgery and on CT scans 11/29/2022 of cervical and thoracic spine, there was concern  regarding spinal instability with potential need for posterior cervical fusion.  This was scheduled in December 2022 however was delayed due to upper respiratory infection.  • Patient admitted on 1/10/2023 to address multiple medical issues in order to prepare for potential neurosurgical procedure on 3/12/2023.  • Patient was previously having severe symptoms related to her cervical stenosis with reduced ability to ambulate previously and significant limitations in movement in her hands, dropping objects.  More recently however she has improved and has been able to walk with the use of a walker, symptoms in the upper extremities have improved and her dexterity is better.    • It appears that her neurosurgical procedure will need to be delayed until we can at least complete the concurrent chemoradiation portion of her treatment.  Surgery would be feasible while continuing on immunotherapy in the future.  • Neurosurgical follow-up has been placed on hold.  • Patient notes that her mobility and symptoms in her extremities have improved somewhat over time     *COPD  • Patient has history of smoking 1 pack/day x 45 years, quit in November 2021.  • Patient has not undergone formal PFTs  • Patient being followed by pulmonary, currently receiving Symbicort inhaler, duo nebs 4 times daily     *Hepatic steatosis  • Identified on prior scans  • PET scan 1/26/2023 with questionable area of activity seen but no corresponding CT abnormality.    • MRI abdomen 2/6/2023 with no evidence to suggest metastatic disease in the liver.  Subcentimeter nonenhancing lesions favor cyst or hemangiomas.  • LFTs have been normal     *Diabetes mellitus  • Currently receiving metformin 850 mg daily    *Situational depression  · Patient reports feeling overwhelmed regarding the amount of information presented today in regards to her malignancy as well as her other medical issues.    · Patient was seen by supportive oncology on 2/27/2023, added  gabapentin 300 mg 3 times daily.    · Patient notes that symptoms are under fair control currently.    *Right thyroid uptake on PET scan 1/26/2023  · Patient was scheduled for thyroid ultrasound however does not feel that she will be able to undergo the procedure due to her neck issues and therefore was canceled, consider at future date    *Laryngeal uptake on PET scan 1/26/2023  · Referral to ENT for laryngoscopy  · Patient canceled visit with ENT and appointment was rescheduled for later today    *Esophageal uptake on PET scan 1/26/2023  · Felt to be consistent with esophagitis in the distal esophagus.  We will hold on referral for EGD given the multitude of additional procedures and consults required above.    *Venous access  · Patient scheduled to see Dr. Shook on 2/22/2023 to discuss pursuit of port placement.    *Nutrition/weight loss  · Patient with ongoing significant weight loss, decreased from 237 down to 231 pounds over 1 week.  Patient reports that she is not necessarily trying to lose weight but is glad that she is losing weight.  We discussed that she is losing lean muscle mass at this point and I encouraged her to attempt stabilizing her weight.  She is being seen today by oncology nutrition.    *Social  · Patient reports that she has significant transportation issues.  Oncology social work has been involved to help facilitate transportation for concurrent chemoradiation    *Thrombocytopenia  · Due to chemotherapy: Platelet count on 4/4/2023 with cycle 3 has decreased 97,000.  We will proceed with treatment today and continue to monitor.       PLAN:  1. Continue definitive treatment for stage IIIa non-small cell lung cancer with concurrent chemoradiation with weekly single agent carboplatin, to be followed by 1 year of Durvalumab.  Patient is due for week 3 single agent carboplatin today (AUC 2).  Taxol omitted due to avoid neurotoxicity as above.  2. Patient continuing to be followed by oncology  nutrition for weight loss.  3. In 1 to 2 weeks follow-up with NP with CBC, CMP and weekly low-dose carboplatin.  4. Follow-up 3 weeks with Dr. Lombardi with CBC, CMP, due for week 6 carboplatin.  At that time they will discuss timeframe for follow-up scans after completion of therapy, as well as timeframe to begin subsequent immunotherapy with Durvalumab.  5. Call/ return sooner should she develop any new concerns or problems.      Patient is on a high risk medication requiring close monitoring for toxicity.

## 2023-04-04 NOTE — PROGRESS NOTES
Radiation Oncology  On-Treatment Note      Patient: Gabby Acosta    MRN: [id number]    Attending Physician: No care team member to display     Diagnosis:     ICD-10-CM ICD-9-CM   1. Malignant neoplasm of lower lobe of left lung  C34.32 162.5       Radiation Therapy Visit:  Continue radiation therapy, Dosimetry plan remains acceptable, Films reviewed and remains acceptable, Pain assessed, Pain management planned, Radiation dose schedule reviewed and remains acceptable, Radiation technique remains acceptable and Symptoms within expected range    Radiation Treatments     Active   Plans   Lung/Media RA   Most recent treatment: Dose planned: 200 cGy (fraction 11 on 4/4/2023)   Total: Dose planned: 6,000 cGy (30 fractions)   Elapsed Days: 14      Reference Points   Rx: Lung/Media   Most recent treatment: Dose given: 200 cGy (on 4/4/2023)   Total: Dose given: 2,200 cGy   Elapsed Days: 14      calc Lung/Media   Most recent treatment: Dose given: 201 cGy (on 4/4/2023)   Total: Dose given: 2,213 cGy   Elapsed Days: 14                    Physical Examination:  Vitals:   Vitals:    04/04/23 1025   BP: 141/85   Pulse: 87   SpO2: 96%   Weight: 108 kg (238 lb)     Pain Score    04/04/23 1025   PainSc: 0-No pain       Restricted in physically strenuous activity but ambulatory and able to carry out work of a light or sedentary nature, e.g., light house work, office work = 1    We examined the relevant areas: yes  Findings are within the expected range for this stage of treatment: yes  -------------------------------------------------------------------------------------------------------------------     ACTION ITEMS:  Patient tolerating treatment well and as expected for this stage in their treatment and Continue radiation therapy as planned  Tolerating concurrent chemo-RT well.  Other than mild fatigue, no complaints.  Feels well.  Denies chest pain, cough, shortness of breath, or hemoptysis.    David Moreira MD   Radiation  Oncology

## 2023-04-05 ENCOUNTER — TREATMENT (OUTPATIENT)
Dept: RADIATION ONCOLOGY | Facility: HOSPITAL | Age: 68
End: 2023-04-05
Payer: MEDICARE

## 2023-04-05 LAB
RAD ONC ARIA COURSE ID: NORMAL
RAD ONC ARIA COURSE INTENT: NORMAL
RAD ONC ARIA COURSE LAST TREATMENT DATE: NORMAL
RAD ONC ARIA COURSE START DATE: NORMAL
RAD ONC ARIA COURSE TREATMENT ELAPSED DAYS: 15
RAD ONC ARIA FIRST TREATMENT DATE: NORMAL
RAD ONC ARIA PLAN FRACTIONS TREATED TO DATE: 12
RAD ONC ARIA PLAN ID: NORMAL
RAD ONC ARIA PLAN PRESCRIBED DOSE PER FRACTION: 2 GY
RAD ONC ARIA PLAN PRIMARY REFERENCE POINT: NORMAL
RAD ONC ARIA PLAN TOTAL FRACTIONS PRESCRIBED: 30
RAD ONC ARIA PLAN TOTAL PRESCRIBED DOSE: 6000 CGY
RAD ONC ARIA REFERENCE POINT DOSAGE GIVEN TO DATE: 24 GY
RAD ONC ARIA REFERENCE POINT DOSAGE GIVEN TO DATE: 24.14 GY
RAD ONC ARIA REFERENCE POINT ID: NORMAL
RAD ONC ARIA REFERENCE POINT ID: NORMAL
RAD ONC ARIA REFERENCE POINT SESSION DOSAGE GIVEN: 2 GY
RAD ONC ARIA REFERENCE POINT SESSION DOSAGE GIVEN: 2.01 GY

## 2023-04-05 PROCEDURE — 77386 CHG INTENSITY MODULATED RADIATION TX DLVR COMPLEX: CPT | Performed by: RADIOLOGY

## 2023-04-05 PROCEDURE — 77427 RADIATION TX MANAGEMENT X5: CPT | Performed by: RADIOLOGY

## 2023-04-05 PROCEDURE — 77386: CPT | Performed by: RADIOLOGY

## 2023-04-05 PROCEDURE — 77014 CHG CT GUIDANCE RADIATION THERAPY FLDS PLACEMENT: CPT | Performed by: RADIOLOGY

## 2023-04-06 ENCOUNTER — TREATMENT (OUTPATIENT)
Dept: RADIATION ONCOLOGY | Facility: HOSPITAL | Age: 68
End: 2023-04-06
Payer: MEDICARE

## 2023-04-06 LAB
RAD ONC ARIA COURSE ID: NORMAL
RAD ONC ARIA COURSE INTENT: NORMAL
RAD ONC ARIA COURSE LAST TREATMENT DATE: NORMAL
RAD ONC ARIA COURSE START DATE: NORMAL
RAD ONC ARIA COURSE TREATMENT ELAPSED DAYS: 16
RAD ONC ARIA FIRST TREATMENT DATE: NORMAL
RAD ONC ARIA PLAN FRACTIONS TREATED TO DATE: 13
RAD ONC ARIA PLAN ID: NORMAL
RAD ONC ARIA PLAN PRESCRIBED DOSE PER FRACTION: 2 GY
RAD ONC ARIA PLAN PRIMARY REFERENCE POINT: NORMAL
RAD ONC ARIA PLAN TOTAL FRACTIONS PRESCRIBED: 30
RAD ONC ARIA PLAN TOTAL PRESCRIBED DOSE: 6000 CGY
RAD ONC ARIA REFERENCE POINT DOSAGE GIVEN TO DATE: 26 GY
RAD ONC ARIA REFERENCE POINT DOSAGE GIVEN TO DATE: 26.15 GY
RAD ONC ARIA REFERENCE POINT ID: NORMAL
RAD ONC ARIA REFERENCE POINT ID: NORMAL
RAD ONC ARIA REFERENCE POINT SESSION DOSAGE GIVEN: 2 GY
RAD ONC ARIA REFERENCE POINT SESSION DOSAGE GIVEN: 2.01 GY

## 2023-04-06 PROCEDURE — 77014 CHG CT GUIDANCE RADIATION THERAPY FLDS PLACEMENT: CPT | Performed by: RADIOLOGY

## 2023-04-06 PROCEDURE — 77336 RADIATION PHYSICS CONSULT: CPT | Performed by: RADIOLOGY

## 2023-04-06 PROCEDURE — 77386 CHG INTENSITY MODULATED RADIATION TX DLVR COMPLEX: CPT | Performed by: RADIOLOGY

## 2023-04-06 PROCEDURE — 77386: CPT | Performed by: RADIOLOGY

## 2023-04-07 ENCOUNTER — TREATMENT (OUTPATIENT)
Dept: RADIATION ONCOLOGY | Facility: HOSPITAL | Age: 68
End: 2023-04-07
Payer: MEDICARE

## 2023-04-07 LAB
RAD ONC ARIA COURSE ID: NORMAL
RAD ONC ARIA COURSE INTENT: NORMAL
RAD ONC ARIA COURSE LAST TREATMENT DATE: NORMAL
RAD ONC ARIA COURSE START DATE: NORMAL
RAD ONC ARIA COURSE TREATMENT ELAPSED DAYS: 17
RAD ONC ARIA FIRST TREATMENT DATE: NORMAL
RAD ONC ARIA PLAN FRACTIONS TREATED TO DATE: 14
RAD ONC ARIA PLAN ID: NORMAL
RAD ONC ARIA PLAN PRESCRIBED DOSE PER FRACTION: 2 GY
RAD ONC ARIA PLAN PRIMARY REFERENCE POINT: NORMAL
RAD ONC ARIA PLAN TOTAL FRACTIONS PRESCRIBED: 30
RAD ONC ARIA PLAN TOTAL PRESCRIBED DOSE: 6000 CGY
RAD ONC ARIA REFERENCE POINT DOSAGE GIVEN TO DATE: 28 GY
RAD ONC ARIA REFERENCE POINT DOSAGE GIVEN TO DATE: 28.16 GY
RAD ONC ARIA REFERENCE POINT ID: NORMAL
RAD ONC ARIA REFERENCE POINT ID: NORMAL
RAD ONC ARIA REFERENCE POINT SESSION DOSAGE GIVEN: 2 GY
RAD ONC ARIA REFERENCE POINT SESSION DOSAGE GIVEN: 2.01 GY

## 2023-04-07 PROCEDURE — 77386: CPT | Performed by: RADIOLOGY

## 2023-04-07 PROCEDURE — 77386 CHG INTENSITY MODULATED RADIATION TX DLVR COMPLEX: CPT | Performed by: RADIOLOGY

## 2023-04-07 PROCEDURE — 77014 CHG CT GUIDANCE RADIATION THERAPY FLDS PLACEMENT: CPT | Performed by: RADIOLOGY

## 2023-04-10 ENCOUNTER — TREATMENT (OUTPATIENT)
Dept: RADIATION ONCOLOGY | Facility: HOSPITAL | Age: 68
End: 2023-04-10
Payer: MEDICARE

## 2023-04-10 LAB
RAD ONC ARIA COURSE ID: NORMAL
RAD ONC ARIA COURSE INTENT: NORMAL
RAD ONC ARIA COURSE LAST TREATMENT DATE: NORMAL
RAD ONC ARIA COURSE START DATE: NORMAL
RAD ONC ARIA COURSE TREATMENT ELAPSED DAYS: 20
RAD ONC ARIA FIRST TREATMENT DATE: NORMAL
RAD ONC ARIA PLAN FRACTIONS TREATED TO DATE: 15
RAD ONC ARIA PLAN ID: NORMAL
RAD ONC ARIA PLAN PRESCRIBED DOSE PER FRACTION: 2 GY
RAD ONC ARIA PLAN PRIMARY REFERENCE POINT: NORMAL
RAD ONC ARIA PLAN TOTAL FRACTIONS PRESCRIBED: 30
RAD ONC ARIA PLAN TOTAL PRESCRIBED DOSE: 6000 CGY
RAD ONC ARIA REFERENCE POINT DOSAGE GIVEN TO DATE: 30 GY
RAD ONC ARIA REFERENCE POINT DOSAGE GIVEN TO DATE: 30.17 GY
RAD ONC ARIA REFERENCE POINT ID: NORMAL
RAD ONC ARIA REFERENCE POINT ID: NORMAL
RAD ONC ARIA REFERENCE POINT SESSION DOSAGE GIVEN: 2 GY
RAD ONC ARIA REFERENCE POINT SESSION DOSAGE GIVEN: 2.01 GY

## 2023-04-10 PROCEDURE — 77014 CHG CT GUIDANCE RADIATION THERAPY FLDS PLACEMENT: CPT | Performed by: RADIOLOGY

## 2023-04-10 PROCEDURE — 77386: CPT | Performed by: RADIOLOGY

## 2023-04-10 PROCEDURE — 77386 CHG INTENSITY MODULATED RADIATION TX DLVR COMPLEX: CPT | Performed by: RADIOLOGY

## 2023-04-11 ENCOUNTER — RADIATION ONCOLOGY WEEKLY ASSESSMENT (OUTPATIENT)
Dept: RADIATION ONCOLOGY | Facility: HOSPITAL | Age: 68
End: 2023-04-11
Payer: MEDICARE

## 2023-04-11 ENCOUNTER — OFFICE VISIT (OUTPATIENT)
Dept: ONCOLOGY | Facility: CLINIC | Age: 68
End: 2023-04-11
Payer: MEDICARE

## 2023-04-11 ENCOUNTER — INFUSION (OUTPATIENT)
Dept: ONCOLOGY | Facility: HOSPITAL | Age: 68
End: 2023-04-11
Payer: MEDICARE

## 2023-04-11 ENCOUNTER — HOSPITAL ENCOUNTER (OUTPATIENT)
Dept: RADIATION ONCOLOGY | Facility: HOSPITAL | Age: 68
Discharge: HOME | End: 2023-04-11

## 2023-04-11 VITALS
OXYGEN SATURATION: 96 % | HEIGHT: 62 IN | WEIGHT: 236.8 LBS | TEMPERATURE: 97.1 F | HEART RATE: 83 BPM | DIASTOLIC BLOOD PRESSURE: 84 MMHG | RESPIRATION RATE: 16 BRPM | SYSTOLIC BLOOD PRESSURE: 135 MMHG | BODY MASS INDEX: 43.58 KG/M2

## 2023-04-11 VITALS — OXYGEN SATURATION: 98 % | DIASTOLIC BLOOD PRESSURE: 72 MMHG | SYSTOLIC BLOOD PRESSURE: 126 MMHG | HEART RATE: 77 BPM

## 2023-04-11 DIAGNOSIS — Z45.2 ENCOUNTER FOR ADJUSTMENT OR MANAGEMENT OF VASCULAR ACCESS DEVICE: Primary | ICD-10-CM

## 2023-04-11 DIAGNOSIS — D69.59 CHEMOTHERAPY-INDUCED THROMBOCYTOPENIA: ICD-10-CM

## 2023-04-11 DIAGNOSIS — C34.32 MALIGNANT NEOPLASM OF LOWER LOBE OF LEFT LUNG: Primary | ICD-10-CM

## 2023-04-11 DIAGNOSIS — Z79.899 HIGH RISK MEDICATION USE: ICD-10-CM

## 2023-04-11 DIAGNOSIS — C34.32 MALIGNANT NEOPLASM OF LOWER LOBE OF LEFT LUNG: ICD-10-CM

## 2023-04-11 DIAGNOSIS — T45.1X5A CHEMOTHERAPY-INDUCED THROMBOCYTOPENIA: ICD-10-CM

## 2023-04-11 LAB
ALBUMIN SERPL-MCNC: 3.8 G/DL (ref 3.5–5.2)
ALBUMIN/GLOB SERPL: 1.3 G/DL (ref 1.1–2.4)
ALP SERPL-CCNC: 70 U/L (ref 38–116)
ALT SERPL W P-5'-P-CCNC: 20 U/L (ref 0–33)
ANION GAP SERPL CALCULATED.3IONS-SCNC: 8.7 MMOL/L (ref 5–15)
AST SERPL-CCNC: 22 U/L (ref 0–32)
BASOPHILS # BLD AUTO: 0.01 10*3/MM3 (ref 0–0.2)
BASOPHILS NFR BLD AUTO: 0.3 % (ref 0–1.5)
BILIRUB SERPL-MCNC: 0.3 MG/DL (ref 0.2–1.2)
BUN SERPL-MCNC: 23 MG/DL (ref 6–20)
BUN/CREAT SERPL: 20.9 (ref 7.3–30)
CALCIUM SPEC-SCNC: 9.7 MG/DL (ref 8.5–10.2)
CHLORIDE SERPL-SCNC: 102 MMOL/L (ref 98–107)
CO2 SERPL-SCNC: 28.3 MMOL/L (ref 22–29)
CREAT SERPL-MCNC: 1.1 MG/DL (ref 0.6–1.1)
DEPRECATED RDW RBC AUTO: 41.5 FL (ref 37–54)
EGFRCR SERPLBLD CKD-EPI 2021: 55.2 ML/MIN/1.73
EOSINOPHIL # BLD AUTO: 0.03 10*3/MM3 (ref 0–0.4)
EOSINOPHIL NFR BLD AUTO: 0.8 % (ref 0.3–6.2)
ERYTHROCYTE [DISTWIDTH] IN BLOOD BY AUTOMATED COUNT: 13.1 % (ref 12.3–15.4)
GLOBULIN UR ELPH-MCNC: 2.9 GM/DL (ref 1.8–3.5)
GLUCOSE SERPL-MCNC: 85 MG/DL (ref 74–124)
HCT VFR BLD AUTO: 36.9 % (ref 34–46.6)
HGB BLD-MCNC: 11.6 G/DL (ref 12–15.9)
IMM GRANULOCYTES # BLD AUTO: 0.02 10*3/MM3 (ref 0–0.05)
IMM GRANULOCYTES NFR BLD AUTO: 0.5 % (ref 0–0.5)
LYMPHOCYTES # BLD AUTO: 0.43 10*3/MM3 (ref 0.7–3.1)
LYMPHOCYTES NFR BLD AUTO: 11 % (ref 19.6–45.3)
MCH RBC QN AUTO: 28.6 PG (ref 26.6–33)
MCHC RBC AUTO-ENTMCNC: 31.4 G/DL (ref 31.5–35.7)
MCV RBC AUTO: 90.9 FL (ref 79–97)
MONOCYTES # BLD AUTO: 0.51 10*3/MM3 (ref 0.1–0.9)
MONOCYTES NFR BLD AUTO: 13 % (ref 5–12)
NEUTROPHILS NFR BLD AUTO: 2.92 10*3/MM3 (ref 1.7–7)
NEUTROPHILS NFR BLD AUTO: 74.4 % (ref 42.7–76)
NRBC BLD AUTO-RTO: 0 /100 WBC (ref 0–0.2)
PLATELET # BLD AUTO: 67 10*3/MM3 (ref 140–450)
PMV BLD AUTO: 10 FL (ref 6–12)
POTASSIUM SERPL-SCNC: 4.7 MMOL/L (ref 3.5–4.7)
PROT SERPL-MCNC: 6.7 G/DL (ref 6.3–8)
RAD ONC ARIA COURSE ID: NORMAL
RAD ONC ARIA COURSE INTENT: NORMAL
RAD ONC ARIA COURSE LAST TREATMENT DATE: NORMAL
RAD ONC ARIA COURSE START DATE: NORMAL
RAD ONC ARIA COURSE TREATMENT ELAPSED DAYS: 21
RAD ONC ARIA FIRST TREATMENT DATE: NORMAL
RAD ONC ARIA PLAN FRACTIONS TREATED TO DATE: 16
RAD ONC ARIA PLAN ID: NORMAL
RAD ONC ARIA PLAN PRESCRIBED DOSE PER FRACTION: 2 GY
RAD ONC ARIA PLAN PRIMARY REFERENCE POINT: NORMAL
RAD ONC ARIA PLAN TOTAL FRACTIONS PRESCRIBED: 30
RAD ONC ARIA PLAN TOTAL PRESCRIBED DOSE: 6000 CGY
RAD ONC ARIA REFERENCE POINT DOSAGE GIVEN TO DATE: 32 GY
RAD ONC ARIA REFERENCE POINT DOSAGE GIVEN TO DATE: 32.18 GY
RAD ONC ARIA REFERENCE POINT ID: NORMAL
RAD ONC ARIA REFERENCE POINT ID: NORMAL
RAD ONC ARIA REFERENCE POINT SESSION DOSAGE GIVEN: 2 GY
RAD ONC ARIA REFERENCE POINT SESSION DOSAGE GIVEN: 2.01 GY
RBC # BLD AUTO: 4.06 10*6/MM3 (ref 3.77–5.28)
SODIUM SERPL-SCNC: 139 MMOL/L (ref 134–145)
WBC NRBC COR # BLD: 3.92 10*3/MM3 (ref 3.4–10.8)

## 2023-04-11 PROCEDURE — 25010000002 HEPARIN LOCK FLUSH PER 10 UNITS: Performed by: INTERNAL MEDICINE

## 2023-04-11 PROCEDURE — 85025 COMPLETE CBC W/AUTO DIFF WBC: CPT

## 2023-04-11 PROCEDURE — 3079F DIAST BP 80-89 MM HG: CPT | Performed by: NURSE PRACTITIONER

## 2023-04-11 PROCEDURE — 80053 COMPREHEN METABOLIC PANEL: CPT

## 2023-04-11 PROCEDURE — 99214 OFFICE O/P EST MOD 30 MIN: CPT | Performed by: NURSE PRACTITIONER

## 2023-04-11 PROCEDURE — 36591 DRAW BLOOD OFF VENOUS DEVICE: CPT

## 2023-04-11 PROCEDURE — 77014 CHG CT GUIDANCE RADIATION THERAPY FLDS PLACEMENT: CPT | Performed by: RADIOLOGY

## 2023-04-11 PROCEDURE — 77386: CPT | Performed by: RADIOLOGY

## 2023-04-11 PROCEDURE — 3075F SYST BP GE 130 - 139MM HG: CPT | Performed by: NURSE PRACTITIONER

## 2023-04-11 PROCEDURE — 1125F AMNT PAIN NOTED PAIN PRSNT: CPT | Performed by: NURSE PRACTITIONER

## 2023-04-11 PROCEDURE — 77386 CHG INTENSITY MODULATED RADIATION TX DLVR COMPLEX: CPT | Performed by: RADIOLOGY

## 2023-04-11 RX ORDER — HEPARIN SODIUM (PORCINE) LOCK FLUSH IV SOLN 100 UNIT/ML 100 UNIT/ML
500 SOLUTION INTRAVENOUS AS NEEDED
Status: DISCONTINUED | OUTPATIENT
Start: 2023-04-11 | End: 2023-04-11 | Stop reason: HOSPADM

## 2023-04-11 RX ORDER — SODIUM CHLORIDE 0.9 % (FLUSH) 0.9 %
10 SYRINGE (ML) INJECTION AS NEEDED
Status: CANCELLED | OUTPATIENT
Start: 2023-04-11

## 2023-04-11 RX ORDER — SODIUM CHLORIDE 0.9 % (FLUSH) 0.9 %
10 SYRINGE (ML) INJECTION AS NEEDED
Status: DISCONTINUED | OUTPATIENT
Start: 2023-04-11 | End: 2023-04-11 | Stop reason: HOSPADM

## 2023-04-11 RX ORDER — SUCRALFATE 1 G/1
1 TABLET ORAL 4 TIMES DAILY
Qty: 120 TABLET | Refills: 1 | Status: SHIPPED | OUTPATIENT
Start: 2023-04-11

## 2023-04-11 RX ORDER — HEPARIN SODIUM (PORCINE) LOCK FLUSH IV SOLN 100 UNIT/ML 100 UNIT/ML
500 SOLUTION INTRAVENOUS AS NEEDED
Status: CANCELLED | OUTPATIENT
Start: 2023-04-11

## 2023-04-11 RX ADMIN — Medication 500 UNITS: at 12:34

## 2023-04-11 RX ADMIN — Medication 10 ML: at 12:32

## 2023-04-11 NOTE — PROGRESS NOTES
"Chief Complaint  Non-small cell lung cancer, clinical stage IIIA ([id number]), aortic stenosis, cervical spine stenosis, COPD, hepatic steatosis, diabetes mellitus    Subjective        History of Present Illness   Patient is here today for lab review and evaluation prior to week 4 concurrent chemoradiation with low-dose single agent carboplatin.  Taxol omitted to avoid neurotoxicity due to underlying neuropathic changes from severe cervical stenosis.  She has started having some swallowing difficulty this week, just noting some discomfort.  Has not affected her intake however.  Her weight is fairly stable.  She denies any nausea or vomiting.  She reports she discussed the discomfort with radiation oncology and they are sending in 2 different prescriptions for her but 1 of which is Joy's Magic mouth rinse.  Patient denies any recent bleeding, fevers, chills.      Objective   Vital Signs:   /84   Pulse 83   Temp 97.1 °F (36.2 °C) (Temporal)   Resp 16   Ht 157.5 cm (62.01\")   Wt 107 kg (236 lb 12.8 oz)   SpO2 96%   BMI 43.30 kg/m²     Physical Exam  Vitals reviewed.   Constitutional:       General: She is not in acute distress.     Appearance: Normal appearance. She is well-developed. She is obese.      Comments: Seated in wheelchair   HENT:      Head: Normocephalic and atraumatic.   Eyes:      Pupils: Pupils are equal, round, and reactive to light.   Cardiovascular:      Rate and Rhythm: Normal rate and regular rhythm.      Heart sounds: Normal heart sounds. No murmur heard.  Pulmonary:      Effort: Pulmonary effort is normal. No respiratory distress.      Breath sounds: Normal breath sounds.   Musculoskeletal:         General: Normal range of motion.      Cervical back: Normal range of motion.   Skin:     General: Skin is warm and dry.      Findings: No rash.   Neurological:      Mental Status: She is alert and oriented to person, place, and time.           Result Review : CBC and CMP from today       "   Assessment and Plan     *Non-small cell lung cancer, clinical stage IIIA ([id number])  • Patient has history of smoking 1 pack/day x 45 years, quit in November 2021.    • Following anterior cervical corpectomy in September 2022, she experienced acute hypoxemic respiratory failure.    • CT angiogram chest 9/25/2022 showed a concerning 1.4 cm left lower lobe pulmonary nodule, mediastinal lymphadenopathy with infracarinal lymph node 2.7 cm, left hilar lymph node 1.2 cm, small right hilar lymph nodes up from 1.1 cm and additional enlarged mediastinal nodes in the right paratracheal, precarinal, infracarinal spaces.  There was a wedge-shaped area of consolidation/atelectasis in the right middle lobe base, subpleural density left costophrenic sulcus felt to represent atelectasis.  It was recommended for her to undergo subsequent PET scan by pulmonary.  • The patient has had ongoing follow-up with neurosurgery and on CT scans 11/29/2022 of cervical and thoracic spine, there was concern regarding spinal instability with potential need for posterior cervical fusion.  This was scheduled in December 2022 however was delayed due to upper respiratory infection.  • Patient admitted on 1/10/2023 to address multiple medical issues in order to prepare for potential neurosurgical procedure on 3/12/2023.  • Patient underwent echocardiogram on 1/11/2023 with ejection fraction greater than 70%, grade 1 diastolic dysfunction, severe aortic stenosis.  Patient is being followed by cardiology with consideration of need for TAVR versus SAVR for moderate to severe aortic stenosis.  • Repeat CT chest on 1/12/2023 showed slight increase in size of the left lower lobe pulmonary nodule increased from 1.4 to 1.5 cm.  Groundglass nodule peripheral left lower lobe less conspicuous and resolution of previous reticulonodular opacities right lower lobe.  There was further enlargement of mediastinal lymph nodes with right posterior paratracheal lymph  node 1.4 x 2.1 up to 1.5 x 2.3 cm, subcarinal lymph node increased from 2.7 x 5.2 up to 3.2 x 5.8 cm, left hilar lymph node increased from 1.2 up to 1.4 cm, and is stable 1.7 cm left infrahilar lymph node.  There was also comment regarding hepatic steatosis with changes of chronic liver disease and stable indeterminate nodular thickening of the adrenal glands.  • CT-guided lung biopsy on 1/13/2023 with pathology that showed invasive poorly differentiated non-small cell carcinoma with focal neuroendocrine differentiation.  Staining pattern and morphology favor poorly differentiated adenocarcinoma however there was focal CD56 staining, could not entirely exclude large cell neuroendocrine carcinoma.  PD-L1 35%, Caris analysis pending.  • It was felt that there would be increased risk for bronchoscopy from a cardiac standpoint due to her aortic stenosis.  • Staging MRI brain 1/29/2023 showed no evidence of metastatic disease  • Staging PET scan 1/26/2023 showed hypermetabolic left lower lobe pulmonary nodule, size difficult to determine on PET however on recent CT was 1.6 cm (SUV 14.4).  Mediastinal and left hilar hypermetabolic lymphadenopathy (subcarinal lymph node 3.2 cm, SUV 31.7, left paratracheal 2.2 cm lymph node SUV 12.7).  Sub-6 mm pulmonary nodule left upper lobe unchanged and below PET resolution.  Focal uptake posterior right hepatic lobe SUV 7.2 of uncertain significance.  Recommended MRI to evaluate.  Right thyroid activity SUV 5.3, recommended ultrasound.  Long segment uptake distal esophagus consistent with esophagitis.  Uptake posterior larynx, nonspecific, recommended direct visualization.  • MRI abdomen 2/6/2023 with no evidence to suggest metastatic disease in the liver.  Subcentimeter nonenhancing lesions favor cyst or hemangiomas.  • Patient underwent right and left heart catheterization with cardiology on 2/14/2023.  Identification of coronary artery disease, mild pulmonary hypertension, severe  aortic stenosis.  Recommendation per cardiology to proceed with treatment for lung cancer and defer any intervention regarding aortic stenosis.  • Concern regarding underlying neurologic issues from cervical stenosis and potential neuropathic effects from Taxol chemotherapy.  Concern regarding patient's overall performance status and ability to tolerate full dose chemotherapy.  Patient discussed at thoracic oncology tumor board with consensus to treat with weekly low-dose carboplatin concurrent with radiation therapy and omit Taxol.  Subsequent plan for 1 year durvalumab following concurrent chemoradiation.  • Significant delay in initiating concurrent chemoradiation due to logistics with scheduling Mediport placement, patient canceled appointments and delayed initiation of treatment.  • On 3/21/2023 initiated concurrent chemoradiation with weekly carboplatin (AUC 2) on 2/28/2023.  • 4/4/2023 due for week 3 single agent carboplatin with concurrent radiation, continuing to tolerate well.  Platelet count today is 97,000.  She denies bleeding issues.  I have reviewed with Dr. Lombardi and we will proceed with treatment today.  • Patient returns 4/11/2023 for week for single agent carboplatin with concurrent radiation.  She has started to have some discomfort with swallowing and radiation oncology did send her into prescriptions today to help with this, 1 of which is Joy's Magic mouth rinse.  She denies any recent fevers, chills, or bleeding.  Unfortunately, her platelet count has declined today to 67,000 and treatment will be held this week.    *Aortic stenosis  • Patient underwent echocardiogram on 1/11/2023 with ejection fraction greater than 70%, grade 1 diastolic dysfunction, severe aortic stenosis.    • Patient followed by cardiology with consideration of need for TAVR versus SAVR for moderate to severe aortic stenosis.  • Patient underwent cardiac catheterization 2/14/2023 with identification of coronary artery  disease, mild pulmonary hypertension, severe aortic stenosis.  • Per cardiology, recommendation to proceed with treatment for lung cancer and defer any consideration of intervention for aortic stenosis.     *Cervical spine stenosis  • Patient with cervical spine stenosis with associated myelopathy.   • She underwent surgery on 9/20/2022 with anterior cervical corpectomy with cage.      • The patient has had ongoing follow-up with neurosurgery and on CT scans 11/29/2022 of cervical and thoracic spine, there was concern regarding spinal instability with potential need for posterior cervical fusion.  This was scheduled in December 2022 however was delayed due to upper respiratory infection.  • Patient admitted on 1/10/2023 to address multiple medical issues in order to prepare for potential neurosurgical procedure on 3/12/2023.  • Patient was previously having severe symptoms related to her cervical stenosis with reduced ability to ambulate previously and significant limitations in movement in her hands, dropping objects.  More recently however she has improved and has been able to walk with the use of a walker, symptoms in the upper extremities have improved and her dexterity is better.    • It appears that her neurosurgical procedure will need to be delayed until we can at least complete the concurrent chemoradiation portion of her treatment.  Surgery would be feasible while continuing on immunotherapy in the future.  • Neurosurgical follow-up has been placed on hold.  • Patient notes that her mobility and symptoms in her extremities have improved somewhat over time     *COPD  • Patient has history of smoking 1 pack/day x 45 years, quit in November 2021.  • Patient has not undergone formal PFTs  • Patient being followed by pulmonary, currently receiving Symbicort inhaler, duo nebs 4 times daily     *Hepatic steatosis  • Identified on prior scans  • PET scan 1/26/2023 with questionable area of activity seen but no  corresponding CT abnormality.    • MRI abdomen 2/6/2023 with no evidence to suggest metastatic disease in the liver.  Subcentimeter nonenhancing lesions favor cyst or hemangiomas.  • LFTs have been normal     *Diabetes mellitus  • Currently receiving metformin 850 mg daily    *Situational depression  · Patient reports feeling overwhelmed regarding the amount of information presented today in regards to her malignancy as well as her other medical issues.    · Patient was seen by supportive oncology on 2/27/2023, added gabapentin 300 mg 3 times daily.    · Patient notes that symptoms are under fair control currently.    *Right thyroid uptake on PET scan 1/26/2023  · Patient was scheduled for thyroid ultrasound however does not feel that she will be able to undergo the procedure due to her neck issues and therefore was canceled, consider at future date    *Laryngeal uptake on PET scan 1/26/2023  · Referral to ENT for laryngoscopy  · Patient canceled visit with ENT and appointment was rescheduled for later today    *Esophageal uptake on PET scan 1/26/2023  · Felt to be consistent with esophagitis in the distal esophagus.  We will hold on referral for EGD given the multitude of additional procedures and consults required above.    *Venous access  · Patient scheduled to see Dr. Shook on 2/22/2023 to discuss pursuit of port placement.    *Nutrition/weight loss  · Patient with ongoing significant weight loss, decreased from 237 down to 231 pounds over 1 week.  Patient reports that she is not necessarily trying to lose weight but is glad that she is losing weight.  We discussed that she is losing lean muscle mass at this point and I encouraged her to attempt stabilizing her weight.  · 4/11/2023 weight did improve last week, she is down just 1 pound from last week today.    *Social  · Patient reports that she has significant transportation issues.  Oncology social work has been involved to help facilitate transportation for  concurrent chemoradiation    *Thrombocytopenia  · Due to chemotherapy: Platelet count on 4/4/2023 with cycle 3 has decreased 97,000.  We will proceed with treatment today and continue to monitor.  · 411 2023-week for treatment will be held secondary to thrombocytopenia with platelet count of 67,000.  Patient denies bleeding.  She does not utilize Aleve or ibuprofen.       PLAN:  1. Carboplatin will be held today secondary to thrombocytopenia  2. Patient undergoing treatment stage IIIa non-small cell lung cancer with concurrent chemoradiation with weekly single agent carboplatin, to be followed by 1 year of Durvalumab.  Patient is due for week 3 single agent carboplatin today (AUC 2).  Taxol omitted due to avoid neurotoxicity as above.  3. Patient continuing to be followed by oncology nutrition for weight loss.  4. In 1 week follow-up with NP with CBC, CMP and weekly low-dose carboplatin pending platelet count.  5. Follow-up 2 weeks with Dr. Lombardi with CBC, CMP, due for week 6 carboplatin.  At that time they will discuss timeframe for follow-up scans after completion of therapy, as well as timeframe to begin subsequent immunotherapy with Durvalumab.  6. Call/ return sooner should she develop any new concerns or problems.      Patient is on a high risk medication requiring close monitoring for toxicity.

## 2023-04-11 NOTE — NURSING NOTE
Per Dr. Lombardi, C3 Carboplatin held today secondary to thrombocytopenia. Patient to f/u In 1 week with APRN with CBC, CMP and weekly low-dose carboplatin pending platelet count.

## 2023-04-12 ENCOUNTER — PATIENT OUTREACH (OUTPATIENT)
Dept: OTHER | Facility: HOSPITAL | Age: 68
End: 2023-04-12
Payer: MEDICARE

## 2023-04-12 ENCOUNTER — HOSPITAL ENCOUNTER (OUTPATIENT)
Dept: RADIATION ONCOLOGY | Facility: HOSPITAL | Age: 68
Discharge: HOME | End: 2023-04-12

## 2023-04-12 LAB
RAD ONC ARIA COURSE ID: NORMAL
RAD ONC ARIA COURSE INTENT: NORMAL
RAD ONC ARIA COURSE LAST TREATMENT DATE: NORMAL
RAD ONC ARIA COURSE START DATE: NORMAL
RAD ONC ARIA COURSE TREATMENT ELAPSED DAYS: 22
RAD ONC ARIA FIRST TREATMENT DATE: NORMAL
RAD ONC ARIA PLAN FRACTIONS TREATED TO DATE: 17
RAD ONC ARIA PLAN ID: NORMAL
RAD ONC ARIA PLAN PRESCRIBED DOSE PER FRACTION: 2 GY
RAD ONC ARIA PLAN PRIMARY REFERENCE POINT: NORMAL
RAD ONC ARIA PLAN TOTAL FRACTIONS PRESCRIBED: 30
RAD ONC ARIA PLAN TOTAL PRESCRIBED DOSE: 6000 CGY
RAD ONC ARIA REFERENCE POINT DOSAGE GIVEN TO DATE: 34 GY
RAD ONC ARIA REFERENCE POINT DOSAGE GIVEN TO DATE: 34.19 GY
RAD ONC ARIA REFERENCE POINT ID: NORMAL
RAD ONC ARIA REFERENCE POINT ID: NORMAL
RAD ONC ARIA REFERENCE POINT SESSION DOSAGE GIVEN: 2 GY
RAD ONC ARIA REFERENCE POINT SESSION DOSAGE GIVEN: 2.01 GY

## 2023-04-12 PROCEDURE — 77386 CHG INTENSITY MODULATED RADIATION TX DLVR COMPLEX: CPT | Performed by: RADIOLOGY

## 2023-04-12 PROCEDURE — 77427 RADIATION TX MANAGEMENT X5: CPT | Performed by: RADIOLOGY

## 2023-04-12 PROCEDURE — 77014 CHG CT GUIDANCE RADIATION THERAPY FLDS PLACEMENT: CPT | Performed by: RADIOLOGY

## 2023-04-12 PROCEDURE — 77386: CPT | Performed by: RADIOLOGY

## 2023-04-12 NOTE — PROGRESS NOTES
Called patient. Left message that I was checking in to see how treatment is going. Wanted to know if she has any current resource or supportive care needs. Requested cb. If we don;t touch base, I will try back in a few weeks

## 2023-04-17 ENCOUNTER — HOSPITAL ENCOUNTER (OUTPATIENT)
Dept: RADIATION ONCOLOGY | Facility: HOSPITAL | Age: 68
Discharge: HOME | End: 2023-04-17

## 2023-04-17 LAB
RAD ONC ARIA COURSE ID: NORMAL
RAD ONC ARIA COURSE INTENT: NORMAL
RAD ONC ARIA COURSE LAST TREATMENT DATE: NORMAL
RAD ONC ARIA COURSE START DATE: NORMAL
RAD ONC ARIA COURSE TREATMENT ELAPSED DAYS: 27
RAD ONC ARIA FIRST TREATMENT DATE: NORMAL
RAD ONC ARIA PLAN FRACTIONS TREATED TO DATE: 18
RAD ONC ARIA PLAN ID: NORMAL
RAD ONC ARIA PLAN PRESCRIBED DOSE PER FRACTION: 2 GY
RAD ONC ARIA PLAN PRIMARY REFERENCE POINT: NORMAL
RAD ONC ARIA PLAN TOTAL FRACTIONS PRESCRIBED: 30
RAD ONC ARIA PLAN TOTAL PRESCRIBED DOSE: 6000 CGY
RAD ONC ARIA REFERENCE POINT DOSAGE GIVEN TO DATE: 36 GY
RAD ONC ARIA REFERENCE POINT DOSAGE GIVEN TO DATE: 36.2 GY
RAD ONC ARIA REFERENCE POINT ID: NORMAL
RAD ONC ARIA REFERENCE POINT ID: NORMAL
RAD ONC ARIA REFERENCE POINT SESSION DOSAGE GIVEN: 2 GY
RAD ONC ARIA REFERENCE POINT SESSION DOSAGE GIVEN: 2.01 GY

## 2023-04-17 PROCEDURE — 77386: CPT | Performed by: RADIOLOGY

## 2023-04-17 PROCEDURE — 77336 RADIATION PHYSICS CONSULT: CPT | Performed by: RADIOLOGY

## 2023-04-17 PROCEDURE — 77386 CHG INTENSITY MODULATED RADIATION TX DLVR COMPLEX: CPT | Performed by: RADIOLOGY

## 2023-04-17 PROCEDURE — 77014 CHG CT GUIDANCE RADIATION THERAPY FLDS PLACEMENT: CPT | Performed by: RADIOLOGY

## 2023-04-18 ENCOUNTER — OFFICE VISIT (OUTPATIENT)
Dept: ONCOLOGY | Facility: CLINIC | Age: 68
End: 2023-04-18
Payer: MEDICARE

## 2023-04-18 ENCOUNTER — INFUSION (OUTPATIENT)
Dept: ONCOLOGY | Facility: HOSPITAL | Age: 68
End: 2023-04-18
Payer: MEDICARE

## 2023-04-18 ENCOUNTER — HOSPITAL ENCOUNTER (OUTPATIENT)
Dept: RADIATION ONCOLOGY | Facility: HOSPITAL | Age: 68
Discharge: HOME | End: 2023-04-18

## 2023-04-18 ENCOUNTER — RADIATION ONCOLOGY WEEKLY ASSESSMENT (OUTPATIENT)
Dept: RADIATION ONCOLOGY | Facility: HOSPITAL | Age: 68
End: 2023-04-18
Payer: MEDICARE

## 2023-04-18 VITALS
TEMPERATURE: 97.7 F | RESPIRATION RATE: 18 BRPM | DIASTOLIC BLOOD PRESSURE: 76 MMHG | SYSTOLIC BLOOD PRESSURE: 114 MMHG | BODY MASS INDEX: 43.19 KG/M2 | OXYGEN SATURATION: 99 % | HEART RATE: 85 BPM | WEIGHT: 234.7 LBS | HEIGHT: 62 IN

## 2023-04-18 DIAGNOSIS — C34.32 MALIGNANT NEOPLASM OF LOWER LOBE OF LEFT LUNG: Primary | ICD-10-CM

## 2023-04-18 DIAGNOSIS — D69.59 CHEMOTHERAPY-INDUCED THROMBOCYTOPENIA: ICD-10-CM

## 2023-04-18 DIAGNOSIS — T45.1X5A CHEMOTHERAPY-INDUCED THROMBOCYTOPENIA: ICD-10-CM

## 2023-04-18 DIAGNOSIS — Z45.2 ENCOUNTER FOR ADJUSTMENT OR MANAGEMENT OF VASCULAR ACCESS DEVICE: Primary | ICD-10-CM

## 2023-04-18 DIAGNOSIS — C34.32 MALIGNANT NEOPLASM OF LOWER LOBE OF LEFT LUNG: ICD-10-CM

## 2023-04-18 LAB
ALBUMIN SERPL-MCNC: 3.9 G/DL (ref 3.5–5.2)
ALBUMIN/GLOB SERPL: 1.3 G/DL (ref 1.1–2.4)
ALP SERPL-CCNC: 81 U/L (ref 38–116)
ALT SERPL W P-5'-P-CCNC: 19 U/L (ref 0–33)
ANION GAP SERPL CALCULATED.3IONS-SCNC: 11.8 MMOL/L (ref 5–15)
AST SERPL-CCNC: 21 U/L (ref 0–32)
BASOPHILS # BLD AUTO: 0.01 10*3/MM3 (ref 0–0.2)
BASOPHILS NFR BLD AUTO: 0.4 % (ref 0–1.5)
BILIRUB SERPL-MCNC: 0.4 MG/DL (ref 0.2–1.2)
BUN SERPL-MCNC: 16 MG/DL (ref 6–20)
BUN/CREAT SERPL: 18.6 (ref 7.3–30)
CALCIUM SPEC-SCNC: 9.6 MG/DL (ref 8.5–10.2)
CHLORIDE SERPL-SCNC: 103 MMOL/L (ref 98–107)
CO2 SERPL-SCNC: 25.2 MMOL/L (ref 22–29)
CREAT SERPL-MCNC: 0.86 MG/DL (ref 0.6–1.1)
DEPRECATED RDW RBC AUTO: 40.7 FL (ref 37–54)
EGFRCR SERPLBLD CKD-EPI 2021: 74.2 ML/MIN/1.73
EOSINOPHIL # BLD AUTO: 0.02 10*3/MM3 (ref 0–0.4)
EOSINOPHIL NFR BLD AUTO: 0.8 % (ref 0.3–6.2)
ERYTHROCYTE [DISTWIDTH] IN BLOOD BY AUTOMATED COUNT: 13.6 % (ref 12.3–15.4)
GLOBULIN UR ELPH-MCNC: 2.9 GM/DL (ref 1.8–3.5)
GLUCOSE SERPL-MCNC: 150 MG/DL (ref 74–124)
HCT VFR BLD AUTO: 35.7 % (ref 34–46.6)
HGB BLD-MCNC: 11.6 G/DL (ref 12–15.9)
IMM GRANULOCYTES # BLD AUTO: 0.01 10*3/MM3 (ref 0–0.05)
IMM GRANULOCYTES NFR BLD AUTO: 0.4 % (ref 0–0.5)
LYMPHOCYTES # BLD AUTO: 0.34 10*3/MM3 (ref 0.7–3.1)
LYMPHOCYTES NFR BLD AUTO: 14.2 % (ref 19.6–45.3)
MCH RBC QN AUTO: 28.6 PG (ref 26.6–33)
MCHC RBC AUTO-ENTMCNC: 32.5 G/DL (ref 31.5–35.7)
MCV RBC AUTO: 88.1 FL (ref 79–97)
MONOCYTES # BLD AUTO: 0.33 10*3/MM3 (ref 0.1–0.9)
MONOCYTES NFR BLD AUTO: 13.8 % (ref 5–12)
NEUTROPHILS NFR BLD AUTO: 1.68 10*3/MM3 (ref 1.7–7)
NEUTROPHILS NFR BLD AUTO: 70.4 % (ref 42.7–76)
NRBC BLD AUTO-RTO: 0 /100 WBC (ref 0–0.2)
PLATELET # BLD AUTO: 70 10*3/MM3 (ref 140–450)
PMV BLD AUTO: 10 FL (ref 6–12)
POTASSIUM SERPL-SCNC: 4.1 MMOL/L (ref 3.5–4.7)
PROT SERPL-MCNC: 6.8 G/DL (ref 6.3–8)
RAD ONC ARIA COURSE ID: NORMAL
RAD ONC ARIA COURSE INTENT: NORMAL
RAD ONC ARIA COURSE LAST TREATMENT DATE: NORMAL
RAD ONC ARIA COURSE START DATE: NORMAL
RAD ONC ARIA COURSE TREATMENT ELAPSED DAYS: 28
RAD ONC ARIA FIRST TREATMENT DATE: NORMAL
RAD ONC ARIA PLAN FRACTIONS TREATED TO DATE: 19
RAD ONC ARIA PLAN ID: NORMAL
RAD ONC ARIA PLAN PRESCRIBED DOSE PER FRACTION: 2 GY
RAD ONC ARIA PLAN PRIMARY REFERENCE POINT: NORMAL
RAD ONC ARIA PLAN TOTAL FRACTIONS PRESCRIBED: 30
RAD ONC ARIA PLAN TOTAL PRESCRIBED DOSE: 6000 CGY
RAD ONC ARIA REFERENCE POINT DOSAGE GIVEN TO DATE: 38 GY
RAD ONC ARIA REFERENCE POINT DOSAGE GIVEN TO DATE: 38.22 GY
RAD ONC ARIA REFERENCE POINT ID: NORMAL
RAD ONC ARIA REFERENCE POINT ID: NORMAL
RAD ONC ARIA REFERENCE POINT SESSION DOSAGE GIVEN: 2 GY
RAD ONC ARIA REFERENCE POINT SESSION DOSAGE GIVEN: 2.01 GY
RBC # BLD AUTO: 4.05 10*6/MM3 (ref 3.77–5.28)
SODIUM SERPL-SCNC: 140 MMOL/L (ref 134–145)
WBC NRBC COR # BLD: 2.39 10*3/MM3 (ref 3.4–10.8)

## 2023-04-18 PROCEDURE — 77386: CPT | Performed by: RADIOLOGY

## 2023-04-18 PROCEDURE — 77014 CHG CT GUIDANCE RADIATION THERAPY FLDS PLACEMENT: CPT | Performed by: RADIOLOGY

## 2023-04-18 PROCEDURE — 25010000002 HEPARIN LOCK FLUSH PER 10 UNITS: Performed by: INTERNAL MEDICINE

## 2023-04-18 PROCEDURE — 85025 COMPLETE CBC W/AUTO DIFF WBC: CPT

## 2023-04-18 PROCEDURE — 80053 COMPREHEN METABOLIC PANEL: CPT

## 2023-04-18 PROCEDURE — 36591 DRAW BLOOD OFF VENOUS DEVICE: CPT

## 2023-04-18 PROCEDURE — 77386 CHG INTENSITY MODULATED RADIATION TX DLVR COMPLEX: CPT | Performed by: RADIOLOGY

## 2023-04-18 RX ORDER — SODIUM CHLORIDE 0.9 % (FLUSH) 0.9 %
10 SYRINGE (ML) INJECTION AS NEEDED
OUTPATIENT
Start: 2023-04-18

## 2023-04-18 RX ORDER — HEPARIN SODIUM (PORCINE) LOCK FLUSH IV SOLN 100 UNIT/ML 100 UNIT/ML
500 SOLUTION INTRAVENOUS AS NEEDED
Status: DISCONTINUED | OUTPATIENT
Start: 2023-04-18 | End: 2023-04-18 | Stop reason: HOSPADM

## 2023-04-18 RX ORDER — SODIUM CHLORIDE 0.9 % (FLUSH) 0.9 %
10 SYRINGE (ML) INJECTION AS NEEDED
Status: DISCONTINUED | OUTPATIENT
Start: 2023-04-18 | End: 2023-04-18 | Stop reason: HOSPADM

## 2023-04-18 RX ORDER — HEPARIN SODIUM (PORCINE) LOCK FLUSH IV SOLN 100 UNIT/ML 100 UNIT/ML
500 SOLUTION INTRAVENOUS AS NEEDED
OUTPATIENT
Start: 2023-04-18

## 2023-04-18 RX ADMIN — Medication 10 ML: at 11:13

## 2023-04-18 RX ADMIN — Medication 500 UNITS: at 11:13

## 2023-04-18 NOTE — PROGRESS NOTES
"Chief Complaint  Non-small cell lung cancer, clinical stage IIIA ([id number]), aortic stenosis, cervical spine stenosis, COPD, hepatic steatosis, diabetes mellitus    Subjective        History of Present Illness   Patient is here today for reconsideration of low-dose single agent carboplatin which she is to be receiving concurrently with radiation.  Unfortunately last week when she presented for week 4 of treatment platelet count was to 67,000 and therefore treatment was held.  She did continue on radiation.  However she does note that her granddaughter passed away suddenly last week and she had to miss 2 days of radiation and then the  is this week which will require another 2 days of radiation missed.  Therefore it is expected that she will at least go late into next week for radiation completion.    All that being said as she is reviewed back today unfortunately her platelet count remains low at 70,000 we discussed the need to again hold chemotherapy.  She denies any bleeding difficulty.  She has been feeling fatigued but this is stable.  She has had some anticipatory nausea and anxious stomach prior to coming here but otherwise has not struggled with any significant side effects.    She denies other concerns at this time.    Objective   Vital Signs:   /76   Pulse 85   Temp 97.7 °F (36.5 °C) (Temporal)   Resp 18   Ht 157.5 cm (62.01\")   Wt 106 kg (234 lb 11.2 oz)   SpO2 99%   BMI 42.92 kg/m²     Physical Exam  Vitals reviewed.   Constitutional:       General: She is not in acute distress.     Appearance: Normal appearance. She is well-developed. She is obese.      Comments: Seated in wheelchair   HENT:      Head: Normocephalic and atraumatic.   Eyes:      Pupils: Pupils are equal, round, and reactive to light.   Cardiovascular:      Rate and Rhythm: Normal rate and regular rhythm.      Heart sounds: Normal heart sounds. No murmur heard.  Pulmonary:      Effort: Pulmonary effort is normal. No " respiratory distress.      Breath sounds: Normal breath sounds.   Musculoskeletal:         General: Normal range of motion.      Cervical back: Normal range of motion.   Skin:     General: Skin is warm and dry.      Findings: No rash.   Neurological:      Mental Status: She is alert and oriented to person, place, and time.       I have reexamined the patient and the results are consistent with the previously documented exam. Taylor Rodriguez, APRN       Result Review :  Results from last 7 days   Lab Units 04/18/23  1024   WBC 10*3/mm3 2.39*   NEUTROS ABS 10*3/mm3 1.68*   HEMOGLOBIN g/dL 11.6*   HEMATOCRIT % 35.7   PLATELETS 10*3/mm3 70*     Results from last 7 days   Lab Units 04/18/23  1024   SODIUM mmol/L 140   POTASSIUM mmol/L 4.1   CHLORIDE mmol/L 103   CO2 mmol/L 25.2   BUN mg/dL 16   CREATININE mg/dL 0.86   CALCIUM mg/dL 9.6   ALBUMIN g/dL 3.9   BILIRUBIN mg/dL 0.4   ALK PHOS U/L 81   ALT (SGPT) U/L 19   AST (SGOT) U/L 21   GLUCOSE mg/dL 150*                  Assessment and Plan     *Non-small cell lung cancer, clinical stage IIIA ([id number])  • Patient has history of smoking 1 pack/day x 45 years, quit in November 2021.    • Following anterior cervical corpectomy in September 2022, she experienced acute hypoxemic respiratory failure.    • CT angiogram chest 9/25/2022 showed a concerning 1.4 cm left lower lobe pulmonary nodule, mediastinal lymphadenopathy with infracarinal lymph node 2.7 cm, left hilar lymph node 1.2 cm, small right hilar lymph nodes up from 1.1 cm and additional enlarged mediastinal nodes in the right paratracheal, precarinal, infracarinal spaces.  There was a wedge-shaped area of consolidation/atelectasis in the right middle lobe base, subpleural density left costophrenic sulcus felt to represent atelectasis.  It was recommended for her to undergo subsequent PET scan by pulmonary.  • The patient has had ongoing follow-up with neurosurgery and on CT scans 11/29/2022 of cervical and  thoracic spine, there was concern regarding spinal instability with potential need for posterior cervical fusion.  This was scheduled in December 2022 however was delayed due to upper respiratory infection.  • Patient admitted on 1/10/2023 to address multiple medical issues in order to prepare for potential neurosurgical procedure on 3/12/2023.  • Patient underwent echocardiogram on 1/11/2023 with ejection fraction greater than 70%, grade 1 diastolic dysfunction, severe aortic stenosis.  Patient is being followed by cardiology with consideration of need for TAVR versus SAVR for moderate to severe aortic stenosis.  • Repeat CT chest on 1/12/2023 showed slight increase in size of the left lower lobe pulmonary nodule increased from 1.4 to 1.5 cm.  Groundglass nodule peripheral left lower lobe less conspicuous and resolution of previous reticulonodular opacities right lower lobe.  There was further enlargement of mediastinal lymph nodes with right posterior paratracheal lymph node 1.4 x 2.1 up to 1.5 x 2.3 cm, subcarinal lymph node increased from 2.7 x 5.2 up to 3.2 x 5.8 cm, left hilar lymph node increased from 1.2 up to 1.4 cm, and is stable 1.7 cm left infrahilar lymph node.  There was also comment regarding hepatic steatosis with changes of chronic liver disease and stable indeterminate nodular thickening of the adrenal glands.  • CT-guided lung biopsy on 1/13/2023 with pathology that showed invasive poorly differentiated non-small cell carcinoma with focal neuroendocrine differentiation.  Staining pattern and morphology favor poorly differentiated adenocarcinoma however there was focal CD56 staining, could not entirely exclude large cell neuroendocrine carcinoma.  PD-L1 35%, Caris analysis pending.  • It was felt that there would be increased risk for bronchoscopy from a cardiac standpoint due to her aortic stenosis.  • Staging MRI brain 1/29/2023 showed no evidence of metastatic disease  • Staging PET scan  1/26/2023 showed hypermetabolic left lower lobe pulmonary nodule, size difficult to determine on PET however on recent CT was 1.6 cm (SUV 14.4).  Mediastinal and left hilar hypermetabolic lymphadenopathy (subcarinal lymph node 3.2 cm, SUV 31.7, left paratracheal 2.2 cm lymph node SUV 12.7).  Sub-6 mm pulmonary nodule left upper lobe unchanged and below PET resolution.  Focal uptake posterior right hepatic lobe SUV 7.2 of uncertain significance.  Recommended MRI to evaluate.  Right thyroid activity SUV 5.3, recommended ultrasound.  Long segment uptake distal esophagus consistent with esophagitis.  Uptake posterior larynx, nonspecific, recommended direct visualization.  • MRI abdomen 2/6/2023 with no evidence to suggest metastatic disease in the liver.  Subcentimeter nonenhancing lesions favor cyst or hemangiomas.  • Patient underwent right and left heart catheterization with cardiology on 2/14/2023.  Identification of coronary artery disease, mild pulmonary hypertension, severe aortic stenosis.  Recommendation per cardiology to proceed with treatment for lung cancer and defer any intervention regarding aortic stenosis.  • Concern regarding underlying neurologic issues from cervical stenosis and potential neuropathic effects from Taxol chemotherapy.  Concern regarding patient's overall performance status and ability to tolerate full dose chemotherapy.  Patient discussed at thoracic oncology tumor board with consensus to treat with weekly low-dose carboplatin concurrent with radiation therapy and omit Taxol.  Subsequent plan for 1 year durvalumab following concurrent chemoradiation.  • Significant delay in initiating concurrent chemoradiation due to logistics with scheduling Mediport placement, patient canceled appointments and delayed initiation of treatment.  • On 3/21/2023 initiated concurrent chemoradiation with weekly carboplatin (AUC 2) on 2/28/2023.  • 4/4/2023 due for week 3 single agent carboplatin with  concurrent radiation, continuing to tolerate well.  Platelet count today is 97,000.  She denies bleeding issues.  I have reviewed with Dr. Lombardi and we will proceed with treatment today.  • Patient returns 2023 for week 4single agent carboplatin with concurrent radiation.  She has started to have some discomfort with swallowing and radiation oncology did send her into prescriptions today to help with this, 1 of which is Joy's Magic mouth rinse.  She denies any recent fevers, chills, or bleeding.  Unfortunately, her platelet count has declined to 67,000 and treatment will be held.  • 2023 platelet count today remains low at 70,000.  Continue to hold chemotherapy.  Patient will continue on radiation.  She has had to miss a few doses of radiation due to a death in the family including 2 days last week and expects to miss at least 2 days this week because of the .  This will push her date for completion of radiation out and she may, counts permitting, be able to get into more doses of chemo.    *Aortic stenosis  • Patient underwent echocardiogram on 2023 with ejection fraction greater than 70%, grade 1 diastolic dysfunction, severe aortic stenosis.    • Patient followed by cardiology with consideration of need for TAVR versus SAVR for moderate to severe aortic stenosis.  • Patient underwent cardiac catheterization 2023 with identification of coronary artery disease, mild pulmonary hypertension, severe aortic stenosis.  • Per cardiology, recommendation to proceed with treatment for lung cancer and defer any consideration of intervention for aortic stenosis.     *Cervical spine stenosis  • Patient with cervical spine stenosis with associated myelopathy.   • She underwent surgery on 2022 with anterior cervical corpectomy with cage.      • The patient has had ongoing follow-up with neurosurgery and on CT scans 2022 of cervical and thoracic spine, there was concern regarding spinal  instability with potential need for posterior cervical fusion.  This was scheduled in December 2022 however was delayed due to upper respiratory infection.  • Patient admitted on 1/10/2023 to address multiple medical issues in order to prepare for potential neurosurgical procedure on 3/12/2023.  • Patient was previously having severe symptoms related to her cervical stenosis with reduced ability to ambulate previously and significant limitations in movement in her hands, dropping objects.  More recently however she has improved and has been able to walk with the use of a walker, symptoms in the upper extremities have improved and her dexterity is better.    • It appears that her neurosurgical procedure will need to be delayed until we can at least complete the concurrent chemoradiation portion of her treatment.  Surgery would be feasible while continuing on immunotherapy in the future.  • Neurosurgical follow-up has been placed on hold.  • Patient notes that her mobility and symptoms in her extremities have improved somewhat over time     *COPD  • Patient has history of smoking 1 pack/day x 45 years, quit in November 2021.  • Patient has not undergone formal PFTs  • Patient being followed by pulmonary, currently receiving Symbicort inhaler, duo nebs 4 times daily     *Hepatic steatosis  • Identified on prior scans  • PET scan 1/26/2023 with questionable area of activity seen but no corresponding CT abnormality.    • MRI abdomen 2/6/2023 with no evidence to suggest metastatic disease in the liver.  Subcentimeter nonenhancing lesions favor cyst or hemangiomas.  • LFTs have been normal     *Diabetes mellitus  • Currently receiving metformin 850 mg daily    *Situational depression  · Patient reports feeling overwhelmed regarding the amount of information presented today in regards to her malignancy as well as her other medical issues.    · Patient was seen by supportive oncology on 2/27/2023, added gabapentin 300 mg 3  times daily.    · Patient notes that symptoms are under fair control currently.    *Right thyroid uptake on PET scan 1/26/2023  · Patient was scheduled for thyroid ultrasound however does not feel that she will be able to undergo the procedure due to her neck issues and therefore was canceled, consider at future date    *Laryngeal uptake on PET scan 1/26/2023  · Referral to ENT for laryngoscopy  · Patient canceled visit with ENT and appointment was rescheduled for later today    *Esophageal uptake on PET scan 1/26/2023  · Felt to be consistent with esophagitis in the distal esophagus.  We will hold on referral for EGD given the multitude of additional procedures and consults required above.    *Venous access  · Patient scheduled to see Dr. Shook on 2/22/2023 to discuss pursuit of port placement.    *Nutrition/weight loss  · Patient with ongoing significant weight loss, decreased from 237 down to 231 pounds over 1 week.  Patient reports that she is not necessarily trying to lose weight but is glad that she is losing weight.  We discussed that she is losing lean muscle mass at this point and I encouraged her to attempt stabilizing her weight.  · Weight has stabilized    *Social  · Patient reports that she has significant transportation issues.  Oncology social work has been involved to help facilitate transportation for concurrent chemoradiation    *Thrombocytopenia  · Due to chemotherapy: Platelet count on 4/4/2023 with cycle 3 has decreased 97,000.  We will proceed with treatment today and continue to monitor.  · 4/11/2023-week 4treatment will be held secondary to thrombocytopenia with platelet count of 67,000.  Patient denies bleeding.  She does not utilize Aleve or ibuprofen.  · 4/18/2023-platelet count remains low at 70,000.  Carboplatin will remain on hold.       PLAN:  1. Carboplatin will remain on hold due to continued thrombocytopenia.    2. Patient is continuing on radiation therapy.  She does note missing  2 days last week due to a death in the family and will miss 2 days this week for the .  This will put her date of completion for radiation out and we may be able to get a few more doses of carboplatin and pending counts.    3. She will follow-up next week with Dr. Lombardi for review and potential continuation of carboplatin (AUC 2).  Taxol is been omitted to avoid neurotoxicity.  4. Call/ return sooner should she develop any new concerns or problems.      Patient is on a high risk medication requiring close monitoring for toxicity.

## 2023-04-18 NOTE — PROGRESS NOTES
Physician Weekly Management Note    Diagnosis:     Diagnosis Plan   1. Malignant neoplasm of lower lobe of left lung            RT Details:  fx 19/30 lung and mediastinal nodes 38/60Gy    Notes on Treatment course, Films, Medical progress:  kps 80% dooing ok, denies any pain in treatment area, denies any increased soa, cont on,     Weekly Management:  Medication reviewed?   Yes  New medications given?   No  Problemlist reviewed?   Yes  Problem added?   No  Issues raised requiring referral to support services - task assigned to:  na    Technical aspects reviewed:  Weekly OBI approved?   Yes  Weekly port films approved?   Yes  Change requests noted on port film?   No  Patient setup and plan reviewed?   Yes    Chart Reviewed:  Continue current treatment plan?   Yes  Treatment plan change requested?   Yes  CBC reviewed?   Yes  Concurrent Chemo?   Yes    Objective     Toxicities:   Fatigue      Review of Systems   Constitutional: Positive for fatigue.   Respiratory: Negative.    Skin: Negative.           There were no vitals filed for this visit.        4/11/2023    12:10 PM   Current Status   ECOG score 2       Physical Exam  Constitutional:       Appearance: Normal appearance. She is obese.   Pulmonary:      Effort: Pulmonary effort is normal.             Problem Summary List    Diagnosis:     Diagnosis Plan   1. Malignant neoplasm of lower lobe of left lung          Pathology:   Lung cancer    Past Medical History:   Diagnosis Date   • Cervical spondylosis with myelopathy    • Cervical stenosis of spine    • COPD (chronic obstructive pulmonary disease)    • Diabetes mellitus    • Diabetes mellitus with diabetic dermatitis    • Disease of thyroid gland     Hypothyroid   • Elevated cholesterol    • Fatty liver    • Hepatic steatosis    • Hyperlipidemia    • Hypertension    • Lung cancer     Stage IIIA non-small cell lung cancer   • Malignant neoplasm of lower lobe of left lung 02/16/2023   • Severe aortic stenosis      followed by    • Slow to wake up after anesthesia          Past Surgical History:   Procedure Laterality Date   • ANTERIOR CHANNEL VERTEBRECTOMY/CORPECTOMY N/A 2022    Procedure: Anterior cervical corpectomy, cervical four/five/six, with cage and plate from cervical three to cervical seven;  Surgeon: David Cheung MD;  Location: [facility] OR;  Service: Neurosurgery;  Laterality: N/A;   • CARDIAC CATHETERIZATION N/A 2023    Procedure: Right and Left Heart Cath;  Surgeon: Kostas David MD;  Location: [facility] CATH INVASIVE LOC[facility];  Service: Cardiovascular;  Laterality: N/A;   • CARDIAC CATHETERIZATION N/A 2023    Procedure: Coronary angiography;  Surgeon: Kostas David MD;  Location:  [facility] CATH INVASIVE LOCATION;  Service: Cardiovascular;  Laterality: N/A;   • CARDIAC CATHETERIZATION N/A 2023    Procedure: Left ventriculography;  Surgeon: Kostas David MD;  Location[facility] CATH [facility]ASIVE LOC[facility];  Service: Cardiovascular;  Laterality: N/A;   • CARDIAC CATHETERIZATION N/A 2023    Procedure: Resting Full Cycle Ratio;  Surgeon: Kostas David MD;  Location[facility] CATH [facility]IVE LOC[facility];  Service: Cardiovascular;  Laterality: N/A;   • CATARACT EXTRACTION     •  SECTION     • CHOLECYSTECTOMY     • VENOUS ACCESS DEVICE (PORT) INSERTION Left 3/2/2023    Procedure: INSERTION OF PORTACATH;  Surgeon: Collin Shook MD;  Location: [facility] MAIN OR;  Service: General;  Laterality: Left;         Current Outpatient Medications on File Prior to Visit   Medication Sig Dispense Refill   • albuterol sulfate  (90 Base) MCG/ACT inhaler      • amLODIPine (NORVASC) 5 MG tablet Take 1 tablet by mouth Daily.     • buPROPion XL (WELLBUTRIN XL) 150 MG 24 hr tablet Take 1 tablet by mouth Daily. 30 tablet 0   • diphenhydrAMINE (BENADRYL) 25 mg capsule Take 1 capsule by mouth Every 6 (Six) Hours As Needed for Allergies.     • escitalopram (LEXAPRO) 10  MG tablet Take 1 tablet by mouth Daily.     • fluticasone (FLONASE) 50 MCG/ACT nasal spray INSTILL TWO (2) SPRAYS INTO NOSE DAILY.     • gabapentin (Neurontin) 300 MG capsule Take 1 capsule by mouth 3 (Three) Times a Day. 90 capsule 2   • guaiFENesin (MUCINEX) 600 MG 12 hr tablet Take 1 tablet by mouth Every 12 (Twelve) Hours. 14 tablet 0   • hydrOXYzine (ATARAX) 10 MG tablet Take 5 mg by mouth As Needed for Itching.     • ipratropium-albuterol (DUO-NEB) 0.5-2.5 mg/3 ml nebulizer Take 3 mL by nebulization 4 (Four) Times a Day. 360 mL 0   • levothyroxine (SYNTHROID, LEVOTHROID) 125 MCG tablet Take 1 tablet by mouth Daily.     • lidocaine-prilocaine (EMLA) 2.5-2.5 % cream Apply maria g-sized amount to Mediport site 30 min prior to port access. Do not rub in. 30 g 2   • losartan (COZAAR) 100 MG tablet Take 1 tablet by mouth Every Night.     • lovastatin (MEVACOR) 40 MG tablet TAKE ONE (1) TABLET BY MOUTH NIGHTLY.     • metFORMIN (GLUCOPHAGE) 850 MG tablet Take 1 tablet by mouth Daily.     • nystatin in Lidocaine Viscous HCl solution-diphenhydrAMINE liquid-aluminum-magnesium hydroxide-simethicone suspension Swish and swallow 5 mL by mouth 4 times per day before meals and at bedtime 410 mL 2   • ondansetron (ZOFRAN) 8 MG tablet Take 1 tablet by mouth Every 8 (Eight) Hours As Needed for Nausea or Vomiting. 30 tablet 2   • pyridoxine (VITAMIN B-6) 100 MG tablet Take 1 tablet by mouth Daily.     • sucralfate (Carafate) 1 g tablet Take 1 tablet by mouth 4 (Four) Times a Day. 120 tablet 1   • traMADol (ULTRAM) 50 MG tablet Take 1 tablet by mouth Every 6 (Six) Hours As Needed for Moderate Pain. 12 tablet 0   • vitamin D3 125 MCG (5000 UT) capsule capsule Take 1 capsule by mouth Daily.       No current facility-administered medications on file prior to visit.       Allergies   Allergen Reactions   • Nsaids Other (See Comments)     No NSAIDs per Cardiology.         Referring Provider:    No referring provider defined for this  encounter.    Oncologist:  No primary care provider on file.      Seen and approved by:  Ree Banerjee MD  04/18/2023  Subjective

## 2023-04-19 ENCOUNTER — HOSPITAL ENCOUNTER (OUTPATIENT)
Dept: RADIATION ONCOLOGY | Facility: HOSPITAL | Age: 68
Discharge: HOME | End: 2023-04-19

## 2023-04-19 LAB
RAD ONC ARIA COURSE ID: NORMAL
RAD ONC ARIA COURSE INTENT: NORMAL
RAD ONC ARIA COURSE LAST TREATMENT DATE: NORMAL
RAD ONC ARIA COURSE START DATE: NORMAL
RAD ONC ARIA COURSE TREATMENT ELAPSED DAYS: 29
RAD ONC ARIA FIRST TREATMENT DATE: NORMAL
RAD ONC ARIA PLAN FRACTIONS TREATED TO DATE: 20
RAD ONC ARIA PLAN ID: NORMAL
RAD ONC ARIA PLAN PRESCRIBED DOSE PER FRACTION: 2 GY
RAD ONC ARIA PLAN PRIMARY REFERENCE POINT: NORMAL
RAD ONC ARIA PLAN TOTAL FRACTIONS PRESCRIBED: 30
RAD ONC ARIA PLAN TOTAL PRESCRIBED DOSE: 6000 CGY
RAD ONC ARIA REFERENCE POINT DOSAGE GIVEN TO DATE: 40 GY
RAD ONC ARIA REFERENCE POINT DOSAGE GIVEN TO DATE: 40.23 GY
RAD ONC ARIA REFERENCE POINT ID: NORMAL
RAD ONC ARIA REFERENCE POINT ID: NORMAL
RAD ONC ARIA REFERENCE POINT SESSION DOSAGE GIVEN: 2 GY
RAD ONC ARIA REFERENCE POINT SESSION DOSAGE GIVEN: 2.01 GY

## 2023-04-19 PROCEDURE — 77386: CPT | Performed by: RADIOLOGY

## 2023-04-19 PROCEDURE — 77014 CHG CT GUIDANCE RADIATION THERAPY FLDS PLACEMENT: CPT | Performed by: RADIOLOGY

## 2023-04-19 PROCEDURE — 77386 CHG INTENSITY MODULATED RADIATION TX DLVR COMPLEX: CPT | Performed by: RADIOLOGY

## 2023-04-24 ENCOUNTER — HOSPITAL ENCOUNTER (OUTPATIENT)
Dept: RADIATION ONCOLOGY | Facility: HOSPITAL | Age: 68
Discharge: HOME | End: 2023-04-24

## 2023-04-24 LAB
RAD ONC ARIA COURSE ID: NORMAL
RAD ONC ARIA COURSE INTENT: NORMAL
RAD ONC ARIA COURSE LAST TREATMENT DATE: NORMAL
RAD ONC ARIA COURSE START DATE: NORMAL
RAD ONC ARIA COURSE TREATMENT ELAPSED DAYS: 34
RAD ONC ARIA FIRST TREATMENT DATE: NORMAL
RAD ONC ARIA PLAN FRACTIONS TREATED TO DATE: 21
RAD ONC ARIA PLAN ID: NORMAL
RAD ONC ARIA PLAN PRESCRIBED DOSE PER FRACTION: 2 GY
RAD ONC ARIA PLAN PRIMARY REFERENCE POINT: NORMAL
RAD ONC ARIA PLAN TOTAL FRACTIONS PRESCRIBED: 30
RAD ONC ARIA PLAN TOTAL PRESCRIBED DOSE: 6000 CGY
RAD ONC ARIA REFERENCE POINT DOSAGE GIVEN TO DATE: 42 GY
RAD ONC ARIA REFERENCE POINT DOSAGE GIVEN TO DATE: 42.24 GY
RAD ONC ARIA REFERENCE POINT ID: NORMAL
RAD ONC ARIA REFERENCE POINT ID: NORMAL
RAD ONC ARIA REFERENCE POINT SESSION DOSAGE GIVEN: 2 GY
RAD ONC ARIA REFERENCE POINT SESSION DOSAGE GIVEN: 2.01 GY

## 2023-04-24 PROCEDURE — 77014 CHG CT GUIDANCE RADIATION THERAPY FLDS PLACEMENT: CPT | Performed by: RADIOLOGY

## 2023-04-24 PROCEDURE — 77386 CHG INTENSITY MODULATED RADIATION TX DLVR COMPLEX: CPT | Performed by: RADIOLOGY

## 2023-04-24 PROCEDURE — 77386: CPT | Performed by: RADIOLOGY

## 2023-04-24 NOTE — PROGRESS NOTES
"Chief Complaint  Non-small cell lung cancer, clinical stage IIIA ([id number]), aortic stenosis, cervical spine stenosis, COPD, hepatic steatosis, diabetes mellitus    Subjective        History of Present Illness  Patient returns today in follow-up continuing on concurrent chemoradiation with low-dose weekly carboplatin alone.  Taxol was omitted to avoid neurotoxicity due to her underlying neuropathic changes from her severe cervical spine stenosis.  The patient received 3 weekly doses of carboplatin however on 2023 developed thrombocytopenia with platelet count down to 67,000 and treatment was held.  On 2023, platelet count had not improved significantly, remained at 70,000 and treatment was again held.  She has continued on with radiation therapy.  She reports however that she has missed 4 fractions due to a death in the family and had to go to the .  She is now scheduled to complete her treatment on 5/10/2023 with her last fraction of radiation therapy.  She reports that overall she feels better on treatment than she did before.  She does note some fatigue with treatment but notes that her mobility has increased.  She has been able to go upstairs and has been ambulating more around her home.  She does have some mild dysphagia and was prescribed Carafate in pill form, finds it difficult to swallow large pills.  She was not able to afford Magic mouthwash.  She does note alteration in her taste with a metallic taste present.  She has lost further weight down to 229 pounds.  She is using [place] instant breakfast at least daily.        Objective   Vital Signs:   /76   Pulse 92   Temp 97.1 °F (36.2 °C) (Temporal)   Resp 16   Ht 157.5 cm (62.01\")   Wt 104 kg (229 lb 1.6 oz)   SpO2 94%   BMI 41.89 kg/m²     Physical Exam  Constitutional:       Appearance: She is well-developed.      Comments: Patient is seated in wheelchair today   Eyes:      Conjunctiva/sclera: Conjunctivae normal.   Neck: "      Thyroid: No thyromegaly.   Cardiovascular:      Rate and Rhythm: Normal rate and regular rhythm.      Heart sounds: No murmur heard.    No friction rub. No gallop.   Pulmonary:      Effort: No respiratory distress.      Breath sounds: Normal breath sounds.      Comments: Slightly diminished breath sounds bilaterally  Abdominal:      General: Bowel sounds are normal. There is no distension.      Palpations: Abdomen is soft.      Tenderness: There is no abdominal tenderness.   Lymphadenopathy:      Head:      Right side of head: No submandibular adenopathy.      Cervical: No cervical adenopathy.      Upper Body:      Right upper body: No supraclavicular adenopathy.      Left upper body: No supraclavicular adenopathy.   Skin:     General: Skin is warm and dry.      Findings: No rash.   Neurological:      Mental Status: She is alert and oriented to person, place, and time.      Cranial Nerves: No cranial nerve deficit.      Motor: No abnormal muscle tone.      Deep Tendon Reflexes: Reflexes normal.   Psychiatric:         Behavior: Behavior normal.       Patient was examined today, unchanged from above    Result Review : Reviewed CBC, CMP from today       Assessment and Plan     *Non-small cell lung cancer, clinical stage IIIA ([id number])  • Patient has history of smoking 1 pack/day x 45 years, quit in November 2021.    • Following anterior cervical corpectomy in September 2022, she experienced acute hypoxemic respiratory failure.    • CT angiogram chest 9/25/2022 showed a concerning 1.4 cm left lower lobe pulmonary nodule, mediastinal lymphadenopathy with infracarinal lymph node 2.7 cm, left hilar lymph node 1.2 cm, small right hilar lymph nodes up from 1.1 cm and additional enlarged mediastinal nodes in the right paratracheal, precarinal, infracarinal spaces.  There was a wedge-shaped area of consolidation/atelectasis in the right middle lobe base, subpleural density left costophrenic sulcus felt to represent  atelectasis.  It was recommended for her to undergo subsequent PET scan by pulmonary.  • The patient has had ongoing follow-up with neurosurgery and on CT scans 11/29/2022 of cervical and thoracic spine, there was concern regarding spinal instability with potential need for posterior cervical fusion.  This was scheduled in December 2022 however was delayed due to upper respiratory infection.  • Patient admitted on 1/10/2023 to address multiple medical issues in order to prepare for potential neurosurgical procedure on 3/12/2023.  • Patient underwent echocardiogram on 1/11/2023 with ejection fraction greater than 70%, grade 1 diastolic dysfunction, severe aortic stenosis.  Patient is being followed by cardiology with consideration of need for TAVR versus SAVR for moderate to severe aortic stenosis.  • Repeat CT chest on 1/12/2023 showed slight increase in size of the left lower lobe pulmonary nodule increased from 1.4 to 1.5 cm.  Groundglass nodule peripheral left lower lobe less conspicuous and resolution of previous reticulonodular opacities right lower lobe.  There was further enlargement of mediastinal lymph nodes with right posterior paratracheal lymph node 1.4 x 2.1 up to 1.5 x 2.3 cm, subcarinal lymph node increased from 2.7 x 5.2 up to 3.2 x 5.8 cm, left hilar lymph node increased from 1.2 up to 1.4 cm, and is stable 1.7 cm left infrahilar lymph node.  There was also comment regarding hepatic steatosis with changes of chronic liver disease and stable indeterminate nodular thickening of the adrenal glands.  • CT-guided lung biopsy on 1/13/2023 with pathology that showed invasive poorly differentiated non-small cell carcinoma with focal neuroendocrine differentiation.  Staining pattern and morphology favor poorly differentiated adenocarcinoma however there was focal CD56 staining, could not entirely exclude large cell neuroendocrine carcinoma.  PD-L1 35%, Caris analysis pending.  • It was felt that there would  be increased risk for bronchoscopy from a cardiac standpoint due to her aortic stenosis.  • Staging MRI brain 1/29/2023 showed no evidence of metastatic disease  • Staging PET scan 1/26/2023 showed hypermetabolic left lower lobe pulmonary nodule, size difficult to determine on PET however on recent CT was 1.6 cm (SUV 14.4).  Mediastinal and left hilar hypermetabolic lymphadenopathy (subcarinal lymph node 3.2 cm, SUV 31.7, left paratracheal 2.2 cm lymph node SUV 12.7).  Sub-6 mm pulmonary nodule left upper lobe unchanged and below PET resolution.  Focal uptake posterior right hepatic lobe SUV 7.2 of uncertain significance.  Recommended MRI to evaluate.  Right thyroid activity SUV 5.3, recommended ultrasound.  Long segment uptake distal esophagus consistent with esophagitis.  Uptake posterior larynx, nonspecific, recommended direct visualization.  • MRI abdomen 2/6/2023 with no evidence to suggest metastatic disease in the liver.  Subcentimeter nonenhancing lesions favor cyst or hemangiomas.  • Patient underwent right and left heart catheterization with cardiology on 2/14/2023.  Identification of coronary artery disease, mild pulmonary hypertension, severe aortic stenosis.  Recommendation per cardiology to proceed with treatment for lung cancer and defer any intervention regarding aortic stenosis.  • Concern regarding underlying neurologic issues from cervical stenosis and potential neuropathic effects from Taxol chemotherapy.  Concern regarding patient's overall performance status and ability to tolerate full dose chemotherapy.  Patient discussed at thoracic oncology tumor board with consensus to treat with weekly low-dose carboplatin concurrent with radiation therapy and omit Taxol.  Subsequent plan for 1 year durvalumab following concurrent chemoradiation.  • Significant delay in initiating concurrent chemoradiation due to logistics with scheduling Mediport placement, patient canceled appointments and delayed  initiation of treatment.  • On 3/21/2023 initiated concurrent chemoradiation with weekly carboplatin (AUC 2) on 2/28/2023.  • Week 4 carboplatin held 4/11 and again 4/18/2023 due to thrombocytopenia  • Week 4 carboplatin resumed on 4/25/2023 at reduced dose to AUC 1 due to borderline neutropenia  • Patient returns today in follow-up continuing on concurrent chemoradiation with weekly low-dose single agent carboplatin.  As above we had to hold carboplatin due to thrombocytopenia on 4/11 and 4/18/2023.  Thrombocytopenia today has recovered with platelet count up to 133,000.  Patient however has WBC that has declined to 1.67 with borderline ANC 1.1.  The patient has missed 4 fractions of radiation due to a death in the family and will not complete her treatment now until 4/10/2023.  We would like to administer additional carboplatin during her radiation.  Therefore we elected to proceed today with carboplatin however at a reduced dose to AUC 1.  She will have NP visit next week with potential week 5 carboplatin ending her counts and I will see her in 2 weeks just before she completes radiation therapy and we will consider whether to administer a 6 weekly dose of carboplatin.  At that time we will also discuss potential short interval follow-up CT scans as well as timing to initiate immunotherapy with durvalumab.  The patient is experiencing some side effects from treatment including fatigue and mild dysphagia.  We are trying to switch from Carafate tablets to liquid the more effective and we will investigate availability of Magic mouthwash (patient reports she could not afford the medication).  We discussed trying to improve her caloric intake and she will see oncology nutrition today and will try to increase use of [place] Instant Breakfast to at least twice daily as she has lost further weight down to 229 pounds.  She does report overall however that she feels better than she has in quite some time and her mobility  has actually improved at home.    *Aortic stenosis  • Patient underwent echocardiogram on 1/11/2023 with ejection fraction greater than 70%, grade 1 diastolic dysfunction, severe aortic stenosis.    • Patient followed by cardiology with consideration of need for TAVR versus SAVR for moderate to severe aortic stenosis.  • Patient underwent cardiac catheterization 2/14/2023 with identification of coronary artery disease, mild pulmonary hypertension, severe aortic stenosis.  • Per cardiology, recommendation to proceed with treatment for lung cancer and defer any consideration of intervention for aortic stenosis.     *Cervical spine stenosis  • Patient with cervical spine stenosis with associated myelopathy.   • She underwent surgery on 9/20/2022 with anterior cervical corpectomy with cage.      • The patient has had ongoing follow-up with neurosurgery and on CT scans 11/29/2022 of cervical and thoracic spine, there was concern regarding spinal instability with potential need for posterior cervical fusion.  This was scheduled in December 2022 however was delayed due to upper respiratory infection.  • Patient admitted on 1/10/2023 to address multiple medical issues in order to prepare for potential neurosurgical procedure on 3/12/2023.  • Patient was previously having severe symptoms related to her cervical stenosis with reduced ability to ambulate previously and significant limitations in movement in her hands, dropping objects.  More recently however she has improved and has been able to walk with the use of a walker, symptoms in the upper extremities have improved and her dexterity is better.    • It appears that her neurosurgical procedure will need to be delayed until we can at least complete the concurrent chemoradiation portion of her treatment.  Surgery would be feasible while continuing on immunotherapy in the future.  • Neurosurgical follow-up has been placed on hold.  • Patient notes that her mobility and  symptoms in her extremities have improved somewhat over time     *COPD  • Patient has history of smoking 1 pack/day x 45 years, quit in November 2021.  • Patient has not undergone formal PFTs  • Patient being followed by pulmonary, currently receiving Symbicort inhaler, duo nebs 4 times daily     *Hepatic steatosis  • Identified on prior scans  • PET scan 1/26/2023 with questionable area of activity seen but no corresponding CT abnormality.    • MRI abdomen 2/6/2023 with no evidence to suggest metastatic disease in the liver.  Subcentimeter nonenhancing lesions favor cyst or hemangiomas.  • LFTs have been normal     *Diabetes mellitus  • Currently receiving metformin 850 mg daily    *Situational depression  · Patient reports feeling overwhelmed regarding the amount of information presented today in regards to her malignancy as well as her other medical issues.    · Patient was seen by supportive oncology on 2/27/2023, added gabapentin 300 mg 3 times daily.    · Patient notes that symptoms are under fair control currently.    *Right thyroid uptake on PET scan 1/26/2023  · Patient was scheduled for thyroid ultrasound however does not feel that she will be able to undergo the procedure due to her neck issues and therefore was canceled, consider at future date    *Laryngeal uptake on PET scan 1/26/2023  · Referral to ENT for laryngoscopy  · Visit with ENT was rescheduled and we will obtain records    *Esophageal uptake on PET scan 1/26/2023  · Felt to be consistent with esophagitis in the distal esophagus.  We will hold on referral for EGD given the multitude of additional procedures and consults required above.    *Venous access  · Patient scheduled to see Dr. Shook on 2/22/2023 to discuss pursuit of port placement.    *Nutrition/weight loss  · Patient with continued weight loss down to 229 pounds today.  Patient will meet again with oncology nutrition and we discussed increasing her caloric intake with use of  [place] Instant Breakfast increasing from once daily to twice daily    *Social  · Patient reports that she has significant transportation issues.  Oncology social work has been involved to help facilitate transportation for concurrent chemoradiation         PLAN:  1. Continue definitive treatment for stage IIIA non-small cell lung cancer with concurrent chemoradiation with weekly single agent carboplatin to be followed by 1 year of durvalumab.  We will resume carboplatin today (fourth weekly dose) with recovery of platelet count.  Due to borderline ANC we will reduce carboplatin dose to AUC 1.  2. We will obtain records from visit with ENT to evaluate laryngeal activity seen on PET scan  3. Patient will be seen again by oncology nutrition today in regards to ongoing weight loss  4. Patient encouraged to increase [place] instant breakfast to at least twice daily  5. Prescription sent for Carafate suspension 1 g 4 times daily  6. We will investigate potential assistance to obtain Magic mouthwash  7. In 1 week NP visit with CBC, CMP and weekly carboplatin  8. In 2 weeks MD visit with CBC, CMP and possible final dose weekly carboplatin (patient will complete radiation therapy the next day on 5/10/2023).  We will discuss plans for subsequent CT evaluation and timeframe to initiate immunotherapy with durvalumab.      The patient is continuing on high risk medication requiring intensive monitoring    I did spend 40 minutes in total time caring for the patient today, time spent in review of records, face-to-face consultation, coordination of care, placement of orders, completion of documentation.

## 2023-04-25 ENCOUNTER — INFUSION (OUTPATIENT)
Dept: ONCOLOGY | Facility: HOSPITAL | Age: 68
End: 2023-04-25
Payer: MEDICARE

## 2023-04-25 ENCOUNTER — HOSPITAL ENCOUNTER (OUTPATIENT)
Dept: RADIATION ONCOLOGY | Facility: HOSPITAL | Age: 68
Discharge: HOME | End: 2023-04-25

## 2023-04-25 ENCOUNTER — RADIATION ONCOLOGY WEEKLY ASSESSMENT (OUTPATIENT)
Dept: RADIATION ONCOLOGY | Facility: HOSPITAL | Age: 68
End: 2023-04-25
Payer: MEDICARE

## 2023-04-25 ENCOUNTER — OFFICE VISIT (OUTPATIENT)
Dept: ONCOLOGY | Facility: CLINIC | Age: 68
End: 2023-04-25
Payer: MEDICARE

## 2023-04-25 VITALS
WEIGHT: 229.1 LBS | RESPIRATION RATE: 16 BRPM | DIASTOLIC BLOOD PRESSURE: 76 MMHG | BODY MASS INDEX: 42.16 KG/M2 | HEIGHT: 62 IN | SYSTOLIC BLOOD PRESSURE: 126 MMHG | HEART RATE: 92 BPM | OXYGEN SATURATION: 94 % | TEMPERATURE: 97.1 F

## 2023-04-25 DIAGNOSIS — C34.32 MALIGNANT NEOPLASM OF LOWER LOBE OF LEFT LUNG: Primary | ICD-10-CM

## 2023-04-25 DIAGNOSIS — Z45.2 ENCOUNTER FOR ADJUSTMENT OR MANAGEMENT OF VASCULAR ACCESS DEVICE: ICD-10-CM

## 2023-04-25 DIAGNOSIS — C34.32 MALIGNANT NEOPLASM OF LOWER LOBE OF LEFT LUNG: ICD-10-CM

## 2023-04-25 LAB
ALBUMIN SERPL-MCNC: 3.9 G/DL (ref 3.5–5.2)
ALBUMIN/GLOB SERPL: 1.4 G/DL (ref 1.1–2.4)
ALP SERPL-CCNC: 80 U/L (ref 38–116)
ALT SERPL W P-5'-P-CCNC: 17 U/L (ref 0–33)
ANION GAP SERPL CALCULATED.3IONS-SCNC: 13.8 MMOL/L (ref 5–15)
AST SERPL-CCNC: 21 U/L (ref 0–32)
BASOPHILS # BLD AUTO: 0.01 10*3/MM3 (ref 0–0.2)
BASOPHILS NFR BLD AUTO: 0.6 % (ref 0–1.5)
BILIRUB SERPL-MCNC: 0.3 MG/DL (ref 0.2–1.2)
BUN SERPL-MCNC: 13 MG/DL (ref 6–20)
BUN/CREAT SERPL: 12.7 (ref 7.3–30)
CALCIUM SPEC-SCNC: 10 MG/DL (ref 8.5–10.2)
CHLORIDE SERPL-SCNC: 102 MMOL/L (ref 98–107)
CO2 SERPL-SCNC: 24.2 MMOL/L (ref 22–29)
CREAT SERPL-MCNC: 1.02 MG/DL (ref 0.6–1.1)
DEPRECATED RDW RBC AUTO: 44.3 FL (ref 37–54)
EGFRCR SERPLBLD CKD-EPI 2021: 60.4 ML/MIN/1.73
EOSINOPHIL # BLD AUTO: 0.04 10*3/MM3 (ref 0–0.4)
EOSINOPHIL NFR BLD AUTO: 2.4 % (ref 0.3–6.2)
ERYTHROCYTE [DISTWIDTH] IN BLOOD BY AUTOMATED COUNT: 14.5 % (ref 12.3–15.4)
GLOBULIN UR ELPH-MCNC: 2.8 GM/DL (ref 1.8–3.5)
GLUCOSE SERPL-MCNC: 180 MG/DL (ref 74–124)
HCT VFR BLD AUTO: 34.8 % (ref 34–46.6)
HGB BLD-MCNC: 11.5 G/DL (ref 12–15.9)
IMM GRANULOCYTES # BLD AUTO: 0.01 10*3/MM3 (ref 0–0.05)
IMM GRANULOCYTES NFR BLD AUTO: 0.6 % (ref 0–0.5)
LYMPHOCYTES # BLD AUTO: 0.3 10*3/MM3 (ref 0.7–3.1)
LYMPHOCYTES NFR BLD AUTO: 18 % (ref 19.6–45.3)
MCH RBC QN AUTO: 29.7 PG (ref 26.6–33)
MCHC RBC AUTO-ENTMCNC: 33 G/DL (ref 31.5–35.7)
MCV RBC AUTO: 89.9 FL (ref 79–97)
MONOCYTES # BLD AUTO: 0.21 10*3/MM3 (ref 0.1–0.9)
MONOCYTES NFR BLD AUTO: 12.6 % (ref 5–12)
NEUTROPHILS NFR BLD AUTO: 1.1 10*3/MM3 (ref 1.7–7)
NEUTROPHILS NFR BLD AUTO: 65.8 % (ref 42.7–76)
NRBC BLD AUTO-RTO: 0 /100 WBC (ref 0–0.2)
PLATELET # BLD AUTO: 133 10*3/MM3 (ref 140–450)
PMV BLD AUTO: 9.1 FL (ref 6–12)
POTASSIUM SERPL-SCNC: 3.7 MMOL/L (ref 3.5–4.7)
PROT SERPL-MCNC: 6.7 G/DL (ref 6.3–8)
RAD ONC ARIA COURSE ID: NORMAL
RAD ONC ARIA COURSE INTENT: NORMAL
RAD ONC ARIA COURSE LAST TREATMENT DATE: NORMAL
RAD ONC ARIA COURSE START DATE: NORMAL
RAD ONC ARIA COURSE TREATMENT ELAPSED DAYS: 35
RAD ONC ARIA FIRST TREATMENT DATE: NORMAL
RAD ONC ARIA PLAN FRACTIONS TREATED TO DATE: 1
RAD ONC ARIA PLAN ID: NORMAL
RAD ONC ARIA PLAN PRESCRIBED DOSE PER FRACTION: 2 GY
RAD ONC ARIA PLAN PRIMARY REFERENCE POINT: NORMAL
RAD ONC ARIA PLAN TOTAL FRACTIONS PRESCRIBED: 8
RAD ONC ARIA PLAN TOTAL PRESCRIBED DOSE: 1600 CGY
RAD ONC ARIA REFERENCE POINT DOSAGE GIVEN TO DATE: 44 GY
RAD ONC ARIA REFERENCE POINT DOSAGE GIVEN TO DATE: 44.23 GY
RAD ONC ARIA REFERENCE POINT ID: NORMAL
RAD ONC ARIA REFERENCE POINT ID: NORMAL
RAD ONC ARIA REFERENCE POINT SESSION DOSAGE GIVEN: 1.99 GY
RAD ONC ARIA REFERENCE POINT SESSION DOSAGE GIVEN: 2 GY
RBC # BLD AUTO: 3.87 10*6/MM3 (ref 3.77–5.28)
SODIUM SERPL-SCNC: 140 MMOL/L (ref 134–145)
WBC NRBC COR # BLD: 1.67 10*3/MM3 (ref 3.4–10.8)

## 2023-04-25 PROCEDURE — 77300 RADIATION THERAPY DOSE PLAN: CPT | Performed by: RADIOLOGY

## 2023-04-25 PROCEDURE — 77014 CHG CT GUIDANCE RADIATION THERAPY FLDS PLACEMENT: CPT | Performed by: RADIOLOGY

## 2023-04-25 PROCEDURE — 25010000002 CARBOPLATIN PER 50 MG: Performed by: INTERNAL MEDICINE

## 2023-04-25 PROCEDURE — 85025 COMPLETE CBC W/AUTO DIFF WBC: CPT

## 2023-04-25 PROCEDURE — 77301 RADIOTHERAPY DOSE PLAN IMRT: CPT | Performed by: RADIOLOGY

## 2023-04-25 PROCEDURE — 25010000002 DEXAMETHASONE SODIUM PHOSPHATE 100 MG/10ML SOLUTION: Performed by: INTERNAL MEDICINE

## 2023-04-25 PROCEDURE — 25010000002 PALONOSETRON PER 25 MCG: Performed by: INTERNAL MEDICINE

## 2023-04-25 PROCEDURE — 80053 COMPREHEN METABOLIC PANEL: CPT

## 2023-04-25 PROCEDURE — 25010000002 HEPARIN LOCK FLUSH PER 10 UNITS: Performed by: INTERNAL MEDICINE

## 2023-04-25 PROCEDURE — 77293 RESPIRATOR MOTION MGMT SIMUL: CPT | Performed by: RADIOLOGY

## 2023-04-25 PROCEDURE — 96375 TX/PRO/DX INJ NEW DRUG ADDON: CPT

## 2023-04-25 PROCEDURE — 96413 CHEMO IV INFUSION 1 HR: CPT

## 2023-04-25 PROCEDURE — 77338 DESIGN MLC DEVICE FOR IMRT: CPT | Performed by: RADIOLOGY

## 2023-04-25 PROCEDURE — 77386: CPT | Performed by: RADIOLOGY

## 2023-04-25 RX ORDER — SUCRALFATE ORAL 1 G/10ML
1 SUSPENSION ORAL 4 TIMES DAILY
Qty: 414 ML | Refills: 2 | Status: SHIPPED | OUTPATIENT
Start: 2023-04-25

## 2023-04-25 RX ORDER — HEPARIN SODIUM (PORCINE) LOCK FLUSH IV SOLN 100 UNIT/ML 100 UNIT/ML
500 SOLUTION INTRAVENOUS AS NEEDED
Status: DISCONTINUED | OUTPATIENT
Start: 2023-04-25 | End: 2023-04-25 | Stop reason: HOSPADM

## 2023-04-25 RX ORDER — ALBUTEROL SULFATE 90 UG/1
2 AEROSOL, METERED RESPIRATORY (INHALATION) EVERY 4 HOURS PRN
Qty: 8 G | Refills: 2 | Status: SHIPPED | OUTPATIENT
Start: 2023-04-25

## 2023-04-25 RX ORDER — PALONOSETRON 0.05 MG/ML
0.25 INJECTION, SOLUTION INTRAVENOUS ONCE
Status: CANCELLED | OUTPATIENT
Start: 2023-04-25

## 2023-04-25 RX ORDER — PALONOSETRON 0.05 MG/ML
0.25 INJECTION, SOLUTION INTRAVENOUS ONCE
Status: COMPLETED | OUTPATIENT
Start: 2023-04-25 | End: 2023-04-25

## 2023-04-25 RX ORDER — HEPARIN SODIUM (PORCINE) LOCK FLUSH IV SOLN 100 UNIT/ML 100 UNIT/ML
500 SOLUTION INTRAVENOUS AS NEEDED
OUTPATIENT
Start: 2023-04-25

## 2023-04-25 RX ORDER — SODIUM CHLORIDE 0.9 % (FLUSH) 0.9 %
10 SYRINGE (ML) INJECTION AS NEEDED
Status: DISCONTINUED | OUTPATIENT
Start: 2023-04-25 | End: 2023-04-25 | Stop reason: HOSPADM

## 2023-04-25 RX ORDER — SODIUM CHLORIDE 0.9 % (FLUSH) 0.9 %
10 SYRINGE (ML) INJECTION AS NEEDED
OUTPATIENT
Start: 2023-04-25

## 2023-04-25 RX ORDER — SODIUM CHLORIDE 9 MG/ML
250 INJECTION, SOLUTION INTRAVENOUS ONCE
Status: COMPLETED | OUTPATIENT
Start: 2023-04-25 | End: 2023-04-25

## 2023-04-25 RX ORDER — SODIUM CHLORIDE 9 MG/ML
250 INJECTION, SOLUTION INTRAVENOUS ONCE
Status: CANCELLED | OUTPATIENT
Start: 2023-04-25

## 2023-04-25 RX ADMIN — SODIUM CHLORIDE 250 ML: 9 INJECTION, SOLUTION INTRAVENOUS at 12:30

## 2023-04-25 RX ADMIN — CARBOPLATIN 110 MG: 10 INJECTION INTRAVENOUS at 12:49

## 2023-04-25 RX ADMIN — DEXAMETHASONE SODIUM PHOSPHATE 12 MG: 10 INJECTION, SOLUTION INTRAMUSCULAR; INTRAVENOUS at 12:31

## 2023-04-25 RX ADMIN — PALONOSETRON 0.25 MG: 0.05 INJECTION, SOLUTION INTRAVENOUS at 12:30

## 2023-04-25 RX ADMIN — Medication 500 UNITS: at 13:28

## 2023-04-25 RX ADMIN — Medication 10 ML: at 13:28

## 2023-04-25 NOTE — LETTER
2023     Danyelle Brush MD  [address] [facility] [address]    Patient: Gabby Acosta   YOB: 1955   Date of Visit: 2023       Dear Dr. Gonsalo MD:    Thank you for referring Gabby Acosta to me for evaluation. Below are the relevant portions of my assessment and plan of care.    If you have questions, please do not hesitate to call me. I look forward to following Gabby along with you.         Sincerely,        Carmelo Lombardi MD        CC: MD Harjit Escobar John L Jr., MD  [phone]  Sign when Signing Visit  Chief Complaint  Non-small cell lung cancer, clinical stage IIIA ([id number]), aortic stenosis, cervical spine stenosis, COPD, hepatic steatosis, diabetes mellitus    Subjective         History of Present Illness  Patient returns today in follow-up continuing on concurrent chemoradiation with low-dose weekly carboplatin alone.  Taxol was omitted to avoid neurotoxicity due to her underlying neuropathic changes from her severe cervical spine stenosis.  The patient received 3 weekly doses of carboplatin however on 2023 developed thrombocytopenia with platelet count down to 67,000 and treatment was held.  On 2023, platelet count had not improved significantly, remained at 70,000 and treatment was again held.  She has continued on with radiation therapy.  She reports however that she has missed 4 fractions due to a death in the family and had to go to the .  She is now scheduled to complete her treatment on 5/10/2023 with her last fraction of radiation therapy.  She reports that overall she feels better on treatment than she did before.  She does note some fatigue with treatment but notes that her mobility has increased.  She has been able to go upstairs and has been ambulating more around her home.  She does have some mild dysphagia and was prescribed Carafate in pill form, finds it difficult to swallow large pills.  She was not able to afford  "Magic mouthwash.  She does note alteration in her taste with a metallic taste present.  She has lost further weight down to 229 pounds.  She is using [place] instant breakfast at least daily.        Objective    Vital Signs:   /76   Pulse 92   Temp 97.1 °F (36.2 °C) (Temporal)   Resp 16   Ht 157.5 cm (62.01\")   Wt 104 kg (229 lb 1.6 oz)   SpO2 94%   BMI 41.89 kg/m²     Physical Exam  Constitutional:       Appearance: She is well-developed.      Comments: Patient is seated in wheelchair today   Eyes:      Conjunctiva/sclera: Conjunctivae normal.   Neck:      Thyroid: No thyromegaly.   Cardiovascular:      Rate and Rhythm: Normal rate and regular rhythm.      Heart sounds: No murmur heard.    No friction rub. No gallop.   Pulmonary:      Effort: No respiratory distress.      Breath sounds: Normal breath sounds.      Comments: Slightly diminished breath sounds bilaterally  Abdominal:      General: Bowel sounds are normal. There is no distension.      Palpations: Abdomen is soft.      Tenderness: There is no abdominal tenderness.   Lymphadenopathy:      Head:      Right side of head: No submandibular adenopathy.      Cervical: No cervical adenopathy.      Upper Body:      Right upper body: No supraclavicular adenopathy.      Left upper body: No supraclavicular adenopathy.   Skin:     General: Skin is warm and dry.      Findings: No rash.   Neurological:      Mental Status: She is alert and oriented to person, place, and time.      Cranial Nerves: No cranial nerve deficit.      Motor: No abnormal muscle tone.      Deep Tendon Reflexes: Reflexes normal.   Psychiatric:         Behavior: Behavior normal.       Patient was examined today, unchanged from above    Result Review : Reviewed CBC, CMP from today      Assessment and Plan     *Non-small cell lung cancer, clinical stage IIIA ([id number])  Patient has history of smoking 1 pack/day x 45 years, quit in November 2021.    Following anterior cervical " corpectomy in September 2022, she experienced acute hypoxemic respiratory failure.    CT angiogram chest 9/25/2022 showed a concerning 1.4 cm left lower lobe pulmonary nodule, mediastinal lymphadenopathy with infracarinal lymph node 2.7 cm, left hilar lymph node 1.2 cm, small right hilar lymph nodes up from 1.1 cm and additional enlarged mediastinal nodes in the right paratracheal, precarinal, infracarinal spaces.  There was a wedge-shaped area of consolidation/atelectasis in the right middle lobe base, subpleural density left costophrenic sulcus felt to represent atelectasis.  It was recommended for her to undergo subsequent PET scan by pulmonary.  The patient has had ongoing follow-up with neurosurgery and on CT scans 11/29/2022 of cervical and thoracic spine, there was concern regarding spinal instability with potential need for posterior cervical fusion.  This was scheduled in December 2022 however was delayed due to upper respiratory infection.  Patient admitted on 1/10/2023 to address multiple medical issues in order to prepare for potential neurosurgical procedure on 3/12/2023.  Patient underwent echocardiogram on 1/11/2023 with ejection fraction greater than 70%, grade 1 diastolic dysfunction, severe aortic stenosis.  Patient is being followed by cardiology with consideration of need for TAVR versus SAVR for moderate to severe aortic stenosis.  Repeat CT chest on 1/12/2023 showed slight increase in size of the left lower lobe pulmonary nodule increased from 1.4 to 1.5 cm.  Groundglass nodule peripheral left lower lobe less conspicuous and resolution of previous reticulonodular opacities right lower lobe.  There was further enlargement of mediastinal lymph nodes with right posterior paratracheal lymph node 1.4 x 2.1 up to 1.5 x 2.3 cm, subcarinal lymph node increased from 2.7 x 5.2 up to 3.2 x 5.8 cm, left hilar lymph node increased from 1.2 up to 1.4 cm, and is stable 1.7 cm left infrahilar lymph node.   There was also comment regarding hepatic steatosis with changes of chronic liver disease and stable indeterminate nodular thickening of the adrenal glands.  CT-guided lung biopsy on 1/13/2023 with pathology that showed invasive poorly differentiated non-small cell carcinoma with focal neuroendocrine differentiation.  Staining pattern and morphology favor poorly differentiated adenocarcinoma however there was focal CD56 staining, could not entirely exclude large cell neuroendocrine carcinoma.  PD-L1 35%, Caris analysis pending.  It was felt that there would be increased risk for bronchoscopy from a cardiac standpoint due to her aortic stenosis.  Staging MRI brain 1/29/2023 showed no evidence of metastatic disease  Staging PET scan 1/26/2023 showed hypermetabolic left lower lobe pulmonary nodule, size difficult to determine on PET however on recent CT was 1.6 cm (SUV 14.4).  Mediastinal and left hilar hypermetabolic lymphadenopathy (subcarinal lymph node 3.2 cm, SUV 31.7, left paratracheal 2.2 cm lymph node SUV 12.7).  Sub-6 mm pulmonary nodule left upper lobe unchanged and below PET resolution.  Focal uptake posterior right hepatic lobe SUV 7.2 of uncertain significance.  Recommended MRI to evaluate.  Right thyroid activity SUV 5.3, recommended ultrasound.  Long segment uptake distal esophagus consistent with esophagitis.  Uptake posterior larynx, nonspecific, recommended direct visualization.  MRI abdomen 2/6/2023 with no evidence to suggest metastatic disease in the liver.  Subcentimeter nonenhancing lesions favor cyst or hemangiomas.  Patient underwent right and left heart catheterization with cardiology on 2/14/2023.  Identification of coronary artery disease, mild pulmonary hypertension, severe aortic stenosis.  Recommendation per cardiology to proceed with treatment for lung cancer and defer any intervention regarding aortic stenosis.  Concern regarding underlying neurologic issues from cervical stenosis and  potential neuropathic effects from Taxol chemotherapy.  Concern regarding patient's overall performance status and ability to tolerate full dose chemotherapy.  Patient discussed at thoracic oncology tumor board with consensus to treat with weekly low-dose carboplatin concurrent with radiation therapy and omit Taxol.  Subsequent plan for 1 year durvalumab following concurrent chemoradiation.  Significant delay in initiating concurrent chemoradiation due to logistics with scheduling Mediport placement, patient canceled appointments and delayed initiation of treatment.  On 3/21/2023 initiated concurrent chemoradiation with weekly carboplatin (AUC 2) on 2/28/2023.  Week 4 carboplatin held 4/11 and again 4/18/2023 due to thrombocytopenia  Week 4 carboplatin resumed on 4/25/2023 at reduced dose to AUC 1 due to borderline neutropenia  Patient returns today in follow-up continuing on concurrent chemoradiation with weekly low-dose single agent carboplatin.  As above we had to hold carboplatin due to thrombocytopenia on 4/11 and 4/18/2023.  Thrombocytopenia today has recovered with platelet count up to 133,000.  Patient however has WBC that has declined to 1.67 with borderline ANC 1.1.  The patient has missed 4 fractions of radiation due to a death in the family and will not complete her treatment now until 4/10/2023.  We would like to administer additional carboplatin during her radiation.  Therefore we elected to proceed today with carboplatin however at a reduced dose to AUC 1.  She will have NP visit next week with potential week 5 carboplatin ending her counts and I will see her in 2 weeks just before she completes radiation therapy and we will consider whether to administer a 6 weekly dose of carboplatin.  At that time we will also discuss potential short interval follow-up CT scans as well as timing to initiate immunotherapy with durvalumab.  The patient is experiencing some side effects from treatment including  fatigue and mild dysphagia.  We are trying to switch from Carafate tablets to liquid the more effective and we will investigate availability of Magic mouthwash (patient reports she could not afford the medication).  We discussed trying to improve her caloric intake and she will see oncology nutrition today and will try to increase use of [place] Instant Breakfast to at least twice daily as she has lost further weight down to 229 pounds.  She does report overall however that she feels better than she has in quite some time and her mobility has actually improved at home.    *Aortic stenosis  Patient underwent echocardiogram on 1/11/2023 with ejection fraction greater than 70%, grade 1 diastolic dysfunction, severe aortic stenosis.    Patient followed by cardiology with consideration of need for TAVR versus SAVR for moderate to severe aortic stenosis.  Patient underwent cardiac catheterization 2/14/2023 with identification of coronary artery disease, mild pulmonary hypertension, severe aortic stenosis.  Per cardiology, recommendation to proceed with treatment for lung cancer and defer any consideration of intervention for aortic stenosis.     *Cervical spine stenosis  Patient with cervical spine stenosis with associated myelopathy.   She underwent surgery on 9/20/2022 with anterior cervical corpectomy with cage.      The patient has had ongoing follow-up with neurosurgery and on CT scans 11/29/2022 of cervical and thoracic spine, there was concern regarding spinal instability with potential need for posterior cervical fusion.  This was scheduled in December 2022 however was delayed due to upper respiratory infection.  Patient admitted on 1/10/2023 to address multiple medical issues in order to prepare for potential neurosurgical procedure on 3/12/2023.  Patient was previously having severe symptoms related to her cervical stenosis with reduced ability to ambulate previously and significant limitations in movement in  her hands, dropping objects.  More recently however she has improved and has been able to walk with the use of a walker, symptoms in the upper extremities have improved and her dexterity is better.    It appears that her neurosurgical procedure will need to be delayed until we can at least complete the concurrent chemoradiation portion of her treatment.  Surgery would be feasible while continuing on immunotherapy in the future.  Neurosurgical follow-up has been placed on hold.  Patient notes that her mobility and symptoms in her extremities have improved somewhat over time     *COPD  Patient has history of smoking 1 pack/day x 45 years, quit in November 2021.  Patient has not undergone formal PFTs  Patient being followed by pulmonary, currently receiving Symbicort inhaler, duo nebs 4 times daily     *Hepatic steatosis  Identified on prior scans  PET scan 1/26/2023 with questionable area of activity seen but no corresponding CT abnormality.    MRI abdomen 2/6/2023 with no evidence to suggest metastatic disease in the liver.  Subcentimeter nonenhancing lesions favor cyst or hemangiomas.  LFTs have been normal     *Diabetes mellitus  Currently receiving metformin 850 mg daily    *Situational depression  Patient reports feeling overwhelmed regarding the amount of information presented today in regards to her malignancy as well as her other medical issues.    Patient was seen by supportive oncology on 2/27/2023, added gabapentin 300 mg 3 times daily.    Patient notes that symptoms are under fair control currently.    *Right thyroid uptake on PET scan 1/26/2023  Patient was scheduled for thyroid ultrasound however does not feel that she will be able to undergo the procedure due to her neck issues and therefore was canceled, consider at future date    *Laryngeal uptake on PET scan 1/26/2023  Referral to ENT for laryngoscopy  Visit with ENT was rescheduled and we will obtain records    *Esophageal uptake on PET scan  1/26/2023  Felt to be consistent with esophagitis in the distal esophagus.  We will hold on referral for EGD given the multitude of additional procedures and consults required above.    *Venous access  Patient scheduled to see Dr. Shook on 2/22/2023 to discuss pursuit of port placement.    *Nutrition/weight loss  Patient with continued weight loss down to 229 pounds today.  Patient will meet again with oncology nutrition and we discussed increasing her caloric intake with use of [place] Instant Breakfast increasing from once daily to twice daily    *Social  Patient reports that she has significant transportation issues.  Oncology social work has been involved to help facilitate transportation for concurrent chemoradiation         PLAN:  Continue definitive treatment for stage IIIA non-small cell lung cancer with concurrent chemoradiation with weekly single agent carboplatin to be followed by 1 year of durvalumab.  We will resume carboplatin today (fourth weekly dose) with recovery of platelet count.  Due to borderline ANC we will reduce carboplatin dose to AUC 1.  We will obtain records from visit with ENT to evaluate laryngeal activity seen on PET scan  Patient will be seen again by oncology nutrition today in regards to ongoing weight loss  Patient encouraged to increase [place] instant breakfast to at least twice daily  Prescription sent for Carafate suspension 1 g 4 times daily  We will investigate potential assistance to obtain Magic mouthwash  In 1 week NP visit with CBC, CMP and weekly carboplatin  In 2 weeks MD visit with CBC, CMP and possible final dose weekly carboplatin (patient will complete radiation therapy the next day on 5/10/2023).  We will discuss plans for subsequent CT evaluation and timeframe to initiate immunotherapy with durvalumab.      The patient is continuing on high risk medication requiring intensive monitoring    I did spend 40 minutes in total time caring for the patient today,  time spent in review of records, face-to-face consultation, coordination of care, placement of orders, completion of documentation.

## 2023-04-25 NOTE — NURSING NOTE
S/w Dr. Harjit tobar to treat with borderline anc and he dose reduced.  Lab Results   Component Value Date    NEUTROABS 1.10 (L) 04/25/2023     Lab Results   Component Value Date    WBC 1.67 (L) 04/25/2023    HGB 11.5 (L) 04/25/2023    HCT 34.8 04/25/2023    MCV 89.9 04/25/2023     (L) 04/25/2023

## 2023-04-25 NOTE — PROGRESS NOTES
Physician Weekly Management Note    Diagnosis:     Diagnosis Plan   1. Malignant neoplasm of lower lobe of left lung            RT Details:  fx 22/30 lung/mediastinum 44/60Gy    Notes on Treatment course, Films, Medical progress:  kps 80% doing ok, tumor and nodes shrinking so replanned to start new plan tomorrow, cont on     Weekly Management:  Medication reviewed?   Yes  New medications given?   No  Problemlist reviewed?   Yes  Problem added?   No  Issues raised requiring referral to support services - task assigned to:  na    Technical aspects reviewed:  Weekly OBI approved?   Yes  Weekly port films approved?   Yes  Change requests noted on port film?   No  Patient setup and plan reviewed?   Yes    Chart Reviewed:  Continue current treatment plan?   Yes  Treatment plan change requested?   Yes  CBC reviewed?   Yes  Concurrent Chemo?   Yes    Objective     Toxicities:   fatigue     Review of Systems   Constitutional: Positive for fatigue and unexpected weight change.   HENT: Negative.    Respiratory: Negative.           There were no vitals filed for this visit.        4/18/2023    10:51 AM   Current Status   ECOG score 2       Physical Exam  Constitutional:       Appearance: Normal appearance.   Cardiovascular:      Pulses: Normal pulses.   Pulmonary:      Effort: Pulmonary effort is normal.      Breath sounds: Normal breath sounds. No wheezing.   Neurological:      Mental Status: She is alert.             Problem Summary List    Diagnosis:     Diagnosis Plan   1. Malignant neoplasm of lower lobe of left lung          Pathology:   Non small cell carcinoma with focal neuroendocrine diff    Past Medical History:   Diagnosis Date   • Cervical spondylosis with myelopathy    • Cervical stenosis of spine    • COPD (chronic obstructive pulmonary disease)    • Diabetes mellitus    • Diabetes mellitus with diabetic dermatitis    • Disease of thyroid gland     Hypothyroid   • Elevated cholesterol    • Fatty liver    •  Hepatic steatosis    • Hyperlipidemia    • Hypertension    • Lung cancer     Stage IIIA non-small cell lung cancer   • Malignant neoplasm of lower lobe of left lung 2023   • Severe aortic stenosis     followed by    • Slow to wake up after anesthesia          Past Surgical History:   Procedure Laterality Date   • ANTERIOR CHANNEL VERTEBRECTOMY/CORPECTOMY N/A 2022    Procedure: Anterior cervical corpectomy, cervical four/five/six, with cage and plate from cervical three to cervical seven;  Surgeon: David Cheung MD;  Location: [facility] MAIN OR;  Service: Neurosurgery;  Laterality: N/A;   • CARDIAC CATHETERIZATION N/A 2023    Procedure: Right and Left Heart Cath;  Surgeon: Kostas David MD;  [facility];  Service: Cardiovascular;  Laterality: N/A;   • CARDIAC CATHETERIZATION N/A 2023    Procedure: Coronary angiography;  Surgeon: Kostas David MD;  [facility];  Service: Cardiovascular;  Laterality: N/A;   • CARDIAC CATHETERIZATION N/A 2023    Procedure: Left ventriculography;  Surgeon: Kostas David MD;  Location: [facility] CATH INVASIVE LOCATION;  Service: Cardiovascular;  Laterality: N/A;   • CARDIAC CATHETERIZATION N/A 2023    Procedure: Resting Full Cycle Ratio;  Surgeon: Kostas David MD;  [facility];  Service: Cardiovascular;  Laterality: N/A;   • CATARACT EXTRACTION     •  SECTION     • CHOLECYSTECTOMY     • VENOUS ACCESS DEVICE (PORT) INSERTION Left 3/2/2023    Procedure: INSERTION OF PORTACATH;  Surgeon: Collin Shook MD;  Location: [facility] OR;  Service: General;  Laterality: Left;         Current Outpatient Medications on File Prior to Visit   Medication Sig Dispense Refill   • albuterol sulfate  (90 Base) MCG/ACT inhaler      • amLODIPine (NORVASC) 5 MG tablet Take 1 tablet by mouth Daily.     • buPROPion XL (WELLBUTRIN XL)  150 MG 24 hr tablet Take 1 tablet by mouth Daily. 30 tablet 0   • diphenhydrAMINE (BENADRYL) 25 mg capsule Take 1 capsule by mouth Every 6 (Six) Hours As Needed for Allergies.     • escitalopram (LEXAPRO) 10 MG tablet Take 1 tablet by mouth Daily.     • fluticasone (FLONASE) 50 MCG/ACT nasal spray INSTILL TWO (2) SPRAYS INTO NOSE DAILY.     • gabapentin (Neurontin) 300 MG capsule Take 1 capsule by mouth 3 (Three) Times a Day. 90 capsule 2   • guaiFENesin (MUCINEX) 600 MG 12 hr tablet Take 1 tablet by mouth Every 12 (Twelve) Hours. 14 tablet 0   • hydrOXYzine (ATARAX) 10 MG tablet Take 5 mg by mouth As Needed for Itching.     • ipratropium-albuterol (DUO-NEB) 0.5-2.5 mg/3 ml nebulizer Take 3 mL by nebulization 4 (Four) Times a Day. 360 mL 0   • levothyroxine (SYNTHROID, LEVOTHROID) 125 MCG tablet Take 1 tablet by mouth Daily.     • lidocaine-prilocaine (EMLA) 2.5-2.5 % cream Apply maria g-sized amount to Mediport site 30 min prior to port access. Do not rub in. 30 g 2   • losartan (COZAAR) 100 MG tablet Take 1 tablet by mouth Every Night.     • lovastatin (MEVACOR) 40 MG tablet TAKE ONE (1) TABLET BY MOUTH NIGHTLY.     • metFORMIN (GLUCOPHAGE) 850 MG tablet Take 1 tablet by mouth Daily.     • nystatin in Lidocaine Viscous HCl solution-diphenhydrAMINE liquid-aluminum-magnesium hydroxide-simethicone suspension Swish and swallow 5 mL by mouth 4 times per day before meals and at bedtime 410 mL 2   • ondansetron (ZOFRAN) 8 MG tablet Take 1 tablet by mouth Every 8 (Eight) Hours As Needed for Nausea or Vomiting. 30 tablet 2   • pyridoxine (VITAMIN B-6) 100 MG tablet Take 1 tablet by mouth Daily.     • sucralfate (Carafate) 1 g tablet Take 1 tablet by mouth 4 (Four) Times a Day. 120 tablet 1   • traMADol (ULTRAM) 50 MG tablet Take 1 tablet by mouth Every 6 (Six) Hours As Needed for Moderate Pain. 12 tablet 0   • vitamin D3 125 MCG (5000 UT) capsule capsule Take 1 capsule by mouth Daily.       No current facility-administered  medications on file prior to visit.       Allergies   Allergen Reactions   • Nsaids Other (See Comments)     No NSAIDs per Cardiology.         Referring Provider:    No referring provider defined for this encounter.    Oncologist:  No primary care provider on file.      Seen and approved by:  Ree Banerjee MD  04/25/2023  Subjective     No ref. provider found

## 2023-04-26 ENCOUNTER — HOSPITAL ENCOUNTER (OUTPATIENT)
Dept: RADIATION ONCOLOGY | Facility: HOSPITAL | Age: 68
Discharge: HOME | End: 2023-04-26

## 2023-04-26 LAB
RAD ONC ARIA COURSE ID: NORMAL
RAD ONC ARIA COURSE INTENT: NORMAL
RAD ONC ARIA COURSE LAST TREATMENT DATE: NORMAL
RAD ONC ARIA COURSE START DATE: NORMAL
RAD ONC ARIA COURSE TREATMENT ELAPSED DAYS: 36
RAD ONC ARIA FIRST TREATMENT DATE: NORMAL
RAD ONC ARIA PLAN FRACTIONS TREATED TO DATE: 2
RAD ONC ARIA PLAN ID: NORMAL
RAD ONC ARIA PLAN PRESCRIBED DOSE PER FRACTION: 2 GY
RAD ONC ARIA PLAN PRIMARY REFERENCE POINT: NORMAL
RAD ONC ARIA PLAN TOTAL FRACTIONS PRESCRIBED: 8
RAD ONC ARIA PLAN TOTAL PRESCRIBED DOSE: 1600 CGY
RAD ONC ARIA REFERENCE POINT DOSAGE GIVEN TO DATE: 46 GY
RAD ONC ARIA REFERENCE POINT DOSAGE GIVEN TO DATE: 46.22 GY
RAD ONC ARIA REFERENCE POINT ID: NORMAL
RAD ONC ARIA REFERENCE POINT ID: NORMAL
RAD ONC ARIA REFERENCE POINT SESSION DOSAGE GIVEN: 1.99 GY
RAD ONC ARIA REFERENCE POINT SESSION DOSAGE GIVEN: 2 GY

## 2023-04-26 PROCEDURE — 77336 RADIATION PHYSICS CONSULT: CPT | Performed by: RADIOLOGY

## 2023-04-26 PROCEDURE — 77014 CHG CT GUIDANCE RADIATION THERAPY FLDS PLACEMENT: CPT | Performed by: RADIOLOGY

## 2023-04-26 PROCEDURE — 77386: CPT | Performed by: RADIOLOGY

## 2023-04-27 ENCOUNTER — HOSPITAL ENCOUNTER (OUTPATIENT)
Dept: RADIATION ONCOLOGY | Facility: HOSPITAL | Age: 68
Discharge: HOME | End: 2023-04-27

## 2023-04-27 LAB
RAD ONC ARIA COURSE ID: NORMAL
RAD ONC ARIA COURSE INTENT: NORMAL
RAD ONC ARIA COURSE LAST TREATMENT DATE: NORMAL
RAD ONC ARIA COURSE START DATE: NORMAL
RAD ONC ARIA COURSE TREATMENT ELAPSED DAYS: 37
RAD ONC ARIA FIRST TREATMENT DATE: NORMAL
RAD ONC ARIA PLAN FRACTIONS TREATED TO DATE: 3
RAD ONC ARIA PLAN ID: NORMAL
RAD ONC ARIA PLAN PRESCRIBED DOSE PER FRACTION: 2 GY
RAD ONC ARIA PLAN PRIMARY REFERENCE POINT: NORMAL
RAD ONC ARIA PLAN TOTAL FRACTIONS PRESCRIBED: 8
RAD ONC ARIA PLAN TOTAL PRESCRIBED DOSE: 1600 CGY
RAD ONC ARIA REFERENCE POINT DOSAGE GIVEN TO DATE: 48 GY
RAD ONC ARIA REFERENCE POINT DOSAGE GIVEN TO DATE: 48.21 GY
RAD ONC ARIA REFERENCE POINT ID: NORMAL
RAD ONC ARIA REFERENCE POINT ID: NORMAL
RAD ONC ARIA REFERENCE POINT SESSION DOSAGE GIVEN: 1.99 GY
RAD ONC ARIA REFERENCE POINT SESSION DOSAGE GIVEN: 2 GY

## 2023-04-27 PROCEDURE — 77386: CPT | Performed by: RADIOLOGY

## 2023-04-27 PROCEDURE — 77014 CHG CT GUIDANCE RADIATION THERAPY FLDS PLACEMENT: CPT | Performed by: RADIOLOGY

## 2023-04-28 ENCOUNTER — HOSPITAL ENCOUNTER (OUTPATIENT)
Dept: RADIATION ONCOLOGY | Facility: HOSPITAL | Age: 68
Discharge: HOME | End: 2023-04-28

## 2023-04-28 ENCOUNTER — PATIENT OUTREACH (OUTPATIENT)
Dept: OTHER | Facility: HOSPITAL | Age: 68
End: 2023-04-28
Payer: MEDICARE

## 2023-04-28 LAB
RAD ONC ARIA COURSE ID: NORMAL
RAD ONC ARIA COURSE INTENT: NORMAL
RAD ONC ARIA COURSE LAST TREATMENT DATE: NORMAL
RAD ONC ARIA COURSE START DATE: NORMAL
RAD ONC ARIA COURSE TREATMENT ELAPSED DAYS: 38
RAD ONC ARIA FIRST TREATMENT DATE: NORMAL
RAD ONC ARIA PLAN FRACTIONS TREATED TO DATE: 4
RAD ONC ARIA PLAN ID: NORMAL
RAD ONC ARIA PLAN PRESCRIBED DOSE PER FRACTION: 2 GY
RAD ONC ARIA PLAN PRIMARY REFERENCE POINT: NORMAL
RAD ONC ARIA PLAN TOTAL FRACTIONS PRESCRIBED: 8
RAD ONC ARIA PLAN TOTAL PRESCRIBED DOSE: 1600 CGY
RAD ONC ARIA REFERENCE POINT DOSAGE GIVEN TO DATE: 50 GY
RAD ONC ARIA REFERENCE POINT DOSAGE GIVEN TO DATE: 50.2 GY
RAD ONC ARIA REFERENCE POINT ID: NORMAL
RAD ONC ARIA REFERENCE POINT ID: NORMAL
RAD ONC ARIA REFERENCE POINT SESSION DOSAGE GIVEN: 1.99 GY
RAD ONC ARIA REFERENCE POINT SESSION DOSAGE GIVEN: 2 GY

## 2023-04-28 PROCEDURE — 77386: CPT | Performed by: RADIOLOGY

## 2023-04-28 PROCEDURE — 77014 CHG CT GUIDANCE RADIATION THERAPY FLDS PLACEMENT: CPT | Performed by: RADIOLOGY

## 2023-04-28 NOTE — PROGRESS NOTES
Met with patient in radiation oncology.  She is doing ok with treatment; she has a little more than 2 weeks of radiation left. The patient complains of mild skin irritation, fatigue and a sore throat. She has been using prescriptions as prescribed for these treatments.    The patient reports being behind on her [employer] bill; she owes $416 by 5/16. She has previously rec'd assistance through [employer].  She is asking for a letter stating she is a cancer patient and needs her utilities on.  The patient is also requesting gas card(s) if possible.  I will ask Maria Elena BELLO to reach back out to her about this.     I will follow up in several weeks; she will call as needed.

## 2023-05-01 ENCOUNTER — HOSPITAL ENCOUNTER (OUTPATIENT)
Dept: RADIATION ONCOLOGY | Facility: HOSPITAL | Age: 68
Discharge: HOME | End: 2023-05-01

## 2023-05-01 ENCOUNTER — HOSPITAL ENCOUNTER (OUTPATIENT)
Dept: RADIATION ONCOLOGY | Facility: HOSPITAL | Age: 68
Setting detail: RADIATION/ONCOLOGY SERIES
End: 2023-05-01
Payer: MEDICARE

## 2023-05-01 ENCOUNTER — DOCUMENTATION (OUTPATIENT)
Dept: OTHER | Facility: HOSPITAL | Age: 68
End: 2023-05-01
Payer: MEDICARE

## 2023-05-01 LAB
RAD ONC ARIA COURSE ID: NORMAL
RAD ONC ARIA COURSE INTENT: NORMAL
RAD ONC ARIA COURSE LAST TREATMENT DATE: NORMAL
RAD ONC ARIA COURSE START DATE: NORMAL
RAD ONC ARIA COURSE TREATMENT ELAPSED DAYS: 41
RAD ONC ARIA FIRST TREATMENT DATE: NORMAL
RAD ONC ARIA PLAN FRACTIONS TREATED TO DATE: 5
RAD ONC ARIA PLAN ID: NORMAL
RAD ONC ARIA PLAN PRESCRIBED DOSE PER FRACTION: 2 GY
RAD ONC ARIA PLAN PRIMARY REFERENCE POINT: NORMAL
RAD ONC ARIA PLAN TOTAL FRACTIONS PRESCRIBED: 8
RAD ONC ARIA PLAN TOTAL PRESCRIBED DOSE: 1600 CGY
RAD ONC ARIA REFERENCE POINT DOSAGE GIVEN TO DATE: 52 GY
RAD ONC ARIA REFERENCE POINT DOSAGE GIVEN TO DATE: 52.19 GY
RAD ONC ARIA REFERENCE POINT ID: NORMAL
RAD ONC ARIA REFERENCE POINT ID: NORMAL
RAD ONC ARIA REFERENCE POINT SESSION DOSAGE GIVEN: 1.99 GY
RAD ONC ARIA REFERENCE POINT SESSION DOSAGE GIVEN: 2 GY

## 2023-05-01 PROCEDURE — 77386 CHG INTENSITY MODULATED RADIATION TX DLVR COMPLEX: CPT | Performed by: RADIOLOGY

## 2023-05-01 PROCEDURE — 77386: CPT | Performed by: RADIOLOGY

## 2023-05-01 PROCEDURE — 77014 CHG CT GUIDANCE RADIATION THERAPY FLDS PLACEMENT: CPT | Performed by: RADIOLOGY

## 2023-05-01 NOTE — PROGRESS NOTES
Oncology Social Work    Spoke to patient today.  Patient in need of gasoline assistance - $50.. [employer] gift card provided.   Also,  Patient in need of letter stating that she is undergoing cancer treatment  - this letter is for [facility] program ( [employer]).  Will type this letter up for patient.      Maria Elena Enrique, SILASW

## 2023-05-02 ENCOUNTER — OFFICE VISIT (OUTPATIENT)
Dept: ONCOLOGY | Facility: CLINIC | Age: 68
End: 2023-05-02
Payer: MEDICARE

## 2023-05-02 ENCOUNTER — INFUSION (OUTPATIENT)
Dept: ONCOLOGY | Facility: HOSPITAL | Age: 68
End: 2023-05-02
Payer: MEDICARE

## 2023-05-02 ENCOUNTER — APPOINTMENT (OUTPATIENT)
Dept: ONCOLOGY | Facility: HOSPITAL | Age: 68
End: 2023-05-02
Payer: MEDICARE

## 2023-05-02 VITALS
HEIGHT: 62 IN | WEIGHT: 235.4 LBS | DIASTOLIC BLOOD PRESSURE: 77 MMHG | RESPIRATION RATE: 18 BRPM | TEMPERATURE: 98.2 F | BODY MASS INDEX: 43.32 KG/M2 | OXYGEN SATURATION: 91 % | SYSTOLIC BLOOD PRESSURE: 130 MMHG | HEART RATE: 92 BPM

## 2023-05-02 DIAGNOSIS — Z45.2 ENCOUNTER FOR ADJUSTMENT OR MANAGEMENT OF VASCULAR ACCESS DEVICE: Primary | ICD-10-CM

## 2023-05-02 DIAGNOSIS — C34.32 MALIGNANT NEOPLASM OF LOWER LOBE OF LEFT LUNG: Primary | ICD-10-CM

## 2023-05-02 DIAGNOSIS — T45.1X5A CHEMOTHERAPY INDUCED NEUTROPENIA: ICD-10-CM

## 2023-05-02 DIAGNOSIS — D70.1 CHEMOTHERAPY INDUCED NEUTROPENIA: ICD-10-CM

## 2023-05-02 DIAGNOSIS — C34.32 MALIGNANT NEOPLASM OF LOWER LOBE OF LEFT LUNG: ICD-10-CM

## 2023-05-02 LAB
ALBUMIN SERPL-MCNC: 3.6 G/DL (ref 3.5–5.2)
ALBUMIN/GLOB SERPL: 1.4 G/DL (ref 1.1–2.4)
ALP SERPL-CCNC: 65 U/L (ref 38–116)
ALT SERPL W P-5'-P-CCNC: 12 U/L (ref 0–33)
ANION GAP SERPL CALCULATED.3IONS-SCNC: 10.1 MMOL/L (ref 5–15)
AST SERPL-CCNC: 17 U/L (ref 0–32)
BASOPHILS # BLD AUTO: 0 10*3/MM3 (ref 0–0.2)
BASOPHILS NFR BLD AUTO: 0 % (ref 0–1.5)
BILIRUB SERPL-MCNC: 0.3 MG/DL (ref 0.2–1.2)
BUN SERPL-MCNC: 20 MG/DL (ref 6–20)
BUN/CREAT SERPL: 20.6 (ref 7.3–30)
CALCIUM SPEC-SCNC: 9.1 MG/DL (ref 8.5–10.2)
CHLORIDE SERPL-SCNC: 104 MMOL/L (ref 98–107)
CO2 SERPL-SCNC: 27.9 MMOL/L (ref 22–29)
CREAT SERPL-MCNC: 0.97 MG/DL (ref 0.6–1.1)
DEPRECATED RDW RBC AUTO: 51 FL (ref 37–54)
EGFRCR SERPLBLD CKD-EPI 2021: 64.2 ML/MIN/1.73
EOSINOPHIL # BLD AUTO: 0.01 10*3/MM3 (ref 0–0.4)
EOSINOPHIL NFR BLD AUTO: 0.9 % (ref 0.3–6.2)
ERYTHROCYTE [DISTWIDTH] IN BLOOD BY AUTOMATED COUNT: 15.9 % (ref 12.3–15.4)
GLOBULIN UR ELPH-MCNC: 2.6 GM/DL (ref 1.8–3.5)
GLUCOSE SERPL-MCNC: 153 MG/DL (ref 74–124)
HCT VFR BLD AUTO: 33.4 % (ref 34–46.6)
HGB BLD-MCNC: 10.9 G/DL (ref 12–15.9)
IMM GRANULOCYTES # BLD AUTO: 0.01 10*3/MM3 (ref 0–0.05)
IMM GRANULOCYTES NFR BLD AUTO: 0.9 % (ref 0–0.5)
LYMPHOCYTES # BLD AUTO: 0.25 10*3/MM3 (ref 0.7–3.1)
LYMPHOCYTES NFR BLD AUTO: 22.1 % (ref 19.6–45.3)
MCH RBC QN AUTO: 29.7 PG (ref 26.6–33)
MCHC RBC AUTO-ENTMCNC: 32.6 G/DL (ref 31.5–35.7)
MCV RBC AUTO: 91 FL (ref 79–97)
MONOCYTES # BLD AUTO: 0.29 10*3/MM3 (ref 0.1–0.9)
MONOCYTES NFR BLD AUTO: 25.7 % (ref 5–12)
NEUTROPHILS NFR BLD AUTO: 0.57 10*3/MM3 (ref 1.7–7)
NEUTROPHILS NFR BLD AUTO: 50.4 % (ref 42.7–76)
NRBC BLD AUTO-RTO: 0 /100 WBC (ref 0–0.2)
PLATELET # BLD AUTO: 118 10*3/MM3 (ref 140–450)
PMV BLD AUTO: 8.9 FL (ref 6–12)
POTASSIUM SERPL-SCNC: 4 MMOL/L (ref 3.5–4.7)
PROT SERPL-MCNC: 6.2 G/DL (ref 6.3–8)
RBC # BLD AUTO: 3.67 10*6/MM3 (ref 3.77–5.28)
SODIUM SERPL-SCNC: 142 MMOL/L (ref 134–145)
WBC NRBC COR # BLD: 1.13 10*3/MM3 (ref 3.4–10.8)

## 2023-05-02 PROCEDURE — 25010000002 HEPARIN LOCK FLUSH PER 10 UNITS: Performed by: INTERNAL MEDICINE

## 2023-05-02 PROCEDURE — 85025 COMPLETE CBC W/AUTO DIFF WBC: CPT

## 2023-05-02 PROCEDURE — 36591 DRAW BLOOD OFF VENOUS DEVICE: CPT

## 2023-05-02 PROCEDURE — 80053 COMPREHEN METABOLIC PANEL: CPT

## 2023-05-02 RX ORDER — HEPARIN SODIUM (PORCINE) LOCK FLUSH IV SOLN 100 UNIT/ML 100 UNIT/ML
500 SOLUTION INTRAVENOUS AS NEEDED
OUTPATIENT
Start: 2023-05-02

## 2023-05-02 RX ORDER — SODIUM CHLORIDE 0.9 % (FLUSH) 0.9 %
10 SYRINGE (ML) INJECTION AS NEEDED
OUTPATIENT
Start: 2023-05-02

## 2023-05-02 RX ORDER — SODIUM CHLORIDE 0.9 % (FLUSH) 0.9 %
10 SYRINGE (ML) INJECTION AS NEEDED
Status: DISCONTINUED | OUTPATIENT
Start: 2023-05-02 | End: 2023-05-02 | Stop reason: HOSPADM

## 2023-05-02 RX ORDER — HEPARIN SODIUM (PORCINE) LOCK FLUSH IV SOLN 100 UNIT/ML 100 UNIT/ML
500 SOLUTION INTRAVENOUS AS NEEDED
Status: DISCONTINUED | OUTPATIENT
Start: 2023-05-02 | End: 2023-05-02 | Stop reason: HOSPADM

## 2023-05-02 RX ADMIN — Medication 500 UNITS: at 11:38

## 2023-05-02 RX ADMIN — Medication 10 ML: at 11:38

## 2023-05-02 NOTE — PROGRESS NOTES
"Chief Complaint  Non-small cell lung cancer, clinical stage IIIA ([id number]), aortic stenosis, cervical spine stenosis, COPD, hepatic steatosis, diabetes mellitus    Subjective        History of Present Illness   Patient is a 68-year-old female with the above-mentioned history who is here today for lab review and evaluation and consideration of carboplatin with concurrent radiation.  Taxol has been omitted to avoid neurotoxicity due to underlying neuropathic changes from severe cervical spine stenosis.  Patient has had treatment delays after 3 doses due to thrombocytopenia.  She was able to receive her fourth cycle on 4/25/2023.  She would be due for her fifth dose today.  Patient is complaining of some issues with sore throat, but states that the medication she was given by radiation oncology is helping.  She does notice some intermittent hoarseness.  She denies any issues with swallowing.  She denies issues with fevers or chills.  She has some pre-existing neuropathy, which has not worsened.      She does have ongoing fatigue.        Objective   Vital Signs:   /77   Pulse 92   Temp 98.2 °F (36.8 °C) (Temporal)   Resp 18   Ht 157.5 cm (62.01\")   Wt 107 kg (235 lb 6.4 oz)   SpO2 91%   BMI 43.04 kg/m²     Physical Exam  Constitutional:       Appearance: She is well-developed.      Comments: Patient is seated in wheelchair today   Eyes:      Conjunctiva/sclera: Conjunctivae normal.   Neck:      Thyroid: No thyromegaly.   Cardiovascular:      Rate and Rhythm: Normal rate and regular rhythm.      Heart sounds: No murmur heard.    No friction rub. No gallop.   Pulmonary:      Effort: No respiratory distress.      Breath sounds: Normal breath sounds.      Comments: Slightly diminished breath sounds bilaterally  Abdominal:      General: Bowel sounds are normal. There is no distension.      Palpations: Abdomen is soft.      Tenderness: There is no abdominal tenderness.   Lymphadenopathy:      Head:      Right " side of head: No submandibular adenopathy.      Cervical: No cervical adenopathy.      Upper Body:      Right upper body: No supraclavicular adenopathy.      Left upper body: No supraclavicular adenopathy.   Skin:     General: Skin is warm and dry.      Findings: No rash.   Neurological:      Mental Status: She is alert and oriented to person, place, and time.      Cranial Nerves: No cranial nerve deficit.      Motor: No abnormal muscle tone.      Deep Tendon Reflexes: Reflexes normal.   Psychiatric:         Behavior: Behavior normal.       05/02/2023 Patient was examined today, unchanged from above    Result Review :   Lab Results   Component Value Date    WBC 1.13 (L) 05/02/2023    HGB 10.9 (L) 05/02/2023    HCT 33.4 (L) 05/02/2023    MCV 91.0 05/02/2023     (L) 05/02/2023     Lab Results   Component Value Date    NEUTROABS 0.57 (L) 05/02/2023     Lab Results   Component Value Date    GLUCOSE 153 (H) 05/02/2023    BUN 20 05/02/2023    CREATININE 0.97 05/02/2023    EGFR 64.2 05/02/2023    BCR 20.6 05/02/2023    K 4.0 05/02/2023    CO2 27.9 05/02/2023    CALCIUM 9.1 05/02/2023    ALBUMIN 3.6 05/02/2023    BILITOT 0.3 05/02/2023    AST 17 05/02/2023    ALT 12 05/02/2023            Assessment and Plan     *Non-small cell lung cancer, clinical stage IIIA ([id number])  • Patient has history of smoking 1 pack/day x 45 years, quit in November 2021.    • Following anterior cervical corpectomy in September 2022, she experienced acute hypoxemic respiratory failure.    • CT angiogram chest 9/25/2022 showed a concerning 1.4 cm left lower lobe pulmonary nodule, mediastinal lymphadenopathy with infracarinal lymph node 2.7 cm, left hilar lymph node 1.2 cm, small right hilar lymph nodes up from 1.1 cm and additional enlarged mediastinal nodes in the right paratracheal, precarinal, infracarinal spaces.  There was a wedge-shaped area of consolidation/atelectasis in the right middle lobe base, subpleural density left  costophrenic sulcus felt to represent atelectasis.  It was recommended for her to undergo subsequent PET scan by pulmonary.  • The patient has had ongoing follow-up with neurosurgery and on CT scans 11/29/2022 of cervical and thoracic spine, there was concern regarding spinal instability with potential need for posterior cervical fusion.  This was scheduled in December 2022 however was delayed due to upper respiratory infection.  • Patient admitted on 1/10/2023 to address multiple medical issues in order to prepare for potential neurosurgical procedure on 3/12/2023.  • Patient underwent echocardiogram on 1/11/2023 with ejection fraction greater than 70%, grade 1 diastolic dysfunction, severe aortic stenosis.  Patient is being followed by cardiology with consideration of need for TAVR versus SAVR for moderate to severe aortic stenosis.  • Repeat CT chest on 1/12/2023 showed slight increase in size of the left lower lobe pulmonary nodule increased from 1.4 to 1.5 cm.  Groundglass nodule peripheral left lower lobe less conspicuous and resolution of previous reticulonodular opacities right lower lobe.  There was further enlargement of mediastinal lymph nodes with right posterior paratracheal lymph node 1.4 x 2.1 up to 1.5 x 2.3 cm, subcarinal lymph node increased from 2.7 x 5.2 up to 3.2 x 5.8 cm, left hilar lymph node increased from 1.2 up to 1.4 cm, and is stable 1.7 cm left infrahilar lymph node.  There was also comment regarding hepatic steatosis with changes of chronic liver disease and stable indeterminate nodular thickening of the adrenal glands.  • CT-guided lung biopsy on 1/13/2023 with pathology that showed invasive poorly differentiated non-small cell carcinoma with focal neuroendocrine differentiation.  Staining pattern and morphology favor poorly differentiated adenocarcinoma however there was focal [id number] staining, could not entirely exclude large cell neuroendocrine carcinoma.  PD-L1 35%, Caris analysis  pending.  • It was felt that there would be increased risk for bronchoscopy from a cardiac standpoint due to her aortic stenosis.  • Staging MRI brain 1/29/2023 showed no evidence of metastatic disease  • Staging PET scan 1/26/2023 showed hypermetabolic left lower lobe pulmonary nodule, size difficult to determine on PET however on recent CT was 1.6 cm (SUV 14.4).  Mediastinal and left hilar hypermetabolic lymphadenopathy (subcarinal lymph node 3.2 cm, SUV 31.7, left paratracheal 2.2 cm lymph node SUV 12.7).  Sub-6 mm pulmonary nodule left upper lobe unchanged and below PET resolution.  Focal uptake posterior right hepatic lobe SUV 7.2 of uncertain significance.  Recommended MRI to evaluate.  Right thyroid activity SUV 5.3, recommended ultrasound.  Long segment uptake distal esophagus consistent with esophagitis.  Uptake posterior larynx, nonspecific, recommended direct visualization.  • MRI abdomen 2/6/2023 with no evidence to suggest metastatic disease in the liver.  Subcentimeter nonenhancing lesions favor cyst or hemangiomas.  • Patient underwent right and left heart catheterization with cardiology on 2/14/2023.  Identification of coronary artery disease, mild pulmonary hypertension, severe aortic stenosis.  Recommendation per cardiology to proceed with treatment for lung cancer and defer any intervention regarding aortic stenosis.  • Concern regarding underlying neurologic issues from cervical stenosis and potential neuropathic effects from Taxol chemotherapy.  Concern regarding patient's overall performance status and ability to tolerate full dose chemotherapy.  Patient discussed at thoracic oncology tumor board with consensus to treat with weekly low-dose carboplatin concurrent with radiation therapy and omit Taxol.  Subsequent plan for 1 year durvalumab following concurrent chemoradiation.  • Significant delay in initiating concurrent chemoradiation due to logistics with scheduling Mediport placement,  patient canceled appointments and delayed initiation of treatment.  • On 3/21/2023 initiated concurrent chemoradiation with weekly carboplatin (AUC 2) on 2/28/2023.  • Week 4 carboplatin held 4/11 and again 4/18/2023 due to thrombocytopenia  • Week 4 carboplatin resumed on 4/25/2023 at reduced dose to AUC 1 due to borderline neutropenia  • 5/2/2023 here for consideration of week 5 carboplatin.  Today WBC 1.13, ANC 0.57, hemoglobin 10.9, platelet count 118,000.  Discussed with Dr. Lombardi.  We will hold treatment today.  I have also notified radiation oncology, and they plan on holding her radiation treatments for the remainder of the week.  Patient will return on Monday for a CBC check to see if they can resume her radiation.  She will return on Tuesday, 5/9/2023 for follow-up with Dr. Lombardi for reassessment and consideration of continued carboplatin.  Reviewed neutropenic precautions with the patient advised her to call with fever or any signs of infection.     *Aortic stenosis  • Patient underwent echocardiogram on 1/11/2023 with ejection fraction greater than 70%, grade 1 diastolic dysfunction, severe aortic stenosis.    • Patient followed by cardiology with consideration of need for TAVR versus SAVR for moderate to severe aortic stenosis.  • Patient underwent cardiac catheterization 2/14/2023 with identification of coronary artery disease, mild pulmonary hypertension, severe aortic stenosis.  • Per cardiology, recommendation to proceed with treatment for lung cancer and defer any consideration of intervention for aortic stenosis.     *Cervical spine stenosis  • Patient with cervical spine stenosis with associated myelopathy.   • She underwent surgery on 9/20/2022 with anterior cervical corpectomy with cage.      • The patient has had ongoing follow-up with neurosurgery and on CT scans 11/29/2022 of cervical and thoracic spine, there was concern regarding spinal instability with potential need for posterior  cervical fusion.  This was scheduled in December 2022 however was delayed due to upper respiratory infection.  • Patient admitted on 1/10/2023 to address multiple medical issues in order to prepare for potential neurosurgical procedure on 3/12/2023.  • Patient was previously having severe symptoms related to her cervical stenosis with reduced ability to ambulate previously and significant limitations in movement in her hands, dropping objects.  More recently however she has improved and has been able to walk with the use of a walker, symptoms in the upper extremities have improved and her dexterity is better.    • It appears that her neurosurgical procedure will need to be delayed until we can at least complete the concurrent chemoradiation portion of her treatment.  Surgery would be feasible while continuing on immunotherapy in the future.  • Neurosurgical follow-up has been placed on hold.  • Patient notes that her mobility and symptoms in her extremities have improved somewhat over time     *COPD  • Patient has history of smoking 1 pack/day x 45 years, quit in November 2021.  • Patient has not undergone formal PFTs  • Patient being followed by pulmonary, currently receiving Symbicort inhaler, duo nebs 4 times daily     *Hepatic steatosis  • Identified on prior scans  • PET scan 1/26/2023 with questionable area of activity seen but no corresponding CT abnormality.    • MRI abdomen 2/6/2023 with no evidence to suggest metastatic disease in the liver.  Subcentimeter nonenhancing lesions favor cyst or hemangiomas.  • LFTs have been normal     *Diabetes mellitus  • Currently receiving metformin 850 mg daily    *Situational depression  · Patient reports feeling overwhelmed regarding the amount of information presented today in regards to her malignancy as well as her other medical issues.    · Patient was seen by supportive oncology on 2/27/2023, added gabapentin 300 mg 3 times daily.    · Patient notes that symptoms  are under fair control currently.    *Right thyroid uptake on PET scan 1/26/2023  · Patient was scheduled for thyroid ultrasound however does not feel that she will be able to undergo the procedure due to her neck issues and therefore was canceled, consider at future date    *Laryngeal uptake on PET scan 1/26/2023  · Referral to ENT for laryngoscopy  · Visit with ENT was rescheduled and we will obtain records    *Esophageal uptake on PET scan 1/26/2023  · Felt to be consistent with esophagitis in the distal esophagus.  We will hold on referral for EGD given the multitude of additional procedures and consults required above.    *Venous access  · Patient scheduled to see Dr. Shook on 2/22/2023 to discuss pursuit of port placement.    *Nutrition/weight loss  · Patient with continued weight loss down to 229 pounds today.  Patient will meet again with oncology nutrition and we discussed increasing her caloric intake with use of [place] Instant Breakfast increasing from once daily to twice daily    *Social  · Patient reports that she has significant transportation issues.  Oncology social work has been involved to help facilitate transportation for concurrent chemoradiation         PLAN:  1. Hold chemotherapy today due neutropenia.  2. Discussed with radiation oncology, and they will be holding radiation this week due to neutropenia as well.  3. Patient will return on Monday, 5/8/2023 for CBC prior to her radiation treatments to see if they can resume radiation.  4. Patient will return on 5/9/2023 for follow-up with Dr. Lombardi with repeat labs reevaluation and consideration of continued single agent carboplatin.    5. Call/ return sooner should the patient develop any new concerns or problems.  6. Continue Carafate 1 g 4 times daily.        Patient is on a high risk medication requiring close monitoring for toxicity.      Yolanda Centeno, APRN  05/02/2023

## 2023-05-02 NOTE — NURSING NOTE
No treatment today per Yolanda ANAND. Greyson de accessed, sent pt up to scheduling. Sin in pharmacy made aware.    Lab Results   Component Value Date    NEUTROABS 0.57 (L) 05/02/2023

## 2023-05-08 ENCOUNTER — LAB (OUTPATIENT)
Dept: LAB | Facility: HOSPITAL | Age: 68
End: 2023-05-08
Payer: MEDICARE

## 2023-05-08 ENCOUNTER — CLINICAL SUPPORT (OUTPATIENT)
Dept: ONCOLOGY | Facility: HOSPITAL | Age: 68
End: 2023-05-08
Payer: MEDICARE

## 2023-05-08 ENCOUNTER — HOSPITAL ENCOUNTER (OUTPATIENT)
Dept: RADIATION ONCOLOGY | Facility: HOSPITAL | Age: 68
Discharge: HOME | End: 2023-05-08

## 2023-05-08 DIAGNOSIS — C34.32 MALIGNANT NEOPLASM OF LOWER LOBE OF LEFT LUNG: ICD-10-CM

## 2023-05-08 LAB
ALBUMIN SERPL-MCNC: 3.9 G/DL (ref 3.5–5.2)
ALBUMIN/GLOB SERPL: 1.4 G/DL (ref 1.1–2.4)
ALP SERPL-CCNC: 74 U/L (ref 38–116)
ALT SERPL W P-5'-P-CCNC: 17 U/L (ref 0–33)
ANION GAP SERPL CALCULATED.3IONS-SCNC: 8.6 MMOL/L (ref 5–15)
AST SERPL-CCNC: 24 U/L (ref 0–32)
BASOPHILS # BLD AUTO: 0.01 10*3/MM3 (ref 0–0.2)
BASOPHILS NFR BLD AUTO: 0.4 % (ref 0–1.5)
BILIRUB SERPL-MCNC: 0.4 MG/DL (ref 0.2–1.2)
BUN SERPL-MCNC: 12 MG/DL (ref 6–20)
BUN/CREAT SERPL: 13.8 (ref 7.3–30)
CALCIUM SPEC-SCNC: 9.6 MG/DL (ref 8.5–10.2)
CHLORIDE SERPL-SCNC: 103 MMOL/L (ref 98–107)
CO2 SERPL-SCNC: 27.4 MMOL/L (ref 22–29)
CREAT SERPL-MCNC: 0.87 MG/DL (ref 0.6–1.1)
DEPRECATED RDW RBC AUTO: 54.5 FL (ref 37–54)
EGFRCR SERPLBLD CKD-EPI 2021: 73.1 ML/MIN/1.73
EOSINOPHIL # BLD AUTO: 0.01 10*3/MM3 (ref 0–0.4)
EOSINOPHIL NFR BLD AUTO: 0.4 % (ref 0.3–6.2)
ERYTHROCYTE [DISTWIDTH] IN BLOOD BY AUTOMATED COUNT: 17.2 % (ref 12.3–15.4)
GLOBULIN UR ELPH-MCNC: 2.7 GM/DL (ref 1.8–3.5)
GLUCOSE SERPL-MCNC: 137 MG/DL (ref 74–124)
HCT VFR BLD AUTO: 35.6 % (ref 34–46.6)
HGB BLD-MCNC: 11.6 G/DL (ref 12–15.9)
IMM GRANULOCYTES # BLD AUTO: 0.03 10*3/MM3 (ref 0–0.05)
IMM GRANULOCYTES NFR BLD AUTO: 1.3 % (ref 0–0.5)
LYMPHOCYTES # BLD AUTO: 0.45 10*3/MM3 (ref 0.7–3.1)
LYMPHOCYTES NFR BLD AUTO: 19 % (ref 19.6–45.3)
MCH RBC QN AUTO: 29.7 PG (ref 26.6–33)
MCHC RBC AUTO-ENTMCNC: 32.6 G/DL (ref 31.5–35.7)
MCV RBC AUTO: 91.3 FL (ref 79–97)
MONOCYTES # BLD AUTO: 0.56 10*3/MM3 (ref 0.1–0.9)
MONOCYTES NFR BLD AUTO: 23.6 % (ref 5–12)
NEUTROPHILS NFR BLD AUTO: 1.31 10*3/MM3 (ref 1.7–7)
NEUTROPHILS NFR BLD AUTO: 55.3 % (ref 42.7–76)
NRBC BLD AUTO-RTO: 0 /100 WBC (ref 0–0.2)
PLATELET # BLD AUTO: 127 10*3/MM3 (ref 140–450)
PMV BLD AUTO: 9.6 FL (ref 6–12)
POTASSIUM SERPL-SCNC: 4.7 MMOL/L (ref 3.5–4.7)
PROT SERPL-MCNC: 6.6 G/DL (ref 6.3–8)
RAD ONC ARIA COURSE ID: NORMAL
RAD ONC ARIA COURSE INTENT: NORMAL
RAD ONC ARIA COURSE LAST TREATMENT DATE: NORMAL
RAD ONC ARIA COURSE START DATE: NORMAL
RAD ONC ARIA COURSE TREATMENT ELAPSED DAYS: 48
RAD ONC ARIA FIRST TREATMENT DATE: NORMAL
RAD ONC ARIA PLAN FRACTIONS TREATED TO DATE: 6
RAD ONC ARIA PLAN ID: NORMAL
RAD ONC ARIA PLAN PRESCRIBED DOSE PER FRACTION: 2 GY
RAD ONC ARIA PLAN PRIMARY REFERENCE POINT: NORMAL
RAD ONC ARIA PLAN TOTAL FRACTIONS PRESCRIBED: 8
RAD ONC ARIA PLAN TOTAL PRESCRIBED DOSE: 1600 CGY
RAD ONC ARIA REFERENCE POINT DOSAGE GIVEN TO DATE: 54 GY
RAD ONC ARIA REFERENCE POINT DOSAGE GIVEN TO DATE: 54.18 GY
RAD ONC ARIA REFERENCE POINT ID: NORMAL
RAD ONC ARIA REFERENCE POINT ID: NORMAL
RAD ONC ARIA REFERENCE POINT SESSION DOSAGE GIVEN: 1.99 GY
RAD ONC ARIA REFERENCE POINT SESSION DOSAGE GIVEN: 2 GY
RBC # BLD AUTO: 3.9 10*6/MM3 (ref 3.77–5.28)
SODIUM SERPL-SCNC: 139 MMOL/L (ref 134–145)
WBC NRBC COR # BLD: 2.37 10*3/MM3 (ref 3.4–10.8)

## 2023-05-08 PROCEDURE — 85025 COMPLETE CBC W/AUTO DIFF WBC: CPT

## 2023-05-08 PROCEDURE — 36415 COLL VENOUS BLD VENIPUNCTURE: CPT

## 2023-05-08 PROCEDURE — 77014 CHG CT GUIDANCE RADIATION THERAPY FLDS PLACEMENT: CPT | Performed by: RADIOLOGY

## 2023-05-08 PROCEDURE — 77386 CHG INTENSITY MODULATED RADIATION TX DLVR COMPLEX: CPT | Performed by: RADIOLOGY

## 2023-05-08 PROCEDURE — G0463 HOSPITAL OUTPT CLINIC VISIT: HCPCS

## 2023-05-08 PROCEDURE — 77386: CPT | Performed by: RADIOLOGY

## 2023-05-08 PROCEDURE — 80053 COMPREHEN METABOLIC PANEL: CPT

## 2023-05-08 NOTE — PROGRESS NOTES
"Chief Complaint  Non-small cell lung cancer, clinical stage IIIA ([id number]), aortic stenosis, cervical spine stenosis, COPD, hepatic steatosis, diabetes mellitus    Subjective        History of Present Illness  Patient returns today in follow-up continuing on concurrent chemoradiation with low-dose weekly carboplatin alone.  Taxol was omitted to avoid neurotoxicity due to her underlying neuropathic changes from her severe cervical spine stenosis.  The patient has had interruption in chemotherapy due to myelosuppression.  After 3 weekly doses of carboplatin, treatment was held due to thrombocytopenia, eventually resumed on 4/25/2023 with fourth weekly dose of carboplatin.  Treatment was subsequently held including both radiation and chemotherapy beginning 5/2/2023 due to neutropenia with WBC 1.13, ANC 0.57.  With recovery of her white count on 5/8/2023 up to 2.37 (ANC 1.31), radiation therapy was resumed and she returns today to consider 1/5 and final weekly dose of carboplatin as she will complete radiation therapy on 5/12/2023.  Overall she has done relatively well and tolerating treatment.  She has had some mild fatigue.  She has had mild esophagitis.  We did transition her to oral Carafate which has helped.  We tried to get Magic mouthwash however cost has been prohibitive.  She is maintaining her weight at this point, 236 pounds today.  She remains in a wheelchair although notes that her mobility has actually improved since she has been on treatment.  She is more ambulatory at home although requires significant assistance.  She notes that her upper extremity function has improved and she is now able to write without much difficulty.  She does note recent worsening swelling in the left lower extremity, reports that this has been intermittent in the past.        Objective   Vital Signs:   /90   Pulse 76   Temp 97.8 °F (36.6 °C) (Temporal)   Resp 18   Ht 157.5 cm (62.01\")   Wt 107 kg (236 lb 1.6 oz)   " SpO2 97%   BMI 43.17 kg/m²     Physical Exam  Constitutional:       Appearance: She is well-developed.      Comments: Patient is seated in wheelchair today   Eyes:      Conjunctiva/sclera: Conjunctivae normal.   Neck:      Thyroid: No thyromegaly.   Cardiovascular:      Rate and Rhythm: Normal rate and regular rhythm.      Heart sounds: No murmur heard.    No friction rub. No gallop.   Pulmonary:      Effort: No respiratory distress.      Breath sounds: Normal breath sounds.      Comments: Slightly diminished breath sounds bilaterally  Abdominal:      General: Bowel sounds are normal. There is no distension.      Palpations: Abdomen is soft.      Tenderness: There is no abdominal tenderness.   Musculoskeletal:      Comments: Trace-1+ edema in the bilateral lower extremities, left greater than right   Lymphadenopathy:      Head:      Right side of head: No submandibular adenopathy.      Cervical: No cervical adenopathy.      Upper Body:      Right upper body: No supraclavicular adenopathy.      Left upper body: No supraclavicular adenopathy.   Skin:     General: Skin is warm and dry.      Findings: No rash.   Neurological:      Mental Status: She is alert and oriented to person, place, and time.      Cranial Nerves: No cranial nerve deficit.      Motor: No abnormal muscle tone.      Deep Tendon Reflexes: Reflexes normal.   Psychiatric:         Behavior: Behavior normal.         Result Review : Reviewed CBC, CMP from today       Assessment and Plan     *Non-small cell lung cancer, clinical stage IIIA ([id number])  • Patient has history of smoking 1 pack/day x 45 years, quit in November 2021.    • Following anterior cervical corpectomy in September 2022, she experienced acute hypoxemic respiratory failure.    • CT angiogram chest 9/25/2022 showed a concerning 1.4 cm left lower lobe pulmonary nodule, mediastinal lymphadenopathy with infracarinal lymph node 2.7 cm, left hilar lymph node 1.2 cm, small right hilar lymph  nodes up from 1.1 cm and additional enlarged mediastinal nodes in the right paratracheal, precarinal, infracarinal spaces.  There was a wedge-shaped area of consolidation/atelectasis in the right middle lobe base, subpleural density left costophrenic sulcus felt to represent atelectasis.  It was recommended for her to undergo subsequent PET scan by pulmonary.  • The patient has had ongoing follow-up with neurosurgery and on CT scans 11/29/2022 of cervical and thoracic spine, there was concern regarding spinal instability with potential need for posterior cervical fusion.  This was scheduled in December 2022 however was delayed due to upper respiratory infection.  • Patient admitted on 1/10/2023 to address multiple medical issues in order to prepare for potential neurosurgical procedure on 3/12/2023.  • Patient underwent echocardiogram on 1/11/2023 with ejection fraction greater than 70%, grade 1 diastolic dysfunction, severe aortic stenosis.  Patient is being followed by cardiology with consideration of need for TAVR versus SAVR for moderate to severe aortic stenosis.  • Repeat CT chest on 1/12/2023 showed slight increase in size of the left lower lobe pulmonary nodule increased from 1.4 to 1.5 cm.  Groundglass nodule peripheral left lower lobe less conspicuous and resolution of previous reticulonodular opacities right lower lobe.  There was further enlargement of mediastinal lymph nodes with right posterior paratracheal lymph node 1.4 x 2.1 up to 1.5 x 2.3 cm, subcarinal lymph node increased from 2.7 x 5.2 up to 3.2 x 5.8 cm, left hilar lymph node increased from 1.2 up to 1.4 cm, and is stable 1.7 cm left infrahilar lymph node.  There was also comment regarding hepatic steatosis with changes of chronic liver disease and stable indeterminate nodular thickening of the adrenal glands.  • CT-guided lung biopsy on 1/13/2023 with pathology that showed invasive poorly differentiated non-small cell carcinoma with focal  neuroendocrine differentiation.  Staining pattern and morphology favor poorly differentiated adenocarcinoma however there was focal CD56 staining, could not entirely exclude large cell neuroendocrine carcinoma.  PD-L1 35%, Caris analysis pending.  • It was felt that there would be increased risk for bronchoscopy from a cardiac standpoint due to her aortic stenosis.  • Staging MRI brain 1/29/2023 showed no evidence of metastatic disease  • Staging PET scan 1/26/2023 showed hypermetabolic left lower lobe pulmonary nodule, size difficult to determine on PET however on recent CT was 1.6 cm (SUV 14.4).  Mediastinal and left hilar hypermetabolic lymphadenopathy (subcarinal lymph node 3.2 cm, SUV 31.7, left paratracheal 2.2 cm lymph node SUV 12.7).  Sub-6 mm pulmonary nodule left upper lobe unchanged and below PET resolution.  Focal uptake posterior right hepatic lobe SUV 7.2 of uncertain significance.  Recommended MRI to evaluate.  Right thyroid activity SUV 5.3, recommended ultrasound.  Long segment uptake distal esophagus consistent with esophagitis.  Uptake posterior larynx, nonspecific, recommended direct visualization.  • MRI abdomen 2/6/2023 with no evidence to suggest metastatic disease in the liver.  Subcentimeter nonenhancing lesions favor cyst or hemangiomas.  • Patient underwent right and left heart catheterization with cardiology on 2/14/2023.  Identification of coronary artery disease, mild pulmonary hypertension, severe aortic stenosis.  Recommendation per cardiology to proceed with treatment for lung cancer and defer any intervention regarding aortic stenosis.  • Concern regarding underlying neurologic issues from cervical stenosis and potential neuropathic effects from Taxol chemotherapy.  Concern regarding patient's overall performance status and ability to tolerate full dose chemotherapy.  Patient discussed at thoracic oncology tumor board with consensus to treat with weekly low-dose carboplatin  concurrent with radiation therapy and omit Taxol.  Subsequent plan for 1 year durvalumab following concurrent chemoradiation.  • Significant delay in initiating concurrent chemoradiation due to logistics with scheduling Mediport placement, patient canceled appointments and delayed initiation of treatment.  • On 3/21/2023 initiated concurrent chemoradiation with weekly carboplatin (AUC 2) on 2/28/2023.  • Week 4 carboplatin held 4/11 and again 4/18/2023 due to thrombocytopenia  • Week 4 carboplatin resumed on 4/25/2023 at reduced dose to AUC 1 due to borderline neutropenia  • Chemo and radiation held beginning 5/2/2023 due to neutropenia with WBC 1.13, ANC 0.53  • Resumed radiation therapy on 5/8/2023 with recovery of WBC.  Received fifth and final weekly dose of concurrent carboplatin (AUC 1) on 5/9/2023.  Patient will complete radiation therapy on 5/12/2023.  • Patient returns today in follow-up having held chemo and radiation beginning 5/2/2023 due to neutropenia with WBC 1.13, ANC 0.53.  On 5/8/2023 with resolution of neutropenia patient did resume radiation therapy.  Today she is due for a fifth and final weekly dose of carboplatin concurrent with radiation.  She will complete radiation therapy on 5/12/2023.  Counts today are acceptable although borderline with WBC 2.29, ANC 1.23.  We will proceed on with treatment today with carboplatin with AUC 1 as she received last time.  Weight has stabilized at 236 pounds.  Encouraged her to maintain her caloric intake.  Esophagitis symptoms will likely be resolving within the next few weeks.  We discussed proceeding with repeat scans in 2 weeks to assess status of her disease overall, realizing that we will not be capturing full effect of her concurrent chemoradiation.  I will see her back in 3 weeks and assess her overall status, scan results and potentially begin durvalumab with plan to treat every 2 weeks x 1 year.  Prior to visit, she will undergo education for  immunotherapy.    *Aortic stenosis  • Patient underwent echocardiogram on 1/11/2023 with ejection fraction greater than 70%, grade 1 diastolic dysfunction, severe aortic stenosis.    • Patient followed by cardiology with consideration of need for TAVR versus SAVR for moderate to severe aortic stenosis.  • Patient underwent cardiac catheterization 2/14/2023 with identification of coronary artery disease, mild pulmonary hypertension, severe aortic stenosis.  • Per cardiology, recommendation to proceed with treatment for lung cancer and defer any consideration of intervention for aortic stenosis.  • Patient will be seen in the near future by cardiology on 5/22/2023     *Cervical spine stenosis  • Patient with cervical spine stenosis with associated myelopathy.   • She underwent surgery on 9/20/2022 with anterior cervical corpectomy with cage.      • The patient has had ongoing follow-up with neurosurgery and on CT scans 11/29/2022 of cervical and thoracic spine, there was concern regarding spinal instability with potential need for posterior cervical fusion.  This was scheduled in December 2022 however was delayed due to upper respiratory infection.  • Patient admitted on 1/10/2023 to address multiple medical issues in order to prepare for potential neurosurgical procedure on 3/12/2023.  • Patient was previously having severe symptoms related to her cervical stenosis with reduced ability to ambulate previously and significant limitations in movement in her hands, dropping objects.  More recently however she has improved and has been able to walk with the use of a walker, symptoms in the upper extremities have improved and her dexterity is better.    • It appears that her neurosurgical procedure will need to be delayed until we can at least complete the concurrent chemoradiation portion of her treatment.  Surgery would be feasible while continuing on immunotherapy in the future.  • Neurosurgical follow-up has been  placed on hold.  • Patient notes that her mobility and symptoms in her extremities have improved somewhat over time     *COPD  • Patient has history of smoking 1 pack/day x 45 years, quit in November 2021.  • Patient has not undergone formal PFTs  • Patient being followed by pulmonary, currently receiving Symbicort inhaler, duo nebs 4 times daily     *Hepatic steatosis  • Identified on prior scans  • PET scan 1/26/2023 with questionable area of activity seen but no corresponding CT abnormality.    • MRI abdomen 2/6/2023 with no evidence to suggest metastatic disease in the liver.  Subcentimeter nonenhancing lesions favor cyst or hemangiomas.  • LFTs remain normal     *Diabetes mellitus  • Currently receiving metformin 850 mg daily    *Situational depression  · Patient reports feeling overwhelmed regarding the amount of information presented today in regards to her malignancy as well as her other medical issues.    · Patient was seen by supportive oncology on 2/27/2023, added gabapentin 300 mg 3 times daily.    · Patient notes that symptoms are under fair control currently.    *Right thyroid uptake on PET scan 1/26/2023  · Patient was scheduled for thyroid ultrasound however does not feel that she will be able to undergo the procedure due to her neck issues and therefore was canceled, consider at future date    *Laryngeal uptake on PET scan 1/26/2023  · Patient was seen by ENT on 3/28/2023, laryngoscopy showed mild edema in the posterior glottis consistent with laryngeal pharyngeal reflux, no evidence of malignancy.    *Esophageal uptake on PET scan 1/26/2023  · Felt to be consistent with esophagitis in the distal esophagus.  We will hold on referral for EGD given the multitude of additional procedures and consults required above.    *Venous access  · Mediport placed by Dr. Shook on 3/2/2023    *Nutrition/weight loss  · Patient has been seen by oncology nutrition  · Patient is attempting to increase caloric intake  with use of supplements, weight currently stabilized at 236 pounds    *Radiation esophagitis  · Patient is currently using Carafate 1 g 4 times daily  · Prescription was sent for Magic mouthwash however patient cannot afford the medication  · Patient reports that symptoms are currently stably well with Carafate alone.  Anticipate improvement in symptoms within the next few weeks.    *Lower extremity edema  · Patient with slight increase in chronic edema in the lower extremities, left slightly greater than right.  We will obtain Doppler today to rule out DVT       PLAN:  1. Continue definitive treatment for stage IIIA non-small cell lung cancer with concurrent chemoradiation with weekly single agent carboplatin to be followed by 1 year of durvalumab.  Resume chemotherapy today with carboplatin alone, AUC 1, fifth and final weekly dose.  Patient will complete radiation therapy on 5/12/2023.  2. Bilateral lower extremity Doppler today, stat hold and call result  3. Continue Carafate suspension 1 g 4 times daily  4. Patient will continue to maintain her caloric intake and weight  5. In 1 week NP visit with CBC, CMP  6. In 2 weeks CT chest abdomen pelvis  7. Formal education for immunotherapy with durvalumab  8. Patient is scheduled for follow-up with cardiology on 5/22/2023  9. In 3 weeks MD visit with CBC, CMP, TSH, free T4 and begin immunotherapy with durvalumab.      The patient is continuing on high risk medication requiring intensive monitoring    I did spend 40 minutes in total time caring for the patient today, time spent in review of records, face-to-face consultation, coordination of care, placement of orders, completion of documentation.

## 2023-05-08 NOTE — NURSING NOTE
Patient is here for lab review with RN.  CBC reviewed, counts are stable for this patient at this time. Patient has no complaints. Copy of labs given to patient and follow up appointment reviewed. Patient is instructed to call the office with any concerns or new symptoms prior to next visit. Patient verbalized understanding and discharged in stable condition.    Lab Results   Component Value Date    WBC 2.37 (L) 05/08/2023    HGB 11.6 (L) 05/08/2023    HCT 35.6 05/08/2023    MCV 91.3 05/08/2023     (L) 05/08/2023   She is going to RT to see if Radiation therapy can be resumed today.

## 2023-05-09 ENCOUNTER — HOSPITAL ENCOUNTER (OUTPATIENT)
Dept: RADIATION ONCOLOGY | Facility: HOSPITAL | Age: 68
Discharge: HOME | End: 2023-05-09

## 2023-05-09 ENCOUNTER — HOSPITAL ENCOUNTER (OUTPATIENT)
Dept: CARDIOLOGY | Facility: HOSPITAL | Age: 68
Discharge: HOME | End: 2023-05-09
Payer: MEDICARE

## 2023-05-09 ENCOUNTER — INFUSION (OUTPATIENT)
Dept: ONCOLOGY | Facility: HOSPITAL | Age: 68
End: 2023-05-09
Payer: MEDICARE

## 2023-05-09 ENCOUNTER — OFFICE VISIT (OUTPATIENT)
Dept: ONCOLOGY | Facility: CLINIC | Age: 68
End: 2023-05-09
Payer: MEDICARE

## 2023-05-09 ENCOUNTER — RADIATION ONCOLOGY WEEKLY ASSESSMENT (OUTPATIENT)
Dept: RADIATION ONCOLOGY | Facility: HOSPITAL | Age: 68
End: 2023-05-09
Payer: MEDICARE

## 2023-05-09 VITALS
HEART RATE: 80 BPM | BODY MASS INDEX: 43.15 KG/M2 | OXYGEN SATURATION: 97 % | WEIGHT: 236 LBS | SYSTOLIC BLOOD PRESSURE: 159 MMHG | DIASTOLIC BLOOD PRESSURE: 77 MMHG

## 2023-05-09 VITALS
RESPIRATION RATE: 18 BRPM | BODY MASS INDEX: 43.45 KG/M2 | SYSTOLIC BLOOD PRESSURE: 128 MMHG | DIASTOLIC BLOOD PRESSURE: 90 MMHG | HEIGHT: 62 IN | OXYGEN SATURATION: 97 % | HEART RATE: 76 BPM | WEIGHT: 236.1 LBS | TEMPERATURE: 97.8 F

## 2023-05-09 DIAGNOSIS — M79.606 PAIN AND SWELLING OF LOWER EXTREMITY, UNSPECIFIED LATERALITY: ICD-10-CM

## 2023-05-09 DIAGNOSIS — C34.32 MALIGNANT NEOPLASM OF LOWER LOBE OF LEFT LUNG: Primary | ICD-10-CM

## 2023-05-09 DIAGNOSIS — M79.89 PAIN AND SWELLING OF LOWER EXTREMITY, UNSPECIFIED LATERALITY: ICD-10-CM

## 2023-05-09 DIAGNOSIS — C34.32 MALIGNANT NEOPLASM OF LOWER LOBE OF LEFT LUNG: ICD-10-CM

## 2023-05-09 DIAGNOSIS — Z45.2 ENCOUNTER FOR ADJUSTMENT OR MANAGEMENT OF VASCULAR ACCESS DEVICE: ICD-10-CM

## 2023-05-09 LAB
ALBUMIN SERPL-MCNC: 3.7 G/DL (ref 3.5–5.2)
ALBUMIN/GLOB SERPL: 1.4 G/DL (ref 1.1–2.4)
ALP SERPL-CCNC: 73 U/L (ref 38–116)
ALT SERPL W P-5'-P-CCNC: 16 U/L (ref 0–33)
ANION GAP SERPL CALCULATED.3IONS-SCNC: 9.6 MMOL/L (ref 5–15)
AST SERPL-CCNC: 23 U/L (ref 0–32)
BASOPHILS # BLD AUTO: 0.02 10*3/MM3 (ref 0–0.2)
BASOPHILS NFR BLD AUTO: 0.9 % (ref 0–1.5)
BH CV LOWER VASCULAR LEFT COMMON FEMORAL AUGMENT: NORMAL
BH CV LOWER VASCULAR LEFT COMMON FEMORAL COMPETENT: NORMAL
BH CV LOWER VASCULAR LEFT COMMON FEMORAL COMPRESS: NORMAL
BH CV LOWER VASCULAR LEFT COMMON FEMORAL PHASIC: NORMAL
BH CV LOWER VASCULAR LEFT COMMON FEMORAL SPONT: NORMAL
BH CV LOWER VASCULAR LEFT DISTAL FEMORAL COMPRESS: NORMAL
BH CV LOWER VASCULAR LEFT GASTRONEMIUS COMPRESS: NORMAL
BH CV LOWER VASCULAR LEFT GREATER SAPH AK COMPRESS: NORMAL
BH CV LOWER VASCULAR LEFT GREATER SAPH BK COMPRESS: NORMAL
BH CV LOWER VASCULAR LEFT LESSER SAPH COMPRESS: NORMAL
BH CV LOWER VASCULAR LEFT MID FEMORAL AUGMENT: NORMAL
BH CV LOWER VASCULAR LEFT MID FEMORAL COMPETENT: NORMAL
BH CV LOWER VASCULAR LEFT MID FEMORAL COMPRESS: NORMAL
BH CV LOWER VASCULAR LEFT MID FEMORAL PHASIC: NORMAL
BH CV LOWER VASCULAR LEFT MID FEMORAL SPONT: NORMAL
BH CV LOWER VASCULAR LEFT PERONEAL COMPRESS: NORMAL
BH CV LOWER VASCULAR LEFT POPLITEAL AUGMENT: NORMAL
BH CV LOWER VASCULAR LEFT POPLITEAL COMPETENT: NORMAL
BH CV LOWER VASCULAR LEFT POPLITEAL COMPRESS: NORMAL
BH CV LOWER VASCULAR LEFT POPLITEAL PHASIC: NORMAL
BH CV LOWER VASCULAR LEFT POPLITEAL SPONT: NORMAL
BH CV LOWER VASCULAR LEFT POSTERIOR TIBIAL COMPRESS: NORMAL
BH CV LOWER VASCULAR LEFT PROFUNDA FEMORAL COMPRESS: NORMAL
BH CV LOWER VASCULAR LEFT PROXIMAL FEMORAL COMPRESS: NORMAL
BH CV LOWER VASCULAR LEFT SAPHENOFEMORAL JUNCTION COMPRESS: NORMAL
BH CV LOWER VASCULAR RIGHT COMMON FEMORAL AUGMENT: NORMAL
BH CV LOWER VASCULAR RIGHT COMMON FEMORAL COMPETENT: NORMAL
BH CV LOWER VASCULAR RIGHT COMMON FEMORAL COMPRESS: NORMAL
BH CV LOWER VASCULAR RIGHT COMMON FEMORAL PHASIC: NORMAL
BH CV LOWER VASCULAR RIGHT COMMON FEMORAL SPONT: NORMAL
BH CV LOWER VASCULAR RIGHT DISTAL FEMORAL COMPRESS: NORMAL
BH CV LOWER VASCULAR RIGHT GASTRONEMIUS COMPRESS: NORMAL
BH CV LOWER VASCULAR RIGHT GREATER SAPH AK COMPRESS: NORMAL
BH CV LOWER VASCULAR RIGHT GREATER SAPH BK COMPRESS: NORMAL
BH CV LOWER VASCULAR RIGHT LESSER SAPH COMPRESS: NORMAL
BH CV LOWER VASCULAR RIGHT MID FEMORAL AUGMENT: NORMAL
BH CV LOWER VASCULAR RIGHT MID FEMORAL COMPETENT: NORMAL
BH CV LOWER VASCULAR RIGHT MID FEMORAL COMPRESS: NORMAL
BH CV LOWER VASCULAR RIGHT MID FEMORAL PHASIC: NORMAL
BH CV LOWER VASCULAR RIGHT MID FEMORAL SPONT: NORMAL
BH CV LOWER VASCULAR RIGHT PERONEAL COMPRESS: NORMAL
BH CV LOWER VASCULAR RIGHT POPLITEAL AUGMENT: NORMAL
BH CV LOWER VASCULAR RIGHT POPLITEAL COMPETENT: NORMAL
BH CV LOWER VASCULAR RIGHT POPLITEAL COMPRESS: NORMAL
BH CV LOWER VASCULAR RIGHT POPLITEAL PHASIC: NORMAL
BH CV LOWER VASCULAR RIGHT POPLITEAL SPONT: NORMAL
BH CV LOWER VASCULAR RIGHT POSTERIOR TIBIAL COMPRESS: NORMAL
BH CV LOWER VASCULAR RIGHT PROFUNDA FEMORAL COMPRESS: NORMAL
BH CV LOWER VASCULAR RIGHT PROXIMAL FEMORAL COMPRESS: NORMAL
BH CV LOWER VASCULAR RIGHT SAPHENOFEMORAL JUNCTION COMPRESS: NORMAL
BH CV POP FLUID COLLECT LEFT: 1
BH CV VAS POP FLUID COLLECTED: 1
BH CV VAS PRELIMINARY FINDINGS SCRIPTING: 1
BILIRUB SERPL-MCNC: 0.3 MG/DL (ref 0.2–1.2)
BUN SERPL-MCNC: 11 MG/DL (ref 6–20)
BUN/CREAT SERPL: 12.6 (ref 7.3–30)
CALCIUM SPEC-SCNC: 9.6 MG/DL (ref 8.5–10.2)
CHLORIDE SERPL-SCNC: 104 MMOL/L (ref 98–107)
CO2 SERPL-SCNC: 27.4 MMOL/L (ref 22–29)
CREAT SERPL-MCNC: 0.87 MG/DL (ref 0.6–1.1)
DEPRECATED RDW RBC AUTO: 56.8 FL (ref 37–54)
EGFRCR SERPLBLD CKD-EPI 2021: 73.1 ML/MIN/1.73
EOSINOPHIL # BLD AUTO: 0.01 10*3/MM3 (ref 0–0.4)
EOSINOPHIL NFR BLD AUTO: 0.4 % (ref 0.3–6.2)
ERYTHROCYTE [DISTWIDTH] IN BLOOD BY AUTOMATED COUNT: 17.4 % (ref 12.3–15.4)
GLOBULIN UR ELPH-MCNC: 2.6 GM/DL (ref 1.8–3.5)
GLUCOSE SERPL-MCNC: 117 MG/DL (ref 74–124)
HCT VFR BLD AUTO: 34 % (ref 34–46.6)
HGB BLD-MCNC: 10.9 G/DL (ref 12–15.9)
IMM GRANULOCYTES # BLD AUTO: 0.03 10*3/MM3 (ref 0–0.05)
IMM GRANULOCYTES NFR BLD AUTO: 1.3 % (ref 0–0.5)
LYMPHOCYTES # BLD AUTO: 0.47 10*3/MM3 (ref 0.7–3.1)
LYMPHOCYTES NFR BLD AUTO: 20.5 % (ref 19.6–45.3)
MAXIMAL PREDICTED HEART RATE: 153 BPM
MCH RBC QN AUTO: 29.6 PG (ref 26.6–33)
MCHC RBC AUTO-ENTMCNC: 32.1 G/DL (ref 31.5–35.7)
MCV RBC AUTO: 92.4 FL (ref 79–97)
MONOCYTES # BLD AUTO: 0.53 10*3/MM3 (ref 0.1–0.9)
MONOCYTES NFR BLD AUTO: 23.1 % (ref 5–12)
NEUTROPHILS NFR BLD AUTO: 1.23 10*3/MM3 (ref 1.7–7)
NEUTROPHILS NFR BLD AUTO: 53.8 % (ref 42.7–76)
NRBC BLD AUTO-RTO: 0 /100 WBC (ref 0–0.2)
PLATELET # BLD AUTO: 129 10*3/MM3 (ref 140–450)
PMV BLD AUTO: 9.5 FL (ref 6–12)
POTASSIUM SERPL-SCNC: 4 MMOL/L (ref 3.5–4.7)
PROT SERPL-MCNC: 6.3 G/DL (ref 6.3–8)
RAD ONC ARIA COURSE ID: NORMAL
RAD ONC ARIA COURSE INTENT: NORMAL
RAD ONC ARIA COURSE LAST TREATMENT DATE: NORMAL
RAD ONC ARIA COURSE START DATE: NORMAL
RAD ONC ARIA COURSE TREATMENT ELAPSED DAYS: 49
RAD ONC ARIA FIRST TREATMENT DATE: NORMAL
RAD ONC ARIA PLAN FRACTIONS TREATED TO DATE: 7
RAD ONC ARIA PLAN ID: NORMAL
RAD ONC ARIA PLAN PRESCRIBED DOSE PER FRACTION: 2 GY
RAD ONC ARIA PLAN PRIMARY REFERENCE POINT: NORMAL
RAD ONC ARIA PLAN TOTAL FRACTIONS PRESCRIBED: 8
RAD ONC ARIA PLAN TOTAL PRESCRIBED DOSE: 1600 CGY
RAD ONC ARIA REFERENCE POINT DOSAGE GIVEN TO DATE: 56 GY
RAD ONC ARIA REFERENCE POINT DOSAGE GIVEN TO DATE: 56.17 GY
RAD ONC ARIA REFERENCE POINT ID: NORMAL
RAD ONC ARIA REFERENCE POINT ID: NORMAL
RAD ONC ARIA REFERENCE POINT SESSION DOSAGE GIVEN: 1.99 GY
RAD ONC ARIA REFERENCE POINT SESSION DOSAGE GIVEN: 2 GY
RBC # BLD AUTO: 3.68 10*6/MM3 (ref 3.77–5.28)
SODIUM SERPL-SCNC: 141 MMOL/L (ref 134–145)
STRESS TARGET HR: 130 BPM
WBC NRBC COR # BLD: 2.29 10*3/MM3 (ref 3.4–10.8)

## 2023-05-09 PROCEDURE — 25010000002 DEXAMETHASONE SODIUM PHOSPHATE 100 MG/10ML SOLUTION: Performed by: INTERNAL MEDICINE

## 2023-05-09 PROCEDURE — 77336 RADIATION PHYSICS CONSULT: CPT | Performed by: RADIOLOGY

## 2023-05-09 PROCEDURE — 80053 COMPREHEN METABOLIC PANEL: CPT

## 2023-05-09 PROCEDURE — 25010000002 CARBOPLATIN PER 50 MG: Performed by: INTERNAL MEDICINE

## 2023-05-09 PROCEDURE — 77386 CHG INTENSITY MODULATED RADIATION TX DLVR COMPLEX: CPT | Performed by: RADIOLOGY

## 2023-05-09 PROCEDURE — 85025 COMPLETE CBC W/AUTO DIFF WBC: CPT

## 2023-05-09 PROCEDURE — 77386: CPT | Performed by: RADIOLOGY

## 2023-05-09 PROCEDURE — 93970 EXTREMITY STUDY: CPT

## 2023-05-09 PROCEDURE — 96375 TX/PRO/DX INJ NEW DRUG ADDON: CPT

## 2023-05-09 PROCEDURE — 96413 CHEMO IV INFUSION 1 HR: CPT

## 2023-05-09 PROCEDURE — 77014 CHG CT GUIDANCE RADIATION THERAPY FLDS PLACEMENT: CPT | Performed by: RADIOLOGY

## 2023-05-09 PROCEDURE — 25010000002 PALONOSETRON PER 25 MCG: Performed by: INTERNAL MEDICINE

## 2023-05-09 PROCEDURE — 25010000002 HEPARIN LOCK FLUSH PER 10 UNITS: Performed by: INTERNAL MEDICINE

## 2023-05-09 RX ORDER — PALONOSETRON 0.05 MG/ML
0.25 INJECTION, SOLUTION INTRAVENOUS ONCE
Status: CANCELLED | OUTPATIENT
Start: 2023-05-09

## 2023-05-09 RX ORDER — SODIUM CHLORIDE 9 MG/ML
250 INJECTION, SOLUTION INTRAVENOUS ONCE
Status: COMPLETED | OUTPATIENT
Start: 2023-05-09 | End: 2023-05-09

## 2023-05-09 RX ORDER — HEPARIN SODIUM (PORCINE) LOCK FLUSH IV SOLN 100 UNIT/ML 100 UNIT/ML
500 SOLUTION INTRAVENOUS AS NEEDED
OUTPATIENT
Start: 2023-05-09

## 2023-05-09 RX ORDER — HEPARIN SODIUM (PORCINE) LOCK FLUSH IV SOLN 100 UNIT/ML 100 UNIT/ML
500 SOLUTION INTRAVENOUS AS NEEDED
Status: DISCONTINUED | OUTPATIENT
Start: 2023-05-09 | End: 2023-05-09 | Stop reason: HOSPADM

## 2023-05-09 RX ORDER — SODIUM CHLORIDE 9 MG/ML
250 INJECTION, SOLUTION INTRAVENOUS ONCE
Status: CANCELLED | OUTPATIENT
Start: 2023-05-09

## 2023-05-09 RX ORDER — SODIUM CHLORIDE 0.9 % (FLUSH) 0.9 %
10 SYRINGE (ML) INJECTION AS NEEDED
OUTPATIENT
Start: 2023-05-09

## 2023-05-09 RX ORDER — SODIUM CHLORIDE 0.9 % (FLUSH) 0.9 %
10 SYRINGE (ML) INJECTION AS NEEDED
Status: DISCONTINUED | OUTPATIENT
Start: 2023-05-09 | End: 2023-05-09 | Stop reason: HOSPADM

## 2023-05-09 RX ORDER — PALONOSETRON 0.05 MG/ML
0.25 INJECTION, SOLUTION INTRAVENOUS ONCE
Status: COMPLETED | OUTPATIENT
Start: 2023-05-09 | End: 2023-05-09

## 2023-05-09 RX ADMIN — SODIUM CHLORIDE 250 ML: 9 INJECTION, SOLUTION INTRAVENOUS at 12:48

## 2023-05-09 RX ADMIN — Medication 500 UNITS: at 13:48

## 2023-05-09 RX ADMIN — PALONOSETRON 0.25 MG: 0.05 INJECTION, SOLUTION INTRAVENOUS at 12:48

## 2023-05-09 RX ADMIN — Medication 10 ML: at 13:48

## 2023-05-09 RX ADMIN — CARBOPLATIN 130 MG: 10 INJECTION, SOLUTION INTRAVENOUS at 13:17

## 2023-05-09 RX ADMIN — DEXAMETHASONE SODIUM PHOSPHATE 12 MG: 10 INJECTION, SOLUTION INTRAMUSCULAR; INTRAVENOUS at 12:50

## 2023-05-09 NOTE — PROGRESS NOTES
Patient: Gabby Acosta    MRN: [id number]    Attending Physician: Derrick Enriquez MD    Diagnosis:     ICD-10-CM ICD-9-CM   1. Malignant neoplasm of lower lobe of left lung  C34.32 162.5       Radiation Therapy Visit:  No problems or concerns voiced.  Continue radiation therapy, Dosimetry plan remains acceptable, Films reviewed and remains acceptable, Pain assessed, Radiation dose schedule reviewed and remains acceptable, Radiation technique remains acceptable and Symptoms within expected range    Radiation Treatments     Active   Plans   Lung/Media R1   Most recent treatment: Dose planned: 200 cGy (fraction 6 on 5/8/2023)   Total: Dose planned: 1,600 cGy (8 fractions)   Elapsed Days: 48      Lung/Media RA   Most recent treatment: Dose planned: 200 cGy (fraction 21 on 4/24/2023)   Total: Dose planned: 6,000 cGy (30 fractions)   Elapsed Days: 34      Reference Points   Rx: Lung/Media   Most recent treatment: Dose given: 200 cGy (on 5/8/2023)   Total: Dose given: 5,400 cGy   Elapsed Days: 48      calc Lung/Media   Most recent treatment: Dose given: 199 cGy (on 5/8/2023)   Total: Dose given: 5,418 cGy   Elapsed Days: 48                    Physical Examination:  Vitals: Blood pressure 159/77, pulse 80, weight 107 kg (236 lb), SpO2 97 %.  Vitals:    05/09/23 1548   BP: 159/77   Pulse: 80   SpO2: 97%   Weight: 107 kg (236 lb)     Pain Score    05/09/23 1548   PainSc: 0-No pain       Ambulatory and capable of all selfcare but unable to carry out any work activities; up and about more than 50% of waking hours = 2    We examined the relevant areas: no  Findings are within the expected range for this stage of treatment: yes  -------------------------------------------------------------------------------------------------------------------    ACTION ITEMS:  Patient tolerating treatment well and as expected for this stage in their treatment and Continue radiation therapy as planned        Derrick Enriquez MD  Radiation  Oncology

## 2023-05-09 NOTE — PROGRESS NOTES
"Physician Weekly Management Note    Diagnosis:     Diagnosis Plan   1. Malignant neoplasm of lower lobe of left lung            RT Details:  fx 29/30 58/60Gy lung/mediastinum    Notes on Treatment course, Films, Medical progress:  kps 80% doing well, denies increased soa or cough, denies pain, cont on     Weekly Management:  Medication reviewed?   Yes  New medications given?   No  Problemlist reviewed?   Yes  Problem added?   {Yes/No:[id number]::\"Yes\"}  Issues raised requiring referral to support services - task assigned to:  {RAD/ONC BH TASK ASSIGNED:83934}    Technical aspects reviewed:  Weekly OBI approved?   {Yes/No:[id number]::\"Yes\"}  Weekly port films approved?   {Yes/No:[id number]::\"Yes\"}  Change requests noted on port film?   {Yes/No:[id number]::\"Yes\"}  Patient setup and plan reviewed?   {Yes/No:[id number]::\"Yes\"}    Chart Reviewed:  Continue current treatment plan?   {Yes/No:[id number]::\"Yes\"}  Treatment plan change requested?   {Yes/No:[id number]::\"Yes\"}  CBC reviewed?   {Yes/No:[id number]::\"Yes\"}  Concurrent Chemo?   {Yes/No:[id number]::\"Yes\"}    Objective     Toxicities:   ***     Review of Systems       There were no vitals filed for this visit.        5/2/2023    11:17 AM   Current Status   ECOG score 2       Physical Exam        Problem Summary List    Diagnosis:     Diagnosis Plan   1. Malignant neoplasm of lower lobe of left lung          Pathology:   ***    Past Medical History:   Diagnosis Date   • Cervical spondylosis with myelopathy    • Cervical stenosis of spine    • COPD (chronic obstructive pulmonary disease)    • Diabetes mellitus    • Diabetes mellitus with diabetic dermatitis    • Disease of thyroid gland     Hypothyroid   • Elevated cholesterol    • Fatty liver    • Hepatic steatosis    • Hyperlipidemia    • Hypertension    • Lung cancer     Stage IIIA non-small cell lung cancer   • Malignant neoplasm of lower lobe of left lung 02/16/2023   • Severe aortic stenosis     followed by    • " Slow to wake up after anesthesia          Past Surgical History:   Procedure Laterality Date   • ANTERIOR CHANNEL VERTEBRECTOMY/CORPECTOMY N/A 2022    Procedure: Anterior cervical corpectomy, cervical four/five/six, with cage and plate from cervical three to cervical seven;  Surgeon: David Cheung MD;  Location: [facility] OR;  Service: Neurosurgery;  Laterality: N/A;   • CARDIAC CATHETERIZATION N/A 2023    Procedure: Right and Left Heart Cath;  Surgeon: Kostas David MD;  Location: [facility] CATH INVASIVE LOCATION;  Service: Cardiovascular;  Laterality: N/A;   • CARDIAC CATHETERIZATION N/A 2023    Procedure: Coronary angiography;  Surgeon: Kostas David MD;  Location: [facility] CATH INVASIVE LOCATION;  Service: Cardiovascular;  Laterality: N/A;   • CARDIAC CATHETERIZATION N/A 2023    Procedure: Left ventriculography;  Surgeon: Kostas David MD;  Location: [facility] CATH INVASIVE LOCATION;  Service: Cardiovascular;  Laterality: N/A;   • CARDIAC CATHETERIZATION N/A 2023    Procedure: Resting Full Cycle Ratio;  Surgeon: Kostas David MD;  Location: [facility] CATH INVASIVE LOCATION;  Service: Cardiovascular;  Laterality: N/A;   • CATARACT EXTRACTION     •  SECTION     • CHOLECYSTECTOMY     • VENOUS ACCESS DEVICE (PORT) INSERTION Left 3/2/2023    Procedure: INSERTION OF PORTACATH;  Surgeon: Collin Shook MD;  Location: [facility];  Service: General;  Laterality: Left;         Current Outpatient Medications on File Prior to Visit   Medication Sig Dispense Refill   • albuterol sulfate  (90 Base) MCG/ACT inhaler Inhale 2 puffs Every 4 (Four) Hours As Needed for Wheezing. 8 g 2   • amLODIPine (NORVASC) 5 MG tablet Take 1 tablet by mouth Daily.     • buPROPion XL (WELLBUTRIN XL) 150 MG 24 hr tablet Take 1 tablet by mouth Daily. 30 tablet 0   • diphenhydrAMINE (BENADRYL) 25 mg capsule Take 1 capsule by mouth Every 6 (Six) Hours As Needed for  Allergies.     • escitalopram (LEXAPRO) 10 MG tablet Take 1 tablet by mouth Daily.     • fluticasone (FLONASE) 50 MCG/ACT nasal spray INSTILL TWO (2) SPRAYS INTO NOSE DAILY.     • gabapentin (Neurontin) 300 MG capsule Take 1 capsule by mouth 3 (Three) Times a Day. 90 capsule 2   • guaiFENesin (MUCINEX) 600 MG 12 hr tablet Take 1 tablet by mouth Every 12 (Twelve) Hours. 14 tablet 0   • hydrOXYzine (ATARAX) 10 MG tablet Take 5 mg by mouth As Needed for Itching.     • ipratropium-albuterol (DUO-NEB) 0.5-2.5 mg/3 ml nebulizer Take 3 mL by nebulization 4 (Four) Times a Day. 360 mL 0   • levothyroxine (SYNTHROID, LEVOTHROID) 125 MCG tablet Take 1 tablet by mouth Daily.     • lidocaine-prilocaine (EMLA) 2.5-2.5 % cream Apply maria g-sized amount to Mediport site 30 min prior to port access. Do not rub in. 30 g 2   • losartan (COZAAR) 100 MG tablet Take 1 tablet by mouth Every Night.     • lovastatin (MEVACOR) 40 MG tablet TAKE ONE (1) TABLET BY MOUTH NIGHTLY.     • metFORMIN (GLUCOPHAGE) 850 MG tablet Take 1 tablet by mouth Daily.     • nystatin in Lidocaine Viscous HCl solution-diphenhydrAMINE liquid-aluminum-magnesium hydroxide-simethicone suspension Swish and swallow 5 mL by mouth 4 times per day before meals and at bedtime 410 mL 2   • ondansetron (ZOFRAN) 8 MG tablet Take 1 tablet by mouth Every 8 (Eight) Hours As Needed for Nausea or Vomiting. 30 tablet 2   • pyridoxine (VITAMIN B-6) 100 MG tablet Take 1 tablet by mouth Daily.     • sucralfate (Carafate) 1 g tablet Take 1 tablet by mouth 4 (Four) Times a Day. 120 tablet 1   • sucralfate (Carafate) 1 GM/10ML suspension Take 10 mL by mouth 4 (Four) Times a Day. 414 mL 2   • traMADol (ULTRAM) 50 MG tablet Take 1 tablet by mouth Every 6 (Six) Hours As Needed for Moderate Pain. 12 tablet 0   • vitamin D3 125 MCG (5000 UT) capsule capsule Take 1 capsule by mouth Daily.       No current facility-administered medications on file prior to visit.       Allergies   Allergen  Reactions   • Nsaids Other (See Comments)     No NSAIDs per Cardiology.         Referring Provider:    No referring provider defined for this encounter.    Oncologist:  No primary care provider on file.      Seen and approved by:  Ree Banerjee MD  05/09/2023  Subjective     No ref. provider found

## 2023-05-09 NOTE — LETTER
May 10, 2023     Danyelle Brush MD  [address] [facility] [address]    Patient: Gabby Acosta   YOB: 1955   Date of Visit: 5/9/2023       Dear Dr. Gonsalo MD:    Thank you for referring Gabby Acosta to me for evaluation. Below are the relevant portions of my assessment and plan of care.    If you have questions, please do not hesitate to call me. I look forward to following Gabby along with you.         Sincerely,        Carmelo Lombardi MD        CC: MD Pan Moss MD Wayne Villanueva, MD Huber, John L Jr., MD  05/10/23 [id number]  Sign when Signing Visit  Chief Complaint  Non-small cell lung cancer, clinical stage IIIA ([id number]), aortic stenosis, cervical spine stenosis, COPD, hepatic steatosis, diabetes mellitus    Subjective         History of Present Illness  Patient returns today in follow-up continuing on concurrent chemoradiation with low-dose weekly carboplatin alone.  Taxol was omitted to avoid neurotoxicity due to her underlying neuropathic changes from her severe cervical spine stenosis.  The patient has had interruption in chemotherapy due to myelosuppression.  After 3 weekly doses of carboplatin, treatment was held due to thrombocytopenia, eventually resumed on 4/25/2023 with fourth weekly dose of carboplatin.  Treatment was subsequently held including both radiation and chemotherapy beginning 5/2/2023 due to neutropenia with WBC 1.13, ANC 0.57.  With recovery of her white count on 5/8/2023 up to 2.37 (ANC 1.31), radiation therapy was resumed and she returns today to consider 1/5 and final weekly dose of carboplatin as she will complete radiation therapy on 5/12/2023.  Overall she has done relatively well and tolerating treatment.  She has had some mild fatigue.  She has had mild esophagitis.  We did transition her to oral Carafate which has helped.  We tried to get Magic mouthwash however cost has been prohibitive.  She is maintaining her weight at this  "point, 236 pounds today.  She remains in a wheelchair although notes that her mobility has actually improved since she has been on treatment.  She is more ambulatory at home although requires significant assistance.  She notes that her upper extremity function has improved and she is now able to write without much difficulty.  She does note recent worsening swelling in the left lower extremity, reports that this has been intermittent in the past.        Objective    Vital Signs:   /90   Pulse 76   Temp 97.8 °F (36.6 °C) (Temporal)   Resp 18   Ht 157.5 cm (62.01\")   Wt 107 kg (236 lb 1.6 oz)   SpO2 97%   BMI 43.17 kg/m²     Physical Exam  Constitutional:       Appearance: She is well-developed.      Comments: Patient is seated in wheelchair today   Eyes:      Conjunctiva/sclera: Conjunctivae normal.   Neck:      Thyroid: No thyromegaly.   Cardiovascular:      Rate and Rhythm: Normal rate and regular rhythm.      Heart sounds: No murmur heard.    No friction rub. No gallop.   Pulmonary:      Effort: No respiratory distress.      Breath sounds: Normal breath sounds.      Comments: Slightly diminished breath sounds bilaterally  Abdominal:      General: Bowel sounds are normal. There is no distension.      Palpations: Abdomen is soft.      Tenderness: There is no abdominal tenderness.   Musculoskeletal:      Comments: Trace-1+ edema in the bilateral lower extremities, left greater than right   Lymphadenopathy:      Head:      Right side of head: No submandibular adenopathy.      Cervical: No cervical adenopathy.      Upper Body:      Right upper body: No supraclavicular adenopathy.      Left upper body: No supraclavicular adenopathy.   Skin:     General: Skin is warm and dry.      Findings: No rash.   Neurological:      Mental Status: She is alert and oriented to person, place, and time.      Cranial Nerves: No cranial nerve deficit.      Motor: No abnormal muscle tone.      Deep Tendon Reflexes: Reflexes " normal.   Psychiatric:         Behavior: Behavior normal.         Result Review : Reviewed CBC, CMP from today      Assessment and Plan     *Non-small cell lung cancer, clinical stage IIIA ([id number])  Patient has history of smoking 1 pack/day x 45 years, quit in November 2021.    Following anterior cervical corpectomy in September 2022, she experienced acute hypoxemic respiratory failure.    CT angiogram chest 9/25/2022 showed a concerning 1.4 cm left lower lobe pulmonary nodule, mediastinal lymphadenopathy with infracarinal lymph node 2.7 cm, left hilar lymph node 1.2 cm, small right hilar lymph nodes up from 1.1 cm and additional enlarged mediastinal nodes in the right paratracheal, precarinal, infracarinal spaces.  There was a wedge-shaped area of consolidation/atelectasis in the right middle lobe base, subpleural density left costophrenic sulcus felt to represent atelectasis.  It was recommended for her to undergo subsequent PET scan by pulmonary.  The patient has had ongoing follow-up with neurosurgery and on CT scans 11/29/2022 of cervical and thoracic spine, there was concern regarding spinal instability with potential need for posterior cervical fusion.  This was scheduled in December 2022 however was delayed due to upper respiratory infection.  Patient admitted on 1/10/2023 to address multiple medical issues in order to prepare for potential neurosurgical procedure on 3/12/2023.  Patient underwent echocardiogram on 1/11/2023 with ejection fraction greater than 70%, grade 1 diastolic dysfunction, severe aortic stenosis.  Patient is being followed by cardiology with consideration of need for TAVR versus SAVR for moderate to severe aortic stenosis.  Repeat CT chest on 1/12/2023 showed slight increase in size of the left lower lobe pulmonary nodule increased from 1.4 to 1.5 cm.  Groundglass nodule peripheral left lower lobe less conspicuous and resolution of previous reticulonodular opacities right lower lobe.   There was further enlargement of mediastinal lymph nodes with right posterior paratracheal lymph node 1.4 x 2.1 up to 1.5 x 2.3 cm, subcarinal lymph node increased from 2.7 x 5.2 up to 3.2 x 5.8 cm, left hilar lymph node increased from 1.2 up to 1.4 cm, and is stable 1.7 cm left infrahilar lymph node.  There was also comment regarding hepatic steatosis with changes of chronic liver disease and stable indeterminate nodular thickening of the adrenal glands.  CT-guided lung biopsy on 1/13/2023 with pathology that showed invasive poorly differentiated non-small cell carcinoma with focal neuroendocrine differentiation.  Staining pattern and morphology favor poorly differentiated adenocarcinoma however there was focal CD56 staining, could not entirely exclude large cell neuroendocrine carcinoma.  PD-L1 35%, Caris analysis pending.  It was felt that there would be increased risk for bronchoscopy from a cardiac standpoint due to her aortic stenosis.  Staging MRI brain 1/29/2023 showed no evidence of metastatic disease  Staging PET scan 1/26/2023 showed hypermetabolic left lower lobe pulmonary nodule, size difficult to determine on PET however on recent CT was 1.6 cm (SUV 14.4).  Mediastinal and left hilar hypermetabolic lymphadenopathy (subcarinal lymph node 3.2 cm, SUV 31.7, left paratracheal 2.2 cm lymph node SUV 12.7).  Sub-6 mm pulmonary nodule left upper lobe unchanged and below PET resolution.  Focal uptake posterior right hepatic lobe SUV 7.2 of uncertain significance.  Recommended MRI to evaluate.  Right thyroid activity SUV 5.3, recommended ultrasound.  Long segment uptake distal esophagus consistent with esophagitis.  Uptake posterior larynx, nonspecific, recommended direct visualization.  MRI abdomen 2/6/2023 with no evidence to suggest metastatic disease in the liver.  Subcentimeter nonenhancing lesions favor cyst or hemangiomas.  Patient underwent right and left heart catheterization with cardiology on  2/14/2023.  Identification of coronary artery disease, mild pulmonary hypertension, severe aortic stenosis.  Recommendation per cardiology to proceed with treatment for lung cancer and defer any intervention regarding aortic stenosis.  Concern regarding underlying neurologic issues from cervical stenosis and potential neuropathic effects from Taxol chemotherapy.  Concern regarding patient's overall performance status and ability to tolerate full dose chemotherapy.  Patient discussed at thoracic oncology tumor board with consensus to treat with weekly low-dose carboplatin concurrent with radiation therapy and omit Taxol.  Subsequent plan for 1 year durvalumab following concurrent chemoradiation.  Significant delay in initiating concurrent chemoradiation due to logistics with scheduling Mediport placement, patient canceled appointments and delayed initiation of treatment.  On 3/21/2023 initiated concurrent chemoradiation with weekly carboplatin (AUC 2) on 2/28/2023.  Week 4 carboplatin held 4/11 and again 4/18/2023 due to thrombocytopenia  Week 4 carboplatin resumed on 4/25/2023 at reduced dose to AUC 1 due to borderline neutropenia  Chemo and radiation held beginning 5/2/2023 due to neutropenia with WBC 1.13, ANC 0.53  Resumed radiation therapy on 5/8/2023 with recovery of WBC.  Received fifth and final weekly dose of concurrent carboplatin (AUC 1) on 5/9/2023.  Patient will complete radiation therapy on 5/12/2023.  Patient returns today in follow-up having held chemo and radiation beginning 5/2/2023 due to neutropenia with WBC 1.13, ANC 0.53.  On 5/8/2023 with resolution of neutropenia patient did resume radiation therapy.  Today she is due for a fifth and final weekly dose of carboplatin concurrent with radiation.  She will complete radiation therapy on 5/12/2023.  Counts today are acceptable although borderline with WBC 2.29, ANC 1.23.  We will proceed on with treatment today with carboplatin with AUC 1 as she  received last time.  Weight has stabilized at 236 pounds.  Encouraged her to maintain her caloric intake.  Esophagitis symptoms will likely be resolving within the next few weeks.  We discussed proceeding with repeat scans in 2 weeks to assess status of her disease overall, realizing that we will not be capturing full effect of her concurrent chemoradiation.  I will see her back in 3 weeks and assess her overall status, scan results and potentially begin durvalumab with plan to treat every 2 weeks x 1 year.  Prior to visit, she will undergo education for immunotherapy.    *Aortic stenosis  Patient underwent echocardiogram on 1/11/2023 with ejection fraction greater than 70%, grade 1 diastolic dysfunction, severe aortic stenosis.    Patient followed by cardiology with consideration of need for TAVR versus SAVR for moderate to severe aortic stenosis.  Patient underwent cardiac catheterization 2/14/2023 with identification of coronary artery disease, mild pulmonary hypertension, severe aortic stenosis.  Per cardiology, recommendation to proceed with treatment for lung cancer and defer any consideration of intervention for aortic stenosis.  Patient will be seen in the near future by cardiology on 5/22/2023     *Cervical spine stenosis  Patient with cervical spine stenosis with associated myelopathy.   She underwent surgery on 9/20/2022 with anterior cervical corpectomy with cage.      The patient has had ongoing follow-up with neurosurgery and on CT scans 11/29/2022 of cervical and thoracic spine, there was concern regarding spinal instability with potential need for posterior cervical fusion.  This was scheduled in December 2022 however was delayed due to upper respiratory infection.  Patient admitted on 1/10/2023 to address multiple medical issues in order to prepare for potential neurosurgical procedure on 3/12/2023.  Patient was previously having severe symptoms related to her cervical stenosis with reduced ability  to ambulate previously and significant limitations in movement in her hands, dropping objects.  More recently however she has improved and has been able to walk with the use of a walker, symptoms in the upper extremities have improved and her dexterity is better.    It appears that her neurosurgical procedure will need to be delayed until we can at least complete the concurrent chemoradiation portion of her treatment.  Surgery would be feasible while continuing on immunotherapy in the future.  Neurosurgical follow-up has been placed on hold.  Patient notes that her mobility and symptoms in her extremities have improved somewhat over time     *COPD  Patient has history of smoking 1 pack/day x 45 years, quit in November 2021.  Patient has not undergone formal PFTs  Patient being followed by pulmonary, currently receiving Symbicort inhaler, duo nebs 4 times daily     *Hepatic steatosis  Identified on prior scans  PET scan 1/26/2023 with questionable area of activity seen but no corresponding CT abnormality.    MRI abdomen 2/6/2023 with no evidence to suggest metastatic disease in the liver.  Subcentimeter nonenhancing lesions favor cyst or hemangiomas.  LFTs remain normal     *Diabetes mellitus  Currently receiving metformin 850 mg daily    *Situational depression  Patient reports feeling overwhelmed regarding the amount of information presented today in regards to her malignancy as well as her other medical issues.    Patient was seen by supportive oncology on 2/27/2023, added gabapentin 300 mg 3 times daily.    Patient notes that symptoms are under fair control currently.    *Right thyroid uptake on PET scan 1/26/2023  Patient was scheduled for thyroid ultrasound however does not feel that she will be able to undergo the procedure due to her neck issues and therefore was canceled, consider at future date    *Laryngeal uptake on PET scan 1/26/2023  Patient was seen by ENT on 3/28/2023, laryngoscopy showed mild edema  in the posterior glottis consistent with laryngeal pharyngeal reflux, no evidence of malignancy.    *Esophageal uptake on PET scan 1/26/2023  [place] to be consistent with esophagitis in the distal esophagus.  We will hold on referral for EGD given the multitude of additional procedures and consults required above.    *Venous access  Mediport placed by Dr. Shook on 3/2/2023    *Nutrition/weight loss  Patient has been seen by oncology nutrition  Patient is attempting to increase caloric intake with use of supplements, weight currently stabilized at 236 pounds    *Radiation esophagitis  Patient is currently using Carafate 1 g 4 times daily  Prescription was sent for Magic mouthwash however patient cannot afford the medication  Patient reports that symptoms are currently stably well with Carafate alone.  Anticipate improvement in symptoms within the next few weeks.    *Lower extremity edema  Patient with slight increase in chronic edema in the lower extremities, left slightly greater than right.  We will obtain Doppler today to rule out DVT       PLAN:  Continue definitive treatment for stage IIIA non-small cell lung cancer with concurrent chemoradiation with weekly single agent carboplatin to be followed by 1 year of durvalumab.  Resume chemotherapy today with carboplatin alone, AUC 1, fifth and final weekly dose.  Patient will complete radiation therapy on 5/12/2023.  Bilateral lower extremity Doppler today, stat hold and call result  Continue Carafate suspension 1 g 4 times daily  Patient will continue to maintain her caloric intake and weight  In 1 week NP visit with CBC, CMP  In 2 weeks CT chest abdomen pelvis  Formal education for immunotherapy with durvalumab  Patient is scheduled for follow-up with cardiology on 5/22/2023  In 3 weeks MD visit with CBC, CMP, TSH, free T4 and begin immunotherapy with durvalumab.      The patient is continuing on high risk medication requiring intensive monitoring    I did spend 40  minutes in total time caring for the patient today, time spent in review of records, face-to-face consultation, coordination of care, placement of orders, completion of documentation.

## 2023-05-10 ENCOUNTER — HOSPITAL ENCOUNTER (OUTPATIENT)
Dept: RADIATION ONCOLOGY | Facility: HOSPITAL | Age: 68
Setting detail: RADIATION/ONCOLOGY SERIES
Discharge: HOME | End: 2023-05-10
Payer: MEDICARE

## 2023-05-10 LAB
RAD ONC ARIA COURSE ID: NORMAL
RAD ONC ARIA COURSE INTENT: NORMAL
RAD ONC ARIA COURSE LAST TREATMENT DATE: NORMAL
RAD ONC ARIA COURSE START DATE: NORMAL
RAD ONC ARIA COURSE TREATMENT ELAPSED DAYS: 50
RAD ONC ARIA FIRST TREATMENT DATE: NORMAL
RAD ONC ARIA PLAN FRACTIONS TREATED TO DATE: 1
RAD ONC ARIA PLAN ID: NORMAL
RAD ONC ARIA PLAN PRESCRIBED DOSE PER FRACTION: 2 GY
RAD ONC ARIA PLAN PRIMARY REFERENCE POINT: NORMAL
RAD ONC ARIA PLAN TOTAL FRACTIONS PRESCRIBED: 2
RAD ONC ARIA PLAN TOTAL PRESCRIBED DOSE: 400 CGY
RAD ONC ARIA REFERENCE POINT DOSAGE GIVEN TO DATE: 58 GY
RAD ONC ARIA REFERENCE POINT DOSAGE GIVEN TO DATE: 58.16 GY
RAD ONC ARIA REFERENCE POINT ID: NORMAL
RAD ONC ARIA REFERENCE POINT ID: NORMAL
RAD ONC ARIA REFERENCE POINT SESSION DOSAGE GIVEN: 1.99 GY
RAD ONC ARIA REFERENCE POINT SESSION DOSAGE GIVEN: 2 GY

## 2023-05-10 PROCEDURE — 77014 CHG CT GUIDANCE RADIATION THERAPY FLDS PLACEMENT: CPT | Performed by: RADIOLOGY

## 2023-05-10 PROCEDURE — 77386: CPT | Performed by: RADIOLOGY

## 2023-05-10 PROCEDURE — 77386 CHG INTENSITY MODULATED RADIATION TX DLVR COMPLEX: CPT | Performed by: RADIOLOGY

## 2023-05-11 ENCOUNTER — HOSPITAL ENCOUNTER (OUTPATIENT)
Dept: RADIATION ONCOLOGY | Facility: HOSPITAL | Age: 68
Setting detail: RADIATION/ONCOLOGY SERIES
Discharge: HOME | End: 2023-05-11

## 2023-05-11 LAB
RAD ONC ARIA COURSE END DATE: NORMAL
RAD ONC ARIA COURSE ID: NORMAL
RAD ONC ARIA COURSE ID: NORMAL
RAD ONC ARIA COURSE INTENT: NORMAL
RAD ONC ARIA COURSE INTENT: NORMAL
RAD ONC ARIA COURSE LAST TREATMENT DATE: NORMAL
RAD ONC ARIA COURSE LAST TREATMENT DATE: NORMAL
RAD ONC ARIA COURSE START DATE: NORMAL
RAD ONC ARIA COURSE START DATE: NORMAL
RAD ONC ARIA COURSE TREATMENT ELAPSED DAYS: 51
RAD ONC ARIA COURSE TREATMENT ELAPSED DAYS: 51
RAD ONC ARIA FIRST TREATMENT DATE: NORMAL
RAD ONC ARIA FIRST TREATMENT DATE: NORMAL
RAD ONC ARIA PLAN FRACTIONS TREATED TO DATE: 2
RAD ONC ARIA PLAN FRACTIONS TREATED TO DATE: 2
RAD ONC ARIA PLAN FRACTIONS TREATED TO DATE: 21
RAD ONC ARIA PLAN FRACTIONS TREATED TO DATE: 7
RAD ONC ARIA PLAN ID: NORMAL
RAD ONC ARIA PLAN NAME: NORMAL
RAD ONC ARIA PLAN PRESCRIBED DOSE PER FRACTION: 2 GY
RAD ONC ARIA PLAN PRIMARY REFERENCE POINT: NORMAL
RAD ONC ARIA PLAN TOTAL FRACTIONS PRESCRIBED: 2
RAD ONC ARIA PLAN TOTAL FRACTIONS PRESCRIBED: 2
RAD ONC ARIA PLAN TOTAL FRACTIONS PRESCRIBED: 30
RAD ONC ARIA PLAN TOTAL FRACTIONS PRESCRIBED: 7
RAD ONC ARIA PLAN TOTAL PRESCRIBED DOSE: 1600 CGY
RAD ONC ARIA PLAN TOTAL PRESCRIBED DOSE: 400 CGY
RAD ONC ARIA PLAN TOTAL PRESCRIBED DOSE: 400 CGY
RAD ONC ARIA PLAN TOTAL PRESCRIBED DOSE: 6000 CGY
RAD ONC ARIA REFERENCE POINT DOSAGE GIVEN TO DATE: 60 GY
RAD ONC ARIA REFERENCE POINT DOSAGE GIVEN TO DATE: 60 GY
RAD ONC ARIA REFERENCE POINT DOSAGE GIVEN TO DATE: 60.15 GY
RAD ONC ARIA REFERENCE POINT DOSAGE GIVEN TO DATE: 60.15 GY
RAD ONC ARIA REFERENCE POINT ID: NORMAL
RAD ONC ARIA REFERENCE POINT SESSION DOSAGE GIVEN: 1.99 GY
RAD ONC ARIA REFERENCE POINT SESSION DOSAGE GIVEN: 2 GY

## 2023-05-11 PROCEDURE — 77386: CPT | Performed by: STUDENT IN AN ORGANIZED HEALTH CARE EDUCATION/TRAINING PROGRAM

## 2023-05-12 ENCOUNTER — APPOINTMENT (OUTPATIENT)
Dept: RADIATION ONCOLOGY | Facility: HOSPITAL | Age: 68
End: 2023-05-12
Payer: MEDICARE

## 2023-05-16 ENCOUNTER — TELEPHONE (OUTPATIENT)
Dept: ONCOLOGY | Facility: CLINIC | Age: 68
End: 2023-05-16
Payer: MEDICARE

## 2023-05-17 ENCOUNTER — TELEPHONE (OUTPATIENT)
Dept: ONCOLOGY | Facility: CLINIC | Age: 68
End: 2023-05-17

## 2023-05-17 DIAGNOSIS — C34.32 MALIGNANT NEOPLASM OF LOWER LOBE OF LEFT LUNG: Primary | ICD-10-CM

## 2023-05-17 NOTE — TELEPHONE ENCOUNTER
Caller: Gabby Acosta    Relationship to patient: Self    Best call back number:[phone]    Chief complaint: PATIENT TO RESCHEDULE 5/18/23 APPTS

## 2023-05-23 ENCOUNTER — OFFICE VISIT (OUTPATIENT)
Dept: ONCOLOGY | Facility: CLINIC | Age: 68
End: 2023-05-23
Payer: MEDICARE

## 2023-05-23 ENCOUNTER — LAB (OUTPATIENT)
Dept: LAB | Facility: HOSPITAL | Age: 68
End: 2023-05-23
Payer: MEDICARE

## 2023-05-23 VITALS
SYSTOLIC BLOOD PRESSURE: 123 MMHG | BODY MASS INDEX: 42.47 KG/M2 | HEART RATE: 69 BPM | WEIGHT: 230.8 LBS | OXYGEN SATURATION: 96 % | TEMPERATURE: 97.5 F | DIASTOLIC BLOOD PRESSURE: 70 MMHG | HEIGHT: 62 IN

## 2023-05-23 DIAGNOSIS — C34.32 MALIGNANT NEOPLASM OF LOWER LOBE OF LEFT LUNG: Primary | ICD-10-CM

## 2023-05-23 DIAGNOSIS — C34.32 MALIGNANT NEOPLASM OF LOWER LOBE OF LEFT LUNG: ICD-10-CM

## 2023-05-23 LAB
ALBUMIN SERPL-MCNC: 3.8 G/DL (ref 3.5–5.2)
ALBUMIN/GLOB SERPL: 1.4 G/DL (ref 1.1–2.4)
ALP SERPL-CCNC: 59 U/L (ref 38–116)
ALT SERPL W P-5'-P-CCNC: 13 U/L (ref 0–33)
ANION GAP SERPL CALCULATED.3IONS-SCNC: 11 MMOL/L (ref 5–15)
AST SERPL-CCNC: 18 U/L (ref 0–32)
BASOPHILS # BLD AUTO: 0.02 10*3/MM3 (ref 0–0.2)
BASOPHILS NFR BLD AUTO: 0.6 % (ref 0–1.5)
BILIRUB SERPL-MCNC: 0.3 MG/DL (ref 0.2–1.2)
BUN SERPL-MCNC: 18 MG/DL (ref 6–20)
BUN/CREAT SERPL: 18.9 (ref 7.3–30)
CALCIUM SPEC-SCNC: 9.8 MG/DL (ref 8.5–10.2)
CHLORIDE SERPL-SCNC: 105 MMOL/L (ref 98–107)
CO2 SERPL-SCNC: 26 MMOL/L (ref 22–29)
CREAT SERPL-MCNC: 0.95 MG/DL (ref 0.6–1.1)
DEPRECATED RDW RBC AUTO: 61.5 FL (ref 37–54)
EGFRCR SERPLBLD CKD-EPI 2021: 65.8 ML/MIN/1.73
EOSINOPHIL # BLD AUTO: 0.05 10*3/MM3 (ref 0–0.4)
EOSINOPHIL NFR BLD AUTO: 1.5 % (ref 0.3–6.2)
ERYTHROCYTE [DISTWIDTH] IN BLOOD BY AUTOMATED COUNT: 18.4 % (ref 12.3–15.4)
GLOBULIN UR ELPH-MCNC: 2.8 GM/DL (ref 1.8–3.5)
GLUCOSE SERPL-MCNC: 111 MG/DL (ref 74–124)
HCT VFR BLD AUTO: 35.7 % (ref 34–46.6)
HGB BLD-MCNC: 11.6 G/DL (ref 12–15.9)
IMM GRANULOCYTES # BLD AUTO: 0.01 10*3/MM3 (ref 0–0.05)
IMM GRANULOCYTES NFR BLD AUTO: 0.3 % (ref 0–0.5)
LYMPHOCYTES # BLD AUTO: 0.55 10*3/MM3 (ref 0.7–3.1)
LYMPHOCYTES NFR BLD AUTO: 16.3 % (ref 19.6–45.3)
MCH RBC QN AUTO: 30.4 PG (ref 26.6–33)
MCHC RBC AUTO-ENTMCNC: 32.5 G/DL (ref 31.5–35.7)
MCV RBC AUTO: 93.5 FL (ref 79–97)
MONOCYTES # BLD AUTO: 0.61 10*3/MM3 (ref 0.1–0.9)
MONOCYTES NFR BLD AUTO: 18 % (ref 5–12)
NEUTROPHILS NFR BLD AUTO: 2.14 10*3/MM3 (ref 1.7–7)
NEUTROPHILS NFR BLD AUTO: 63.3 % (ref 42.7–76)
NRBC BLD AUTO-RTO: 0 /100 WBC (ref 0–0.2)
PLATELET # BLD AUTO: 111 10*3/MM3 (ref 140–450)
PMV BLD AUTO: 9.9 FL (ref 6–12)
POTASSIUM SERPL-SCNC: 4.3 MMOL/L (ref 3.5–4.7)
PROT SERPL-MCNC: 6.6 G/DL (ref 6.3–8)
RBC # BLD AUTO: 3.82 10*6/MM3 (ref 3.77–5.28)
SODIUM SERPL-SCNC: 142 MMOL/L (ref 134–145)
WBC NRBC COR # BLD: 3.38 10*3/MM3 (ref 3.4–10.8)

## 2023-05-23 PROCEDURE — 36415 COLL VENOUS BLD VENIPUNCTURE: CPT

## 2023-05-23 PROCEDURE — 80053 COMPREHEN METABOLIC PANEL: CPT

## 2023-05-23 PROCEDURE — 85025 COMPLETE CBC W/AUTO DIFF WBC: CPT

## 2023-05-23 NOTE — PROGRESS NOTES
TREATMENT  PREPARATION    Gabby Acosta  [id number]  1955    Chief Complaint: Treatment preparation and needs assessment    History of present illness:  Gabby Acosta is a 68 y.o. year old female who is here today for treatment preparation and needs assessment.  The patient has been diagnosed with   Encounter Diagnosis   Name Primary?   • Malignant neoplasm of lower lobe of left lung Yes    and is scheduled to begin treatment with:     Oncology History:    Oncology/Hematology History   Malignant neoplasm of lower lobe of left lung   2/16/2023 Initial Diagnosis    Malignant neoplasm of lower lobe of left lung (HCC)     3/21/2023 - 5/9/2023 Chemotherapy    OP LUNG PACLitaxel / CARBOplatin AUC=2 (Weekly) + XRT      5/30/2023 -  Chemotherapy    OP LUNG Durvalumab         The current medication list and allergy list were reviewed and reconciled.     Past Medical History, Past Surgical History, Social History, Family History have been reviewed and are without significant changes except as mentioned.    Physical Exam:    Vitals:    05/23/23 1200   BP: 123/70   Pulse: 69   Temp: 97.5 °F (36.4 °C)   SpO2: 96%     Vitals:    05/23/23 1200   PainSc: 0-No pain        ECOG score: 1             Physical Exam  HENT:      Head: Normocephalic and atraumatic.   Eyes:      Extraocular Movements: Extraocular movements intact.      Conjunctiva/sclera: Conjunctivae normal.   Pulmonary:      Effort: Pulmonary effort is normal. No respiratory distress.   Neurological:      General: No focal deficit present.      Mental Status: She is alert and oriented to person, place, and time.   Psychiatric:         Mood and Affect: Mood normal.         Behavior: Behavior normal.           NEEDS ASSESSMENTS    Genetics  The patient's new diagnosis and family history have been reviewed for genetic counseling needs. The patient will not be referred..     Psychosocial and Barriers to care  The patient has completed a PHQ-9 Depression Screening and the  Distress Thermometer (DT) today.  PHQ-9 results show PHQ-2 Total Score:   PHQ-9 Total Score: PHQ-9 Total Score:   0     The patient scored their distress today as Distress Level: 1 on a scale of 0-10 with 0 being no distress and 10 being extreme distress. Problems marked by the patient as being an issue for them within the last week include Practical Problems  Transportation: Yes .      Results were reviewed along with psychosocial resources offered by our cancer center.  Our Supportive Oncology team will be flagged for a score of 4 or above, and a same day call will be made for a score of 9 or 10.  A mental health referral is offered at that time. Patients who score less than 4 have been educated on our support services and can be referred to our  upon request.  The patient will not be referred to our .       Nutrition  The patient has completed the malnutrition screening today. They scored Malnutrition Screening Tool  Have you recently lost weight without trying?  If yes, how much weight have you lost?: 0--> No  Have you been eating poorly because of a decreased appetite?: 0--> No  MST score: 0   with a score of 0-1 meaning not at risk in a score of 2 or greater meaning at risk.  Patients with a score of 3 or higher will be referred to our oncology dietitian for support. Patients beginning at risk treatment regimens or who have dietary concerns will also be referred to our oncology dietitian. The patient will not be referred.    Functional Assessment  Persons who are age 70 or greater will be screened for qualification of a comprehensive geriatric assessment by our survivorship nurse practitioner.  Older adults with cancer face unique challenges. These may include an increased risk of drug reactions, financial burdens, and caregiver stress. The patient scored   . Patients scoring 14 or lower will referred for an older adult functional assessment with the survivorship advanced practice  "registered nurse to ensure all needed support is provided as patients plan for their treatments. NOT APPLICABLE    Intravenous Access Assessment  The patient and I discussed planned intravenous chemo/biotherapy as well as other IV treatments that are often needed throughout the course of treatment. These may include, but are not limited to blood transfusions, antibiotics, and IV hydration. Discussed that depending on selected treatment and vein assessment, patient may require venous access device (VAD) which could include but not limited to a Mediport or PICC line. Risks and benefits of VADs reviewed. The patient will be treated via Port.    Reproductive/Sexual Activity   People should avoid becoming pregnant and should not get a partner pregnant while undergoing chemo/biotherapy.  People of childbearing age should use effective contraception during active therapy. The best recommendation for all people is to use a barrier method for a minimum of 1 week after the last infusion of chemo/biotherapy to prevent your partner being exposed to byproducts from treatment medications in bodily fluids. Effective contraception should be discussed with your oncology team to make sure it is safe to take based on your diagnosis. Possible options include oral contraceptives, barrier methods. Chemo/biotherapy can change your ability to reproduce children in the future.  There are options for fertility preservation. NOT APPLICABLE    Advanced Care Planning  Advance Care Planning   The patient and I discussed advanced care planning, \"Conversations that Matter\".   This service is offered for development of advance directives with a certified ACP facilitator.  The patient does not have an up-to-date advanced directive. This document is not on file with our office. The patient is not interested in an appointment with one of our facilitators to create or update their advanced directives.    Have you reviewed your Advance Directive and is " it valid for this stay?: Not applicable  Patient Requests Assistance on Advance Directives: Patient Declined          Smoking cessation  Tobacco Use: Medium Risk   • Smoking Tobacco Use: Former   • Smokeless Tobacco Use: Never   • Passive Exposure: Not on file       Patient and I discussed their tobacco use history. Referral will not be made for smoking cessation.      Palliative Care  When appropriate, the patient and I discussed the availability palliative care services and when appropriate Hospice care. Palliative care is not the same as Hospice care which was explained to the patient.NOT APPLICABLE.    Survivorship   When appropriate, we discussed that we will refer the patient to survivorship clinic to discuss next steps following completion of planned treatment.  Reviewed this visit will include assessment of your physical, psychological, functional, and spiritual needs as a survivor and the need at attend this visit when scheduled.    TREATMENT EDUCATION    Today I met with the patient to discuss the chemo/biotherapy regimen recommended for treatment of Malignant neoplasm of lower lobe of left lung  .  The patient was given explanation of treatment premed side effects including office policy that prohibits patients to drive if sedating medications are administered, MD explanation given regarding benefits, side effects, toxicities and goals of treatment.  The patient received a Chemotherapy/Biotherapy Plan Summary including diagnosis and explanation of specific treatment plan.    SIDE EFFECTS:  Common side effects were discussed with the patient and/or significant other.  Discussion included where applicable hair loss/discoloration, anemia/fatigue, infection/chills/fever, appetite, bleeding risk/precautions, constipation, diarrhea, mouth sores, taste alteration, loss of appetite, nausea/vomiting, peripheral neuropathy, skin/nail changes, rash, muscle aches/weakness, photosensitivity, weight gain/loss, hearing  loss, dizziness, menopausal symptoms, menstrual irregularity, sterility, high blood pressure, heart damage, liver damage, lung damage, kidney damage, DVT/PE risk, fluid retention, pleural/pericardial effusion, somnolence, electrolyte/LFT imbalance, vein exercises and/or the possible need for vascular access/port placement.  The patient was advised that although uncommon, leakage of an infused medication from the vein or venous access device may lead to skin breakdown and/or other tissue damage.  The patient was advised that he/she may have pain, bleeding, and/or bruising from the insertion of a needle in their vein or venous access device (port).  The patient was further advised that, in spite of proper technique, infection with redness and irritation may rarely occur at the site where the needle was inserted.  The patient was advised that if complications occur, additional medical treatment is available.  Finally, where applicable we have reviewed rare but potential immune mediated side effects including shortness of breath, cough, chest pain (pneumonitis), abdominal pain, diarrhea (colitis), thyroiditis (hypothyroid or hyperthyroid), hepatitis and liver dysfunction, nephritis and renal dysfunction.    Discussion also included side effects specific to drugs in the treatment plan, specifically:    Treatment Plans     Name Type Plan Dates Plan Provider         Active    OP LUNG Durvalumab ONCOLOGY TREATMENT  5/29/2023 - Present Carmelo Lombadri Jr., MD                    Questions answered and additional information discussed on topics including:  Organ toxicities and immune mediated toxicity       Assessment and Plan:    Diagnoses and all orders for this visit:    1. Malignant neoplasm of lower lobe of left lung (Primary)      No orders of the defined types were placed in this encounter.        1. The patient and I have reviewed their diagnosis and scheduled treatment plan. Needs assessment was completed where  applicable including genetics, psychosocial needs, barriers to care, VAD evaluation, advanced care planning, survivorship, and palliative care services where indicated. Referrals have been ordered as appropriate based upon evaluation today and patient desires.   2. Chemo/biotherapy teaching was completed today and consent obtained. See separate documentation for further details.  3. Adequate time was given to answer questions.  Patient made aware of their care team members and contact information if they have questions or problems throughout the treatment course.  4. Discussion held and written information provided describing frequency of office visits and ongoing monitoring throughout the treatment plan.     5. Reviewed with patient any prescribed medication sent to pharmacy.  Education provided regarding proper storage, safe handling, and proper disposal of unused medication.  6. Proper handling of body fluids and waste discussed and written information provided.  7. If appropriate, patient had pretreatment labs drawn today.    Learning assessment completed at initial patient encounter. See separate flowsheet. Chemo/biotherapy education comprehension assessed at today's visit.    I spent 35 minutes caring for Gabby on this date of service. This time includes time spent by me in the following activities: preparing for the visit, reviewing tests, counseling and educating the patient/family/caregiver, documenting information in the medical record and care coordination.     Taylor Rodriguez, APRN   05/23/23

## 2023-05-25 ENCOUNTER — HOSPITAL ENCOUNTER (OUTPATIENT)
Dept: CT IMAGING | Facility: HOSPITAL | Age: 68
Discharge: HOME | End: 2023-05-25
Payer: MEDICARE

## 2023-05-25 DIAGNOSIS — C34.32 MALIGNANT NEOPLASM OF LOWER LOBE OF LEFT LUNG: ICD-10-CM

## 2023-05-25 LAB — CREAT BLDA-MCNC: 0.9 MG/DL (ref 0.6–1.3)

## 2023-05-25 PROCEDURE — 25510000001 IOPAMIDOL 61 % SOLUTION: Performed by: INTERNAL MEDICINE

## 2023-05-25 PROCEDURE — 82565 ASSAY OF CREATININE: CPT

## 2023-05-25 PROCEDURE — 74177 CT ABD & PELVIS W/CONTRAST: CPT

## 2023-05-25 PROCEDURE — 71260 CT THORAX DX C+: CPT

## 2023-05-25 PROCEDURE — 0 DIATRIZOATE MEGLUMINE & SODIUM PER 1 ML: Performed by: INTERNAL MEDICINE

## 2023-05-25 RX ADMIN — DIATRIZOATE MEGLUMINE AND DIATRIZOATE SODIUM 30 ML: 600; 100 SOLUTION ORAL; RECTAL at 13:00

## 2023-05-25 RX ADMIN — IOPAMIDOL 85 ML: 612 INJECTION, SOLUTION INTRAVENOUS at 14:28

## 2023-05-26 ENCOUNTER — PATIENT OUTREACH (OUTPATIENT)
Dept: RADIATION ONCOLOGY | Facility: HOSPITAL | Age: 68
End: 2023-05-26
Payer: MEDICARE

## 2023-05-26 NOTE — PROGRESS NOTES
"Chief Complaint  Non-small cell lung cancer, clinical stage IIIA ([id number]), aortic stenosis, cervical spine stenosis, COPD, hepatic steatosis, diabetes mellitus    Subjective        History of Present Illness  Patient returns today in follow-up with CT scans and laboratory studies to review with plans to initiate treatment with immunotherapy using durvalumab following concurrent chemoradiation.  Patient reports that she has continued to improve in terms of recent effects from concurrent chemoradiation.  Her fatigue continues to gradually improve as does her mobility at home.  She remains in a wheelchair however today.  She still has some mild difficulty with dysphagia but notes that this is gradually improving.  She had some degree of dysphagia following her cervical spine surgery even before concurrent chemoradiation began and she feels that she is getting back to her previous baseline.  She still notes a metallic taste.  She is maintaining her caloric intake and weight, weight stable at 231 pounds today.  She has no other specific complaints currently.  She is anxious to hear about her scan results.        Objective   Vital Signs:   /64   Pulse 83   Temp 97.8 °F (36.6 °C) (Temporal)   Resp 18   Ht 157.5 cm (62.01\")   Wt 105 kg (231 lb 6.4 oz)   SpO2 96%   BMI 42.31 kg/m²     Physical Exam  Constitutional:       Appearance: She is well-developed.      Comments: Patient is seated in wheelchair today   Eyes:      Conjunctiva/sclera: Conjunctivae normal.   Neck:      Thyroid: No thyromegaly.   Cardiovascular:      Rate and Rhythm: Normal rate and regular rhythm.      Heart sounds: No murmur heard.    No friction rub. No gallop.   Pulmonary:      Effort: No respiratory distress.      Breath sounds: Normal breath sounds.      Comments: Slightly diminished breath sounds bilaterally  Abdominal:      General: Bowel sounds are normal. There is no distension.      Palpations: Abdomen is soft.      Tenderness: " There is no abdominal tenderness.   Musculoskeletal:      Comments: Trace-1+ edema in the bilateral lower extremities, left greater than right   Lymphadenopathy:      Head:      Right side of head: No submandibular adenopathy.      Cervical: No cervical adenopathy.      Upper Body:      Right upper body: No supraclavicular adenopathy.      Left upper body: No supraclavicular adenopathy.   Skin:     General: Skin is warm and dry.      Findings: No rash.   Neurological:      Mental Status: She is alert and oriented to person, place, and time.      Cranial Nerves: No cranial nerve deficit.      Motor: No abnormal muscle tone.      Deep Tendon Reflexes: Reflexes normal.   Psychiatric:         Behavior: Behavior normal.       Patient was examined today, unchanged from above    Result Review : Reviewed CBC, CMP, TSH, free T4 from today.  Reviewed CT chest abdomen pelvis 5/25/2023.       Assessment and Plan     *Non-small cell lung cancer, clinical stage IIIA ([id number])  • Patient has history of smoking 1 pack/day x 45 years, quit in November 2021.    • Following anterior cervical corpectomy in September 2022, she experienced acute hypoxemic respiratory failure.    • CT angiogram chest 9/25/2022 showed a concerning 1.4 cm left lower lobe pulmonary nodule, mediastinal lymphadenopathy with infracarinal lymph node 2.7 cm, left hilar lymph node 1.2 cm, small right hilar lymph nodes up from 1.1 cm and additional enlarged mediastinal nodes in the right paratracheal, precarinal, infracarinal spaces.  There was a wedge-shaped area of consolidation/atelectasis in the right middle lobe base, subpleural density left costophrenic sulcus felt to represent atelectasis.  It was recommended for her to undergo subsequent PET scan by pulmonary.  • The patient has had ongoing follow-up with neurosurgery and on CT scans 11/29/2022 of cervical and thoracic spine, there was concern regarding spinal instability with potential need for posterior  cervical fusion.  This was scheduled in December 2022 however was delayed due to upper respiratory infection.  • Patient admitted on 1/10/2023 to address multiple medical issues in order to prepare for potential neurosurgical procedure on 3/12/2023.  • Patient underwent echocardiogram on 1/11/2023 with ejection fraction greater than 70%, grade 1 diastolic dysfunction, severe aortic stenosis.  Patient is being followed by cardiology with consideration of need for TAVR versus SAVR for moderate to severe aortic stenosis.  • Repeat CT chest on 1/12/2023 showed slight increase in size of the left lower lobe pulmonary nodule increased from 1.4 to 1.5 cm.  Groundglass nodule peripheral left lower lobe less conspicuous and resolution of previous reticulonodular opacities right lower lobe.  There was further enlargement of mediastinal lymph nodes with right posterior paratracheal lymph node 1.4 x 2.1 up to 1.5 x 2.3 cm, subcarinal lymph node increased from 2.7 x 5.2 up to 3.2 x 5.8 cm, left hilar lymph node increased from 1.2 up to 1.4 cm, and is stable 1.7 cm left infrahilar lymph node.  There was also comment regarding hepatic steatosis with changes of chronic liver disease and stable indeterminate nodular thickening of the adrenal glands.  • CT-guided lung biopsy on 1/13/2023 with pathology that showed invasive poorly differentiated non-small cell carcinoma with focal neuroendocrine differentiation.  Staining pattern and morphology favor poorly differentiated adenocarcinoma however there was focal CD56 staining, could not entirely exclude large cell neuroendocrine carcinoma.  PD-L1 35%, Caris analysis pending.  • It was felt that there would be increased risk for bronchoscopy from a cardiac standpoint due to her aortic stenosis.  • Staging MRI brain 1/29/2023 showed no evidence of metastatic disease  • Staging PET scan 1/26/2023 showed hypermetabolic left lower lobe pulmonary nodule, size difficult to determine on PET  however on recent CT was 1.6 cm (SUV 14.4).  Mediastinal and left hilar hypermetabolic lymphadenopathy (subcarinal lymph node 3.2 cm, SUV 31.7, left paratracheal 2.2 cm lymph node SUV 12.7).  Sub-6 mm pulmonary nodule left upper lobe unchanged and below PET resolution.  Focal uptake posterior right hepatic lobe SUV 7.2 of uncertain significance.  Recommended MRI to evaluate.  Right thyroid activity SUV 5.3, recommended ultrasound.  Long segment uptake distal esophagus consistent with esophagitis.  Uptake posterior larynx, nonspecific, recommended direct visualization.  • MRI abdomen 2/6/2023 with no evidence to suggest metastatic disease in the liver.  Subcentimeter nonenhancing lesions favor cyst or hemangiomas.  • Patient underwent right and left heart catheterization with cardiology on 2/14/2023.  Identification of coronary artery disease, mild pulmonary hypertension, severe aortic stenosis.  Recommendation per cardiology to proceed with treatment for lung cancer and defer any intervention regarding aortic stenosis.  • Concern regarding underlying neurologic issues from cervical stenosis and potential neuropathic effects from Taxol chemotherapy.  Concern regarding patient's overall performance status and ability to tolerate full dose chemotherapy.  Patient discussed at thoracic oncology tumor board with consensus to treat with weekly low-dose carboplatin concurrent with radiation therapy and omit Taxol.  Subsequent plan for 1 year durvalumab following concurrent chemoradiation.  • Significant delay in initiating concurrent chemoradiation due to logistics with scheduling Mediport placement, patient canceled appointments and delayed initiation of treatment.  • On 3/21/2023 initiated concurrent chemoradiation with weekly carboplatin (AUC 2) on 2/28/2023.  • Week 4 carboplatin held 4/11 and again 4/18/2023 due to thrombocytopenia  • Week 4 carboplatin resumed on 4/25/2023 at reduced dose to AUC 1 due to borderline  neutropenia  • Chemo and radiation held beginning 5/2/2023 due to neutropenia with WBC 1.13, ANC 0.53  • Resumed radiation therapy on 5/8/2023 with recovery of WBC.  Received fifth and final weekly dose of concurrent carboplatin (AUC 1) on 5/9/2023.  Patient completed radiation therapy on 5/12/2023.  • CT chest abdomen pelvis 5/25/2023 with significant improvement in mediastinal and left hilar lymphadenopathy, decrease in left lower lobe nodule indicating significant response.  • On 5/30/2023 initiated treatment with durvalumab 10 mg/kg every 2 weeks x 1 year.  • Patient returns today in follow-up having completed concurrent chemoradiation on 5/12/2023.  She is now recovering from side effects with return of her dysphagia to previous baseline prior to treatment (related to her previous cervical spine surgery).  Fatigue is improving gradually.  Weight has stabilized at 231 pounds.  We reviewed her CT scan from 5/25/2023 showing significant response in mediastinal and left hilar lymphadenopathy in addition to the left lower lobe nodule.  Therefore we discussed proceeding on with treatment today with durvalumab as planned.  She did undergo education prior to today's visit.  We discussed side effects of immunotherapy and questions were answered to her satisfaction.  She is ready to proceed on with treatment today.  Due to transportation issues her next 2 treatments will be on Wednesdays.  We will need to slightly delay the next treatment (cycle 4) by 6 days in order to get back to a Tuesday schedule.  When I see her at that time, we will discuss timeframe for further follow-up scans, likely with PET scan which we will pursue around 3 months after completion of concurrent chemoradiation.    *Aortic stenosis  • Patient underwent echocardiogram on 1/11/2023 with ejection fraction greater than 70%, grade 1 diastolic dysfunction, severe aortic stenosis.    • Patient followed by cardiology with consideration of need for TAVR  versus SAVR for moderate to severe aortic stenosis.  • Patient underwent cardiac catheterization 2/14/2023 with identification of coronary artery disease, mild pulmonary hypertension, severe aortic stenosis.  • Per cardiology, recommendation to proceed with treatment for lung cancer and defer any consideration of intervention for aortic stenosis.  • Patient will be seen in the near future by cardiology on 6/19/2023     *Cervical spine stenosis  • Patient with cervical spine stenosis with associated myelopathy.   • She underwent surgery on 9/20/2022 with anterior cervical corpectomy with cage.      • The patient has had ongoing follow-up with neurosurgery and on CT scans 11/29/2022 of cervical and thoracic spine, there was concern regarding spinal instability with potential need for posterior cervical fusion.  This was scheduled in December 2022 however was delayed due to upper respiratory infection.  • Patient admitted on 1/10/2023 to address multiple medical issues in order to prepare for potential neurosurgical procedure on 3/12/2023.  • Patient was previously having severe symptoms related to her cervical stenosis with reduced ability to ambulate previously and significant limitations in movement in her hands, dropping objects.  More recently however she has improved and has been able to walk with the use of a walker, symptoms in the upper extremities have improved and her dexterity is better.    • It appears that her neurosurgical procedure will need to be delayed until we can at least complete the concurrent chemoradiation portion of her treatment.  Surgery would be feasible while continuing on immunotherapy in the future.  • Neurosurgical follow-up has been placed on hold.  • Patient notes that her mobility and symptoms in her extremities have continued to improve over time     *COPD  • Patient has history of smoking 1 pack/day x 45 years, quit in November 2021.  • Patient has not undergone formal  PFTs  • Patient being followed by pulmonary, currently receiving Symbicort inhaler, duo nebs 4 times daily     *Hepatic steatosis  • Identified on prior scans  • PET scan 1/26/2023 with questionable area of activity seen but no corresponding CT abnormality.    • MRI abdomen 2/6/2023 with no evidence to suggest metastatic disease in the liver.  Subcentimeter nonenhancing lesions favor cyst or hemangiomas.  • LFTs remain normal     *Diabetes mellitus  • Currently receiving metformin 850 mg daily    *Situational depression  · Patient reports feeling overwhelmed regarding the amount of information presented today in regards to her malignancy as well as her other medical issues.    · Patient was seen by supportive oncology on 2/27/2023, added gabapentin 300 mg 3 times daily.    · Patient notes that symptoms are under fair control currently.    *Right thyroid uptake on PET scan 1/26/2023  · Patient was scheduled for thyroid ultrasound however did not feel that she was able to undergo the procedure due to her neck issues and therefore was canceled, consider at future date    *Laryngeal uptake on PET scan 1/26/2023  · Patient was seen by ENT on 3/28/2023, laryngoscopy showed mild edema in the posterior glottis consistent with laryngeal pharyngeal reflux, no evidence of malignancy.    *Esophageal uptake on PET scan 1/26/2023  · Felt to be consistent with esophagitis in the distal esophagus.  We will hold on referral for EGD given the multitude of additional procedures and consults required above.    *Venous access  · Mediport placed by Dr. Shook on 3/2/2023    *Nutrition/weight loss  · Patient has been seen by oncology nutrition  · Patient is attempting to increase caloric intake with use of supplements, weight currently stabilized at 236 pounds    *Radiation esophagitis  · Patient is currently using Carafate 1 g 4 times daily  · Prescription was sent for Magic mouthwash however patient cannot afford the  medication  · Symptoms stabilized with Carafate alone.   · Patient notes that her dysphagia from esophagitis has essentially resolved and she is back to her residual baseline level of mild dysphagia which was related to her previous cervical spine surgery.    *Lower extremity edema  · Patient developed exacerbation of chronic edema in the lower extremities, left slightly greater than right.    · Bilateral lower extremity Doppler on 5/9/2023 was negative, showed bilateral popliteal fluid collections.    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  · We will refer the patient to vascular surgery for further evaluation.    *Hypothyroidism  · Patient continues on levothyroxine 125 mcg daily managed by PCP Dr. Danyelle Brush  · Labs today prior to beginning immunotherapy with evidence of hypothyroidism with TSH 10.5, free T40.54.  We will forward labs to Dr. Brush in regards to potential increase in levothyroxine dose.  We will be monitoring routine thyroid function studies on immunotherapy per protocol.    *Chemotherapy-induced leukopenia  · WBC remains suppressed at 3.04, ANC 1.97.  Anticipate that this will continue to gradually improve following previous chemotherapy    *Chemotherapy-induced thrombocytopenia  · Platelet count today 103,000  · Anticipate that this will continue to improve following previous chemotherapy.    *Chemotherapy-induced anemia  · Hemoglobin did decline into the high 10-low 11 range on concurrent chemoradiation  · Hemoglobin today has improved at 12.0.       PLAN:  1. Continue definitive treatment for stage IIIA non-small cell lung cancer.  Patient completed concurrent chemoradiation with weekly single agent carboplatin (completed on 5/12/2023) with plans to follow this with 1 year of durvalumab.  Begin durvalumab today, 10 mg/kg every 14 days.  2. Forward thyroid function studies to Dr. Brush today for adjustment in levothyroxine dose  (currently 125 mcg daily)  3. Patient has follow-up scheduled with cardiology in regards to her aortic stenosis on 6/19/2023.  There would be no contraindication to proceeding with TAVR in the future on immunotherapy if needed.  4. Refer patient to vascular surgery regarding possible stenosis at the origin of the SMA seen on CT scan.  5. In 2 weeks NP visit with CBC, CMP and cycle 2 durvalumab (patient needs to move treatment to Wednesday schedule due to transportation issues)  6. In 4 weeks NP visit with CBC, CMP and cycle 3 durvalumab (will remain on Wednesday schedule)  7. In 7 weeks MD visit with CBC, CMP, TSH, free T4 and cycle 4 durvalumab (returning to Tuesday schedule).  We will discuss timeframe for follow-up PET scan.      The patient is continuing on high risk medication requiring intensive monitoring    I did spend 45 minutes in total time caring for the patient today, time spent in review of records, face-to-face consultation, coordination of care, placement of orders, completion of documentation.

## 2023-05-30 ENCOUNTER — OFFICE VISIT (OUTPATIENT)
Dept: ONCOLOGY | Facility: CLINIC | Age: 68
End: 2023-05-30

## 2023-05-30 ENCOUNTER — INFUSION (OUTPATIENT)
Dept: ONCOLOGY | Facility: HOSPITAL | Age: 68
End: 2023-05-30

## 2023-05-30 ENCOUNTER — PATIENT OUTREACH (OUTPATIENT)
Dept: OTHER | Facility: HOSPITAL | Age: 68
End: 2023-05-30

## 2023-05-30 VITALS
TEMPERATURE: 97.8 F | WEIGHT: 231.4 LBS | HEART RATE: 83 BPM | OXYGEN SATURATION: 96 % | HEIGHT: 62 IN | BODY MASS INDEX: 42.58 KG/M2 | RESPIRATION RATE: 18 BRPM | DIASTOLIC BLOOD PRESSURE: 64 MMHG | SYSTOLIC BLOOD PRESSURE: 105 MMHG

## 2023-05-30 DIAGNOSIS — C34.32 MALIGNANT NEOPLASM OF LOWER LOBE OF LEFT LUNG: Primary | ICD-10-CM

## 2023-05-30 DIAGNOSIS — C34.32 MALIGNANT NEOPLASM OF LOWER LOBE OF LEFT LUNG: ICD-10-CM

## 2023-05-30 DIAGNOSIS — Z45.2 ENCOUNTER FOR ADJUSTMENT OR MANAGEMENT OF VASCULAR ACCESS DEVICE: ICD-10-CM

## 2023-05-30 DIAGNOSIS — K55.1 SUPERIOR MESENTERIC ARTERY STENOSIS: ICD-10-CM

## 2023-05-30 LAB
ALBUMIN SERPL-MCNC: 3.8 G/DL (ref 3.5–5.2)
ALBUMIN/GLOB SERPL: 1.3 G/DL (ref 1.1–2.4)
ALP SERPL-CCNC: 80 U/L (ref 38–116)
ALT SERPL W P-5'-P-CCNC: 15 U/L (ref 0–33)
ANION GAP SERPL CALCULATED.3IONS-SCNC: 13.1 MMOL/L (ref 5–15)
AST SERPL-CCNC: 21 U/L (ref 0–32)
BASOPHILS # BLD AUTO: 0.02 10*3/MM3 (ref 0–0.2)
BASOPHILS NFR BLD AUTO: 0.7 % (ref 0–1.5)
BILIRUB SERPL-MCNC: 0.4 MG/DL (ref 0.2–1.2)
BUN SERPL-MCNC: 14 MG/DL (ref 6–20)
BUN/CREAT SERPL: 16.1 (ref 7.3–30)
CALCIUM SPEC-SCNC: 9.7 MG/DL (ref 8.5–10.2)
CHLORIDE SERPL-SCNC: 101 MMOL/L (ref 98–107)
CO2 SERPL-SCNC: 24.9 MMOL/L (ref 22–29)
CREAT SERPL-MCNC: 0.87 MG/DL (ref 0.6–1.1)
DEPRECATED RDW RBC AUTO: 61.2 FL (ref 37–54)
EGFRCR SERPLBLD CKD-EPI 2021: 73.1 ML/MIN/1.73
EOSINOPHIL # BLD AUTO: 0.06 10*3/MM3 (ref 0–0.4)
EOSINOPHIL NFR BLD AUTO: 2 % (ref 0.3–6.2)
ERYTHROCYTE [DISTWIDTH] IN BLOOD BY AUTOMATED COUNT: 17.7 % (ref 12.3–15.4)
GLOBULIN UR ELPH-MCNC: 2.9 GM/DL (ref 1.8–3.5)
GLUCOSE SERPL-MCNC: 223 MG/DL (ref 74–124)
HCT VFR BLD AUTO: 36.8 % (ref 34–46.6)
HGB BLD-MCNC: 12 G/DL (ref 12–15.9)
IMM GRANULOCYTES # BLD AUTO: 0.02 10*3/MM3 (ref 0–0.05)
IMM GRANULOCYTES NFR BLD AUTO: 0.7 % (ref 0–0.5)
LYMPHOCYTES # BLD AUTO: 0.53 10*3/MM3 (ref 0.7–3.1)
LYMPHOCYTES NFR BLD AUTO: 17.4 % (ref 19.6–45.3)
MCH RBC QN AUTO: 31.1 PG (ref 26.6–33)
MCHC RBC AUTO-ENTMCNC: 32.6 G/DL (ref 31.5–35.7)
MCV RBC AUTO: 95.3 FL (ref 79–97)
MONOCYTES # BLD AUTO: 0.44 10*3/MM3 (ref 0.1–0.9)
MONOCYTES NFR BLD AUTO: 14.5 % (ref 5–12)
NEUTROPHILS NFR BLD AUTO: 1.97 10*3/MM3 (ref 1.7–7)
NEUTROPHILS NFR BLD AUTO: 64.7 % (ref 42.7–76)
NRBC BLD AUTO-RTO: 0 /100 WBC (ref 0–0.2)
PLATELET # BLD AUTO: 103 10*3/MM3 (ref 140–450)
PMV BLD AUTO: 10 FL (ref 6–12)
POTASSIUM SERPL-SCNC: 3.9 MMOL/L (ref 3.5–4.7)
PROT SERPL-MCNC: 6.7 G/DL (ref 6.3–8)
RBC # BLD AUTO: 3.86 10*6/MM3 (ref 3.77–5.28)
SODIUM SERPL-SCNC: 139 MMOL/L (ref 134–145)
T4 FREE SERPL-MCNC: 0.54 NG/DL (ref 0.93–1.7)
TSH SERPL DL<=0.05 MIU/L-ACNC: 10.5 UIU/ML (ref 0.27–4.2)
WBC NRBC COR # BLD: 3.04 10*3/MM3 (ref 3.4–10.8)

## 2023-05-30 PROCEDURE — 25010000002 HEPARIN LOCK FLUSH PER 10 UNITS: Performed by: INTERNAL MEDICINE

## 2023-05-30 PROCEDURE — 85025 COMPLETE CBC W/AUTO DIFF WBC: CPT

## 2023-05-30 PROCEDURE — 84443 ASSAY THYROID STIM HORMONE: CPT | Performed by: INTERNAL MEDICINE

## 2023-05-30 PROCEDURE — 96413 CHEMO IV INFUSION 1 HR: CPT

## 2023-05-30 PROCEDURE — 25010000002 DURVALUMAB 50 MG/ML SOLUTION 2.4 ML VIAL: Performed by: INTERNAL MEDICINE

## 2023-05-30 PROCEDURE — 25010000002 DURVALUMAB 50 MG/ML SOLUTION 10 ML VIAL: Performed by: INTERNAL MEDICINE

## 2023-05-30 PROCEDURE — 80053 COMPREHEN METABOLIC PANEL: CPT

## 2023-05-30 PROCEDURE — 84439 ASSAY OF FREE THYROXINE: CPT | Performed by: INTERNAL MEDICINE

## 2023-05-30 RX ORDER — SODIUM CHLORIDE 0.9 % (FLUSH) 0.9 %
10 SYRINGE (ML) INJECTION AS NEEDED
OUTPATIENT
Start: 2023-05-30

## 2023-05-30 RX ORDER — HEPARIN SODIUM (PORCINE) LOCK FLUSH IV SOLN 100 UNIT/ML 100 UNIT/ML
500 SOLUTION INTRAVENOUS AS NEEDED
Status: DISCONTINUED | OUTPATIENT
Start: 2023-05-30 | End: 2023-05-30 | Stop reason: HOSPADM

## 2023-05-30 RX ORDER — SODIUM CHLORIDE 9 MG/ML
250 INJECTION, SOLUTION INTRAVENOUS ONCE
Status: COMPLETED | OUTPATIENT
Start: 2023-05-30 | End: 2023-05-30

## 2023-05-30 RX ORDER — SODIUM CHLORIDE 9 MG/ML
250 INJECTION, SOLUTION INTRAVENOUS ONCE
Status: CANCELLED | OUTPATIENT
Start: 2023-05-30

## 2023-05-30 RX ORDER — SODIUM CHLORIDE 0.9 % (FLUSH) 0.9 %
10 SYRINGE (ML) INJECTION AS NEEDED
Status: DISCONTINUED | OUTPATIENT
Start: 2023-05-30 | End: 2023-05-30 | Stop reason: HOSPADM

## 2023-05-30 RX ORDER — HEPARIN SODIUM (PORCINE) LOCK FLUSH IV SOLN 100 UNIT/ML 100 UNIT/ML
500 SOLUTION INTRAVENOUS AS NEEDED
OUTPATIENT
Start: 2023-05-30

## 2023-05-30 RX ADMIN — SODIUM CHLORIDE 1070 MG: 900 INJECTION, SOLUTION INTRAVENOUS at 13:28

## 2023-05-30 RX ADMIN — Medication 10 ML: at 14:34

## 2023-05-30 RX ADMIN — SODIUM CHLORIDE 250 ML: 9 INJECTION, SOLUTION INTRAVENOUS at 13:24

## 2023-05-30 RX ADMIN — Medication 500 UNITS: at 14:34

## 2023-05-30 NOTE — PROGRESS NOTES
Met patient in CBC office with her grandson. She is scheduled to see Dr. Lombardi and discuss her recent CT results. The patient is then scheduled for immunotherapy treatment. She denies any side effects except fatigue.  The patient is in a wheelchair today although states she recently was able to walk up and down her front steps outside her home using the rail. She is very pleased with this progress.    The patient states she left a message for Cherelle to refill her gabapentin.     The patient received the letter from [employer] that she needed to extend her [employer] assistance. She will reach out to Maria Elena again soon for another letter. Provided her Maria Elena's number.    The patient denies any other needs at this time.    I will f/u in 1-2 months; she agreed and will contact me as needed

## 2023-06-08 ENCOUNTER — TELEPHONE (OUTPATIENT)
Dept: PSYCHIATRY | Facility: HOSPITAL | Age: 68
End: 2023-06-08
Payer: MEDICARE

## 2023-06-08 NOTE — TELEPHONE ENCOUNTER
Supportive Oncology Services    Supportive call placed to patient regarding need for gabapentin refill, no follow up scheduled. Pt reports benefit to gabapentin, taking three times daily. Reports benefit to anxiety, shakiness. Is currently out, and thus not taking. Denies experience of side effect and is interested in resuming.    Pt reports significant anxiety surrounding anticipation of upcoming cardiac surgery for severe aortic stenosis. Considers concerns regarding previously challenges tolerating anesthesia with hallucinations, delirium, delayed recovery. At this time, patient states she is not going to be able to make upcoming apt with cardiology. Hopes to align visits with cardio and vascular to best assist her in medical decision making.    Listened to patient concerns and supported historical experience of delirium. Briefly considered strategy for managing prophylactically through pharmacological and non pharmacological interventions. Requested patient alert me as to when this apt is rescheduled so I can coordinate delirium prevention with surgical team.    FU scheduled in behav onc in 2 weeks alongside med onc visit.

## 2023-06-08 NOTE — TELEPHONE ENCOUNTER
Supportive Oncology Services    Supportive call placed to patient to check in, verify need for refills and determine plan for continuity of care. LM with request for pt to return call.

## 2023-06-14 ENCOUNTER — INFUSION (OUTPATIENT)
Dept: ONCOLOGY | Facility: HOSPITAL | Age: 68
End: 2023-06-14
Payer: MEDICARE

## 2023-06-14 ENCOUNTER — OFFICE VISIT (OUTPATIENT)
Dept: ONCOLOGY | Facility: CLINIC | Age: 68
End: 2023-06-14
Payer: MEDICARE

## 2023-06-14 VITALS
HEIGHT: 62 IN | OXYGEN SATURATION: 95 % | HEART RATE: 78 BPM | RESPIRATION RATE: 18 BRPM | SYSTOLIC BLOOD PRESSURE: 152 MMHG | TEMPERATURE: 97.8 F | WEIGHT: 227.9 LBS | BODY MASS INDEX: 41.94 KG/M2 | DIASTOLIC BLOOD PRESSURE: 85 MMHG

## 2023-06-14 DIAGNOSIS — Z45.2 ENCOUNTER FOR ADJUSTMENT OR MANAGEMENT OF VASCULAR ACCESS DEVICE: ICD-10-CM

## 2023-06-14 DIAGNOSIS — C34.32 MALIGNANT NEOPLASM OF LOWER LOBE OF LEFT LUNG: Primary | ICD-10-CM

## 2023-06-14 DIAGNOSIS — C34.32 MALIGNANT NEOPLASM OF LOWER LOBE OF LEFT LUNG: ICD-10-CM

## 2023-06-14 DIAGNOSIS — Z79.899 HIGH RISK MEDICATION USE: ICD-10-CM

## 2023-06-14 DIAGNOSIS — F41.1 GAD (GENERALIZED ANXIETY DISORDER): ICD-10-CM

## 2023-06-14 LAB
ALBUMIN SERPL-MCNC: 3.9 G/DL (ref 3.5–5.2)
ALBUMIN/GLOB SERPL: 1.3 G/DL (ref 1.1–2.4)
ALP SERPL-CCNC: 68 U/L (ref 38–116)
ALT SERPL W P-5'-P-CCNC: 15 U/L (ref 0–33)
ANION GAP SERPL CALCULATED.3IONS-SCNC: 11.7 MMOL/L (ref 5–15)
AST SERPL-CCNC: 20 U/L (ref 0–32)
BASOPHILS # BLD AUTO: 0.02 10*3/MM3 (ref 0–0.2)
BASOPHILS NFR BLD AUTO: 0.5 % (ref 0–1.5)
BILIRUB SERPL-MCNC: 0.6 MG/DL (ref 0.2–1.2)
BUN SERPL-MCNC: 18 MG/DL (ref 6–20)
BUN/CREAT SERPL: 23.1 (ref 7.3–30)
CALCIUM SPEC-SCNC: 10 MG/DL (ref 8.5–10.2)
CHLORIDE SERPL-SCNC: 101 MMOL/L (ref 98–107)
CO2 SERPL-SCNC: 26.3 MMOL/L (ref 22–29)
CREAT SERPL-MCNC: 0.78 MG/DL (ref 0.6–1.1)
DEPRECATED RDW RBC AUTO: 56.7 FL (ref 37–54)
EGFRCR SERPLBLD CKD-EPI 2021: 83.4 ML/MIN/1.73
EOSINOPHIL # BLD AUTO: 0.12 10*3/MM3 (ref 0–0.4)
EOSINOPHIL NFR BLD AUTO: 3.1 % (ref 0.3–6.2)
ERYTHROCYTE [DISTWIDTH] IN BLOOD BY AUTOMATED COUNT: 16.3 % (ref 12.3–15.4)
GLOBULIN UR ELPH-MCNC: 2.9 GM/DL (ref 1.8–3.5)
GLUCOSE SERPL-MCNC: 136 MG/DL (ref 74–124)
HCT VFR BLD AUTO: 35.4 % (ref 34–46.6)
HGB BLD-MCNC: 11.9 G/DL (ref 12–15.9)
IMM GRANULOCYTES # BLD AUTO: 0.01 10*3/MM3 (ref 0–0.05)
IMM GRANULOCYTES NFR BLD AUTO: 0.3 % (ref 0–0.5)
LYMPHOCYTES # BLD AUTO: 0.64 10*3/MM3 (ref 0.7–3.1)
LYMPHOCYTES NFR BLD AUTO: 16.6 % (ref 19.6–45.3)
MCH RBC QN AUTO: 31.8 PG (ref 26.6–33)
MCHC RBC AUTO-ENTMCNC: 33.6 G/DL (ref 31.5–35.7)
MCV RBC AUTO: 94.7 FL (ref 79–97)
MONOCYTES # BLD AUTO: 0.44 10*3/MM3 (ref 0.1–0.9)
MONOCYTES NFR BLD AUTO: 11.4 % (ref 5–12)
NEUTROPHILS NFR BLD AUTO: 2.62 10*3/MM3 (ref 1.7–7)
NEUTROPHILS NFR BLD AUTO: 68.1 % (ref 42.7–76)
NRBC BLD AUTO-RTO: 0 /100 WBC (ref 0–0.2)
PLATELET # BLD AUTO: 115 10*3/MM3 (ref 140–450)
PMV BLD AUTO: 9.5 FL (ref 6–12)
POTASSIUM SERPL-SCNC: 3.9 MMOL/L (ref 3.5–4.7)
PROT SERPL-MCNC: 6.8 G/DL (ref 6.3–8)
RBC # BLD AUTO: 3.74 10*6/MM3 (ref 3.77–5.28)
SODIUM SERPL-SCNC: 139 MMOL/L (ref 134–145)
WBC NRBC COR # BLD: 3.85 10*3/MM3 (ref 3.4–10.8)

## 2023-06-14 PROCEDURE — 96413 CHEMO IV INFUSION 1 HR: CPT

## 2023-06-14 PROCEDURE — 25010000002 DURVALUMAB 50 MG/ML SOLUTION 2.4 ML VIAL: Performed by: NURSE PRACTITIONER

## 2023-06-14 PROCEDURE — 80053 COMPREHEN METABOLIC PANEL: CPT

## 2023-06-14 PROCEDURE — 25010000002 DURVALUMAB 50 MG/ML SOLUTION 10 ML VIAL: Performed by: NURSE PRACTITIONER

## 2023-06-14 PROCEDURE — 25010000002 HEPARIN LOCK FLUSH PER 10 UNITS: Performed by: INTERNAL MEDICINE

## 2023-06-14 PROCEDURE — 85025 COMPLETE CBC W/AUTO DIFF WBC: CPT

## 2023-06-14 RX ORDER — HEPARIN SODIUM (PORCINE) LOCK FLUSH IV SOLN 100 UNIT/ML 100 UNIT/ML
500 SOLUTION INTRAVENOUS AS NEEDED
OUTPATIENT
Start: 2023-06-14

## 2023-06-14 RX ORDER — HEPARIN SODIUM (PORCINE) LOCK FLUSH IV SOLN 100 UNIT/ML 100 UNIT/ML
500 SOLUTION INTRAVENOUS AS NEEDED
Status: DISCONTINUED | OUTPATIENT
Start: 2023-06-14 | End: 2023-06-14 | Stop reason: HOSPADM

## 2023-06-14 RX ORDER — LEVOTHYROXINE SODIUM 0.15 MG/1
TABLET ORAL
COMMUNITY
Start: 2023-06-09 | End: 2023-06-19 | Stop reason: ALTCHOICE

## 2023-06-14 RX ORDER — SODIUM CHLORIDE 0.9 % (FLUSH) 0.9 %
10 SYRINGE (ML) INJECTION AS NEEDED
OUTPATIENT
Start: 2023-06-14

## 2023-06-14 RX ORDER — SODIUM CHLORIDE 0.9 % (FLUSH) 0.9 %
10 SYRINGE (ML) INJECTION AS NEEDED
Status: DISCONTINUED | OUTPATIENT
Start: 2023-06-14 | End: 2023-06-14 | Stop reason: HOSPADM

## 2023-06-14 RX ORDER — SODIUM CHLORIDE 9 MG/ML
250 INJECTION, SOLUTION INTRAVENOUS ONCE
Status: COMPLETED | OUTPATIENT
Start: 2023-06-14 | End: 2023-06-14

## 2023-06-14 RX ORDER — SODIUM CHLORIDE 9 MG/ML
250 INJECTION, SOLUTION INTRAVENOUS ONCE
Status: CANCELLED | OUTPATIENT
Start: 2023-06-14

## 2023-06-14 RX ADMIN — Medication 10 ML: at 11:52

## 2023-06-14 RX ADMIN — SODIUM CHLORIDE 1070 MG: 900 INJECTION, SOLUTION INTRAVENOUS at 10:48

## 2023-06-14 RX ADMIN — Medication 500 UNITS: at 11:53

## 2023-06-14 RX ADMIN — SODIUM CHLORIDE 250 ML: 9 INJECTION, SOLUTION INTRAVENOUS at 10:48

## 2023-06-14 NOTE — PROGRESS NOTES
"Chief Complaint  Non-small cell lung cancer, clinical stage IIIA ([id number]), aortic stenosis, cervical spine stenosis, COPD, hepatic steatosis, diabetes mellitus    Subjective        History of Present Illness  Patient is a 68-year-old female with the above-mentioned issues here today for lab review and evaluation prior to cycle 2 Durvalumab.  She tolerated her first cycle quite well.  She denies issues with increased shortness of breath.  She denies problems with skin rash or diarrhea.        Objective   Vital Signs:   /85   Pulse 78   Temp 97.8 °F (36.6 °C) (Temporal)   Resp 18   Ht 157.5 cm (62.01\")   Wt 103 kg (227 lb 14.4 oz)   SpO2 95%   BMI 41.67 kg/m²     Physical Exam  Vitals and nursing note reviewed.   Constitutional:       Appearance: Normal appearance. She is well-developed.   HENT:      Head: Normocephalic and atraumatic.      Nose: Nose normal.   Eyes:      Pupils: Pupils are equal, round, and reactive to light.   Cardiovascular:      Rate and Rhythm: Normal rate and regular rhythm.      Heart sounds: Normal heart sounds.   Pulmonary:      Effort: Pulmonary effort is normal. No respiratory distress.      Breath sounds: Normal breath sounds. No wheezing, rhonchi or rales.      Comments: Slightly diminished breath sounds bilaterally  Abdominal:      General: Bowel sounds are normal. There is no distension.      Palpations: Abdomen is soft.      Tenderness: There is no abdominal tenderness.   Musculoskeletal:         General: Normal range of motion.      Cervical back: Normal range of motion and neck supple.   Skin:     General: Skin is warm and dry.   Neurological:      Mental Status: She is alert and oriented to person, place, and time.   Psychiatric:         Behavior: Behavior normal.       Result Review :   Lab Results   Component Value Date    WBC 3.85 06/14/2023    HGB 11.9 (L) 06/14/2023    HCT 35.4 06/14/2023    MCV 94.7 06/14/2023     (L) 06/14/2023              Assessment " and Plan     *Non-small cell lung cancer, clinical stage IIIA ([id number])  Patient has history of smoking 1 pack/day x 45 years, quit in November 2021.    Following anterior cervical corpectomy in September 2022, she experienced acute hypoxemic respiratory failure.    CT angiogram chest 9/25/2022 showed a concerning 1.4 cm left lower lobe pulmonary nodule, mediastinal lymphadenopathy with infracarinal lymph node 2.7 cm, left hilar lymph node 1.2 cm, small right hilar lymph nodes up from 1.1 cm and additional enlarged mediastinal nodes in the right paratracheal, precarinal, infracarinal spaces.  There was a wedge-shaped area of consolidation/atelectasis in the right middle lobe base, subpleural density left costophrenic sulcus felt to represent atelectasis.  It was recommended for her to undergo subsequent PET scan by pulmonary.  The patient has had ongoing follow-up with neurosurgery and on CT scans 11/29/2022 of cervical and thoracic spine, there was concern regarding spinal instability with potential need for posterior cervical fusion.  This was scheduled in December 2022 however was delayed due to upper respiratory infection.  Patient admitted on 1/10/2023 to address multiple medical issues in order to prepare for potential neurosurgical procedure on 3/12/2023.  Patient underwent echocardiogram on 1/11/2023 with ejection fraction greater than 70%, grade 1 diastolic dysfunction, severe aortic stenosis.  Patient is being followed by cardiology with consideration of need for TAVR versus SAVR for moderate to severe aortic stenosis.  Repeat CT chest on 1/12/2023 showed slight increase in size of the left lower lobe pulmonary nodule increased from 1.4 to 1.5 cm.  Groundglass nodule peripheral left lower lobe less conspicuous and resolution of previous reticulonodular opacities right lower lobe.  There was further enlargement of mediastinal lymph nodes with right posterior paratracheal lymph node 1.4 x 2.1 up to 1.5 x  2.3 cm, subcarinal lymph node increased from 2.7 x 5.2 up to 3.2 x 5.8 cm, left hilar lymph node increased from 1.2 up to 1.4 cm, and is stable 1.7 cm left infrahilar lymph node.  There was also comment regarding hepatic steatosis with changes of chronic liver disease and stable indeterminate nodular thickening of the adrenal glands.  CT-guided lung biopsy on 1/13/2023 with pathology that showed invasive poorly differentiated non-small cell carcinoma with focal neuroendocrine differentiation.  Staining pattern and morphology favor poorly differentiated adenocarcinoma however there was focal CD56 staining, could not entirely exclude large cell neuroendocrine carcinoma.  PD-L1 35%, Caris analysis pending.  It was felt that there would be increased risk for bronchoscopy from a cardiac standpoint due to her aortic stenosis.  Staging MRI brain 1/29/2023 showed no evidence of metastatic disease  Staging PET scan 1/26/2023 showed hypermetabolic left lower lobe pulmonary nodule, size difficult to determine on PET however on recent CT was 1.6 cm (SUV 14.4).  Mediastinal and left hilar hypermetabolic lymphadenopathy (subcarinal lymph node 3.2 cm, SUV 31.7, left paratracheal 2.2 cm lymph node SUV 12.7).  Sub-6 mm pulmonary nodule left upper lobe unchanged and below PET resolution.  Focal uptake posterior right hepatic lobe SUV 7.2 of uncertain significance.  Recommended MRI to evaluate.  Right thyroid activity SUV 5.3, recommended ultrasound.  Long segment uptake distal esophagus consistent with esophagitis.  Uptake posterior larynx, nonspecific, recommended direct visualization.  MRI abdomen 2/6/2023 with no evidence to suggest metastatic disease in the liver.  Subcentimeter nonenhancing lesions favor cyst or hemangiomas.  Patient underwent right and left heart catheterization with cardiology on 2/14/2023.  Identification of coronary artery disease, mild pulmonary hypertension, severe aortic stenosis.  Recommendation per  cardiology to proceed with treatment for lung cancer and defer any intervention regarding aortic stenosis.  Concern regarding underlying neurologic issues from cervical stenosis and potential neuropathic effects from Taxol chemotherapy.  Concern regarding patient's overall performance status and ability to tolerate full dose chemotherapy.  Patient discussed at thoracic oncology tumor board with consensus to treat with weekly low-dose carboplatin concurrent with radiation therapy and omit Taxol.  Subsequent plan for 1 year durvalumab following concurrent chemoradiation.  Significant delay in initiating concurrent chemoradiation due to logistics with scheduling Mediport placement, patient canceled appointments and delayed initiation of treatment.  On 3/21/2023 initiated concurrent chemoradiation with weekly carboplatin (AUC 2) on 2/28/2023.  Week 4 carboplatin held 4/11 and again 4/18/2023 due to thrombocytopenia  Week 4 carboplatin resumed on 4/25/2023 at reduced dose to AUC 1 due to borderline neutropenia  Chemo and radiation held beginning 5/2/2023 due to neutropenia with WBC 1.13, ANC 0.53  Resumed radiation therapy on 5/8/2023 with recovery of WBC.  Received fifth and final weekly dose of concurrent carboplatin (AUC 1) on 5/9/2023.  Patient completed radiation therapy on 5/12/2023.  CT chest abdomen pelvis 5/25/2023 with significant improvement in mediastinal and left hilar lymphadenopathy, decrease in left lower lobe nodule indicating significant response.  On 5/30/2023 initiated treatment with durvalumab 10 mg/kg every 2 weeks x 1 year.  6/14/2023 here for cycle 2 Durvalumab, so far is tolerating quite well.      *Aortic stenosis  Patient underwent echocardiogram on 1/11/2023 with ejection fraction greater than 70%, grade 1 diastolic dysfunction, severe aortic stenosis.    Patient followed by cardiology with consideration of need for TAVR versus SAVR for moderate to severe aortic stenosis.  Patient underwent  cardiac catheterization 2/14/2023 with identification of coronary artery disease, mild pulmonary hypertension, severe aortic stenosis.  Per cardiology, recommendation to proceed with treatment for lung cancer and defer any consideration of intervention for aortic stenosis.  Patient will be seen in the near future by cardiology on 6/19/2023     *Cervical spine stenosis  Patient with cervical spine stenosis with associated myelopathy.   She underwent surgery on 9/20/2022 with anterior cervical corpectomy with cage.      The patient has had ongoing follow-up with neurosurgery and on CT scans 11/29/2022 of cervical and thoracic spine, there was concern regarding spinal instability with potential need for posterior cervical fusion.  This was scheduled in December 2022 however was delayed due to upper respiratory infection.  Patient admitted on 1/10/2023 to address multiple medical issues in order to prepare for potential neurosurgical procedure on 3/12/2023.  Patient was previously having severe symptoms related to her cervical stenosis with reduced ability to ambulate previously and significant limitations in movement in her hands, dropping objects.  More recently however she has improved and has been able to walk with the use of a walker, symptoms in the upper extremities have improved and her dexterity is better.    It appears that her neurosurgical procedure will need to be delayed until we can at least complete the concurrent chemoradiation portion of her treatment.  Surgery would be feasible while continuing on immunotherapy in the future.  Neurosurgical follow-up has been placed on hold.  Patient notes that her mobility and symptoms in her extremities have continued to improve over time     *COPD  Patient has history of smoking 1 pack/day x 45 years, quit in November 2021.  Patient has not undergone formal PFTs  Patient being followed by pulmonary, currently receiving Symbicort inhaler, duo nebs 4 times daily      *Hepatic steatosis  Identified on prior scans  PET scan 1/26/2023 with questionable area of activity seen but no corresponding CT abnormality.    MRI abdomen 2/6/2023 with no evidence to suggest metastatic disease in the liver.  Subcentimeter nonenhancing lesions favor cyst or hemangiomas.  LFTs remain normal     *Diabetes mellitus  Currently receiving metformin 850 mg daily    *Situational depression  Patient reports feeling overwhelmed regarding the amount of information presented today in regards to her malignancy as well as her other medical issues.    Patient was seen by supportive oncology on 2/27/2023, added gabapentin 300 mg 3 times daily.    Patient notes that symptoms are under fair control currently.    *Right thyroid uptake on PET scan 1/26/2023  Patient was scheduled for thyroid ultrasound however did not feel that she was able to undergo the procedure due to her neck issues and therefore was canceled, consider at future date    *Laryngeal uptake on PET scan 1/26/2023  Patient was seen by ENT on 3/28/2023, laryngoscopy showed mild edema in the posterior glottis consistent with laryngeal pharyngeal reflux, no evidence of malignancy.    *Esophageal uptake on PET scan 1/26/2023  [place] to be consistent with esophagitis in the distal esophagus.  We will hold on referral for EGD given the multitude of additional procedures and consults required above.    *Venous access  Mediport placed by Dr. Shook on 3/2/2023    *Nutrition/weight loss  Patient has been seen by oncology nutrition  Patient is attempting to increase caloric intake with use of supplements, weight currently stabilized at 236 pounds    *Radiation esophagitis  Patient is currently using Carafate 1 g 4 times daily  Prescription was sent for Magic mouthwash however patient cannot afford the medication  Symptoms stabilized with Carafate alone.   Patient notes that her dysphagia from esophagitis has essentially resolved and she is back to her  residual baseline level of mild dysphagia which was related to her previous cervical spine surgery.    *Lower extremity edema  Patient developed exacerbation of chronic edema in the lower extremities, left slightly greater than right.    Bilateral lower extremity Doppler on 5/9/2023 was negative, showed bilateral popliteal fluid collections.    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  We will refer the patient to vascular surgery for further evaluation.    *Hypothyroidism  Patient continues on levothyroxine 125 mcg daily managed by PCP Dr. Danyelle Brush  Labs today prior to beginning immunotherapy with evidence of hypothyroidism with TSH 10.5, free T40.54.  We will forward labs to Dr. Brush in regards to potential increase in levothyroxine dose.  We will be monitoring routine thyroid function studies on immunotherapy per protocol.    *Chemotherapy-induced leukopenia  WBC remains suppressed at 3.04, ANC 1.97.  Anticipate that this will continue to gradually improve following previous chemotherapy    *Chemotherapy-induced thrombocytopenia  Platelet count today 103,000  Anticipate that this will continue to improve following previous chemotherapy.    *Chemotherapy-induced anemia  Hemoglobin did decline into the high 10-low 11 range on concurrent chemoradiation  Hemoglobin 11.9 on 6/14/2023.     PLAN:  Proceed with Durvalumab today at 10 mg/kg every 14 days to complete a total of 1 year.  Follow-up with cardiology as scheduled in regards to her aortic stenosis on 6/19/2023.  4 weeks follow-up with NP with repeat CBC, CMP, cycle 3 Durvalumab  Follow-up 7/18/2023 with Dr. Lombardi with repeat labs CBC, CMP, TSH, free T4 and cycle 4 Durvalumab (to return to a Tuesday schedule).  We will discuss timeframe for follow-up PET scan at that time.  Patient has been referred  to vascular surgery regarding possible stenosis at the origin of the SMA seen on CT scan.  Scheduled  to see Dr. Velarde on 6/30/2023.  Call/ return sooner should the patient develop any new concerns or problems.    Patient is on a high risk medication requiring close monitoring for toxicity.      Yolanda Centeno, APRN  06/14/2023

## 2023-06-15 RX ORDER — GABAPENTIN 300 MG/1
300 CAPSULE ORAL 3 TIMES DAILY
Qty: 90 CAPSULE | Refills: 2 | Status: SHIPPED | OUTPATIENT
Start: 2023-06-15 | End: 2024-06-14

## 2023-06-19 ENCOUNTER — OFFICE VISIT (OUTPATIENT)
Dept: CARDIOLOGY | Facility: CLINIC | Age: 68
End: 2023-06-19
Payer: MEDICARE

## 2023-06-19 ENCOUNTER — TELEPHONE (OUTPATIENT)
Dept: CARDIOLOGY | Facility: HOSPITAL | Age: 68
End: 2023-06-19
Payer: MEDICARE

## 2023-06-19 VITALS
HEART RATE: 90 BPM | WEIGHT: 231.6 LBS | HEIGHT: 62 IN | DIASTOLIC BLOOD PRESSURE: 60 MMHG | BODY MASS INDEX: 42.62 KG/M2 | SYSTOLIC BLOOD PRESSURE: 112 MMHG

## 2023-06-19 DIAGNOSIS — I35.0 NONRHEUMATIC AORTIC VALVE STENOSIS: Primary | ICD-10-CM

## 2023-06-19 DIAGNOSIS — I10 PRIMARY HYPERTENSION: ICD-10-CM

## 2023-06-19 PROCEDURE — 3078F DIAST BP <80 MM HG: CPT | Performed by: INTERNAL MEDICINE

## 2023-06-19 PROCEDURE — 99214 OFFICE O/P EST MOD 30 MIN: CPT | Performed by: INTERNAL MEDICINE

## 2023-06-19 PROCEDURE — 1159F MED LIST DOCD IN RCRD: CPT | Performed by: INTERNAL MEDICINE

## 2023-06-19 PROCEDURE — 1160F RVW MEDS BY RX/DR IN RCRD: CPT | Performed by: INTERNAL MEDICINE

## 2023-06-19 PROCEDURE — 3074F SYST BP LT 130 MM HG: CPT | Performed by: INTERNAL MEDICINE

## 2023-06-19 NOTE — PROGRESS NOTES
[facility] Follow Up Office Note     Encounter Date:23  Patient:Gabby Acosta  :1955  MRN:[id number]      Chief Complaint:   Chief Complaint   Patient presents with    TAVR Consult     History of Presenting Illness:      Ms. Acosta is a 68 y.o. woman with past medical history notable for heart murmur since age 15, nonrheumatic aortic stenosis, COPD, diabetes, hypertension, prior tobacco use, and cervical spine stenoses with prior surgery, and recently diagnosed non-small cell lung cancer for which she is undergoing chemotherapy who presents for follow-up.  She was actually evaluated in the hospital back in 2023 as her surgery was being planned and further evaluation was requested due to appreciation of a heart murmur prompting echocardiogram diagnosing moderate to severe aortic stenoses.  Clinically the patient was doing okay but she also was not particularly mobile related to her underlying degenerative disc disease.    We did decide to proceed forward with further evaluation with right and left heart catheterization which again demonstrated more moderate to severe aortic stenosis.  She was getting ready to start chemotherapy and felt that patient would be appropriate to proceed forward with further treatment of her lung cancer with close monitoring of her valvular heart disease.       Review of Systems:  Review of Systems   Constitutional: Positive for malaise/fatigue.   HENT: Negative.     Eyes: Negative.    Cardiovascular:  Positive for dyspnea on exertion and leg swelling.   Respiratory: Negative.     Endocrine: Negative.    Hematologic/Lymphatic: Negative.    Skin: Negative.    Musculoskeletal: Negative.    Gastrointestinal: Negative.    Genitourinary: Negative.    Neurological: Negative.    Psychiatric/Behavioral: Negative.     Allergic/Immunologic: Negative.      Current Outpatient Medications on File Prior to Visit   Medication Sig Dispense Refill    amLODIPine (NORVASC) 5 MG  tablet Take 1 tablet by mouth Daily.      buPROPion XL (WELLBUTRIN XL) 150 MG 24 hr tablet Take 1 tablet by mouth Daily. 30 tablet 0    escitalopram (LEXAPRO) 10 MG tablet Take 1 tablet by mouth Daily.      fluticasone (FLONASE) 50 MCG/ACT nasal spray INSTILL TWO (2) SPRAYS INTO NOSE DAILY.      gabapentin (Neurontin) 300 MG capsule Take 1 capsule by mouth 3 (Three) Times a Day. 90 capsule 2    ipratropium-albuterol (DUO-NEB) 0.5-2.5 mg/3 ml nebulizer Take 3 mL by nebulization 4 (Four) Times a Day. 360 mL 0    levothyroxine (SYNTHROID, LEVOTHROID) 125 MCG tablet Take 1 tablet by mouth Daily.      lidocaine-prilocaine (EMLA) 2.5-2.5 % cream Apply maria g-sized amount to Mediport site 30 min prior to port access. Do not rub in. 30 g 2    losartan (COZAAR) 100 MG tablet Take 1 tablet by mouth Every Night.      lovastatin (MEVACOR) 40 MG tablet TAKE ONE (1) TABLET BY MOUTH NIGHTLY.      metFORMIN (GLUCOPHAGE) 850 MG tablet Take 1 tablet by mouth Daily.      pyridoxine (VITAMIN B-6) 100 MG tablet Take 1 tablet by mouth Daily.      vitamin D3 125 MCG (5000 UT) capsule capsule Take 1 capsule by mouth Daily.      albuterol sulfate  (90 Base) MCG/ACT inhaler Inhale 2 puffs Every 4 (Four) Hours As Needed for Wheezing. (Patient not taking: Reported on 6/19/2023) 8 g 2    diphenhydrAMINE (BENADRYL) 25 mg capsule Take 1 capsule by mouth Every 6 (Six) Hours As Needed for Allergies. (Patient not taking: Reported on 6/19/2023)      guaiFENesin (MUCINEX) 600 MG 12 hr tablet Take 1 tablet by mouth Every 12 (Twelve) Hours. (Patient not taking: Reported on 6/19/2023) 14 tablet 0    hydrOXYzine (ATARAX) 10 MG tablet Take 5 mg by mouth As Needed for Itching. (Patient not taking: Reported on 6/19/2023)      levothyroxine (SYNTHROID, LEVOTHROID) 150 MCG tablet       nystatin in Lidocaine Viscous HCl solution-diphenhydrAMINE liquid-aluminum-magnesium hydroxide-simethicone suspension Swish and swallow 5 mL by mouth 4 times per day  before meals and at bedtime (Patient not taking: Reported on 6/19/2023) 410 mL 2    ondansetron (ZOFRAN) 8 MG tablet Take 1 tablet by mouth Every 8 (Eight) Hours As Needed for Nausea or Vomiting. (Patient not taking: Reported on 6/19/2023) 30 tablet 2    sucralfate (Carafate) 1 g tablet Take 1 tablet by mouth 4 (Four) Times a Day. 120 tablet 1    sucralfate (Carafate) 1 GM/10ML suspension Take 10 mL by mouth 4 (Four) Times a Day. 414 mL 2    traMADol (ULTRAM) 50 MG tablet Take 1 tablet by mouth Every 6 (Six) Hours As Needed for Moderate Pain. (Patient not taking: Reported on 6/19/2023) 12 tablet 0     No current facility-administered medications on file prior to visit.       Allergies   Allergen Reactions    Nsaids Other (See Comments)     No NSAIDs per Cardiology.       Past Medical History:   Diagnosis Date    Cervical spondylosis with myelopathy     Cervical stenosis of spine     COPD (chronic obstructive pulmonary disease)     Diabetes mellitus     Diabetes mellitus with diabetic dermatitis     Disease of thyroid gland     Hypothyroid    Elevated cholesterol     Fatty liver     Hepatic steatosis     Hyperlipidemia     Hypertension     Lung cancer     Stage IIIA non-small cell lung cancer    Malignant neoplasm of lower lobe of left lung 02/16/2023    Severe aortic stenosis     followed by     Slow to wake up after anesthesia        Past Surgical History:   Procedure Laterality Date    ANTERIOR CHANNEL VERTEBRECTOMY/CORPECTOMY N/A 9/20/2022    Procedure: Anterior cervical corpectomy, cervical four/five/six, with cage and plate from cervical three to cervical seven;  Surgeon: David Cheung MD;  Location: [facility] OR;  Service: Neurosurgery;  Laterality: N/A;    CARDIAC CATHETERIZATION N/A 2/14/2023    Procedure: Right and Left Heart Cath;  Surgeon: Kostas David MD;  Location: [facility] CATH INVASIVE LOCATION;  Service: Cardiovascular;  Laterality: N/A;    CARDIAC CATHETERIZATION N/A  2023    Procedure: Coronary angiography;  Surgeon: Kostas David MD;  Location:  [facility] CATH INVASIVE LOCATION;  Service: Cardiovascular;  Laterality: N/A;    CARDIAC CATHETERIZATION N/A 2023    Procedure: Left ventriculography;  Surgeon: Kostas David MD;  Location:  [facility] CATH INVASIVE LOCATION;  Service: Cardiovascular;  Laterality: N/A;    CARDIAC CATHETERIZATION N/A 2023    Procedure: Resting Full Cycle Ratio;  Surgeon: Kostas David MD;  Location:  [facility] CATH INVASIVE LOCATION;  Service: Cardiovascular;  Laterality: N/A;    CATARACT EXTRACTION       SECTION      CHOLECYSTECTOMY      VENOUS ACCESS DEVICE (PORT) INSERTION Left 3/2/2023    Procedure: INSERTION OF PORTACATH;  Surgeon: Collin Shook MD;  Location: [facility] MAIN OR;  Service: General;  Laterality: Left;       Social History     Socioeconomic History    Marital status:    Tobacco Use    Smoking status: Former     Packs/day: 1.00     Years: 45.00     Pack years: 45.00     Types: Cigarettes     Quit date: 2021     Years since quittin.6    Smokeless tobacco: Never    Tobacco comments:     Caffeine use    Vaping Use    Vaping Use: Never used   Substance and Sexual Activity    Alcohol use: Never    Drug use: Yes     Types: Marijuana     Comment: She is taking the gummies    Sexual activity: Yes       Family History   Problem Relation Age of Onset    Cancer Father     Heart disease Brother     Hypertension Brother     Diabetes Brother     Cancer Brother     Diabetes Maternal Grandmother     Cancer Paternal Grandmother     Cancer Paternal Grandfather     Malig Hyperthermia Neg Hx        The following portions of the patient's history were reviewed and updated as appropriate: allergies, current medications, past family history, past medical history, past social history, past surgical history and problem list.       Objective:       Vitals:    23 1013   BP: 112/60   BP Location: Right  "arm   Patient Position: Sitting   Pulse: 90   Weight: 105 kg (231 lb 9.6 oz)   Height: 157.5 cm (62.01\")     Body mass index is 42.35 kg/m².     Physical Exam:  Constitutional: Chronically ill appearing, well developed, no acute distress   HENT: Oropharynx clear and membrane moist  Eyes: Normal conjunctiva, no sclera icterus.  Neck: Supple, no carotid bruit bilaterally.  Cardiovascular: Regular rate and rhythm, Late peaking systolic murmur over the right upper sternal border, 1+ bilateral lower extremity edema.  Pulmonary: Normal respiratory effort, normal lung sounds, no wheezing.  Neurological: Alert and orient x 3.   Skin: Warm, dry, no ecchymosis, no rash.  Psych: Appropriate mood and affect. Normal judgment and insight.         Lab Results   Component Value Date     06/14/2023     05/30/2023    K 3.9 06/14/2023    K 3.9 05/30/2023     06/14/2023     05/30/2023    CO2 26.3 06/14/2023    CO2 24.9 05/30/2023    BUN 18 06/14/2023    BUN 14 05/30/2023    CREATININE 0.78 06/14/2023    CREATININE 0.87 05/30/2023    GLUCOSE 136 (H) 06/14/2023    GLUCOSE 223 (H) 05/30/2023    CALCIUM 10.0 06/14/2023    CALCIUM 9.7 05/30/2023    ALBUMIN 3.9 06/14/2023    ALBUMIN 3.8 05/30/2023    BILITOT 0.6 06/14/2023    BILITOT 0.4 05/30/2023    AST 20 06/14/2023    AST 21 05/30/2023    ALT 15 06/14/2023    ALT 15 05/30/2023     Lab Results   Component Value Date    WBC 3.85 06/14/2023    WBC 3.04 (L) 05/30/2023    HGB 11.9 (L) 06/14/2023    HGB 12.0 05/30/2023    HCT 35.4 06/14/2023    HCT 36.8 05/30/2023    MCV 94.7 06/14/2023    MCV 95.3 05/30/2023     (L) 06/14/2023     (L) 05/30/2023     Lab Results   Component Value Date    TRIG 154 (H) 08/04/2020    TRIG 212 (H) 05/24/2019    HDL 54 08/04/2020    HDL 49 (L) 05/24/2019     (H) 08/04/2020     05/24/2019     Lab Results   Component Value Date    PROBNP 331.0 01/12/2023    PROBNP 245.0 09/22/2022     No results found for: CKTOTAL, " CKMB, CKMBINDEX, TROPONINI, TROPONINT  Lab Results   Component Value Date    TSH 10.500 (H) 05/30/2023    TSH 1.220 04/25/2022         Cardiac catheterization 12/14/2023:  Left main: Large-caliber vessel that bifurcates to an LAD and circumflex.  Normal  LAD: Medium caliber vessel that gives rise to a small caliber diagonal branch in the midsegment.  There is moderate to severe calcification in the proximal to mid LAD with a sequential discrete 60% and 50% stenosis associated with severe tortuosity.  LCX: Medium caliber vessel that gives rise to a small caliber OM1 and medium caliber OM 2 branch.  The OM 2 branch has a diffuse 20% proximal stenosis  RCA: Large caliber, severely calcified vessel that gives rise to a medium caliber PDA and small caliber RPL branch.  The RCA has a 30% mid vessel stenosis  RFR assessment LAD 0.91 not hemodynamically flow-limiting  Aortic valve area 0.8 cm2  Peak to peak gradient 38 mmHg  Stroke-volume index 26 mL/m2     Echocardiogram 1/11/2023:  Left ventricular ejection fraction appears to be greater than 70%.  Left ventricular wall thickness is consistent with mild concentric hypertrophy.  Left ventricular diastolic function is consistent with (grade I) impaired relaxation.  Normal right ventricular cavity size noted. Hyperdynamic right ventricular systolic function noted.  The aortic valve leaflets are not well visualized, although appear heavily calcified  Severe aortic valve stenosis is present. Aortic valve area is 0.99 cm2. Aortic valve maximum pressure gradient is 77.5 mmHg. Aortic valve mean pressure gradient is 39.2 mmHg.  Calculated right ventricular systolic pressure from tricuspid regurgitation is 21 mmHg.  There is no evidence of pericardial effusion. . There is evidence of a fat pad present.             Assessment:          Diagnosis Plan   1. Nonrheumatic aortic valve stenosis  Ambulatory Referral to Cardiac Surgery    CT angio TAVR chest abdomen pelvis w wo contrast       2. Primary hypertension               Plan:       Ms. Acosta is a 68 y.o. woman with past medical history notable for heart murmur since age 15, nonrheumatic aortic stenosis, COPD, diabetes, hypertension, prior tobacco use, and cervical spine stenoses with prior surgery, and recently diagnosed non-small cell lung cancer for which she is undergoing chemotherapy who presents for follow-up.  Patient does have moderate to severe aortic stenosis which is likely congenital in etiology.  Her valvular severity was again in the moderate to severe range and clinically no high risk features we wanted to closely monitor how she did clinically as she started appropriate therapy for her lung cancer.  We did want close follow-up with her she has rescheduled a number of times and presents today about 6 months after her most recent assessment.  Her aortic stenosis is on the severe side based upon her invasive numbers we wanted to see if her prognosis with her lung cancer would be reasonable and functional capacity would be appropriate she seems to have done well with her chemotherapy and radiation she is now on immunotherapy.  She has some further follow-up scans planned by oncology but seems like her prognosis is reasonable and at least over a year.  With where her valve is I would recommend proceeding forward with valve replacement.  Given her multiple comorbidities including lung cancer and unstable cervical spine would be appropriate to further consider less invasive options including TAVR.  We will have her surgeons also see her and set up for CTA.    Nonrheumatic aortic stenosis:  Severe in severity based upon invasive measurements with a paradoxical low-flow low gradient  Proceed forward with work-up given reasonable life expectancy on immunotherapy for lung cancer  Plan for CTA and cardiac surgery evaluation    Essential hypertension:  Blood pressure reasonably controlled continue the medical therapy      Follow up:  After  CTA and CTS referral      Pan Mendoza MD  [facility]  06/19/23  10:55 EDT       Aortic stenosis shared decision making statement:  Aortic Stenosis education material has been provided to the patient. This included a printed brochure detailing pathophysiology of aortic stenosis, patient specific aortic stenosis symptoms, and treatment options (medical therapy, surgical aortic valve replacement, and transcatheter aortic valve replacement).   We reviewed the Shared Decision Making Tool for treatment of Aortic Stenosis to compare/contrast the options of TAVR/SAVR/medical management. We discussed patient's goals of care: Address valvular heart disease to allow for further treatment of her other comorbidities. Patient has expressed following concerns regarding Aortic Stenosis and/or treatment options: Has concerns regarding any sort of anesthesia has bad reactions to it  Patient has a Living Will: no  Patient has a Power of : no

## 2023-06-19 NOTE — TELEPHONE ENCOUNTER
After speaking with Dr Mendoza I spoke with Ms Karla and she is agreeable to seeing Dr James in office 7/7/23. We are trying to schedule a CTA for the same day I have discussed the evaluation process with her. Our introductory packet has been mailed including information on shared decision making. I provided our contact information and she will call with any further questions

## 2023-07-21 ENCOUNTER — HOSPITAL ENCOUNTER (OUTPATIENT)
Dept: CT IMAGING | Facility: HOSPITAL | Age: 68
Discharge: HOME | End: 2023-07-21
Admitting: INTERNAL MEDICINE
Payer: MEDICARE

## 2023-07-21 VITALS — HEART RATE: 74 BPM

## 2023-07-21 DIAGNOSIS — I35.0 NONRHEUMATIC AORTIC VALVE STENOSIS: ICD-10-CM

## 2023-07-21 PROCEDURE — 74174 CTA ABD&PLVS W/CONTRAST: CPT

## 2023-07-21 PROCEDURE — 25510000001 IOPAMIDOL PER 1 ML: Performed by: INTERNAL MEDICINE

## 2023-07-21 PROCEDURE — 71275 CT ANGIOGRAPHY CHEST: CPT

## 2023-07-21 RX ADMIN — IOPAMIDOL 95 ML: 755 INJECTION, SOLUTION INTRAVENOUS at 11:41

## 2023-07-21 NOTE — H&P (VIEW-ONLY)
Chief Complaint  Aortic Stenosis    Subjective        Gabby Acosta presents to [facility] CARDIAC SURGERY  History of Present Illness  68 years old woman with COPD, diabetes, hypertension, prior tobacco use, cervical spine stenosis with previous surgery, recently diagnosed non-small cell lung cancer chemotherapy and radiation therapy.  She was evaluated back in January 2023 for cervical spine surgery and severe aortic valve stenosis was diagnosed.  Also she was diagnosed with the lung cancer and she received radiation and chemotherapy, and now is on immunotherapy.  She complains of some shortness of breath with exertion.  She is not particularly mobile related to her back problems.  She denies any chest pain.    Echocardiogram on 1/11/23 showed:  ú  Left ventricular ejection fraction appears to be greater than 70%.  ú  Left ventricular wall thickness is consistent with mild concentric hypertrophy.  ú  Left ventricular diastolic function is consistent with (grade I) impaired relaxation.  ú  Normal right ventricular cavity size noted. Hyperdynamic right ventricular systolic function noted.  ú  The aortic valve leaflets are not well visualized, although appear heavily calcified  ú  Severe aortic valve stenosis is present. Aortic valve area is 0.99 cm2. Aortic valve maximum pressure gradient is 77.5 mmHg. Aortic valve mean pressure gradient is 39.2 mmHg.  ú  Calculated right ventricular systolic pressure from tricuspid regurgitation is 21 mmHg.  ú  There is no evidence of pericardial effusion. . There is evidence of a fat pad present.    Cardiac cath on 2/14/2023 showed:  1. Left main: Normal  2. LAD: Calcified sequential, discrete 60 and 50% mid vessel stenoses.  Associated with tortuosity  3. LCX: Diffuse 20% OM 2 stenosis.  4. RCA: Severely calcified 30% mid vessel stenosis.  5.  Normal right and left-sided filling pressure  6.  Mild pulmonary hypertension  7.  Severe aortic valve stenosis.  Mean  "gradient 37 mmHg in the setting of decreased stroke-volume index of 26 mL per beat per meter squared              Objective   Vital Signs:  /69 (BP Location: Left arm, Patient Position: Sitting, Cuff Size: Adult)   Pulse 71   Temp 96.8 øF (36 øC)   Resp 18   Ht 157.5 cm (62\")   Wt 100 kg (221 lb)   SpO2 94%   BMI 40.42 kg/mý   Estimated body mass index is 40.42 kg/mý as calculated from the following:    Height as of this encounter: 157.5 cm (62\").    Weight as of this encounter: 100 kg (221 lb).             Physical Exam  Constitutional:       Appearance: She is obese.   Cardiovascular:      Rate and Rhythm: Normal rate and regular rhythm.      Heart sounds: Murmur heard.   Pulmonary:      Effort: Pulmonary effort is normal.      Breath sounds: Normal breath sounds.   Neurological:      General: No focal deficit present.      Mental Status: She is alert and oriented to person, place, and time. Mental status is at baseline.   Psychiatric:         Mood and Affect: Mood normal.         Thought Content: Thought content normal.         Judgment: Judgment normal.      Result Review :                   Assessment and Plan   There are no diagnoses linked to this encounter.    -Severe symptomatic aortic valve stenosis.  For TAVR    68 years old female with poor mobility due to multiple back surgeries and a pending cervical spine surgery.  Diagnosed with lung cancer last January she completed chemotherapy and radiation and now is on immunotherapy.  She has severe aortic valve stenosis symptomatic for shortness of breath.  She needs treatment to her aortic valve to improve symptoms and prolong life. I think that she is high risk for SAVR and she will benefit from a TAVR procedure assuming that the life expectancy from her lung cancer is more than 1 year.  I spoke with her about being an open rescue if she is a candidate for TAVR and she agree.  We will discuss her case with the structural heart team.       "     Follow Up   No follow-ups on file.  Patient was given instructions and counseling regarding her condition or for health maintenance advice. Please see specific information pulled into the AVS if appropriate.

## 2023-07-24 ENCOUNTER — TELEPHONE (OUTPATIENT)
Dept: PSYCHIATRY | Facility: HOSPITAL | Age: 68
End: 2023-07-24
Payer: MEDICARE

## 2023-07-24 ENCOUNTER — PREP FOR SURGERY (OUTPATIENT)
Dept: OTHER | Facility: HOSPITAL | Age: 68
End: 2023-07-24
Payer: MEDICARE

## 2023-07-24 DIAGNOSIS — R79.9 ABNORMAL FINDING OF BLOOD CHEMISTRY, UNSPECIFIED: ICD-10-CM

## 2023-07-24 DIAGNOSIS — R79.1 ABNORMAL COAGULATION PROFILE: ICD-10-CM

## 2023-07-24 DIAGNOSIS — I50.32 CHRONIC DIASTOLIC (CONGESTIVE) HEART FAILURE: ICD-10-CM

## 2023-07-24 DIAGNOSIS — I35.0 NONRHEUMATIC AORTIC VALVE STENOSIS: Primary | ICD-10-CM

## 2023-07-24 RX ORDER — CHLORHEXIDINE GLUCONATE 0.12 MG/ML
15 RINSE ORAL EVERY 12 HOURS
Status: CANCELLED | OUTPATIENT
Start: 2023-07-24 | End: 2023-07-25

## 2023-07-24 RX ORDER — CEFAZOLIN SODIUM 2 G/100ML
2000 INJECTION, SOLUTION INTRAVENOUS ONCE
OUTPATIENT
Start: 2023-07-24 | End: 2023-07-24

## 2023-07-24 RX ORDER — CHLORHEXIDINE GLUCONATE 0.12 MG/ML
15 RINSE ORAL ONCE
OUTPATIENT
Start: 2023-07-24 | End: 2023-07-24

## 2023-07-24 NOTE — TELEPHONE ENCOUNTER
Supportive Oncology Services    Supportive call placed to patient regarding upcoming cardiac procedure. I have communicated with Dr. Morris who agrees pt will not be under generalized anesthesia. Procedure scheduled for August 1. Plan to see patient in patient for check in on day following procedure. Will not adjust meds at this time, and patient is in agreement.

## 2023-07-28 ENCOUNTER — PRE-ADMISSION TESTING (OUTPATIENT)
Dept: PREADMISSION TESTING | Facility: HOSPITAL | Age: 68
End: 2023-07-28
Payer: MEDICARE

## 2023-07-28 ENCOUNTER — ANESTHESIA EVENT (OUTPATIENT)
Dept: PERIOP | Facility: HOSPITAL | Age: 68
DRG: 267 | End: 2023-07-28
Payer: MEDICARE

## 2023-07-28 ENCOUNTER — DOCUMENTATION (OUTPATIENT)
Dept: CARDIOLOGY | Facility: HOSPITAL | Age: 68
End: 2023-07-28
Payer: MEDICARE

## 2023-07-28 ENCOUNTER — HOSPITAL ENCOUNTER (OUTPATIENT)
Dept: GENERAL RADIOLOGY | Facility: HOSPITAL | Age: 68
Discharge: HOME | End: 2023-07-28
Payer: MEDICARE

## 2023-07-28 VITALS
OXYGEN SATURATION: 97 % | RESPIRATION RATE: 16 BRPM | DIASTOLIC BLOOD PRESSURE: 82 MMHG | HEIGHT: 62 IN | WEIGHT: 220.4 LBS | HEART RATE: 79 BPM | BODY MASS INDEX: 40.56 KG/M2 | TEMPERATURE: 97.1 F | SYSTOLIC BLOOD PRESSURE: 142 MMHG

## 2023-07-28 DIAGNOSIS — I35.0 NONRHEUMATIC AORTIC VALVE STENOSIS: ICD-10-CM

## 2023-07-28 LAB
ABO GROUP BLD: NORMAL
ALBUMIN SERPL-MCNC: 3.9 G/DL (ref 3.5–5.2)
ALBUMIN/GLOB SERPL: 1.4 G/DL
ALP SERPL-CCNC: 87 U/L (ref 39–117)
ALT SERPL W P-5'-P-CCNC: 13 U/L (ref 1–33)
ANION GAP SERPL CALCULATED.3IONS-SCNC: 12.8 MMOL/L (ref 5–15)
APTT PPP: 26 SECONDS (ref 22.7–35.4)
AST SERPL-CCNC: 17 U/L (ref 1–32)
BACTERIA UR QL AUTO: ABNORMAL /HPF
BACTERIA UR QL AUTO: ABNORMAL /HPF
BILIRUB SERPL-MCNC: 0.4 MG/DL (ref 0–1.2)
BILIRUB UR QL STRIP: NEGATIVE
BILIRUB UR QL STRIP: NEGATIVE
BLD GP AB SCN SERPL QL: NEGATIVE
BUN SERPL-MCNC: 22 MG/DL (ref 8–23)
BUN/CREAT SERPL: 26.8 (ref 7–25)
CALCIUM SPEC-SCNC: 10 MG/DL (ref 8.6–10.5)
CHLORIDE SERPL-SCNC: 102 MMOL/L (ref 98–107)
CLARITY UR: ABNORMAL
CLARITY UR: CLEAR
CLOSE TME COLL+ADP + EPINEP PNL BLD: 96 % (ref 86–100)
CO2 SERPL-SCNC: 25.2 MMOL/L (ref 22–29)
COLOR UR: YELLOW
COLOR UR: YELLOW
CREAT SERPL-MCNC: 0.82 MG/DL (ref 0.57–1)
DEPRECATED RDW RBC AUTO: 42.8 FL (ref 37–54)
EGFRCR SERPLBLD CKD-EPI 2021: 78.5 ML/MIN/1.73
EOSINOPHIL # BLD MANUAL: 0.19 10*3/MM3 (ref 0–0.4)
EOSINOPHIL NFR BLD MANUAL: 4 % (ref 0.3–6.2)
ERYTHROCYTE [DISTWIDTH] IN BLOOD BY AUTOMATED COUNT: 12.1 % (ref 12.3–15.4)
GLOBULIN UR ELPH-MCNC: 2.7 GM/DL
GLUCOSE SERPL-MCNC: 127 MG/DL (ref 65–99)
GLUCOSE UR STRIP-MCNC: NEGATIVE MG/DL
GLUCOSE UR STRIP-MCNC: NEGATIVE MG/DL
HBA1C MFR BLD: 5.4 % (ref 4.8–5.6)
HCT VFR BLD AUTO: 39.2 % (ref 34–46.6)
HGB BLD-MCNC: 13.4 G/DL (ref 12–15.9)
HGB UR QL STRIP.AUTO: NEGATIVE
HGB UR QL STRIP.AUTO: NEGATIVE
HYALINE CASTS UR QL AUTO: ABNORMAL /LPF
HYALINE CASTS UR QL AUTO: ABNORMAL /LPF
INR PPP: 0.97 (ref 0.9–1.1)
KETONES UR QL STRIP: NEGATIVE
KETONES UR QL STRIP: NEGATIVE
LEUKOCYTE ESTERASE UR QL STRIP.AUTO: ABNORMAL
LEUKOCYTE ESTERASE UR QL STRIP.AUTO: ABNORMAL
LYMPHOCYTES # BLD MANUAL: 0.48 10*3/MM3 (ref 0.7–3.1)
LYMPHOCYTES NFR BLD MANUAL: 6 % (ref 5–12)
MCH RBC QN AUTO: 33 PG (ref 26.6–33)
MCHC RBC AUTO-ENTMCNC: 34.2 G/DL (ref 31.5–35.7)
MCV RBC AUTO: 96.6 FL (ref 79–97)
MONOCYTES # BLD: 0.29 10*3/MM3 (ref 0.1–0.9)
NEUTROPHILS # BLD AUTO: 3.85 10*3/MM3 (ref 1.7–7)
NEUTROPHILS NFR BLD MANUAL: 80 % (ref 42.7–76)
NITRITE UR QL STRIP: NEGATIVE
NITRITE UR QL STRIP: NEGATIVE
NT-PROBNP SERPL-MCNC: 227 PG/ML (ref 0–900)
PH UR STRIP.AUTO: 5.5 [PH] (ref 5–8)
PH UR STRIP.AUTO: 5.5 [PH] (ref 5–8)
PLAT MORPH BLD: NORMAL
PLATELET # BLD AUTO: 131 10*3/MM3 (ref 140–450)
PMV BLD AUTO: 10.6 FL (ref 6–12)
POTASSIUM SERPL-SCNC: 4.7 MMOL/L (ref 3.5–5.2)
PROT SERPL-MCNC: 6.6 G/DL (ref 6–8.5)
PROT UR QL STRIP: ABNORMAL
PROT UR QL STRIP: ABNORMAL
PROTHROMBIN TIME: 13 SECONDS (ref 11.7–14.2)
QT INTERVAL: 441 MS
RBC # BLD AUTO: 4.06 10*6/MM3 (ref 3.77–5.28)
RBC # UR STRIP: ABNORMAL /HPF
RBC # UR STRIP: ABNORMAL /HPF
RBC MORPH BLD: NORMAL
REF LAB TEST METHOD: ABNORMAL
REF LAB TEST METHOD: ABNORMAL
RH BLD: POSITIVE
SARS-COV-2 RNA RESP QL NAA+PROBE: NOT DETECTED
SODIUM SERPL-SCNC: 140 MMOL/L (ref 136–145)
SP GR UR STRIP: 1.02 (ref 1–1.03)
SP GR UR STRIP: 1.03 (ref 1–1.03)
SQUAMOUS #/AREA URNS HPF: ABNORMAL /HPF
SQUAMOUS #/AREA URNS HPF: ABNORMAL /HPF
T&S EXPIRATION DATE: NORMAL
UROBILINOGEN UR QL STRIP: ABNORMAL
UROBILINOGEN UR QL STRIP: ABNORMAL
VARIANT LYMPHS NFR BLD MANUAL: 10 % (ref 19.6–45.3)
WBC # UR STRIP: ABNORMAL /HPF
WBC # UR STRIP: ABNORMAL /HPF
WBC MORPH BLD: NORMAL
WBC NRBC COR # BLD: 4.81 10*3/MM3 (ref 3.4–10.8)

## 2023-07-28 PROCEDURE — 81001 URINALYSIS AUTO W/SCOPE: CPT

## 2023-07-28 PROCEDURE — 85576 BLOOD PLATELET AGGREGATION: CPT | Performed by: NURSE PRACTITIONER

## 2023-07-28 PROCEDURE — 87186 SC STD MICRODIL/AGAR DIL: CPT | Performed by: NURSE PRACTITIONER

## 2023-07-28 PROCEDURE — 85025 COMPLETE CBC W/AUTO DIFF WBC: CPT | Performed by: NURSE PRACTITIONER

## 2023-07-28 PROCEDURE — 86901 BLOOD TYPING SEROLOGIC RH(D): CPT | Performed by: NURSE PRACTITIONER

## 2023-07-28 PROCEDURE — 81001 URINALYSIS AUTO W/SCOPE: CPT | Performed by: NURSE PRACTITIONER

## 2023-07-28 PROCEDURE — 85730 THROMBOPLASTIN TIME PARTIAL: CPT | Performed by: NURSE PRACTITIONER

## 2023-07-28 PROCEDURE — 86850 RBC ANTIBODY SCREEN: CPT | Performed by: NURSE PRACTITIONER

## 2023-07-28 PROCEDURE — 83036 HEMOGLOBIN GLYCOSYLATED A1C: CPT | Performed by: NURSE PRACTITIONER

## 2023-07-28 PROCEDURE — 86900 BLOOD TYPING SEROLOGIC ABO: CPT | Performed by: NURSE PRACTITIONER

## 2023-07-28 PROCEDURE — 80053 COMPREHEN METABOLIC PANEL: CPT | Performed by: NURSE PRACTITIONER

## 2023-07-28 PROCEDURE — 71046 X-RAY EXAM CHEST 2 VIEWS: CPT

## 2023-07-28 PROCEDURE — 87635 SARS-COV-2 COVID-19 AMP PRB: CPT | Performed by: NURSE PRACTITIONER

## 2023-07-28 PROCEDURE — 85007 BL SMEAR W/DIFF WBC COUNT: CPT | Performed by: NURSE PRACTITIONER

## 2023-07-28 PROCEDURE — 83880 ASSAY OF NATRIURETIC PEPTIDE: CPT | Performed by: NURSE PRACTITIONER

## 2023-07-28 PROCEDURE — 87086 URINE CULTURE/COLONY COUNT: CPT | Performed by: NURSE PRACTITIONER

## 2023-07-28 PROCEDURE — 87077 CULTURE AEROBIC IDENTIFY: CPT | Performed by: NURSE PRACTITIONER

## 2023-07-28 PROCEDURE — 93010 ELECTROCARDIOGRAM REPORT: CPT | Performed by: INTERNAL MEDICINE

## 2023-07-28 PROCEDURE — 85610 PROTHROMBIN TIME: CPT | Performed by: NURSE PRACTITIONER

## 2023-07-28 PROCEDURE — 93005 ELECTROCARDIOGRAM TRACING: CPT

## 2023-07-28 RX ORDER — NITROFURANTOIN 25; 75 MG/1; MG/1
100 CAPSULE ORAL 2 TIMES DAILY
Qty: 10 CAPSULE | Refills: 0 | Status: SHIPPED | OUTPATIENT
Start: 2023-07-28 | End: 2023-08-02

## 2023-07-28 RX ORDER — CHLORHEXIDINE GLUCONATE 0.12 MG/ML
15 RINSE ORAL EVERY 12 HOURS
Status: DISPENSED | OUTPATIENT
Start: 2023-07-28 | End: 2023-07-29

## 2023-07-28 NOTE — DISCHARGE INSTRUCTIONS
BACTROBAN NASAL OINTMENT  There are many germs normally in your nose. Bactroban is an ointment that will help reduce these germs. Please follow these instructions for Bactroban use:      ____The day before surgery in the morning  Date________    __1ST__The day before surgery in the evening              Date____7/31/23____    __2ND__The day of surgery in the morning    Date____8/1/23____    **Squirt ½ package of Bactroban Ointment onto a cotton applicator and apply to inside of 1st nostril.  Squirt the remaining Bactroban and apply to the inside of the other nostril.    PERIDEX- ORAL:  Use only if your surgeon has ordered  Use the night before and morning of surgery - Swish, gargle, and spit - do not swallow.        Take the following medications the morning of surgery with a small sip of water:  NONE UNLESS DIRECTED BY CARDIOLOGY/ TAVR COORDINATOR    ARRIVAL TIME 0500 ON 8/1/23      If you are on prescription narcotic pain medication to control your pain you may also take that medication the morning of surgery.    General Instructions:  Do not eat or drink anything after midnight the night before surgery.  Infants may have breast milk up to four hours before surgery.  Infants drinking formula may drink formula up to six hours before surgery.   Patients who avoid smoking, chewing tobacco and alcohol for 4 weeks prior to surgery have a reduced risk of post-operative complications.  Quit smoking as many days before surgery as you can.  Do not smoke, use chewing tobacco or drink alcohol the day of surgery.   If applicable bring your C-PAP/ BI-PAP machine in with you to preop day of surgery.  Bring any papers given to you in the doctor’s office.  Wear clean comfortable clothes.  Do not wear contact lenses, false eyelashes or make-up.  Bring a case for your glasses.   Bring crutches or walker if applicable.  Remove all piercings.  Leave jewelry and any other valuables at home.  Hair extensions with metal clips must be  removed prior to surgery.  The Pre-Admission Testing nurse will instruct you to bring medications if unable to obtain an accurate list in Pre-Admission Testing.        If you were given a blood bank ID arm band remember to bring it with you the day of surgery.    Preventing a Surgical Site Infection:  For 2 to 3 days before surgery, avoid shaving with a razor because the razor can irritate skin and make it easier to develop an infection.    Any areas of open skin can increase the risk of a post-operative wound infection by allowing bacteria to enter and travel throughout the body.  Notify your surgeon if you have any skin wounds / rashes even if it is not near the expected surgical site.  The area will need assessed to determine if surgery should be delayed until it is healed.  The night prior to surgery shower using a fresh bar of anti-bacterial soap (such as Dial) and clean washcloth.  Sleep in a clean bed with clean clothing.  Do not allow pets to sleep with you.  Shower on the morning of surgery using a fresh bar of anti-bacterial soap (such as Dial) and clean washcloth.  Dry with a clean towel and dress in clean clothing.  Ask your surgeon if you will be receiving antibiotics prior to surgery.  Make sure you, your family, and all healthcare providers clean their hands with soap and water or an alcohol based hand  before caring for you or your wound.    Day of surgery:  Your arrival time is approximately two hours before your scheduled surgery time.  Upon arrival, a Pre-op nurse and Anesthesiologist will review your health history, obtain vital signs, and answer questions you may have.  The only belongings needed at this time will be your home medications and if applicable your C-PAP/BI-PAP machine.  A Pre-op nurse will start an IV and you may receive medication in preparation for surgery, including something to help you relax.      Please be aware that surgery does come with discomfort.  We want to  make every effort to control your discomfort so please discuss any uncontrolled symptoms with your nurse.   Your doctor will most likely have prescribed pain medications.      If you are going home after surgery you will receive individualized written care instructions before being discharged.  A responsible adult must drive you to and from the hospital on the day of your surgery and stay with you for 24 hours.  Discharge prescriptions can be filled by the hospital pharmacy during regular pharmacy hours.  If you are having surgery late in the day/evening your prescription may be e-prescribed to your pharmacy.  Please verify your pharmacy hours or chose a 24 hour pharmacy to avoid not having access to your prescription because your pharmacy has closed for the day.    If you are staying overnight following surgery, you will be transported to your hospital room following the recovery period.  [facility] has all private rooms.    If you have any questions please call Pre-Admission Testing at [phone].  Deductibles and co-payments are collected on the day of service. Please be prepared to pay the required co-pay, deductible or deposit on the day of service as defined by your plan.    Call your surgeon immediately if you experience any of the following symptoms:  Sore Throat  Shortness of Breath or difficulty breathing  Cough  Chills  Body soreness or muscle pain  Headache  Fever  New loss of taste or smell  Do not arrive for your surgery ill.  Your procedure will need to be rescheduled to another time.  You will need to call your physician before the day of surgery to avoid any unnecessary exposure to hospital staff as well as other patients.

## 2023-07-28 NOTE — NURSING NOTE
I met with Ms Acosta this morning while she was here for pre-admission testing We completed her 15 foot walk test. She has been provided oral and nasal medications with instructions prior to her TAVR procedure scheduled for 8/1/23 with a 5am arrival time . Ms Oseguera lives at home with her significant other Prince. She ambulates with a cane or by steadying herself holding on to things in her apartment She uses a wheelchair for distances due to her instability and issues with her back/neck She does not require supplemental oxygen Her skin has no signs of infection specifically in her groin access site. She has not been hospitalized in the last twelve months for heart failure

## 2023-07-30 LAB — BACTERIA SPEC AEROBE CULT: ABNORMAL

## 2023-08-01 ENCOUNTER — ANESTHESIA (OUTPATIENT)
Dept: PERIOP | Facility: HOSPITAL | Age: 68
DRG: 267 | End: 2023-08-01
Payer: MEDICARE

## 2023-08-01 ENCOUNTER — HOSPITAL ENCOUNTER (INPATIENT)
Facility: HOSPITAL | Age: 68
LOS: 1 days | Discharge: HOME | DRG: 267 | End: 2023-08-02
Payer: MEDICARE

## 2023-08-01 ENCOUNTER — ANCILLARY PROCEDURE (OUTPATIENT)
Dept: PERIOP | Facility: HOSPITAL | Age: 68
DRG: 267 | End: 2023-08-01
Payer: MEDICARE

## 2023-08-01 DIAGNOSIS — K76.9 LESION OF LIVER: Primary | ICD-10-CM

## 2023-08-01 DIAGNOSIS — D64.9 ANEMIA, UNSPECIFIED TYPE: Primary | ICD-10-CM

## 2023-08-01 DIAGNOSIS — I50.32 CHRONIC DIASTOLIC (CONGESTIVE) HEART FAILURE: ICD-10-CM

## 2023-08-01 DIAGNOSIS — Z95.2 S/P TAVR (TRANSCATHETER AORTIC VALVE REPLACEMENT): Primary | ICD-10-CM

## 2023-08-01 DIAGNOSIS — F41.1 GAD (GENERALIZED ANXIETY DISORDER): ICD-10-CM

## 2023-08-01 DIAGNOSIS — R79.1 ABNORMAL COAGULATION PROFILE: ICD-10-CM

## 2023-08-01 DIAGNOSIS — R79.9 ABNORMAL FINDING OF BLOOD CHEMISTRY, UNSPECIFIED: ICD-10-CM

## 2023-08-01 DIAGNOSIS — I35.0 NONRHEUMATIC AORTIC VALVE STENOSIS: ICD-10-CM

## 2023-08-01 LAB
ABO GROUP BLD: NORMAL
ACT BLD: 119 SECONDS (ref 82–152)
ACT BLD: 137 SECONDS (ref 82–152)
ACT BLD: 366 SECONDS (ref 82–152)
BASE EXCESS BLDA CALC-SCNC: 0 MMOL/L (ref -5–5)
BH CV ECHO MEAS - AO MAX PG: 18.8 MMHG
BH CV ECHO MEAS - AO MEAN PG: 8.6 MMHG
BH CV ECHO MEAS - AO V2 MAX: 216.6 CM/SEC
BH CV ECHO MEAS - AO V2 VTI: 39.4 CM
BH CV ECHO MEAS - AVA(I,D): 1.37 CM2
BH CV ECHO MEAS - LV MAX PG: 4.5 MMHG
BH CV ECHO MEAS - LV MEAN PG: 1.91 MMHG
BH CV ECHO MEAS - LV V1 MAX: 105.9 CM/SEC
BH CV ECHO MEAS - LV V1 VTI: 19.8 CM
BH CV ECHO MEAS - LVOT AREA: 2.7 CM2
BH CV ECHO MEAS - LVOT DIAM: 1.86 CM
BH CV ECHO MEAS - SV(LVOT): 54 ML
BLD GP AB SCN SERPL QL: NEGATIVE
CA-I BLDA-SCNC: ABNORMAL MMOL/L
CO2 BLDA-SCNC: 28 MMOL/L (ref 24–29)
GLUCOSE BLDC GLUCOMTR-MCNC: 134 MG/DL (ref 70–130)
GLUCOSE BLDC GLUCOMTR-MCNC: 137 MG/DL (ref 70–130)
GLUCOSE BLDC GLUCOMTR-MCNC: 200 MG/DL (ref 70–130)
HCO3 BLDA-SCNC: 26.4 MMOL/L (ref 22–26)
HCT VFR BLDA CALC: 35 % (ref 38–51)
HGB BLDA-MCNC: 11.9 G/DL (ref 12–17)
PCO2 BLDA: 53.8 MM HG (ref 35–45)
PH BLDA: 7.3 PH UNITS (ref 7.35–7.6)
PO2 BLDA: 266 MMHG (ref 80–105)
POTASSIUM BLDA-SCNC: 3.7 MMOL/L (ref 3.5–4.9)
RH BLD: POSITIVE
SAO2 % BLDA: 100 % (ref 95–98)
T&S EXPIRATION DATE: NORMAL

## 2023-08-01 PROCEDURE — C1894 INTRO/SHEATH, NON-LASER: HCPCS

## 2023-08-01 PROCEDURE — 25010000002 ALBUMIN HUMAN 5% PER 50 ML: Performed by: STUDENT IN AN ORGANIZED HEALTH CARE EDUCATION/TRAINING PROGRAM

## 2023-08-01 PROCEDURE — 82947 ASSAY GLUCOSE BLOOD QUANT: CPT

## 2023-08-01 PROCEDURE — 25010000002 FENTANYL CITRATE (PF) 50 MCG/ML SOLUTION: Performed by: STUDENT IN AN ORGANIZED HEALTH CARE EDUCATION/TRAINING PROGRAM

## 2023-08-01 PROCEDURE — 25010000002 PROTAMINE SULFATE PER 10 MG: Performed by: STUDENT IN AN ORGANIZED HEALTH CARE EDUCATION/TRAINING PROGRAM

## 2023-08-01 PROCEDURE — C1769 GUIDE WIRE: HCPCS

## 2023-08-01 PROCEDURE — 25010000002 PHENYLEPHRINE 10 MG/ML SOLUTION: Performed by: STUDENT IN AN ORGANIZED HEALTH CARE EDUCATION/TRAINING PROGRAM

## 2023-08-01 PROCEDURE — 82948 REAGENT STRIP/BLOOD GLUCOSE: CPT

## 2023-08-01 PROCEDURE — 86850 RBC ANTIBODY SCREEN: CPT | Performed by: INTERNAL MEDICINE

## 2023-08-01 PROCEDURE — 25010000002 PROPOFOL 10 MG/ML EMULSION: Performed by: STUDENT IN AN ORGANIZED HEALTH CARE EDUCATION/TRAINING PROGRAM

## 2023-08-01 PROCEDURE — P9041 ALBUMIN (HUMAN),5%, 50ML: HCPCS | Performed by: STUDENT IN AN ORGANIZED HEALTH CARE EDUCATION/TRAINING PROGRAM

## 2023-08-01 PROCEDURE — 63710000001 ONDANSETRON PER 8 MG: Performed by: INTERNAL MEDICINE

## 2023-08-01 PROCEDURE — 25510000001 IOPAMIDOL PER 1 ML

## 2023-08-01 PROCEDURE — 85018 HEMOGLOBIN: CPT

## 2023-08-01 PROCEDURE — 02HV33Z INSERTION OF INFUSION DEVICE INTO SUPERIOR VENA CAVA, PERCUTANEOUS APPROACH: ICD-10-PCS | Performed by: INTERNAL MEDICINE

## 2023-08-01 PROCEDURE — 93325 DOPPLER ECHO COLOR FLOW MAPG: CPT

## 2023-08-01 PROCEDURE — 93308 TTE F-UP OR LMTD: CPT

## 2023-08-01 PROCEDURE — C1760 CLOSURE DEV, VASC: HCPCS

## 2023-08-01 PROCEDURE — 82803 BLOOD GASES ANY COMBINATION: CPT

## 2023-08-01 PROCEDURE — 86901 BLOOD TYPING SEROLOGIC RH(D): CPT | Performed by: INTERNAL MEDICINE

## 2023-08-01 PROCEDURE — 85347 COAGULATION TIME ACTIVATED: CPT

## 2023-08-01 PROCEDURE — 02RF38Z REPLACEMENT OF AORTIC VALVE WITH ZOOPLASTIC TISSUE, PERCUTANEOUS APPROACH: ICD-10-PCS

## 2023-08-01 PROCEDURE — 85014 HEMATOCRIT: CPT

## 2023-08-01 PROCEDURE — 93321 DOPPLER ECHO F-UP/LMTD STD: CPT

## 2023-08-01 PROCEDURE — B41D1ZZ FLUOROSCOPY OF AORTA AND BILATERAL LOWER EXTREMITY ARTERIES USING LOW OSMOLAR CONTRAST: ICD-10-PCS | Performed by: INTERNAL MEDICINE

## 2023-08-01 PROCEDURE — 25010000002 ONDANSETRON PER 1 MG: Performed by: INTERNAL MEDICINE

## 2023-08-01 PROCEDURE — 25010000002 CEFAZOLIN IN DEXTROSE 2000 MG/ 100 ML SOLUTION: Performed by: NURSE PRACTITIONER

## 2023-08-01 PROCEDURE — 25010000002 PROCHLORPERAZINE 10 MG/2ML SOLUTION: Performed by: NURSE PRACTITIONER

## 2023-08-01 PROCEDURE — 86900 BLOOD TYPING SEROLOGIC ABO: CPT | Performed by: INTERNAL MEDICINE

## 2023-08-01 RX ORDER — ALBUMIN, HUMAN INJ 5% 5 %
500 SOLUTION INTRAVENOUS ONCE
Status: COMPLETED | OUTPATIENT
Start: 2023-08-01 | End: 2023-08-01

## 2023-08-01 RX ORDER — LIDOCAINE HYDROCHLORIDE 20 MG/ML
INJECTION, SOLUTION INFILTRATION; PERINEURAL AS NEEDED
Status: DISCONTINUED | OUTPATIENT
Start: 2023-08-01 | End: 2023-08-01 | Stop reason: HOSPADM

## 2023-08-01 RX ORDER — DIPHENHYDRAMINE HYDROCHLORIDE 50 MG/ML
12.5 INJECTION INTRAMUSCULAR; INTRAVENOUS
Status: DISCONTINUED | OUTPATIENT
Start: 2023-08-01 | End: 2023-08-01 | Stop reason: HOSPADM

## 2023-08-01 RX ORDER — SODIUM CHLORIDE 0.9 % (FLUSH) 0.9 %
3-10 SYRINGE (ML) INJECTION AS NEEDED
Status: CANCELLED | OUTPATIENT
Start: 2023-08-01

## 2023-08-01 RX ORDER — ONDANSETRON 4 MG/1
4 TABLET, FILM COATED ORAL EVERY 6 HOURS PRN
Status: DISCONTINUED | OUTPATIENT
Start: 2023-08-01 | End: 2023-08-02 | Stop reason: HOSPADM

## 2023-08-01 RX ORDER — SODIUM CHLORIDE 0.9 % (FLUSH) 0.9 %
3-10 SYRINGE (ML) INJECTION AS NEEDED
Status: DISCONTINUED | OUTPATIENT
Start: 2023-08-01 | End: 2023-08-01 | Stop reason: HOSPADM

## 2023-08-01 RX ORDER — PROTAMINE SULFATE 10 MG/ML
INJECTION, SOLUTION INTRAVENOUS AS NEEDED
Status: DISCONTINUED | OUTPATIENT
Start: 2023-08-01 | End: 2023-08-01 | Stop reason: SURG

## 2023-08-01 RX ORDER — MIDAZOLAM HYDROCHLORIDE 1 MG/ML
0.5 INJECTION INTRAMUSCULAR; INTRAVENOUS
Status: CANCELLED | OUTPATIENT
Start: 2023-08-01

## 2023-08-01 RX ORDER — KETAMINE HCL IN NACL, ISO-OSM 100MG/10ML
SYRINGE (ML) INJECTION AS NEEDED
Status: DISCONTINUED | OUTPATIENT
Start: 2023-08-01 | End: 2023-08-01 | Stop reason: SURG

## 2023-08-01 RX ORDER — HYDROXYZINE HYDROCHLORIDE 10 MG/1
5 TABLET, FILM COATED ORAL AS NEEDED
Status: DISCONTINUED | OUTPATIENT
Start: 2023-08-01 | End: 2023-08-02 | Stop reason: HOSPADM

## 2023-08-01 RX ORDER — PROCHLORPERAZINE EDISYLATE 5 MG/ML
5 INJECTION INTRAMUSCULAR; INTRAVENOUS ONCE
Status: COMPLETED | OUTPATIENT
Start: 2023-08-01 | End: 2023-08-01

## 2023-08-01 RX ORDER — LOSARTAN POTASSIUM 100 MG/1
100 TABLET ORAL NIGHTLY
Status: DISCONTINUED | OUTPATIENT
Start: 2023-08-01 | End: 2023-08-02 | Stop reason: HOSPADM

## 2023-08-01 RX ORDER — CEFAZOLIN SODIUM 2 G/100ML
2000 INJECTION, SOLUTION INTRAVENOUS ONCE
Status: COMPLETED | OUTPATIENT
Start: 2023-08-01 | End: 2023-08-01

## 2023-08-01 RX ORDER — FENTANYL CITRATE 50 UG/ML
50 INJECTION, SOLUTION INTRAMUSCULAR; INTRAVENOUS ONCE AS NEEDED
Status: DISCONTINUED | OUTPATIENT
Start: 2023-08-01 | End: 2023-08-01 | Stop reason: HOSPADM

## 2023-08-01 RX ORDER — DEXMEDETOMIDINE HYDROCHLORIDE 4 UG/ML
INJECTION, SOLUTION INTRAVENOUS AS NEEDED
Status: DISCONTINUED | OUTPATIENT
Start: 2023-08-01 | End: 2023-08-01 | Stop reason: SURG

## 2023-08-01 RX ORDER — CHLORHEXIDINE GLUCONATE 0.12 MG/ML
15 RINSE ORAL ONCE
Status: COMPLETED | OUTPATIENT
Start: 2023-08-01 | End: 2023-08-01

## 2023-08-01 RX ORDER — HYDROCODONE BITARTRATE AND ACETAMINOPHEN 5; 325 MG/1; MG/1
1 TABLET ORAL EVERY 4 HOURS PRN
Status: DISCONTINUED | OUTPATIENT
Start: 2023-08-01 | End: 2023-08-02 | Stop reason: HOSPADM

## 2023-08-01 RX ORDER — FENTANYL CITRATE 50 UG/ML
INJECTION, SOLUTION INTRAMUSCULAR; INTRAVENOUS AS NEEDED
Status: DISCONTINUED | OUTPATIENT
Start: 2023-08-01 | End: 2023-08-01 | Stop reason: SURG

## 2023-08-01 RX ORDER — MIDAZOLAM HYDROCHLORIDE 1 MG/ML
0.5 INJECTION INTRAMUSCULAR; INTRAVENOUS
Status: DISCONTINUED | OUTPATIENT
Start: 2023-08-01 | End: 2023-08-01 | Stop reason: HOSPADM

## 2023-08-01 RX ORDER — DROPERIDOL 2.5 MG/ML
0.62 INJECTION, SOLUTION INTRAMUSCULAR; INTRAVENOUS
Status: DISCONTINUED | OUTPATIENT
Start: 2023-08-01 | End: 2023-08-01 | Stop reason: HOSPADM

## 2023-08-01 RX ORDER — NALOXONE HCL 0.4 MG/ML
0.2 VIAL (ML) INJECTION AS NEEDED
Status: DISCONTINUED | OUTPATIENT
Start: 2023-08-01 | End: 2023-08-01 | Stop reason: HOSPADM

## 2023-08-01 RX ORDER — SODIUM CHLORIDE 0.9 % (FLUSH) 0.9 %
3 SYRINGE (ML) INJECTION EVERY 12 HOURS SCHEDULED
Status: CANCELLED | OUTPATIENT
Start: 2023-08-01

## 2023-08-01 RX ORDER — LEVOTHYROXINE SODIUM 0.15 MG/1
150 TABLET ORAL DAILY
Status: DISCONTINUED | OUTPATIENT
Start: 2023-08-01 | End: 2023-08-02 | Stop reason: HOSPADM

## 2023-08-01 RX ORDER — PROMETHAZINE HYDROCHLORIDE 25 MG/1
25 TABLET ORAL ONCE AS NEEDED
Status: DISCONTINUED | OUTPATIENT
Start: 2023-08-01 | End: 2023-08-01 | Stop reason: HOSPADM

## 2023-08-01 RX ORDER — ONDANSETRON 2 MG/ML
4 INJECTION INTRAMUSCULAR; INTRAVENOUS EVERY 6 HOURS PRN
Status: DISCONTINUED | OUTPATIENT
Start: 2023-08-01 | End: 2023-08-02 | Stop reason: HOSPADM

## 2023-08-01 RX ORDER — FAMOTIDINE 10 MG/ML
20 INJECTION, SOLUTION INTRAVENOUS ONCE
Status: CANCELLED | OUTPATIENT
Start: 2023-08-01 | End: 2023-08-01

## 2023-08-01 RX ORDER — FENTANYL CITRATE 50 UG/ML
50 INJECTION, SOLUTION INTRAMUSCULAR; INTRAVENOUS ONCE AS NEEDED
Status: CANCELLED | OUTPATIENT
Start: 2023-08-01

## 2023-08-01 RX ORDER — BUPROPION HYDROCHLORIDE 300 MG/1
300 TABLET ORAL DAILY
Status: DISCONTINUED | OUTPATIENT
Start: 2023-08-01 | End: 2023-08-02 | Stop reason: HOSPADM

## 2023-08-01 RX ORDER — SODIUM CHLORIDE, SODIUM LACTATE, POTASSIUM CHLORIDE, CALCIUM CHLORIDE 600; 310; 30; 20 MG/100ML; MG/100ML; MG/100ML; MG/100ML
9 INJECTION, SOLUTION INTRAVENOUS CONTINUOUS
Status: CANCELLED | OUTPATIENT
Start: 2023-08-01

## 2023-08-01 RX ORDER — SODIUM CHLORIDE 0.9 % (FLUSH) 0.9 %
3 SYRINGE (ML) INJECTION EVERY 12 HOURS SCHEDULED
Status: DISCONTINUED | OUTPATIENT
Start: 2023-08-01 | End: 2023-08-01 | Stop reason: HOSPADM

## 2023-08-01 RX ORDER — AMLODIPINE BESYLATE 5 MG/1
5 TABLET ORAL DAILY
Status: DISCONTINUED | OUTPATIENT
Start: 2023-08-01 | End: 2023-08-02 | Stop reason: HOSPADM

## 2023-08-01 RX ORDER — SODIUM CHLORIDE 9 MG/ML
100 INJECTION, SOLUTION INTRAVENOUS CONTINUOUS
Status: ACTIVE | OUTPATIENT
Start: 2023-08-01 | End: 2023-08-01

## 2023-08-01 RX ORDER — SODIUM CHLORIDE, SODIUM LACTATE, POTASSIUM CHLORIDE, CALCIUM CHLORIDE 600; 310; 30; 20 MG/100ML; MG/100ML; MG/100ML; MG/100ML
9 INJECTION, SOLUTION INTRAVENOUS CONTINUOUS
Status: DISCONTINUED | OUTPATIENT
Start: 2023-08-01 | End: 2023-08-02

## 2023-08-01 RX ORDER — LIDOCAINE HYDROCHLORIDE AND EPINEPHRINE 10; 10 MG/ML; UG/ML
INJECTION, SOLUTION INFILTRATION; PERINEURAL AS NEEDED
Status: DISCONTINUED | OUTPATIENT
Start: 2023-08-01 | End: 2023-08-01 | Stop reason: HOSPADM

## 2023-08-01 RX ORDER — ONDANSETRON 2 MG/ML
4 INJECTION INTRAMUSCULAR; INTRAVENOUS ONCE AS NEEDED
Status: DISCONTINUED | OUTPATIENT
Start: 2023-08-01 | End: 2023-08-01 | Stop reason: HOSPADM

## 2023-08-01 RX ORDER — ESCITALOPRAM OXALATE 10 MG/1
10 TABLET ORAL DAILY
Status: DISCONTINUED | OUTPATIENT
Start: 2023-08-01 | End: 2023-08-02 | Stop reason: HOSPADM

## 2023-08-01 RX ORDER — FLUMAZENIL 0.1 MG/ML
0.2 INJECTION INTRAVENOUS AS NEEDED
Status: DISCONTINUED | OUTPATIENT
Start: 2023-08-01 | End: 2023-08-01 | Stop reason: HOSPADM

## 2023-08-01 RX ORDER — ACETAMINOPHEN 325 MG/1
650 TABLET ORAL EVERY 4 HOURS PRN
Status: DISCONTINUED | OUTPATIENT
Start: 2023-08-01 | End: 2023-08-02 | Stop reason: HOSPADM

## 2023-08-01 RX ORDER — ALBUTEROL SULFATE 90 UG/1
2 AEROSOL, METERED RESPIRATORY (INHALATION) EVERY 4 HOURS PRN
Status: DISCONTINUED | OUTPATIENT
Start: 2023-08-01 | End: 2023-08-02 | Stop reason: HOSPADM

## 2023-08-01 RX ORDER — FAMOTIDINE 10 MG/ML
20 INJECTION, SOLUTION INTRAVENOUS ONCE
Status: COMPLETED | OUTPATIENT
Start: 2023-08-01 | End: 2023-08-01

## 2023-08-01 RX ORDER — NITROGLYCERIN 0.4 MG/1
0.4 TABLET SUBLINGUAL
Status: DISCONTINUED | OUTPATIENT
Start: 2023-08-01 | End: 2023-08-02 | Stop reason: HOSPADM

## 2023-08-01 RX ORDER — PROMETHAZINE HYDROCHLORIDE 25 MG/1
25 SUPPOSITORY RECTAL ONCE AS NEEDED
Status: DISCONTINUED | OUTPATIENT
Start: 2023-08-01 | End: 2023-08-01 | Stop reason: HOSPADM

## 2023-08-01 RX ORDER — PHENYLEPHRINE HYDROCHLORIDE 10 MG/ML
INJECTION INTRAVENOUS AS NEEDED
Status: DISCONTINUED | OUTPATIENT
Start: 2023-08-01 | End: 2023-08-01 | Stop reason: SURG

## 2023-08-01 RX ADMIN — Medication 10 MG: at 07:52

## 2023-08-01 RX ADMIN — LOSARTAN POTASSIUM 100 MG: 100 TABLET, FILM COATED ORAL at 20:08

## 2023-08-01 RX ADMIN — PROCHLORPERAZINE EDISYLATE 5 MG: 5 INJECTION INTRAMUSCULAR; INTRAVENOUS at 20:09

## 2023-08-01 RX ADMIN — NOREPINEPHRINE BITARTRATE 0.03 MCG/KG/MIN: 1 SOLUTION INTRAVENOUS at 07:19

## 2023-08-01 RX ADMIN — PHENYLEPHRINE HYDROCHLORIDE 200 MCG: 10 INJECTION INTRAVENOUS at 07:49

## 2023-08-01 RX ADMIN — DEXMEDETOMIDINE HYDROCHLORIDE 0.3 MCG/KG/HR: 4 INJECTION, SOLUTION INTRAVENOUS at 07:13

## 2023-08-01 RX ADMIN — ONDANSETRON HYDROCHLORIDE 4 MG: 4 TABLET, FILM COATED ORAL at 18:28

## 2023-08-01 RX ADMIN — CEFAZOLIN SODIUM 2 MG: 2 INJECTION, SOLUTION INTRAVENOUS at 07:23

## 2023-08-01 RX ADMIN — FENTANYL CITRATE 25 MCG: 50 INJECTION, SOLUTION INTRAMUSCULAR; INTRAVENOUS at 07:04

## 2023-08-01 RX ADMIN — AMLODIPINE BESYLATE 5 MG: 5 TABLET ORAL at 21:14

## 2023-08-01 RX ADMIN — ALBUMIN (HUMAN) 500 ML: 12.5 INJECTION, SOLUTION INTRAVENOUS at 09:00

## 2023-08-01 RX ADMIN — CHLORHEXIDINE GLUCONATE 15 ML: 1.2 SOLUTION ORAL at 05:51

## 2023-08-01 RX ADMIN — FENTANYL CITRATE 25 MCG: 50 INJECTION, SOLUTION INTRAMUSCULAR; INTRAVENOUS at 06:59

## 2023-08-01 RX ADMIN — SODIUM CHLORIDE, POTASSIUM CHLORIDE, SODIUM LACTATE AND CALCIUM CHLORIDE 9 ML/HR: 600; 310; 30; 20 INJECTION, SOLUTION INTRAVENOUS at 05:51

## 2023-08-01 RX ADMIN — Medication 10 MG: at 07:18

## 2023-08-01 RX ADMIN — BUPROPION HYDROCHLORIDE 300 MG: 300 TABLET, EXTENDED RELEASE ORAL at 20:08

## 2023-08-01 RX ADMIN — LEVOTHYROXINE SODIUM 150 MCG: 0.15 TABLET ORAL at 20:08

## 2023-08-01 RX ADMIN — PROTAMINE SULFATE 100 MG: 10 INJECTION, SOLUTION INTRAVENOUS at 08:15

## 2023-08-01 RX ADMIN — ACETAMINOPHEN 650 MG: 325 TABLET, FILM COATED ORAL at 13:47

## 2023-08-01 RX ADMIN — ESCITALOPRAM OXALATE 10 MG: 10 TABLET, FILM COATED ORAL at 20:08

## 2023-08-01 RX ADMIN — FAMOTIDINE 20 MG: 10 INJECTION INTRAVENOUS at 05:51

## 2023-08-01 RX ADMIN — ONDANSETRON 4 MG: 2 INJECTION INTRAMUSCULAR; INTRAVENOUS at 12:59

## 2023-08-01 RX ADMIN — PROPOFOL 250 MCG/KG/MIN: 10 INJECTION, EMULSION INTRAVENOUS at 07:11

## 2023-08-01 NOTE — OP NOTE
TAVR OPERATIVE REPORT    CO-SURGEON: Darryl James MD  CO-SURGEON: Pan Mendoza MD    DIAGNOSIS: Severe symptomatic aortic stenosis.     POSTOP DIAGNOSIS: Severe symptomatic aortic stenosis.     PRESENT CO-MORBIDITIES: Severe AS, COPD, Lung Cancer, DM, HTN, Cervical Spondylosis.      PROCEDURE: Elective procedure in hybrid operating room     1. Transcatheter aortic valve replacement (TAVR) utilizing a 14 Vietnamese eSheath and a 23 mm Dominik 3 transcatheter heart valve  2. Percutaneous left femoral arterial access and venous access  3. Percutaneous right femoral arterial   4. Abdominal aortography and bilateral lower extremity angiography  5. Transvenous pacing using a 5-Vietnamese balloon-tip pacer  6. Supravalvular aortogram  7. Transthoracic echocardiogram  9. Arterial line placement  10. Central venous catheter placement      INDICATIONS FOR OPERATION: The patient is a 68-year-old with New York Heart Association Class II symptoms and STS score of 5.6%. The patient was determined to be best suited high risk for surgery and best suited for TAVR by the multidisciplinary heart team due to comorbidities    FDA TAVR INDICATIONS: The patient was judged to be suitable for TAVR by the multidisciplinary team as reviewed by two cardiac surgeons, an interventional cardiologist, and the rest of the TAVR team.  The patient, family and team were in agreement that the case would convert to an open surgical AVR in the event of unsuccessful TAVR. The patient's co-morbidities would not preclude the expected benefit from correction of the aortic stenosis by TAVR based on the team discussion. The patient will be enrolled in the STS/ACC TAVR TVT registry.     DESCRIPTION: Dr. Pan Mendoza (interventional cardiologist) and Dr. Darryl James (cardiothoracic surgeon) performed the procedure together in all preoperative and operative aspects. The patient was taken to the hybrid OR. A radial art line, central venous access  "were inserted preoperatively. Anesthesia was administered without apparent complication. The patient was prepped and draped in the usual sterile fashion.      Using a micropuncture technique, access was obtained in the right femoral artery and a microsheath was inserted.  Angiography through the microsheath confirmed good position of the arterial access point.  Two Perclose sutures were deployed in offset manner in the right femoral artery using the \"Preclose\" technique.  An 8 Libyan sheath was inserted.      A similar technique was used to obtain access in the left common femoral artery.    A 6 Libyan sheath was inserted.  Access was obtained in the left femoral vein and a 6 Libyan sheath was inserted.   A 5 Libyan pacing catheter was directed to the RV apex and was tested.  A 6 Libyan pigtail was directed to the right coronary cusp.  Angiography was performed using 20 cc of contrast.     We inserted an 5 Fr FR 4 catheter to the aortic arch.  Through this, we inserted a Lunderquist wire.  Over this we performed sequential dilation until a 14 Libyan E-sheath could be inserted to the abdominal aorta.  We exchanged for an 6 Libyan AL-1 catheter and crossed the valve easily using a straight wire.  We exchanged for a pigtail catheter in the LV, then optimized position in the LV apex.  We then inserted an Amplatz Extra Stiff guidewire.  I    The orientation of a 23 mm Parker S3 TAVR valve was confirmed by multiple physicians, then was loaded in the sheath and was advanced to the descending aorta.  The valve was centered on the balloon easily, then was advanced across the aortic arch.     The THV was then placed and repositioned in the aortic annulus, and placement was confirmed by an aortogram. The co-operators were all in agreement for valve positioning prior to deployment. In a coordinated fashion, rapid ventricular pacing and the TAVR valve was deployed for a total of 5 seconds.  The valve delivery balloon was then " deflated, then was withdrawn and pacing was discontinued. We exchanged the TAVR delivery system for a pigtail catheter.  Proper valve placement, orientation and degree of regurgitation was then evaluated by transthoracic echocardiogram and aortography. The co-operators agreed that no further intervention was necessary.    We then withdrew the eSheath to the distal external iliac artery with the wire still in place. From the contralateral side, the pigtail catheter was advanced into the distal abdomen and abdominal aortography was performed. All parties were in agreement that there was no flow-limiting vascular trauma or extravasation of dye, at which point closure of the arteriotomy was performed with our two Perclose devices.  The venous sheath and temporary pacemaker were then removed. Finally, our contralateral access was removed and closed using a 6 Croatian Angioseal. The patient left in the care of the anesthesia team for extubation and hemodynamic monitoring. The patient was transported to the Open Heart Recovery Unit for further monitoring and care.       1. Hemodynamics:  Post TAVR aortic gradient: 8mmHg.  Post TAVR AI: none . Post GREGORIO: 1.4cm2.     CONTRAST USED: 100 mL.     FLUROSCOPY TIME:  8.9 min    Air KERMA:  324 Gray    EBL: minimal    SPECIMENS: none    CONCLUSIONS:  Successful deployment of a 23 mm Dominik 3 transcatheter aortic heart valve.

## 2023-08-01 NOTE — CONSULTS
Met with patient, discussed benefits of cardiac rehab. Provided phase II information along with the contact information for cardiac rehab here at [facility]. Patient will review information and call if interested in attending.

## 2023-08-01 NOTE — PLAN OF CARE
Goal Outcome Evaluation:  Plan of Care Reviewed With: patient        Progress: improving  Outcome Evaluation: VSS, AOx4 upon admission to unit from cath lab post TAVR. Bilateral groin sites are clean, dry, no hematoma. Ambulation with x1 and walker. Purewick applied due to very unsteady gait and urgency. Nausea treated per MAR. WCTM.

## 2023-08-02 ENCOUNTER — READMISSION MANAGEMENT (OUTPATIENT)
Dept: CALL CENTER | Facility: HOSPITAL | Age: 68
End: 2023-08-02
Payer: MEDICARE

## 2023-08-02 ENCOUNTER — DOCUMENTATION (OUTPATIENT)
Dept: PSYCHIATRY | Facility: HOSPITAL | Age: 68
End: 2023-08-02

## 2023-08-02 ENCOUNTER — APPOINTMENT (OUTPATIENT)
Dept: CARDIOLOGY | Facility: HOSPITAL | Age: 68
DRG: 267 | End: 2023-08-02
Payer: MEDICARE

## 2023-08-02 VITALS
RESPIRATION RATE: 20 BRPM | WEIGHT: 216.05 LBS | HEIGHT: 62 IN | DIASTOLIC BLOOD PRESSURE: 64 MMHG | SYSTOLIC BLOOD PRESSURE: 109 MMHG | HEART RATE: 103 BPM | OXYGEN SATURATION: 100 % | BODY MASS INDEX: 39.76 KG/M2 | TEMPERATURE: 98 F

## 2023-08-02 LAB
ANION GAP SERPL CALCULATED.3IONS-SCNC: 14.5 MMOL/L (ref 5–15)
AORTIC DIMENSIONLESS INDEX: 0.9 (DI)
AV HCM GRAD VALS: 30 MMHG
AV LVOT PEAK GRADIENT: 12 MMHG
BASOPHILS # BLD AUTO: 0.01 10*3/MM3 (ref 0–0.2)
BASOPHILS NFR BLD AUTO: 0.1 % (ref 0–1.5)
BH CV ECHO MEAS - AO MAX PG: 16.4 MMHG
BH CV ECHO MEAS - AO MEAN PG: 8.3 MMHG
BH CV ECHO MEAS - AO V2 MAX: 202.2 CM/SEC
BH CV ECHO MEAS - AO V2 VTI: 27.4 CM
BH CV ECHO MEAS - AVA(I,D): 2.9 CM2
BH CV ECHO MEAS - EDV(MOD-SP2): 72 ML
BH CV ECHO MEAS - EDV(MOD-SP4): 63 ML
BH CV ECHO MEAS - EF(MOD-BP): 62.4 %
BH CV ECHO MEAS - EF(MOD-SP2): 63.9 %
BH CV ECHO MEAS - EF(MOD-SP4): 60.3 %
BH CV ECHO MEAS - ESV(MOD-SP2): 26 ML
BH CV ECHO MEAS - ESV(MOD-SP4): 25 ML
BH CV ECHO MEAS - LAT PEAK E' VEL: 6.1 CM/SEC
BH CV ECHO MEAS - LV MAX PG: 9.8 MMHG
BH CV ECHO MEAS - LV MEAN PG: 5.2 MMHG
BH CV ECHO MEAS - LV V1 MAX: 156.7 CM/SEC
BH CV ECHO MEAS - LV V1 VTI: 25.8 CM
BH CV ECHO MEAS - LVOT AREA: 3.1 CM2
BH CV ECHO MEAS - LVOT DIAM: 1.98 CM
BH CV ECHO MEAS - MED PEAK E' VEL: 10.3 CM/SEC
BH CV ECHO MEAS - MR MAX PG: 25.5 MMHG
BH CV ECHO MEAS - MR MAX VEL: 252.5 CM/SEC
BH CV ECHO MEAS - MV A DUR: 0.15 SEC
BH CV ECHO MEAS - MV A MAX VEL: 102.8 CM/SEC
BH CV ECHO MEAS - MV DEC SLOPE: 540.7 CM/SEC2
BH CV ECHO MEAS - MV DEC TIME: 0.3 MSEC
BH CV ECHO MEAS - MV E MAX VEL: 70.7 CM/SEC
BH CV ECHO MEAS - MV E/A: 0.69
BH CV ECHO MEAS - MV MAX PG: 9.2 MMHG
BH CV ECHO MEAS - MV MEAN PG: 4.6 MMHG
BH CV ECHO MEAS - MV P1/2T: 61 MSEC
BH CV ECHO MEAS - MV V2 VTI: 27.3 CM
BH CV ECHO MEAS - MVA(P1/2T): 3.6 CM2
BH CV ECHO MEAS - MVA(VTI): 2.9 CM2
BH CV ECHO MEAS - PULM A REVS DUR: 0.12 SEC
BH CV ECHO MEAS - PULM A REVS VEL: 63.8 CM/SEC
BH CV ECHO MEAS - PULM DIAS VEL: 31.4 CM/SEC
BH CV ECHO MEAS - PULM S/D: 1.39
BH CV ECHO MEAS - PULM SYS VEL: 43.8 CM/SEC
BH CV ECHO MEAS - RAP SYSTOLE: 3 MMHG
BH CV ECHO MEAS - RVSP: 13 MMHG
BH CV ECHO MEAS - SV(LVOT): 79.6 ML
BH CV ECHO MEAS - SV(MOD-SP2): 46 ML
BH CV ECHO MEAS - SV(MOD-SP4): 38 ML
BH CV ECHO MEAS - TAPSE (>1.6): 1.29 CM
BH CV ECHO MEAS - TR MAX PG: 9.9 MMHG
BH CV ECHO MEAS - TR MAX VEL: 157.5 CM/SEC
BH CV ECHO MEASUREMENTS AVERAGE E/E' RATIO: 8.62
BH CV XLRA - RV BASE: 2.6 CM
BH CV XLRA - RV LENGTH: 7.9 CM
BH CV XLRA - RV MID: 2.2 CM
BH CV XLRA - TDI S': 18 CM/SEC
BUN SERPL-MCNC: 17 MG/DL (ref 8–23)
BUN/CREAT SERPL: 23.9 (ref 7–25)
CALCIUM SPEC-SCNC: 9.3 MG/DL (ref 8.6–10.5)
CHLORIDE SERPL-SCNC: 101 MMOL/L (ref 98–107)
CO2 SERPL-SCNC: 20.5 MMOL/L (ref 22–29)
CREAT SERPL-MCNC: 0.71 MG/DL (ref 0.57–1)
DEPRECATED RDW RBC AUTO: 41.7 FL (ref 37–54)
DEPRECATED RDW RBC AUTO: 42.6 FL (ref 37–54)
EGFRCR SERPLBLD CKD-EPI 2021: 93.3 ML/MIN/1.73
EOSINOPHIL # BLD AUTO: 0 10*3/MM3 (ref 0–0.4)
EOSINOPHIL NFR BLD AUTO: 0 % (ref 0.3–6.2)
ERYTHROCYTE [DISTWIDTH] IN BLOOD BY AUTOMATED COUNT: 12 % (ref 12.3–15.4)
ERYTHROCYTE [DISTWIDTH] IN BLOOD BY AUTOMATED COUNT: 12.4 % (ref 12.3–15.4)
GLUCOSE SERPL-MCNC: 215 MG/DL (ref 65–99)
HCT VFR BLD AUTO: 24.1 % (ref 34–46.6)
HCT VFR BLD AUTO: 26.2 % (ref 34–46.6)
HGB BLD-MCNC: 8.4 G/DL (ref 12–15.9)
HGB BLD-MCNC: 8.8 G/DL (ref 12–15.9)
LYMPHOCYTES # BLD AUTO: 0.56 10*3/MM3 (ref 0.7–3.1)
LYMPHOCYTES NFR BLD AUTO: 5.6 % (ref 19.6–45.3)
MCH RBC QN AUTO: 32.2 PG (ref 26.6–33)
MCH RBC QN AUTO: 33.2 PG (ref 26.6–33)
MCHC RBC AUTO-ENTMCNC: 33.6 G/DL (ref 31.5–35.7)
MCHC RBC AUTO-ENTMCNC: 34.9 G/DL (ref 31.5–35.7)
MCV RBC AUTO: 95.3 FL (ref 79–97)
MCV RBC AUTO: 96 FL (ref 79–97)
MONOCYTES # BLD AUTO: 1.07 10*3/MM3 (ref 0.1–0.9)
MONOCYTES NFR BLD AUTO: 10.7 % (ref 5–12)
NEUTROPHILS NFR BLD AUTO: 8.27 10*3/MM3 (ref 1.7–7)
NEUTROPHILS NFR BLD AUTO: 82.9 % (ref 42.7–76)
PLATELET # BLD AUTO: 120 10*3/MM3 (ref 140–450)
PLATELET # BLD AUTO: 123 10*3/MM3 (ref 140–450)
PMV BLD AUTO: 10.5 FL (ref 6–12)
PMV BLD AUTO: 10.7 FL (ref 6–12)
POTASSIUM SERPL-SCNC: 3.6 MMOL/L (ref 3.5–5.2)
QT INTERVAL: 379 MS
RBC # BLD AUTO: 2.53 10*6/MM3 (ref 3.77–5.28)
RBC # BLD AUTO: 2.73 10*6/MM3 (ref 3.77–5.28)
SODIUM SERPL-SCNC: 136 MMOL/L (ref 136–145)
WBC NRBC COR # BLD: 9.83 10*3/MM3 (ref 3.4–10.8)
WBC NRBC COR # BLD: 9.98 10*3/MM3 (ref 3.4–10.8)

## 2023-08-02 PROCEDURE — 93306 TTE W/DOPPLER COMPLETE: CPT

## 2023-08-02 PROCEDURE — 25010000002 PROMETHAZINE PER 50 MG: Performed by: NURSE PRACTITIONER

## 2023-08-02 PROCEDURE — 80048 BASIC METABOLIC PNL TOTAL CA: CPT | Performed by: INTERNAL MEDICINE

## 2023-08-02 PROCEDURE — 25510000001 PERFLUTREN (DEFINITY) 8.476 MG IN SODIUM CHLORIDE (PF) 0.9 % 10 ML INJECTION: Performed by: INTERNAL MEDICINE

## 2023-08-02 PROCEDURE — 25010000002 HYDRALAZINE PER 20 MG: Performed by: NURSE PRACTITIONER

## 2023-08-02 PROCEDURE — 93306 TTE W/DOPPLER COMPLETE: CPT | Performed by: INTERNAL MEDICINE

## 2023-08-02 PROCEDURE — 85027 COMPLETE CBC AUTOMATED: CPT | Performed by: INTERNAL MEDICINE

## 2023-08-02 PROCEDURE — 93005 ELECTROCARDIOGRAM TRACING: CPT | Performed by: INTERNAL MEDICINE

## 2023-08-02 PROCEDURE — 99232 SBSQ HOSP IP/OBS MODERATE 35: CPT | Performed by: INTERNAL MEDICINE

## 2023-08-02 PROCEDURE — 25010000002 ONDANSETRON PER 1 MG: Performed by: INTERNAL MEDICINE

## 2023-08-02 PROCEDURE — 85025 COMPLETE CBC W/AUTO DIFF WBC: CPT | Performed by: NURSE PRACTITIONER

## 2023-08-02 RX ORDER — ASPIRIN 81 MG/1
81 TABLET ORAL DAILY
Qty: 30 TABLET | Refills: 2
Start: 2023-08-02 | End: 2023-10-31

## 2023-08-02 RX ORDER — GABAPENTIN 300 MG/1
300 CAPSULE ORAL 3 TIMES DAILY PRN
Start: 2023-08-02 | End: 2024-08-01

## 2023-08-02 RX ORDER — GUAIFENESIN 600 MG/1
600 TABLET, EXTENDED RELEASE ORAL DAILY PRN
Start: 2023-08-02

## 2023-08-02 RX ORDER — HYDRALAZINE HYDROCHLORIDE 20 MG/ML
10 INJECTION INTRAMUSCULAR; INTRAVENOUS EVERY 6 HOURS PRN
Status: DISCONTINUED | OUTPATIENT
Start: 2023-08-02 | End: 2023-08-02 | Stop reason: HOSPADM

## 2023-08-02 RX ORDER — SODIUM CHLORIDE 9 MG/ML
100 INJECTION, SOLUTION INTRAVENOUS CONTINUOUS
Status: DISCONTINUED | OUTPATIENT
Start: 2023-08-02 | End: 2023-08-02 | Stop reason: HOSPADM

## 2023-08-02 RX ADMIN — HYDRALAZINE HYDROCHLORIDE 10 MG: 20 INJECTION INTRAMUSCULAR; INTRAVENOUS at 02:20

## 2023-08-02 RX ADMIN — PERFLUTREN 3 ML: 6.52 INJECTION, SUSPENSION INTRAVENOUS at 07:46

## 2023-08-02 RX ADMIN — HYDROCODONE BITARTRATE AND ACETAMINOPHEN 1 TABLET: 5; 325 TABLET ORAL at 01:07

## 2023-08-02 RX ADMIN — METOPROLOL TARTRATE 25 MG: 25 TABLET, FILM COATED ORAL at 09:38

## 2023-08-02 RX ADMIN — HYDROXYZINE HYDROCHLORIDE 5 MG: 10 TABLET ORAL at 00:14

## 2023-08-02 RX ADMIN — SODIUM CHLORIDE 100 ML/HR: 9 INJECTION, SOLUTION INTRAVENOUS at 09:38

## 2023-08-02 RX ADMIN — ONDANSETRON 4 MG: 2 INJECTION INTRAMUSCULAR; INTRAVENOUS at 02:02

## 2023-08-02 RX ADMIN — AMLODIPINE BESYLATE 5 MG: 5 TABLET ORAL at 08:10

## 2023-08-02 RX ADMIN — LEVOTHYROXINE SODIUM 150 MCG: 0.15 TABLET ORAL at 08:09

## 2023-08-02 RX ADMIN — ESCITALOPRAM OXALATE 10 MG: 10 TABLET, FILM COATED ORAL at 08:09

## 2023-08-02 RX ADMIN — PROMETHAZINE HYDROCHLORIDE 12.5 MG: 25 INJECTION INTRAMUSCULAR; INTRAVENOUS at 00:12

## 2023-08-02 RX ADMIN — BUPROPION HYDROCHLORIDE 300 MG: 300 TABLET, EXTENDED RELEASE ORAL at 08:09

## 2023-08-02 NOTE — PLAN OF CARE
Alert, room air. Nausea throughout shift. Spoke to cardio multiple times. Zofran, phenergan and compazine given. Norco for back pain. New PRN hydralazine given for HTN. Bilateral groin sites, C/D/I, no swelling.     Problem: Adult Inpatient Plan of Care  Goal: Absence of Hospital-Acquired Illness or Injury  Intervention: Prevent Skin Injury  Recent Flowsheet Documentation  Taken 8/2/2023 0205 by Pily Styles RN  Body Position:   right   side-lying  Taken 8/1/2023 2337 by Pily Styles RN  Body Position: sitting up in bed  Skin Protection: adhesive use limited  Taken 8/1/2023 2155 by Pily Styles RN  Body Position:   right   side-lying  Taken 8/1/2023 1936 by Pily Styles RN  Body Position:   right   side-lying  Skin Protection: adhesive use limited   Goal Outcome Evaluation:

## 2023-08-02 NOTE — PLAN OF CARE
Goal Outcome Evaluation:  Plan of Care Reviewed With: patient        Progress: improving  Outcome Evaluation: VSS, AOx4 upon admission to unit from cath lab post TAVR. Bilateral groin sites are clean, dry, no hematoma. Ambulation with x1 and walker. Purewick applied due to very unsteady gait and urgency. Nausea treated per MAR. WCTM.    VSS, Aox4. Adequate for discharge. WCTM.

## 2023-08-02 NOTE — DISCHARGE SUMMARY
Date of Admission: 8/1/2023  Date of Discharge:  8/2/2023    Discharge Diagnosis:  - severe aortic valve stenosis s/p TAVR  - chronic diastolic congestive heart failure  - DM II  - COPD with former tobacco use  - hypertension  - non-small cell lung cancer with current chemotherapy  - cervical spondylosis  - post op anemia    Presenting Problem/History of Present Illness  Nonrheumatic aortic valve stenosis [I35.0]  Aortic stenosis [I35.0]     Hospital Course  Patient is a 68 y.o. female who presented for previously scheduled cardiac surgery. On 8/1/23 she underwent transfemoral transcatheter aortic valve replacement with Dr. James and Dr. Mendoza (see op note for full report). Post-operatively she did well. She was noted to be anemic after surgery, but there was not evidence of acute bleeding. It was decided to hold off on transfusion and repeat labs in 48 hours to reassess. She had significant nausea following anesthesia. She was given IV fluids and antiemetics with resolution. Metoprolol was started secondary to hypertension/tachycardia. Post-operative echocardiogram was reviewed by Dr. Mendoza and showed well seated/functioning TAVR valve. She was weaned from oxygen and is tolerating the current medication regimen. She is urinating and defecating without issues, and eating and drinking in sufficiently. Her mobility is at baseline. On POD#1 she was deemed ready for discharge home. She is to follow up with CHENG Romero in 1 week, and Dr. Mendoza in 1 month with a repeat echocardiogram.      Procedures Performed  Procedure(s):  TTE TRANSFEMORAL TRANSCATHETER AORTIC VALVE REPLACEMENT PERCUTANEOUS APPROACH  Transfemoral Transcatheter Aortic Valve Replacement with intraoperative TTE and possible open surgical rescue       Consults:   Consults       No orders found for last 30 day(s).            Pertinent Test Results:    Lab Results   Component Value Date    WBC 9.98 08/02/2023    HGB 8.4 (L)  08/02/2023    HCT 24.1 (L) 08/02/2023    MCV 95.3 08/02/2023     (L) 08/02/2023      Lab Results   Component Value Date    GLUCOSE 215 (H) 08/02/2023    CALCIUM 9.3 08/02/2023     08/02/2023    K 3.6 08/02/2023    CO2 20.5 (L) 08/02/2023     08/02/2023    BUN 17 08/02/2023    CREATININE 0.71 08/02/2023    BCR 23.9 08/02/2023    ANIONGAP 14.5 08/02/2023     Lab Results   Component Value Date    INR 0.97 07/28/2023    PROTIME 13.0 07/28/2023       Condition on Discharge:  Stable    Vital Signs  Temp:  [97.5 øF (36.4 øC)-98.9 øF (37.2 øC)] 98 øF (36.7 øC)  Heart Rate:  [] 103  Resp:  [20] 20  BP: (109-186)/(63-79) 109/64      Discharge Disposition  Home or Self Care    Discharge Medications     Discharge Medications        New Medications        Instructions Start Date   aspirin 81 MG EC tablet   81 mg, Oral, Daily      metoprolol tartrate 25 MG tablet  Commonly known as: LOPRESSOR   25 mg, Oral, Every 12 Hours Scheduled             Changes to Medications        Instructions Start Date   gabapentin 300 MG capsule  Commonly known as: Neurontin  What changed:   when to take this  reasons to take this   300 mg, Oral, 3 Times Daily PRN      guaiFENesin 600 MG 12 hr tablet  Commonly known as: MUCINEX  What changed:   when to take this  reasons to take this   600 mg, Oral, Daily PRN      lidocaine-prilocaine 2.5-2.5 % cream  Commonly known as: EMLA  What changed:   how to take this  when to take this  reasons to take this   Apply maria g-sized amount to Mediport site 30 min prior to port access. Do not rub in.             Continue These Medications        Instructions Start Date   albuterol sulfate  (90 Base) MCG/ACT inhaler  Commonly known as: PROVENTIL HFA;VENTOLIN HFA;PROAIR HFA   2 puffs, Inhalation, Every 4 Hours PRN      amLODIPine 5 MG tablet  Commonly known as: NORVASC   5 mg, Oral, Daily      buPROPion  MG 24 hr tablet  Commonly known as: WELLBUTRIN XL   300 mg, Oral, Daily       diphenhydrAMINE 25 mg capsule  Commonly known as: BENADRYL   25 mg, Oral, Every 6 Hours PRN      escitalopram 10 MG tablet  Commonly known as: LEXAPRO   10 mg, Oral, Daily      fluticasone 50 MCG/ACT nasal spray  Commonly known as: FLONASE   2 sprays into the nostril(s) as directed by provider As Needed.      hydrOXYzine 10 MG tablet  Commonly known as: ATARAX   5 mg, Oral, As Needed      levothyroxine 150 MCG tablet  Commonly known as: SYNTHROID, LEVOTHROID   150 mcg, Oral, Daily      losartan 100 MG tablet  Commonly known as: COZAAR   100 mg, Oral, Nightly      lovastatin 40 MG tablet  Commonly known as: MEVACOR   Take 1 tablet by mouth Every Night.      ondansetron 8 MG tablet  Commonly known as: ZOFRAN   8 mg, Oral, Every 8 Hours PRN      pyridoxine 100 MG tablet  Commonly known as: VITAMIN B-6   100 mg, Oral, Daily      vitamin D3 125 MCG (5000 UT) capsule capsule   5,000 Units, Oral, Daily             Stop These Medications      mupirocin 2 % nasal ointment  Commonly known as: BACTROBAN     nitrofurantoin (macrocrystal-monohydrate) 100 MG capsule  Commonly known as: Macrobid     nystatin in Lidocaine Viscous HCl solution-diphenhydrAMINE liquid-aluminum-magnesium hydroxide-simethicone suspension     PERIDEX MT              Discharge Diet:     Activity at Discharge:   Activity Instructions       Activity as Tolerated      Measure Weight      Daily weight, notify if over 3 lbs in one day            Follow-up Appointments  Future Appointments   Date Time Provider Department Center   8/8/2023 12:00 PM Adela Villaseñor APRN [facility]   8/9/2023 10:45 AM [facility] MRI 1 (3T)  [facility]   8/15/2023  7:30 AM [facility] PORT CHAIR  [facility]   8/15/2023  8:00 AM Carmelo Lombardi Jr., MD [facility]   8/15/2023  8:45 AM CHAIR  CBC [facility] - LG  [facility]   8/15/2023  9:30 AM Mag Waterman, RD, LD  [facility]   9/6/2023  9:15 AM [facility] ECHO/VAS FRONT [facility] ECHO [facility]   9/6/2023 10:00 AM  Pan Mendoza MD [facility]   6/6/2024 12:00 PM [facility] ECHO/VAS FRONT [facility] ECHO [facility]   6/6/2024 12:45 PM Pan Mendoza MD [facility]     Additional Instructions for the Follow-ups that You Need to Schedule       Ambulatory Referral to Cardiac Rehab   As directed      Discharge Follow-up with Specialty: Dr. Mendoza; 1 Month   As directed      Specialty: Dr. Mendoza   Follow Up: 1 Month        Discharge Follow-up with Specified Provider: CHENG Romero; 1 Week   As directed      To: CHENG Romero   Follow Up: 1 Week        Basic Metabolic Panel    Aug 04, 2023 (Approximate)      CBC & Differential    Aug 04, 2023 (Approximate)      Manual Differential: No   Release to patient: Routine Release                Test Results Pending at Discharge       CHENG Murrieta  08/02/23  13:45 EDT

## 2023-08-02 NOTE — DISCHARGE INSTRUCTIONS
Call office or valve coordinator for any drainage, increased redness at procedure sites, swelling at site, or fever over 100.5

## 2023-08-02 NOTE — PROGRESS NOTES
"[facility] Progress Note       Encounter Date:23  Patient:Gabby Acosta  :1955  MRN:[id number]      Chief Complaint: Follow-up TAVR      Subjective:        Patient noted to be nauseated throughout shift requiring as needed antiemetics.  No overt emesis feels better this morning but has not eaten much    Review of Systems:  Review of Systems   Constitutional: Positive for malaise/fatigue.   Cardiovascular:  Negative for chest pain.   Gastrointestinal:  Positive for nausea.     Medications:  Scheduled Meds:  amLODIPine, 5 mg, Oral, Daily  buPROPion XL, 300 mg, Oral, Daily  escitalopram, 10 mg, Oral, Daily  levothyroxine, 150 mcg, Oral, Daily  losartan, 100 mg, Oral, Nightly    Continuous Infusions:  lactated ringers, 9 mL/hr, Last Rate: 9 mL/hr (23 0551)    PRN Meds:    acetaminophen    albuterol sulfate HFA    hydrALAZINE    HYDROcodone-acetaminophen    hydrOXYzine    nitroglycerin    ondansetron **OR** ondansetron         Objective:       Vitals:    23 0220 23 0341 23 0455 23 0745   BP: (!) 186/75 172/74 154/63 154/63   BP Location:  Left arm Left arm    Patient Position:  Sitting Lying    Pulse:   104 103   Resp:   20    Temp:   98.3 øF (36.8 øC)    TempSrc:   Oral    SpO2:   100%    Weight:    98 kg (216 lb 0.8 oz)   Height:    157 cm (61.81\")           Physical Exam:  Constitutional: Well appearing, well developed, no acute distress   HENT: Oropharynx clear and membrane moist  Eyes: Normal conjunctiva, no sclera icterus.  Neck: Supple, no carotid bruit bilaterally.  Cardiovascular: Regular and Tachycardia rate and rhythm, Early peaking systolic murmur over the right upper sternal border, No bilateral lower extremity edema.  Pulmonary: Normal respiratory effort, normal lung sounds, no wheezing.  Abdominal: Soft, nontender, no hepatosplenomegaly, liver is non-pulsatile.  Neurological: Alert and orient x 3.   Skin: Warm, dry, no ecchymosis, no rash.  " Cath site well-healed  Psych: Appropriate mood and affect. Normal judgment and insight.           Lab Review:   Results from last 7 days   Lab Units 08/02/23  0320 07/28/23  0851   SODIUM mmol/L 136 140   POTASSIUM mmol/L 3.6 4.7   CHLORIDE mmol/L 101 102   CO2 mmol/L 20.5* 25.2   BUN mg/dL 17 22   CREATININE mg/dL 0.71 0.82   GLUCOSE mg/dL 215* 127*   CALCIUM mg/dL 9.3 10.0   AST (SGOT) U/L  --  17   ALT (SGPT) U/L  --  13         Results from last 7 days   Lab Units 08/02/23  0320 08/01/23  0735 07/28/23  0851   WBC 10*3/mm3 9.83  --  4.81   HEMOGLOBIN g/dL 8.8*  --  13.4   HEMOGLOBIN, POC g/dL  --  11.9*  --    HEMATOCRIT % 26.2*  --  39.2   HEMATOCRIT POC %  --  35*  --    PLATELETS 10*3/mm3 120*  --  131*     Results from last 7 days   Lab Units 07/28/23  0851   INR  0.97   APTT seconds 26.0               Invalid input(s): LDLCALC  Results from last 7 days   Lab Units 07/28/23  0851   PROBNP pg/mL 227.0           Echocardiogram 8/2/2023 images reviewed by myself:  Shows normal valve function of the limited images.  No valvular regurgitation or paravalvular regurgitation with normal gradients.  Hyperdynamic LV with small IVC consistent with volume depletion    TAVR 8/1/2023:  Successful placement of 23 mm GILDARDO ultra aortic valve bioprosthesis with trace paravalvular leak.  Bedside echocardiogram identified a calculated valve area of 1.4 cm2 with a mean gradient of 8 mmHg across aortic valve       Assessment:          Diagnosis Plan   1. Nonrheumatic aortic valve stenosis  CBC With Differential    CBC With Differential    Comprehensive Metabolic Panel    Comprehensive Metabolic Panel    Hemoglobin A1c    Hemoglobin A1c    BNP    BNP    PTT    PTT    Protime-INR    Protime-INR    Platelet Function ADP    Platelet Function ADP    Urinalysis With Culture If Indicated -    Urinalysis With Culture If Indicated - Urine, Random Void    Type & Screen    Type & Screen    Urinalysis, Microscopic Only - Urine, Clean Catch     Urinalysis, Microscopic Only - Urine, Random Void    Manual Differential    Manual Differential    Urine Culture - Urine,    Urine Culture - Urine, Urine, Random Void    chlorhexidine (PERIDEX) 0.12 % solution 15 mL    ceFAZolin in dextrose (ANCEF) IVPB solution 2,000 mg    Ambulatory Referral to Cardiac Rehab    Cardiac Catheterization/Vascular Study    Cardiac Catheterization/Vascular Study      2. Abnormal finding of blood chemistry, unspecified  Hemoglobin A1c    Hemoglobin A1c      3. Chronic diastolic (congestive) heart failure  BNP    BNP      4. Abnormal coagulation profile  PTT    PTT             Plan:       Ms. Acosta is a 68 y.o. woman with past medical history notable for heart murmur since age 15, nonrheumatic aortic stenosis, COPD, diabetes, hypertension, prior tobacco use, and cervical spine stenoses with prior surgery, and recently diagnosed non-small cell lung cancer for which she is undergoing chemotherapy who presents for elective TAVR.  She underwent placement of a 23 mm sapient ultra aortic valve bioprostheses via right transfemoral approach.  Procedure was uncomplicated.  Postprocedure she had a lot of nausea.  She is also had a drop in her hemoglobin but no overt signs of bleeding at her access site or on echocardiogram.  She looks dry on her echocardiogram she did get some fluids yesterday might benefit from more hydration she did not eat much given severe nausea we will get a repeat a hemoglobin this morning to make sure that it is either better or at least stable like to see how she does today if her heart rate improves and feeling better may be home later today as echocardiogram shows normal valve function but still issues with tachycardia and nausea may need to monitor for another day to allow anesthesia to washout also would like to make sure hemoglobin slowly stable.    Status post TAVR:  Echocardiogram today shows normal valve function  LV somewhat hyperdynamic may benefit from  adding beta-blocker to therapy  Continue aspirin    Anemia:  Unclear etiology  We will repeat labs to see if spurious lab draw or if significant drop  There was some blood loss during the procedure but no evidence of any vascular access issues or bleeding internally    Nausea:  Improved this morning we will see how she does with eating    Essential hypertension:  Restart home medications we will also add metoprolol to help out with heart rate and blood pressure           Pan Mendoza MD  [facility]  08/02/23  07:53 EDT

## 2023-08-02 NOTE — PROGRESS NOTES
" LOS: 1 day   Patient Care Team:  Daynelle Brush MD as PCP - General (Pediatrics)  Carmelo Lombardi Jr., MD as Consulting Physician (Hematology and Oncology)  Justin Diaz Jr., MD as Referring Physician (Pulmonary Disease)  Ajnana Garcia, RN as Nurse Navigator  Hawk Zimmerman MD as Consulting Physician (Radiation Oncology)  Pan Mendoza MD as Consulting Physician (Cardiology)    Chief Complaint: post op TAVR    Subjective:  Symptoms:  No shortness of breath, cough or chest pain.    Diet:  Poor intake.  She reports  nausea and vomiting.    Pain:  She complains of pain that is mild.  Pain is well controlled.      C/o neck pain, which is chronic for her    Vital Signs  Temp:  [97.5 øF (36.4 øC)-98.9 øF (37.2 øC)] 98.2 øF (36.8 øC)  Heart Rate:  [] 103  Resp:  [16-22] 20  BP: (110-186)/(55-95) 154/63  Body mass index is 39.76 kg/mý.    Intake/Output Summary (Last 24 hours) at 8/2/2023 0945  Last data filed at 8/2/2023 0800  Gross per 24 hour   Intake 500 ml   Output 850 ml   Net -350 ml     I/O this shift:  In: 120 [P.O.:120]  Out: -           08/01/23  0557 08/02/23  0745   Weight: 98.9 kg (218 lb 0.6 oz) 98 kg (216 lb 0.8 oz)       Objective:  General Appearance:  Comfortable and in no acute distress.    Vital signs: (most recent): Blood pressure 154/63, pulse 103, temperature 98.2 øF (36.8 øC), temperature source Oral, resp. rate 20, height 157 cm (61.81\"), weight 98 kg (216 lb 0.8 oz), SpO2 100 %.  Vital signs are normal.  No fever.    Output: Producing urine and no stool output.    Lungs:  Normal effort and normal respiratory rate.  There are decreased breath sounds.    Heart: Tachycardia.  Regular rhythm.    Abdomen: Abdomen is soft.  Bowel sounds are normal.     Extremities: There is dependent edema.    Pulses: Distal pulses are intact.    Neurological: Patient is alert and oriented to person, place and time.    Skin:  Warm and dry.  (Bilateral groin sites clean and dry, with mild " ecchymosis)        Results Review:        WBC WBC   Date Value Ref Range Status   08/02/2023 9.98 3.40 - 10.80 10*3/mm3 Final   08/02/2023 9.83 3.40 - 10.80 10*3/mm3 Final      HGB Hemoglobin   Date Value Ref Range Status   08/02/2023 8.4 (L) 12.0 - 15.9 g/dL Final   08/02/2023 8.8 (L) 12.0 - 15.9 g/dL Final   08/01/2023 11.9 (L) 12.0 - 17.0 g/dL Final      HCT Hematocrit   Date Value Ref Range Status   08/02/2023 24.1 (L) 34.0 - 46.6 % Final   08/02/2023 26.2 (L) 34.0 - 46.6 % Final   08/01/2023 35 (L) 38 - 51 % Final      Platelets Platelets   Date Value Ref Range Status   08/02/2023 123 (L) 140 - 450 10*3/mm3 Final   08/02/2023 120 (L) 140 - 450 10*3/mm3 Final        PT/INR:  No results found for: PROTIME/No results found for: INR    Sodium Sodium   Date Value Ref Range Status   08/02/2023 136 136 - 145 mmol/L Final      Potassium Potassium   Date Value Ref Range Status   08/02/2023 3.6 3.5 - 5.2 mmol/L Final      Chloride Chloride   Date Value Ref Range Status   08/02/2023 101 98 - 107 mmol/L Final      Bicarbonate CO2   Date Value Ref Range Status   08/02/2023 20.5 (L) 22.0 - 29.0 mmol/L Final      BUN BUN   Date Value Ref Range Status   08/02/2023 17 8 - 23 mg/dL Final      Creatinine Creatinine   Date Value Ref Range Status   08/02/2023 0.71 0.57 - 1.00 mg/dL Final      Calcium Calcium   Date Value Ref Range Status   08/02/2023 9.3 8.6 - 10.5 mg/dL Final      Magnesium No results found for: MG       amLODIPine, 5 mg, Oral, Daily  buPROPion XL, 300 mg, Oral, Daily  escitalopram, 10 mg, Oral, Daily  levothyroxine, 150 mcg, Oral, Daily  losartan, 100 mg, Oral, Nightly  metoprolol tartrate, 25 mg, Oral, Q12H      sodium chloride, 100 mL/hr, Last Rate: 100 mL/hr (08/02/23 7264)      Assessment & Plan  - severe aortic valve stenosis s/p TAVR POD#1 Jacob/Reji  - chronic diastolic congestive heart failure  - DM II  - COPD with former tobacco use  - hypertension  - non-small cell lung cancer with current  chemotherapy  - cervical spondylosis  - post op anemia    With severe nausea overnight. Minimal oral intake as a result. She is tachycardic this morning and looks to be dry on her echo. IVF ordered per cardiology  Tachycardic and hypertensive. Agree with addition of metoprolol  Hgb was down this morning to 8.8. Will repeat. No over signs of bleeding on exam  Post operative echocardiogram reviewed by cardiology and showed well seated/functioning TAVR valve  Weaned to RA and tolerating  Mobilize as able  We will re-evaluate patient this afternoon. Possible d/c later today or tomorrow pending progress  Continue routine care    CHENG Murrieta  08/02/23  09:45 EDT

## 2023-08-07 ENCOUNTER — TELEPHONE (OUTPATIENT)
Dept: ONCOLOGY | Facility: CLINIC | Age: 68
End: 2023-08-07

## 2023-08-07 NOTE — TELEPHONE ENCOUNTER
"  Caller: Gabby Acosta \"GALVAN\"    Relationship: Self    Best call back number: [phone]    What is the best time to reach you: ANYTIME    Who are you requesting to speak with (clinical staff, provider,  specific staff member): SCHEDULING     What was the call regarding: PT IS SCHEDULED FOR PORT FLUSH, F/U AND INFUSION ON 8-15 STARTING AT 7:30, SHE NEEDS TO SEE IF SHE CAN R/S THESE APPOINTMENTS ON THE SAME DAY BUT TO START THEM AT 8:45 RATHER THAN 7:30. PLEASE CALL TO ADVISE    Is it okay if the provider responds through MyChart: N/A          "

## 2023-08-08 ENCOUNTER — OFFICE VISIT (OUTPATIENT)
Dept: CARDIOLOGY | Facility: CLINIC | Age: 68
End: 2023-08-08
Payer: MEDICARE

## 2023-08-08 ENCOUNTER — HOSPITAL ENCOUNTER (OUTPATIENT)
Dept: CARDIOLOGY | Facility: HOSPITAL | Age: 68
Discharge: HOME | End: 2023-08-08
Payer: MEDICARE

## 2023-08-08 ENCOUNTER — LAB (OUTPATIENT)
Dept: LAB | Facility: HOSPITAL | Age: 68
End: 2023-08-08
Payer: MEDICARE

## 2023-08-08 VITALS
DIASTOLIC BLOOD PRESSURE: 68 MMHG | HEIGHT: 62 IN | HEART RATE: 67 BPM | SYSTOLIC BLOOD PRESSURE: 110 MMHG | BODY MASS INDEX: 41.77 KG/M2 | WEIGHT: 227 LBS

## 2023-08-08 DIAGNOSIS — I50.32 CHRONIC DIASTOLIC CONGESTIVE HEART FAILURE: ICD-10-CM

## 2023-08-08 DIAGNOSIS — I72.9 PSEUDOANEURYSM FOLLOWING PROCEDURE: ICD-10-CM

## 2023-08-08 DIAGNOSIS — T81.718A PSEUDOANEURYSM FOLLOWING PROCEDURE: ICD-10-CM

## 2023-08-08 DIAGNOSIS — T14.8XXA HEMATOMA: ICD-10-CM

## 2023-08-08 DIAGNOSIS — T88.8XXA OTHER SPECIFIED COMPLICATIONS OF SURGICAL AND MEDICAL CARE, NOT ELSEWHERE CLASSIFIED, INITIAL ENCOUNTER: ICD-10-CM

## 2023-08-08 DIAGNOSIS — Z95.2 S/P TAVR (TRANSCATHETER AORTIC VALVE REPLACEMENT): Primary | ICD-10-CM

## 2023-08-08 DIAGNOSIS — Z95.2 S/P TAVR (TRANSCATHETER AORTIC VALVE REPLACEMENT): ICD-10-CM

## 2023-08-08 DIAGNOSIS — S30.1XXA HEMATOMA OF GROIN, INITIAL ENCOUNTER: ICD-10-CM

## 2023-08-08 DIAGNOSIS — I35.0 NONRHEUMATIC AORTIC VALVE STENOSIS: ICD-10-CM

## 2023-08-08 DIAGNOSIS — D62 ACUTE BLOOD LOSS ANEMIA: ICD-10-CM

## 2023-08-08 DIAGNOSIS — I10 PRIMARY HYPERTENSION: ICD-10-CM

## 2023-08-08 LAB
ANION GAP SERPL CALCULATED.3IONS-SCNC: 9.6 MMOL/L (ref 5–15)
BH CV LEFT GROIN PSA PROCEDURE SCRIPTING LRR: 1
BH CV VAS L EIA VELOCITY PSV: 119 CM/SEC
BUN SERPL-MCNC: 19 MG/DL (ref 8–23)
BUN/CREAT SERPL: 21.6 (ref 7–25)
CALCIUM SPEC-SCNC: 8.7 MG/DL (ref 8.6–10.5)
CHLORIDE SERPL-SCNC: 106 MMOL/L (ref 98–107)
CO2 SERPL-SCNC: 26.4 MMOL/L (ref 22–29)
CREAT SERPL-MCNC: 0.88 MG/DL (ref 0.57–1)
DEPRECATED RDW RBC AUTO: 46.5 FL (ref 37–54)
EGFRCR SERPLBLD CKD-EPI 2021: 72.1 ML/MIN/1.73
ERYTHROCYTE [DISTWIDTH] IN BLOOD BY AUTOMATED COUNT: 13.1 % (ref 12.3–15.4)
GLUCOSE SERPL-MCNC: 123 MG/DL (ref 65–99)
HCT VFR BLD AUTO: 21.4 % (ref 34–46.6)
HGB BLD-MCNC: 7.1 G/DL (ref 12–15.9)
LEFT GROIN CFA SYS: 126 CM/SEC
MCH RBC QN AUTO: 33 PG (ref 26.6–33)
MCHC RBC AUTO-ENTMCNC: 33.2 G/DL (ref 31.5–35.7)
MCV RBC AUTO: 99.5 FL (ref 79–97)
NT-PROBNP SERPL-MCNC: 2513 PG/ML (ref 0–900)
PLATELET # BLD AUTO: 123 10*3/MM3 (ref 140–450)
PMV BLD AUTO: 10.3 FL (ref 6–12)
POTASSIUM SERPL-SCNC: 3.8 MMOL/L (ref 3.5–5.2)
PROX PFA PSV LEFT: 70.3 CM/SEC
PROX SFA PSV LEFT: 98.4 CM/SEC
RBC # BLD AUTO: 2.15 10*6/MM3 (ref 3.77–5.28)
SODIUM SERPL-SCNC: 142 MMOL/L (ref 136–145)
WBC NRBC COR # BLD: 5.6 10*3/MM3 (ref 3.4–10.8)

## 2023-08-08 PROCEDURE — 80048 BASIC METABOLIC PNL TOTAL CA: CPT

## 2023-08-08 PROCEDURE — 84466 ASSAY OF TRANSFERRIN: CPT

## 2023-08-08 PROCEDURE — 83540 ASSAY OF IRON: CPT

## 2023-08-08 PROCEDURE — 93926 LOWER EXTREMITY STUDY: CPT

## 2023-08-08 PROCEDURE — 36415 COLL VENOUS BLD VENIPUNCTURE: CPT

## 2023-08-08 PROCEDURE — 85027 COMPLETE CBC AUTOMATED: CPT

## 2023-08-08 PROCEDURE — 83880 ASSAY OF NATRIURETIC PEPTIDE: CPT

## 2023-08-08 NOTE — PROGRESS NOTES
[facility] Follow Up Office Note     Encounter Date:23  Patient:Gabby Acosta  :1955  MRN:[id number]      Chief Complaint:   Chief Complaint   Patient presents with    1 week post TAVR         History of Presenting Illness:        Gabby Acosta is a 68 y.o. female who is here for follow-up.  She is a patient of Dr Mendoza.    Patient has past medical history that is significant for nonrheumatic aortic stenosis, COPD, diabetes, hypertension, prior tobacco use and cervical spine stenosis with prior surgery.  Unfortunately, she was recently diagnosed with non-small cell lung cancer and is currently undergoing chemotherapy.    Patient had recent planned transfemoral TAVR procedure with 23 mm GILDARDO valve.  Postprocedure she had a lot of nausea and a drop in her hemoglobin with no overt bleeding.  Postprocedure echocardiogram showed normal valve function.  She was discharged on postop day 1.      Today patient reports she has more swelling in her lower extremities since hospital discharge, particularly in the left leg with bruising that is extended down her leg.  She does not have pain at her left groin site.  She has not noted any active bleeding.  Additionally, she is having more shortness of breath that she associates with allergies.  This improves with taking Flonase and her inhaler.  She usually takes Benadryl but she did not know if she could still take this with her heart.  She does not have palpitations, dizziness, chest pain.  She has some issues related to chronic pain that she says is from fibromyalgia.    Review of Systems:  Review of Systems   Cardiovascular:  Positive for dyspnea on exertion and leg swelling. Negative for chest pain, orthopnea and palpitations.   Respiratory:  Negative for shortness of breath.    Hematologic/Lymphatic: Bruises/bleeds easily.     Current Outpatient Medications on File Prior to Visit   Medication Sig Dispense Refill    albuterol sulfate  (90  Base) MCG/ACT inhaler Inhale 2 puffs Every 4 (Four) Hours As Needed for Wheezing. 8 g 2    amLODIPine (NORVASC) 5 MG tablet Take 1 tablet by mouth Daily.      aspirin 81 MG EC tablet Take 1 tablet by mouth Daily for 90 days. 30 tablet 2    buPROPion XL (WELLBUTRIN XL) 300 MG 24 hr tablet Take 1 tablet by mouth Daily.      escitalopram (LEXAPRO) 10 MG tablet Take 1 tablet by mouth Daily.      fluticasone (FLONASE) 50 MCG/ACT nasal spray 2 sprays into the nostril(s) as directed by provider As Needed.      gabapentin (Neurontin) 300 MG capsule Take 1 capsule by mouth 3 (Three) Times a Day As Needed (as directed).      guaiFENesin (MUCINEX) 600 MG 12 hr tablet Take 1 tablet by mouth Daily As Needed for Congestion.      hydrOXYzine (ATARAX) 10 MG tablet Take 0.5 tablets by mouth As Needed for Itching or Anxiety.      levothyroxine (SYNTHROID, LEVOTHROID) 150 MCG tablet Take 1 tablet by mouth Daily. 30 tablet 2    lidocaine-prilocaine (EMLA) 2.5-2.5 % cream Apply maria g-sized amount to Mediport site 30 min prior to port access. Do not rub in. (Patient taking differently: Apply  topically to the appropriate area as directed As Needed. Apply maria g-sized amount to Mediport site 30 min prior to port access. Do not rub in.) 30 g 2    losartan (COZAAR) 100 MG tablet Take 1 tablet by mouth Every Night.      lovastatin (MEVACOR) 40 MG tablet Take 1 tablet by mouth Every Night.      metoprolol tartrate (LOPRESSOR) 25 MG tablet Take 1 tablet by mouth Every 12 (Twelve) Hours for 90 days. 60 tablet 2    ondansetron (ZOFRAN) 8 MG tablet Take 1 tablet by mouth Every 8 (Eight) Hours As Needed for Nausea or Vomiting. 30 tablet 2    pyridoxine (VITAMIN B-6) 100 MG tablet Take 1 tablet by mouth Daily.      vitamin D3 125 MCG (5000 UT) capsule capsule Take 1 capsule by mouth Daily.      diphenhydrAMINE (BENADRYL) 25 mg capsule Take 1 capsule by mouth Every 6 (Six) Hours As Needed for Allergies.       No current facility-administered  "medications on file prior to visit.       No Known Allergies    Past Medical History:   Diagnosis Date    Anxiety     Cervical spondylosis with myelopathy     Cervical stenosis of spine     COPD (chronic obstructive pulmonary disease)     Diabetes mellitus     Fibromyalgia     Hepatic steatosis     Hyperlipidemia     Hypertension     Hypothyroidism     Lung cancer 02/2023    Stage IIIA non-small cell lung cancer - LAST RADIATION AND CHEMO 5/2023, CURRENT IMMUNOTHERAPY - FOLLWOED BY [facility]    Osteoarthritis     Severe anxiety about anesthesia and home medications     PT STATES HAS HALLUCINATIONS, SEVERE ANXIETY, AND \"BLACK OUT\" EPISODES REGARDING ANESTHESIA - STATES WORKING WITH A THERAPIST    Severe aortic stenosis     Slow to wake up after anesthesia        Past Surgical History:   Procedure Laterality Date    ANTERIOR CHANNEL VERTEBRECTOMY/CORPECTOMY N/A 09/20/2022    Procedure: Anterior cervical corpectomy, cervical four/five/six, with cage and plate from cervical three to cervical seven;  Surgeon: David Cheung MD;  Location: [facility] OR;  Service: Neurosurgery;  Laterality: N/A;    CARDIAC CATHETERIZATION N/A 02/14/2023    Procedure: Right and Left Heart Cath;  Surgeon: Kostas David MD;  Location: [facility] CATH INVASIVE LOCATION;  Service: Cardiovascular;  Laterality: N/A;    CARDIAC CATHETERIZATION N/A 02/14/2023    Procedure: Coronary angiography;  Surgeon: Kostas David MD;  Location: [facility] CATH INVASIVE LOCATION;  Service: Cardiovascular;  Laterality: N/A;    CARDIAC CATHETERIZATION N/A 02/14/2023    Procedure: Left ventriculography;  Surgeon: Kostas David MD;  Location: [facility] CATH INVASIVE LOCATION;  Service: Cardiovascular;  Laterality: N/A;    CARDIAC CATHETERIZATION N/A 02/14/2023    Procedure: Resting Full Cycle Ratio;  Surgeon: Kostas David MD;  Location: [facility] CATH INVASIVE LOCATION;  Service: Cardiovascular;  Laterality: N/A;    CATARACT EXTRACTION " "Bilateral      SECTION      X1    CHOLECYSTECTOMY      VENOUS ACCESS DEVICE (PORT) INSERTION Left 2023    Procedure: INSERTION OF PORTACATH;  Surgeon: Collin Shook MD;  Location: [facility];  Service: General;  Laterality: Left;       Social History     Socioeconomic History    Marital status:    Tobacco Use    Smoking status: Former     Packs/day: 1.00     Years: 45.00     Pack years: 45.00     Types: Cigarettes     Quit date: 2021     Years since quittin.7    Smokeless tobacco: Never    Tobacco comments:     Caffeine use    Vaping Use    Vaping Use: Never used   Substance and Sexual Activity    Alcohol use: Not Currently    Drug use: Yes     Types: Marijuana     Comment: GUMMIES ON OCCASION    Sexual activity: Yes       Family History   Problem Relation Age of Onset    Cancer Father     Heart disease Brother     Hypertension Brother     Diabetes Brother     Cancer Brother     Diabetes Maternal Grandmother     Cancer Paternal Grandmother     Cancer Paternal Grandfather     Malig Hyperthermia Neg Hx        The following portions of the patient's history were reviewed and updated as appropriate: allergies, current medications, past family history, past medical history, past social history, past surgical history and problem list.       Objective:       Vitals:    23 1201   BP: 110/68   BP Location: Left arm   Patient Position: Sitting   Pulse: 67   Weight: 103 kg (227 lb)   Height: 157 cm (61.81\")         Physical Exam:  Constitutional: Well appearing, well developed, no acute distress   HENT: Oropharynx clear and membrane moist  Eyes: Normal conjunctiva, no sclera icterus  Neck: Supple, no carotid bruit bilaterally  Cardiovascular: Regular rate and rhythm, Early peaking systolic murmur over the right upper sternal border, + bilateral DORCAS worse in left leg  Pulmonary: Normal respiratory effort, normal lung sounds, no wheezing  Neurological: Alert and orient x 3  Skin: " Warm, dry, no ecchymosis, no rash  Psych: Appropriate mood and affect. Normal judgment and insight         Lab Results   Component Value Date     08/08/2023     08/02/2023    K 3.8 08/08/2023    K 3.6 08/02/2023     08/08/2023     08/02/2023    CO2 26.4 08/08/2023    CO2 20.5 (L) 08/02/2023    BUN 19 08/08/2023    BUN 17 08/02/2023    CREATININE 0.88 08/08/2023    CREATININE 0.71 08/02/2023    GLUCOSE 123 (H) 08/08/2023    GLUCOSE 215 (H) 08/02/2023    CALCIUM 8.7 08/08/2023    CALCIUM 9.3 08/02/2023    ALBUMIN 3.9 07/28/2023    ALBUMIN 3.7 07/18/2023    BILITOT 0.4 07/28/2023    BILITOT 0.2 07/18/2023    AST 17 07/28/2023    AST 23 07/18/2023    ALT 13 07/28/2023    ALT 9 07/18/2023     Lab Results   Component Value Date    WBC 5.60 08/08/2023    WBC 9.98 08/02/2023    HGB 7.1 (L) 08/08/2023    HGB 8.4 (L) 08/02/2023    HCT 21.4 (L) 08/08/2023    HCT 24.1 (L) 08/02/2023    MCV 99.5 (H) 08/08/2023    MCV 95.3 08/02/2023     (L) 08/08/2023     (L) 08/02/2023     Lab Results   Component Value Date    TRIG 154 (H) 08/04/2020    TRIG 212 (H) 05/24/2019    HDL 54 08/04/2020    HDL 49 (L) 05/24/2019     (H) 08/04/2020     05/24/2019     Lab Results   Component Value Date    PROBNP 227.0 07/28/2023    PROBNP 331.0 01/12/2023     No results found for: CKTOTAL, CKMB, CKMBINDEX, TROPONINI, TROPONINT  Lab Results   Component Value Date    TSH 0.313 07/18/2023    TSH 10.500 (H) 05/30/2023           ECG 12 Lead    Date/Time: 8/8/2023 12:15 PM  Performed by: Adela Villaseñor APRN  Authorized by: Adela Villaseñor APRN   Comparison: compared with previous ECG from 8/2/2023  Similar to previous ECG  Rhythm: sinus rhythm  Ectopy: multifocal PVCs  Rate: normal  BPM: 67  Other findings: non-specific ST-T wave changes           Assessment:          Diagnosis Plan   1. S/P TAVR (transcatheter aortic valve replacement)  ECG 12 Lead      2. Chronic diastolic congestive heart failure   proBNP      3. Hematoma        4. Pseudoaneurysm following procedure        5. Hematoma of groin, initial encounter  Duplex Pseudoaneurysm CAR      6. Other specified complications of surgical and medical care, not elsewhere classified, initial encounter  Duplex Pseudoaneurysm CAR      7. Nonrheumatic aortic valve stenosis        8. Primary hypertension               Plan:       Nonrheumatic aortic stenosis status post TAVR -23 mm GILDARDO valve on 8/2/2023.   Today she has bruising in left groin extending down to left calf.  There is no obvious hematoma.  Her hemoglobin has continued to downtrend to 7.1 and I ordered left groin pseudoaneurysm duplex  Essential hypertension -blood pressure is controlled on current regimen  Coronary artery disease -moderate LAD disease on left heart catheterization 12/2023.  Continue aspirin, statin, beta-blocker  Heart murmur -this seems more consistent with a flow murmur.  She had a increased outflow tract gradient on most recent echocardiogram.  Heart rate controlled on metoprolol    Patient is seen today for post TAVR follow-up.  Due to anemia and extensive groin bruising a left groin pseudoaneurysm duplex was ordered.  We will follow-up on results of these with further recommendations.  Without significant findings would start iron supplement and repeat labs in about 3 days.  She has some swelling on exam.  proBNP added to her labs.  Follow-up with Dr. Mendoza as scheduled, earlier with problems.    Orders Placed This Encounter   Procedures    proBNP     Standing Status:   Future     Number of Occurrences:   1     Standing Expiration Date:   8/8/2024    ECG 12 Lead     This order was created via procedure documentation            CHENG Caban  [facility]  08/08/23  13:24 EDT

## 2023-08-09 ENCOUNTER — READMISSION MANAGEMENT (OUTPATIENT)
Dept: CALL CENTER | Facility: HOSPITAL | Age: 68
End: 2023-08-09
Payer: MEDICARE

## 2023-08-09 DIAGNOSIS — D62 ACUTE BLOOD LOSS ANEMIA: ICD-10-CM

## 2023-08-09 DIAGNOSIS — I50.32 CHRONIC DIASTOLIC CONGESTIVE HEART FAILURE: Primary | ICD-10-CM

## 2023-08-09 LAB
IRON 24H UR-MRATE: 78 MCG/DL (ref 37–145)
IRON SATN MFR SERPL: 22 % (ref 20–50)
TIBC SERPL-MCNC: 350 MCG/DL (ref 298–536)
TRANSFERRIN SERPL-MCNC: 235 MG/DL (ref 200–360)

## 2023-08-09 RX ORDER — POTASSIUM CHLORIDE 750 MG/1
10 TABLET, FILM COATED, EXTENDED RELEASE ORAL DAILY
Qty: 30 TABLET | Refills: 11 | Status: SHIPPED | OUTPATIENT
Start: 2023-08-09

## 2023-08-09 RX ORDER — FUROSEMIDE 40 MG/1
40 TABLET ORAL DAILY
Qty: 30 TABLET | Refills: 11 | Status: SHIPPED | OUTPATIENT
Start: 2023-08-09

## 2023-08-09 RX ORDER — FERROUS SULFATE 325(65) MG
325 TABLET ORAL
Qty: 30 TABLET | Refills: 3 | Status: SHIPPED | OUTPATIENT
Start: 2023-08-09

## 2023-08-09 NOTE — OUTREACH NOTE
CT Surgery Week 1 Survey      Flowsheet Row Responses   [facility] ([facility] patient discharged from? [place]   Does the patient have one of the following disease processes/diagnoses(primary or secondary)? Cardiothoracic surgery   Week 1 attempt successful? Yes   Call start time 0855   Call end time [phone]   Discharge diagnosis severe aortic valve stenosis s/p TAVR   Meds reviewed with patient/caregiver? Yes   Is the patient having any side effects they believe may be caused by any medication additions or changes? No   Does the patient have all medications related to this admission filled (includes all antibiotics, pain medications, cardiac medications, etc.) Yes   Is the patient taking all medications as directed (includes completed medication regime)? Yes   Medication comments States the surgeon added an iron supplement and diurectics.   Does the patient have a primary care provider?  Yes   Does the patient have an appointment scheduled with their C/T surgeon? Yes   Comments regarding PCP Wanting to find a new [facility] PCP.  Phone # given for Patient Connect [facility]   Has the patient kept scheduled appointments due by today? Yes   Comments Had follow up with surgeon yesterday   Psychosocial issues? No   Did the patient receive a copy of their discharge instructions? Yes   Nursing interventions Reviewed instructions with patient   What is the patient's perception of their health status since discharge? Improving   Is the patient /caregiver able to teach back basic post-op care? Practice cough and deep breath every 4 hours while awake, Continue use of incentive spirometry at least 1 week post discharge, Hold pillow to support chest when coughing, Drive as instructed by MD in discharge instructions, No tub bath, swimming, or hot tub until instructed by MD, Use a clean wash cloth and antibacterial bar or liquid soap to clean incisions, Lifting as instructed by MD in discharge instructions   Is the patient/caregiver able to  teach back signs and symptoms of incisional infection? Fever, Pus or odor from incision, Incisional warmth, Increased redness, swelling or pain at the incisonal site, Increased drainage or bleeding   Is the patient/caregiver able to teach back steps to recovery at home? Rest and rebuild strength, gradually increase activity, Set small, achievable goals for return to baseline health, Eat a well-balance diet   If the patient is a current smoker, are they able to teach back resources for cessation? Not a smoker   Is the patient/caregiver able to teach back the hierarchy of who to call/visit for symptoms/problems? PCP, Specialist, Home health nurse, Urgent Care, ED, 911 Yes   Additional teach back comments States she is doing well.  Had ultrasound of heart yesterday and was told everything looked good.   Week 1 call completed? Yes   Graduated Yes   Graduated/Revoked comments Pt to make appt with new PCP.  No other needs or questions at this time.   Call end time [phone]              Karyn STOKES - Licensed Nurse

## 2023-08-09 NOTE — PROGRESS NOTES
Spoke to patient. Normal pseudoaneurysm ultrasound.  Recommend oral iron supplement  Start 40mg Lasix and potassium supplement

## 2023-08-14 NOTE — PROGRESS NOTES
"Chief Complaint  Non-small cell lung cancer, clinical stage IIIA ([id number]), aortic stenosis, cervical spine stenosis, COPD, hepatic steatosis, diabetes mellitus    Subjective        History of Present Illness  Patient returns today in follow-up due to continue on treatment with durvalumab, due for her fifth cycle of therapy today after switching to every 4-week dosing with her fourth treatment.  In the interim since her last visit, she did undergo TAVR on 8/1/2023.  She tolerated this very well.  She has not experienced any significant difficulties apart from ecchymosis that has tracked down her left leg from her groin.  She does note some ongoing mild sinus congestion.  She reports that her respiratory status is stable.  She overall has been more active at home, still requires a wheelchair for ambulating longer distances but is able to perform normal daily household activities more easily.  She did develop significant decline in her hemoglobin around the time of the procedure with hemoglobin that decreased to 8.8 on 8/2/2023 and 7.1 on 8/8/2023 when she returned in follow-up.  Additional labs at that time showed iron 78, iron saturation normal at 22%, TIBC normal at 350.  She was placed on oral iron and is experiencing some mild constipation from this.        Objective   Vital Signs:   /68   Pulse 64   Temp 97.3 øF (36.3 øC) (Temporal)   Resp 18   Ht 157 cm (61.81\")   Wt 100 kg (220 lb 6.4 oz)   SpO2 92%   BMI 40.56 kg/mý     Physical Exam  Constitutional:       Appearance: She is well-developed.      Comments: Patient is using a wheelchair today   Eyes:      Conjunctiva/sclera: Conjunctivae normal.   Neck:      Thyroid: No thyromegaly.   Cardiovascular:      Rate and Rhythm: Normal rate and regular rhythm.      Heart sounds: No murmur heard.    No friction rub. No gallop.   Pulmonary:      Effort: No respiratory distress.      Breath sounds: Normal breath sounds.      Comments: A few scattered " bibasilar rhonchi.  Abdominal:      General: Bowel sounds are normal. There is no distension.      Palpations: Abdomen is soft.      Tenderness: There is no abdominal tenderness.   Musculoskeletal:      Comments: Trace bilateral lower extremity edema, left slightly greater than right with evidence of ecchymosis tracking distally in the left leg   Lymphadenopathy:      Head:      Right side of head: No submandibular adenopathy.      Cervical: No cervical adenopathy.      Upper Body:      Right upper body: No supraclavicular adenopathy.      Left upper body: No supraclavicular adenopathy.   Skin:     General: Skin is warm and dry.      Findings: No rash.   Neurological:      Mental Status: She is alert and oriented to person, place, and time.      Cranial Nerves: No cranial nerve deficit.      Motor: No abnormal muscle tone.      Deep Tendon Reflexes: Reflexes normal.   Psychiatric:         Behavior: Behavior normal.         Result Review : Reviewed CBC, CMP from today.  Reviewed records from TAVR procedure.  Reviewed additional labs including iron panel from 8/8/2023.       Assessment and Plan     *Non-small cell lung cancer, clinical stage IIIA ([id number])  Patient has history of smoking 1 pack/day x 45 years, quit in November 2021.    Following anterior cervical corpectomy in September 2022, she experienced acute hypoxemic respiratory failure.    CT angiogram chest 9/25/2022 showed a concerning 1.4 cm left lower lobe pulmonary nodule, mediastinal lymphadenopathy with infracarinal lymph node 2.7 cm, left hilar lymph node 1.2 cm, small right hilar lymph nodes up from 1.1 cm and additional enlarged mediastinal nodes in the right paratracheal, precarinal, infracarinal spaces.  There was a wedge-shaped area of consolidation/atelectasis in the right middle lobe base, subpleural density left costophrenic sulcus felt to represent atelectasis.  It was recommended for her to undergo subsequent PET scan by pulmonary.  The  patient has had ongoing follow-up with neurosurgery and on CT scans 11/29/2022 of cervical and thoracic spine, there was concern regarding spinal instability with potential need for posterior cervical fusion.  This was scheduled in December 2022 however was delayed due to upper respiratory infection.  Patient admitted on 1/10/2023 to address multiple medical issues in order to prepare for potential neurosurgical procedure on 3/12/2023.  Patient underwent echocardiogram on 1/11/2023 with ejection fraction greater than 70%, grade 1 diastolic dysfunction, severe aortic stenosis.  Patient is being followed by cardiology with consideration of need for TAVR versus SAVR for moderate to severe aortic stenosis.  Repeat CT chest on 1/12/2023 showed slight increase in size of the left lower lobe pulmonary nodule increased from 1.4 to 1.5 cm.  Groundglass nodule peripheral left lower lobe less conspicuous and resolution of previous reticulonodular opacities right lower lobe.  There was further enlargement of mediastinal lymph nodes with right posterior paratracheal lymph node 1.4 x 2.1 up to 1.5 x 2.3 cm, subcarinal lymph node increased from 2.7 x 5.2 up to 3.2 x 5.8 cm, left hilar lymph node increased from 1.2 up to 1.4 cm, and is stable 1.7 cm left infrahilar lymph node.  There was also comment regarding hepatic steatosis with changes of chronic liver disease and stable indeterminate nodular thickening of the adrenal glands.  CT-guided lung biopsy on 1/13/2023 with pathology that showed invasive poorly differentiated non-small cell carcinoma with focal neuroendocrine differentiation.  Staining pattern and morphology favor poorly differentiated adenocarcinoma however there was focal CD56 staining, could not entirely exclude large cell neuroendocrine carcinoma.  PD-L1 35%, Caris analysis pending.  It was felt that there would be increased risk for bronchoscopy from a cardiac standpoint due to her aortic stenosis.  Staging MRI  brain 1/29/2023 showed no evidence of metastatic disease  Staging PET scan 1/26/2023 showed hypermetabolic left lower lobe pulmonary nodule, size difficult to determine on PET however on recent CT was 1.6 cm (SUV 14.4).  Mediastinal and left hilar hypermetabolic lymphadenopathy (subcarinal lymph node 3.2 cm, SUV 31.7, left paratracheal 2.2 cm lymph node SUV 12.7).  Sub-6 mm pulmonary nodule left upper lobe unchanged and below PET resolution.  Focal uptake posterior right hepatic lobe SUV 7.2 of uncertain significance.  Recommended MRI to evaluate.  Right thyroid activity SUV 5.3, recommended ultrasound.  Long segment uptake distal esophagus consistent with esophagitis.  Uptake posterior larynx, nonspecific, recommended direct visualization.  MRI abdomen 2/6/2023 with no evidence to suggest metastatic disease in the liver.  Subcentimeter nonenhancing lesions favor cyst or hemangiomas.  Patient underwent right and left heart catheterization with cardiology on 2/14/2023.  Identification of coronary artery disease, mild pulmonary hypertension, severe aortic stenosis.  Recommendation per cardiology to proceed with treatment for lung cancer and defer any intervention regarding aortic stenosis.  Concern regarding underlying neurologic issues from cervical stenosis and potential neuropathic effects from Taxol chemotherapy.  Concern regarding patient's overall performance status and ability to tolerate full dose chemotherapy.  Patient discussed at thoracic oncology tumor board with consensus to treat with weekly low-dose carboplatin concurrent with radiation therapy and omit Taxol.  Subsequent plan for 1 year durvalumab following concurrent chemoradiation.  Significant delay in initiating concurrent chemoradiation due to logistics with scheduling Mediport placement, patient canceled appointments and delayed initiation of treatment.  On 3/21/2023 initiated concurrent chemoradiation with weekly carboplatin (AUC 2) on  2/28/2023.  Week 4 carboplatin held 4/11 and again 4/18/2023 due to thrombocytopenia  Week 4 carboplatin resumed on 4/25/2023 at reduced dose to AUC 1 due to borderline neutropenia  Chemo and radiation held beginning 5/2/2023 due to neutropenia with WBC 1.13, ANC 0.53  Resumed radiation therapy on 5/8/2023 with recovery of WBC.  Received fifth and final weekly dose of concurrent carboplatin (AUC 1) on 5/9/2023.  Patient completed radiation therapy on 5/12/2023.  CT chest abdomen pelvis 5/25/2023 with significant improvement in mediastinal and left hilar lymphadenopathy, decrease in left lower lobe nodule indicating significant response.  On 5/30/2023 initiated treatment with durvalumab 10 mg/kg every 2 weeks x 1 year.  Transitioned to durvalumab 1500 mg every 4-week dosing with cycle 4 on 7/18/2023.  TAVR CT chest abdomen pelvis 7/21/2023 with decreased left lower lobe nodule from 8 down to 6 mm, stable mediastinal lymph nodes (with the exception of 1 small superior mediastinal lymph node increased from 4 to 7 mm of unclear significance).  Upper abdominal lymph nodes with slight increase in the periportal, hepatogastric, peripancreatic regions.  Patient returns today in follow-up due for cycle 5 durvalumab having transitioned to every 4-week dosing with cycle 4.  In the interval she did undergo TAVR on 8/1/2023 and appears to have done well with the procedure.  She did develop significant decline in her hemoglobin following this with decline to 7.1 on 8/8/2023.  This will be discussed further below.  We are sending off anemia evaluation today, does not appear that she is iron deficient and she is having constipation from the oral iron that was started on 8/8/2023 and we will discontinue this.  She appears to be tolerating immunotherapy very well with no significant side effects.  We did review the CT scan performed prior to her TAVR from 7/21/2023 which showed decrease in the left lower lobe nodule, stable  mediastinal lymph nodes with the exception of 1 small lymph node in the superior mediastinum that increased slightly from 4 up to 7 mm of uncertain significance.  There was a slight increase in some small.  Portal, hepatogastric, peripancreatic lymph nodes of unclear significance as well.  We will proceed on with her treatment today as planned with cycle 5 durvalumab and in 3 weeks she will undergo follow-up PET scan as we are now 3 months out from completion of her concurrent chemoradiation.  I will see her in 4 weeks when she is due for cycle 6 devalue Mab and we will review the PET scan results.    *Aortic stenosis  Patient underwent echocardiogram on 1/11/2023 with ejection fraction greater than 70%, grade 1 diastolic dysfunction, severe aortic stenosis.    Patient followed by cardiology with consideration of need for TAVR versus SAVR for moderate to severe aortic stenosis.  Patient underwent cardiac catheterization 2/14/2023 with identification of coronary artery disease, mild pulmonary hypertension, severe aortic stenosis.  Per cardiology, recommendation to proceed with treatment for lung cancer and defer any consideration of intervention for aortic stenosis.  Patient did undergo TAVR on 8/1/2023  Patient scheduled for follow-up with cardiology including repeat echocardiogram on 9/6/2023     *Cervical spine stenosis  Patient with cervical spine stenosis with associated myelopathy.   She underwent surgery on 9/20/2022 with anterior cervical corpectomy with cage.      The patient has had ongoing follow-up with neurosurgery and on CT scans 11/29/2022 of cervical and thoracic spine, there was concern regarding spinal instability with potential need for posterior cervical fusion.  This was scheduled in December 2022 however was delayed due to upper respiratory infection.  Patient admitted on 1/10/2023 to address multiple medical issues in order to prepare for potential neurosurgical procedure on 3/12/2023.  Patient  was previously having severe symptoms related to her cervical stenosis with reduced ability to ambulate previously and significant limitations in movement in her hands, dropping objects.  More recently however she has improved and has been able to walk with the use of a walker, symptoms in the upper extremities have improved and her dexterity is better.    It appears that her neurosurgical procedure will need to be delayed until we can at least complete the concurrent chemoradiation portion of her treatment.  Surgery would be feasible while continuing on immunotherapy in the future.  Neurosurgical follow-up has been placed on hold.  Patient notes that her mobility and symptoms in her extremities have continued to improve over time     *COPD  Patient has history of smoking 1 pack/day x 45 years, quit in November 2021.  Patient has not undergone formal PFTs  Patient being followed by pulmonary, currently using albuterol inhaler as needed     *Hepatic steatosis  Identified on prior scans  PET scan 1/26/2023 with questionable area of activity seen but no corresponding CT abnormality.    MRI abdomen 2/6/2023 with no evidence to suggest metastatic disease in the liver.  Subcentimeter nonenhancing lesions favor cyst or hemangiomas.  LFTs remain normal     *Diabetes mellitus    *Anxiety/depression  Patient reports feeling overwhelmed regarding the amount of information presented today in regards to her malignancy as well as her other medical issues.    Patient was seen by supportive oncology on 2/27/2023, added gabapentin 300 mg 3 times daily.  Patient continuing on Wellbutrin  mg daily  Patient notes that symptoms are under fair control currently.  Patient has continued follow-up with supportive oncology.  She has concern regarding potential delirium with upcoming anesthesia for TAVR.    *Right thyroid uptake on PET scan 1/26/2023  Patient was scheduled for thyroid ultrasound however did not feel that she was able to  undergo the procedure due to her neck issues and therefore was canceled, consider at future date  We will follow this up on PET scan in 3 weeks    *Laryngeal uptake on PET scan 1/26/2023  Patient was seen by ENT on 3/28/2023, laryngoscopy showed mild edema in the posterior glottis consistent with laryngeal pharyngeal reflux, no evidence of malignancy.    *Esophageal uptake on PET scan 1/26/2023  [place] to be consistent with esophagitis in the distal esophagus.  We will hold on referral for EGD given the multitude of additional procedures and consults required above.  We will follow this up on PET scan in 3 weeks    *Venous access  Mediport placed by Dr. Shook on 3/2/2023    *Nutrition/weight loss  Patient has been seen by oncology nutrition  Patient overall has lost a significant amount of weight however weight has subsequently stabilized in the past month or so.    *Radiation esophagitis  Patient is currently using Carafate 1 g 4 times daily  Prescription was sent for Magic mouthwash however patient cannot afford the medication  Symptoms stabilized with Carafate alone and subsequently resolved.     *Lower extremity edema  Patient developed exacerbation of chronic edema in the lower extremities, left slightly greater than right.    Bilateral lower extremity Doppler on 5/9/2023 was negative, showed bilateral popliteal fluid collections.  Patient with slight increase in left lower extremity edema today with evidence of hematoma tracking into the distal portion of her left leg after recent left groin access for TAVR procedure.    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  Patient notes that she is scheduled to be seen by vascular surgery on 8/18/2023.    *Hypothyroidism  Hypothyroidism managed by PCP Dr. Bassett Scales  Labs on 5/30/2023 prior to beginning immunotherapy with evidence of hypothyroidism with TSH 10.5, free T4 0.54.  Levothyroxine dose increased by  Dr. Brush from 125 up to 150 mcg daily  Continue to monitor thyroid function studies every 8 weeks on durvalumab  Thyroid function studies normal on 7/18/2023, due for recheck with next cycle in 4 weeks per protocol    *Chemotherapy-induced leukopenia  WBC today 5.0 with normal differential    *Chemotherapy-induced thrombocytopenia  Platelet count has gradually improved since completion of concurrent chemoradiation  Patient does have a mild degree of residual thrombocytopenia, platelet count today 119,000.  We will check B12 and folate studies today.    *Chemotherapy-induced anemia  Hemoglobin did decline into the high 10-low 11 range on concurrent chemoradiation  Hemoglobin subsequently improved and normalized as of 7/28/2023 with hemoglobin of 13.4  Patient underwent TAVR on 8/1/2023 with significant decline in hemoglobin thereafter, reached a low of 7.1 on 8/8/2023.  Additional labs with cardiology on 8/8/2023 with iron 78, iron saturation 22%, TIBC 350.  Patient was started on oral iron although no evidence of iron deficiency.  Patient returns today in follow-up with hemoglobin that is improved slightly up to 8.7.  She is having constipation from oral iron and there has been no evidence of iron deficiency.  We are repeating iron panel and ferritin today.  She will discontinue oral iron at this time.  We will check additional labs including B12 and folate and given her recent valvular issues we will check for hemolysis with LDH and haptoglobin.    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  Recommended CTA exam for more definitive evaluation.  Patient was referred to vascular surgery, had to postpone the visit   Patient is scheduled to be seen by vascular surgery on 8/18/2023.       PLAN:  Continue definitive treatment for stage IIIA non-small cell lung cancer.  Patient completed concurrent chemoradiation with weekly single agent  carboplatin (completed on 5/12/2023).  Patient is continuing on durvalumab single agent initiated 5/30/2023 with 10 mg/kg dose every 2 weeks.  With cycle 4 on 7/18/2023 transitioned to 1500 mg every 4 weeks.  Proceed with cycle 5 durvalumab today.  Patient continues on levothyroxine currently at 150 mcg daily managed by Dr. Brush.  We will continue monitoring TSH, free T4 every 8 weeks on immunotherapy (due next in 4 weeks).  Discontinue oral iron with no evidence of iron deficiency and exacerbation of constipation  Additional labs today with iron panel, ferritin, B12, folate, LDH, haptoglobin  Patient scheduled to be seen by vascular surgery on 8/18/2023.  In 3 weeks PET scan  In 4 weeks MD visit with CBC, CMP, TSH, free T4 and patient will be due for cycle 6 durvalumab pending PET scan results.      The patient is continuing on high risk medication requiring intensive monitoring    I did spend 40 minutes in total time caring for the patient today, time spent in review of records, face-to-face consultation, coordination of care, placement of orders, completion of documentation.

## 2023-08-15 ENCOUNTER — APPOINTMENT (OUTPATIENT)
Dept: ONCOLOGY | Facility: HOSPITAL | Age: 68
End: 2023-08-15
Payer: MEDICARE

## 2023-08-15 ENCOUNTER — NUTRITION (OUTPATIENT)
Dept: OTHER | Facility: HOSPITAL | Age: 68
End: 2023-08-15
Payer: MEDICARE

## 2023-08-15 ENCOUNTER — INFUSION (OUTPATIENT)
Dept: ONCOLOGY | Facility: HOSPITAL | Age: 68
End: 2023-08-15
Payer: MEDICARE

## 2023-08-15 ENCOUNTER — OFFICE VISIT (OUTPATIENT)
Dept: ONCOLOGY | Facility: CLINIC | Age: 68
End: 2023-08-15
Payer: MEDICARE

## 2023-08-15 VITALS
BODY MASS INDEX: 40.56 KG/M2 | DIASTOLIC BLOOD PRESSURE: 68 MMHG | SYSTOLIC BLOOD PRESSURE: 146 MMHG | HEIGHT: 62 IN | OXYGEN SATURATION: 92 % | HEART RATE: 64 BPM | TEMPERATURE: 97.3 F | RESPIRATION RATE: 18 BRPM | WEIGHT: 220.4 LBS

## 2023-08-15 DIAGNOSIS — C34.32 MALIGNANT NEOPLASM OF LOWER LOBE OF LEFT LUNG: Primary | ICD-10-CM

## 2023-08-15 DIAGNOSIS — D53.9 MACROCYTIC ANEMIA: ICD-10-CM

## 2023-08-15 DIAGNOSIS — C34.32 MALIGNANT NEOPLASM OF LOWER LOBE OF LEFT LUNG: ICD-10-CM

## 2023-08-15 LAB
ALBUMIN SERPL-MCNC: 3.4 G/DL (ref 3.5–5.2)
ALBUMIN/GLOB SERPL: 1.4 G/DL
ALP SERPL-CCNC: 70 U/L (ref 39–117)
ALT SERPL W P-5'-P-CCNC: <5 U/L (ref 1–33)
ANION GAP SERPL CALCULATED.3IONS-SCNC: 13.9 MMOL/L (ref 5–15)
AST SERPL-CCNC: 22 U/L (ref 1–32)
BASOPHILS # BLD AUTO: 0.02 10*3/MM3 (ref 0–0.2)
BASOPHILS NFR BLD AUTO: 0.4 % (ref 0–1.5)
BILIRUB SERPL-MCNC: 0.5 MG/DL (ref 0–1.2)
BUN SERPL-MCNC: 21 MG/DL (ref 8–23)
BUN/CREAT SERPL: 20.6 (ref 7–25)
CALCIUM SPEC-SCNC: 8.5 MG/DL (ref 8.6–10.5)
CHLORIDE SERPL-SCNC: 100 MMOL/L (ref 98–107)
CO2 SERPL-SCNC: 25.1 MMOL/L (ref 22–29)
CREAT SERPL-MCNC: 1.02 MG/DL (ref 0.6–1.1)
DEPRECATED RDW RBC AUTO: 64.4 FL (ref 37–54)
EGFRCR SERPLBLD CKD-EPI 2021: 60.4 ML/MIN/1.73
EOSINOPHIL # BLD AUTO: 0.13 10*3/MM3 (ref 0–0.4)
EOSINOPHIL NFR BLD AUTO: 2.6 % (ref 0.3–6.2)
ERYTHROCYTE [DISTWIDTH] IN BLOOD BY AUTOMATED COUNT: 16.7 % (ref 12.3–15.4)
FERRITIN SERPL-MCNC: 120 NG/ML (ref 13–150)
FOLATE SERPL-MCNC: 7.15 NG/ML (ref 4.78–24.2)
GLOBULIN UR ELPH-MCNC: 2.4 GM/DL
GLUCOSE SERPL-MCNC: 125 MG/DL (ref 65–99)
HAPTOGLOB SERPL-MCNC: <10 MG/DL (ref 30–200)
HCT VFR BLD AUTO: 27.9 % (ref 34–46.6)
HGB BLD-MCNC: 8.7 G/DL (ref 12–15.9)
IMM GRANULOCYTES # BLD AUTO: 0.01 10*3/MM3 (ref 0–0.05)
IMM GRANULOCYTES NFR BLD AUTO: 0.2 % (ref 0–0.5)
IRON 24H UR-MRATE: 129 MCG/DL (ref 37–145)
IRON SATN MFR SERPL: 39 % (ref 20–50)
LDH SERPL-CCNC: 350 U/L (ref 135–214)
LYMPHOCYTES # BLD AUTO: 0.37 10*3/MM3 (ref 0.7–3.1)
LYMPHOCYTES NFR BLD AUTO: 7.4 % (ref 19.6–45.3)
MCH RBC QN AUTO: 33.6 PG (ref 26.6–33)
MCHC RBC AUTO-ENTMCNC: 31.2 G/DL (ref 31.5–35.7)
MCV RBC AUTO: 107.7 FL (ref 79–97)
MONOCYTES # BLD AUTO: 0.6 10*3/MM3 (ref 0.1–0.9)
MONOCYTES NFR BLD AUTO: 12 % (ref 5–12)
NEUTROPHILS NFR BLD AUTO: 3.87 10*3/MM3 (ref 1.7–7)
NEUTROPHILS NFR BLD AUTO: 77.4 % (ref 42.7–76)
NRBC BLD AUTO-RTO: 0 /100 WBC (ref 0–0.2)
PLATELET # BLD AUTO: 119 10*3/MM3 (ref 140–450)
PMV BLD AUTO: 9.9 FL (ref 6–12)
POTASSIUM SERPL-SCNC: 3.7 MMOL/L (ref 3.5–5.2)
PROT SERPL-MCNC: 5.8 G/DL (ref 6–8.5)
RBC # BLD AUTO: 2.59 10*6/MM3 (ref 3.77–5.28)
SODIUM SERPL-SCNC: 139 MMOL/L (ref 136–145)
TIBC SERPL-MCNC: 335 MCG/DL (ref 298–536)
TRANSFERRIN SERPL-MCNC: 239 MG/DL (ref 200–360)
VIT B12 BLD-MCNC: 288 PG/ML (ref 211–946)
WBC NRBC COR # BLD: 5 10*3/MM3 (ref 3.4–10.8)

## 2023-08-15 PROCEDURE — 85025 COMPLETE CBC W/AUTO DIFF WBC: CPT

## 2023-08-15 PROCEDURE — 84466 ASSAY OF TRANSFERRIN: CPT | Performed by: INTERNAL MEDICINE

## 2023-08-15 PROCEDURE — 96413 CHEMO IV INFUSION 1 HR: CPT

## 2023-08-15 PROCEDURE — 25010000002 DURVALUMAB 50 MG/ML SOLUTION 10 ML VIAL: Performed by: INTERNAL MEDICINE

## 2023-08-15 PROCEDURE — 82728 ASSAY OF FERRITIN: CPT | Performed by: INTERNAL MEDICINE

## 2023-08-15 PROCEDURE — 83540 ASSAY OF IRON: CPT | Performed by: INTERNAL MEDICINE

## 2023-08-15 PROCEDURE — 83010 ASSAY OF HAPTOGLOBIN QUANT: CPT | Performed by: INTERNAL MEDICINE

## 2023-08-15 PROCEDURE — 80053 COMPREHEN METABOLIC PANEL: CPT

## 2023-08-15 PROCEDURE — 82607 VITAMIN B-12: CPT | Performed by: INTERNAL MEDICINE

## 2023-08-15 PROCEDURE — 82746 ASSAY OF FOLIC ACID SERUM: CPT | Performed by: INTERNAL MEDICINE

## 2023-08-15 PROCEDURE — 83615 LACTATE (LD) (LDH) ENZYME: CPT | Performed by: INTERNAL MEDICINE

## 2023-08-15 PROCEDURE — 36415 COLL VENOUS BLD VENIPUNCTURE: CPT | Performed by: INTERNAL MEDICINE

## 2023-08-15 RX ORDER — SODIUM CHLORIDE 9 MG/ML
250 INJECTION, SOLUTION INTRAVENOUS ONCE
Status: COMPLETED | OUTPATIENT
Start: 2023-08-15 | End: 2023-08-15

## 2023-08-15 RX ORDER — SODIUM CHLORIDE 9 MG/ML
250 INJECTION, SOLUTION INTRAVENOUS ONCE
Status: CANCELLED | OUTPATIENT
Start: 2023-08-15

## 2023-08-15 RX ORDER — SODIUM CHLORIDE 9 MG/ML
250 INJECTION, SOLUTION INTRAVENOUS ONCE
OUTPATIENT
Start: 2023-09-12

## 2023-08-15 RX ADMIN — SODIUM CHLORIDE 250 ML: 9 INJECTION, SOLUTION INTRAVENOUS at 12:21

## 2023-08-15 RX ADMIN — SODIUM CHLORIDE 1500 MG: 900 INJECTION, SOLUTION INTRAVENOUS at 12:21

## 2023-08-15 NOTE — LETTER
August 15, 2023     Danyelle Brush MD  [address] [facility] [id number]    Patient: Gabby Acosta   YOB: 1955   Date of Visit: 8/15/2023     Dear Danyelle Brush MD:       Thank you for referring Gabby Acosta to me for evaluation. Below are the relevant portions of my assessment and plan of care.    If you have questions, please do not hesitate to call me. I look forward to following Gabby along with you.         Sincerely,        Carmelo Lombardi MD        CC: MD Harjit Butler John L Jr., MD  08/15/23 1246  Sign when Signing Visit  Chief Complaint  Non-small cell lung cancer, clinical stage IIIA ([id number]), aortic stenosis, cervical spine stenosis, COPD, hepatic steatosis, diabetes mellitus    Subjective        History of Present Illness  Patient returns today in follow-up due to continue on treatment with durvalumab, due for her fifth cycle of therapy today after switching to every 4-week dosing with her fourth treatment.  In the interim since her last visit, she did undergo TAVR on 8/1/2023.  She tolerated this very well.  She has not experienced any significant difficulties apart from ecchymosis that has tracked down her left leg from her groin.  She does note some ongoing mild sinus congestion.  She reports that her respiratory status is stable.  She overall has been more active at home, still requires a wheelchair for ambulating longer distances but is able to perform normal daily household activities more easily.  She did develop significant decline in her hemoglobin around the time of the procedure with hemoglobin that decreased to 8.8 on 8/2/2023 and 7.1 on 8/8/2023 when she returned in follow-up.  Additional labs at that time showed iron 78, iron saturation normal at 22%, TIBC normal at 350.  She was placed on oral iron and is experiencing some mild constipation from this.        Objective   Vital Signs:   /68   Pulse 64   Temp 97.3 øF (36.3 øC) (Temporal)    "Resp 18   Ht 157 cm (61.81\")   Wt 100 kg (220 lb 6.4 oz)   SpO2 92%   BMI 40.56 kg/mý     Physical Exam  Constitutional:       Appearance: She is well-developed.      Comments: Patient is using a wheelchair today   Eyes:      Conjunctiva/sclera: Conjunctivae normal.   Neck:      Thyroid: No thyromegaly.   Cardiovascular:      Rate and Rhythm: Normal rate and regular rhythm.      Heart sounds: No murmur heard.    No friction rub. No gallop.   Pulmonary:      Effort: No respiratory distress.      Breath sounds: Normal breath sounds.      Comments: A few scattered bibasilar rhonchi.  Abdominal:      General: Bowel sounds are normal. There is no distension.      Palpations: Abdomen is soft.      Tenderness: There is no abdominal tenderness.   Musculoskeletal:      Comments: Trace bilateral lower extremity edema, left slightly greater than right with evidence of ecchymosis tracking distally in the left leg   Lymphadenopathy:      Head:      Right side of head: No submandibular adenopathy.      Cervical: No cervical adenopathy.      Upper Body:      Right upper body: No supraclavicular adenopathy.      Left upper body: No supraclavicular adenopathy.   Skin:     General: Skin is warm and dry.      Findings: No rash.   Neurological:      Mental Status: She is alert and oriented to person, place, and time.      Cranial Nerves: No cranial nerve deficit.      Motor: No abnormal muscle tone.      Deep Tendon Reflexes: Reflexes normal.   Psychiatric:         Behavior: Behavior normal.         Result Review : Reviewed CBC, CMP from today.  Reviewed records from TAVR procedure.  Reviewed additional labs including iron panel from 8/8/2023.       Assessment and Plan     *Non-small cell lung cancer, clinical stage IIIA ([id number])  Patient has history of smoking 1 pack/day x 45 years, quit in November 2021.    Following anterior cervical corpectomy in September 2022, she experienced acute hypoxemic respiratory failure.    CT " angiogram chest 9/25/2022 showed a concerning 1.4 cm left lower lobe pulmonary nodule, mediastinal lymphadenopathy with infracarinal lymph node 2.7 cm, left hilar lymph node 1.2 cm, small right hilar lymph nodes up from 1.1 cm and additional enlarged mediastinal nodes in the right paratracheal, precarinal, infracarinal spaces.  There was a wedge-shaped area of consolidation/atelectasis in the right middle lobe base, subpleural density left costophrenic sulcus felt to represent atelectasis.  It was recommended for her to undergo subsequent PET scan by pulmonary.  The patient has had ongoing follow-up with neurosurgery and on CT scans 11/29/2022 of cervical and thoracic spine, there was concern regarding spinal instability with potential need for posterior cervical fusion.  This was scheduled in December 2022 however was delayed due to upper respiratory infection.  Patient admitted on 1/10/2023 to address multiple medical issues in order to prepare for potential neurosurgical procedure on 3/12/2023.  Patient underwent echocardiogram on 1/11/2023 with ejection fraction greater than 70%, grade 1 diastolic dysfunction, severe aortic stenosis.  Patient is being followed by cardiology with consideration of need for TAVR versus SAVR for moderate to severe aortic stenosis.  Repeat CT chest on 1/12/2023 showed slight increase in size of the left lower lobe pulmonary nodule increased from 1.4 to 1.5 cm.  Groundglass nodule peripheral left lower lobe less conspicuous and resolution of previous reticulonodular opacities right lower lobe.  There was further enlargement of mediastinal lymph nodes with right posterior paratracheal lymph node 1.4 x 2.1 up to 1.5 x 2.3 cm, subcarinal lymph node increased from 2.7 x 5.2 up to 3.2 x 5.8 cm, left hilar lymph node increased from 1.2 up to 1.4 cm, and is stable 1.7 cm left infrahilar lymph node.  There was also comment regarding hepatic steatosis with changes of chronic liver disease and  stable indeterminate nodular thickening of the adrenal glands.  CT-guided lung biopsy on 1/13/2023 with pathology that showed invasive poorly differentiated non-small cell carcinoma with focal neuroendocrine differentiation.  Staining pattern and morphology favor poorly differentiated adenocarcinoma however there was focal CD56 staining, could not entirely exclude large cell neuroendocrine carcinoma.  PD-L1 35%, Caris analysis pending.  It was felt that there would be increased risk for bronchoscopy from a cardiac standpoint due to her aortic stenosis.  Staging MRI brain 1/29/2023 showed no evidence of metastatic disease  Staging PET scan 1/26/2023 showed hypermetabolic left lower lobe pulmonary nodule, size difficult to determine on PET however on recent CT was 1.6 cm (SUV 14.4).  Mediastinal and left hilar hypermetabolic lymphadenopathy (subcarinal lymph node 3.2 cm, SUV 31.7, left paratracheal 2.2 cm lymph node SUV 12.7).  Sub-6 mm pulmonary nodule left upper lobe unchanged and below PET resolution.  Focal uptake posterior right hepatic lobe SUV 7.2 of uncertain significance.  Recommended MRI to evaluate.  Right thyroid activity SUV 5.3, recommended ultrasound.  Long segment uptake distal esophagus consistent with esophagitis.  Uptake posterior larynx, nonspecific, recommended direct visualization.  MRI abdomen 2/6/2023 with no evidence to suggest metastatic disease in the liver.  Subcentimeter nonenhancing lesions favor cyst or hemangiomas.  Patient underwent right and left heart catheterization with cardiology on 2/14/2023.  Identification of coronary artery disease, mild pulmonary hypertension, severe aortic stenosis.  Recommendation per cardiology to proceed with treatment for lung cancer and defer any intervention regarding aortic stenosis.  Concern regarding underlying neurologic issues from cervical stenosis and potential neuropathic effects from Taxol chemotherapy.  Concern regarding patient's overall  performance status and ability to tolerate full dose chemotherapy.  Patient discussed at thoracic oncology tumor board with consensus to treat with weekly low-dose carboplatin concurrent with radiation therapy and omit Taxol.  Subsequent plan for 1 year durvalumab following concurrent chemoradiation.  Significant delay in initiating concurrent chemoradiation due to logistics with scheduling Mediport placement, patient canceled appointments and delayed initiation of treatment.  On 3/21/2023 initiated concurrent chemoradiation with weekly carboplatin (AUC 2) on 2/28/2023.  Week 4 carboplatin held 4/11 and again 4/18/2023 due to thrombocytopenia  Week 4 carboplatin resumed on 4/25/2023 at reduced dose to AUC 1 due to borderline neutropenia  Chemo and radiation held beginning 5/2/2023 due to neutropenia with WBC 1.13, ANC 0.53  Resumed radiation therapy on 5/8/2023 with recovery of WBC.  Received fifth and final weekly dose of concurrent carboplatin (AUC 1) on 5/9/2023.  Patient completed radiation therapy on 5/12/2023.  CT chest abdomen pelvis 5/25/2023 with significant improvement in mediastinal and left hilar lymphadenopathy, decrease in left lower lobe nodule indicating significant response.  On 5/30/2023 initiated treatment with durvalumab 10 mg/kg every 2 weeks x 1 year.  Transitioned to durvalumab 1500 mg every 4-week dosing with cycle 4 on 7/18/2023.  TAVR CT chest abdomen pelvis 7/21/2023 with decreased left lower lobe nodule from 8 down to 6 mm, stable mediastinal lymph nodes (with the exception of 1 small superior mediastinal lymph node increased from 4 to 7 mm of unclear significance).  Upper abdominal lymph nodes with slight increase in the periportal, hepatogastric, peripancreatic regions.  Patient returns today in follow-up due for cycle 5 durvalumab having transitioned to every 4-week dosing with cycle 4.  In the interval she did undergo TAVR on 8/1/2023 and appears to have done well with the procedure.   She did develop significant decline in her hemoglobin following this with decline to 7.1 on 8/8/2023.  This will be discussed further below.  We are sending off anemia evaluation today, does not appear that she is iron deficient and she is having constipation from the oral iron that was started on 8/8/2023 and we will discontinue this.  She appears to be tolerating immunotherapy very well with no significant side effects.  We did review the CT scan performed prior to her TAVR from 7/21/2023 which showed decrease in the left lower lobe nodule, stable mediastinal lymph nodes with the exception of 1 small lymph node in the superior mediastinum that increased slightly from 4 up to 7 mm of uncertain significance.  There was a slight increase in some small.  Portal, hepatogastric, peripancreatic lymph nodes of unclear significance as well.  We will proceed on with her treatment today as planned with cycle 5 durvalumab and in 3 weeks she will undergo follow-up PET scan as we are now 3 months out from completion of her concurrent chemoradiation.  I will see her in 4 weeks when she is due for cycle 6 devalue Mab and we will review the PET scan results.    *Aortic stenosis  Patient underwent echocardiogram on 1/11/2023 with ejection fraction greater than 70%, grade 1 diastolic dysfunction, severe aortic stenosis.    Patient followed by cardiology with consideration of need for TAVR versus SAVR for moderate to severe aortic stenosis.  Patient underwent cardiac catheterization 2/14/2023 with identification of coronary artery disease, mild pulmonary hypertension, severe aortic stenosis.  Per cardiology, recommendation to proceed with treatment for lung cancer and defer any consideration of intervention for aortic stenosis.  Patient did undergo TAVR on 8/1/2023  Patient scheduled for follow-up with cardiology including repeat echocardiogram on 9/6/2023     *Cervical spine stenosis  Patient with cervical spine stenosis with  associated myelopathy.   She underwent surgery on 9/20/2022 with anterior cervical corpectomy with cage.      The patient has had ongoing follow-up with neurosurgery and on CT scans 11/29/2022 of cervical and thoracic spine, there was concern regarding spinal instability with potential need for posterior cervical fusion.  This was scheduled in December 2022 however was delayed due to upper respiratory infection.  Patient admitted on 1/10/2023 to address multiple medical issues in order to prepare for potential neurosurgical procedure on 3/12/2023.  Patient was previously having severe symptoms related to her cervical stenosis with reduced ability to ambulate previously and significant limitations in movement in her hands, dropping objects.  More recently however she has improved and has been able to walk with the use of a walker, symptoms in the upper extremities have improved and her dexterity is better.    It appears that her neurosurgical procedure will need to be delayed until we can at least complete the concurrent chemoradiation portion of her treatment.  Surgery would be feasible while continuing on immunotherapy in the future.  Neurosurgical follow-up has been placed on hold.  Patient notes that her mobility and symptoms in her extremities have continued to improve over time     *COPD  Patient has history of smoking 1 pack/day x 45 years, quit in November 2021.  Patient has not undergone formal PFTs  Patient being followed by pulmonary, currently using albuterol inhaler as needed     *Hepatic steatosis  Identified on prior scans  PET scan 1/26/2023 with questionable area of activity seen but no corresponding CT abnormality.    MRI abdomen 2/6/2023 with no evidence to suggest metastatic disease in the liver.  Subcentimeter nonenhancing lesions favor cyst or hemangiomas.  LFTs remain normal     *Diabetes mellitus    *Anxiety/depression  Patient reports feeling overwhelmed regarding the amount of information  presented today in regards to her malignancy as well as her other medical issues.    Patient was seen by supportive oncology on 2/27/2023, added gabapentin 300 mg 3 times daily.  Patient continuing on Wellbutrin  mg daily  Patient notes that symptoms are under fair control currently.  Patient has continued follow-up with supportive oncology.  She has concern regarding potential delirium with upcoming anesthesia for TAVR.    *Right thyroid uptake on PET scan 1/26/2023  Patient was scheduled for thyroid ultrasound however did not feel that she was able to undergo the procedure due to her neck issues and therefore was canceled, consider at future date  We will follow this up on PET scan in 3 weeks    *Laryngeal uptake on PET scan 1/26/2023  Patient was seen by ENT on 3/28/2023, laryngoscopy showed mild edema in the posterior glottis consistent with laryngeal pharyngeal reflux, no evidence of malignancy.    *Esophageal uptake on PET scan 1/26/2023  Pettus to be consistent with esophagitis in the distal esophagus.  We will hold on referral for EGD given the multitude of additional procedures and consults required above.  We will follow this up on PET scan in 3 weeks    *Venous access  Mediport placed by Dr. Shook on 3/2/2023    *Nutrition/weight loss  Patient has been seen by oncology nutrition  Patient overall has lost a significant amount of weight however weight has subsequently stabilized in the past month or so.    *Radiation esophagitis  Patient is currently using Carafate 1 g 4 times daily  Prescription was sent for Magic mouthwash however patient cannot afford the medication  Symptoms stabilized with Carafate alone and subsequently resolved.     *Lower extremity edema  Patient developed exacerbation of chronic edema in the lower extremities, left slightly greater than right.    Bilateral lower extremity Doppler on 5/9/2023 was negative, showed bilateral popliteal fluid collections.  Patient with slight  increase in left lower extremity edema today with evidence of hematoma tracking into the distal portion of her left leg after recent left groin access for TAVR procedure.    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  Patient notes that she is scheduled to be seen by vascular surgery on 8/18/2023.    *Hypothyroidism  Hypothyroidism managed by PCP Dr. Danyelle Brush  Labs on 5/30/2023 prior to beginning immunotherapy with evidence of hypothyroidism with TSH 10.5, free T4 0.54.  Levothyroxine dose increased by Dr. Brush from 125 up to 150 mcg daily  Continue to monitor thyroid function studies every 8 weeks on durvalumab  Thyroid function studies normal on 7/18/2023, due for recheck with next cycle in 4 weeks per protocol    *Chemotherapy-induced leukopenia  WBC today 5.0 with normal differential    *Chemotherapy-induced thrombocytopenia  Platelet count has gradually improved since completion of concurrent chemoradiation  Patient does have a mild degree of residual thrombocytopenia, platelet count today 119,000.  We will check B12 and folate studies today.    *Chemotherapy-induced anemia  Hemoglobin did decline into the high 10-low 11 range on concurrent chemoradiation  Hemoglobin subsequently improved and normalized as of 7/28/2023 with hemoglobin of 13.4  Patient underwent TAVR on 8/1/2023 with significant decline in hemoglobin thereafter, reached a low of 7.1 on 8/8/2023.  Additional labs with cardiology on 8/8/2023 with iron 78, iron saturation 22%, TIBC 350.  Patient was started on oral iron although no evidence of iron deficiency.  Patient returns today in follow-up with hemoglobin that is improved slightly up to 8.7.  She is having constipation from oral iron and there has been no evidence of iron deficiency.  We are repeating iron panel and ferritin today.  She will discontinue oral iron at this time.  We will check additional labs including B12 and  folate and given her recent valvular issues we will check for hemolysis with LDH and haptoglobin.    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  Recommended CTA exam for more definitive evaluation.  Patient was referred to vascular surgery, had to postpone the visit   Patient is scheduled to be seen by vascular surgery on 8/18/2023.       PLAN:  Continue definitive treatment for stage IIIA non-small cell lung cancer.  Patient completed concurrent chemoradiation with weekly single agent carboplatin (completed on 5/12/2023).  Patient is continuing on durvalumab single agent initiated 5/30/2023 with 10 mg/kg dose every 2 weeks.  With cycle 4 on 7/18/2023 transitioned to 1500 mg every 4 weeks.  Proceed with cycle 5 durvalumab today.  Patient continues on levothyroxine currently at 150 mcg daily managed by Dr. Brush.  We will continue monitoring TSH, free T4 every 8 weeks on immunotherapy (due next in 4 weeks).  Discontinue oral iron with no evidence of iron deficiency and exacerbation of constipation  Additional labs today with iron panel, ferritin, B12, folate, LDH, haptoglobin  Patient scheduled to be seen by vascular surgery on 8/18/2023.  In 3 weeks PET scan  In 4 weeks MD visit with CBC, CMP, TSH, free T4 and patient will be due for cycle 6 durvalumab pending PET scan results.      The patient is continuing on high risk medication requiring intensive monitoring    I did spend 40 minutes in total time caring for the patient today, time spent in review of records, face-to-face consultation, coordination of care, placement of orders, completion of documentation.

## 2023-08-15 NOTE — PROGRESS NOTES
"OUTPATIENT ONCOLOGY NUTRITION ASSESSMENT    Patient Name: Gabby Acosta  YOB: 1955  MRN: [id number]  Assessment Date: 8/15/2023    COMMENTS: MD referral. Patient seen in infusion area today.  The patient had lost a few pounds after chemoradiation in March but has now stabilized. Reports having a good appetite and eating two meals at home with occasional snacks. S/p TAVR.   Will be available as needed. The patient would like to lose some weight when ok with MD but knows that at this time she should aim for maintenance.         Reason for Assessment Follow up, Physician referral     Diagnosis/Problem   Stage III non-small cell lung cancer   Treatment Plan Comment:  Durvalumab (Imfinzi)    Frequency    Goal of cancer treatment Other:    Comments:        Encounter Information        Nutrition/Diet History:     Oral Nutrition Supplements:    Factors/Symptoms Affecting Intake: No factors at this time   Comments:      Medical/Surgical History Past Medical History:   Diagnosis Date    Anxiety     Cervical spondylosis with myelopathy     Cervical stenosis of spine     COPD (chronic obstructive pulmonary disease)     Diabetes mellitus     Fibromyalgia     Hepatic steatosis     Hyperlipidemia     Hypertension     Hypothyroidism     Lung cancer 02/2023    Stage IIIA non-small cell lung cancer - LAST RADIATION AND CHEMO 5/2023, CURRENT IMMUNOTHERAPY - FOLLWOED BY [facility]    Osteoarthritis     Severe anxiety about anesthesia and home medications     PT STATES HAS HALLUCINATIONS, SEVERE ANXIETY, AND \"BLACK OUT\" EPISODES REGARDING ANESTHESIA - STATES WORKING WITH A THERAPIST    Severe aortic stenosis     Slow to wake up after anesthesia        Past Surgical History:   Procedure Laterality Date    ANTERIOR CHANNEL VERTEBRECTOMY/CORPECTOMY N/A 09/20/2022    Procedure: Anterior cervical corpectomy, cervical four/five/six, with cage and plate from cervical three to cervical seven;  Surgeon: David Cheung MD;  " "Location: [facility] MAIN OR;  Service: Neurosurgery;  Laterality: N/A;    CARDIAC CATHETERIZATION N/A 2023    Procedure: Right and Left Heart Cath;  Surgeon: Kostas David MD;  [facility];  Service: Cardiovascular;  Laterality: N/A;    CARDIAC CATHETERIZATION N/A 2023    Procedure: Coronary angiography;  Surgeon: Kostas David MD;  [facility];  Service: Cardiovascular;  Laterality: N/A;    CARDIAC CATHETERIZATION N/A 2023    Procedure: Left ventriculography;  Surgeon: Kostas David MD;  [facility];  Service: Cardiovascular;  Laterality: N/A;    CARDIAC CATHETERIZATION N/A 2023    Procedure: Resting Full Cycle Ratio;  Surgeon: Kostas David MD;  [facility];  Service: Cardiovascular;  Laterality: N/A;    CATARACT EXTRACTION Bilateral      SECTION      X1    CHOLECYSTECTOMY      VENOUS ACCESS DEVICE (PORT) INSERTION Left 2023    Procedure: INSERTION OF PORTACATH;  Surgeon: Collin Shook MD;  Location: [facility] OR;  Service: General;  Laterality: Left;          Anthropometrics        Current Height Ht Readings from Last 1 Encounters:   08/15/23 157 cm (61.81\")      Current Weight Wt Readings from Last 1 Encounters:   08/15/23 100 kg (220 lb 6.4 oz)      Weight Change/Trend Stable   Weight History Wt Readings from Last 30 Encounters:   08/15/23 1026 100 kg (220 lb 6.4 oz)   23 1201 103 kg (227 lb)   23 0745 98 kg (216 lb 0.8 oz)   23 0557 98.9 kg (218 lb 0.6 oz)   23 0923 100 kg (220 lb 6.4 oz)   23 1200 100 kg (221 lb)   23 0931 101 kg (222 lb 6.4 oz)   23 1042 102 kg (225 lb 3.2 oz)   23 1013 105 kg (231 lb 9.6 oz)   23 0924 103 kg (227 lb 14.4 oz)   23 1205 105 kg (231 lb 6.4 oz)   23 1200 105 kg (230 lb 12.8 oz)   23 1140 107 kg (236 lb 1.6 oz)   23 " 1548 107 kg (236 lb)   05/02/23 1118 107 kg (235 lb 6.4 oz)   04/25/23 1120 104 kg (229 lb 1.6 oz)   04/18/23 1051 106 kg (234 lb 11.2 oz)   04/11/23 1207 107 kg (236 lb 12.8 oz)   04/04/23 1122 108 kg (237 lb)   04/04/23 1025 108 kg (238 lb)   03/28/23 1027 105 kg (231 lb 6.4 oz)   03/21/23 1018 108 kg (237 lb 3.2 oz)   03/08/23 1440 111 kg (244 lb 9.6 oz)   03/01/23 1027 112 kg (247 lb)   02/23/23 0906 112 kg (247 lb)   02/14/23 1153 110 kg (243 lb)   02/02/23 1207 112 kg (246 lb 11.2 oz)   01/11/23 1524 125 kg (275 lb)   01/10/23 1255 125 kg (275 lb 12.7 oz)   12/01/22 1421 125 kg (275 lb)   11/16/22 1155 125 kg (275 lb)   10/27/22 1520 125 kg (275 lb)          Medications           Current medications: albuterol sulfate HFA, amLODIPine, aspirin, buPROPion XL, diphenhydrAMINE, escitalopram, fluticasone, furosemide, gabapentin, guaiFENesin, hydrOXYzine, levothyroxine, lidocaine-prilocaine, losartan, lovastatin, metoprolol tartrate, ondansetron, potassium chloride, pyridoxine, and vitamin D3                 Tests/Procedures        Tests/Procedures Chemotherapy     Labs       Pertinent Labs    Results from last 7 days   Lab Units 08/15/23  1006   SODIUM mmol/L 139   POTASSIUM mmol/L 3.7   CHLORIDE mmol/L 100   CO2 mmol/L 25.1   BUN mg/dL 21   CREATININE mg/dL 1.02   CALCIUM mg/dL 8.5*   BILIRUBIN mg/dL 0.5   ALK PHOS U/L 70   ALT (SGPT) U/L <5   AST (SGOT) U/L 22   GLUCOSE mg/dL 125*     Results from last 7 days   Lab Units 08/15/23  1006   HEMOGLOBIN g/dL 8.7*   HEMATOCRIT % 27.9*   WBC 10*3/mm3 5.00   ALBUMIN g/dL 3.4*     Results from last 7 days   Lab Units 08/15/23  1006   PLATELETS 10*3/mm3 119*     COVID19   Date Value Ref Range Status   07/28/2023 Not Detected Not Detected - Ref. Range Final     Lab Results   Component Value Date    HGBA1C 5.40 07/28/2023          Physical Findings        Physical Appearance alert     Edema  no edema   Gastrointestinal None   Tubes/Drains none   Oral/Mouth Cavity WNL   Skin             PES STATEMENT / NUTRITION DIAGNOSIS      Nutrition Dx Problem Problem:    No Nutrition Diagnosis at this Time    Etiology:  Medical diagnosis: Lung cancer  Factors affecting nutrition: Reported/Observed By, Appetite good per patient      Signs/Symptoms:  Information Deficit    Comment:      NUTRITION INTERVENTION / PLAN OF CARE      Intervention Goal(s) Maintain nutrition status and Maintain weight         RD Intervention/Action Encouraged intake         Recommendations:       PO Diet Continue same, encouraged adding a snack if eating only two meals per day. Patient would like to lose weight when ok with MD      Supplements       Snacks       Other    --      Monitor/Evaluation Follow up as needed   Education Education provided   --    RD to follow per protocol.    Electronically signed by:  Mag Waterman RD, LD  08/15/23 14:42 EDT

## 2023-09-05 ENCOUNTER — HOSPITAL ENCOUNTER (OUTPATIENT)
Dept: PET IMAGING | Facility: HOSPITAL | Age: 68
Discharge: HOME | End: 2023-09-05
Payer: MEDICARE

## 2023-09-05 DIAGNOSIS — C34.32 MALIGNANT NEOPLASM OF LOWER LOBE OF LEFT LUNG: ICD-10-CM

## 2023-09-05 LAB — GLUCOSE BLDC GLUCOMTR-MCNC: 127 MG/DL (ref 70–130)

## 2023-09-05 PROCEDURE — 0 FLUDEOXYGLUCOSE F18 SOLUTION: Performed by: INTERNAL MEDICINE

## 2023-09-05 PROCEDURE — A9552 F18 FDG: HCPCS | Performed by: INTERNAL MEDICINE

## 2023-09-05 PROCEDURE — 82948 REAGENT STRIP/BLOOD GLUCOSE: CPT

## 2023-09-05 PROCEDURE — 78815 PET IMAGE W/CT SKULL-THIGH: CPT

## 2023-09-05 RX ADMIN — FLUDEOXYGLUCOSE F18 1 DOSE: 300 INJECTION INTRAVENOUS at 09:57

## 2023-09-06 ENCOUNTER — HOSPITAL ENCOUNTER (OUTPATIENT)
Dept: CARDIOLOGY | Facility: HOSPITAL | Age: 68
Discharge: HOME | End: 2023-09-06
Payer: MEDICARE

## 2023-09-06 ENCOUNTER — OFFICE VISIT (OUTPATIENT)
Dept: CARDIOLOGY | Facility: CLINIC | Age: 68
End: 2023-09-06
Payer: MEDICARE

## 2023-09-06 VITALS
HEIGHT: 62 IN | DIASTOLIC BLOOD PRESSURE: 62 MMHG | SYSTOLIC BLOOD PRESSURE: 124 MMHG | HEART RATE: 57 BPM | WEIGHT: 219 LBS | BODY MASS INDEX: 40.3 KG/M2

## 2023-09-06 DIAGNOSIS — I10 PRIMARY HYPERTENSION: ICD-10-CM

## 2023-09-06 DIAGNOSIS — Z95.2 S/P TAVR (TRANSCATHETER AORTIC VALVE REPLACEMENT): Primary | ICD-10-CM

## 2023-09-06 NOTE — PROGRESS NOTES
[facility] Follow Up Office Note     Encounter Date:23  Patient:Gabby Acosta  :1955  MRN:[id number]      Chief Complaint:   Chief Complaint   Patient presents with    1 month post TAVR     History of Presenting Illness:      Ms. Acosta is a 68 y.o. woman with past medical history notable for heart murmur since age 15, nonrheumatic aortic stenosis s/p TAVR, COPD, diabetes, hypertension, prior tobacco use, and cervical spine stenoses with prior surgery, and recently diagnosed non-small cell lung cancer for which she is undergoing chemotherapy who presents for follow-up due to recent TAVR.  Since leaving the hospital she did have some issues with bruising in her left leg which is actually her non-TAVR side.  Despite that she is actually been doing quite well her follow-up ultrasound of her groin was normal and her blood counts have stabilized.  Overall she feels great from a cardiac perspective.        Review of Systems:  Review of Systems   Constitutional: Negative.   HENT: Negative.     Eyes: Negative.    Cardiovascular: Negative.    Respiratory: Negative.     Endocrine: Negative.    Hematologic/Lymphatic: Bruises/bleeds easily.   Skin: Negative.    Musculoskeletal: Negative.    Gastrointestinal: Negative.    Genitourinary: Negative.    Neurological: Negative.    Psychiatric/Behavioral: Negative.     Allergic/Immunologic: Negative.      Current Outpatient Medications on File Prior to Visit   Medication Sig Dispense Refill    albuterol sulfate  (90 Base) MCG/ACT inhaler Inhale 2 puffs Every 4 (Four) Hours As Needed for Wheezing. 8 g 2    amLODIPine (NORVASC) 5 MG tablet Take 1 tablet by mouth Daily.      aspirin 81 MG EC tablet Take 1 tablet by mouth Daily for 90 days. 30 tablet 2    buPROPion XL (WELLBUTRIN XL) 300 MG 24 hr tablet Take 1 tablet by mouth Daily.      diphenhydrAMINE (BENADRYL) 25 mg capsule Take 1 capsule by mouth Every 6 (Six) Hours As Needed for Allergies.       escitalopram (LEXAPRO) 10 MG tablet Take 1 tablet by mouth Daily.      fluticasone (FLONASE) 50 MCG/ACT nasal spray 2 sprays into the nostril(s) as directed by provider As Needed.      furosemide (LASIX) 40 MG tablet Take 1 tablet by mouth Daily. 30 tablet 11    gabapentin (Neurontin) 300 MG capsule Take 1 capsule by mouth 3 (Three) Times a Day As Needed (as directed).      guaiFENesin (MUCINEX) 600 MG 12 hr tablet Take 1 tablet by mouth Daily As Needed for Congestion.      hydrOXYzine (ATARAX) 10 MG tablet Take 0.5 tablets by mouth As Needed for Itching or Anxiety.      levothyroxine (SYNTHROID, LEVOTHROID) 150 MCG tablet Take 1 tablet by mouth Daily. 30 tablet 2    lidocaine-prilocaine (EMLA) 2.5-2.5 % cream Apply maria g-sized amount to Mediport site 30 min prior to port access. Do not rub in. (Patient taking differently: Apply  topically to the appropriate area as directed As Needed. Apply maria g-sized amount to Mediport site 30 min prior to port access. Do not rub in.) 30 g 2    losartan (COZAAR) 100 MG tablet Take 1 tablet by mouth Every Night.      lovastatin (MEVACOR) 40 MG tablet Take 1 tablet by mouth Every Night.      metoprolol tartrate (LOPRESSOR) 25 MG tablet Take 1 tablet by mouth Every 12 (Twelve) Hours for 90 days. 60 tablet 2    ondansetron (ZOFRAN) 8 MG tablet Take 1 tablet by mouth Every 8 (Eight) Hours As Needed for Nausea or Vomiting. 30 tablet 2    potassium chloride 10 MEQ CR tablet Take 1 tablet by mouth Daily. 30 tablet 11    pyridoxine (VITAMIN B-6) 100 MG tablet Take 1 tablet by mouth Daily.      vitamin D3 125 MCG (5000 UT) capsule capsule Take 1 capsule by mouth Daily.       No current facility-administered medications on file prior to visit.       No Known Allergies      Past Medical History:   Diagnosis Date    Anxiety     Cervical spondylosis with myelopathy     Cervical stenosis of spine     COPD (chronic obstructive pulmonary disease)     Diabetes mellitus     Fibromyalgia      "Hepatic steatosis     Hyperlipidemia     Hypertension     Hypothyroidism     Lung cancer 02/2023    Stage IIIA non-small cell lung cancer - LAST RADIATION AND CHEMO 5/2023, CURRENT IMMUNOTHERAPY - FOLLWOED BY [facility]    Osteoarthritis     Severe anxiety about anesthesia and home medications     PT STATES HAS HALLUCINATIONS, SEVERE ANXIETY, AND \"BLACK OUT\" EPISODES REGARDING ANESTHESIA - STATES WORKING WITH A THERAPIST    Severe aortic stenosis     Slow to wake up after anesthesia        Past Surgical History:   Procedure Laterality Date    ANTERIOR CHANNEL VERTEBRECTOMY/CORPECTOMY N/A 09/20/2022    Procedure: Anterior cervical corpectomy, cervical four/five/six, with cage and plate from cervical three to cervical seven;  Surgeon: David Cheung MD;  Location: [facility] OR;  Service: Neurosurgery;  Laterality: N/A;    AORTIC VALVE REPAIR/REPLACEMENT N/A 8/1/2023    Procedure: TTE TRANSFEMORAL TRANSCATHETER AORTIC VALVE REPLACEMENT PERCUTANEOUS APPROACH;  Surgeon: Darryl James MD;  Location: [facility];  Service: Cardiothoracic;  Laterality: N/A;    AORTIC VALVE REPAIR/REPLACEMENT N/A 8/1/2023    Procedure: Transfemoral Transcatheter Aortic Valve Replacement with intraoperative TTE and possible open surgical rescue;  Surgeon: Pan Mendoza MD;  Location: [facility];  Service: Cardiovascular;  Laterality: N/A;    CARDIAC CATHETERIZATION N/A 02/14/2023    Procedure: Right and Left Heart Cath;  Surgeon: Kostas David MD;  Location: [facility] CATH INVASIVE LOCATION;  Service: Cardiovascular;  Laterality: N/A;    CARDIAC CATHETERIZATION N/A 02/14/2023    Procedure: Coronary angiography;  Surgeon: Kostas David MD;  Location: [facility] CATH INVASIVE LOCATION;  Service: Cardiovascular;  Laterality: N/A;    CARDIAC CATHETERIZATION N/A 02/14/2023    Procedure: Left ventriculography;  Surgeon: Kostas David MD;  Location: [facility] CATH INVASIVE LOCATION;  Service: Cardiovascular;  " "Laterality: N/A;    CARDIAC CATHETERIZATION N/A 2023    Procedure: Resting Full Cycle Ratio;  Surgeon: Kostas David MD;  Location: [facility] CATH INVASIVE LOCATION;  Service: Cardiovascular;  Laterality: N/A;    CATARACT EXTRACTION Bilateral      SECTION      X1    CHOLECYSTECTOMY      VENOUS ACCESS DEVICE (PORT) INSERTION Left 2023    Procedure: INSERTION OF PORTACATH;  Surgeon: Collin Shook MD;  Location: [facility] MAIN OR;  Service: General;  Laterality: Left;       Social History     Socioeconomic History    Marital status:    Tobacco Use    Smoking status: Former     Packs/day: 1.00     Years: 45.00     Pack years: 45.00     Types: Cigarettes     Quit date: 2021     Years since quittin.8    Smokeless tobacco: Never    Tobacco comments:     Caffeine use    Vaping Use    Vaping Use: Never used   Substance and Sexual Activity    Alcohol use: Not Currently    Drug use: Yes     Types: Marijuana     Comment: GUMMIES ON OCCASION    Sexual activity: Yes       Family History   Problem Relation Age of Onset    Cancer Father     Heart disease Brother     Hypertension Brother     Diabetes Brother     Cancer Brother     Diabetes Maternal Grandmother     Cancer Paternal Grandmother     Cancer Paternal Grandfather     Malig Hyperthermia Neg Hx        The following portions of the patient's history were reviewed and updated as appropriate: allergies, current medications, past family history, past medical history, past social history, past surgical history and problem list.       Objective:       Vitals:    23 1019   BP: 124/62   BP Location: Left arm   Patient Position: Sitting   Pulse: 57   Weight: 99.3 kg (219 lb)   Height: 157 cm (61.81\")     Body mass index is 40.3 kg/m².     Physical Exam:  Constitutional: Chronically ill appearing, well developed, no acute distress   HENT: Oropharynx clear and membrane moist  Eyes: Normal conjunctiva, no sclera icterus.  Neck: Supple, " no carotid bruit bilaterally.  Cardiovascular: Regular rate and rhythm, Early peaking systolic murmur over the right upper sternal border, Trace bilateral lower extremity edema.  Pulmonary: Normal respiratory effort, normal lung sounds, no wheezing.  Neurological: Alert and orient x 3.   Skin: Warm, dry, mild ecchymoses in the left ankle area, no rash.  Psych: Appropriate mood and affect. Normal judgment and insight.         Lab Results   Component Value Date     08/15/2023     08/08/2023    K 3.7 08/15/2023    K 3.8 08/08/2023     08/15/2023     08/08/2023    CO2 25.1 08/15/2023    CO2 26.4 08/08/2023    BUN 21 08/15/2023    BUN 19 08/08/2023    CREATININE 1.02 08/15/2023    CREATININE 0.88 08/08/2023    GLUCOSE 125 (H) 08/15/2023    GLUCOSE 123 (H) 08/08/2023    CALCIUM 8.5 (L) 08/15/2023    CALCIUM 8.7 08/08/2023    ALBUMIN 3.4 (L) 08/15/2023    ALBUMIN 3.9 07/28/2023    BILITOT 0.5 08/15/2023    BILITOT 0.4 07/28/2023    AST 22 08/15/2023    AST 17 07/28/2023    ALT <5 08/15/2023    ALT 13 07/28/2023     Lab Results   Component Value Date    WBC 5.00 08/15/2023    WBC 5.60 08/08/2023    HGB 8.7 (L) 08/15/2023    HGB 7.1 (L) 08/08/2023    HCT 27.9 (L) 08/15/2023    HCT 21.4 (L) 08/08/2023    .7 (H) 08/15/2023    MCV 99.5 (H) 08/08/2023     (L) 08/15/2023     (L) 08/08/2023     Lab Results   Component Value Date    TRIG 154 (H) 08/04/2020    TRIG 212 (H) 05/24/2019    HDL 54 08/04/2020    HDL 49 (L) 05/24/2019     (H) 08/04/2020     05/24/2019     Lab Results   Component Value Date    PROBNP 2,513.0 (H) 08/08/2023    PROBNP 227.0 07/28/2023     No results found for: CKTOTAL, CKMB, CKMBINDEX, TROPONINI, TROPONINT  Lab Results   Component Value Date    TSH 0.313 07/18/2023    TSH 10.500 (H) 05/30/2023         ECG 12 Lead    Date/Time: 9/6/2023 10:46 AM  Performed by: Pan Mendoza MD  Authorized by: Pan Mendoza MD   Comparison: compared with  previous ECG from 8/8/2023  Similar to previous ECG  Rhythm: sinus rhythm  Other findings: non-specific ST-T wave changes         Echocardiogram 8/2/2023:  Shows normal valve function of the limited images.  No valvular regurgitation or paravalvular regurgitation with normal gradients.  Hyperdynamic LV with small IVC consistent with volume depletion     TAVR 8/1/2023:  Successful placement of 23 mm GILDARDO ultra aortic valve bioprosthesis with trace paravalvular leak.  Bedside echocardiogram identified a calculated valve area of 1.4 cm2 with a mean gradient of 8 mmHg across aortic valve    Cardiac catheterization 12/14/2023:  Left main: Large-caliber vessel that bifurcates to an LAD and circumflex.  Normal  LAD: Medium caliber vessel that gives rise to a small caliber diagonal branch in the midsegment.  There is moderate to severe calcification in the proximal to mid LAD with a sequential discrete 60% and 50% stenosis associated with severe tortuosity.  LCX: Medium caliber vessel that gives rise to a small caliber OM1 and medium caliber OM 2 branch.  The OM 2 branch has a diffuse 20% proximal stenosis  RCA: Large caliber, severely calcified vessel that gives rise to a medium caliber PDA and small caliber RPL branch.  The RCA has a 30% mid vessel stenosis  RFR assessment LAD 0.91 not hemodynamically flow-limiting  Aortic valve area 0.8 cm2  Peak to peak gradient 38 mmHg  Stroke-volume index 26 mL/m2     Echocardiogram 1/11/2023:  Left ventricular ejection fraction appears to be greater than 70%.  Left ventricular wall thickness is consistent with mild concentric hypertrophy.  Left ventricular diastolic function is consistent with (grade I) impaired relaxation.  Normal right ventricular cavity size noted. Hyperdynamic right ventricular systolic function noted.  The aortic valve leaflets are not well visualized, although appear heavily calcified  Severe aortic valve stenosis is present. Aortic valve area is 0.99 cm2.  Aortic valve maximum pressure gradient is 77.5 mmHg. Aortic valve mean pressure gradient is 39.2 mmHg.  Calculated right ventricular systolic pressure from tricuspid regurgitation is 21 mmHg.  There is no evidence of pericardial effusion. . There is evidence of a fat pad present.             Assessment:          Diagnosis Plan   1. S/P TAVR (transcatheter aortic valve replacement)  ECG 12 Lead      2. Primary hypertension  ECG 12 Lead             Plan:       Ms. Acosta is a 68 y.o. woman with past medical history notable for heart murmur since age 15, nonrheumatic aortic stenosis, COPD, diabetes, hypertension, prior tobacco use, and cervical spine stenoses with prior surgery, and recently diagnosed non-small cell lung cancer for which she is undergoing chemotherapy who presents for follow-up due to recent TAVR.  Overall she is doing great no changes are needed at this time we will plan on seeing back in 3-month.  She showed up late for her appointment and did not want to stick around for her echo we will work on rescheduling clinically valve sounds great though      Nonrheumatic aortic stenosis:  S/P 23 mm Dominik Ultra 8/1  NYHA class II symptoms  Continue aspirin  Echocardiogram today was not done patient showed up late even to her office appointment she did not want to stick around for echo we will work on rescheduling in the coming weeks.    Essential hypertension:  Blood pressure reasonably controlled continue the medical therapy       Follow up:  3 Months      Pan Mendoza MD  [facility]  09/06/23  10:47 EDT

## 2023-09-08 ENCOUNTER — DOCUMENTATION (OUTPATIENT)
Dept: OTHER | Facility: HOSPITAL | Age: 68
End: 2023-09-08
Payer: MEDICARE

## 2023-09-08 NOTE — PROGRESS NOTES
Oncology Social Work  Clinical Case management    Faxed [employer] Certification for extension request on patient's behalf to [phone].     Maria Elena Enrique, MADELYNW, LCSW

## 2023-09-11 NOTE — PROGRESS NOTES
"Chief Complaint  Non-small cell lung cancer, clinical stage IIIA ([id number]), aortic stenosis, cervical spine stenosis, COPD, hepatic steatosis, diabetes mellitus    Subjective        History of Present Illness  Patient returns today in follow-up due to continue on treatment with durvalumab, due for her sixth cycle of therapy today after switching to every 4-week dosing with her fourth treatment.  Today the patient has laboratory studies and PET scan to review.  Since her last visit, she reports that she has had difficulty with hematoma on the left hip and groin area that did track into her left leg.  She reports that this is gradually improving, decreasing in size.  She denies any erythema around this area, no fevers or chills.  She does note significant fatigue that has been occurred after the last few treatments that last for around 2 weeks and then improves.  During this timeframe she does sleep more, feels that the fatigue is tolerable and manageable.  She has no other specific complaints today.        Objective   Vital Signs:   /76   Pulse 57   Temp 98.2 °F (36.8 °C) (Temporal)   Resp 18   Ht 157 cm (61.81\")   Wt 98.9 kg (218 lb)   SpO2 99%   BMI 40.12 kg/m²     Physical Exam  Constitutional:       Appearance: She is well-developed.      Comments: Patient is using a wheelchair today   Eyes:      Conjunctiva/sclera: Conjunctivae normal.   Neck:      Thyroid: No thyromegaly.   Cardiovascular:      Rate and Rhythm: Normal rate and regular rhythm.      Heart sounds: No murmur heard.    No friction rub. No gallop.   Pulmonary:      Effort: No respiratory distress.      Breath sounds: Normal breath sounds.   Abdominal:      General: Bowel sounds are normal. There is no distension.      Palpations: Abdomen is soft.      Tenderness: There is no abdominal tenderness.   Musculoskeletal:      Comments: Trace bilateral lower extremity edema   Lymphadenopathy:      Head:      Right side of head: No " submandibular adenopathy.      Cervical: No cervical adenopathy.      Upper Body:      Right upper body: No supraclavicular adenopathy.      Left upper body: No supraclavicular adenopathy.   Skin:     General: Skin is warm and dry.      Findings: No rash.   Neurological:      Mental Status: She is alert and oriented to person, place, and time.      Cranial Nerves: No cranial nerve deficit.      Motor: No abnormal muscle tone.      Deep Tendon Reflexes: Reflexes normal.   Psychiatric:         Behavior: Behavior normal.         Result Review : Reviewed CBC, CMP, TSH, free T4 from today.  Reviewed PET scan from 9/5/2023.       Assessment and Plan     *Non-small cell lung cancer, clinical stage IIIA ([id number])  Patient has history of smoking 1 pack/day x 45 years, quit in November 2021.    Following anterior cervical corpectomy in September 2022, she experienced acute hypoxemic respiratory failure.    CT angiogram chest 9/25/2022 showed a concerning 1.4 cm left lower lobe pulmonary nodule, mediastinal lymphadenopathy with infracarinal lymph node 2.7 cm, left hilar lymph node 1.2 cm, small right hilar lymph nodes up from 1.1 cm and additional enlarged mediastinal nodes in the right paratracheal, precarinal, infracarinal spaces.  There was a wedge-shaped area of consolidation/atelectasis in the right middle lobe base, subpleural density left costophrenic sulcus felt to represent atelectasis.  It was recommended for her to undergo subsequent PET scan by pulmonary.  The patient has had ongoing follow-up with neurosurgery and on CT scans 11/29/2022 of cervical and thoracic spine, there was concern regarding spinal instability with potential need for posterior cervical fusion.  This was scheduled in December 2022 however was delayed due to upper respiratory infection.  Patient admitted on 1/10/2023 to address multiple medical issues in order to prepare for potential neurosurgical procedure on 3/12/2023.  Patient underwent  echocardiogram on 1/11/2023 with ejection fraction greater than 70%, grade 1 diastolic dysfunction, severe aortic stenosis.  Patient is being followed by cardiology with consideration of need for TAVR versus SAVR for moderate to severe aortic stenosis.  Repeat CT chest on 1/12/2023 showed slight increase in size of the left lower lobe pulmonary nodule increased from 1.4 to 1.5 cm.  Groundglass nodule peripheral left lower lobe less conspicuous and resolution of previous reticulonodular opacities right lower lobe.  There was further enlargement of mediastinal lymph nodes with right posterior paratracheal lymph node 1.4 x 2.1 up to 1.5 x 2.3 cm, subcarinal lymph node increased from 2.7 x 5.2 up to 3.2 x 5.8 cm, left hilar lymph node increased from 1.2 up to 1.4 cm, and is stable 1.7 cm left infrahilar lymph node.  There was also comment regarding hepatic steatosis with changes of chronic liver disease and stable indeterminate nodular thickening of the adrenal glands.  CT-guided lung biopsy on 1/13/2023 with pathology that showed invasive poorly differentiated non-small cell carcinoma with focal neuroendocrine differentiation.  Staining pattern and morphology favor poorly differentiated adenocarcinoma however there was focal CD56 staining, could not entirely exclude large cell neuroendocrine carcinoma.  PD-L1 35%, Caris analysis pending.  It was felt that there would be increased risk for bronchoscopy from a cardiac standpoint due to her aortic stenosis.  Staging MRI brain 1/29/2023 showed no evidence of metastatic disease  Staging PET scan 1/26/2023 showed hypermetabolic left lower lobe pulmonary nodule, size difficult to determine on PET however on recent CT was 1.6 cm (SUV 14.4).  Mediastinal and left hilar hypermetabolic lymphadenopathy (subcarinal lymph node 3.2 cm, SUV 31.7, left paratracheal 2.2 cm lymph node SUV 12.7).  Sub-6 mm pulmonary nodule left upper lobe unchanged and below PET resolution.  Focal uptake  posterior right hepatic lobe SUV 7.2 of uncertain significance.  Recommended MRI to evaluate.  Right thyroid activity SUV 5.3, recommended ultrasound.  Long segment uptake distal esophagus consistent with esophagitis.  Uptake posterior larynx, nonspecific, recommended direct visualization.  MRI abdomen 2/6/2023 with no evidence to suggest metastatic disease in the liver.  Subcentimeter nonenhancing lesions favor cyst or hemangiomas.  Patient underwent right and left heart catheterization with cardiology on 2/14/2023.  Identification of coronary artery disease, mild pulmonary hypertension, severe aortic stenosis.  Recommendation per cardiology to proceed with treatment for lung cancer and defer any intervention regarding aortic stenosis.  Concern regarding underlying neurologic issues from cervical stenosis and potential neuropathic effects from Taxol chemotherapy.  Concern regarding patient's overall performance status and ability to tolerate full dose chemotherapy.  Patient discussed at thoracic oncology tumor board with consensus to treat with weekly low-dose carboplatin concurrent with radiation therapy and omit Taxol.  Subsequent plan for 1 year durvalumab following concurrent chemoradiation.  Significant delay in initiating concurrent chemoradiation due to logistics with scheduling Mediport placement, patient canceled appointments and delayed initiation of treatment.  On 3/21/2023 initiated concurrent chemoradiation with weekly carboplatin (AUC 2) on 2/28/2023.  Week 4 carboplatin held 4/11 and again 4/18/2023 due to thrombocytopenia  Week 4 carboplatin resumed on 4/25/2023 at reduced dose to AUC 1 due to borderline neutropenia  Chemo and radiation held beginning 5/2/2023 due to neutropenia with WBC 1.13, ANC 0.53  Resumed radiation therapy on 5/8/2023 with recovery of WBC.  Received fifth and final weekly dose of concurrent carboplatin (AUC 1) on 5/9/2023.  Patient completed radiation therapy on 5/12/2023.  CT  chest abdomen pelvis 5/25/2023 with significant improvement in mediastinal and left hilar lymphadenopathy, decrease in left lower lobe nodule indicating significant response.  On 5/30/2023 initiated treatment with durvalumab 10 mg/kg every 2 weeks x 1 year.  Transitioned to durvalumab 1500 mg every 4-week dosing with cycle 4 on 7/18/2023.  TAVR CT chest abdomen pelvis 7/21/2023 with decreased left lower lobe nodule from 8 down to 6 mm, stable mediastinal lymph nodes (with the exception of 1 small superior mediastinal lymph node increased from 4 to 7 mm of unclear significance).  Upper abdominal lymph nodes with slight increase in the periportal, hepatogastric, peripancreatic regions.  Following 5 cycles durvalumab, PET scan 9/5/2023 with left lower lobe ill-defined consolidation and opacification likely representing postradiation change.  No mediastinal lymphadenopathy nor hypermetabolic activity.  Slight enlargement (0.7 up to 1.0 cm) of anterior pericardial lymph node with new hypermetabolic activity (SUV 5.5.  Stable epigastric adenopathy with mild hypermetabolic activity (SUV 4.4) unchanged from 1/26/2023 scan (although not noted on that report).  Nondisplaced, healing left seventh rib fracture.  Significance of the pericardial lymph node and epigastric lymphadenopathy unclear.  Neither area easily accessible for CT-guided biopsy after discussion with interventional radiology on 9/12/2023.  Elected to monitor on repeat PET scan at 2-month interval.  Patient returns today in follow-up due for cycle 6 durvalumab having transitioned to every 4-week dosing with cycle 4.  We are following up results from the above-mentioned PET scan from 9/5/2023.  Patient has responded well to concurrent chemoradiation with no residual hypermetabolic mediastinal, hilar adenopathy.  There is evidence of postradiation change in the left lower lobe at site of primary lesion.  There was a slight increase in a pericardial lymph node from  0.7 up to 1 cm with new activity SUV 5.5 of unclear significance.  There was also comment on the current scan report of stable epigastric adenopathy with tona hepatis conglomerate lymph node up to 2.9 cm with SUV 4.5.  Reported that this was present on the prior study however that was not noted on the prior report.  Significance of this is unclear.  There is also postprocedural change in the left groin where the patient experienced a significant hematoma.  There is no clinical evidence to suggest infection in this area.  I did contact interventional radiology and discussed the scan findings with Dr. Ny today.  He indicated that the pericardial lymph node and tona hepatis lymphadenopathy would be very difficult to reach with CT-guided biopsy.  I discussed this with the patient as well.  We will forego attempt at biopsy at this time and plan a 2-month interval follow-up PET scan to reevaluate these areas.  In the interim she will continue on durvalumab as planned.  She is tolerating immunotherapy well with the exception of fatigue that is increased few weeks after each treatment.    *Aortic stenosis  Patient underwent echocardiogram on 1/11/2023 with ejection fraction greater than 70%, grade 1 diastolic dysfunction, severe aortic stenosis.    Patient followed by cardiology with consideration of need for TAVR versus SAVR for moderate to severe aortic stenosis.  Patient underwent cardiac catheterization 2/14/2023 with identification of coronary artery disease, mild pulmonary hypertension, severe aortic stenosis.  Per cardiology, recommendation to proceed with treatment for lung cancer and defer any consideration of intervention for aortic stenosis.  Patient did undergo TAVR on 8/1/2023  Echocardiogram on 8/2/2023 with ejection fraction 62.4%, TAVR present  Patient continues follow-up with cardiology     *Cervical spine stenosis  Patient with cervical spine stenosis with associated myelopathy.   She underwent  surgery on 9/20/2022 with anterior cervical corpectomy with cage.      The patient has had ongoing follow-up with neurosurgery and on CT scans 11/29/2022 of cervical and thoracic spine, there was concern regarding spinal instability with potential need for posterior cervical fusion.  This was scheduled in December 2022 however was delayed due to upper respiratory infection.  Patient admitted on 1/10/2023 to address multiple medical issues in order to prepare for potential neurosurgical procedure on 3/12/2023.  Patient was previously having severe symptoms related to her cervical stenosis with reduced ability to ambulate previously and significant limitations in movement in her hands, dropping objects.  More recently however she has improved and has been able to walk with the use of a walker, symptoms in the upper extremities have improved and her dexterity is better.    It appears that her neurosurgical procedure will need to be delayed until we can at least complete the concurrent chemoradiation portion of her treatment.  Surgery would be feasible while continuing on immunotherapy in the future.  Neurosurgical follow-up has been placed on hold.  Patient notes that her mobility and symptoms in her extremities have continued to improve over time     *COPD  Patient has history of smoking 1 pack/day x 45 years, quit in November 2021.  Patient has not undergone formal PFTs  Patient being followed by pulmonary, currently using albuterol inhaler as needed     *Hepatic steatosis  Identified on prior scans  PET scan 1/26/2023 with questionable area of activity seen but no corresponding CT abnormality.    MRI abdomen 2/6/2023 with no evidence to suggest metastatic disease in the liver.  Subcentimeter nonenhancing lesions favor cyst or hemangiomas.  LFTs remain normal     *Diabetes mellitus    *Anxiety/depression  Patient reports feeling overwhelmed regarding the amount of information presented today in regards to her  malignancy as well as her other medical issues.    Patient was seen by supportive oncology on 2/27/2023, added gabapentin 300 mg 3 times daily.  Patient continuing on Wellbutrin  mg daily  Patient notes that symptoms are under fair control currently.  Patient has continued follow-up with supportive oncology.      *Right thyroid uptake on PET scan 1/26/2023  Patient was scheduled for thyroid ultrasound however did not feel that she was able to undergo the procedure due to her neck issues and therefore was canceled, consider at future date  No mention of thyroid uptake on PET scan 9/5/2023    *Laryngeal uptake on PET scan 1/26/2023  Patient was seen by ENT on 3/28/2023, laryngoscopy showed mild edema in the posterior glottis consistent with laryngeal pharyngeal reflux, no evidence of malignancy.  No mention of laryngeal uptake on PET scan 9/5/2023    *Esophageal uptake on PET scan 1/26/2023  [place]mot to be consistent with esophagitis in the distal esophagus.  We will hold on referral for EGD given the multitude of additional procedures and consults required above.  No mention of esophageal uptake on PET scan 9/5/2023    *Venous access  Mediport placed by Dr. Shook on 3/2/2023    *Nutrition/weight loss  Patient has been seen by oncology nutrition  Patient overall has lost a significant amount of weight however weight subsequently stabilized  Weight today down slightly at 218 pounds    *Radiation esophagitis  Patient is currently using Carafate 1 g 4 times daily  Prescription was sent for Magic mouthwash however patient cannot afford the medication  Symptoms stabilized with Carafate alone and subsequently resolved.     *Chronic left greater than right lower extremity edema with subsequent left inguinal/lower extremity hematoma following TAVR  Patient developed exacerbation of chronic edema in the lower extremities, left slightly greater than right.    Bilateral lower extremity Doppler on 5/9/2023 was negative,  showed bilateral popliteal fluid collections.  Patient with evidence of left inguinal/left lower extremity hematoma following TAVR with ecchymosis tracking into the distal portion of the left lower extremity and ecchymosis laterally in the left hip.  Likely explains decline in hemoglobin and low haptoglobin post procedure.  PET scan 9/5/2023 with evidence of fluid collection/blood in the left inguinal region.    *Hypothyroidism  Hypothyroidism managed by PCP Dr. Danyelle Brush  Labs on 5/30/2023 prior to beginning immunotherapy with evidence of hypothyroidism with TSH 10.5, free T4 0.54.  Levothyroxine dose increased by Dr. Brush from 125 up to 150 mcg daily  Continue to monitor thyroid function studies every 8 weeks on durvalumab  Thyroid function studies today with TSH 6.7, free T4 borderline low at 0.86.  Recheck again in 1 month and if there has been further increase in TSH and decrease in free T4 need to consider adjustment in levothyroxine dose.    *Chemotherapy-induced leukopenia  WBC today 4.75 with normal differential    *Chemotherapy-induced thrombocytopenia  Platelet count has gradually improved since completion of concurrent chemoradiation  Platelet count today 120,000    *Chemotherapy-induced anemia  Hemoglobin did decline into the high 10-low 11 range on concurrent chemoradiation  Hemoglobin subsequently improved and normalized as of 7/28/2023 with hemoglobin of 13.4  Patient underwent TAVR on 8/1/2023 with significant decline in hemoglobin thereafter, reached a low of 7.1 on 8/8/2023.  Additional labs with cardiology on 8/8/2023 with iron 78, iron saturation 22%, TIBC 350.  Patient was started on oral iron although no evidence of iron deficiency.    Labs on 8/15/2023 with hemoglobin 8.7, iron 129, ferritin 120, iron saturation 39%, TIBC 355, B12 288, folate 7.15, haptoglobin less than 10.  Oral iron discontinued as patient was constipated, had no evidence of iron deficiency.  Initiated oral B12 1000 mcg  daily.  Postprocedure anemia and low haptoglobin related to left inguinal/left lower extremity hematoma.  Hemoglobin today has rebounded up to 12.1.  Patient will continue on oral B12 1000 mcg daily.    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  Recommended CTA exam for more definitive evaluation.  Patient was referred to vascular surgery, had to postpone the visit        PLAN:  Continue definitive treatment for stage IIIA non-small cell lung cancer.  Patient completed concurrent chemoradiation with weekly single agent carboplatin (completed on 5/12/2023).  Patient is continuing on durvalumab single agent initiated 5/30/2023 with 10 mg/kg dose every 2 weeks.  With cycle 4 on 7/18/2023 transitioned to 1500 mg every 4 weeks.  Proceed with cycle 6 durvalumab today.  Add cortisol level to today's labs  Patient continues on levothyroxine currently at 150 mcg daily managed by Dr. Brush.  We will continue monitoring TSH, free T4 routinely with ongoing immunotherapy   Continue oral B12 1000 mcg daily  In 4 weeks NP visit with CBC, CMP, TSH, free T4 and cycle 7 durvalumab.  In 7 weeks PET scan  In 8 weeks MD visit with CBC, CMP, TSH, free T4 and cycle 8 durvalumab pending PET scan results.      The patient is continuing on high risk medication requiring intensive monitoring    I did spend 45 minutes in total time caring for the patient today, time spent in review of records, face-to-face consultation, coordination of care, placement of orders, completion of documentation.

## 2023-09-12 ENCOUNTER — INFUSION (OUTPATIENT)
Dept: ONCOLOGY | Facility: HOSPITAL | Age: 68
End: 2023-09-12
Payer: MEDICARE

## 2023-09-12 ENCOUNTER — OFFICE VISIT (OUTPATIENT)
Dept: ONCOLOGY | Facility: CLINIC | Age: 68
End: 2023-09-12
Payer: MEDICARE

## 2023-09-12 ENCOUNTER — PATIENT OUTREACH (OUTPATIENT)
Dept: OTHER | Facility: HOSPITAL | Age: 68
End: 2023-09-12
Payer: MEDICARE

## 2023-09-12 VITALS
HEIGHT: 62 IN | RESPIRATION RATE: 18 BRPM | WEIGHT: 218 LBS | OXYGEN SATURATION: 99 % | DIASTOLIC BLOOD PRESSURE: 76 MMHG | SYSTOLIC BLOOD PRESSURE: 172 MMHG | HEART RATE: 57 BPM | BODY MASS INDEX: 40.12 KG/M2 | TEMPERATURE: 98.2 F

## 2023-09-12 DIAGNOSIS — C34.32 MALIGNANT NEOPLASM OF LOWER LOBE OF LEFT LUNG: Primary | ICD-10-CM

## 2023-09-12 DIAGNOSIS — Z45.2 ENCOUNTER FOR ADJUSTMENT OR MANAGEMENT OF VASCULAR ACCESS DEVICE: ICD-10-CM

## 2023-09-12 DIAGNOSIS — C34.32 MALIGNANT NEOPLASM OF LOWER LOBE OF LEFT LUNG: ICD-10-CM

## 2023-09-12 LAB
ALBUMIN SERPL-MCNC: 3.7 G/DL (ref 3.5–5.2)
ALBUMIN/GLOB SERPL: 1.4 G/DL
ALP SERPL-CCNC: 83 U/L (ref 39–117)
ALT SERPL W P-5'-P-CCNC: 8 U/L (ref 1–33)
ANION GAP SERPL CALCULATED.3IONS-SCNC: 13.6 MMOL/L (ref 5–15)
AST SERPL-CCNC: 19 U/L (ref 1–32)
BASOPHILS # BLD AUTO: 0.02 10*3/MM3 (ref 0–0.2)
BASOPHILS NFR BLD AUTO: 0.4 % (ref 0–1.5)
BILIRUB SERPL-MCNC: 0.3 MG/DL (ref 0–1.2)
BUN SERPL-MCNC: 25 MG/DL (ref 8–23)
BUN/CREAT SERPL: 28.1 (ref 7–25)
CALCIUM SPEC-SCNC: 9.4 MG/DL (ref 8.6–10.5)
CHLORIDE SERPL-SCNC: 101 MMOL/L (ref 98–107)
CO2 SERPL-SCNC: 24.4 MMOL/L (ref 22–29)
CORTIS SERPL-MCNC: 8.27 MCG/DL
CREAT SERPL-MCNC: 0.89 MG/DL (ref 0.6–1.1)
DEPRECATED RDW RBC AUTO: 46.4 FL (ref 37–54)
EGFRCR SERPLBLD CKD-EPI 2021: 71.2 ML/MIN/1.73
EOSINOPHIL # BLD AUTO: 0.13 10*3/MM3 (ref 0–0.4)
EOSINOPHIL NFR BLD AUTO: 2.7 % (ref 0.3–6.2)
ERYTHROCYTE [DISTWIDTH] IN BLOOD BY AUTOMATED COUNT: 12.4 % (ref 12.3–15.4)
GLOBULIN UR ELPH-MCNC: 2.6 GM/DL
GLUCOSE SERPL-MCNC: 155 MG/DL (ref 65–99)
HCT VFR BLD AUTO: 36 % (ref 34–46.6)
HGB BLD-MCNC: 12.1 G/DL (ref 12–15.9)
IMM GRANULOCYTES # BLD AUTO: 0.01 10*3/MM3 (ref 0–0.05)
IMM GRANULOCYTES NFR BLD AUTO: 0.2 % (ref 0–0.5)
LYMPHOCYTES # BLD AUTO: 0.54 10*3/MM3 (ref 0.7–3.1)
LYMPHOCYTES NFR BLD AUTO: 11.4 % (ref 19.6–45.3)
MCH RBC QN AUTO: 33.5 PG (ref 26.6–33)
MCHC RBC AUTO-ENTMCNC: 33.6 G/DL (ref 31.5–35.7)
MCV RBC AUTO: 99.7 FL (ref 79–97)
MONOCYTES # BLD AUTO: 0.44 10*3/MM3 (ref 0.1–0.9)
MONOCYTES NFR BLD AUTO: 9.3 % (ref 5–12)
NEUTROPHILS NFR BLD AUTO: 3.61 10*3/MM3 (ref 1.7–7)
NEUTROPHILS NFR BLD AUTO: 76 % (ref 42.7–76)
NRBC BLD AUTO-RTO: 0 /100 WBC (ref 0–0.2)
PLATELET # BLD AUTO: 120 10*3/MM3 (ref 140–450)
PMV BLD AUTO: 10 FL (ref 6–12)
POTASSIUM SERPL-SCNC: 4.7 MMOL/L (ref 3.5–5.2)
PROT SERPL-MCNC: 6.3 G/DL (ref 6–8.5)
RBC # BLD AUTO: 3.61 10*6/MM3 (ref 3.77–5.28)
SODIUM SERPL-SCNC: 139 MMOL/L (ref 136–145)
T4 FREE SERPL-MCNC: 0.86 NG/DL (ref 0.93–1.7)
TSH SERPL DL<=0.05 MIU/L-ACNC: 6.72 UIU/ML (ref 0.27–4.2)
WBC NRBC COR # BLD: 4.75 10*3/MM3 (ref 3.4–10.8)

## 2023-09-12 PROCEDURE — 25010000002 DURVALUMAB 50 MG/ML SOLUTION 10 ML VIAL: Performed by: INTERNAL MEDICINE

## 2023-09-12 PROCEDURE — 82533 TOTAL CORTISOL: CPT | Performed by: INTERNAL MEDICINE

## 2023-09-12 PROCEDURE — 80053 COMPREHEN METABOLIC PANEL: CPT

## 2023-09-12 PROCEDURE — 85025 COMPLETE CBC W/AUTO DIFF WBC: CPT

## 2023-09-12 PROCEDURE — 96413 CHEMO IV INFUSION 1 HR: CPT

## 2023-09-12 PROCEDURE — 84439 ASSAY OF FREE THYROXINE: CPT | Performed by: INTERNAL MEDICINE

## 2023-09-12 PROCEDURE — 84443 ASSAY THYROID STIM HORMONE: CPT | Performed by: INTERNAL MEDICINE

## 2023-09-12 PROCEDURE — 25010000002 HEPARIN LOCK FLUSH PER 10 UNITS: Performed by: INTERNAL MEDICINE

## 2023-09-12 RX ORDER — HEPARIN SODIUM (PORCINE) LOCK FLUSH IV SOLN 100 UNIT/ML 100 UNIT/ML
500 SOLUTION INTRAVENOUS AS NEEDED
OUTPATIENT
Start: 2023-09-12

## 2023-09-12 RX ORDER — SODIUM CHLORIDE 9 MG/ML
250 INJECTION, SOLUTION INTRAVENOUS ONCE
Status: COMPLETED | OUTPATIENT
Start: 2023-09-12 | End: 2023-09-12

## 2023-09-12 RX ORDER — SODIUM CHLORIDE 0.9 % (FLUSH) 0.9 %
10 SYRINGE (ML) INJECTION AS NEEDED
Status: DISCONTINUED | OUTPATIENT
Start: 2023-09-12 | End: 2023-09-12 | Stop reason: HOSPADM

## 2023-09-12 RX ORDER — HEPARIN SODIUM (PORCINE) LOCK FLUSH IV SOLN 100 UNIT/ML 100 UNIT/ML
500 SOLUTION INTRAVENOUS AS NEEDED
Status: DISCONTINUED | OUTPATIENT
Start: 2023-09-12 | End: 2023-09-12 | Stop reason: HOSPADM

## 2023-09-12 RX ORDER — SODIUM CHLORIDE 0.9 % (FLUSH) 0.9 %
10 SYRINGE (ML) INJECTION AS NEEDED
OUTPATIENT
Start: 2023-09-12

## 2023-09-12 RX ADMIN — Medication 500 UNITS: at 13:40

## 2023-09-12 RX ADMIN — SODIUM CHLORIDE 1500 MG: 900 INJECTION, SOLUTION INTRAVENOUS at 12:39

## 2023-09-12 RX ADMIN — SODIUM CHLORIDE 250 ML: 9 INJECTION, SOLUTION INTRAVENOUS at 12:39

## 2023-09-12 RX ADMIN — Medication 10 ML: at 13:40

## 2023-09-12 NOTE — PROGRESS NOTES
Reviewed chart.    Met patient in radiation center today. She had a recent PET scan and is meeting with Dr. Lombardi to discuss the results and next steps. She saw the report on [id number] and is concerned about a recurrence.     Her daughter has been working with [employer],  on [employer] assistance paperwork.    She denies any other needs at this time. I will call next week

## 2023-09-12 NOTE — LETTER
"September 12, 2023     Danyelle Brush MD  [address] [place] [id number]    Patient: Gabby Acosta   YOB: 1955   Date of Visit: 9/12/2023     Dear Danyelle Brush MD:       Thank you for referring Gabby Acosta to me for evaluation. Below are the relevant portions of my assessment and plan of care.    If you have questions, please do not hesitate to call me. I look forward to following Gabby along with you.         Sincerely,        Carmelo Lombardi MD        CC: MD Harjit Butler John L Jr., MD  09/12/23 1584  Sign when Signing Visit  Chief Complaint  Non-small cell lung cancer, clinical stage IIIA ([id number]), aortic stenosis, cervical spine stenosis, COPD, hepatic steatosis, diabetes mellitus    Subjective        History of Present Illness  Patient returns today in follow-up due to continue on treatment with durvalumab, due for her sixth cycle of therapy today after switching to every 4-week dosing with her fourth treatment.  Today the patient has laboratory studies and PET scan to review.  Since her last visit, she reports that she has had difficulty with hematoma on the left hip and groin area that did track into her left leg.  She reports that this is gradually improving, decreasing in size.  She denies any erythema around this area, no fevers or chills.  She does note significant fatigue that has been occurred after the last few treatments that last for around 2 weeks and then improves.  During this timeframe she does sleep more, feels that the fatigue is tolerable and manageable.  She has no other specific complaints today.        Objective   Vital Signs:   /76   Pulse 57   Temp 98.2 °F (36.8 °C) (Temporal)   Resp 18   Ht 157 cm (61.81\")   Wt 98.9 kg (218 lb)   SpO2 99%   BMI 40.12 kg/m²     Physical Exam  Constitutional:       Appearance: She is well-developed.      Comments: Patient is using a wheelchair today   Eyes:      Conjunctiva/sclera: Conjunctivae " normal.   Neck:      Thyroid: No thyromegaly.   Cardiovascular:      Rate and Rhythm: Normal rate and regular rhythm.      Heart sounds: No murmur heard.    No friction rub. No gallop.   Pulmonary:      Effort: No respiratory distress.      Breath sounds: Normal breath sounds.   Abdominal:      General: Bowel sounds are normal. There is no distension.      Palpations: Abdomen is soft.      Tenderness: There is no abdominal tenderness.   Musculoskeletal:      Comments: Trace bilateral lower extremity edema   Lymphadenopathy:      Head:      Right side of head: No submandibular adenopathy.      Cervical: No cervical adenopathy.      Upper Body:      Right upper body: No supraclavicular adenopathy.      Left upper body: No supraclavicular adenopathy.   Skin:     General: Skin is warm and dry.      Findings: No rash.   Neurological:      Mental Status: She is alert and oriented to person, place, and time.      Cranial Nerves: No cranial nerve deficit.      Motor: No abnormal muscle tone.      Deep Tendon Reflexes: Reflexes normal.   Psychiatric:         Behavior: Behavior normal.         Result Review : Reviewed CBC, CMP, TSH, free T4 from today.  Reviewed PET scan from 9/5/2023.       Assessment and Plan     *Non-small cell lung cancer, clinical stage IIIA ([id number])  Patient has history of smoking 1 pack/day x 45 years, quit in November 2021.    Following anterior cervical corpectomy in September 2022, she experienced acute hypoxemic respiratory failure.    CT angiogram chest 9/25/2022 showed a concerning 1.4 cm left lower lobe pulmonary nodule, mediastinal lymphadenopathy with infracarinal lymph node 2.7 cm, left hilar lymph node 1.2 cm, small right hilar lymph nodes up from 1.1 cm and additional enlarged mediastinal nodes in the right paratracheal, precarinal, infracarinal spaces.  There was a wedge-shaped area of consolidation/atelectasis in the right middle lobe base, subpleural density left costophrenic sulcus  felt to represent atelectasis.  It was recommended for her to undergo subsequent PET scan by pulmonary.  The patient has had ongoing follow-up with neurosurgery and on CT scans 11/29/2022 of cervical and thoracic spine, there was concern regarding spinal instability with potential need for posterior cervical fusion.  This was scheduled in December 2022 however was delayed due to upper respiratory infection.  Patient admitted on 1/10/2023 to address multiple medical issues in order to prepare for potential neurosurgical procedure on 3/12/2023.  Patient underwent echocardiogram on 1/11/2023 with ejection fraction greater than 70%, grade 1 diastolic dysfunction, severe aortic stenosis.  Patient is being followed by cardiology with consideration of need for TAVR versus SAVR for moderate to severe aortic stenosis.  Repeat CT chest on 1/12/2023 showed slight increase in size of the left lower lobe pulmonary nodule increased from 1.4 to 1.5 cm.  Groundglass nodule peripheral left lower lobe less conspicuous and resolution of previous reticulonodular opacities right lower lobe.  There was further enlargement of mediastinal lymph nodes with right posterior paratracheal lymph node 1.4 x 2.1 up to 1.5 x 2.3 cm, subcarinal lymph node increased from 2.7 x 5.2 up to 3.2 x 5.8 cm, left hilar lymph node increased from 1.2 up to 1.4 cm, and is stable 1.7 cm left infrahilar lymph node.  There was also comment regarding hepatic steatosis with changes of chronic liver disease and stable indeterminate nodular thickening of the adrenal glands.  CT-guided lung biopsy on 1/13/2023 with pathology that showed invasive poorly differentiated non-small cell carcinoma with focal neuroendocrine differentiation.  Staining pattern and morphology favor poorly differentiated adenocarcinoma however there was focal CD56 staining, could not entirely exclude large cell neuroendocrine carcinoma.  PD-L1 35%, Caris analysis pending.  It was felt that there  would be increased risk for bronchoscopy from a cardiac standpoint due to her aortic stenosis.  Staging MRI brain 1/29/2023 showed no evidence of metastatic disease  Staging PET scan 1/26/2023 showed hypermetabolic left lower lobe pulmonary nodule, size difficult to determine on PET however on recent CT was 1.6 cm (SUV 14.4).  Mediastinal and left hilar hypermetabolic lymphadenopathy (subcarinal lymph node 3.2 cm, SUV 31.7, left paratracheal 2.2 cm lymph node SUV 12.7).  Sub-6 mm pulmonary nodule left upper lobe unchanged and below PET resolution.  Focal uptake posterior right hepatic lobe SUV 7.2 of uncertain significance.  Recommended MRI to evaluate.  Right thyroid activity SUV 5.3, recommended ultrasound.  Long segment uptake distal esophagus consistent with esophagitis.  Uptake posterior larynx, nonspecific, recommended direct visualization.  MRI abdomen 2/6/2023 with no evidence to suggest metastatic disease in the liver.  Subcentimeter nonenhancing lesions favor cyst or hemangiomas.  Patient underwent right and left heart catheterization with cardiology on 2/14/2023.  Identification of coronary artery disease, mild pulmonary hypertension, severe aortic stenosis.  Recommendation per cardiology to proceed with treatment for lung cancer and defer any intervention regarding aortic stenosis.  Concern regarding underlying neurologic issues from cervical stenosis and potential neuropathic effects from Taxol chemotherapy.  Concern regarding patient's overall performance status and ability to tolerate full dose chemotherapy.  Patient discussed at thoracic oncology tumor board with consensus to treat with weekly low-dose carboplatin concurrent with radiation therapy and omit Taxol.  Subsequent plan for 1 year durvalumab following concurrent chemoradiation.  Significant delay in initiating concurrent chemoradiation due to logistics with scheduling Mediport placement, patient canceled appointments and delayed initiation  of treatment.  On 3/21/2023 initiated concurrent chemoradiation with weekly carboplatin (AUC 2) on 2/28/2023.  Week 4 carboplatin held 4/11 and again 4/18/2023 due to thrombocytopenia  Week 4 carboplatin resumed on 4/25/2023 at reduced dose to AUC 1 due to borderline neutropenia  Chemo and radiation held beginning 5/2/2023 due to neutropenia with WBC 1.13, ANC 0.53  Resumed radiation therapy on 5/8/2023 with recovery of WBC.  Received fifth and final weekly dose of concurrent carboplatin (AUC 1) on 5/9/2023.  Patient completed radiation therapy on 5/12/2023.  CT chest abdomen pelvis 5/25/2023 with significant improvement in mediastinal and left hilar lymphadenopathy, decrease in left lower lobe nodule indicating significant response.  On 5/30/2023 initiated treatment with durvalumab 10 mg/kg every 2 weeks x 1 year.  Transitioned to durvalumab 1500 mg every 4-week dosing with cycle 4 on 7/18/2023.  TAVR CT chest abdomen pelvis 7/21/2023 with decreased left lower lobe nodule from 8 down to 6 mm, stable mediastinal lymph nodes (with the exception of 1 small superior mediastinal lymph node increased from 4 to 7 mm of unclear significance).  Upper abdominal lymph nodes with slight increase in the periportal, hepatogastric, peripancreatic regions.  Following 5 cycles durvalumab, PET scan 9/5/2023 with left lower lobe ill-defined consolidation and opacification likely representing postradiation change.  No mediastinal lymphadenopathy nor hypermetabolic activity.  Slight enlargement (0.7 up to 1.0 cm) of anterior pericardial lymph node with new hypermetabolic activity (SUV 5.5.  Stable epigastric adenopathy with mild hypermetabolic activity (SUV 4.4) unchanged from 1/26/2023 scan (although not noted on that report).  Nondisplaced, healing left seventh rib fracture.  Significance of the pericardial lymph node and epigastric lymphadenopathy unclear.  Neither area easily accessible for CT-guided biopsy after discussion with  interventional radiology on 9/12/2023.  Elected to monitor on repeat PET scan at 2-month interval.  Patient returns today in follow-up due for cycle 6 durvalumab having transitioned to every 4-week dosing with cycle 4.  We are following up results from the above-mentioned PET scan from 9/5/2023.  Patient has responded well to concurrent chemoradiation with no residual hypermetabolic mediastinal, hilar adenopathy.  There is evidence of postradiation change in the left lower lobe at site of primary lesion.  There was a slight increase in a pericardial lymph node from 0.7 up to 1 cm with new activity SUV 5.5 of unclear significance.  There was also comment on the current scan report of stable epigastric adenopathy with tona hepatis conglomerate lymph node up to 2.9 cm with SUV 4.5.  Reported that this was present on the prior study however that was not noted on the prior report.  Significance of this is unclear.  There is also postprocedural change in the left groin where the patient experienced a significant hematoma.  There is no clinical evidence to suggest infection in this area.  I did contact interventional radiology and discussed the scan findings with Dr. Ny today.  He indicated that the pericardial lymph node and tona hepatis lymphadenopathy would be very difficult to reach with CT-guided biopsy.  I discussed this with the patient as well.  We will forego attempt at biopsy at this time and plan a 2-month interval follow-up PET scan to reevaluate these areas.  In the interim she will continue on durvalumab as planned.  She is tolerating immunotherapy well with the exception of fatigue that is increased few weeks after each treatment.    *Aortic stenosis  Patient underwent echocardiogram on 1/11/2023 with ejection fraction greater than 70%, grade 1 diastolic dysfunction, severe aortic stenosis.    Patient followed by cardiology with consideration of need for TAVR versus SAVR for moderate to severe aortic  stenosis.  Patient underwent cardiac catheterization 2/14/2023 with identification of coronary artery disease, mild pulmonary hypertension, severe aortic stenosis.  Per cardiology, recommendation to proceed with treatment for lung cancer and defer any consideration of intervention for aortic stenosis.  Patient did undergo TAVR on 8/1/2023  Echocardiogram on 8/2/2023 with ejection fraction 62.4%, TAVR present  Patient continues follow-up with cardiology     *Cervical spine stenosis  Patient with cervical spine stenosis with associated myelopathy.   She underwent surgery on 9/20/2022 with anterior cervical corpectomy with cage.      The patient has had ongoing follow-up with neurosurgery and on CT scans 11/29/2022 of cervical and thoracic spine, there was concern regarding spinal instability with potential need for posterior cervical fusion.  This was scheduled in December 2022 however was delayed due to upper respiratory infection.  Patient admitted on 1/10/2023 to address multiple medical issues in order to prepare for potential neurosurgical procedure on 3/12/2023.  Patient was previously having severe symptoms related to her cervical stenosis with reduced ability to ambulate previously and significant limitations in movement in her hands, dropping objects.  More recently however she has improved and has been able to walk with the use of a walker, symptoms in the upper extremities have improved and her dexterity is better.    It appears that her neurosurgical procedure will need to be delayed until we can at least complete the concurrent chemoradiation portion of her treatment.  Surgery would be feasible while continuing on immunotherapy in the future.  Neurosurgical follow-up has been placed on hold.  Patient notes that her mobility and symptoms in her extremities have continued to improve over time     *COPD  Patient has history of smoking 1 pack/day x 45 years, quit in November 2021.  Patient has not undergone  formal PFTs  Patient being followed by pulmonary, currently using albuterol inhaler as needed     *Hepatic steatosis  Identified on prior scans  PET scan 1/26/2023 with questionable area of activity seen but no corresponding CT abnormality.    MRI abdomen 2/6/2023 with no evidence to suggest metastatic disease in the liver.  Subcentimeter nonenhancing lesions favor cyst or hemangiomas.  LFTs remain normal     *Diabetes mellitus    *Anxiety/depression  Patient reports feeling overwhelmed regarding the amount of information presented today in regards to her malignancy as well as her other medical issues.    Patient was seen by supportive oncology on 2/27/2023, added gabapentin 300 mg 3 times daily.  Patient continuing on Wellbutrin  mg daily  Patient notes that symptoms are under fair control currently.  Patient has continued follow-up with supportive oncology.      *Right thyroid uptake on PET scan 1/26/2023  Patient was scheduled for thyroid ultrasound however did not feel that she was able to undergo the procedure due to her neck issues and therefore was canceled, consider at future date  No mention of thyroid uptake on PET scan 9/5/2023    *Laryngeal uptake on PET scan 1/26/2023  Patient was seen by ENT on 3/28/2023, laryngoscopy showed mild edema in the posterior glottis consistent with laryngeal pharyngeal reflux, no evidence of malignancy.  No mention of laryngeal uptake on PET scan 9/5/2023    *Esophageal uptake on PET scan 1/26/2023  [place] to be consistent with esophagitis in the distal esophagus.  We will hold on referral for EGD given the multitude of additional procedures and consults required above.  No mention of esophageal uptake on PET scan 9/5/2023    *Venous access  Mediport placed by Dr. Shook on 3/2/2023    *Nutrition/weight loss  Patient has been seen by oncology nutrition  Patient overall has lost a significant amount of weight however weight subsequently stabilized  Weight today down  slightly at 218 pounds    *Radiation esophagitis  Patient is currently using Carafate 1 g 4 times daily  Prescription was sent for Magic mouthwash however patient cannot afford the medication  Symptoms stabilized with Carafate alone and subsequently resolved.     *Chronic left greater than right lower extremity edema with subsequent left inguinal/lower extremity hematoma following TAVR  Patient developed exacerbation of chronic edema in the lower extremities, left slightly greater than right.    Bilateral lower extremity Doppler on 5/9/2023 was negative, showed bilateral popliteal fluid collections.  Patient with evidence of left inguinal/left lower extremity hematoma following TAVR with ecchymosis tracking into the distal portion of the left lower extremity and ecchymosis laterally in the left hip.  Likely explains decline in hemoglobin and low haptoglobin post procedure.  PET scan 9/5/2023 with evidence of fluid collection/blood in the left inguinal region.    *Hypothyroidism  Hypothyroidism managed by PCP Dr. aDnyelle Brush  Labs on 5/30/2023 prior to beginning immunotherapy with evidence of hypothyroidism with TSH 10.5, free T4 0.54.  Levothyroxine dose increased by Dr. Brush from 125 up to 150 mcg daily  Continue to monitor thyroid function studies every 8 weeks on durvalumab  Thyroid function studies today with TSH 6.7, free T4 borderline low at 0.86.  Recheck again in 1 month and if there has been further increase in TSH and decrease in free T4 need to consider adjustment in levothyroxine dose.    *Chemotherapy-induced leukopenia  WBC today 4.75 with normal differential    *Chemotherapy-induced thrombocytopenia  Platelet count has gradually improved since completion of concurrent chemoradiation  Platelet count today 120,000    *Chemotherapy-induced anemia  Hemoglobin did decline into the high 10-low 11 range on concurrent chemoradiation  Hemoglobin subsequently improved and normalized as of 7/28/2023 with  hemoglobin of 13.4  Patient underwent TAVR on 8/1/2023 with significant decline in hemoglobin thereafter, reached a low of 7.1 on 8/8/2023.  Additional labs with cardiology on 8/8/2023 with iron 78, iron saturation 22%, TIBC 350.  Patient was started on oral iron although no evidence of iron deficiency.    Labs on 8/15/2023 with hemoglobin 8.7, iron 129, ferritin 120, iron saturation 39%, TIBC 355, B12 288, folate 7.15, haptoglobin less than 10.  Oral iron discontinued as patient was constipated, had no evidence of iron deficiency.  Initiated oral B12 1000 mcg daily.  Postprocedure anemia and low haptoglobin related to left inguinal/left lower extremity hematoma.  Hemoglobin today has rebounded up to 12.1.  Patient will continue on oral B12 1000 mcg daily.    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  Recommended CTA exam for more definitive evaluation.  Patient was referred to vascular surgery, had to postpone the visit        PLAN:  Continue definitive treatment for stage IIIA non-small cell lung cancer.  Patient completed concurrent chemoradiation with weekly single agent carboplatin (completed on 5/12/2023).  Patient is continuing on durvalumab single agent initiated 5/30/2023 with 10 mg/kg dose every 2 weeks.  With cycle 4 on 7/18/2023 transitioned to 1500 mg every 4 weeks.  Proceed with cycle 6 durvalumab today.  Add cortisol level to today's labs  Patient continues on levothyroxine currently at 150 mcg daily managed by Dr. Brush.  We will continue monitoring TSH, free T4 routinely with ongoing immunotherapy   Continue oral B12 1000 mcg daily  In 4 weeks NP visit with CBC, CMP, TSH, free T4 and cycle 7 durvalumab.  In 7 weeks PET scan  In 8 weeks MD visit with CBC, CMP, TSH, free T4 and cycle 8 durvalumab pending PET scan results.      The patient is continuing on high risk medication requiring intensive monitoring    I did spend  45 minutes in total time caring for the patient today, time spent in review of records, face-to-face consultation, coordination of care, placement of orders, completion of documentation.

## 2023-09-18 ENCOUNTER — PATIENT OUTREACH (OUTPATIENT)
Dept: OTHER | Facility: HOSPITAL | Age: 68
End: 2023-09-18
Payer: MEDICARE

## 2023-09-18 NOTE — PROGRESS NOTES
Reviewed chart    Called patient. She met with Dr. Lombardi last week. The patient will have a repeat PET scan 10/31. She continues to receive treatment monthly.     Maria Elena has been working with [id number] for her assistance.  The patient states that [employer] won't accept Maria Elena's signature for the assistance.  The patient's daughter, Yolanda will see if Dr. Lombardi will sign the form for her.     We again discussed integrative therapies and other [facility]. Patient expressed gratitude for my support and denied any additional needs at this time. I will call in 1-2 months; encouraged patient to call as needed.

## 2023-09-25 ENCOUNTER — HOSPITAL ENCOUNTER (OUTPATIENT)
Dept: CARDIOLOGY | Facility: HOSPITAL | Age: 68
Discharge: HOME | End: 2023-09-25
Payer: MEDICARE

## 2023-10-10 ENCOUNTER — INFUSION (OUTPATIENT)
Dept: ONCOLOGY | Facility: HOSPITAL | Age: 68
End: 2023-10-10
Payer: MEDICARE

## 2023-10-10 ENCOUNTER — OFFICE VISIT (OUTPATIENT)
Dept: ONCOLOGY | Facility: CLINIC | Age: 68
End: 2023-10-10
Payer: MEDICARE

## 2023-10-10 VITALS
TEMPERATURE: 97.7 F | SYSTOLIC BLOOD PRESSURE: 148 MMHG | WEIGHT: 214.2 LBS | BODY MASS INDEX: 39.42 KG/M2 | DIASTOLIC BLOOD PRESSURE: 81 MMHG | HEIGHT: 62 IN | HEART RATE: 58 BPM | OXYGEN SATURATION: 96 %

## 2023-10-10 DIAGNOSIS — C34.32 MALIGNANT NEOPLASM OF LOWER LOBE OF LEFT LUNG: ICD-10-CM

## 2023-10-10 DIAGNOSIS — C34.32 MALIGNANT NEOPLASM OF LOWER LOBE OF LEFT LUNG: Primary | ICD-10-CM

## 2023-10-10 DIAGNOSIS — Z45.2 ENCOUNTER FOR ADJUSTMENT OR MANAGEMENT OF VASCULAR ACCESS DEVICE: ICD-10-CM

## 2023-10-10 DIAGNOSIS — Z79.899 HIGH RISK MEDICATION USE: ICD-10-CM

## 2023-10-10 LAB
ALBUMIN SERPL-MCNC: 3.6 G/DL (ref 3.5–5.2)
ALBUMIN/GLOB SERPL: 1.2 G/DL
ALP SERPL-CCNC: 71 U/L (ref 39–117)
ALT SERPL W P-5'-P-CCNC: 12 U/L (ref 1–33)
ANION GAP SERPL CALCULATED.3IONS-SCNC: 12.4 MMOL/L (ref 5–15)
AST SERPL-CCNC: 24 U/L (ref 1–32)
BASOPHILS # BLD AUTO: 0.04 10*3/MM3 (ref 0–0.2)
BASOPHILS NFR BLD AUTO: 0.7 % (ref 0–1.5)
BILIRUB SERPL-MCNC: 0.2 MG/DL (ref 0–1.2)
BUN SERPL-MCNC: 20 MG/DL (ref 8–23)
BUN/CREAT SERPL: 20.8 (ref 7–25)
CALCIUM SPEC-SCNC: 9 MG/DL (ref 8.6–10.5)
CHLORIDE SERPL-SCNC: 101 MMOL/L (ref 98–107)
CO2 SERPL-SCNC: 25.6 MMOL/L (ref 22–29)
CREAT SERPL-MCNC: 0.96 MG/DL (ref 0.6–1.1)
DEPRECATED RDW RBC AUTO: 40.7 FL (ref 37–54)
EGFRCR SERPLBLD CKD-EPI 2021: 65 ML/MIN/1.73
EOSINOPHIL # BLD AUTO: 0.16 10*3/MM3 (ref 0–0.4)
EOSINOPHIL NFR BLD AUTO: 2.9 % (ref 0.3–6.2)
ERYTHROCYTE [DISTWIDTH] IN BLOOD BY AUTOMATED COUNT: 11.6 % (ref 12.3–15.4)
GLOBULIN UR ELPH-MCNC: 2.9 GM/DL
GLUCOSE SERPL-MCNC: 147 MG/DL (ref 65–99)
HCT VFR BLD AUTO: 40.5 % (ref 34–46.6)
HGB BLD-MCNC: 14 G/DL (ref 12–15.9)
IMM GRANULOCYTES # BLD AUTO: 0.03 10*3/MM3 (ref 0–0.05)
IMM GRANULOCYTES NFR BLD AUTO: 0.6 % (ref 0–0.5)
LYMPHOCYTES # BLD AUTO: 0.57 10*3/MM3 (ref 0.7–3.1)
LYMPHOCYTES NFR BLD AUTO: 10.5 % (ref 19.6–45.3)
MCH RBC QN AUTO: 33.1 PG (ref 26.6–33)
MCHC RBC AUTO-ENTMCNC: 34.6 G/DL (ref 31.5–35.7)
MCV RBC AUTO: 95.7 FL (ref 79–97)
MONOCYTES # BLD AUTO: 0.42 10*3/MM3 (ref 0.1–0.9)
MONOCYTES NFR BLD AUTO: 7.7 % (ref 5–12)
NEUTROPHILS NFR BLD AUTO: 4.22 10*3/MM3 (ref 1.7–7)
NEUTROPHILS NFR BLD AUTO: 77.6 % (ref 42.7–76)
NRBC BLD AUTO-RTO: 0 /100 WBC (ref 0–0.2)
PLATELET # BLD AUTO: 119 10*3/MM3 (ref 140–450)
PMV BLD AUTO: 10.2 FL (ref 6–12)
POTASSIUM SERPL-SCNC: 4.3 MMOL/L (ref 3.5–5.2)
PROT SERPL-MCNC: 6.5 G/DL (ref 6–8.5)
RBC # BLD AUTO: 4.23 10*6/MM3 (ref 3.77–5.28)
SODIUM SERPL-SCNC: 139 MMOL/L (ref 136–145)
T4 FREE SERPL-MCNC: 1.15 NG/DL (ref 0.93–1.7)
TSH SERPL DL<=0.05 MIU/L-ACNC: 15.4 UIU/ML (ref 0.27–4.2)
WBC NRBC COR # BLD: 5.44 10*3/MM3 (ref 3.4–10.8)

## 2023-10-10 PROCEDURE — 84443 ASSAY THYROID STIM HORMONE: CPT | Performed by: INTERNAL MEDICINE

## 2023-10-10 PROCEDURE — 25810000003 SODIUM CHLORIDE 0.9 % SOLUTION 250 ML FLEX CONT: Performed by: NURSE PRACTITIONER

## 2023-10-10 PROCEDURE — 25810000003 SODIUM CHLORIDE 0.9 % SOLUTION: Performed by: NURSE PRACTITIONER

## 2023-10-10 PROCEDURE — 25010000002 HEPARIN LOCK FLUSH PER 10 UNITS: Performed by: INTERNAL MEDICINE

## 2023-10-10 PROCEDURE — 96413 CHEMO IV INFUSION 1 HR: CPT

## 2023-10-10 PROCEDURE — 85025 COMPLETE CBC W/AUTO DIFF WBC: CPT

## 2023-10-10 PROCEDURE — 25010000002 DURVALUMAB 50 MG/ML SOLUTION 10 ML VIAL: Performed by: NURSE PRACTITIONER

## 2023-10-10 PROCEDURE — 80053 COMPREHEN METABOLIC PANEL: CPT

## 2023-10-10 PROCEDURE — 84439 ASSAY OF FREE THYROXINE: CPT | Performed by: INTERNAL MEDICINE

## 2023-10-10 RX ORDER — SODIUM CHLORIDE 9 MG/ML
250 INJECTION, SOLUTION INTRAVENOUS ONCE
Status: COMPLETED | OUTPATIENT
Start: 2023-10-10 | End: 2023-10-10

## 2023-10-10 RX ORDER — SODIUM CHLORIDE 0.9 % (FLUSH) 0.9 %
10 SYRINGE (ML) INJECTION AS NEEDED
Status: DISCONTINUED | OUTPATIENT
Start: 2023-10-10 | End: 2023-10-10 | Stop reason: HOSPADM

## 2023-10-10 RX ORDER — SODIUM CHLORIDE 0.9 % (FLUSH) 0.9 %
10 SYRINGE (ML) INJECTION AS NEEDED
OUTPATIENT
Start: 2023-10-10

## 2023-10-10 RX ORDER — HEPARIN SODIUM (PORCINE) LOCK FLUSH IV SOLN 100 UNIT/ML 100 UNIT/ML
500 SOLUTION INTRAVENOUS AS NEEDED
OUTPATIENT
Start: 2023-10-10

## 2023-10-10 RX ORDER — HEPARIN SODIUM (PORCINE) LOCK FLUSH IV SOLN 100 UNIT/ML 100 UNIT/ML
500 SOLUTION INTRAVENOUS AS NEEDED
Status: DISCONTINUED | OUTPATIENT
Start: 2023-10-10 | End: 2023-10-10 | Stop reason: HOSPADM

## 2023-10-10 RX ORDER — SODIUM CHLORIDE 9 MG/ML
250 INJECTION, SOLUTION INTRAVENOUS ONCE
Status: CANCELLED | OUTPATIENT
Start: 2023-10-10

## 2023-10-10 RX ADMIN — SODIUM CHLORIDE 250 ML: 9 INJECTION, SOLUTION INTRAVENOUS at 11:34

## 2023-10-10 RX ADMIN — SODIUM CHLORIDE 1500 MG: 900 INJECTION, SOLUTION INTRAVENOUS at 11:35

## 2023-10-10 RX ADMIN — Medication 500 UNITS: at 12:36

## 2023-10-10 RX ADMIN — Medication 10 ML: at 12:36

## 2023-10-10 NOTE — PROGRESS NOTES
"Chief Complaint  Non-small cell lung cancer, clinical stage IIIA ([id number]), aortic stenosis, cervical spine stenosis, COPD, hepatic steatosis, diabetes mellitus    Subjective        History of Present Illness  Patient returns today in follow-up due to continue on treatment with durvalumab, due for her seventh cycle of therapy today after switching to every 4-week dosing with her fourth treatment.  Today the patient has laboratory studies and PET scan to review.  She reports that hematoma she has had is further improving and only has small faint area now.  She denies any new pain.  She denies recent fevers, chills, or infections.  She does continue with fatigue.            Objective   Vital Signs:   /81   Pulse 58   Temp 97.7 øF (36.5 øC) (Temporal)   Ht 157 cm (61.81\")   Wt 97.2 kg (214 lb 3.2 oz)   SpO2 96%   BMI 39.42 kg/mý     Physical Exam  Constitutional:       Appearance: She is well-developed.      Comments: Patient is using a wheelchair today   Eyes:      Conjunctiva/sclera: Conjunctivae normal.   Neck:      Thyroid: No thyromegaly.   Cardiovascular:      Rate and Rhythm: Normal rate and regular rhythm.      Heart sounds: No murmur heard.     No friction rub. No gallop.   Pulmonary:      Effort: No respiratory distress.      Breath sounds: Normal breath sounds.   Abdominal:      General: Bowel sounds are normal. There is no distension.      Palpations: Abdomen is soft.      Tenderness: There is no abdominal tenderness.   Musculoskeletal:      Comments: Trace bilateral lower extremity edema   Lymphadenopathy:      Head:      Right side of head: No submandibular adenopathy.      Cervical: No cervical adenopathy.      Upper Body:      Right upper body: No supraclavicular adenopathy.      Left upper body: No supraclavicular adenopathy.   Skin:     General: Skin is warm and dry.      Findings: No rash.   Neurological:      Mental Status: She is alert and oriented to person, place, and time.      " Cranial Nerves: No cranial nerve deficit.      Motor: No abnormal muscle tone.      Deep Tendon Reflexes: Reflexes normal.   Psychiatric:         Behavior: Behavior normal.         Result Review : Reviewed CBC, CMP, TSH, free T4       Assessment and Plan     *Non-small cell lung cancer, clinical stage IIIA ([id number])  Patient has history of smoking 1 pack/day x 45 years, quit in November 2021.    Following anterior cervical corpectomy in September 2022, she experienced acute hypoxemic respiratory failure.    CT angiogram chest 9/25/2022 showed a concerning 1.4 cm left lower lobe pulmonary nodule, mediastinal lymphadenopathy with infracarinal lymph node 2.7 cm, left hilar lymph node 1.2 cm, small right hilar lymph nodes up from 1.1 cm and additional enlarged mediastinal nodes in the right paratracheal, precarinal, infracarinal spaces.  There was a wedge-shaped area of consolidation/atelectasis in the right middle lobe base, subpleural density left costophrenic sulcus felt to represent atelectasis.  It was recommended for her to undergo subsequent PET scan by pulmonary.  The patient has had ongoing follow-up with neurosurgery and on CT scans 11/29/2022 of cervical and thoracic spine, there was concern regarding spinal instability with potential need for posterior cervical fusion.  This was scheduled in December 2022 however was delayed due to upper respiratory infection.  Patient admitted on 1/10/2023 to address multiple medical issues in order to prepare for potential neurosurgical procedure on 3/12/2023.  Patient underwent echocardiogram on 1/11/2023 with ejection fraction greater than 70%, grade 1 diastolic dysfunction, severe aortic stenosis.  Patient is being followed by cardiology with consideration of need for TAVR versus SAVR for moderate to severe aortic stenosis.  Repeat CT chest on 1/12/2023 showed slight increase in size of the left lower lobe pulmonary nodule increased from 1.4 to 1.5 cm.  Groundglass  nodule peripheral left lower lobe less conspicuous and resolution of previous reticulonodular opacities right lower lobe.  There was further enlargement of mediastinal lymph nodes with right posterior paratracheal lymph node 1.4 x 2.1 up to 1.5 x 2.3 cm, subcarinal lymph node increased from 2.7 x 5.2 up to 3.2 x 5.8 cm, left hilar lymph node increased from 1.2 up to 1.4 cm, and is stable 1.7 cm left infrahilar lymph node.  There was also comment regarding hepatic steatosis with changes of chronic liver disease and stable indeterminate nodular thickening of the adrenal glands.  CT-guided lung biopsy on 1/13/2023 with pathology that showed invasive poorly differentiated non-small cell carcinoma with focal neuroendocrine differentiation.  Staining pattern and morphology favor poorly differentiated adenocarcinoma however there was focal CD56 staining, could not entirely exclude large cell neuroendocrine carcinoma.  PD-L1 35%, Caris analysis pending.  It was felt that there would be increased risk for bronchoscopy from a cardiac standpoint due to her aortic stenosis.  Staging MRI brain 1/29/2023 showed no evidence of metastatic disease  Staging PET scan 1/26/2023 showed hypermetabolic left lower lobe pulmonary nodule, size difficult to determine on PET however on recent CT was 1.6 cm (SUV 14.4).  Mediastinal and left hilar hypermetabolic lymphadenopathy (subcarinal lymph node 3.2 cm, SUV 31.7, left paratracheal 2.2 cm lymph node SUV 12.7).  Sub-6 mm pulmonary nodule left upper lobe unchanged and below PET resolution.  Focal uptake posterior right hepatic lobe SUV 7.2 of uncertain significance.  Recommended MRI to evaluate.  Right thyroid activity SUV 5.3, recommended ultrasound.  Long segment uptake distal esophagus consistent with esophagitis.  Uptake posterior larynx, nonspecific, recommended direct visualization.  MRI abdomen 2/6/2023 with no evidence to suggest metastatic disease in the liver.  Subcentimeter  nonenhancing lesions favor cyst or hemangiomas.  Patient underwent right and left heart catheterization with cardiology on 2/14/2023.  Identification of coronary artery disease, mild pulmonary hypertension, severe aortic stenosis.  Recommendation per cardiology to proceed with treatment for lung cancer and defer any intervention regarding aortic stenosis.  Concern regarding underlying neurologic issues from cervical stenosis and potential neuropathic effects from Taxol chemotherapy.  Concern regarding patient's overall performance status and ability to tolerate full dose chemotherapy.  Patient discussed at thoracic oncology tumor board with consensus to treat with weekly low-dose carboplatin concurrent with radiation therapy and omit Taxol.  Subsequent plan for 1 year durvalumab following concurrent chemoradiation.  Significant delay in initiating concurrent chemoradiation due to logistics with scheduling Mediport placement, patient canceled appointments and delayed initiation of treatment.  On 3/21/2023 initiated concurrent chemoradiation with weekly carboplatin (AUC 2) on 2/28/2023.  Week 4 carboplatin held 4/11 and again 4/18/2023 due to thrombocytopenia  Week 4 carboplatin resumed on 4/25/2023 at reduced dose to AUC 1 due to borderline neutropenia  Chemo and radiation held beginning 5/2/2023 due to neutropenia with WBC 1.13, ANC 0.53  Resumed radiation therapy on 5/8/2023 with recovery of WBC.  Received fifth and final weekly dose of concurrent carboplatin (AUC 1) on 5/9/2023.  Patient completed radiation therapy on 5/12/2023.  CT chest abdomen pelvis 5/25/2023 with significant improvement in mediastinal and left hilar lymphadenopathy, decrease in left lower lobe nodule indicating significant response.  On 5/30/2023 initiated treatment with durvalumab 10 mg/kg every 2 weeks x 1 year.  Transitioned to durvalumab 1500 mg every 4-week dosing with cycle 4 on 7/18/2023.  TAVR CT chest abdomen pelvis 7/21/2023 with  decreased left lower lobe nodule from 8 down to 6 mm, stable mediastinal lymph nodes (with the exception of 1 small superior mediastinal lymph node increased from 4 to 7 mm of unclear significance).  Upper abdominal lymph nodes with slight increase in the periportal, hepatogastric, peripancreatic regions.  Following 5 cycles durvalumab, PET scan 9/5/2023 with left lower lobe ill-defined consolidation and opacification likely representing postradiation change.  No mediastinal lymphadenopathy nor hypermetabolic activity.  Slight enlargement (0.7 up to 1.0 cm) of anterior pericardial lymph node with new hypermetabolic activity (SUV 5.5.  Stable epigastric adenopathy with mild hypermetabolic activity (SUV 4.4) unchanged from 1/26/2023 scan (although not noted on that report).  Nondisplaced, healing left seventh rib fracture.  Significance of the pericardial lymph node and epigastric lymphadenopathy unclear.  Neither area easily accessible for CT-guided biopsy after discussion with interventional radiology on 9/12/2023.  Elected to monitor on repeat PET scan at 2-month interval.  Patient returns today in follow-up due for cycle 7 durvalumab having transitioned to every 4-week dosing with cycle 4.  Patient continues with fatigue but no other concerns today.  We will plan 2-month follow-up PET scan from previous.    *Aortic stenosis  Patient underwent echocardiogram on 1/11/2023 with ejection fraction greater than 70%, grade 1 diastolic dysfunction, severe aortic stenosis.    Patient followed by cardiology with consideration of need for TAVR versus SAVR for moderate to severe aortic stenosis.  Patient underwent cardiac catheterization 2/14/2023 with identification of coronary artery disease, mild pulmonary hypertension, severe aortic stenosis.  Per cardiology, recommendation to proceed with treatment for lung cancer and defer any consideration of intervention for aortic stenosis.  Patient did undergo TAVR on  8/1/2023  Echocardiogram on 8/2/2023 with ejection fraction 62.4%, TAVR present  Patient continues follow-up with cardiology     *Cervical spine stenosis  Patient with cervical spine stenosis with associated myelopathy.   She underwent surgery on 9/20/2022 with anterior cervical corpectomy with cage.      The patient has had ongoing follow-up with neurosurgery and on CT scans 11/29/2022 of cervical and thoracic spine, there was concern regarding spinal instability with potential need for posterior cervical fusion.  This was scheduled in December 2022 however was delayed due to upper respiratory infection.  Patient admitted on 1/10/2023 to address multiple medical issues in order to prepare for potential neurosurgical procedure on 3/12/2023.  Patient was previously having severe symptoms related to her cervical stenosis with reduced ability to ambulate previously and significant limitations in movement in her hands, dropping objects.  More recently however she has improved and has been able to walk with the use of a walker, symptoms in the upper extremities have improved and her dexterity is better.    It appears that her neurosurgical procedure will need to be delayed until we can at least complete the concurrent chemoradiation portion of her treatment.  Surgery would be feasible while continuing on immunotherapy in the future.  Neurosurgical follow-up has been placed on hold.  Patient notes that her mobility and symptoms in her extremities have continued to improve over time     *COPD  Patient has history of smoking 1 pack/day x 45 years, quit in November 2021.  Patient has not undergone formal PFTs  Patient being followed by pulmonary, currently using albuterol inhaler as needed     *Hepatic steatosis  Identified on prior scans  PET scan 1/26/2023 with questionable area of activity seen but no corresponding CT abnormality.    MRI abdomen 2/6/2023 with no evidence to suggest metastatic disease in the liver.   Subcentimeter nonenhancing lesions favor cyst or hemangiomas.  LFTs remain normal     *Diabetes mellitus    *Anxiety/depression  Patient reports feeling overwhelmed regarding the amount of information presented today in regards to her malignancy as well as her other medical issues.    Patient was seen by supportive oncology on 2/27/2023, added gabapentin 300 mg 3 times daily.  Patient continuing on Wellbutrin  mg daily  Patient notes that symptoms are under fair control currently.  Patient has continued follow-up with supportive oncology.      *Right thyroid uptake on PET scan 1/26/2023  Patient was scheduled for thyroid ultrasound however did not feel that she was able to undergo the procedure due to her neck issues and therefore was canceled, consider at future date  No mention of thyroid uptake on PET scan 9/5/2023    *Laryngeal uptake on PET scan 1/26/2023  Patient was seen by ENT on 3/28/2023, laryngoscopy showed mild edema in the posterior glottis consistent with laryngeal pharyngeal reflux, no evidence of malignancy.  No mention of laryngeal uptake on PET scan 9/5/2023    *Esophageal uptake on PET scan 1/26/2023  Campbell to be consistent with esophagitis in the distal esophagus.  We will hold on referral for EGD given the multitude of additional procedures and consults required above.  No mention of esophageal uptake on PET scan 9/5/2023    *Venous access  Mediport placed by Dr. Shook on 3/2/2023    *Nutrition/weight loss  Patient has been seen by oncology nutrition  Patient overall has lost a significant amount of weight however weight subsequently stabilized  Weight today down slightly at 214 pounds    *Radiation esophagitis  Patient is currently using Carafate 1 g 4 times daily  Prescription was sent for Magic mouthwash however patient cannot afford the medication  Symptoms stabilized with Carafate alone and subsequently resolved.     *Chronic left greater than right lower extremity edema with  subsequent left inguinal/lower extremity hematoma following TAVR  Patient developed exacerbation of chronic edema in the lower extremities, left slightly greater than right.    Bilateral lower extremity Doppler on 5/9/2023 was negative, showed bilateral popliteal fluid collections.  Patient with evidence of left inguinal/left lower extremity hematoma following TAVR with ecchymosis tracking into the distal portion of the left lower extremity and ecchymosis laterally in the left hip.  Likely explains decline in hemoglobin and low haptoglobin post procedure.  PET scan 9/5/2023 with evidence of fluid collection/blood in the left inguinal region.  Hematoma is improving    *Hypothyroidism  Hypothyroidism managed by PCP Dr. Danyelle Brush  Labs on 5/30/2023 prior to beginning immunotherapy with evidence of hypothyroidism with TSH 10.5, free T4 0.54.  Levothyroxine dose increased by Dr. Brush from 125 up to 150 mcg daily  Continue to monitor thyroid function studies every 8 weeks on durvalumab  9/12/2023 TSH 6.7, free T4 borderline low at 0.86.  Recheck again in 1 month and if there has been further increase in TSH and decrease in free T4 need to consider adjustment in levothyroxine dose.  10/10/2023 TSH up to 15.4 however free T4 normal at 1.15.    *Chemotherapy-induced leukopenia  WBC today 5.44 with normal differential    *Chemotherapy-induced thrombocytopenia  Platelet count has gradually improved since completion of concurrent chemoradiation  Platelet count today 119,000    *Chemotherapy-induced anemia  Hemoglobin did decline into the high 10-low 11 range on concurrent chemoradiation  Hemoglobin subsequently improved and normalized as of 7/28/2023 with hemoglobin of 13.4  Patient underwent TAVR on 8/1/2023 with significant decline in hemoglobin thereafter, reached a low of 7.1 on 8/8/2023.  Additional labs with cardiology on 8/8/2023 with iron 78, iron saturation 22%, TIBC 350.  Patient was started on oral iron although  no evidence of iron deficiency.    Labs on 8/15/2023 with hemoglobin 8.7, iron 129, ferritin 120, iron saturation 39%, TIBC 355, B12 288, folate 7.15, haptoglobin less than 10.  Oral iron discontinued as patient was constipated, had no evidence of iron deficiency.  Initiated oral B12 1000 mcg daily.  Postprocedure anemia and low haptoglobin related to left inguinal/left lower extremity hematoma.  Hemoglobin today has rebounded up to 14.  Patient will continue on oral B12 1000 mcg daily.    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  Recommended CTA exam for more definitive evaluation.  Patient was referred to vascular surgery, had to postpone the visit        PLAN:  Continue definitive treatment for stage IIIA non-small cell lung cancer.  Patient completed concurrent chemoradiation with weekly single agent carboplatin (completed on 5/12/2023).  Patient is continuing on durvalumab single agent initiated 5/30/2023 with 10 mg/kg dose every 2 weeks.  With cycle 4 on 7/18/2023 transitioned to 1500 mg every 4 weeks.  Proceed with cycle 7 durvalumab today.  Patient continues on levothyroxine currently at 150 mcg daily managed by Dr. Brush.  We will continue monitoring TSH, free T4 routinely with ongoing immunotherapy   Continue oral B12 1000 mcg daily  In 3 weeks PET scan  In 4 weeks MD visit with CBC, CMP, TSH, free T4 and cycle 8 durvalumab pending PET scan results.      The patient is continuing on high risk medication requiring intensive monitoring

## 2023-10-19 RX ORDER — AMLODIPINE BESYLATE 5 MG/1
TABLET ORAL
Qty: 90 TABLET | Refills: 1 | OUTPATIENT
Start: 2023-10-19

## 2023-10-19 RX ORDER — LEVOTHYROXINE SODIUM 0.15 MG/1
150 TABLET ORAL DAILY
Qty: 90 TABLET | Refills: 0 | OUTPATIENT
Start: 2023-10-19

## 2023-10-19 RX ORDER — LOSARTAN POTASSIUM 100 MG/1
100 TABLET ORAL DAILY
Qty: 90 TABLET | Refills: 1 | OUTPATIENT
Start: 2023-10-19

## 2023-10-20 RX ORDER — AMLODIPINE BESYLATE 5 MG/1
TABLET ORAL
Qty: 90 TABLET | Refills: 3 | Status: SHIPPED | OUTPATIENT
Start: 2023-10-20

## 2023-10-31 ENCOUNTER — HOSPITAL ENCOUNTER (OUTPATIENT)
Dept: CARDIOLOGY | Facility: HOSPITAL | Age: 68
Discharge: HOME | End: 2023-10-31
Payer: MEDICARE

## 2023-10-31 ENCOUNTER — HOSPITAL ENCOUNTER (OUTPATIENT)
Dept: PET IMAGING | Facility: HOSPITAL | Age: 68
Discharge: HOME | End: 2023-10-31
Payer: MEDICARE

## 2023-10-31 VITALS
WEIGHT: 213.85 LBS | DIASTOLIC BLOOD PRESSURE: 58 MMHG | BODY MASS INDEX: 39.35 KG/M2 | HEART RATE: 77 BPM | SYSTOLIC BLOOD PRESSURE: 150 MMHG | HEIGHT: 62 IN

## 2023-10-31 DIAGNOSIS — C34.32 MALIGNANT NEOPLASM OF LOWER LOBE OF LEFT LUNG: ICD-10-CM

## 2023-10-31 DIAGNOSIS — Z95.2 S/P TAVR (TRANSCATHETER AORTIC VALVE REPLACEMENT): ICD-10-CM

## 2023-10-31 LAB
AORTIC DIMENSIONLESS INDEX: 0.5 (DI)
BH CV ECHO MEAS - AO MAX PG: 29.2 MMHG
BH CV ECHO MEAS - AO MEAN PG: 17.1 MMHG
BH CV ECHO MEAS - AO V2 MAX: 270.1 CM/SEC
BH CV ECHO MEAS - AO V2 VTI: 61 CM
BH CV ECHO MEAS - EDV(MOD-SP2): 33 ML
BH CV ECHO MEAS - EDV(MOD-SP4): 45 ML
BH CV ECHO MEAS - EF(MOD-BP): 66.2 %
BH CV ECHO MEAS - EF(MOD-SP2): 69.7 %
BH CV ECHO MEAS - EF(MOD-SP4): 64.4 %
BH CV ECHO MEAS - ESV(MOD-SP2): 10 ML
BH CV ECHO MEAS - ESV(MOD-SP4): 16 ML
BH CV ECHO MEAS - LAT PEAK E' VEL: 6.4 CM/SEC
BH CV ECHO MEAS - LV DIASTOLIC VOL/BSA (35-75): 22.9 CM2
BH CV ECHO MEAS - LV MAX PG: 5.3 MMHG
BH CV ECHO MEAS - LV MEAN PG: 3 MMHG
BH CV ECHO MEAS - LV SYSTOLIC VOL/BSA (12-30): 8.2 CM2
BH CV ECHO MEAS - LV V1 MAX: 115 CM/SEC
BH CV ECHO MEAS - LV V1 VTI: 27.6 CM
BH CV ECHO MEAS - MED PEAK E' VEL: 5.7 CM/SEC
BH CV ECHO MEAS - MR MAX PG: 9.9 MMHG
BH CV ECHO MEAS - MR MAX VEL: 157.5 CM/SEC
BH CV ECHO MEAS - MV A DUR: 0.17 SEC
BH CV ECHO MEAS - MV A MAX VEL: 87.8 CM/SEC
BH CV ECHO MEAS - MV DEC SLOPE: 228 CM/SEC2
BH CV ECHO MEAS - MV DEC TIME: 0.31 SEC
BH CV ECHO MEAS - MV E MAX VEL: 55.3 CM/SEC
BH CV ECHO MEAS - MV E/A: 0.63
BH CV ECHO MEAS - MV MAX PG: 2.8 MMHG
BH CV ECHO MEAS - MV MEAN PG: 1.44 MMHG
BH CV ECHO MEAS - MV P1/2T: 95.9 MSEC
BH CV ECHO MEAS - MV V2 VTI: 24.1 CM
BH CV ECHO MEAS - MVA(P1/2T): 2.29 CM2
BH CV ECHO MEAS - PULM DIAS VEL: 18.9 CM/SEC
BH CV ECHO MEAS - PULM S/D: 1.25
BH CV ECHO MEAS - PULM SYS VEL: 23.6 CM/SEC
BH CV ECHO MEAS - SI(MOD-SP2): 11.7 ML/M2
BH CV ECHO MEAS - SI(MOD-SP4): 14.8 ML/M2
BH CV ECHO MEAS - SV(MOD-SP2): 23 ML
BH CV ECHO MEAS - SV(MOD-SP4): 29 ML
BH CV ECHO MEAS - TR MAX PG: 7.7 MMHG
BH CV ECHO MEAS - TR MAX VEL: 138.5 CM/SEC
BH CV ECHO MEASUREMENTS AVERAGE E/E' RATIO: 9.14
BH CV XLRA - RV BASE: 2.42 CM
BH CV XLRA - RV LENGTH: 7.5 CM
BH CV XLRA - RV MID: 2.17 CM
BH CV XLRA - TDI S': 12.6 CM/SEC
GLUCOSE BLDC GLUCOMTR-MCNC: 126 MG/DL (ref 70–130)

## 2023-10-31 PROCEDURE — 82948 REAGENT STRIP/BLOOD GLUCOSE: CPT

## 2023-10-31 PROCEDURE — 78815 PET IMAGE W/CT SKULL-THIGH: CPT

## 2023-10-31 PROCEDURE — 25510000001 PERFLUTREN (DEFINITY) 8.476 MG IN SODIUM CHLORIDE (PF) 0.9 % 10 ML INJECTION: Performed by: INTERNAL MEDICINE

## 2023-10-31 PROCEDURE — 0 FLUDEOXYGLUCOSE F18 SOLUTION: Performed by: INTERNAL MEDICINE

## 2023-10-31 PROCEDURE — 93306 TTE W/DOPPLER COMPLETE: CPT | Performed by: INTERNAL MEDICINE

## 2023-10-31 PROCEDURE — A9552 F18 FDG: HCPCS | Performed by: INTERNAL MEDICINE

## 2023-10-31 PROCEDURE — 93306 TTE W/DOPPLER COMPLETE: CPT

## 2023-10-31 RX ADMIN — PERFLUTREN 3 ML: 6.52 INJECTION, SUSPENSION INTRAVENOUS at 13:15

## 2023-10-31 RX ADMIN — FLUDEOXYGLUCOSE F18 1 DOSE: 300 INJECTION INTRAVENOUS at 10:08

## 2023-11-06 NOTE — PROGRESS NOTES
"Chief Complaint  Non-small cell lung cancer, clinical stage IIIA ([id number]), aortic stenosis, cervical spine stenosis, COPD, hepatic steatosis, diabetes mellitus    Subjective        History of Present Illness  Patient returns today in follow-up with laboratory studies and PET scan to review, due for cycle 8 durvalumab.  She reports some difficulty tolerating treatment in terms of fatigue that is worse usually for the first 2 to 3 weeks of each monthly cycle and then usually improves back to baseline during the last 1 to 2 weeks.  She has noted a definite pattern in response to treatment.  She notes that her appetite is significant.  Her weight however has declined slightly at 213 pounds.  She reports that her mobility overall has improved significantly and she is able to ambulate around the house without difficulty.  She is in a wheelchair today, does require this for longer distances.  She denies any respiratory issues.  She has no new complaints today.        Objective   Vital Signs:   /84   Pulse 73   Temp 96.4 °F (35.8 °C) (Temporal)   Resp 16   Ht 157 cm (61.81\")   Wt 96.8 kg (213 lb 4.8 oz)   SpO2 96%   BMI 39.25 kg/m²     Physical Exam  Constitutional:       Appearance: She is well-developed.      Comments: Patient is using a wheelchair today   Eyes:      Conjunctiva/sclera: Conjunctivae normal.   Neck:      Thyroid: No thyromegaly.   Cardiovascular:      Rate and Rhythm: Normal rate and regular rhythm.      Heart sounds: No murmur heard.     No friction rub. No gallop.   Pulmonary:      Effort: No respiratory distress.      Breath sounds: Normal breath sounds.   Abdominal:      General: Bowel sounds are normal. There is no distension.      Palpations: Abdomen is soft.      Tenderness: There is no abdominal tenderness.   Musculoskeletal:         General: No swelling.   Lymphadenopathy:      Head:      Right side of head: No submandibular adenopathy.      Cervical: No cervical adenopathy.      " Upper Body:      Right upper body: No supraclavicular adenopathy.      Left upper body: No supraclavicular adenopathy.   Skin:     General: Skin is warm and dry.      Findings: No rash.   Neurological:      Mental Status: She is alert and oriented to person, place, and time.      Cranial Nerves: No cranial nerve deficit.      Motor: No abnormal muscle tone.      Deep Tendon Reflexes: Reflexes normal.   Psychiatric:         Behavior: Behavior normal.           Result Review : Reviewed CBC, CMP, TSH, free T4 from today.  Reviewed PET scan from 10/31/2023.       Assessment and Plan     *Non-small cell lung cancer, clinical stage IIIA ([id number])  Patient has history of smoking 1 pack/day x 45 years, quit in November 2021.    Following anterior cervical corpectomy in September 2022, she experienced acute hypoxemic respiratory failure.    CT angiogram chest 9/25/2022 showed a concerning 1.4 cm left lower lobe pulmonary nodule, mediastinal lymphadenopathy with infracarinal lymph node 2.7 cm, left hilar lymph node 1.2 cm, small right hilar lymph nodes up from 1.1 cm and additional enlarged mediastinal nodes in the right paratracheal, precarinal, infracarinal spaces.  There was a wedge-shaped area of consolidation/atelectasis in the right middle lobe base, subpleural density left costophrenic sulcus felt to represent atelectasis.  It was recommended for her to undergo subsequent PET scan by pulmonary.  The patient has had ongoing follow-up with neurosurgery and on CT scans 11/29/2022 of cervical and thoracic spine, there was concern regarding spinal instability with potential need for posterior cervical fusion.  This was scheduled in December 2022 however was delayed due to upper respiratory infection.  Patient admitted on 1/10/2023 to address multiple medical issues in order to prepare for potential neurosurgical procedure on 3/12/2023.  Patient underwent echocardiogram on 1/11/2023 with ejection fraction greater than 70%,  grade 1 diastolic dysfunction, severe aortic stenosis.  Patient is being followed by cardiology with consideration of need for TAVR versus SAVR for moderate to severe aortic stenosis.  Repeat CT chest on 1/12/2023 showed slight increase in size of the left lower lobe pulmonary nodule increased from 1.4 to 1.5 cm.  Groundglass nodule peripheral left lower lobe less conspicuous and resolution of previous reticulonodular opacities right lower lobe.  There was further enlargement of mediastinal lymph nodes with right posterior paratracheal lymph node 1.4 x 2.1 up to 1.5 x 2.3 cm, subcarinal lymph node increased from 2.7 x 5.2 up to 3.2 x 5.8 cm, left hilar lymph node increased from 1.2 up to 1.4 cm, and is stable 1.7 cm left infrahilar lymph node.  There was also comment regarding hepatic steatosis with changes of chronic liver disease and stable indeterminate nodular thickening of the adrenal glands.  CT-guided lung biopsy on 1/13/2023 with pathology that showed invasive poorly differentiated non-small cell carcinoma with focal neuroendocrine differentiation.  Staining pattern and morphology favor poorly differentiated adenocarcinoma however there was focal CD56 staining, could not entirely exclude large cell neuroendocrine carcinoma.  PD-L1 35%, Caris analysis pending.  It was felt that there would be increased risk for bronchoscopy from a cardiac standpoint due to her aortic stenosis.  Staging MRI brain 1/29/2023 showed no evidence of metastatic disease  Staging PET scan 1/26/2023 showed hypermetabolic left lower lobe pulmonary nodule, size difficult to determine on PET however on recent CT was 1.6 cm (SUV 14.4).  Mediastinal and left hilar hypermetabolic lymphadenopathy (subcarinal lymph node 3.2 cm, SUV 31.7, left paratracheal 2.2 cm lymph node SUV 12.7).  Sub-6 mm pulmonary nodule left upper lobe unchanged and below PET resolution.  Focal uptake posterior right hepatic lobe SUV 7.2 of uncertain significance.   Recommended MRI to evaluate.  Right thyroid activity SUV 5.3, recommended ultrasound.  Long segment uptake distal esophagus consistent with esophagitis.  Uptake posterior larynx, nonspecific, recommended direct visualization.  MRI abdomen 2/6/2023 with no evidence to suggest metastatic disease in the liver.  Subcentimeter nonenhancing lesions favor cyst or hemangiomas.  Patient underwent right and left heart catheterization with cardiology on 2/14/2023.  Identification of coronary artery disease, mild pulmonary hypertension, severe aortic stenosis.  Recommendation per cardiology to proceed with treatment for lung cancer and defer any intervention regarding aortic stenosis.  Concern regarding underlying neurologic issues from cervical stenosis and potential neuropathic effects from Taxol chemotherapy.  Concern regarding patient's overall performance status and ability to tolerate full dose chemotherapy.  Patient discussed at thoracic oncology tumor board with consensus to treat with weekly low-dose carboplatin concurrent with radiation therapy and omit Taxol.  Subsequent plan for 1 year durvalumab following concurrent chemoradiation.  Significant delay in initiating concurrent chemoradiation due to logistics with scheduling Mediport placement, patient canceled appointments and delayed initiation of treatment.  On 3/21/2023 initiated concurrent chemoradiation with weekly carboplatin (AUC 2) on 2/28/2023.  Week 4 carboplatin held 4/11 and again 4/18/2023 due to thrombocytopenia  Week 4 carboplatin resumed on 4/25/2023 at reduced dose to AUC 1 due to borderline neutropenia  Chemo and radiation held beginning 5/2/2023 due to neutropenia with WBC 1.13, ANC 0.53  Resumed radiation therapy on 5/8/2023 with recovery of WBC.  Received fifth and final weekly dose of concurrent carboplatin (AUC 1) on 5/9/2023.  Patient completed radiation therapy on 5/12/2023.  CT chest abdomen pelvis 5/25/2023 with significant improvement in  mediastinal and left hilar lymphadenopathy, decrease in left lower lobe nodule indicating significant response.  On 5/30/2023 initiated treatment with durvalumab 10 mg/kg every 2 weeks x 1 year.  Transitioned to durvalumab 1500 mg every 4-week dosing with cycle 4 on 7/18/2023.  TAVR CT chest abdomen pelvis 7/21/2023 with decreased left lower lobe nodule from 8 down to 6 mm, stable mediastinal lymph nodes (with the exception of 1 small superior mediastinal lymph node increased from 4 to 7 mm of unclear significance).  Upper abdominal lymph nodes with slight increase in the periportal, hepatogastric, peripancreatic regions.  Following 5 cycles durvalumab, PET scan 9/5/2023 with left lower lobe ill-defined consolidation and opacification likely representing postradiation change.  No mediastinal lymphadenopathy nor hypermetabolic activity.  Slight enlargement (0.7 up to 1.0 cm) of anterior pericardial lymph node with new hypermetabolic activity (SUV 5.5.  Stable epigastric adenopathy with mild hypermetabolic activity (SUV 4.4) unchanged from 1/26/2023 scan (although not noted on that report).  Nondisplaced, healing left seventh rib fracture.  Significance of the pericardial lymph node and epigastric lymphadenopathy unclear.  Neither area easily accessible for CT-guided biopsy after discussion with interventional radiology on 9/12/2023.  Elected to monitor on repeat PET scan at 2-month interval.  PET scan 10/31/2023 with stable mildly hypermetabolic left lower lobe segmental consolidation, indeterminate regarding residual malignancy versus inflammatory change.  Prior epicardial lymph node decreased in size to less than 1 cm with no residual hypermetabolic activity.  Subcentimeter periceliac lymph node with slight increase in SUV from 3.9 up to 6.5, indeterminate.  Plan 3-month interval PET scan  Patient returns today in follow-up due for cycle 8 durvalumab having transitioned to every 4-week dosing with cycle 4.  We  reviewed the above PET scan from 10/31/2023.  There are no definitive findings of residual malignancy.  Stable slight activity in an area of consolidation in the left lower lobe which may be inflammatory change versus residual malignancy.  The previous epicardial lymph node has resolved.  Subcentimeter celiac lymph node with slight increase in SUV, indeterminate.  She is tolerating treatment reasonably well although does have some difficulty with fatigue for the first few weeks of each cycle which subsides during the last 1 to 2 weeks.  She feels that this is manageable.  We will proceed on with treatment as planned today.  She will return in 4 weeks for NP visit and cycle 9 durvalumab and I will see her back in 8 weeks when she is due for cycle 10.  At the end of cycle 10 we will plan again repeat PET scan at a 3-month interval.    *Aortic stenosis  Patient underwent echocardiogram on 1/11/2023 with ejection fraction greater than 70%, grade 1 diastolic dysfunction, severe aortic stenosis.    Patient followed by cardiology with consideration of need for TAVR versus SAVR for moderate to severe aortic stenosis.  Patient underwent cardiac catheterization 2/14/2023 with identification of coronary artery disease, mild pulmonary hypertension, severe aortic stenosis.  Per cardiology, recommendation to proceed with treatment for lung cancer and defer any consideration of intervention for aortic stenosis.  Patient did undergo TAVR on 8/1/2023  Echocardiogram on 8/2/2023 with ejection fraction 62.4%, TAVR present  Patient continues follow-up with cardiology     *Cervical spine stenosis  Patient with cervical spine stenosis with associated myelopathy.   She underwent surgery on 9/20/2022 with anterior cervical corpectomy with cage.      The patient has had ongoing follow-up with neurosurgery and on CT scans 11/29/2022 of cervical and thoracic spine, there was concern regarding spinal instability with potential need for  posterior cervical fusion.  This was scheduled in December 2022 however was delayed due to upper respiratory infection.  Patient admitted on 1/10/2023 to address multiple medical issues in order to prepare for potential neurosurgical procedure on 3/12/2023.  Patient was previously having severe symptoms related to her cervical stenosis with reduced ability to ambulate previously and significant limitations in movement in her hands, dropping objects.  More recently however she has improved and has been able to walk with the use of a walker, symptoms in the upper extremities have improved and her dexterity is better.    It appears that her neurosurgical procedure will need to be delayed until we can at least complete the concurrent chemoradiation portion of her treatment.  Surgery would be feasible while continuing on immunotherapy in the future.  Neurosurgical follow-up has been placed on hold.  Patient notes that her mobility and symptoms in her extremities have continued to improve over time     *COPD  Patient has history of smoking 1 pack/day x 45 years, quit in November 2021.  Patient has not undergone formal PFTs  Patient being followed by pulmonary, currently using albuterol inhaler as needed     *Hepatic steatosis  Identified on prior scans  PET scan 1/26/2023 with questionable area of activity seen but no corresponding CT abnormality.    MRI abdomen 2/6/2023 with no evidence to suggest metastatic disease in the liver.  Subcentimeter nonenhancing lesions favor cyst or hemangiomas.  LFTs remain normal     *Diabetes mellitus    *Anxiety/depression  Patient reports feeling overwhelmed regarding the amount of information presented today in regards to her malignancy as well as her other medical issues.    Patient was seen by supportive oncology on 2/27/2023, added gabapentin 300 mg 3 times daily.  Patient continuing on Wellbutrin  mg daily  Patient notes that symptoms are under fair control currently.   Patient has continued follow-up with supportive oncology.      *Right thyroid uptake on PET scan 1/26/2023  Patient was scheduled for thyroid ultrasound however did not feel that she was able to undergo the procedure due to her neck issues and therefore was canceled, consider at future date  No mention of thyroid uptake on PET scan 9/5/2023 no are on 10/31/2023    *Laryngeal uptake on PET scan 1/26/2023  Patient was seen by ENT on 3/28/2023, laryngoscopy showed mild edema in the posterior glottis consistent with laryngeal pharyngeal reflux, no evidence of malignancy.  No mention of laryngeal uptake on PET scan 9/5/2023 nor on 10/31/2023    *Esophageal uptake on PET scan 1/26/2023  [place] to be consistent with esophagitis in the distal esophagus.  We will hold on referral for EGD given the multitude of additional procedures and consults required above.  No mention of esophageal uptake on PET scan 9/5/2023 nor on 10/31/2023    *Venous access  Mediport placed by Dr. Shook on 3/2/2023    *Nutrition/weight loss  Patient has been seen by oncology nutrition  Patient overall has lost a significant amount of weight however weight subsequently stabilized  Weight today down slightly at 213 pounds, patient however reports tremendous appetite.    *Radiation esophagitis  Patient is currently using Carafate 1 g 4 times daily  Prescription was sent for Magic mouthwash however patient cannot afford the medication  Symptoms stabilized with Carafate alone and subsequently resolved.     *Chronic left greater than right lower extremity edema with subsequent left inguinal/lower extremity hematoma following TAVR  Patient developed exacerbation of chronic edema in the lower extremities, left slightly greater than right.    Bilateral lower extremity Doppler on 5/9/2023 was negative, showed bilateral popliteal fluid collections.  Patient with evidence of left inguinal/left lower extremity hematoma following TAVR with ecchymosis tracking into  the distal portion of the left lower extremity and ecchymosis laterally in the left hip.  Likely explains decline in hemoglobin and low haptoglobin post procedure.  PET scan 9/5/2023 with evidence of fluid collection/blood in the left inguinal region.    *Hypothyroidism  Hypothyroidism managed by PCP Dr. Danyelle Brsuh  Labs on 5/30/2023 prior to beginning immunotherapy with evidence of hypothyroidism with TSH 10.5, free T4 0.54.  Levothyroxine dose increased by Dr. Brush from 125 up to 150 mcg daily  Continue to monitor thyroid function studies every 8 weeks on durvalumab  Thyroid function studies today with TSH today declined back to 6.38, free T4 remains normal at 1.04.  Patient did request referral to endocrinology and given her abnormal thyroid function studies on immunotherapy we will go ahead and make referral.    *Chemotherapy-induced leukopenia  WBC today 4.88 with normal differential    *Chemotherapy-induced thrombocytopenia  Platelet count has gradually improved since completion of concurrent chemoradiation  Platelet count today has declined to 101,000.  Decline in platelet count is of unclear etiology we will check additional labs today with IPF, B12, folate levels.    *Chemotherapy-induced anemia  Hemoglobin did decline into the high 10-low 11 range on concurrent chemoradiation  Hemoglobin subsequently improved and normalized as of 7/28/2023 with hemoglobin of 13.4  Patient underwent TAVR on 8/1/2023 with significant decline in hemoglobin thereafter, reached a low of 7.1 on 8/8/2023.  Additional labs with cardiology on 8/8/2023 with iron 78, iron saturation 22%, TIBC 350.  Patient was started on oral iron although no evidence of iron deficiency.    Labs on 8/15/2023 with hemoglobin 8.7, iron 129, ferritin 120, iron saturation 39%, TIBC 355, B12 288, folate 7.15, haptoglobin less than 10.  Oral iron discontinued as patient was constipated, had no evidence of iron deficiency.  Initiated oral B12 1000 mcg  daily.  Postprocedure anemia and low haptoglobin related to left inguinal/left lower extremity hematoma.  Hemoglobin today 14.  Patient will continue on oral B12 1000 mcg daily.    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  Recommended CTA exam for more definitive evaluation.  Patient was referred to vascular surgery, had to postpone the visit        PLAN:  Continue definitive treatment for stage IIIA non-small cell lung cancer.  Patient completed concurrent chemoradiation with weekly single agent carboplatin (completed on 5/12/2023).  Patient is continuing on durvalumab single agent initiated 5/30/2023 with 10 mg/kg dose every 2 weeks.  With cycle 4 on 7/18/2023 transitioned to 1500 mg every 4 weeks.  Proceed with cycle 8 durvalumab today.  Additional labs today with cortisol level, B12, folate, IPF  Patient continues on levothyroxine currently at 150 mcg daily   Referral to endocrinology regarding abnormal thyroid function studies on immunotherapy  Continue oral B12 1000 mcg daily  In 4 weeks NP visit with CBC, CMP, TSH, free T4 and cycle 9 durvalumab.  In 8 weeks MD visit with CBC, CMP, TSH, free T4 and cycle 10 durvalumab.  We will plan 3-month interval follow-up PET scan which will be due at the end of that cycle.      The patient is continuing on high risk medication requiring intensive monitoring

## 2023-11-07 ENCOUNTER — INFUSION (OUTPATIENT)
Dept: ONCOLOGY | Facility: HOSPITAL | Age: 68
End: 2023-11-07
Payer: MEDICARE

## 2023-11-07 ENCOUNTER — OFFICE VISIT (OUTPATIENT)
Dept: ONCOLOGY | Facility: CLINIC | Age: 68
End: 2023-11-07
Payer: MEDICARE

## 2023-11-07 VITALS
OXYGEN SATURATION: 96 % | DIASTOLIC BLOOD PRESSURE: 84 MMHG | BODY MASS INDEX: 39.25 KG/M2 | HEART RATE: 73 BPM | SYSTOLIC BLOOD PRESSURE: 146 MMHG | RESPIRATION RATE: 16 BRPM | HEIGHT: 62 IN | WEIGHT: 213.3 LBS | TEMPERATURE: 96.4 F

## 2023-11-07 DIAGNOSIS — C34.32 MALIGNANT NEOPLASM OF LOWER LOBE OF LEFT LUNG: Primary | ICD-10-CM

## 2023-11-07 DIAGNOSIS — E03.9 ACQUIRED HYPOTHYROIDISM: ICD-10-CM

## 2023-11-07 DIAGNOSIS — Z45.2 ENCOUNTER FOR ADJUSTMENT OR MANAGEMENT OF VASCULAR ACCESS DEVICE: ICD-10-CM

## 2023-11-07 DIAGNOSIS — C34.32 MALIGNANT NEOPLASM OF LOWER LOBE OF LEFT LUNG: ICD-10-CM

## 2023-11-07 DIAGNOSIS — D69.6 THROMBOCYTOPENIA: ICD-10-CM

## 2023-11-07 LAB
ALBUMIN SERPL-MCNC: 3.6 G/DL (ref 3.5–5.2)
ALBUMIN/GLOB SERPL: 1.4 G/DL
ALP SERPL-CCNC: 70 U/L (ref 39–117)
ALT SERPL W P-5'-P-CCNC: 13 U/L (ref 1–33)
ANION GAP SERPL CALCULATED.3IONS-SCNC: 13.7 MMOL/L (ref 5–15)
AST SERPL-CCNC: 22 U/L (ref 1–32)
BASOPHILS # BLD AUTO: 0.03 10*3/MM3 (ref 0–0.2)
BASOPHILS NFR BLD AUTO: 0.6 % (ref 0–1.5)
BILIRUB SERPL-MCNC: 0.2 MG/DL (ref 0–1.2)
BUN SERPL-MCNC: 26 MG/DL (ref 8–23)
BUN/CREAT SERPL: 25.2 (ref 7–25)
CALCIUM SPEC-SCNC: 9 MG/DL (ref 8.6–10.5)
CHLORIDE SERPL-SCNC: 102 MMOL/L (ref 98–107)
CO2 SERPL-SCNC: 24.3 MMOL/L (ref 22–29)
CORTIS SERPL-MCNC: 10.6 MCG/DL
CREAT SERPL-MCNC: 1.03 MG/DL (ref 0.6–1.1)
DEPRECATED RDW RBC AUTO: 40.7 FL (ref 37–54)
EGFRCR SERPLBLD CKD-EPI 2021: 59.7 ML/MIN/1.73
EOSINOPHIL # BLD AUTO: 0.12 10*3/MM3 (ref 0–0.4)
EOSINOPHIL NFR BLD AUTO: 2.5 % (ref 0.3–6.2)
ERYTHROCYTE [DISTWIDTH] IN BLOOD BY AUTOMATED COUNT: 11.8 % (ref 12.3–15.4)
FOLATE SERPL-MCNC: 8.99 NG/ML (ref 4.78–24.2)
GLOBULIN UR ELPH-MCNC: 2.5 GM/DL
GLUCOSE SERPL-MCNC: 142 MG/DL (ref 65–99)
HCT VFR BLD AUTO: 41.2 % (ref 34–46.6)
HGB BLD-MCNC: 14 G/DL (ref 12–15.9)
IMM GRANULOCYTES # BLD AUTO: 0.02 10*3/MM3 (ref 0–0.05)
IMM GRANULOCYTES NFR BLD AUTO: 0.4 % (ref 0–0.5)
LYMPHOCYTES # BLD AUTO: 0.56 10*3/MM3 (ref 0.7–3.1)
LYMPHOCYTES NFR BLD AUTO: 11.5 % (ref 19.6–45.3)
MCH RBC QN AUTO: 32.3 PG (ref 26.6–33)
MCHC RBC AUTO-ENTMCNC: 34 G/DL (ref 31.5–35.7)
MCV RBC AUTO: 95.2 FL (ref 79–97)
MONOCYTES # BLD AUTO: 0.41 10*3/MM3 (ref 0.1–0.9)
MONOCYTES NFR BLD AUTO: 8.4 % (ref 5–12)
NEUTROPHILS NFR BLD AUTO: 3.74 10*3/MM3 (ref 1.7–7)
NEUTROPHILS NFR BLD AUTO: 76.6 % (ref 42.7–76)
NRBC BLD AUTO-RTO: 0 /100 WBC (ref 0–0.2)
PLATELET # BLD AUTO: 101 10*3/MM3 (ref 140–450)
PLATELETS.RETICULATED NFR BLD AUTO: 5.5 % (ref 0.9–6.5)
PMV BLD AUTO: 10.3 FL (ref 6–12)
POTASSIUM SERPL-SCNC: 4.2 MMOL/L (ref 3.5–5.2)
PROT SERPL-MCNC: 6.1 G/DL (ref 6–8.5)
RBC # BLD AUTO: 4.33 10*6/MM3 (ref 3.77–5.28)
SODIUM SERPL-SCNC: 140 MMOL/L (ref 136–145)
T4 FREE SERPL-MCNC: 1.04 NG/DL (ref 0.93–1.7)
TSH SERPL DL<=0.05 MIU/L-ACNC: 6.38 UIU/ML (ref 0.27–4.2)
VIT B12 BLD-MCNC: 342 PG/ML (ref 211–946)
WBC NRBC COR # BLD: 4.88 10*3/MM3 (ref 3.4–10.8)

## 2023-11-07 PROCEDURE — 25810000003 SODIUM CHLORIDE 0.9 % SOLUTION 250 ML FLEX CONT: Performed by: INTERNAL MEDICINE

## 2023-11-07 PROCEDURE — 82533 TOTAL CORTISOL: CPT | Performed by: INTERNAL MEDICINE

## 2023-11-07 PROCEDURE — 99214 OFFICE O/P EST MOD 30 MIN: CPT | Performed by: INTERNAL MEDICINE

## 2023-11-07 PROCEDURE — 96413 CHEMO IV INFUSION 1 HR: CPT

## 2023-11-07 PROCEDURE — 85025 COMPLETE CBC W/AUTO DIFF WBC: CPT

## 2023-11-07 PROCEDURE — 82746 ASSAY OF FOLIC ACID SERUM: CPT | Performed by: INTERNAL MEDICINE

## 2023-11-07 PROCEDURE — 82607 VITAMIN B-12: CPT | Performed by: INTERNAL MEDICINE

## 2023-11-07 PROCEDURE — 1159F MED LIST DOCD IN RCRD: CPT | Performed by: INTERNAL MEDICINE

## 2023-11-07 PROCEDURE — 25010000002 HEPARIN LOCK FLUSH PER 10 UNITS: Performed by: INTERNAL MEDICINE

## 2023-11-07 PROCEDURE — 84443 ASSAY THYROID STIM HORMONE: CPT | Performed by: INTERNAL MEDICINE

## 2023-11-07 PROCEDURE — 1126F AMNT PAIN NOTED NONE PRSNT: CPT | Performed by: INTERNAL MEDICINE

## 2023-11-07 PROCEDURE — 84439 ASSAY OF FREE THYROXINE: CPT | Performed by: INTERNAL MEDICINE

## 2023-11-07 PROCEDURE — 25010000002 DURVALUMAB 50 MG/ML SOLUTION 10 ML VIAL: Performed by: INTERNAL MEDICINE

## 2023-11-07 PROCEDURE — 1160F RVW MEDS BY RX/DR IN RCRD: CPT | Performed by: INTERNAL MEDICINE

## 2023-11-07 PROCEDURE — 25810000003 SODIUM CHLORIDE 0.9 % SOLUTION: Performed by: INTERNAL MEDICINE

## 2023-11-07 PROCEDURE — 36415 COLL VENOUS BLD VENIPUNCTURE: CPT | Performed by: INTERNAL MEDICINE

## 2023-11-07 PROCEDURE — 80053 COMPREHEN METABOLIC PANEL: CPT

## 2023-11-07 PROCEDURE — 85055 RETICULATED PLATELET ASSAY: CPT | Performed by: INTERNAL MEDICINE

## 2023-11-07 PROCEDURE — 3079F DIAST BP 80-89 MM HG: CPT | Performed by: INTERNAL MEDICINE

## 2023-11-07 PROCEDURE — 3077F SYST BP >= 140 MM HG: CPT | Performed by: INTERNAL MEDICINE

## 2023-11-07 RX ORDER — SODIUM CHLORIDE 0.9 % (FLUSH) 0.9 %
10 SYRINGE (ML) INJECTION AS NEEDED
OUTPATIENT
Start: 2023-11-07

## 2023-11-07 RX ORDER — HEPARIN SODIUM (PORCINE) LOCK FLUSH IV SOLN 100 UNIT/ML 100 UNIT/ML
500 SOLUTION INTRAVENOUS AS NEEDED
OUTPATIENT
Start: 2023-11-07

## 2023-11-07 RX ORDER — ASPIRIN 81 MG/1
81 TABLET ORAL DAILY
COMMUNITY

## 2023-11-07 RX ORDER — HEPARIN SODIUM (PORCINE) LOCK FLUSH IV SOLN 100 UNIT/ML 100 UNIT/ML
500 SOLUTION INTRAVENOUS AS NEEDED
Status: DISCONTINUED | OUTPATIENT
Start: 2023-11-07 | End: 2023-11-07 | Stop reason: HOSPADM

## 2023-11-07 RX ORDER — SODIUM CHLORIDE 9 MG/ML
250 INJECTION, SOLUTION INTRAVENOUS ONCE
Status: CANCELLED | OUTPATIENT
Start: 2023-11-07

## 2023-11-07 RX ORDER — SODIUM CHLORIDE 0.9 % (FLUSH) 0.9 %
10 SYRINGE (ML) INJECTION AS NEEDED
Status: DISCONTINUED | OUTPATIENT
Start: 2023-11-07 | End: 2023-11-07 | Stop reason: HOSPADM

## 2023-11-07 RX ORDER — SODIUM CHLORIDE 9 MG/ML
250 INJECTION, SOLUTION INTRAVENOUS ONCE
Status: COMPLETED | OUTPATIENT
Start: 2023-11-07 | End: 2023-11-07

## 2023-11-07 RX ADMIN — SODIUM CHLORIDE 250 ML: 9 INJECTION, SOLUTION INTRAVENOUS at 10:41

## 2023-11-07 RX ADMIN — Medication 10 ML: at 11:52

## 2023-11-07 RX ADMIN — Medication 500 UNITS: at 11:52

## 2023-11-07 RX ADMIN — SODIUM CHLORIDE 1500 MG: 9 INJECTION, SOLUTION INTRAVENOUS at 10:50

## 2023-11-13 ENCOUNTER — PATIENT OUTREACH (OUTPATIENT)
Dept: OTHER | Facility: HOSPITAL | Age: 68
End: 2023-11-13
Payer: MEDICARE

## 2023-11-13 NOTE — PROGRESS NOTES
Reviewed chart    Called patient. Left message that I was just touching base to see how she was doing with treatment. Wanted to know if she had any questions/concerns or resource/supportive care needs; requested cb

## 2023-11-29 PROBLEM — Z79.899 ENCOUNTER FOR LONG-TERM (CURRENT) USE OF HIGH-RISK MEDICATION: Status: ACTIVE | Noted: 2023-11-29

## 2023-12-01 ENCOUNTER — PATIENT OUTREACH (OUTPATIENT)
Dept: OTHER | Facility: HOSPITAL | Age: 68
End: 2023-12-01
Payer: MEDICARE

## 2023-12-01 NOTE — PROGRESS NOTES
Reviewed chart. Mylene Lombardi 11/7. Continue definitive treatment for stage IIIA non-small cell lung cancer.  Patient completed concurrent chemoradiation with weekly single agent carboplatin (completed on 5/12/2023).  Patient is continuing on durvalumab single agent initiated 5/30/2023 with 10 mg/kg dose every 2 weeks.  With cycle 4 on 7/18/2023 transitioned to 1500 mg every 4 weeks.  Rec'd cycle 8 durvalumab 11/7.     Called patient. Unable to reach or leave a message

## 2023-12-04 ENCOUNTER — PATIENT OUTREACH (OUTPATIENT)
Dept: OTHER | Facility: HOSPITAL | Age: 68
End: 2023-12-04
Payer: MEDICARE

## 2023-12-04 NOTE — PROGRESS NOTES
Reviewed chart    Called patient. Left message that I was just touching base to see how she was doing with treatment. Wanted to know if he had any questions/concerns or resource/supportive care needs; requested cb

## 2023-12-05 ENCOUNTER — INFUSION (OUTPATIENT)
Dept: ONCOLOGY | Facility: HOSPITAL | Age: 68
End: 2023-12-05
Payer: MEDICARE

## 2023-12-05 ENCOUNTER — OFFICE VISIT (OUTPATIENT)
Dept: ONCOLOGY | Facility: CLINIC | Age: 68
End: 2023-12-05
Payer: MEDICARE

## 2023-12-05 VITALS
BODY MASS INDEX: 39.73 KG/M2 | HEIGHT: 62 IN | WEIGHT: 215.9 LBS | HEART RATE: 66 BPM | SYSTOLIC BLOOD PRESSURE: 148 MMHG | DIASTOLIC BLOOD PRESSURE: 76 MMHG | TEMPERATURE: 97.8 F | OXYGEN SATURATION: 96 %

## 2023-12-05 DIAGNOSIS — Z79.899 ENCOUNTER FOR LONG-TERM (CURRENT) USE OF HIGH-RISK MEDICATION: ICD-10-CM

## 2023-12-05 DIAGNOSIS — C34.32 MALIGNANT NEOPLASM OF LOWER LOBE OF LEFT LUNG: Primary | ICD-10-CM

## 2023-12-05 DIAGNOSIS — R79.89 ELEVATED SERUM CREATININE: ICD-10-CM

## 2023-12-05 DIAGNOSIS — C34.32 MALIGNANT NEOPLASM OF LOWER LOBE OF LEFT LUNG: ICD-10-CM

## 2023-12-05 DIAGNOSIS — Z79.899 HIGH RISK MEDICATION USE: ICD-10-CM

## 2023-12-05 DIAGNOSIS — D69.6 THROMBOCYTOPENIA: ICD-10-CM

## 2023-12-05 LAB
ALBUMIN SERPL-MCNC: 3.8 G/DL (ref 3.5–5.2)
ALBUMIN/GLOB SERPL: 1.4 G/DL
ALP SERPL-CCNC: 79 U/L (ref 39–117)
ALT SERPL W P-5'-P-CCNC: 17 U/L (ref 1–33)
ANION GAP SERPL CALCULATED.3IONS-SCNC: 9.2 MMOL/L (ref 5–15)
AST SERPL-CCNC: 28 U/L (ref 1–32)
BASOPHILS # BLD AUTO: 0.04 10*3/MM3 (ref 0–0.2)
BASOPHILS NFR BLD AUTO: 0.8 % (ref 0–1.5)
BILIRUB SERPL-MCNC: 0.2 MG/DL (ref 0–1.2)
BUN SERPL-MCNC: 29 MG/DL (ref 8–23)
BUN/CREAT SERPL: 24.8 (ref 7–25)
CALCIUM SPEC-SCNC: 9.2 MG/DL (ref 8.6–10.5)
CHLORIDE SERPL-SCNC: 101 MMOL/L (ref 98–107)
CO2 SERPL-SCNC: 29.8 MMOL/L (ref 22–29)
CREAT SERPL-MCNC: 1.17 MG/DL (ref 0.57–1)
DEPRECATED RDW RBC AUTO: 43.3 FL (ref 37–54)
EGFRCR SERPLBLD CKD-EPI 2021: 51.2 ML/MIN/1.73
EOSINOPHIL # BLD AUTO: 0.14 10*3/MM3 (ref 0–0.4)
EOSINOPHIL NFR BLD AUTO: 2.6 % (ref 0.3–6.2)
ERYTHROCYTE [DISTWIDTH] IN BLOOD BY AUTOMATED COUNT: 12.6 % (ref 12.3–15.4)
GLOBULIN UR ELPH-MCNC: 2.8 GM/DL
GLUCOSE SERPL-MCNC: 147 MG/DL (ref 65–99)
HCT VFR BLD AUTO: 38.3 % (ref 34–46.6)
HGB BLD-MCNC: 13.2 G/DL (ref 12–15.9)
IMM GRANULOCYTES # BLD AUTO: 0.03 10*3/MM3 (ref 0–0.05)
IMM GRANULOCYTES NFR BLD AUTO: 0.6 % (ref 0–0.5)
LYMPHOCYTES # BLD AUTO: 0.62 10*3/MM3 (ref 0.7–3.1)
LYMPHOCYTES NFR BLD AUTO: 11.7 % (ref 19.6–45.3)
MCH RBC QN AUTO: 32.3 PG (ref 26.6–33)
MCHC RBC AUTO-ENTMCNC: 34.5 G/DL (ref 31.5–35.7)
MCV RBC AUTO: 93.6 FL (ref 79–97)
MONOCYTES # BLD AUTO: 0.52 10*3/MM3 (ref 0.1–0.9)
MONOCYTES NFR BLD AUTO: 9.8 % (ref 5–12)
NEUTROPHILS NFR BLD AUTO: 3.97 10*3/MM3 (ref 1.7–7)
NEUTROPHILS NFR BLD AUTO: 74.5 % (ref 42.7–76)
NRBC BLD AUTO-RTO: 0 /100 WBC (ref 0–0.2)
PLATELET # BLD AUTO: 116 10*3/MM3 (ref 140–450)
PMV BLD AUTO: 10.5 FL (ref 6–12)
POTASSIUM SERPL-SCNC: 4.2 MMOL/L (ref 3.5–5.2)
PROT SERPL-MCNC: 6.6 G/DL (ref 6–8.5)
RBC # BLD AUTO: 4.09 10*6/MM3 (ref 3.77–5.28)
SODIUM SERPL-SCNC: 140 MMOL/L (ref 136–145)
T4 FREE SERPL-MCNC: 1.27 NG/DL (ref 0.93–1.7)
TSH SERPL DL<=0.05 MIU/L-ACNC: 4.74 UIU/ML (ref 0.27–4.2)
WBC NRBC COR # BLD AUTO: 5.32 10*3/MM3 (ref 3.4–10.8)

## 2023-12-05 PROCEDURE — 84439 ASSAY OF FREE THYROXINE: CPT | Performed by: INTERNAL MEDICINE

## 2023-12-05 PROCEDURE — 85025 COMPLETE CBC W/AUTO DIFF WBC: CPT

## 2023-12-05 PROCEDURE — 25810000003 SODIUM CHLORIDE 0.9 % SOLUTION 250 ML FLEX CONT: Performed by: NURSE PRACTITIONER

## 2023-12-05 PROCEDURE — 80053 COMPREHEN METABOLIC PANEL: CPT

## 2023-12-05 PROCEDURE — 84443 ASSAY THYROID STIM HORMONE: CPT | Performed by: INTERNAL MEDICINE

## 2023-12-05 PROCEDURE — 96361 HYDRATE IV INFUSION ADD-ON: CPT

## 2023-12-05 PROCEDURE — 96413 CHEMO IV INFUSION 1 HR: CPT

## 2023-12-05 PROCEDURE — 25810000003 SODIUM CHLORIDE 0.9 % SOLUTION: Performed by: NURSE PRACTITIONER

## 2023-12-05 PROCEDURE — 25010000002 DURVALUMAB 50 MG/ML SOLUTION 10 ML VIAL: Performed by: NURSE PRACTITIONER

## 2023-12-05 RX ORDER — SODIUM CHLORIDE 9 MG/ML
250 INJECTION, SOLUTION INTRAVENOUS ONCE
Status: DISCONTINUED | OUTPATIENT
Start: 2023-12-05 | End: 2023-12-05 | Stop reason: HOSPADM

## 2023-12-05 RX ORDER — SODIUM CHLORIDE 9 MG/ML
250 INJECTION, SOLUTION INTRAVENOUS ONCE
Status: CANCELLED | OUTPATIENT
Start: 2023-12-05

## 2023-12-05 RX ORDER — SODIUM CHLORIDE 9 MG/ML
500 INJECTION, SOLUTION INTRAVENOUS ONCE
Status: COMPLETED | OUTPATIENT
Start: 2023-12-05 | End: 2023-12-05

## 2023-12-05 RX ADMIN — SODIUM CHLORIDE 500 ML: 9 INJECTION, SOLUTION INTRAVENOUS at 11:00

## 2023-12-05 RX ADMIN — SODIUM CHLORIDE 1500 MG: 900 INJECTION, SOLUTION INTRAVENOUS at 11:30

## 2023-12-05 NOTE — NURSING NOTE
Per Julio NP, ok to treat will give additional 500 cc NS.       Lab Results   Component Value Date    GLUCOSE 147 (H) 12/05/2023    BUN 29 (H) 12/05/2023    CREATININE 1.17 (H) 12/05/2023    EGFR 51.2 (L) 12/05/2023    BCR 24.8 12/05/2023    K 4.2 12/05/2023    CO2 29.8 (H) 12/05/2023    CALCIUM 9.2 12/05/2023    ALBUMIN 3.8 12/05/2023    BILITOT 0.2 12/05/2023    AST 28 12/05/2023    ALT 17 12/05/2023

## 2023-12-05 NOTE — PROGRESS NOTES
"Chief Complaint  Non-small cell lung cancer, clinical stage IIIA ([id number]), aortic stenosis, cervical spine stenosis, COPD, hepatic steatosis, diabetes mellitus    Subjective        History of Present Illness  Patient returns today in follow-up with laboratory studies and is due for cycle 9 durvalumab.  She reports some difficulty tolerating treatment in terms of fatigue that is worse usually for the first 2 to 3 weeks of each monthly cycle and then usually improves back to baseline during the last 1 to 2 weeks.  She has noted a definite pattern in response to treatment.  She notes that her appetite is significant.  Her weight however has stabilized at 215 pounds.  She reports that her mobility overall has improved significantly and she is able to ambulate around the house without difficulty.  She is in a wheelchair today, does require this for longer distances.  She denies any respiratory issues.  She has no new complaints today.        Objective   Vital Signs:   /76   Pulse 66   Temp 97.8 °F (36.6 °C) (Temporal)   Ht 157 cm (61.81\")   Wt 97.9 kg (215 lb 14.4 oz)   SpO2 96%   BMI 39.73 kg/m²     Physical Exam  Constitutional:       Appearance: She is well-developed.      Comments: Patient is using a wheelchair today   Eyes:      Conjunctiva/sclera: Conjunctivae normal.   Neck:      Thyroid: No thyromegaly.   Cardiovascular:      Rate and Rhythm: Normal rate and regular rhythm.      Heart sounds: No murmur heard.     No friction rub. No gallop.   Pulmonary:      Effort: No respiratory distress.      Breath sounds: Normal breath sounds.   Abdominal:      General: Bowel sounds are normal. There is no distension.      Palpations: Abdomen is soft.      Tenderness: There is no abdominal tenderness.   Musculoskeletal:         General: No swelling.   Lymphadenopathy:      Head:      Right side of head: No submandibular adenopathy.      Cervical: No cervical adenopathy.      Upper Body:      Right upper body: " No supraclavicular adenopathy.      Left upper body: No supraclavicular adenopathy.   Skin:     General: Skin is warm and dry.      Findings: No rash.   Neurological:      Mental Status: She is alert and oriented to person, place, and time.      Cranial Nerves: No cranial nerve deficit.      Motor: No abnormal muscle tone.      Deep Tendon Reflexes: Reflexes normal.   Psychiatric:         Behavior: Behavior normal.           Result Review : Reviewed CBC, CMP from today.        Assessment and Plan     *Non-small cell lung cancer, clinical stage IIIA ([id number])  Patient has history of smoking 1 pack/day x 45 years, quit in November 2021.    Following anterior cervical corpectomy in September 2022, she experienced acute hypoxemic respiratory failure.    CT angiogram chest 9/25/2022 showed a concerning 1.4 cm left lower lobe pulmonary nodule, mediastinal lymphadenopathy with infracarinal lymph node 2.7 cm, left hilar lymph node 1.2 cm, small right hilar lymph nodes up from 1.1 cm and additional enlarged mediastinal nodes in the right paratracheal, precarinal, infracarinal spaces.  There was a wedge-shaped area of consolidation/atelectasis in the right middle lobe base, subpleural density left costophrenic sulcus felt to represent atelectasis.  It was recommended for her to undergo subsequent PET scan by pulmonary.  The patient has had ongoing follow-up with neurosurgery and on CT scans 11/29/2022 of cervical and thoracic spine, there was concern regarding spinal instability with potential need for posterior cervical fusion.  This was scheduled in December 2022 however was delayed due to upper respiratory infection.  Patient admitted on 1/10/2023 to address multiple medical issues in order to prepare for potential neurosurgical procedure on 3/12/2023.  Patient underwent echocardiogram on 1/11/2023 with ejection fraction greater than 70%, grade 1 diastolic dysfunction, severe aortic stenosis.  Patient is being followed by  cardiology with consideration of need for TAVR versus SAVR for moderate to severe aortic stenosis.  Repeat CT chest on 1/12/2023 showed slight increase in size of the left lower lobe pulmonary nodule increased from 1.4 to 1.5 cm.  Groundglass nodule peripheral left lower lobe less conspicuous and resolution of previous reticulonodular opacities right lower lobe.  There was further enlargement of mediastinal lymph nodes with right posterior paratracheal lymph node 1.4 x 2.1 up to 1.5 x 2.3 cm, subcarinal lymph node increased from 2.7 x 5.2 up to 3.2 x 5.8 cm, left hilar lymph node increased from 1.2 up to 1.4 cm, and is stable 1.7 cm left infrahilar lymph node.  There was also comment regarding hepatic steatosis with changes of chronic liver disease and stable indeterminate nodular thickening of the adrenal glands.  CT-guided lung biopsy on 1/13/2023 with pathology that showed invasive poorly differentiated non-small cell carcinoma with focal neuroendocrine differentiation.  Staining pattern and morphology favor poorly differentiated adenocarcinoma however there was focal CD56 staining, could not entirely exclude large cell neuroendocrine carcinoma.  PD-L1 35%, Caris analysis pending.  It was felt that there would be increased risk for bronchoscopy from a cardiac standpoint due to her aortic stenosis.  Staging MRI brain 1/29/2023 showed no evidence of metastatic disease  Staging PET scan 1/26/2023 showed hypermetabolic left lower lobe pulmonary nodule, size difficult to determine on PET however on recent CT was 1.6 cm (SUV 14.4).  Mediastinal and left hilar hypermetabolic lymphadenopathy (subcarinal lymph node 3.2 cm, SUV 31.7, left paratracheal 2.2 cm lymph node SUV 12.7).  Sub-6 mm pulmonary nodule left upper lobe unchanged and below PET resolution.  Focal uptake posterior right hepatic lobe SUV 7.2 of uncertain significance.  Recommended MRI to evaluate.  Right thyroid activity SUV 5.3, recommended ultrasound.   Long segment uptake distal esophagus consistent with esophagitis.  Uptake posterior larynx, nonspecific, recommended direct visualization.  MRI abdomen 2/6/2023 with no evidence to suggest metastatic disease in the liver.  Subcentimeter nonenhancing lesions favor cyst or hemangiomas.  Patient underwent right and left heart catheterization with cardiology on 2/14/2023.  Identification of coronary artery disease, mild pulmonary hypertension, severe aortic stenosis.  Recommendation per cardiology to proceed with treatment for lung cancer and defer any intervention regarding aortic stenosis.  Concern regarding underlying neurologic issues from cervical stenosis and potential neuropathic effects from Taxol chemotherapy.  Concern regarding patient's overall performance status and ability to tolerate full dose chemotherapy.  Patient discussed at thoracic oncology tumor board with consensus to treat with weekly low-dose carboplatin concurrent with radiation therapy and omit Taxol.  Subsequent plan for 1 year durvalumab following concurrent chemoradiation.  Significant delay in initiating concurrent chemoradiation due to logistics with scheduling Mediport placement, patient canceled appointments and delayed initiation of treatment.  On 3/21/2023 initiated concurrent chemoradiation with weekly carboplatin (AUC 2) on 2/28/2023.  Week 4 carboplatin held 4/11 and again 4/18/2023 due to thrombocytopenia  Week 4 carboplatin resumed on 4/25/2023 at reduced dose to AUC 1 due to borderline neutropenia  Chemo and radiation held beginning 5/2/2023 due to neutropenia with WBC 1.13, ANC 0.53  Resumed radiation therapy on 5/8/2023 with recovery of WBC.  Received fifth and final weekly dose of concurrent carboplatin (AUC 1) on 5/9/2023.  Patient completed radiation therapy on 5/12/2023.  CT chest abdomen pelvis 5/25/2023 with significant improvement in mediastinal and left hilar lymphadenopathy, decrease in left lower lobe nodule  indicating significant response.  On 5/30/2023 initiated treatment with durvalumab 10 mg/kg every 2 weeks x 1 year.  Transitioned to durvalumab 1500 mg every 4-week dosing with cycle 4 on 7/18/2023.  TAVR CT chest abdomen pelvis 7/21/2023 with decreased left lower lobe nodule from 8 down to 6 mm, stable mediastinal lymph nodes (with the exception of 1 small superior mediastinal lymph node increased from 4 to 7 mm of unclear significance).  Upper abdominal lymph nodes with slight increase in the periportal, hepatogastric, peripancreatic regions.  Following 5 cycles durvalumab, PET scan 9/5/2023 with left lower lobe ill-defined consolidation and opacification likely representing postradiation change.  No mediastinal lymphadenopathy nor hypermetabolic activity.  Slight enlargement (0.7 up to 1.0 cm) of anterior pericardial lymph node with new hypermetabolic activity (SUV 5.5.  Stable epigastric adenopathy with mild hypermetabolic activity (SUV 4.4) unchanged from 1/26/2023 scan (although not noted on that report).  Nondisplaced, healing left seventh rib fracture.  Significance of the pericardial lymph node and epigastric lymphadenopathy unclear.  Neither area easily accessible for CT-guided biopsy after discussion with interventional radiology on 9/12/2023.  Elected to monitor on repeat PET scan at 2-month interval.  PET scan 10/31/2023 with stable mildly hypermetabolic left lower lobe segmental consolidation, indeterminate regarding residual malignancy versus inflammatory change.  Prior epicardial lymph node decreased in size to less than 1 cm with no residual hypermetabolic activity.  Subcentimeter periceliac lymph node with slight increase in SUV from 3.9 up to 6.5, indeterminate.  Plan 3-month interval PET scan  Patient returns today in follow-up due for cycle 8 durvalumab having transitioned to every 4-week dosing with cycle 4.  We reviewed the above PET scan from 10/31/2023.  There are no definitive findings of  residual malignancy.  Stable slight activity in an area of consolidation in the left lower lobe which may be inflammatory change versus residual malignancy.  The previous epicardial lymph node has resolved.  Subcentimeter celiac lymph node with slight increase in SUV, indeterminate.  She is tolerating treatment reasonably well although does have some difficulty with fatigue for the first few weeks of each cycle which subsides during the last 1 to 2 weeks.  She feels that this is manageable.  We will proceed on with treatment as planned today.  She will return in 4 weeks for NP visit and cycle 9 durvalumab and I will see her back in 8 weeks when she is due for cycle 10.  At the end of cycle 10 we will plan again repeat PET scan at a 3-month interval.  12/5/2023: Proceed with cycle 9 durvalumab today. 500 ml of normal saline given in clinic today for mild elevation of creatinine at 1.17 and BUN 29.     *Aortic stenosis  Patient underwent echocardiogram on 1/11/2023 with ejection fraction greater than 70%, grade 1 diastolic dysfunction, severe aortic stenosis.    Patient followed by cardiology with consideration of need for TAVR versus SAVR for moderate to severe aortic stenosis.  Patient underwent cardiac catheterization 2/14/2023 with identification of coronary artery disease, mild pulmonary hypertension, severe aortic stenosis.  Per cardiology, recommendation to proceed with treatment for lung cancer and defer any consideration of intervention for aortic stenosis.  Patient did undergo TAVR on 8/1/2023  Echocardiogram on 8/2/2023 with ejection fraction 62.4%, TAVR present  Patient continues follow-up with cardiology     *Cervical spine stenosis  Patient with cervical spine stenosis with associated myelopathy.   She underwent surgery on 9/20/2022 with anterior cervical corpectomy with cage.      The patient has had ongoing follow-up with neurosurgery and on CT scans 11/29/2022 of cervical and thoracic spine, there was  concern regarding spinal instability with potential need for posterior cervical fusion.  This was scheduled in December 2022 however was delayed due to upper respiratory infection.  Patient admitted on 1/10/2023 to address multiple medical issues in order to prepare for potential neurosurgical procedure on 3/12/2023.  Patient was previously having severe symptoms related to her cervical stenosis with reduced ability to ambulate previously and significant limitations in movement in her hands, dropping objects.  More recently however she has improved and has been able to walk with the use of a walker, symptoms in the upper extremities have improved and her dexterity is better.    It appears that her neurosurgical procedure will need to be delayed until we can at least complete the concurrent chemoradiation portion of her treatment.  Surgery would be feasible while continuing on immunotherapy in the future.  Neurosurgical follow-up has been placed on hold.  Patient notes that her mobility and symptoms in her extremities have continued to improve over time     *COPD  Patient has history of smoking 1 pack/day x 45 years, quit in November 2021.  Patient has not undergone formal PFTs  Patient being followed by pulmonary, currently using albuterol inhaler as needed     *Hepatic steatosis  Identified on prior scans  PET scan 1/26/2023 with questionable area of activity seen but no corresponding CT abnormality.    MRI abdomen 2/6/2023 with no evidence to suggest metastatic disease in the liver.  Subcentimeter nonenhancing lesions favor cyst or hemangiomas.  LFTs remain normal     *Diabetes mellitus    *Anxiety/depression  Patient reports feeling overwhelmed regarding the amount of information presented today in regards to her malignancy as well as her other medical issues.    Patient was seen by supportive oncology on 2/27/2023, added gabapentin 300 mg 3 times daily.  Patient continuing on Wellbutrin  mg daily  Patient  notes that symptoms are under fair control currently.  Patient has continued follow-up with supportive oncology.      *Right thyroid uptake on PET scan 1/26/2023  Patient was scheduled for thyroid ultrasound however did not feel that she was able to undergo the procedure due to her neck issues and therefore was canceled, consider at future date  No mention of thyroid uptake on PET scan 9/5/2023 no are on 10/31/2023    *Laryngeal uptake on PET scan 1/26/2023  Patient was seen by ENT on 3/28/2023, laryngoscopy showed mild edema in the posterior glottis consistent with laryngeal pharyngeal reflux, no evidence of malignancy.  No mention of laryngeal uptake on PET scan 9/5/2023 nor on 10/31/2023    *Esophageal uptake on PET scan 1/26/2023  [place] to be consistent with esophagitis in the distal esophagus.  We will hold on referral for EGD given the multitude of additional procedures and consults required above.  No mention of esophageal uptake on PET scan 9/5/2023 nor on 10/31/2023    *Venous access  Mediport placed by Dr. Shook on 3/2/2023    *Nutrition/weight loss  Patient has been seen by oncology nutrition  Patient overall has lost a significant amount of weight however weight subsequently stabilized  Weight today down slightly at 213 pounds, patient however reports tremendous appetite.  Weight stable at 215 today    *Radiation esophagitis  Patient is currently using Carafate 1 g 4 times daily  Prescription was sent for Magic mouthwash however patient cannot afford the medication  Symptoms stabilized with Carafate alone and subsequently resolved.     *Chronic left greater than right lower extremity edema with subsequent left inguinal/lower extremity hematoma following TAVR  Patient developed exacerbation of chronic edema in the lower extremities, left slightly greater than right.    Bilateral lower extremity Doppler on 5/9/2023 was negative, showed bilateral popliteal fluid collections.  Patient with evidence of left  inguinal/left lower extremity hematoma following TAVR with ecchymosis tracking into the distal portion of the left lower extremity and ecchymosis laterally in the left hip.  Likely explains decline in hemoglobin and low haptoglobin post procedure.  PET scan 9/5/2023 with evidence of fluid collection/blood in the left inguinal region.    *Hypothyroidism  Hypothyroidism managed by PCP Dr. Danyelle Brush  Labs on 5/30/2023 prior to beginning immunotherapy with evidence of hypothyroidism with TSH 10.5, free T4 0.54.  Levothyroxine dose increased by Dr. Brush from 125 up to 150 mcg daily  Continue to monitor thyroid function studies every 8 weeks on durvalumab  Thyroid function studies today with TSH today declined back to 6.38, free T4 remains normal at 1.04.  Patient did request referral to endocrinology and given her abnormal thyroid function studies on immunotherapy we will go ahead and make referral.  12/5/2023: Thyroid levels pending today    *Chemotherapy-induced leukopenia  WBC today 5.32 with normal differential    *Chemotherapy-induced thrombocytopenia  Platelet count has gradually improved since completion of concurrent chemoradiation  Platelet count today has declined to 101,000.  Decline in platelet count is of unclear etiology we will check additional labs today with IPF, B12, folate levels.  12/5/2023: Platelet count improved to 116,000 today.     *Chemotherapy-induced anemia  Hemoglobin did decline into the high 10-low 11 range on concurrent chemoradiation  Hemoglobin subsequently improved and normalized as of 7/28/2023 with hemoglobin of 13.4  Patient underwent TAVR on 8/1/2023 with significant decline in hemoglobin thereafter, reached a low of 7.1 on 8/8/2023.  Additional labs with cardiology on 8/8/2023 with iron 78, iron saturation 22%, TIBC 350.  Patient was started on oral iron although no evidence of iron deficiency.    Labs on 8/15/2023 with hemoglobin 8.7, iron 129, ferritin 120, iron saturation  39%, TIBC 355, B12 288, folate 7.15, haptoglobin less than 10.  Oral iron discontinued as patient was constipated, had no evidence of iron deficiency.  Initiated oral B12 1000 mcg daily.  Postprocedure anemia and low haptoglobin related to left inguinal/left lower extremity hematoma.  Hemoglobin today 13.2.  Patient will continue on oral B12 1000 mcg daily.    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  Recommended CTA exam for more definitive evaluation.  Patient was referred to vascular surgery, had to postpone the visit        PLAN:  Continue definitive treatment for stage IIIA non-small cell lung cancer.  Patient completed concurrent chemoradiation with weekly single agent carboplatin (completed on 5/12/2023).  Patient is continuing on durvalumab single agent initiated 5/30/2023 with 10 mg/kg dose every 2 weeks.  With cycle 4 on 7/18/2023 transitioned to 1500 mg every 4 weeks.  Proceed with cycle 9 durvalumab today.  Give 500 ml of normal saline today   Patient continues on levothyroxine currently at 150 mcg daily   Referral to endocrinology regarding abnormal thyroid function studies on immunotherapy  Continue oral B12 1000 mcg daily  In 4 weeks MD visit with CBC, CMP, TSH, free T4 and cycle 10 durvalumab.  We will plan 3-month interval follow-up PET scan which will be due at the end of that cycle.      The patient is continuing on high risk medication requiring intensive monitoring    Patient reviewed with Dr. Harjit diana who is in agreement with plan.

## 2023-12-14 ENCOUNTER — PATIENT OUTREACH (OUTPATIENT)
Dept: OTHER | Facility: HOSPITAL | Age: 68
End: 2023-12-14
Payer: MEDICARE

## 2023-12-14 NOTE — PROGRESS NOTES
Reviewed chart. Rec'd cycle 9 durvalumab 12/5    Called patient. Left message that I was just touching base to see how she was doing with treatment. Wanted to know if she had any questions/concerns or resource/supportive care needs; requested cb

## 2023-12-16 NOTE — TELEPHONE ENCOUNTER
Called and talked with patient.  I do agree that getting the heart cath before chemotherapy would be appropriate.  Since I am out next week we will try and have one of my other partners either Dr. Cartagena or Dr. David perform right left heart cath to further evaluate severity of her aortic stenosis.  Hopefully will just be moderate aortic stenosis and we can continue with appropriate treatment of her lung cancer.  Told her that our  will be reaching out to reschedule her heart cath later today  
Called patient regarding getting set up for right and left heart catheterization as part of her aortic stenosis work-up.  For some reason this was canceled or not scheduled on 1/24/2023 we will reach out to our schedulers to get this set up can put in a new order if need be.  
Patient called and left . She would like to know, if her heart is strong enough to go through chemo therapy. She stated that if her heart is not strong enough, then she is not doing chemo.     They want to start chemo 02/22/23, but the cath is not scheduled until the 24th. Sounds like she would prefer to do the cath first.    She can be reached at [phone]      
negative...

## 2023-12-27 ENCOUNTER — PATIENT OUTREACH (OUTPATIENT)
Dept: OTHER | Facility: HOSPITAL | Age: 68
End: 2023-12-27
Payer: MEDICARE

## 2023-12-27 NOTE — PROGRESS NOTES
Called patient. Unable to leave a message.    Haven't been able to reach patient since September. Closing case to navigation

## 2024-01-02 ENCOUNTER — INFUSION (OUTPATIENT)
Dept: ONCOLOGY | Facility: HOSPITAL | Age: 69
End: 2024-01-02
Payer: MEDICARE

## 2024-01-02 ENCOUNTER — OFFICE VISIT (OUTPATIENT)
Dept: ONCOLOGY | Facility: CLINIC | Age: 69
End: 2024-01-02
Payer: MEDICARE

## 2024-01-02 VITALS
HEIGHT: 62 IN | HEART RATE: 70 BPM | DIASTOLIC BLOOD PRESSURE: 75 MMHG | TEMPERATURE: 97.3 F | OXYGEN SATURATION: 96 % | SYSTOLIC BLOOD PRESSURE: 129 MMHG | BODY MASS INDEX: 40.01 KG/M2 | WEIGHT: 217.4 LBS

## 2024-01-02 DIAGNOSIS — Z79.899 ENCOUNTER FOR LONG-TERM (CURRENT) USE OF HIGH-RISK MEDICATION: ICD-10-CM

## 2024-01-02 DIAGNOSIS — Z45.2 ENCOUNTER FOR ADJUSTMENT OR MANAGEMENT OF VASCULAR ACCESS DEVICE: ICD-10-CM

## 2024-01-02 DIAGNOSIS — C34.32 MALIGNANT NEOPLASM OF LOWER LOBE OF LEFT LUNG: Primary | ICD-10-CM

## 2024-01-02 DIAGNOSIS — C34.32 MALIGNANT NEOPLASM OF LOWER LOBE OF LEFT LUNG: ICD-10-CM

## 2024-01-02 DIAGNOSIS — E03.9 HYPOTHYROIDISM, UNSPECIFIED TYPE: ICD-10-CM

## 2024-01-02 LAB
ALBUMIN SERPL-MCNC: 3.9 G/DL (ref 3.5–5.2)
ALBUMIN/GLOB SERPL: 1.6 G/DL
ALP SERPL-CCNC: 65 U/L (ref 39–117)
ALT SERPL W P-5'-P-CCNC: 7 U/L (ref 1–33)
ANION GAP SERPL CALCULATED.3IONS-SCNC: 9 MMOL/L (ref 5–15)
AST SERPL-CCNC: 18 U/L (ref 1–32)
BASOPHILS # BLD AUTO: 0.03 10*3/MM3 (ref 0–0.2)
BASOPHILS NFR BLD AUTO: 0.6 % (ref 0–1.5)
BILIRUB SERPL-MCNC: 0.2 MG/DL (ref 0–1.2)
BUN SERPL-MCNC: 24 MG/DL (ref 8–23)
BUN/CREAT SERPL: 19 (ref 7–25)
CALCIUM SPEC-SCNC: 9.3 MG/DL (ref 8.6–10.5)
CHLORIDE SERPL-SCNC: 98 MMOL/L (ref 98–107)
CO2 SERPL-SCNC: 28 MMOL/L (ref 22–29)
CREAT SERPL-MCNC: 1.26 MG/DL (ref 0.57–1)
DEPRECATED RDW RBC AUTO: 41.1 FL (ref 37–54)
EGFRCR SERPLBLD CKD-EPI 2021: 46.9 ML/MIN/1.73
EOSINOPHIL # BLD AUTO: 0.16 10*3/MM3 (ref 0–0.4)
EOSINOPHIL NFR BLD AUTO: 3.4 % (ref 0.3–6.2)
ERYTHROCYTE [DISTWIDTH] IN BLOOD BY AUTOMATED COUNT: 12.4 % (ref 12.3–15.4)
GLOBULIN UR ELPH-MCNC: 2.5 GM/DL
GLUCOSE SERPL-MCNC: 131 MG/DL (ref 65–99)
HCT VFR BLD AUTO: 38.5 % (ref 34–46.6)
HGB BLD-MCNC: 13.2 G/DL (ref 12–15.9)
IMM GRANULOCYTES # BLD AUTO: 0.01 10*3/MM3 (ref 0–0.05)
IMM GRANULOCYTES NFR BLD AUTO: 0.2 % (ref 0–0.5)
LYMPHOCYTES # BLD AUTO: 0.67 10*3/MM3 (ref 0.7–3.1)
LYMPHOCYTES NFR BLD AUTO: 14.4 % (ref 19.6–45.3)
MCH RBC QN AUTO: 31.3 PG (ref 26.6–33)
MCHC RBC AUTO-ENTMCNC: 34.3 G/DL (ref 31.5–35.7)
MCV RBC AUTO: 91.2 FL (ref 79–97)
MONOCYTES # BLD AUTO: 0.49 10*3/MM3 (ref 0.1–0.9)
MONOCYTES NFR BLD AUTO: 10.5 % (ref 5–12)
NEUTROPHILS NFR BLD AUTO: 3.3 10*3/MM3 (ref 1.7–7)
NEUTROPHILS NFR BLD AUTO: 70.9 % (ref 42.7–76)
NRBC BLD AUTO-RTO: 0 /100 WBC (ref 0–0.2)
PLATELET # BLD AUTO: 111 10*3/MM3 (ref 140–450)
PMV BLD AUTO: 9.8 FL (ref 6–12)
POTASSIUM SERPL-SCNC: 4.1 MMOL/L (ref 3.5–5.2)
PROT SERPL-MCNC: 6.4 G/DL (ref 6–8.5)
RBC # BLD AUTO: 4.22 10*6/MM3 (ref 3.77–5.28)
SODIUM SERPL-SCNC: 135 MMOL/L (ref 136–145)
T4 FREE SERPL-MCNC: 0.8 NG/DL (ref 0.93–1.7)
TSH SERPL DL<=0.05 MIU/L-ACNC: 19.7 UIU/ML (ref 0.27–4.2)
WBC NRBC COR # BLD AUTO: 4.66 10*3/MM3 (ref 3.4–10.8)

## 2024-01-02 PROCEDURE — 25010000002 HEPARIN LOCK FLUSH PER 10 UNITS: Performed by: INTERNAL MEDICINE

## 2024-01-02 PROCEDURE — 25810000003 SODIUM CHLORIDE 0.9 % SOLUTION: Performed by: NURSE PRACTITIONER

## 2024-01-02 PROCEDURE — 85025 COMPLETE CBC W/AUTO DIFF WBC: CPT

## 2024-01-02 PROCEDURE — 84439 ASSAY OF FREE THYROXINE: CPT | Performed by: INTERNAL MEDICINE

## 2024-01-02 PROCEDURE — 25810000003 SODIUM CHLORIDE 0.9 % SOLUTION 250 ML FLEX CONT: Performed by: NURSE PRACTITIONER

## 2024-01-02 PROCEDURE — 84443 ASSAY THYROID STIM HORMONE: CPT | Performed by: INTERNAL MEDICINE

## 2024-01-02 PROCEDURE — 80053 COMPREHEN METABOLIC PANEL: CPT

## 2024-01-02 PROCEDURE — 3074F SYST BP LT 130 MM HG: CPT | Performed by: NURSE PRACTITIONER

## 2024-01-02 PROCEDURE — 96413 CHEMO IV INFUSION 1 HR: CPT

## 2024-01-02 PROCEDURE — 25010000002 DURVALUMAB 50 MG/ML SOLUTION 10 ML VIAL: Performed by: NURSE PRACTITIONER

## 2024-01-02 PROCEDURE — 1159F MED LIST DOCD IN RCRD: CPT | Performed by: NURSE PRACTITIONER

## 2024-01-02 PROCEDURE — 3078F DIAST BP <80 MM HG: CPT | Performed by: NURSE PRACTITIONER

## 2024-01-02 PROCEDURE — 1126F AMNT PAIN NOTED NONE PRSNT: CPT | Performed by: NURSE PRACTITIONER

## 2024-01-02 PROCEDURE — 99214 OFFICE O/P EST MOD 30 MIN: CPT | Performed by: NURSE PRACTITIONER

## 2024-01-02 PROCEDURE — 1160F RVW MEDS BY RX/DR IN RCRD: CPT | Performed by: NURSE PRACTITIONER

## 2024-01-02 PROCEDURE — 96361 HYDRATE IV INFUSION ADD-ON: CPT

## 2024-01-02 RX ORDER — HEPARIN SODIUM (PORCINE) LOCK FLUSH IV SOLN 100 UNIT/ML 100 UNIT/ML
500 SOLUTION INTRAVENOUS AS NEEDED
Status: DISCONTINUED | OUTPATIENT
Start: 2024-01-02 | End: 2024-01-02 | Stop reason: HOSPADM

## 2024-01-02 RX ORDER — SODIUM CHLORIDE 9 MG/ML
500 INJECTION, SOLUTION INTRAVENOUS ONCE
Status: COMPLETED | OUTPATIENT
Start: 2024-01-02 | End: 2024-01-02

## 2024-01-02 RX ORDER — HEPARIN SODIUM (PORCINE) LOCK FLUSH IV SOLN 100 UNIT/ML 100 UNIT/ML
500 SOLUTION INTRAVENOUS AS NEEDED
OUTPATIENT
Start: 2024-01-02

## 2024-01-02 RX ORDER — SODIUM CHLORIDE 0.9 % (FLUSH) 0.9 %
10 SYRINGE (ML) INJECTION AS NEEDED
Status: DISCONTINUED | OUTPATIENT
Start: 2024-01-02 | End: 2024-01-02 | Stop reason: HOSPADM

## 2024-01-02 RX ORDER — SODIUM CHLORIDE 9 MG/ML
250 INJECTION, SOLUTION INTRAVENOUS ONCE
Status: CANCELLED | OUTPATIENT
Start: 2024-01-02

## 2024-01-02 RX ORDER — SODIUM CHLORIDE 9 MG/ML
250 INJECTION, SOLUTION INTRAVENOUS ONCE
Status: COMPLETED | OUTPATIENT
Start: 2024-01-02 | End: 2024-01-02

## 2024-01-02 RX ORDER — SODIUM CHLORIDE 0.9 % (FLUSH) 0.9 %
10 SYRINGE (ML) INJECTION AS NEEDED
OUTPATIENT
Start: 2024-01-02

## 2024-01-02 RX ADMIN — SODIUM CHLORIDE 500 ML: 9 INJECTION, SOLUTION INTRAVENOUS at 12:18

## 2024-01-02 RX ADMIN — SODIUM CHLORIDE 1500 MG: 900 INJECTION, SOLUTION INTRAVENOUS at 12:42

## 2024-01-02 RX ADMIN — Medication 500 UNITS: at 13:49

## 2024-01-02 RX ADMIN — Medication 10 ML: at 13:49

## 2024-01-02 RX ADMIN — SODIUM CHLORIDE 250 ML: 9 INJECTION, SOLUTION INTRAVENOUS at 12:17

## 2024-01-02 NOTE — PROGRESS NOTES
"Chief Complaint  Non-small cell lung cancer, clinical stage IIIA ([id number]), aortic stenosis, cervical spine stenosis, COPD, hepatic steatosis, diabetes mellitus    Subjective        History of Present Illness  Patient returns today 1/2/2024 for lab review and evaluation due for cycle 10 Durvalumab.  She reports that she is tolerating treatment quite well.  She denies any issues with increased shortness of breath.  No skin rash.  No problems with diarrhea.  She has a good appetite and has no new problems or concerns today.      Objective   Vital Signs:   /75   Pulse 70   Temp 97.3 °F (36.3 °C) (Temporal)   Ht 157 cm (61.81\")   Wt 98.6 kg (217 lb 6.4 oz)   SpO2 96%   BMI 40.01 kg/m²     Physical Exam  Vitals reviewed.   Constitutional:       General: She is not in acute distress.     Appearance: Normal appearance. She is well-developed.   HENT:      Head: Normocephalic and atraumatic.   Eyes:      Pupils: Pupils are equal, round, and reactive to light.   Cardiovascular:      Rate and Rhythm: Normal rate and regular rhythm.      Heart sounds: Normal heart sounds. No murmur heard.  Pulmonary:      Effort: Pulmonary effort is normal. No respiratory distress.      Breath sounds: Normal breath sounds. No wheezing, rhonchi or rales.   Abdominal:      General: Bowel sounds are normal. There is no distension.      Palpations: Abdomen is soft.   Musculoskeletal:         General: No swelling. Normal range of motion.      Cervical back: Normal range of motion.   Skin:     General: Skin is warm and dry.      Findings: No rash.   Neurological:      Mental Status: She is alert and oriented to person, place, and time.           Result Review :   Lab Results   Component Value Date    WBC 4.66 01/02/2024    HGB 13.2 01/02/2024    HCT 38.5 01/02/2024    MCV 91.2 01/02/2024     (L) 01/02/2024     Lab Results   Component Value Date    GLUCOSE 131 (H) 01/02/2024    BUN 24 (H) 01/02/2024    CREATININE 1.26 (H) " 01/02/2024    EGFR 46.9 (L) 01/02/2024    BCR 19.0 01/02/2024    K 4.1 01/02/2024    CO2 28.0 01/02/2024    CALCIUM 9.3 01/02/2024    ALBUMIN 3.9 01/02/2024    BILITOT 0.2 01/02/2024    AST 18 01/02/2024    ALT 7 01/02/2024            Assessment and Plan     *Non-small cell lung cancer, clinical stage IIIA ([id number])  Patient has history of smoking 1 pack/day x 45 years, quit in November 2021.    Following anterior cervical corpectomy in September 2022, she experienced acute hypoxemic respiratory failure.    CT angiogram chest 9/25/2022 showed a concerning 1.4 cm left lower lobe pulmonary nodule, mediastinal lymphadenopathy with infracarinal lymph node 2.7 cm, left hilar lymph node 1.2 cm, small right hilar lymph nodes up from 1.1 cm and additional enlarged mediastinal nodes in the right paratracheal, precarinal, infracarinal spaces.  There was a wedge-shaped area of consolidation/atelectasis in the right middle lobe base, subpleural density left costophrenic sulcus felt to represent atelectasis.  It was recommended for her to undergo subsequent PET scan by pulmonary.  The patient has had ongoing follow-up with neurosurgery and on CT scans 11/29/2022 of cervical and thoracic spine, there was concern regarding spinal instability with potential need for posterior cervical fusion.  This was scheduled in December 2022 however was delayed due to upper respiratory infection.  Patient admitted on 1/10/2023 to address multiple medical issues in order to prepare for potential neurosurgical procedure on 3/12/2023.  Patient underwent echocardiogram on 1/11/2023 with ejection fraction greater than 70%, grade 1 diastolic dysfunction, severe aortic stenosis.  Patient is being followed by cardiology with consideration of need for TAVR versus SAVR for moderate to severe aortic stenosis.  Repeat CT chest on 1/12/2023 showed slight increase in size of the left lower lobe pulmonary nodule increased from 1.4 to 1.5 cm.  Groundglass  nodule peripheral left lower lobe less conspicuous and resolution of previous reticulonodular opacities right lower lobe.  There was further enlargement of mediastinal lymph nodes with right posterior paratracheal lymph node 1.4 x 2.1 up to 1.5 x 2.3 cm, subcarinal lymph node increased from 2.7 x 5.2 up to 3.2 x 5.8 cm, left hilar lymph node increased from 1.2 up to 1.4 cm, and is stable 1.7 cm left infrahilar lymph node.  There was also comment regarding hepatic steatosis with changes of chronic liver disease and stable indeterminate nodular thickening of the adrenal glands.  CT-guided lung biopsy on 1/13/2023 with pathology that showed invasive poorly differentiated non-small cell carcinoma with focal neuroendocrine differentiation.  Staining pattern and morphology favor poorly differentiated adenocarcinoma however there was focal CD56 staining, could not entirely exclude large cell neuroendocrine carcinoma.  PD-L1 35%, Caris analysis pending.  It was felt that there would be increased risk for bronchoscopy from a cardiac standpoint due to her aortic stenosis.  Staging MRI brain 1/29/2023 showed no evidence of metastatic disease  Staging PET scan 1/26/2023 showed hypermetabolic left lower lobe pulmonary nodule, size difficult to determine on PET however on recent CT was 1.6 cm (SUV 14.4).  Mediastinal and left hilar hypermetabolic lymphadenopathy (subcarinal lymph node 3.2 cm, SUV 31.7, left paratracheal 2.2 cm lymph node SUV 12.7).  Sub-6 mm pulmonary nodule left upper lobe unchanged and below PET resolution.  Focal uptake posterior right hepatic lobe SUV 7.2 of uncertain significance.  Recommended MRI to evaluate.  Right thyroid activity SUV 5.3, recommended ultrasound.  Long segment uptake distal esophagus consistent with esophagitis.  Uptake posterior larynx, nonspecific, recommended direct visualization.  MRI abdomen 2/6/2023 with no evidence to suggest metastatic disease in the liver.  Subcentimeter  nonenhancing lesions favor cyst or hemangiomas.  Patient underwent right and left heart catheterization with cardiology on 2/14/2023.  Identification of coronary artery disease, mild pulmonary hypertension, severe aortic stenosis.  Recommendation per cardiology to proceed with treatment for lung cancer and defer any intervention regarding aortic stenosis.  Concern regarding underlying neurologic issues from cervical stenosis and potential neuropathic effects from Taxol chemotherapy.  Concern regarding patient's overall performance status and ability to tolerate full dose chemotherapy.  Patient discussed at thoracic oncology tumor board with consensus to treat with weekly low-dose carboplatin concurrent with radiation therapy and omit Taxol.  Subsequent plan for 1 year durvalumab following concurrent chemoradiation.  Significant delay in initiating concurrent chemoradiation due to logistics with scheduling Mediport placement, patient canceled appointments and delayed initiation of treatment.  On 3/21/2023 initiated concurrent chemoradiation with weekly carboplatin (AUC 2) on 2/28/2023.  Week 4 carboplatin held 4/11 and again 4/18/2023 due to thrombocytopenia  Week 4 carboplatin resumed on 4/25/2023 at reduced dose to AUC 1 due to borderline neutropenia  Chemo and radiation held beginning 5/2/2023 due to neutropenia with WBC 1.13, ANC 0.53  Resumed radiation therapy on 5/8/2023 with recovery of WBC.  Received fifth and final weekly dose of concurrent carboplatin (AUC 1) on 5/9/2023.  Patient completed radiation therapy on 5/12/2023.  CT chest abdomen pelvis 5/25/2023 with significant improvement in mediastinal and left hilar lymphadenopathy, decrease in left lower lobe nodule indicating significant response.  On 5/30/2023 initiated treatment with durvalumab 10 mg/kg every 2 weeks x 1 year.  Transitioned to durvalumab 1500 mg every 4-week dosing with cycle 4 on 7/18/2023.  TAVR CT chest abdomen pelvis 7/21/2023 with  decreased left lower lobe nodule from 8 down to 6 mm, stable mediastinal lymph nodes (with the exception of 1 small superior mediastinal lymph node increased from 4 to 7 mm of unclear significance).  Upper abdominal lymph nodes with slight increase in the periportal, hepatogastric, peripancreatic regions.  Following 5 cycles durvalumab, PET scan 9/5/2023 with left lower lobe ill-defined consolidation and opacification likely representing postradiation change.  No mediastinal lymphadenopathy nor hypermetabolic activity.  Slight enlargement (0.7 up to 1.0 cm) of anterior pericardial lymph node with new hypermetabolic activity (SUV 5.5.  Stable epigastric adenopathy with mild hypermetabolic activity (SUV 4.4) unchanged from 1/26/2023 scan (although not noted on that report).  Nondisplaced, healing left seventh rib fracture.  Significance of the pericardial lymph node and epigastric lymphadenopathy unclear.  Neither area easily accessible for CT-guided biopsy after discussion with interventional radiology on 9/12/2023.  Elected to monitor on repeat PET scan at 2-month interval.  PET scan 10/31/2023 with stable mildly hypermetabolic left lower lobe segmental consolidation, indeterminate regarding residual malignancy versus inflammatory change.  Prior epicardial lymph node decreased in size to less than 1 cm with no residual hypermetabolic activity.  Subcentimeter periceliac lymph node with slight increase in SUV from 3.9 up to 6.5, indeterminate.  Plan 3-month interval PET scan  Patient returns today in follow-up due for cycle 8 durvalumab having transitioned to every 4-week dosing with cycle 4.  We reviewed the above PET scan from 10/31/2023.  There are no definitive findings of residual malignancy.  Stable slight activity in an area of consolidation in the left lower lobe which may be inflammatory change versus residual malignancy.  The previous epicardial lymph node has resolved.  Subcentimeter celiac lymph node with  slight increase in SUV, indeterminate.  She is tolerating treatment reasonably well although does have some difficulty with fatigue for the first few weeks of each cycle which subsides during the last 1 to 2 weeks.  She feels that this is manageable.  We will proceed on with treatment as planned today.  She will return in 4 weeks for NP visit and cycle 9 durvalumab and I will see her back in 8 weeks when she is due for cycle 10.  At the end of cycle 10 we will plan again repeat PET scan at a 3-month interval.  12/5/2023: Proceed with cycle 9 durvalumab today. 500 ml of normal saline given in clinic today for mild elevation of creatinine at 1.17 and BUN 29.   1/2/2024 cycle 10 Durvalumab.  Creatinine slightly elevated at 1.26, BUN 24.  Patient will receive 500 cc of IV normal saline today.  Will also schedule patient for follow-up PET scan in about 2 to 3 weeks.    *Aortic stenosis  Patient underwent echocardiogram on 1/11/2023 with ejection fraction greater than 70%, grade 1 diastolic dysfunction, severe aortic stenosis.    Patient followed by cardiology with consideration of need for TAVR versus SAVR for moderate to severe aortic stenosis.  Patient underwent cardiac catheterization 2/14/2023 with identification of coronary artery disease, mild pulmonary hypertension, severe aortic stenosis.  Per cardiology, recommendation to proceed with treatment for lung cancer and defer any consideration of intervention for aortic stenosis.  Patient did undergo TAVR on 8/1/2023  Echocardiogram on 8/2/2023 with ejection fraction 62.4%, TAVR present  Patient continues follow-up with cardiology     *Cervical spine stenosis  Patient with cervical spine stenosis with associated myelopathy.   She underwent surgery on 9/20/2022 with anterior cervical corpectomy with cage.      The patient has had ongoing follow-up with neurosurgery and on CT scans 11/29/2022 of cervical and thoracic spine, there was concern regarding spinal instability  with potential need for posterior cervical fusion.  This was scheduled in December 2022 however was delayed due to upper respiratory infection.  Patient admitted on 1/10/2023 to address multiple medical issues in order to prepare for potential neurosurgical procedure on 3/12/2023.  Patient was previously having severe symptoms related to her cervical stenosis with reduced ability to ambulate previously and significant limitations in movement in her hands, dropping objects.  More recently however she has improved and has been able to walk with the use of a walker, symptoms in the upper extremities have improved and her dexterity is better.    It appears that her neurosurgical procedure will need to be delayed until we can at least complete the concurrent chemoradiation portion of her treatment.  Surgery would be feasible while continuing on immunotherapy in the future.  Neurosurgical follow-up has been placed on hold.  Patient notes that her mobility and symptoms in her extremities have continued to improve over time     *COPD  Patient has history of smoking 1 pack/day x 45 years, quit in November 2021.  Patient has not undergone formal PFTs  Patient being followed by pulmonary, currently using albuterol inhaler as needed     *Hepatic steatosis  Identified on prior scans  PET scan 1/26/2023 with questionable area of activity seen but no corresponding CT abnormality.    MRI abdomen 2/6/2023 with no evidence to suggest metastatic disease in the liver.  Subcentimeter nonenhancing lesions favor cyst or hemangiomas.  LFTs remain normal.     *Diabetes mellitus    *Anxiety/depression  Patient reports feeling overwhelmed regarding the amount of information presented today in regards to her malignancy as well as her other medical issues.    Patient was seen by supportive oncology on 2/27/2023, added gabapentin 300 mg 3 times daily.  Patient continuing on Wellbutrin  mg daily  Patient notes that symptoms are under fair  control currently.  Patient has continued follow-up with supportive oncology.      *Right thyroid uptake on PET scan 1/26/2023  Patient was scheduled for thyroid ultrasound however did not feel that she was able to undergo the procedure due to her neck issues and therefore was canceled, consider at future date  No mention of thyroid uptake on PET scan 9/5/2023 no are on 10/31/2023    *Laryngeal uptake on PET scan 1/26/2023  Patient was seen by ENT on 3/28/2023, laryngoscopy showed mild edema in the posterior glottis consistent with laryngeal pharyngeal reflux, no evidence of malignancy.  No mention of laryngeal uptake on PET scan 9/5/2023 nor on 10/31/2023    *Esophageal uptake on PET scan 1/26/2023  [place] to be consistent with esophagitis in the distal esophagus.  We will hold on referral for EGD given the multitude of additional procedures and consults required above.  No mention of esophageal uptake on PET scan 9/5/2023 nor on 10/31/2023    *Venous access  Mediport placed by Dr. Shook on 3/2/2023    *Nutrition/weight loss  Patient has been seen by oncology nutrition  Patient overall has lost a significant amount of weight however weight subsequently stabilized  Weight today down slightly at 213 pounds, patient however reports tremendous appetite.  Weight stable at 217 pounds today.    *Radiation esophagitis  Patient is currently using Carafate 1 g 4 times daily  Prescription was sent for Magic mouthwash however patient cannot afford the medication  Symptoms stabilized with Carafate alone and subsequently resolved.     *Chronic left greater than right lower extremity edema with subsequent left inguinal/lower extremity hematoma following TAVR  Patient developed exacerbation of chronic edema in the lower extremities, left slightly greater than right.    Bilateral lower extremity Doppler on 5/9/2023 was negative, showed bilateral popliteal fluid collections.  Patient with evidence of left inguinal/left lower  extremity hematoma following TAVR with ecchymosis tracking into the distal portion of the left lower extremity and ecchymosis laterally in the left hip.  Likely explains decline in hemoglobin and low haptoglobin post procedure.  PET scan 9/5/2023 with evidence of fluid collection/blood in the left inguinal region.    *Hypothyroidism  Hypothyroidism managed by PCP Dr. Danyelle Brush  Labs on 5/30/2023 prior to beginning immunotherapy with evidence of hypothyroidism with TSH 10.5, free T4 0.54.  Levothyroxine dose increased by Dr. Brush from 125 up to 150 mcg daily  Continue to monitor thyroid function studies every 8 weeks on durvalumab  Thyroid function studies today with TSH today declined back to 6.38, free T4 remains normal at 1.04.  Patient did request referral to endocrinology and given her abnormal thyroid function studies on immunotherapy we will go ahead and make referral.  12/5/2023: Thyroid levels pending today  1/2/2024 patient has been referred to endocrinology however no appointment for the consultation is yet been scheduled.  TSH today resulted after the patient left the office at 19.7, free T40.8.  I have attempted to contact the patient to ensure that she is taking her medication, with no answer.  Will continue to try to reach the patient regarding this matter.    *Chemotherapy-induced leukopenia  WBC 4.66     *Chemotherapy-induced thrombocytopenia  Platelet count has gradually improved since completion of concurrent chemoradiation  Platelet count today has declined to 101,000.  Decline in platelet count is of unclear etiology we will check additional labs today with IPF, B12, folate levels.  1/2/2024 platelets 111,000    *Chemotherapy-induced anemia  Hemoglobin did decline into the high 10-low 11 range on concurrent chemoradiation  Hemoglobin subsequently improved and normalized as of 7/28/2023 with hemoglobin of 13.4  Patient underwent TAVR on 8/1/2023 with significant decline in hemoglobin  thereafter, reached a low of 7.1 on 8/8/2023.  Additional labs with cardiology on 8/8/2023 with iron 78, iron saturation 22%, TIBC 350.  Patient was started on oral iron although no evidence of iron deficiency.    Labs on 8/15/2023 with hemoglobin 8.7, iron 129, ferritin 120, iron saturation 39%, TIBC 355, B12 288, folate 7.15, haptoglobin less than 10.  Oral iron discontinued as patient was constipated, had no evidence of iron deficiency.  Initiated oral B12 1000 mcg daily.  Postprocedure anemia and low haptoglobin related to left inguinal/left lower extremity hematoma.  1/2/2024 hemoglobin 13.2, continues on oral B12 1000 mcg daily.    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  Recommended CTA exam for more definitive evaluation.  Patient was referred to vascular surgery, had to postpone the visit        PLAN:  Continue definitive treatment for stage IIIA non-small cell lung cancer.  Patient completed concurrent chemoradiation with weekly single agent carboplatin (completed on 5/12/2023).  Patient is continuing on durvalumab single agent initiated 5/30/2023 with 10 mg/kg dose every 2 weeks.  With cycle 4 on 7/18/2023 transitioned to 1500 mg every 4 weeks.    Proceed with cycle 10 Durvalumab today.  Patient to receive an additional 500 cc of IV normal saline today.  Attempted to contact the patient to make sure she is taking thyroid replacement and to find out if she has been contacted regarding Endocrine referral. No answer, will continue to reach out.   Continue oral B12 1000 mcg daily.  In about 2 to 3 weeks patient will be scheduled for PET scan.  Follow-up in 4 weeks with Dr. Lombardi with CBC, CMP, TSH, free T4 and consideration of cycle 11 Durvalumab, pending scan results.  Call/ return sooner should the patient develop any new concerns or problems.      Patient is on a high risk medication requiring close monitoring for  toxicity.

## 2024-01-04 ENCOUNTER — TELEPHONE (OUTPATIENT)
Dept: ONCOLOGY | Facility: CLINIC | Age: 69
End: 2024-01-04
Payer: MEDICARE

## 2024-01-04 NOTE — TELEPHONE ENCOUNTER
Phoned patient at Yolanda ANAND's request to confirm that patient is taking her thyroid medication since her thyroid labs remain abnormal. Patient confirmed she is taking levothyroxine 150 mcg daily. Patient stated she will need a refill soon, but no longer sees the practitioner who originally prescribed it for her. Advised patient we were also looking into endocrinology referral, and we are trying to get her in for first available appointment with them. Informed patient that we would reach back out tomorrow to follow up with any information regarding the referral and potential short-term fill of her thyroid medication to hold her over until she sees endocrinologist. Patient v/u.

## 2024-01-05 RX ORDER — LEVOTHYROXINE SODIUM 175 UG/1
175 TABLET ORAL DAILY
Qty: 30 TABLET | Refills: 0 | Status: SHIPPED | OUTPATIENT
Start: 2024-01-05

## 2024-01-05 NOTE — TELEPHONE ENCOUNTER
Attempted to reach patient with updated information following yesterday's phone call. Left message informing patient that due to her abnormal thyroid labs, we will be sending in a one month supply of levothyroxine, but increasing dose to 175 mcg daily. Also advised patient that endocrinology office has attempted to reach her to schedule appointment, and she should follow up with them to get her office visit scheduled. Direct callback number provided in case of questions.

## 2024-01-09 ENCOUNTER — TELEPHONE (OUTPATIENT)
Dept: ONCOLOGY | Facility: CLINIC | Age: 69
End: 2024-01-09
Payer: MEDICARE

## 2024-01-09 NOTE — TELEPHONE ENCOUNTER
Provider: Dr Packer  Caller: Rocío to pt  Reason for Call: I called pt to let her know [facility] has been calling her to get her scheduled for her NP appt, to call [phone] to schedule.

## 2024-01-16 ENCOUNTER — HOSPITAL ENCOUNTER (OUTPATIENT)
Dept: PET IMAGING | Facility: HOSPITAL | Age: 69
Discharge: HOME | End: 2024-01-16
Payer: MEDICARE

## 2024-01-16 DIAGNOSIS — C34.32 MALIGNANT NEOPLASM OF LOWER LOBE OF LEFT LUNG: ICD-10-CM

## 2024-01-16 DIAGNOSIS — Z79.899 ENCOUNTER FOR LONG-TERM (CURRENT) USE OF HIGH-RISK MEDICATION: ICD-10-CM

## 2024-01-16 LAB — GLUCOSE BLDC GLUCOMTR-MCNC: 161 MG/DL (ref 70–130)

## 2024-01-16 PROCEDURE — 82948 REAGENT STRIP/BLOOD GLUCOSE: CPT

## 2024-01-16 PROCEDURE — 0 FLUDEOXYGLUCOSE F18 SOLUTION: Performed by: NURSE PRACTITIONER

## 2024-01-16 PROCEDURE — A9552 F18 FDG: HCPCS | Performed by: NURSE PRACTITIONER

## 2024-01-16 PROCEDURE — 78815 PET IMAGE W/CT SKULL-THIGH: CPT

## 2024-01-16 RX ADMIN — FLUDEOXYGLUCOSE F 18 1 DOSE: 200 INJECTION, SOLUTION INTRAVENOUS at 09:35

## 2024-01-24 ENCOUNTER — OFFICE VISIT (OUTPATIENT)
Dept: ENDOCRINOLOGY | Age: 69
End: 2024-01-24
Payer: MEDICARE

## 2024-01-24 VITALS
TEMPERATURE: 97.4 F | OXYGEN SATURATION: 95 % | WEIGHT: 213 LBS | SYSTOLIC BLOOD PRESSURE: 138 MMHG | BODY MASS INDEX: 39.2 KG/M2 | HEIGHT: 62 IN | DIASTOLIC BLOOD PRESSURE: 72 MMHG | HEART RATE: 86 BPM

## 2024-01-24 DIAGNOSIS — E03.9 ACQUIRED HYPOTHYROIDISM: Primary | ICD-10-CM

## 2024-01-24 DIAGNOSIS — E55.9 VITAMIN D DEFICIENCY: ICD-10-CM

## 2024-01-24 DIAGNOSIS — Z29.89 IMMUNOTHERAPY: ICD-10-CM

## 2024-01-24 DIAGNOSIS — R73.9 HYPERGLYCEMIA: ICD-10-CM

## 2024-01-24 NOTE — ASSESSMENT & PLAN NOTE
Patient is on Durvalumab anti PD-L1  Immunotherapy might be associated with some endocrinopathies including hypothyroidism, hyperthyroidism, adrenal insufficiency and in very rare cases of type 1 diabetes  Will check baseline a.m. cortisol and ACTH  Will check CMP and hemoglobin A1c  Will check martín 66  Will continue to monitor

## 2024-01-24 NOTE — PROGRESS NOTES
"Chief Complaint  Hypothyroidism    Subjective        Gabby Acosta presents to [facility]  to establish care.      History of Present Illness    Referred by oncology for further management of hypothyroidism    Patient was diagnosed with hypothyroidism about 10 years ago.  Since then she has been taking Synthroid  She usually takes the Synthroid early morning with other pills including calcium supplements.  Complains of fatigue  Denies palpitation or heart racing  Denies lightheadedness or dizziness  Denies cold intolerance or heat intolerance.    The dose of Synthroid increased from 150 to 175 mcg on 1/5/2024 1/2/2024  TSH  19.7  Free T4 0.8           Nonsmall cell lung cancer diagnosed in 2023  Completed chemotherapy and radiation in May 2023   Immunotherapy / durvalumab started in May 2023    Vitamin D deficiency  Takes Vitamin D 5000 IU daily       Objective   Vital Signs:  /72   Pulse 86   Temp 97.4 °F (36.3 °C) (Temporal)   Ht 157 cm (61.81\")   Wt 96.6 kg (213 lb)   SpO2 95%   BMI 39.20 kg/m²   Estimated body mass index is 39.2 kg/m² as calculated from the following:    Height as of this encounter: 157 cm (61.81\").    Weight as of this encounter: 96.6 kg (213 lb).               Review of Systems   Constitutional:  Positive for fatigue. Negative for unexpected weight change.   Eyes:  Negative for visual disturbance.   Cardiovascular:  Negative for palpitations.   Gastrointestinal:  Negative for abdominal pain, constipation, nausea and vomiting.   Endocrine: Negative for cold intolerance and heat intolerance.   Neurological:  Negative for dizziness and light-headedness.        Physical Exam   Result Review :  The following data was reviewed by: Mahrokh Nokhbehzaeim, MD on 01/24/2024:  Common labs          11/7/2023    09:46 12/5/2023    09:47 1/2/2024    10:49 1/2/2024    11:01   Common Labs   Glucose 142  147  131     BUN 26  29  24     Creatinine 1.03  1.17  1.26   "   Sodium 140  140  135     Potassium 4.2  4.2  4.1     Chloride 102  101  98     Calcium 9.0  9.2  9.3     Albumin 3.6  3.8  3.9     Total Bilirubin 0.2  0.2  0.2     Alkaline Phosphatase 70  79  65     AST (SGOT) 22  28  18     ALT (SGPT) 13  17  7     WBC 4.88  5.32   4.66    Hemoglobin 14.0  13.2   13.2    Hematocrit 41.2  38.3   38.5    Platelets 101  116   111      CMP          11/7/2023    09:46 12/5/2023    09:47 1/2/2024    10:49   CMP   Glucose 142  147  131    BUN 26  29  24    Creatinine 1.03  1.17  1.26    EGFR 59.7  51.2  46.9    Sodium 140  140  135    Potassium 4.2  4.2  4.1    Chloride 102  101  98    Calcium 9.0  9.2  9.3    Total Protein 6.1  6.6  6.4    Albumin 3.6  3.8  3.9    Globulin 2.5  2.8  2.5    Total Bilirubin 0.2  0.2  0.2    Alkaline Phosphatase 70  79  65    AST (SGOT) 22  28  18    ALT (SGPT) 13  17  7    Albumin/Globulin Ratio 1.4  1.4  1.6    BUN/Creatinine Ratio 25.2  24.8  19.0    Anion Gap 13.7  9.2  9.0      CBC          11/7/2023    09:46 12/5/2023    09:47 1/2/2024    11:01   CBC   WBC 4.88  5.32  4.66    RBC 4.33  4.09  4.22    Hemoglobin 14.0  13.2  13.2    Hematocrit 41.2  38.3  38.5    MCV 95.2  93.6  91.2    MCH 32.3  32.3  31.3    MCHC 34.0  34.5  34.3    RDW 11.8  12.6  12.4    Platelets 101  116  111      CBC w/diff          11/7/2023    09:46 12/5/2023    09:47 1/2/2024    11:01   CBC w/Diff   WBC 4.88  5.32  4.66    RBC 4.33  4.09  4.22    Hemoglobin 14.0  13.2  13.2    Hematocrit 41.2  38.3  38.5    MCV 95.2  93.6  91.2    MCH 32.3  32.3  31.3    MCHC 34.0  34.5  34.3    RDW 11.8  12.6  12.4    Platelets 101  116  111    Neutrophil Rel % 76.6  74.5  70.9    Immature Granulocyte Rel % 0.4  0.6  0.2    Lymphocyte Rel % 11.5  11.7  14.4    Monocyte Rel % 8.4  9.8  10.5    Eosinophil Rel % 2.5  2.6  3.4    Basophil Rel % 0.6  0.8  0.6        TSH          11/7/2023    09:46 12/5/2023    09:47 1/2/2024    11:17   TSH   TSH 6.380  4.740  19.700      Most Recent A1C           7/28/2023    08:51   HGBA1C Most Recent   Hemoglobin A1C 5.40      Data reviewed : Consultant notes oncology note             Assessment and Plan   Diagnoses and all orders for this visit:    1. Acquired hypothyroidism (Primary)  Assessment & Plan:   The first step in assessing fluctuation in TSH is to evaluate how the patient takes the pill.  Currently takes Synthroid 175 mcg daily takes it early morning with other pills   Proper pill technique discussed, to separate by 4 hours from multivitamin, calcium or iron, and take on empty stomach at least 30 minutes before breakfast.  Repeat TSH free T4 next week  Will check TPO and thyroglobulin antibody      Orders:  -     TSH; Future  -     T4, Free; Future  -     Thyroid Peroxidase Antibody; Future  -     Thyroglobulin Antibody; Future    2. Immunotherapy  Assessment & Plan:  Patient is on Durvalumab anti PD-L1  Immunotherapy might be associated with some endocrinopathies including hypothyroidism, hyperthyroidism, adrenal insufficiency and in very rare cases of type 1 diabetes  Will check baseline a.m. cortisol and ACTH  Will check CMP and hemoglobin A1c  Will check martín 66  Will continue to monitor    Orders:  -     TSH; Future  -     T4, Free; Future  -     Comprehensive Metabolic Panel; Future  -     Hemoglobin A1c; Future  -     Cortisol - AM; Future  -     ACTH; Future  -     Glutamic Acid Decarboxylase; Future  -     Thyroid Peroxidase Antibody; Future  -     Thyroglobulin Antibody; Future    3. Vitamin D deficiency  Assessment & Plan:  Takes vitamin D 5000 units daily  Will check vitamin D level    Orders:  -     Vitamin D 25 Hydroxy; Future    4. Hyperglycemia  Assessment & Plan:  Will check CMP and hemoglobin A1c    Orders:  -     Comprehensive Metabolic Panel; Future  -     Hemoglobin A1c; Future  -     Vitamin D 25 Hydroxy; Future           I spent 60 minutes caring for Gabby on this date of service. This time includes time spent by me in the following  activities:preparing for the visit, reviewing tests, obtaining and/or reviewing a separately obtained history, performing a medically appropriate examination and/or evaluation , counseling and educating the patient/family/caregiver, ordering medications, tests, or procedures, and documenting information in the medical record  Follow Up   Return in about 2 months (around 3/24/2024).  Patient was given instructions and counseling regarding her condition or for health maintenance advice. Please see specific information pulled into the AVS if appropriate.

## 2024-01-24 NOTE — ASSESSMENT & PLAN NOTE
The first step in assessing fluctuation in TSH is to evaluate how the patient takes the pill.  Currently takes Synthroid 175 mcg daily takes it early morning with other pills   Proper pill technique discussed, to separate by 4 hours from multivitamin, calcium or iron, and take on empty stomach at least 30 minutes before breakfast.  Repeat TSH free T4 next week  Will check TPO and thyroglobulin antibody

## 2024-01-29 NOTE — PROGRESS NOTES
"Chief Complaint  Non-small cell lung cancer, clinical stage IIIA ([id number]), aortic stenosis, cervical spine stenosis, COPD, hepatic steatosis, diabetes mellitus    Subjective        History of Present Illness  Patient returns today in follow-up with laboratory studies and PET scan to review, due for cycle 11 durvalumab.  The patient today reports that she is overall doing quite well.  She does have some mild fatigue that persists.  She has some mild sinus congestion.  Overall her respiratory status has been stable.  Her mobility has increased over time, is able to ambulate and perform normal daily activities at home without assistance however does require wheelchair for longer distances and is in a wheelchair again in the office today as usual.  She is requesting prescription for a shower chair today.  We did increase her levothyroxine dose on 1/5/2024 due to increase in her TSH up to 19.7, free T4 declined to 0.8 on 1/2/2024 labs.  We have been trying to have her seen by endocrinology for quite some time however appointment now scheduled for 3/28/2024.        Objective   Vital Signs:   /63   Pulse 62   Temp 97.2 °F (36.2 °C) (Temporal)   Resp 18   Ht 157 cm (61.81\")   Wt 97.2 kg (214 lb 4.8 oz)   SpO2 95%   BMI 39.44 kg/m²     Physical Exam  Constitutional:       Appearance: She is well-developed.      Comments: Patient is using a wheelchair today   Eyes:      Conjunctiva/sclera: Conjunctivae normal.   Neck:      Thyroid: No thyromegaly.   Cardiovascular:      Rate and Rhythm: Normal rate and regular rhythm.      Heart sounds: No murmur heard.     No friction rub. No gallop.   Pulmonary:      Effort: No respiratory distress.      Breath sounds: Normal breath sounds.   Abdominal:      General: Bowel sounds are normal. There is no distension.      Palpations: Abdomen is soft.      Tenderness: There is no abdominal tenderness.   Musculoskeletal:         General: No swelling.   Lymphadenopathy:      Head: "      Right side of head: No submandibular adenopathy.      Cervical: No cervical adenopathy.      Upper Body:      Right upper body: No supraclavicular adenopathy.      Left upper body: No supraclavicular adenopathy.   Skin:     General: Skin is warm and dry.      Findings: No rash.   Neurological:      Mental Status: She is alert and oriented to person, place, and time.      Cranial Nerves: No cranial nerve deficit.      Motor: No abnormal muscle tone.      Deep Tendon Reflexes: Reflexes normal.   Psychiatric:         Behavior: Behavior normal.       Patient was examined today, unchanged from above    Result Review : Reviewed CBC, CMP, TSH, free T4 from today.  Reviewed PET scan from 1/16/2024.       Assessment and Plan     *Non-small cell lung cancer, clinical stage IIIA ([id number])  Patient has history of smoking 1 pack/day x 45 years, quit in November 2021.    Following anterior cervical corpectomy in September 2022, she experienced acute hypoxemic respiratory failure.    CT angiogram chest 9/25/2022 showed a concerning 1.4 cm left lower lobe pulmonary nodule, mediastinal lymphadenopathy with infracarinal lymph node 2.7 cm, left hilar lymph node 1.2 cm, small right hilar lymph nodes up from 1.1 cm and additional enlarged mediastinal nodes in the right paratracheal, precarinal, infracarinal spaces.  There was a wedge-shaped area of consolidation/atelectasis in the right middle lobe base, subpleural density left costophrenic sulcus felt to represent atelectasis.  It was recommended for her to undergo subsequent PET scan by pulmonary.  The patient has had ongoing follow-up with neurosurgery and on CT scans 11/29/2022 of cervical and thoracic spine, there was concern regarding spinal instability with potential need for posterior cervical fusion.  This was scheduled in December 2022 however was delayed due to upper respiratory infection.  Patient admitted on 1/10/2023 to address multiple medical issues in order to  prepare for potential neurosurgical procedure on 3/12/2023.  Patient underwent echocardiogram on 1/11/2023 with ejection fraction greater than 70%, grade 1 diastolic dysfunction, severe aortic stenosis.  Patient is being followed by cardiology with consideration of need for TAVR versus SAVR for moderate to severe aortic stenosis.  Repeat CT chest on 1/12/2023 showed slight increase in size of the left lower lobe pulmonary nodule increased from 1.4 to 1.5 cm.  Groundglass nodule peripheral left lower lobe less conspicuous and resolution of previous reticulonodular opacities right lower lobe.  There was further enlargement of mediastinal lymph nodes with right posterior paratracheal lymph node 1.4 x 2.1 up to 1.5 x 2.3 cm, subcarinal lymph node increased from 2.7 x 5.2 up to 3.2 x 5.8 cm, left hilar lymph node increased from 1.2 up to 1.4 cm, and is stable 1.7 cm left infrahilar lymph node.  There was also comment regarding hepatic steatosis with changes of chronic liver disease and stable indeterminate nodular thickening of the adrenal glands.  CT-guided lung biopsy on 1/13/2023 with pathology that showed invasive poorly differentiated non-small cell carcinoma with focal neuroendocrine differentiation.  Staining pattern and morphology favor poorly differentiated adenocarcinoma however there was focal CD56 staining, could not entirely exclude large cell neuroendocrine carcinoma.  PD-L1 35%, Caris analysis pending.  It was felt that there would be increased risk for bronchoscopy from a cardiac standpoint due to her aortic stenosis.  Staging MRI brain 1/29/2023 showed no evidence of metastatic disease  Staging PET scan 1/26/2023 showed hypermetabolic left lower lobe pulmonary nodule, size difficult to determine on PET however on recent CT was 1.6 cm (SUV 14.4).  Mediastinal and left hilar hypermetabolic lymphadenopathy (subcarinal lymph node 3.2 cm, SUV 31.7, left paratracheal 2.2 cm lymph node SUV 12.7).  Sub-6 mm  pulmonary nodule left upper lobe unchanged and below PET resolution.  Focal uptake posterior right hepatic lobe SUV 7.2 of uncertain significance.  Recommended MRI to evaluate.  Right thyroid activity SUV 5.3, recommended ultrasound.  Long segment uptake distal esophagus consistent with esophagitis.  Uptake posterior larynx, nonspecific, recommended direct visualization.  MRI abdomen 2/6/2023 with no evidence to suggest metastatic disease in the liver.  Subcentimeter nonenhancing lesions favor cyst or hemangiomas.  Patient underwent right and left heart catheterization with cardiology on 2/14/2023.  Identification of coronary artery disease, mild pulmonary hypertension, severe aortic stenosis.  Recommendation per cardiology to proceed with treatment for lung cancer and defer any intervention regarding aortic stenosis.  Concern regarding underlying neurologic issues from cervical stenosis and potential neuropathic effects from Taxol chemotherapy.  Concern regarding patient's overall performance status and ability to tolerate full dose chemotherapy.  Patient discussed at thoracic oncology tumor board with consensus to treat with weekly low-dose carboplatin concurrent with radiation therapy and omit Taxol.  Subsequent plan for 1 year durvalumab following concurrent chemoradiation.  Significant delay in initiating concurrent chemoradiation due to logistics with scheduling Mediport placement, patient canceled appointments and delayed initiation of treatment.  On 3/21/2023 initiated concurrent chemoradiation with weekly carboplatin (AUC 2) on 2/28/2023.  Week 4 carboplatin held 4/11 and again 4/18/2023 due to thrombocytopenia  Week 4 carboplatin resumed on 4/25/2023 at reduced dose to AUC 1 due to borderline neutropenia  Chemo and radiation held beginning 5/2/2023 due to neutropenia with WBC 1.13, ANC 0.53  Resumed radiation therapy on 5/8/2023 with recovery of WBC.  Received fifth and final weekly dose of concurrent  carboplatin (AUC 1) on 5/9/2023.  Patient completed radiation therapy on 5/12/2023.  CT chest abdomen pelvis 5/25/2023 with significant improvement in mediastinal and left hilar lymphadenopathy, decrease in left lower lobe nodule indicating significant response.  On 5/30/2023 initiated treatment with durvalumab 10 mg/kg every 2 weeks x 1 year.  Transitioned to durvalumab 1500 mg every 4-week dosing with cycle 4 on 7/18/2023.  TAVR CT chest abdomen pelvis 7/21/2023 with decreased left lower lobe nodule from 8 down to 6 mm, stable mediastinal lymph nodes (with the exception of 1 small superior mediastinal lymph node increased from 4 to 7 mm of unclear significance).  Upper abdominal lymph nodes with slight increase in the periportal, hepatogastric, peripancreatic regions.  Following 5 cycles durvalumab, PET scan 9/5/2023 with left lower lobe ill-defined consolidation and opacification likely representing postradiation change.  No mediastinal lymphadenopathy nor hypermetabolic activity.  Slight enlargement (0.7 up to 1.0 cm) of anterior pericardial lymph node with new hypermetabolic activity (SUV 5.5.  Stable epigastric adenopathy with mild hypermetabolic activity (SUV 4.4) unchanged from 1/26/2023 scan (although not noted on that report).  Nondisplaced, healing left seventh rib fracture.  Significance of the pericardial lymph node and epigastric lymphadenopathy unclear.  Neither area easily accessible for CT-guided biopsy after discussion with interventional radiology on 9/12/2023.  Elected to monitor on repeat PET scan at 2-month interval.  PET scan 10/31/2023 with stable mildly hypermetabolic left lower lobe segmental consolidation, indeterminate regarding residual malignancy versus inflammatory change.  Prior epicardial lymph node decreased in size to less than 1 cm with no residual hypermetabolic activity.  Subcentimeter periceliac lymph node with slight increase in SUV from 3.9 up to 6.5, indeterminate.  Plan  3-month interval PET scan  PET scan 1/16/2024 with decrease in extent of left lower lobe irregular nodular pulmonary opacification, SUV decreased slightly from 3.9 down to 3.3.  Stable borderline epigastric, mesenteric, aortocaval lymph nodes with stable activity.  Stable intense uptake base of tongue and floor of mouth.  Stable subcentimeter left upper lobe pulmonary nodule.  PET scan findings felt to be indeterminate, left lower lobe findings likely related to prior radiation therapy, no definitive evidence of residual or recurrent disease.  Plan repeat PET scan at 4-month interval at completion of Durvalumab.  Patient returns today in follow-up due for cycle 11 durvalumab having transitioned to every 4-week dosing with cycle 4.  We reviewed the above PET scan from 1/6/2024.  Again, there are no definitive findings consistent with active or recurrent disease.  Changes in the left lower lobe have decreased slightly in size and activity, suspect that this is related to postradiation change although residual active disease cannot be entirely excluded.  There are stable small abdominal lymph nodes with persistent activity, indeterminate.  The activity noted in the floor of mouth and base of tongue is most likely artifactual.  There is nothing evident on exam today.  We did however discuss returning to see Dr. Powers in ENT for evaluation particularly of the base of tongue.  The patient will continue on with Durvalumab as planned.  We discussed follow-up PET scan at a 4-month interval which will be at the end of her planned course of treatment with Durvalumab.  For now we will schedule her back in 4 weeks for NP visit when she is due for cycle 12 and I will see her in 8 weeks when she is due for cycle 13.  Cycle 14 will be her final planned cycle of Durvalumab, completing 1 year of treatment.    *Aortic stenosis  Patient underwent echocardiogram on 1/11/2023 with ejection fraction greater than 70%, grade 1 diastolic  dysfunction, severe aortic stenosis.    Patient followed by cardiology with consideration of need for TAVR versus SAVR for moderate to severe aortic stenosis.  Patient underwent cardiac catheterization 2/14/2023 with identification of coronary artery disease, mild pulmonary hypertension, severe aortic stenosis.  Per cardiology, recommendation to proceed with treatment for lung cancer and defer any consideration of intervention for aortic stenosis.  Patient did undergo TAVR on 8/1/2023  Echocardiogram on 8/2/2023 with ejection fraction 62.4%, TAVR present  Patient continues follow-up with cardiology     *Cervical spine stenosis  Patient with cervical spine stenosis with associated myelopathy.   She underwent surgery on 9/20/2022 with anterior cervical corpectomy with cage.      The patient has had ongoing follow-up with neurosurgery and on CT scans 11/29/2022 of cervical and thoracic spine, there was concern regarding spinal instability with potential need for posterior cervical fusion.  This was scheduled in December 2022 however was delayed due to upper respiratory infection.  Patient admitted on 1/10/2023 to address multiple medical issues in order to prepare for potential neurosurgical procedure on 3/12/2023.  Patient was previously having severe symptoms related to her cervical stenosis with reduced ability to ambulate previously and significant limitations in movement in her hands, dropping objects.  More recently however she has improved and has been able to walk with the use of a walker, symptoms in the upper extremities have improved and her dexterity is better.    It appears that her neurosurgical procedure will need to be delayed until we can at least complete the concurrent chemoradiation portion of her treatment.  Surgery would be feasible while continuing on immunotherapy in the future.  Neurosurgical follow-up has been placed on hold.  Patient notes that her mobility and symptoms in her extremities  have continued to improve over time.  She does not intend to proceed with further surgery in the future.     *COPD  Patient has history of smoking 1 pack/day x 45 years, quit in November 2021.  Patient has not undergone formal PFTs  Patient being followed by pulmonary, currently using albuterol inhaler as needed     *Hepatic steatosis  Identified on prior scans  PET scan 1/26/2023 with questionable area of activity seen but no corresponding CT abnormality.    MRI abdomen 2/6/2023 with no evidence to suggest metastatic disease in the liver.  Subcentimeter nonenhancing lesions favor cyst or hemangiomas.  LFTs remain normal     *Diabetes mellitus    *Anxiety/depression  Patient currently continuing on Wellbutrin  mg daily, Lexapro 10 mg daily    *Right thyroid uptake on PET scan 1/26/2023  Patient was scheduled for thyroid ultrasound however did not feel that she was able to undergo the procedure due to her neck issues and therefore was canceled, consider at future date  No mention of thyroid uptake on PET scan 9/5/2023 and on 10/31/2023.  No uptake on PET scan 1/16/2024, thyroid appeared atrophic    *Laryngeal uptake on PET scan 1/26/2023, subsequent PET uptake and floor of mouth and base of tongue 1/16/2024  Patient was seen by ENT on 3/28/2023, laryngoscopy showed mild edema in the posterior glottis consistent with laryngeal pharyngeal reflux, no evidence of malignancy.  No mention of laryngeal uptake on PET scan 9/5/2023 nor on 10/31/2023  PET scan 1/16/2024 showed persistent significant uptake floor of mouth and base of tongue.  The area was visualized today on exam with no abnormal findings.  She does wear dentures that do not fit properly, unclear whether this could be contributing to the activity.  We will refer her to ENT for better visualization of base of tongue.    *Esophageal uptake on PET scan 1/26/2023  Beatty to be consistent with esophagitis in the distal esophagus.  We will hold on referral for  EGD given the multitude of additional procedures and consults required above.  No mention of esophageal uptake on PET scan 9/5/2023 nor on 10/31/2023 no oral on 1/16/2024    *Venous access  Mediport placed by Dr. Shook on 3/2/2023    *Nutrition/weight loss  Patient has been seen by oncology nutrition  Patient overall has lost a significant amount of weight however weight subsequently stabilized  Patient notes normal appetite, weight stable at 214 pounds    *Radiation esophagitis  Patient is currently using Carafate 1 g 4 times daily  Prescription was sent for Magic mouthwash however patient cannot afford the medication  Symptoms stabilized with Carafate alone and subsequently resolved.     *Chronic left greater than right lower extremity edema with subsequent left inguinal/lower extremity hematoma following TAVR  Patient developed exacerbation of chronic edema in the lower extremities, left slightly greater than right.    Bilateral lower extremity Doppler on 5/9/2023 was negative, showed bilateral popliteal fluid collections.  Patient with evidence of left inguinal/left lower extremity hematoma following TAVR with ecchymosis tracking into the distal portion of the left lower extremity and ecchymosis laterally in the left hip.  Likely explains decline in hemoglobin and low haptoglobin post procedure.  PET scan 9/5/2023 with evidence of fluid collection/blood in the left inguinal region.    *Hypothyroidism exacerbated by immunotherapy  Hypothyroidism managed by PCP Dr. Danyelle Brush  Labs on 5/30/2023 prior to beginning immunotherapy with evidence of hypothyroidism with TSH 10.5, free T4 0.54.  Levothyroxine dose increased by Dr. Brush from 125 up to 150 mcg daily  Continue to monitor thyroid function studies every 8 weeks on durvalumab  Patient with persistent borderline abnormal thyroid function studies on Durvalumab, referred to endocrinology  On 1/2/2024, significant increase in TSH to 19.7 with free T40.8.  On  1/5/2024 increased levothyroxine dose from 150 up to 175 mcg daily.  Improved thyroid function studies today with TSH 4.29, free T41.54.  We will continue at 175 mcg of levothyroxine daily and continue to monitor thyroid function per protocol.  Patient scheduled to see endocrinology on 3/28/2024.    *Chemotherapy-induced leukopenia  WBC today 4.76 with normal differential    *Chemotherapy-induced thrombocytopenia  Platelet count has gradually improved since completion of concurrent chemoradiation  Platelet count on 11/7/2023 declined to 101,000.  Additional labs with IPF 5.5%, B12 3 and 42, folate 8.99  Platelet count remains stable, mildly low at 111,000 today.    *Chemotherapy-induced anemia  Hemoglobin did decline into the high 10-low 11 range on concurrent chemoradiation  Hemoglobin subsequently improved and normalized as of 7/28/2023 with hemoglobin of 13.4  Patient underwent TAVR on 8/1/2023 with significant decline in hemoglobin thereafter, reached a low of 7.1 on 8/8/2023.  Additional labs with cardiology on 8/8/2023 with iron 78, iron saturation 22%, TIBC 350.  Patient was started on oral iron although no evidence of iron deficiency.    Labs on 8/15/2023 with hemoglobin 8.7, iron 129, ferritin 120, iron saturation 39%, TIBC 355, B12 288, folate 7.15, haptoglobin less than 10.  Oral iron discontinued as patient was constipated, had no evidence of iron deficiency.  Initiated oral B12 1000 mcg daily.  Postprocedure anemia and low haptoglobin related to left inguinal/left lower extremity hematoma.  Hemoglobin today 13.2.  Patient will continue on oral B12 1000 mcg daily.    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  Recommended CTA exam for more definitive evaluation.  Patient was referred to vascular surgery, had to postpone the visit        PLAN:  Continue definitive treatment for stage IIIA non-small cell lung cancer.  Patient  completed concurrent chemoradiation with weekly single agent carboplatin (completed on 5/12/2023).  Patient is continuing on durvalumab single agent initiated 5/30/2023 with 10 mg/kg dose every 2 weeks.  With cycle 4 on 7/18/2023 transitioned to 1500 mg every 4 weeks.  Proceed with cycle 11 durvalumab today.  Patient continues on levothyroxine currently at 175 mcg daily   Patient has been referred to endocrinology regarding abnormal thyroid function studies on immunotherapy, scheduled to be seen on 3/28/2024  Continue oral B12 1000 mcg daily  Referral back to Dr. Powers in ENT to evaluate PET activity noted floor of mouth and base of tongue  In 4 weeks NP visit with CBC, CMP, TSH, free T4 and cycle 12 durvalumab.  In 8 weeks MD visit with CBC, CMP, TSH, free T4 and cycle 13 durvalumab.  Cycle 14 will complete her 1 year of treatment and we will plan repeat PET scan at the end of that cycle, 4 months from now.      The patient is continuing on high risk medication requiring intensive monitoring    I did spend 45 minutes total time caring for the patient today, time spent in review of records, face-to-face consultation, coordination of care, placement of orders, completion of documentation.

## 2024-01-30 ENCOUNTER — INFUSION (OUTPATIENT)
Dept: ONCOLOGY | Facility: HOSPITAL | Age: 69
End: 2024-01-30
Payer: MEDICARE

## 2024-01-30 ENCOUNTER — OFFICE VISIT (OUTPATIENT)
Dept: ONCOLOGY | Facility: CLINIC | Age: 69
End: 2024-01-30
Payer: MEDICARE

## 2024-01-30 VITALS
RESPIRATION RATE: 18 BRPM | SYSTOLIC BLOOD PRESSURE: 136 MMHG | HEART RATE: 62 BPM | DIASTOLIC BLOOD PRESSURE: 63 MMHG | HEIGHT: 62 IN | BODY MASS INDEX: 39.43 KG/M2 | WEIGHT: 214.3 LBS | TEMPERATURE: 97.2 F | OXYGEN SATURATION: 95 %

## 2024-01-30 DIAGNOSIS — C34.32 MALIGNANT NEOPLASM OF LOWER LOBE OF LEFT LUNG: ICD-10-CM

## 2024-01-30 DIAGNOSIS — Z79.899 ENCOUNTER FOR LONG-TERM (CURRENT) USE OF HIGH-RISK MEDICATION: Primary | ICD-10-CM

## 2024-01-30 DIAGNOSIS — Z79.899 ENCOUNTER FOR LONG-TERM (CURRENT) USE OF HIGH-RISK MEDICATION: ICD-10-CM

## 2024-01-30 LAB
ALBUMIN SERPL-MCNC: 3.8 G/DL (ref 3.5–5.2)
ALBUMIN/GLOB SERPL: 1.5 G/DL
ALP SERPL-CCNC: 71 U/L (ref 39–117)
ALT SERPL W P-5'-P-CCNC: 13 U/L (ref 1–33)
ANION GAP SERPL CALCULATED.3IONS-SCNC: 9.1 MMOL/L (ref 5–15)
AST SERPL-CCNC: 19 U/L (ref 1–32)
BASOPHILS # BLD AUTO: 0.03 10*3/MM3 (ref 0–0.2)
BASOPHILS NFR BLD AUTO: 0.6 % (ref 0–1.5)
BILIRUB SERPL-MCNC: 0.2 MG/DL (ref 0–1.2)
BUN SERPL-MCNC: 27 MG/DL (ref 8–23)
BUN/CREAT SERPL: 22.9 (ref 7–25)
CALCIUM SPEC-SCNC: 9.1 MG/DL (ref 8.6–10.5)
CHLORIDE SERPL-SCNC: 101 MMOL/L (ref 98–107)
CO2 SERPL-SCNC: 28.9 MMOL/L (ref 22–29)
CREAT SERPL-MCNC: 1.18 MG/DL (ref 0.57–1)
DEPRECATED RDW RBC AUTO: 38.9 FL (ref 37–54)
EGFRCR SERPLBLD CKD-EPI 2021: 50.4 ML/MIN/1.73
EOSINOPHIL # BLD AUTO: 0.12 10*3/MM3 (ref 0–0.4)
EOSINOPHIL NFR BLD AUTO: 2.5 % (ref 0.3–6.2)
ERYTHROCYTE [DISTWIDTH] IN BLOOD BY AUTOMATED COUNT: 11.6 % (ref 12.3–15.4)
GLOBULIN UR ELPH-MCNC: 2.5 GM/DL
GLUCOSE SERPL-MCNC: 122 MG/DL (ref 65–99)
HCT VFR BLD AUTO: 37.4 % (ref 34–46.6)
HGB BLD-MCNC: 12.2 G/DL (ref 12–15.9)
IMM GRANULOCYTES # BLD AUTO: 0.02 10*3/MM3 (ref 0–0.05)
IMM GRANULOCYTES NFR BLD AUTO: 0.4 % (ref 0–0.5)
LYMPHOCYTES # BLD AUTO: 0.53 10*3/MM3 (ref 0.7–3.1)
LYMPHOCYTES NFR BLD AUTO: 11.1 % (ref 19.6–45.3)
MCH RBC QN AUTO: 30 PG (ref 26.6–33)
MCHC RBC AUTO-ENTMCNC: 32.6 G/DL (ref 31.5–35.7)
MCV RBC AUTO: 91.9 FL (ref 79–97)
MONOCYTES # BLD AUTO: 0.51 10*3/MM3 (ref 0.1–0.9)
MONOCYTES NFR BLD AUTO: 10.7 % (ref 5–12)
NEUTROPHILS NFR BLD AUTO: 3.55 10*3/MM3 (ref 1.7–7)
NEUTROPHILS NFR BLD AUTO: 74.7 % (ref 42.7–76)
NRBC BLD AUTO-RTO: 0 /100 WBC (ref 0–0.2)
PLATELET # BLD AUTO: 119 10*3/MM3 (ref 140–450)
PMV BLD AUTO: 9.8 FL (ref 6–12)
POTASSIUM SERPL-SCNC: 4.5 MMOL/L (ref 3.5–5.2)
PROT SERPL-MCNC: 6.3 G/DL (ref 6–8.5)
RBC # BLD AUTO: 4.07 10*6/MM3 (ref 3.77–5.28)
SODIUM SERPL-SCNC: 139 MMOL/L (ref 136–145)
T4 FREE SERPL-MCNC: 1.54 NG/DL (ref 0.93–1.7)
TSH SERPL DL<=0.05 MIU/L-ACNC: 4.29 UIU/ML (ref 0.27–4.2)
WBC NRBC COR # BLD AUTO: 4.76 10*3/MM3 (ref 3.4–10.8)

## 2024-01-30 PROCEDURE — 25810000003 SODIUM CHLORIDE 0.9 % SOLUTION 250 ML FLEX CONT: Performed by: INTERNAL MEDICINE

## 2024-01-30 PROCEDURE — 84443 ASSAY THYROID STIM HORMONE: CPT | Performed by: INTERNAL MEDICINE

## 2024-01-30 PROCEDURE — 85025 COMPLETE CBC W/AUTO DIFF WBC: CPT

## 2024-01-30 PROCEDURE — 80053 COMPREHEN METABOLIC PANEL: CPT

## 2024-01-30 PROCEDURE — 25810000003 SODIUM CHLORIDE 0.9 % SOLUTION: Performed by: INTERNAL MEDICINE

## 2024-01-30 PROCEDURE — 84439 ASSAY OF FREE THYROXINE: CPT | Performed by: INTERNAL MEDICINE

## 2024-01-30 PROCEDURE — 25010000002 DURVALUMAB 50 MG/ML SOLUTION 10 ML VIAL: Performed by: INTERNAL MEDICINE

## 2024-01-30 PROCEDURE — 96413 CHEMO IV INFUSION 1 HR: CPT

## 2024-01-30 RX ORDER — SODIUM CHLORIDE 9 MG/ML
250 INJECTION, SOLUTION INTRAVENOUS ONCE
Status: CANCELLED | OUTPATIENT
Start: 2024-01-30

## 2024-01-30 RX ORDER — SODIUM CHLORIDE 9 MG/ML
250 INJECTION, SOLUTION INTRAVENOUS ONCE
Status: COMPLETED | OUTPATIENT
Start: 2024-01-30 | End: 2024-01-30

## 2024-01-30 RX ADMIN — SODIUM CHLORIDE 250 ML: 9 INJECTION, SOLUTION INTRAVENOUS at 13:59

## 2024-01-30 RX ADMIN — SODIUM CHLORIDE 1500 MG: 9 INJECTION, SOLUTION INTRAVENOUS at 13:59

## 2024-02-01 ENCOUNTER — TELEPHONE (OUTPATIENT)
Dept: ENDOCRINOLOGY | Age: 69
End: 2024-02-01
Payer: MEDICARE

## 2024-02-01 ENCOUNTER — TELEPHONE (OUTPATIENT)
Dept: ONCOLOGY | Facility: CLINIC | Age: 69
End: 2024-02-01
Payer: MEDICARE

## 2024-02-01 DIAGNOSIS — E03.9 ACQUIRED HYPOTHYROIDISM: Primary | ICD-10-CM

## 2024-02-01 RX ORDER — LEVOTHYROXINE SODIUM 175 UG/1
175 TABLET ORAL DAILY
Qty: 90 TABLET | Refills: 1 | Status: SHIPPED | OUTPATIENT
Start: 2024-02-01

## 2024-02-01 NOTE — TELEPHONE ENCOUNTER
"  Caller: Gabby Acosta \"GALVAN\"    Relationship: Self    Best call back number: [phone]    What was the call regarding: PATIENT CALLED WANTED TO KNOW IF WE ARE GOING TO SCHEDULE HER APPOINTMENT WITH DR VICKERS THE ENT OR DOES SHE NEED TO SCHEDULE THAT?  PATIENT ALSO WANTED TO CHECK STATUS OF THE SHOWER CHAIR THAT DR WEISS WAS GOING TO ORDER?  SHE WANTED TO LET DR WEISS KNOW THAT SHE HAS ALREADY SEEN THE ENDO DOCTOR AND SHE IS JUST WAITING ON HER LABS, PATIENT THOUGHT DR WEISS WAS UNDER THE IMPRESSION SHE HAS NOT SEEN ENDO BEFORE       PLEASE CALL TO ADVISE  "

## 2024-02-01 NOTE — TELEPHONE ENCOUNTER
"Hub staff attempted to follow warm transfer process and was unsuccessful     Caller: Gabby Acosta \"GALE\"    Relationship to patient: Self    Best call back number: [phone]     Patient is needing: PT IS NEEDING A PRESCRIPTION AND REFILLS SENT OVER FOR HER levothyroxine (SYNTHROID, LEVOTHROID) 175 MCG tablet   PT ONLY HAS FOUR DAYS LEFT PLEASE ADVISE. OK TO LVM OK TO USE MYCHART.     PHARMACY:  [facility] - [place], [place] - [address] [place] [address] [phone]  - [phone] [address] [address] Phone: [phone] Fax: [phone] Hours: Not open 24 hours         "

## 2024-02-02 RX ORDER — LEVOTHYROXINE SODIUM 175 UG/1
175 TABLET ORAL DAILY
Qty: 30 TABLET | Refills: 0 | OUTPATIENT
Start: 2024-02-02

## 2024-02-27 ENCOUNTER — INFUSION (OUTPATIENT)
Dept: ONCOLOGY | Facility: HOSPITAL | Age: 69
End: 2024-02-27
Payer: MEDICARE

## 2024-02-27 ENCOUNTER — OFFICE VISIT (OUTPATIENT)
Dept: ONCOLOGY | Facility: CLINIC | Age: 69
End: 2024-02-27
Payer: MEDICARE

## 2024-02-27 VITALS
OXYGEN SATURATION: 95 % | DIASTOLIC BLOOD PRESSURE: 72 MMHG | HEIGHT: 62 IN | RESPIRATION RATE: 18 BRPM | WEIGHT: 211.2 LBS | TEMPERATURE: 97.1 F | SYSTOLIC BLOOD PRESSURE: 112 MMHG | BODY MASS INDEX: 38.87 KG/M2 | HEART RATE: 65 BPM

## 2024-02-27 DIAGNOSIS — C34.32 MALIGNANT NEOPLASM OF LOWER LOBE OF LEFT LUNG: Primary | ICD-10-CM

## 2024-02-27 DIAGNOSIS — Z79.899 ENCOUNTER FOR LONG-TERM (CURRENT) USE OF HIGH-RISK MEDICATION: ICD-10-CM

## 2024-02-27 DIAGNOSIS — C34.32 MALIGNANT NEOPLASM OF LOWER LOBE OF LEFT LUNG: ICD-10-CM

## 2024-02-27 DIAGNOSIS — Z45.2 ENCOUNTER FOR ADJUSTMENT OR MANAGEMENT OF VASCULAR ACCESS DEVICE: ICD-10-CM

## 2024-02-27 LAB
ALBUMIN SERPL-MCNC: 3.7 G/DL (ref 3.5–5.2)
ALBUMIN/GLOB SERPL: 1.4 G/DL
ALP SERPL-CCNC: 65 U/L (ref 39–117)
ALT SERPL W P-5'-P-CCNC: 5 U/L (ref 1–33)
ANION GAP SERPL CALCULATED.3IONS-SCNC: 8 MMOL/L (ref 5–15)
AST SERPL-CCNC: 18 U/L (ref 1–32)
BASOPHILS # BLD AUTO: 0.04 10*3/MM3 (ref 0–0.2)
BASOPHILS NFR BLD AUTO: 0.8 % (ref 0–1.5)
BILIRUB SERPL-MCNC: 0.2 MG/DL (ref 0–1.2)
BUN SERPL-MCNC: 26 MG/DL (ref 8–23)
BUN/CREAT SERPL: 24.3 (ref 7–25)
CALCIUM SPEC-SCNC: 9.4 MG/DL (ref 8.6–10.5)
CHLORIDE SERPL-SCNC: 105 MMOL/L (ref 98–107)
CO2 SERPL-SCNC: 24 MMOL/L (ref 22–29)
CREAT SERPL-MCNC: 1.07 MG/DL (ref 0.57–1)
DEPRECATED RDW RBC AUTO: 35.8 FL (ref 37–54)
EGFRCR SERPLBLD CKD-EPI 2021: 56.7 ML/MIN/1.73
EOSINOPHIL # BLD AUTO: 0.17 10*3/MM3 (ref 0–0.4)
EOSINOPHIL NFR BLD AUTO: 3.2 % (ref 0.3–6.2)
ERYTHROCYTE [DISTWIDTH] IN BLOOD BY AUTOMATED COUNT: 11.2 % (ref 12.3–15.4)
GLOBULIN UR ELPH-MCNC: 2.6 GM/DL
GLUCOSE SERPL-MCNC: 101 MG/DL (ref 65–99)
HCT VFR BLD AUTO: 37.2 % (ref 34–46.6)
HGB BLD-MCNC: 12.5 G/DL (ref 12–15.9)
IMM GRANULOCYTES # BLD AUTO: 0.02 10*3/MM3 (ref 0–0.05)
IMM GRANULOCYTES NFR BLD AUTO: 0.4 % (ref 0–0.5)
LYMPHOCYTES # BLD AUTO: 0.86 10*3/MM3 (ref 0.7–3.1)
LYMPHOCYTES NFR BLD AUTO: 16.4 % (ref 19.6–45.3)
MCH RBC QN AUTO: 29.4 PG (ref 26.6–33)
MCHC RBC AUTO-ENTMCNC: 33.6 G/DL (ref 31.5–35.7)
MCV RBC AUTO: 87.5 FL (ref 79–97)
MONOCYTES # BLD AUTO: 0.61 10*3/MM3 (ref 0.1–0.9)
MONOCYTES NFR BLD AUTO: 11.6 % (ref 5–12)
NEUTROPHILS NFR BLD AUTO: 3.55 10*3/MM3 (ref 1.7–7)
NEUTROPHILS NFR BLD AUTO: 67.6 % (ref 42.7–76)
NRBC BLD AUTO-RTO: 0 /100 WBC (ref 0–0.2)
PLATELET # BLD AUTO: 129 10*3/MM3 (ref 140–450)
PMV BLD AUTO: 9.5 FL (ref 6–12)
POTASSIUM SERPL-SCNC: 4.9 MMOL/L (ref 3.5–5.2)
PROT SERPL-MCNC: 6.3 G/DL (ref 6–8.5)
RBC # BLD AUTO: 4.25 10*6/MM3 (ref 3.77–5.28)
SODIUM SERPL-SCNC: 137 MMOL/L (ref 136–145)
T4 FREE SERPL-MCNC: 1.69 NG/DL (ref 0.93–1.7)
TSH SERPL DL<=0.05 MIU/L-ACNC: 0.53 UIU/ML (ref 0.27–4.2)
WBC NRBC COR # BLD AUTO: 5.25 10*3/MM3 (ref 3.4–10.8)

## 2024-02-27 PROCEDURE — 80053 COMPREHEN METABOLIC PANEL: CPT

## 2024-02-27 PROCEDURE — 96413 CHEMO IV INFUSION 1 HR: CPT

## 2024-02-27 PROCEDURE — 25010000002 HEPARIN LOCK FLUSH PER 10 UNITS: Performed by: INTERNAL MEDICINE

## 2024-02-27 PROCEDURE — 85025 COMPLETE CBC W/AUTO DIFF WBC: CPT

## 2024-02-27 PROCEDURE — 25010000002 DURVALUMAB 50 MG/ML SOLUTION 10 ML VIAL: Performed by: NURSE PRACTITIONER

## 2024-02-27 PROCEDURE — 84439 ASSAY OF FREE THYROXINE: CPT | Performed by: INTERNAL MEDICINE

## 2024-02-27 PROCEDURE — 84443 ASSAY THYROID STIM HORMONE: CPT | Performed by: INTERNAL MEDICINE

## 2024-02-27 PROCEDURE — 25810000003 SODIUM CHLORIDE 0.9 % SOLUTION 250 ML FLEX CONT: Performed by: NURSE PRACTITIONER

## 2024-02-27 PROCEDURE — 25810000003 SODIUM CHLORIDE 0.9 % SOLUTION: Performed by: NURSE PRACTITIONER

## 2024-02-27 RX ORDER — SODIUM CHLORIDE 0.9 % (FLUSH) 0.9 %
10 SYRINGE (ML) INJECTION AS NEEDED
OUTPATIENT
Start: 2024-02-27

## 2024-02-27 RX ORDER — SODIUM CHLORIDE 0.9 % (FLUSH) 0.9 %
10 SYRINGE (ML) INJECTION AS NEEDED
Status: DISCONTINUED | OUTPATIENT
Start: 2024-02-27 | End: 2024-02-27 | Stop reason: HOSPADM

## 2024-02-27 RX ORDER — HEPARIN SODIUM (PORCINE) LOCK FLUSH IV SOLN 100 UNIT/ML 100 UNIT/ML
500 SOLUTION INTRAVENOUS AS NEEDED
OUTPATIENT
Start: 2024-02-27

## 2024-02-27 RX ORDER — SODIUM CHLORIDE 9 MG/ML
250 INJECTION, SOLUTION INTRAVENOUS ONCE
Status: COMPLETED | OUTPATIENT
Start: 2024-02-27 | End: 2024-02-27

## 2024-02-27 RX ORDER — HEPARIN SODIUM (PORCINE) LOCK FLUSH IV SOLN 100 UNIT/ML 100 UNIT/ML
500 SOLUTION INTRAVENOUS AS NEEDED
Status: DISCONTINUED | OUTPATIENT
Start: 2024-02-27 | End: 2024-02-27 | Stop reason: HOSPADM

## 2024-02-27 RX ORDER — SODIUM CHLORIDE 9 MG/ML
250 INJECTION, SOLUTION INTRAVENOUS ONCE
Status: CANCELLED | OUTPATIENT
Start: 2024-02-27

## 2024-02-27 RX ADMIN — Medication 500 UNITS: at 13:52

## 2024-02-27 RX ADMIN — Medication 10 ML: at 13:52

## 2024-02-27 RX ADMIN — SODIUM CHLORIDE 250 ML: 9 INJECTION, SOLUTION INTRAVENOUS at 12:46

## 2024-02-27 RX ADMIN — SODIUM CHLORIDE 1500 MG: 900 INJECTION, SOLUTION INTRAVENOUS at 12:46

## 2024-02-27 NOTE — PROGRESS NOTES
"Chief Complaint  Non-small cell lung cancer, clinical stage IIIA ([id number]), aortic stenosis, cervical spine stenosis, COPD, hepatic steatosis, diabetes mellitus    Subjective        History of Present Illness  Patient returns today 2/27/2024 for lab review evaluation due for cycle 12 Durvalumab.  Overall she continues to tolerate treatment quite well.  Patient has not yet seen ENT.  She called scheduled appointment and was told that she had a imbalance that she had to pay first.  She states that she will have the money to pay this on Friday, and then will schedule her appointment.  She has been seen by endocrinology, with an appointment to follow-up on 3/28/2024.  She has no new problems or concerns today.        Objective   Vital Signs:   /72   Pulse 65   Temp 97.1 °F (36.2 °C) (Temporal)   Resp 18   Ht 157 cm (61.81\")   Wt 95.8 kg (211 lb 3.2 oz)   SpO2 95%   BMI 38.87 kg/m²     Physical Exam  Vitals reviewed.   Constitutional:       General: She is not in acute distress.     Appearance: Normal appearance. She is well-developed.      Comments: Seated in wheelchair   HENT:      Head: Normocephalic and atraumatic.   Eyes:      Pupils: Pupils are equal, round, and reactive to light.   Cardiovascular:      Rate and Rhythm: Normal rate and regular rhythm.      Heart sounds: Normal heart sounds. No murmur heard.  Pulmonary:      Effort: Pulmonary effort is normal. No respiratory distress.      Breath sounds: Normal breath sounds. No wheezing, rhonchi or rales.   Abdominal:      General: Bowel sounds are normal. There is no distension.      Palpations: Abdomen is soft.   Musculoskeletal:         General: Normal range of motion.      Cervical back: Normal range of motion.   Skin:     General: Skin is warm and dry.      Findings: No rash.   Neurological:      Mental Status: She is alert and oriented to person, place, and time.       Patient was examined today, unchanged from above    Result Review :   Lab " Results   Component Value Date    WBC 5.25 02/27/2024    HGB 12.5 02/27/2024    HCT 37.2 02/27/2024    MCV 87.5 02/27/2024     (L) 02/27/2024     Lab Results   Component Value Date    GLUCOSE 101 (H) 02/27/2024    BUN 26 (H) 02/27/2024    CREATININE 1.07 (H) 02/27/2024    EGFR 56.7 (L) 02/27/2024    BCR 24.3 02/27/2024    K 4.9 02/27/2024    CO2 24.0 02/27/2024    CALCIUM 9.4 02/27/2024    ALBUMIN 3.7 02/27/2024    BILITOT 0.2 02/27/2024    AST 18 02/27/2024    ALT 5 02/27/2024          Assessment and Plan     *Non-small cell lung cancer, clinical stage IIIA ([id number])  Patient has history of smoking 1 pack/day x 45 years, quit in November 2021.    Following anterior cervical corpectomy in September 2022, she experienced acute hypoxemic respiratory failure.    CT angiogram chest 9/25/2022 showed a concerning 1.4 cm left lower lobe pulmonary nodule, mediastinal lymphadenopathy with infracarinal lymph node 2.7 cm, left hilar lymph node 1.2 cm, small right hilar lymph nodes up from 1.1 cm and additional enlarged mediastinal nodes in the right paratracheal, precarinal, infracarinal spaces.  There was a wedge-shaped area of consolidation/atelectasis in the right middle lobe base, subpleural density left costophrenic sulcus felt to represent atelectasis.  It was recommended for her to undergo subsequent PET scan by pulmonary.  The patient has had ongoing follow-up with neurosurgery and on CT scans 11/29/2022 of cervical and thoracic spine, there was concern regarding spinal instability with potential need for posterior cervical fusion.  This was scheduled in December 2022 however was delayed due to upper respiratory infection.  Patient admitted on 1/10/2023 to address multiple medical issues in order to prepare for potential neurosurgical procedure on 3/12/2023.  Patient underwent echocardiogram on 1/11/2023 with ejection fraction greater than 70%, grade 1 diastolic dysfunction, severe aortic stenosis.  Patient is  being followed by cardiology with consideration of need for TAVR versus SAVR for moderate to severe aortic stenosis.  Repeat CT chest on 1/12/2023 showed slight increase in size of the left lower lobe pulmonary nodule increased from 1.4 to 1.5 cm.  Groundglass nodule peripheral left lower lobe less conspicuous and resolution of previous reticulonodular opacities right lower lobe.  There was further enlargement of mediastinal lymph nodes with right posterior paratracheal lymph node 1.4 x 2.1 up to 1.5 x 2.3 cm, subcarinal lymph node increased from 2.7 x 5.2 up to 3.2 x 5.8 cm, left hilar lymph node increased from 1.2 up to 1.4 cm, and is stable 1.7 cm left infrahilar lymph node.  There was also comment regarding hepatic steatosis with changes of chronic liver disease and stable indeterminate nodular thickening of the adrenal glands.  CT-guided lung biopsy on 1/13/2023 with pathology that showed invasive poorly differentiated non-small cell carcinoma with focal neuroendocrine differentiation.  Staining pattern and morphology favor poorly differentiated adenocarcinoma however there was focal CD56 staining, could not entirely exclude large cell neuroendocrine carcinoma.  PD-L1 35%, Caris analysis pending.  It was felt that there would be increased risk for bronchoscopy from a cardiac standpoint due to her aortic stenosis.  Staging MRI brain 1/29/2023 showed no evidence of metastatic disease  Staging PET scan 1/26/2023 showed hypermetabolic left lower lobe pulmonary nodule, size difficult to determine on PET however on recent CT was 1.6 cm (SUV 14.4).  Mediastinal and left hilar hypermetabolic lymphadenopathy (subcarinal lymph node 3.2 cm, SUV 31.7, left paratracheal 2.2 cm lymph node SUV 12.7).  Sub-6 mm pulmonary nodule left upper lobe unchanged and below PET resolution.  Focal uptake posterior right hepatic lobe SUV 7.2 of uncertain significance.  Recommended MRI to evaluate.  Right thyroid activity SUV 5.3,  recommended ultrasound.  Long segment uptake distal esophagus consistent with esophagitis.  Uptake posterior larynx, nonspecific, recommended direct visualization.  MRI abdomen 2/6/2023 with no evidence to suggest metastatic disease in the liver.  Subcentimeter nonenhancing lesions favor cyst or hemangiomas.  Patient underwent right and left heart catheterization with cardiology on 2/14/2023.  Identification of coronary artery disease, mild pulmonary hypertension, severe aortic stenosis.  Recommendation per cardiology to proceed with treatment for lung cancer and defer any intervention regarding aortic stenosis.  Concern regarding underlying neurologic issues from cervical stenosis and potential neuropathic effects from Taxol chemotherapy.  Concern regarding patient's overall performance status and ability to tolerate full dose chemotherapy.  Patient discussed at thoracic oncology tumor board with consensus to treat with weekly low-dose carboplatin concurrent with radiation therapy and omit Taxol.  Subsequent plan for 1 year durvalumab following concurrent chemoradiation.  Significant delay in initiating concurrent chemoradiation due to logistics with scheduling Mediport placement, patient canceled appointments and delayed initiation of treatment.  On 3/21/2023 initiated concurrent chemoradiation with weekly carboplatin (AUC 2) on 2/28/2023.  Week 4 carboplatin held 4/11 and again 4/18/2023 due to thrombocytopenia  Week 4 carboplatin resumed on 4/25/2023 at reduced dose to AUC 1 due to borderline neutropenia  Chemo and radiation held beginning 5/2/2023 due to neutropenia with WBC 1.13, ANC 0.53  Resumed radiation therapy on 5/8/2023 with recovery of WBC.  Received fifth and final weekly dose of concurrent carboplatin (AUC 1) on 5/9/2023.  Patient completed radiation therapy on 5/12/2023.  CT chest abdomen pelvis 5/25/2023 with significant improvement in mediastinal and left hilar lymphadenopathy, decrease in left  lower lobe nodule indicating significant response.  On 5/30/2023 initiated treatment with durvalumab 10 mg/kg every 2 weeks x 1 year.  Transitioned to durvalumab 1500 mg every 4-week dosing with cycle 4 on 7/18/2023.  TAVR CT chest abdomen pelvis 7/21/2023 with decreased left lower lobe nodule from 8 down to 6 mm, stable mediastinal lymph nodes (with the exception of 1 small superior mediastinal lymph node increased from 4 to 7 mm of unclear significance).  Upper abdominal lymph nodes with slight increase in the periportal, hepatogastric, peripancreatic regions.  Following 5 cycles durvalumab, PET scan 9/5/2023 with left lower lobe ill-defined consolidation and opacification likely representing postradiation change.  No mediastinal lymphadenopathy nor hypermetabolic activity.  Slight enlargement (0.7 up to 1.0 cm) of anterior pericardial lymph node with new hypermetabolic activity (SUV 5.5.  Stable epigastric adenopathy with mild hypermetabolic activity (SUV 4.4) unchanged from 1/26/2023 scan (although not noted on that report).  Nondisplaced, healing left seventh rib fracture.  Significance of the pericardial lymph node and epigastric lymphadenopathy unclear.  Neither area easily accessible for CT-guided biopsy after discussion with interventional radiology on 9/12/2023.  Elected to monitor on repeat PET scan at 2-month interval.  PET scan 10/31/2023 with stable mildly hypermetabolic left lower lobe segmental consolidation, indeterminate regarding residual malignancy versus inflammatory change.  Prior epicardial lymph node decreased in size to less than 1 cm with no residual hypermetabolic activity.  Subcentimeter periceliac lymph node with slight increase in SUV from 3.9 up to 6.5, indeterminate.  Plan 3-month interval PET scan  PET scan 1/16/2024 with decrease in extent of left lower lobe irregular nodular pulmonary opacification, SUV decreased slightly from 3.9 down to 3.3.  Stable borderline epigastric,  mesenteric, aortocaval lymph nodes with stable activity.  Stable intense uptake base of tongue and floor of mouth.  Stable subcentimeter left upper lobe pulmonary nodule.  PET scan findings felt to be indeterminate, left lower lobe findings likely related to prior radiation therapy, no definitive evidence of residual or recurrent disease.  Plan repeat PET scan at 4-month interval at completion of Durvalumab.  Patient returns today in follow-up due for cycle 11 durvalumab having transitioned to every 4-week dosing with cycle 4.  We reviewed the above PET scan from 1/6/2024.  Again, there are no definitive findings consistent with active or recurrent disease.  Changes in the left lower lobe have decreased slightly in size and activity, suspect that this is related to postradiation change although residual active disease cannot be entirely excluded.  There are stable small abdominal lymph nodes with persistent activity, indeterminate.  The activity noted in the floor of mouth and base of tongue is most likely artifactual.  There is nothing evident on exam today.  We did however discuss returning to see Dr. Powers in ENT for evaluation particularly of the base of tongue.  The patient will continue on with Durvalumab as planned.  We discussed follow-up PET scan at a 4-month interval which will be at the end of her planned course of treatment with Durvalumab.  For now we will schedule her back in 4 weeks for NP visit when she is due for cycle 12 and I will see her in 8 weeks when she is due for cycle 13.  Cycle 14 will be her final planned cycle of Durvalumab, completing 1 year of treatment.  2/27/2024 cycle 12 Durvalumab    *Aortic stenosis  Patient underwent echocardiogram on 1/11/2023 with ejection fraction greater than 70%, grade 1 diastolic dysfunction, severe aortic stenosis.    Patient followed by cardiology with consideration of need for TAVR versus SAVR for moderate to severe aortic stenosis.  Patient underwent  cardiac catheterization 2/14/2023 with identification of coronary artery disease, mild pulmonary hypertension, severe aortic stenosis.  Per cardiology, recommendation to proceed with treatment for lung cancer and defer any consideration of intervention for aortic stenosis.  Patient did undergo TAVR on 8/1/2023  Echocardiogram on 8/2/2023 with ejection fraction 62.4%, TAVR present  Patient continues follow-up with cardiology     *Cervical spine stenosis  Patient with cervical spine stenosis with associated myelopathy.   She underwent surgery on 9/20/2022 with anterior cervical corpectomy with cage.      The patient has had ongoing follow-up with neurosurgery and on CT scans 11/29/2022 of cervical and thoracic spine, there was concern regarding spinal instability with potential need for posterior cervical fusion.  This was scheduled in December 2022 however was delayed due to upper respiratory infection.  Patient admitted on 1/10/2023 to address multiple medical issues in order to prepare for potential neurosurgical procedure on 3/12/2023.  Patient was previously having severe symptoms related to her cervical stenosis with reduced ability to ambulate previously and significant limitations in movement in her hands, dropping objects.  More recently however she has improved and has been able to walk with the use of a walker, symptoms in the upper extremities have improved and her dexterity is better.    It appears that her neurosurgical procedure will need to be delayed until we can at least complete the concurrent chemoradiation portion of her treatment.  Surgery would be feasible while continuing on immunotherapy in the future.  Neurosurgical follow-up has been placed on hold.  Patient notes that her mobility and symptoms in her extremities have continued to improve over time.  She does not intend to proceed with further surgery in the future.     *COPD  Patient has history of smoking 1 pack/day x 45 years, quit in  November 2021.  Patient has not undergone formal PFTs  Patient being followed by pulmonary, currently using albuterol inhaler as needed     *Hepatic steatosis  Identified on prior scans  PET scan 1/26/2023 with questionable area of activity seen but no corresponding CT abnormality.    MRI abdomen 2/6/2023 with no evidence to suggest metastatic disease in the liver.  Subcentimeter nonenhancing lesions favor cyst or hemangiomas.  LFTs remain normal     *Diabetes mellitus    *Anxiety/depression  Patient currently continuing on Wellbutrin  mg daily, Lexapro 10 mg daily    *Right thyroid uptake on PET scan 1/26/2023  Patient was scheduled for thyroid ultrasound however did not feel that she was able to undergo the procedure due to her neck issues and therefore was canceled, consider at future date  No mention of thyroid uptake on PET scan 9/5/2023 and on 10/31/2023.  No uptake on PET scan 1/16/2024, thyroid appeared atrophic    *Laryngeal uptake on PET scan 1/26/2023, subsequent PET uptake and floor of mouth and base of tongue 1/16/2024  Patient was seen by ENT on 3/28/2023, laryngoscopy showed mild edema in the posterior glottis consistent with laryngeal pharyngeal reflux, no evidence of malignancy.  No mention of laryngeal uptake on PET scan 9/5/2023 nor on 10/31/2023  PET scan 1/16/2024 showed persistent significant uptake floor of mouth and base of tongue.  The area was visualized today on exam with no abnormal findings.  She does wear dentures that do not fit properly, unclear whether this could be contributing to the activity.  We will refer her to ENT for better visualization of base of tongue.    *Esophageal uptake on PET scan 1/26/2023  [place] to be consistent with esophagitis in the distal esophagus.  We will hold on referral for EGD given the multitude of additional procedures and consults required above.  No mention of esophageal uptake on PET scan 9/5/2023 nor on 10/31/2023 no oral on  1/16/2024    *Venous access  Mediport placed by Dr. Shook on 3/2/2023    *Nutrition/weight loss  Patient has been seen by oncology nutrition  Patient overall has lost a significant amount of weight however weight subsequently stabilized  Patient notes normal appetite, weight stable at 214 pounds    *Radiation esophagitis  Patient is currently using Carafate 1 g 4 times daily  Prescription was sent for Magic mouthwash however patient cannot afford the medication  Symptoms stabilized with Carafate alone and subsequently resolved.     *Chronic left greater than right lower extremity edema with subsequent left inguinal/lower extremity hematoma following TAVR  Patient developed exacerbation of chronic edema in the lower extremities, left slightly greater than right.    Bilateral lower extremity Doppler on 5/9/2023 was negative, showed bilateral popliteal fluid collections.  Patient with evidence of left inguinal/left lower extremity hematoma following TAVR with ecchymosis tracking into the distal portion of the left lower extremity and ecchymosis laterally in the left hip.  Likely explains decline in hemoglobin and low haptoglobin post procedure.  PET scan 9/5/2023 with evidence of fluid collection/blood in the left inguinal region.    *Hypothyroidism exacerbated by immunotherapy  Hypothyroidism managed by PCP Dr. Danyelle Brush  Labs on 5/30/2023 prior to beginning immunotherapy with evidence of hypothyroidism with TSH 10.5, free T4 0.54.  Levothyroxine dose increased by Dr. Brush from 125 up to 150 mcg daily  Continue to monitor thyroid function studies every 8 weeks on durvalumab  Patient with persistent borderline abnormal thyroid function studies on Durvalumab, referred to endocrinology  On 1/2/2024, significant increase in TSH to 19.7 with free T40.8.  On 1/5/2024 increased levothyroxine dose from 150 up to 175 mcg daily.  Improved thyroid function studies today with TSH 4.29, free T41.54.  We will continue at 175  mcg of levothyroxine daily and continue to monitor thyroid function per protocol.  Patient scheduled to see endocrinology on 3/28/2024.    *Chemotherapy-induced leukopenia  WBC today 5.25.    *Chemotherapy-induced thrombocytopenia  Platelet count has gradually improved since completion of concurrent chemoradiation  Platelet count on 11/7/2023 declined to 101,000.  Additional labs with IPF 5.5%, B12 3 and 42, folate 8.99  Platelet count remains stable, mildly low at 129,000.    *Chemotherapy-induced anemia  Hemoglobin did decline into the high 10-low 11 range on concurrent chemoradiation  Hemoglobin subsequently improved and normalized as of 7/28/2023 with hemoglobin of 13.4  Patient underwent TAVR on 8/1/2023 with significant decline in hemoglobin thereafter, reached a low of 7.1 on 8/8/2023.  Additional labs with cardiology on 8/8/2023 with iron 78, iron saturation 22%, TIBC 350.  Patient was started on oral iron although no evidence of iron deficiency.    Labs on 8/15/2023 with hemoglobin 8.7, iron 129, ferritin 120, iron saturation 39%, TIBC 355, B12 288, folate 7.15, haptoglobin less than 10.  Oral iron discontinued as patient was constipated, had no evidence of iron deficiency.  Initiated oral B12 1000 mcg daily.  Postprocedure anemia and low haptoglobin related to left inguinal/left lower extremity hematoma.  Hemoglobin 12.5 today.  Continues on oral B12 1000 mcg daily.      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  Recommended CTA exam for more definitive evaluation.  Patient was referred to vascular surgery, had to postpone the visit        PLAN:  Continue definitive treatment for stage IIIA non-small cell lung cancer.  Patient completed concurrent chemoradiation with weekly single agent carboplatin (completed on 5/12/2023).  Patient is continuing on durvalumab single agent initiated 5/30/2023 with 10 mg/kg dose every 2 weeks.   With cycle 4 on 7/18/2023 transitioned to 1500 mg every 4 weeks.    Proceed with cycle 12 Durvalumab today.  Continue levothyroxine 175 mcg daily.  Follow-up with endocrinology as scheduled regarding abnormal thyroid function studies on immunotherapy.  Currently scheduled to be seen 3/28/2024.  Continue oral B12 1000 mcg daily.  Has been referred to ENT, Dr. Powers for evaluation of abnormal uptake in the floor of the mouth, and base of tongue.  Follow-up in 4 weeks with Dr. Lombardi with repeat CBC, CMP, TSH, free T4, due for cycle 13 Durvalumab.  Cycle 14 will complete 1 year of treatment with plans to repeat PET scan at the end of that cycle.    Patient is on a high risk medication requiring close monitoring for toxicity.

## 2024-03-19 ENCOUNTER — TELEPHONE (OUTPATIENT)
Dept: ONCOLOGY | Facility: CLINIC | Age: 69
End: 2024-03-19
Payer: MEDICARE

## 2024-03-19 DIAGNOSIS — C34.32 MALIGNANT NEOPLASM OF LOWER LOBE OF LEFT LUNG: Primary | ICD-10-CM

## 2024-03-19 NOTE — TELEPHONE ENCOUNTER
"  Caller: Domitila Acosta \"ALE\"    Relationship: Self    Best call back number: [phone]    What was the call regarding: DOMITILA CALLED REGARDING HER APPONTMENT WITH [facility]. SHE IS CANCELLING THE APPOINTMENT DUE TO MOBILITY ISSUES. SHE IS REQUESTING THAT DR. WEISS REFER HER TO A  ENT PROVIDER. SHE SAYS SHE CANNOT SEE DR. VICKERS[facility]S [facility]. PLEASE CALL TO DISCUSS.      "

## 2024-03-20 NOTE — TELEPHONE ENCOUNTER
"Dr. Lombardi,    Patient seen Dr. Powers previously and apparently she got a bill and she states she knew nothing about this. Because of this \"billing issue\" they are wanting her to pay her copay first and she states she cannot afford the copay they are giving her as she normally does not have to pay. She is wanting to be sent to another ENT     Dr. Lombardi recommended Dr. Rafael Evans and referral was entered. Patient was called to make her aware and she did not answer so a v/m was left.   "

## 2024-03-25 NOTE — PROGRESS NOTES
"Chief Complaint  Non-small cell lung cancer, clinical stage IIIA ([id number]), aortic stenosis, cervical spine stenosis, COPD, hepatic steatosis, diabetes mellitus    Subjective        History of Present Illness  Patient returns today in follow-up with laboratory studies and PET scan to review, due for cycle 13 durvalumab.  Patient remains in a wheelchair today as usual.  She reports that overall she has done quite well recently.  Her appetite has increased significantly.  She has been seen by endocrinology, is continuing on levothyroxine 175 mcg daily.  She does report that she had a cyst in her left axillary region which she lanced and excised on her own with her fingers and subsequently healed without difficulty.  She did not take her Lasix 2 days ago nor today and is experiencing increased lower extremity edema, asymmetric left greater than right.  She also reports some pain particularly in her right popliteal fossa, notes that she has had bilateral Baker's cyst in the past.  She does have some chronic sinus congestion.  We have attempted referring her to ENT and she is in the process of obtaining an appointment to follow-up on activity seen on prior PET scan the floor of mouth.  She is able to ambulate to some extent at home although this remains somewhat limited.  She has no new complaints today, reports that her respiratory status has been stable.        Objective   Vital Signs:   /78   Pulse 63   Temp 98.4 °F (36.9 °C) (Oral)   Resp 16   Ht 157 cm (61.81\")   Wt 96.4 kg (212 lb 9.6 oz)   SpO2 93%   BMI 39.12 kg/m²     Physical Exam  Constitutional:       Appearance: She is well-developed.      Comments: Patient is using a wheelchair today   Eyes:      Conjunctiva/sclera: Conjunctivae normal.   Neck:      Thyroid: No thyromegaly.   Cardiovascular:      Rate and Rhythm: Normal rate and regular rhythm.      Heart sounds: No murmur heard.     No friction rub. No gallop.   Pulmonary:      Effort: No " respiratory distress.      Breath sounds: Normal breath sounds.      Comments: Left axilla visualized where patient describes excising a cystic lesion, no residual abnormality identified.  Abdominal:      General: Bowel sounds are normal. There is no distension.      Palpations: Abdomen is soft.      Tenderness: There is no abdominal tenderness.   Musculoskeletal:         General: Swelling present.      Comments: 1+ bilateral lower extremity edema, left greater than right   Lymphadenopathy:      Head:      Right side of head: No submandibular adenopathy.      Cervical: No cervical adenopathy.      Upper Body:      Right upper body: No supraclavicular adenopathy.      Left upper body: No supraclavicular adenopathy.   Skin:     General: Skin is warm and dry.      Findings: No rash.   Neurological:      Mental Status: She is alert and oriented to person, place, and time.      Cranial Nerves: No cranial nerve deficit.      Motor: No abnormal muscle tone.      Deep Tendon Reflexes: Reflexes normal.   Psychiatric:         Behavior: Behavior normal.           Result Review : Reviewed CBC, CMP, TSH, free T4 from today.       Assessment and Plan     *Non-small cell lung cancer, clinical stage IIIA ([id number])  Patient has history of smoking 1 pack/day x 45 years, quit in November 2021.    Following anterior cervical corpectomy in September 2022, she experienced acute hypoxemic respiratory failure.    CT angiogram chest 9/25/2022 showed a concerning 1.4 cm left lower lobe pulmonary nodule, mediastinal lymphadenopathy with infracarinal lymph node 2.7 cm, left hilar lymph node 1.2 cm, small right hilar lymph nodes up from 1.1 cm and additional enlarged mediastinal nodes in the right paratracheal, precarinal, infracarinal spaces.  There was a wedge-shaped area of consolidation/atelectasis in the right middle lobe base, subpleural density left costophrenic sulcus felt to represent atelectasis.  It was recommended for her to  undergo subsequent PET scan by pulmonary.  The patient has had ongoing follow-up with neurosurgery and on CT scans 11/29/2022 of cervical and thoracic spine, there was concern regarding spinal instability with potential need for posterior cervical fusion.  This was scheduled in December 2022 however was delayed due to upper respiratory infection.  Patient admitted on 1/10/2023 to address multiple medical issues in order to prepare for potential neurosurgical procedure on 3/12/2023.  Patient underwent echocardiogram on 1/11/2023 with ejection fraction greater than 70%, grade 1 diastolic dysfunction, severe aortic stenosis.  Patient is being followed by cardiology with consideration of need for TAVR versus SAVR for moderate to severe aortic stenosis.  Repeat CT chest on 1/12/2023 showed slight increase in size of the left lower lobe pulmonary nodule increased from 1.4 to 1.5 cm.  Groundglass nodule peripheral left lower lobe less conspicuous and resolution of previous reticulonodular opacities right lower lobe.  There was further enlargement of mediastinal lymph nodes with right posterior paratracheal lymph node 1.4 x 2.1 up to 1.5 x 2.3 cm, subcarinal lymph node increased from 2.7 x 5.2 up to 3.2 x 5.8 cm, left hilar lymph node increased from 1.2 up to 1.4 cm, and is stable 1.7 cm left infrahilar lymph node.  There was also comment regarding hepatic steatosis with changes of chronic liver disease and stable indeterminate nodular thickening of the adrenal glands.  CT-guided lung biopsy on 1/13/2023 with pathology that showed invasive poorly differentiated non-small cell carcinoma with focal neuroendocrine differentiation.  Staining pattern and morphology favor poorly differentiated adenocarcinoma however there was focal CD56 staining, could not entirely exclude large cell neuroendocrine carcinoma.  PD-L1 35%, Caris analysis pending.  It was felt that there would be increased risk for bronchoscopy from a cardiac  standpoint due to her aortic stenosis.  Staging MRI brain 1/29/2023 showed no evidence of metastatic disease  Staging PET scan 1/26/2023 showed hypermetabolic left lower lobe pulmonary nodule, size difficult to determine on PET however on recent CT was 1.6 cm (SUV 14.4).  Mediastinal and left hilar hypermetabolic lymphadenopathy (subcarinal lymph node 3.2 cm, SUV 31.7, left paratracheal 2.2 cm lymph node SUV 12.7).  Sub-6 mm pulmonary nodule left upper lobe unchanged and below PET resolution.  Focal uptake posterior right hepatic lobe SUV 7.2 of uncertain significance.  Recommended MRI to evaluate.  Right thyroid activity SUV 5.3, recommended ultrasound.  Long segment uptake distal esophagus consistent with esophagitis.  Uptake posterior larynx, nonspecific, recommended direct visualization.  MRI abdomen 2/6/2023 with no evidence to suggest metastatic disease in the liver.  Subcentimeter nonenhancing lesions favor cyst or hemangiomas.  Patient underwent right and left heart catheterization with cardiology on 2/14/2023.  Identification of coronary artery disease, mild pulmonary hypertension, severe aortic stenosis.  Recommendation per cardiology to proceed with treatment for lung cancer and defer any intervention regarding aortic stenosis.  Concern regarding underlying neurologic issues from cervical stenosis and potential neuropathic effects from Taxol chemotherapy.  Concern regarding patient's overall performance status and ability to tolerate full dose chemotherapy.  Patient discussed at thoracic oncology tumor board with consensus to treat with weekly low-dose carboplatin concurrent with radiation therapy and omit Taxol.  Subsequent plan for 1 year durvalumab following concurrent chemoradiation.  Significant delay in initiating concurrent chemoradiation due to logistics with scheduling Mediport placement, patient canceled appointments and delayed initiation of treatment.  On 3/21/2023 initiated concurrent  chemoradiation with weekly carboplatin (AUC 2) on 2/28/2023.  Week 4 carboplatin held 4/11 and again 4/18/2023 due to thrombocytopenia  Week 4 carboplatin resumed on 4/25/2023 at reduced dose to AUC 1 due to borderline neutropenia  Chemo and radiation held beginning 5/2/2023 due to neutropenia with WBC 1.13, ANC 0.53  Resumed radiation therapy on 5/8/2023 with recovery of WBC.  Received fifth and final weekly dose of concurrent carboplatin (AUC 1) on 5/9/2023.  Patient completed radiation therapy on 5/12/2023.  CT chest abdomen pelvis 5/25/2023 with significant improvement in mediastinal and left hilar lymphadenopathy, decrease in left lower lobe nodule indicating significant response.  On 5/30/2023 initiated treatment with durvalumab 10 mg/kg every 2 weeks x 1 year.  Transitioned to durvalumab 1500 mg every 4-week dosing with cycle 4 on 7/18/2023.  TAVR CT chest abdomen pelvis 7/21/2023 with decreased left lower lobe nodule from 8 down to 6 mm, stable mediastinal lymph nodes (with the exception of 1 small superior mediastinal lymph node increased from 4 to 7 mm of unclear significance).  Upper abdominal lymph nodes with slight increase in the periportal, hepatogastric, peripancreatic regions.  Following 5 cycles durvalumab, PET scan 9/5/2023 with left lower lobe ill-defined consolidation and opacification likely representing postradiation change.  No mediastinal lymphadenopathy nor hypermetabolic activity.  Slight enlargement (0.7 up to 1.0 cm) of anterior pericardial lymph node with new hypermetabolic activity (SUV 5.5.  Stable epigastric adenopathy with mild hypermetabolic activity (SUV 4.4) unchanged from 1/26/2023 scan (although not noted on that report).  Nondisplaced, healing left seventh rib fracture.  Significance of the pericardial lymph node and epigastric lymphadenopathy unclear.  Neither area easily accessible for CT-guided biopsy after discussion with interventional radiology on 9/12/2023.  Elected to  monitor on repeat PET scan at 2-month interval.  PET scan 10/31/2023 with stable mildly hypermetabolic left lower lobe segmental consolidation, indeterminate regarding residual malignancy versus inflammatory change.  Prior epicardial lymph node decreased in size to less than 1 cm with no residual hypermetabolic activity.  Subcentimeter periceliac lymph node with slight increase in SUV from 3.9 up to 6.5, indeterminate.  Plan 3-month interval PET scan  PET scan 1/16/2024 with decrease in extent of left lower lobe irregular nodular pulmonary opacification, SUV decreased slightly from 3.9 down to 3.3.  Stable borderline epigastric, mesenteric, aortocaval lymph nodes with stable activity.  Stable intense uptake base of tongue and floor of mouth.  Stable subcentimeter left upper lobe pulmonary nodule.  PET scan findings felt to be indeterminate, left lower lobe findings likely related to prior radiation therapy, no definitive evidence of residual or recurrent disease.  Plan repeat PET scan at 4-month interval at completion of Durvalumab.  Patient returns today in follow-up due for cycle 13 durvalumab having transitioned to every 4-week dosing with cycle 4.  She is continuing to tolerate immunotherapy well.  Weight stable at 212 pounds, appetite has improved.  She is following up currently with endocrinology in regards to management of her hypothyroidism, labs today improved with TSH 0.573 and free T41.61.  We will proceed on with treatment as planned today.  In 4 weeks she will return for cycle 14 which will be her final planned cycle of Durvalumab as she will be completing 1 year on treatment (initiated Durvalumab on 5/30/2023).  In 7 weeks she will undergo PET scan and I will see her in 8 weeks for follow-up to review PET scan results.  At that point if it appears her disease is in remission status we will discuss transitioning to a course of observation/surveillance    *Aortic stenosis  Patient underwent  echocardiogram on 1/11/2023 with ejection fraction greater than 70%, grade 1 diastolic dysfunction, severe aortic stenosis.    Patient followed by cardiology with consideration of need for TAVR versus SAVR for moderate to severe aortic stenosis.  Patient underwent cardiac catheterization 2/14/2023 with identification of coronary artery disease, mild pulmonary hypertension, severe aortic stenosis.  Per cardiology, recommendation to proceed with treatment for lung cancer and defer any consideration of intervention for aortic stenosis.  Patient did undergo TAVR on 8/1/2023  Echocardiogram on 8/2/2023 with ejection fraction 62.4%, TAVR present  Patient continues follow-up with cardiology     *Cervical spine stenosis  Patient with cervical spine stenosis with associated myelopathy.   She underwent surgery on 9/20/2022 with anterior cervical corpectomy with cage.      The patient has had ongoing follow-up with neurosurgery and on CT scans 11/29/2022 of cervical and thoracic spine, there was concern regarding spinal instability with potential need for posterior cervical fusion.  This was scheduled in December 2022 however was delayed due to upper respiratory infection.  Patient admitted on 1/10/2023 to address multiple medical issues in order to prepare for potential neurosurgical procedure on 3/12/2023.  Patient was previously having severe symptoms related to her cervical stenosis with reduced ability to ambulate previously and significant limitations in movement in her hands, dropping objects.  More recently however she has improved and has been able to walk with the use of a walker, symptoms in the upper extremities have improved and her dexterity is better.    It appears that her neurosurgical procedure will need to be delayed until we can at least complete the concurrent chemoradiation portion of her treatment.  Surgery would be feasible while continuing on immunotherapy in the future.  Neurosurgical follow-up has  been placed on hold.  Patient notes that her mobility and symptoms in her extremities have continued to improve over time.  She does not intend to proceed with further surgery in the future.     *COPD  Patient has history of smoking 1 pack/day x 45 years, quit in November 2021.  Patient has not undergone formal PFTs  Patient being followed by pulmonary, currently using albuterol inhaler as needed     *Hepatic steatosis  Identified on prior scans  PET scan 1/26/2023 with questionable area of activity seen but no corresponding CT abnormality.    MRI abdomen 2/6/2023 with no evidence to suggest metastatic disease in the liver.  Subcentimeter nonenhancing lesions favor cyst or hemangiomas.  LFTs remain normal     *Diabetes mellitus    *Anxiety/depression  Patient currently continuing on Wellbutrin  mg daily, Lexapro 10 mg daily    *Right thyroid uptake on PET scan 1/26/2023  Patient was scheduled for thyroid ultrasound however did not feel that she was able to undergo the procedure due to her neck issues and therefore was canceled, consider at future date  No mention of thyroid uptake on PET scan 9/5/2023 and on 10/31/2023.  No uptake on PET scan 1/16/2024, thyroid appeared atrophic    *Laryngeal uptake on PET scan 1/26/2023, subsequent PET uptake and floor of mouth and base of tongue 1/16/2024  Patient was seen by ENT on 3/28/2023, laryngoscopy showed mild edema in the posterior glottis consistent with laryngeal pharyngeal reflux, no evidence of malignancy.  No mention of laryngeal uptake on PET scan 9/5/2023 nor on 10/31/2023  PET scan 1/16/2024 showed persistent significant uptake floor of mouth and base of tongue.  The area was visualized today on exam with no abnormal findings.  She does wear dentures that do not fit properly, unclear whether this could be contributing to the activity.  Patient has been referred to ENT for better visualization of base of tongue.    *Esophageal uptake on PET scan  1/26/2023  Felt to be consistent with esophagitis in the distal esophagus.  We will hold on referral for EGD given the multitude of additional procedures and consults required above.  No mention of esophageal uptake on PET scan 9/5/2023 nor on 10/31/2023 no oral on 1/16/2024    *Venous access  Mediport placed by Dr. Shook on 3/2/2023    *Nutrition/weight loss  Patient has been seen by oncology nutrition  Patient overall has lost a significant amount of weight however weight subsequently stabilized  Patient notes that appetite has increased, weight is stable at 212 pounds.    *Chronic left greater than right lower extremity edema with subsequent left inguinal/lower extremity hematoma following TAVR  Patient developed exacerbation of chronic edema in the lower extremities, left slightly greater than right.    Bilateral lower extremity Doppler on 5/9/2023 was negative, showed bilateral popliteal fluid collections.  Patient with evidence of left inguinal/left lower extremity hematoma following TAVR with ecchymosis tracking into the distal portion of the left lower extremity and ecchymosis laterally in the left hip.  Likely explains decline in hemoglobin and low haptoglobin post procedure.  PET scan 9/5/2023 with evidence of fluid collection/blood in the left inguinal region.  Patient notes that she did not take her Lasix 2 days ago nor today.  Edema has increased in the lower extremities slightly, remains asymmetric left rater than right.  I did recommend that we repeat lower extremity Doppler to rule out DVT    *Hypothyroidism exacerbated by immunotherapy  Hypothyroidism managed by PCP Dr. Danyelle Brush  Labs on 5/30/2023 prior to beginning immunotherapy with evidence of hypothyroidism with TSH 10.5, free T4 0.54.  Levothyroxine dose increased by Dr. Brush from 125 up to 150 mcg daily  Continue to monitor thyroid function studies every 8 weeks on durvalumab  Patient with persistent borderline abnormal thyroid function  studies on Durvalumab, referred to endocrinology  On 1/2/2024, significant increase in TSH to 19.7 with free T40.8.  On 1/5/2024 increased levothyroxine dose from 150 up to 175 mcg daily.  Patient is now followed by endocrinology for management of hypothyroidism  Last today with normal TSH 0.573 and normal free T4 of 1.61.    *Chemotherapy-induced leukopenia  WBC today 4.69 with normal differential    *Chemotherapy-induced thrombocytopenia  Platelet count prior to chemotherapy was normal  Platelet count has gradually improved since completion of concurrent chemoradiation  Platelet count on 11/7/2023 declined to 101,000.  Additional labs with IPF 5.5%, B12 3 and 42, folate 8.99  Platelet count today remains stable at 108,000.  We will repeat IPF and also check B12 and folate    *Chemotherapy-induced anemia  Hemoglobin did decline into the high 10-low 11 range on concurrent chemoradiation  Hemoglobin subsequently improved and normalized as of 7/28/2023 with hemoglobin of 13.4  Patient underwent TAVR on 8/1/2023 with significant decline in hemoglobin thereafter, reached a low of 7.1 on 8/8/2023.  Additional labs with cardiology on 8/8/2023 with iron 78, iron saturation 22%, TIBC 350.  Patient was started on oral iron although no evidence of iron deficiency.    Labs on 8/15/2023 with hemoglobin 8.7, iron 129, ferritin 120, iron saturation 39%, TIBC 355, B12 288, folate 7.15, haptoglobin less than 10.  Oral iron discontinued as patient was constipated, had no evidence of iron deficiency.  Initiated oral B12 1000 mcg daily.  Postprocedure anemia and low haptoglobin related to left inguinal/left lower extremity hematoma.  Hemoglobin today has declined slightly to 11.8.  Patient does continue on oral B12 1000 mcg daily.  Will check anemia evaluation with iron panel, ferritin, B12, folate.    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  Recommended  CTA exam for more definitive evaluation.  Patient was referred to vascular surgery, had to postpone the visit        PLAN:  Continue definitive treatment for stage IIIA non-small cell lung cancer.  Patient completed concurrent chemoradiation with weekly single agent carboplatin (completed on 5/12/2023).  Patient is continuing on durvalumab single agent initiated 5/30/2023 with 10 mg/kg dose every 2 weeks.  With cycle 4 on 7/18/2023 transitioned to 1500 mg every 4 weeks.  Proceed with cycle 13 durvalumab today.  Stat bilateral lower extremity Doppler today  Additional labs today with iron panel, ferritin, B12, folate, IPF  Patient continues on levothyroxine currently at 175 mcg daily   Continue oral B12 1000 mcg daily  Patient referred to ENT to evaluate PET activity noted floor of mouth and base of tongue  In 4 weeks NP visit with CBC, CMP, TSH, free T4 and cycle 14 durvalumab.  This will be the final planned cycle of therapy which will complete 1 year of Durvalumab.  In 7 weeks PET scan  In 8 weeks MD visit with CBC, CMP, TSH, free T4 and port flush.  We will review PET scan results.      The patient is continuing on high risk medication requiring intensive monitoring    I did spend 40 minutes total time caring for the patient today, time spent in review of records, face-to-face consultation, coordination of care, placement of orders, completion of documentation.

## 2024-03-26 ENCOUNTER — HOSPITAL ENCOUNTER (OUTPATIENT)
Dept: CARDIOLOGY | Facility: HOSPITAL | Age: 69
Discharge: HOME | End: 2024-03-26
Admitting: INTERNAL MEDICINE
Payer: MEDICARE

## 2024-03-26 ENCOUNTER — INFUSION (OUTPATIENT)
Dept: ONCOLOGY | Facility: HOSPITAL | Age: 69
End: 2024-03-26
Payer: MEDICARE

## 2024-03-26 ENCOUNTER — OFFICE VISIT (OUTPATIENT)
Dept: ONCOLOGY | Facility: CLINIC | Age: 69
End: 2024-03-26
Payer: MEDICARE

## 2024-03-26 VITALS
HEART RATE: 63 BPM | OXYGEN SATURATION: 93 % | RESPIRATION RATE: 16 BRPM | SYSTOLIC BLOOD PRESSURE: 128 MMHG | DIASTOLIC BLOOD PRESSURE: 78 MMHG | WEIGHT: 212.6 LBS | BODY MASS INDEX: 39.12 KG/M2 | TEMPERATURE: 98.4 F | HEIGHT: 62 IN

## 2024-03-26 DIAGNOSIS — C34.32 MALIGNANT NEOPLASM OF LOWER LOBE OF LEFT LUNG: Primary | ICD-10-CM

## 2024-03-26 DIAGNOSIS — Z79.899 ENCOUNTER FOR LONG-TERM (CURRENT) USE OF HIGH-RISK MEDICATION: ICD-10-CM

## 2024-03-26 DIAGNOSIS — C34.32 MALIGNANT NEOPLASM OF LOWER LOBE OF LEFT LUNG: ICD-10-CM

## 2024-03-26 DIAGNOSIS — E03.8 OTHER SPECIFIED HYPOTHYROIDISM: ICD-10-CM

## 2024-03-26 DIAGNOSIS — M79.89 PAIN AND SWELLING OF LOWER EXTREMITY, UNSPECIFIED LATERALITY: ICD-10-CM

## 2024-03-26 DIAGNOSIS — D69.6 THROMBOCYTOPENIA: ICD-10-CM

## 2024-03-26 DIAGNOSIS — D64.9 NORMOCYTIC ANEMIA: ICD-10-CM

## 2024-03-26 DIAGNOSIS — M79.606 PAIN AND SWELLING OF LOWER EXTREMITY, UNSPECIFIED LATERALITY: ICD-10-CM

## 2024-03-26 LAB
ALBUMIN SERPL-MCNC: 3.6 G/DL (ref 3.5–5.2)
ALBUMIN/GLOB SERPL: 1.3 G/DL
ALP SERPL-CCNC: 73 U/L (ref 39–117)
ALT SERPL W P-5'-P-CCNC: 15 U/L (ref 1–33)
ANION GAP SERPL CALCULATED.3IONS-SCNC: 11.4 MMOL/L (ref 5–15)
AST SERPL-CCNC: 19 U/L (ref 1–32)
BASOPHILS # BLD AUTO: 0.02 10*3/MM3 (ref 0–0.2)
BASOPHILS NFR BLD AUTO: 0.4 % (ref 0–1.5)
BILIRUB SERPL-MCNC: 0.2 MG/DL (ref 0–1.2)
BUN SERPL-MCNC: 22 MG/DL (ref 8–23)
BUN/CREAT SERPL: 21.4 (ref 7–25)
CALCIUM SPEC-SCNC: 9 MG/DL (ref 8.6–10.5)
CHLORIDE SERPL-SCNC: 102 MMOL/L (ref 98–107)
CO2 SERPL-SCNC: 28.6 MMOL/L (ref 22–29)
CREAT SERPL-MCNC: 1.03 MG/DL (ref 0.57–1)
DEPRECATED RDW RBC AUTO: 37.2 FL (ref 37–54)
EGFRCR SERPLBLD CKD-EPI 2021: 59.3 ML/MIN/1.73
EOSINOPHIL # BLD AUTO: 0.12 10*3/MM3 (ref 0–0.4)
EOSINOPHIL NFR BLD AUTO: 2.6 % (ref 0.3–6.2)
ERYTHROCYTE [DISTWIDTH] IN BLOOD BY AUTOMATED COUNT: 11.9 % (ref 12.3–15.4)
FERRITIN SERPL-MCNC: 102 NG/ML (ref 13–150)
FOLATE SERPL-MCNC: 12.1 NG/ML (ref 4.78–24.2)
GLOBULIN UR ELPH-MCNC: 2.8 GM/DL
GLUCOSE SERPL-MCNC: 151 MG/DL (ref 65–99)
HCT VFR BLD AUTO: 36 % (ref 34–46.6)
HGB BLD-MCNC: 11.8 G/DL (ref 12–15.9)
IMM GRANULOCYTES # BLD AUTO: 0.01 10*3/MM3 (ref 0–0.05)
IMM GRANULOCYTES NFR BLD AUTO: 0.2 % (ref 0–0.5)
IRON 24H UR-MRATE: 90 MCG/DL (ref 37–145)
IRON SATN MFR SERPL: 26 % (ref 20–50)
LYMPHOCYTES # BLD AUTO: 0.59 10*3/MM3 (ref 0.7–3.1)
LYMPHOCYTES NFR BLD AUTO: 12.6 % (ref 19.6–45.3)
MCH RBC QN AUTO: 28.2 PG (ref 26.6–33)
MCHC RBC AUTO-ENTMCNC: 32.8 G/DL (ref 31.5–35.7)
MCV RBC AUTO: 85.9 FL (ref 79–97)
MONOCYTES # BLD AUTO: 0.51 10*3/MM3 (ref 0.1–0.9)
MONOCYTES NFR BLD AUTO: 10.9 % (ref 5–12)
NEUTROPHILS NFR BLD AUTO: 3.44 10*3/MM3 (ref 1.7–7)
NEUTROPHILS NFR BLD AUTO: 73.3 % (ref 42.7–76)
NRBC BLD AUTO-RTO: 0 /100 WBC (ref 0–0.2)
PLATELET # BLD AUTO: 108 10*3/MM3 (ref 140–450)
PLATELETS.RETICULATED NFR BLD AUTO: 3.9 % (ref 0.9–6.5)
PMV BLD AUTO: 9.2 FL (ref 6–12)
POTASSIUM SERPL-SCNC: 3.8 MMOL/L (ref 3.5–5.2)
PROT SERPL-MCNC: 6.4 G/DL (ref 6–8.5)
RBC # BLD AUTO: 4.19 10*6/MM3 (ref 3.77–5.28)
SODIUM SERPL-SCNC: 142 MMOL/L (ref 136–145)
T4 FREE SERPL-MCNC: 1.61 NG/DL (ref 0.93–1.7)
TIBC SERPL-MCNC: 343 MCG/DL (ref 298–536)
TRANSFERRIN SERPL-MCNC: 230 MG/DL (ref 200–360)
TSH SERPL DL<=0.05 MIU/L-ACNC: 0.57 UIU/ML (ref 0.27–4.2)
VIT B12 BLD-MCNC: 453 PG/ML (ref 211–946)
WBC NRBC COR # BLD AUTO: 4.69 10*3/MM3 (ref 3.4–10.8)

## 2024-03-26 PROCEDURE — 82728 ASSAY OF FERRITIN: CPT | Performed by: INTERNAL MEDICINE

## 2024-03-26 PROCEDURE — 25010000002 DURVALUMAB 50 MG/ML SOLUTION 10 ML VIAL: Performed by: INTERNAL MEDICINE

## 2024-03-26 PROCEDURE — 84443 ASSAY THYROID STIM HORMONE: CPT | Performed by: INTERNAL MEDICINE

## 2024-03-26 PROCEDURE — 82746 ASSAY OF FOLIC ACID SERUM: CPT | Performed by: INTERNAL MEDICINE

## 2024-03-26 PROCEDURE — 82607 VITAMIN B-12: CPT | Performed by: INTERNAL MEDICINE

## 2024-03-26 PROCEDURE — 85025 COMPLETE CBC W/AUTO DIFF WBC: CPT

## 2024-03-26 PROCEDURE — 85055 RETICULATED PLATELET ASSAY: CPT | Performed by: INTERNAL MEDICINE

## 2024-03-26 PROCEDURE — 25810000003 SODIUM CHLORIDE 0.9 % SOLUTION: Performed by: INTERNAL MEDICINE

## 2024-03-26 PROCEDURE — 96413 CHEMO IV INFUSION 1 HR: CPT

## 2024-03-26 PROCEDURE — 25810000003 SODIUM CHLORIDE 0.9 % SOLUTION 250 ML FLEX CONT: Performed by: INTERNAL MEDICINE

## 2024-03-26 PROCEDURE — 83540 ASSAY OF IRON: CPT | Performed by: INTERNAL MEDICINE

## 2024-03-26 PROCEDURE — 80053 COMPREHEN METABOLIC PANEL: CPT

## 2024-03-26 PROCEDURE — 36415 COLL VENOUS BLD VENIPUNCTURE: CPT | Performed by: INTERNAL MEDICINE

## 2024-03-26 PROCEDURE — 84439 ASSAY OF FREE THYROXINE: CPT | Performed by: INTERNAL MEDICINE

## 2024-03-26 PROCEDURE — 84466 ASSAY OF TRANSFERRIN: CPT | Performed by: INTERNAL MEDICINE

## 2024-03-26 RX ORDER — SODIUM CHLORIDE 9 MG/ML
250 INJECTION, SOLUTION INTRAVENOUS ONCE
Status: CANCELLED | OUTPATIENT
Start: 2024-03-26

## 2024-03-26 RX ORDER — SODIUM CHLORIDE 9 MG/ML
250 INJECTION, SOLUTION INTRAVENOUS ONCE
Status: COMPLETED | OUTPATIENT
Start: 2024-03-26 | End: 2024-03-26

## 2024-03-26 RX ADMIN — SODIUM CHLORIDE 1500 MG: 900 INJECTION, SOLUTION INTRAVENOUS at 10:15

## 2024-03-26 RX ADMIN — SODIUM CHLORIDE 250 ML: 9 INJECTION, SOLUTION INTRAVENOUS at 10:15

## 2024-04-04 ENCOUNTER — TELEPHONE (OUTPATIENT)
Dept: ONCOLOGY | Facility: CLINIC | Age: 69
End: 2024-04-04
Payer: MEDICARE

## 2024-04-04 NOTE — TELEPHONE ENCOUNTER
"  Caller: Gabby Acosta \"GALE\"    Relationship: Self    Best call back number: [phone]     What is the best time to reach you: ANYTIME - OKAY TO LEAVE A VM.    Who are you requesting to speak with (clinical staff, provider,  specific staff member): CLINICAL    What was the call regarding: THE PT CALLED DR SKYLA HARTLEY'S NUMBER AND THEY HAVE ADVISED THAT THEY STILL HAVE NOT RECEIVED THE PT'S REFERRAL. THEY ADVISED THE PT THAT WE SHOULD CALL THE OFFICE AND MAKE AND APPT FOR THE PT.     PT WOULD LIKE TO HAVE APPT ON A TUESDAY AND NO EARLIER THAN 10, BUT NOT LATER THAN 2:30    Is it okay if the provider responds through [email or web address]: YES      "

## 2024-04-04 NOTE — TELEPHONE ENCOUNTER
Called Dr. Evans's office and they had not received our referral documentation yet. Obtained a new/correct fax number and have re-sent her information. MD will review Dr. Lombardi's note and the PET scan results and pt will be scheduled if Dr. Evans agreeable to see. Called pt back and left voicemail re: this information.

## 2024-04-23 ENCOUNTER — OFFICE VISIT (OUTPATIENT)
Dept: ONCOLOGY | Facility: CLINIC | Age: 69
End: 2024-04-23
Payer: MEDICARE

## 2024-04-23 ENCOUNTER — INFUSION (OUTPATIENT)
Dept: ONCOLOGY | Facility: HOSPITAL | Age: 69
End: 2024-04-23
Payer: MEDICARE

## 2024-04-23 VITALS
WEIGHT: 214.9 LBS | DIASTOLIC BLOOD PRESSURE: 83 MMHG | OXYGEN SATURATION: 92 % | SYSTOLIC BLOOD PRESSURE: 153 MMHG | RESPIRATION RATE: 18 BRPM | HEART RATE: 63 BPM | TEMPERATURE: 97.3 F | HEIGHT: 62 IN | BODY MASS INDEX: 39.55 KG/M2

## 2024-04-23 DIAGNOSIS — C34.32 MALIGNANT NEOPLASM OF LOWER LOBE OF LEFT LUNG: Primary | ICD-10-CM

## 2024-04-23 DIAGNOSIS — C34.32 MALIGNANT NEOPLASM OF LOWER LOBE OF LEFT LUNG: ICD-10-CM

## 2024-04-23 DIAGNOSIS — Z45.2 ENCOUNTER FOR ADJUSTMENT OR MANAGEMENT OF VASCULAR ACCESS DEVICE: ICD-10-CM

## 2024-04-23 DIAGNOSIS — Z79.899 HIGH RISK MEDICATION USE: ICD-10-CM

## 2024-04-23 DIAGNOSIS — Z79.899 ENCOUNTER FOR LONG-TERM (CURRENT) USE OF HIGH-RISK MEDICATION: ICD-10-CM

## 2024-04-23 DIAGNOSIS — D69.6 THROMBOCYTOPENIA: ICD-10-CM

## 2024-04-23 LAB
ALBUMIN SERPL-MCNC: 3.8 G/DL (ref 3.5–5.2)
ALBUMIN/GLOB SERPL: 1.5 G/DL
ALP SERPL-CCNC: 71 U/L (ref 39–117)
ALT SERPL W P-5'-P-CCNC: <5 U/L (ref 1–33)
ANION GAP SERPL CALCULATED.3IONS-SCNC: 9.3 MMOL/L (ref 5–15)
AST SERPL-CCNC: 17 U/L (ref 1–32)
BASOPHILS # BLD AUTO: 0.02 10*3/MM3 (ref 0–0.2)
BASOPHILS NFR BLD AUTO: 0.4 % (ref 0–1.5)
BILIRUB SERPL-MCNC: 0.3 MG/DL (ref 0–1.2)
BUN SERPL-MCNC: 21 MG/DL (ref 8–23)
BUN/CREAT SERPL: 24.1 (ref 7–25)
CALCIUM SPEC-SCNC: 9.4 MG/DL (ref 8.6–10.5)
CHLORIDE SERPL-SCNC: 100 MMOL/L (ref 98–107)
CO2 SERPL-SCNC: 25.7 MMOL/L (ref 22–29)
CREAT SERPL-MCNC: 0.87 MG/DL (ref 0.57–1)
DEPRECATED RDW RBC AUTO: 39.7 FL (ref 37–54)
EGFRCR SERPLBLD CKD-EPI 2021: 72.7 ML/MIN/1.73
EOSINOPHIL # BLD AUTO: 0.18 10*3/MM3 (ref 0–0.4)
EOSINOPHIL NFR BLD AUTO: 3.5 % (ref 0.3–6.2)
ERYTHROCYTE [DISTWIDTH] IN BLOOD BY AUTOMATED COUNT: 12.8 % (ref 12.3–15.4)
GLOBULIN UR ELPH-MCNC: 2.6 GM/DL
GLUCOSE SERPL-MCNC: 121 MG/DL (ref 65–99)
HCT VFR BLD AUTO: 39.3 % (ref 34–46.6)
HGB BLD-MCNC: 12.7 G/DL (ref 12–15.9)
IMM GRANULOCYTES # BLD AUTO: 0.02 10*3/MM3 (ref 0–0.05)
IMM GRANULOCYTES NFR BLD AUTO: 0.4 % (ref 0–0.5)
LYMPHOCYTES # BLD AUTO: 0.77 10*3/MM3 (ref 0.7–3.1)
LYMPHOCYTES NFR BLD AUTO: 14.9 % (ref 19.6–45.3)
MCH RBC QN AUTO: 27.7 PG (ref 26.6–33)
MCHC RBC AUTO-ENTMCNC: 32.3 G/DL (ref 31.5–35.7)
MCV RBC AUTO: 85.8 FL (ref 79–97)
MONOCYTES # BLD AUTO: 0.58 10*3/MM3 (ref 0.1–0.9)
MONOCYTES NFR BLD AUTO: 11.2 % (ref 5–12)
NEUTROPHILS NFR BLD AUTO: 3.6 10*3/MM3 (ref 1.7–7)
NEUTROPHILS NFR BLD AUTO: 69.6 % (ref 42.7–76)
NRBC BLD AUTO-RTO: 0 /100 WBC (ref 0–0.2)
PLATELET # BLD AUTO: 125 10*3/MM3 (ref 140–450)
PMV BLD AUTO: 10 FL (ref 6–12)
POTASSIUM SERPL-SCNC: 4.4 MMOL/L (ref 3.5–5.2)
PROT SERPL-MCNC: 6.4 G/DL (ref 6–8.5)
RBC # BLD AUTO: 4.58 10*6/MM3 (ref 3.77–5.28)
SODIUM SERPL-SCNC: 135 MMOL/L (ref 136–145)
T4 FREE SERPL-MCNC: 1.94 NG/DL (ref 0.93–1.7)
TSH SERPL DL<=0.05 MIU/L-ACNC: 0.15 UIU/ML (ref 0.27–4.2)
WBC NRBC COR # BLD AUTO: 5.17 10*3/MM3 (ref 3.4–10.8)

## 2024-04-23 PROCEDURE — 80053 COMPREHEN METABOLIC PANEL: CPT

## 2024-04-23 PROCEDURE — 25010000002 DURVALUMAB 50 MG/ML SOLUTION 10 ML VIAL: Performed by: NURSE PRACTITIONER

## 2024-04-23 PROCEDURE — 25010000002 HEPARIN LOCK FLUSH PER 10 UNITS: Performed by: INTERNAL MEDICINE

## 2024-04-23 PROCEDURE — 3077F SYST BP >= 140 MM HG: CPT | Performed by: NURSE PRACTITIONER

## 2024-04-23 PROCEDURE — 3079F DIAST BP 80-89 MM HG: CPT | Performed by: NURSE PRACTITIONER

## 2024-04-23 PROCEDURE — 99214 OFFICE O/P EST MOD 30 MIN: CPT | Performed by: NURSE PRACTITIONER

## 2024-04-23 PROCEDURE — 85025 COMPLETE CBC W/AUTO DIFF WBC: CPT

## 2024-04-23 PROCEDURE — 84439 ASSAY OF FREE THYROXINE: CPT | Performed by: INTERNAL MEDICINE

## 2024-04-23 PROCEDURE — 25810000003 SODIUM CHLORIDE 0.9 % SOLUTION: Performed by: NURSE PRACTITIONER

## 2024-04-23 PROCEDURE — 25810000003 SODIUM CHLORIDE 0.9 % SOLUTION 250 ML FLEX CONT: Performed by: NURSE PRACTITIONER

## 2024-04-23 PROCEDURE — 96413 CHEMO IV INFUSION 1 HR: CPT

## 2024-04-23 PROCEDURE — 84443 ASSAY THYROID STIM HORMONE: CPT | Performed by: INTERNAL MEDICINE

## 2024-04-23 PROCEDURE — 1126F AMNT PAIN NOTED NONE PRSNT: CPT | Performed by: NURSE PRACTITIONER

## 2024-04-23 RX ORDER — SODIUM CHLORIDE 0.9 % (FLUSH) 0.9 %
10 SYRINGE (ML) INJECTION AS NEEDED
Status: DISCONTINUED | OUTPATIENT
Start: 2024-04-23 | End: 2024-04-23 | Stop reason: HOSPADM

## 2024-04-23 RX ORDER — SODIUM CHLORIDE 9 MG/ML
250 INJECTION, SOLUTION INTRAVENOUS ONCE
Status: COMPLETED | OUTPATIENT
Start: 2024-04-23 | End: 2024-04-23

## 2024-04-23 RX ORDER — SODIUM CHLORIDE 9 MG/ML
250 INJECTION, SOLUTION INTRAVENOUS ONCE
Status: CANCELLED | OUTPATIENT
Start: 2024-04-23

## 2024-04-23 RX ORDER — HEPARIN SODIUM (PORCINE) LOCK FLUSH IV SOLN 100 UNIT/ML 100 UNIT/ML
500 SOLUTION INTRAVENOUS AS NEEDED
Status: DISCONTINUED | OUTPATIENT
Start: 2024-04-23 | End: 2024-04-23 | Stop reason: HOSPADM

## 2024-04-23 RX ADMIN — SODIUM CHLORIDE 1500 MG: 900 INJECTION, SOLUTION INTRAVENOUS at 10:06

## 2024-04-23 RX ADMIN — Medication 500 UNITS: at 11:13

## 2024-04-23 RX ADMIN — SODIUM CHLORIDE 250 ML: 9 INJECTION, SOLUTION INTRAVENOUS at 10:05

## 2024-04-23 RX ADMIN — Medication 10 ML: at 11:13

## 2024-04-23 NOTE — PROGRESS NOTES
"Chief Complaint  Non-small cell lung cancer, clinical stage IIIA ([id number]), aortic stenosis, cervical spine stenosis, COPD, hepatic steatosis, diabetes mellitus    Subjective        History of Present Illness  Patient returns today in follow-up with laboratory studies due for cycle 14 durvalumab.  Patient remains in a wheelchair today as usual.  She was asked to go for a stat bilateral lower extremity Doppler at the last office visit due to bilateral edema with left greater than right.  The patient did not go that day and has not yet gone for the Doppler stating that her symptoms are stable and she has had transportation difficulties and she cannot drive.  She denies any worsening symptoms and states she thinks her edema is even somewhat improved today.  She reports her appointment for the Doppler is on Thursday of this week.  Once again, she did not take her Lasix today since she has been in the car for the appointment states she cannot take it before she goes out of the house like this.  Patient has also been referred to Dr. Evans, ENT for activity seen on PET scan at the floor of the mouth.  Records were resent to Dr. Evans's office 4/4/2024 however the patient reports she has not yet heard back about an appointment.  We will look into this today.  She reports respiratory symptoms are stable.  She denies any bowel difficulties.  She continues her levothyroxine 175 mcg daily.            Objective   Vital Signs:   /83   Pulse 63   Temp 97.3 °F (36.3 °C) (Temporal)   Resp 18   Ht 157 cm (61.81\")   Wt 97.5 kg (214 lb 14.4 oz)   SpO2 92%   BMI 39.55 kg/m²     Physical Exam  Constitutional:       Appearance: She is well-developed.      Comments: Patient is using a wheelchair today   Eyes:      Extraocular Movements: Extraocular movements intact.      Conjunctiva/sclera: Conjunctivae normal.   Neck:      Thyroid: No thyromegaly.   Cardiovascular:      Rate and Rhythm: Normal rate and regular rhythm.      " Heart sounds: No murmur heard.     No friction rub. No gallop.   Pulmonary:      Effort: No respiratory distress.      Breath sounds: Normal breath sounds.   Abdominal:      General: Bowel sounds are normal. There is no distension.      Palpations: Abdomen is soft.      Tenderness: There is no abdominal tenderness.   Musculoskeletal:         General: Swelling present.      Comments: 1+ bilateral lower extremity edema, left greater than right   Lymphadenopathy:      Head:      Right side of head: No submandibular adenopathy.      Cervical: No cervical adenopathy.      Upper Body:      Right upper body: No supraclavicular adenopathy.      Left upper body: No supraclavicular adenopathy.   Skin:     General: Skin is warm and dry.      Findings: No rash.   Neurological:      Mental Status: She is alert and oriented to person, place, and time.      Cranial Nerves: No cranial nerve deficit.      Motor: No abnormal muscle tone.   Psychiatric:         Behavior: Behavior normal.           Result Review : Reviewed CBC and CMP from today       Assessment and Plan     *Non-small cell lung cancer, clinical stage IIIA ([id number])  Patient has history of smoking 1 pack/day x 45 years, quit in November 2021.    Following anterior cervical corpectomy in September 2022, she experienced acute hypoxemic respiratory failure.    CT angiogram chest 9/25/2022 showed a concerning 1.4 cm left lower lobe pulmonary nodule, mediastinal lymphadenopathy with infracarinal lymph node 2.7 cm, left hilar lymph node 1.2 cm, small right hilar lymph nodes up from 1.1 cm and additional enlarged mediastinal nodes in the right paratracheal, precarinal, infracarinal spaces.  There was a wedge-shaped area of consolidation/atelectasis in the right middle lobe base, subpleural density left costophrenic sulcus felt to represent atelectasis.  It was recommended for her to undergo subsequent PET scan by pulmonary.  The patient has had ongoing follow-up with  neurosurgery and on CT scans 11/29/2022 of cervical and thoracic spine, there was concern regarding spinal instability with potential need for posterior cervical fusion.  This was scheduled in December 2022 however was delayed due to upper respiratory infection.  Patient admitted on 1/10/2023 to address multiple medical issues in order to prepare for potential neurosurgical procedure on 3/12/2023.  Patient underwent echocardiogram on 1/11/2023 with ejection fraction greater than 70%, grade 1 diastolic dysfunction, severe aortic stenosis.  Patient is being followed by cardiology with consideration of need for TAVR versus SAVR for moderate to severe aortic stenosis.  Repeat CT chest on 1/12/2023 showed slight increase in size of the left lower lobe pulmonary nodule increased from 1.4 to 1.5 cm.  Groundglass nodule peripheral left lower lobe less conspicuous and resolution of previous reticulonodular opacities right lower lobe.  There was further enlargement of mediastinal lymph nodes with right posterior paratracheal lymph node 1.4 x 2.1 up to 1.5 x 2.3 cm, subcarinal lymph node increased from 2.7 x 5.2 up to 3.2 x 5.8 cm, left hilar lymph node increased from 1.2 up to 1.4 cm, and is stable 1.7 cm left infrahilar lymph node.  There was also comment regarding hepatic steatosis with changes of chronic liver disease and stable indeterminate nodular thickening of the adrenal glands.  CT-guided lung biopsy on 1/13/2023 with pathology that showed invasive poorly differentiated non-small cell carcinoma with focal neuroendocrine differentiation.  Staining pattern and morphology favor poorly differentiated adenocarcinoma however there was focal CD56 staining, could not entirely exclude large cell neuroendocrine carcinoma.  PD-L1 35%, Caris analysis pending.  It was felt that there would be increased risk for bronchoscopy from a cardiac standpoint due to her aortic stenosis.  Staging MRI brain 1/29/2023 showed no evidence of  metastatic disease  Staging PET scan 1/26/2023 showed hypermetabolic left lower lobe pulmonary nodule, size difficult to determine on PET however on recent CT was 1.6 cm (SUV 14.4).  Mediastinal and left hilar hypermetabolic lymphadenopathy (subcarinal lymph node 3.2 cm, SUV 31.7, left paratracheal 2.2 cm lymph node SUV 12.7).  Sub-6 mm pulmonary nodule left upper lobe unchanged and below PET resolution.  Focal uptake posterior right hepatic lobe SUV 7.2 of uncertain significance.  Recommended MRI to evaluate.  Right thyroid activity SUV 5.3, recommended ultrasound.  Long segment uptake distal esophagus consistent with esophagitis.  Uptake posterior larynx, nonspecific, recommended direct visualization.  MRI abdomen 2/6/2023 with no evidence to suggest metastatic disease in the liver.  Subcentimeter nonenhancing lesions favor cyst or hemangiomas.  Patient underwent right and left heart catheterization with cardiology on 2/14/2023.  Identification of coronary artery disease, mild pulmonary hypertension, severe aortic stenosis.  Recommendation per cardiology to proceed with treatment for lung cancer and defer any intervention regarding aortic stenosis.  Concern regarding underlying neurologic issues from cervical stenosis and potential neuropathic effects from Taxol chemotherapy.  Concern regarding patient's overall performance status and ability to tolerate full dose chemotherapy.  Patient discussed at thoracic oncology tumor board with consensus to treat with weekly low-dose carboplatin concurrent with radiation therapy and omit Taxol.  Subsequent plan for 1 year durvalumab following concurrent chemoradiation.  Significant delay in initiating concurrent chemoradiation due to logistics with scheduling Mediport placement, patient canceled appointments and delayed initiation of treatment.  On 3/21/2023 initiated concurrent chemoradiation with weekly carboplatin (AUC 2) on 2/28/2023.  Week 4 carboplatin held 4/11 and  again 4/18/2023 due to thrombocytopenia  Week 4 carboplatin resumed on 4/25/2023 at reduced dose to AUC 1 due to borderline neutropenia  Chemo and radiation held beginning 5/2/2023 due to neutropenia with WBC 1.13, ANC 0.53  Resumed radiation therapy on 5/8/2023 with recovery of WBC.  Received fifth and final weekly dose of concurrent carboplatin (AUC 1) on 5/9/2023.  Patient completed radiation therapy on 5/12/2023.  CT chest abdomen pelvis 5/25/2023 with significant improvement in mediastinal and left hilar lymphadenopathy, decrease in left lower lobe nodule indicating significant response.  On 5/30/2023 initiated treatment with durvalumab 10 mg/kg every 2 weeks x 1 year.  Transitioned to durvalumab 1500 mg every 4-week dosing with cycle 4 on 7/18/2023.  TAVR CT chest abdomen pelvis 7/21/2023 with decreased left lower lobe nodule from 8 down to 6 mm, stable mediastinal lymph nodes (with the exception of 1 small superior mediastinal lymph node increased from 4 to 7 mm of unclear significance).  Upper abdominal lymph nodes with slight increase in the periportal, hepatogastric, peripancreatic regions.  Following 5 cycles durvalumab, PET scan 9/5/2023 with left lower lobe ill-defined consolidation and opacification likely representing postradiation change.  No mediastinal lymphadenopathy nor hypermetabolic activity.  Slight enlargement (0.7 up to 1.0 cm) of anterior pericardial lymph node with new hypermetabolic activity (SUV 5.5.  Stable epigastric adenopathy with mild hypermetabolic activity (SUV 4.4) unchanged from 1/26/2023 scan (although not noted on that report).  Nondisplaced, healing left seventh rib fracture.  Significance of the pericardial lymph node and epigastric lymphadenopathy unclear.  Neither area easily accessible for CT-guided biopsy after discussion with interventional radiology on 9/12/2023.  Elected to monitor on repeat PET scan at 2-month interval.  PET scan 10/31/2023 with stable mildly  hypermetabolic left lower lobe segmental consolidation, indeterminate regarding residual malignancy versus inflammatory change.  Prior epicardial lymph node decreased in size to less than 1 cm with no residual hypermetabolic activity.  Subcentimeter periceliac lymph node with slight increase in SUV from 3.9 up to 6.5, indeterminate.  Plan 3-month interval PET scan  PET scan 1/16/2024 with decrease in extent of left lower lobe irregular nodular pulmonary opacification, SUV decreased slightly from 3.9 down to 3.3.  Stable borderline epigastric, mesenteric, aortocaval lymph nodes with stable activity.  Stable intense uptake base of tongue and floor of mouth.  Stable subcentimeter left upper lobe pulmonary nodule.  PET scan findings felt to be indeterminate, left lower lobe findings likely related to prior radiation therapy, no definitive evidence of residual or recurrent disease.  Plan repeat PET scan at 4-month interval at completion of Durvalumab.  Today she returns for cycle 14 Durvalumab which will be her final planned cycle of Durvalumab as she will be completing 1 year on treatment (initiated Durvalumab on 5/30/2023).  She continues follow-up with endocrinology for management of her hypothyroidism and on levothyroxine 175 mcg daily.  She was sent for a stat bilateral lower extremity Doppler following the last office visit however she did not go for this appointment and has not yet had the Doppler as she states they have had transportation issues that she cannot drive.  This appointment is now scheduled for Thursday of this week.  Of asked if she could go today and she states that she cannot.  She denies any worsening of her symptoms.  We discussed risks if in fact this is a blood clot and the patient is aware.  We have referred her to Dr. Evans, ENT for evaluation of the activity on the PET scan at the floor of the mouth.  Patient states she has not yet heard from their office and I have messaged our clinic nurse  to call to see if they have an appointment scheduled for her.  Otherwise she denies any new concerns today.  We will proceed today with her last Durvalumab.  In 3 weeks she will undergo PET scan and Dr. Lombardi will see her in 4 weeks for follow-up to review PET scan results.  At that point if it appears her disease is in remission status we will discuss transitioning to a course of observation/surveillance    *Aortic stenosis  Patient underwent echocardiogram on 1/11/2023 with ejection fraction greater than 70%, grade 1 diastolic dysfunction, severe aortic stenosis.    Patient followed by cardiology with consideration of need for TAVR versus SAVR for moderate to severe aortic stenosis.  Patient underwent cardiac catheterization 2/14/2023 with identification of coronary artery disease, mild pulmonary hypertension, severe aortic stenosis.  Per cardiology, recommendation to proceed with treatment for lung cancer and defer any consideration of intervention for aortic stenosis.  Patient did undergo TAVR on 8/1/2023  Echocardiogram on 8/2/2023 with ejection fraction 62.4%, TAVR present  Patient continues follow-up with cardiology     *Cervical spine stenosis  Patient with cervical spine stenosis with associated myelopathy.   She underwent surgery on 9/20/2022 with anterior cervical corpectomy with cage.      The patient has had ongoing follow-up with neurosurgery and on CT scans 11/29/2022 of cervical and thoracic spine, there was concern regarding spinal instability with potential need for posterior cervical fusion.  This was scheduled in December 2022 however was delayed due to upper respiratory infection.  Patient admitted on 1/10/2023 to address multiple medical issues in order to prepare for potential neurosurgical procedure on 3/12/2023.  Patient was previously having severe symptoms related to her cervical stenosis with reduced ability to ambulate previously and significant limitations in movement in her hands,  dropping objects.  More recently however she has improved and has been able to walk with the use of a walker, symptoms in the upper extremities have improved and her dexterity is better.    It appears that her neurosurgical procedure will need to be delayed until we can at least complete the concurrent chemoradiation portion of her treatment.  Surgery would be feasible while continuing on immunotherapy in the future.  Neurosurgical follow-up has been placed on hold.  Patient notes that her mobility and symptoms in her extremities have continued to improve over time.  She does not intend to proceed with further surgery in the future.     *COPD  Patient has history of smoking 1 pack/day x 45 years, quit in November 2021.  Patient has not undergone formal PFTs  Patient being followed by pulmonary, currently using albuterol inhaler as needed     *Hepatic steatosis  Identified on prior scans  PET scan 1/26/2023 with questionable area of activity seen but no corresponding CT abnormality.    MRI abdomen 2/6/2023 with no evidence to suggest metastatic disease in the liver.  Subcentimeter nonenhancing lesions favor cyst or hemangiomas.  LFTs remain normal     *Diabetes mellitus    *Anxiety/depression  Patient currently continuing on Wellbutrin  mg daily, Lexapro 10 mg daily    *Right thyroid uptake on PET scan 1/26/2023  Patient was scheduled for thyroid ultrasound however did not feel that she was able to undergo the procedure due to her neck issues and therefore was canceled, consider at future date  No mention of thyroid uptake on PET scan 9/5/2023 and on 10/31/2023.  No uptake on PET scan 1/16/2024, thyroid appeared atrophic    *Laryngeal uptake on PET scan 1/26/2023, subsequent PET uptake and floor of mouth and base of tongue 1/16/2024  Patient was seen by ENT on 3/28/2023, laryngoscopy showed mild edema in the posterior glottis consistent with laryngeal pharyngeal reflux, no evidence of malignancy.  No mention of  laryngeal uptake on PET scan 9/5/2023 nor on 10/31/2023  PET scan 1/16/2024 showed persistent significant uptake floor of mouth and base of tongue.  The area was visualized today on exam with no abnormal findings.  She does wear dentures that do not fit properly, unclear whether this could be contributing to the activity.  Patient has been referred to ENT for better visualization of base of tongue.    *Esophageal uptake on PET scan 1/26/2023  [place] to be consistent with esophagitis in the distal esophagus.  We will hold on referral for EGD given the multitude of additional procedures and consults required above.  No mention of esophageal uptake on PET scan 9/5/2023 nor on 10/31/2023 no oral on 1/16/2024    *Venous access  Mediport placed by Dr. Shook on 3/2/2023    *Nutrition/weight loss  Patient has been seen by oncology nutrition  Patient overall has lost a significant amount of weight however weight subsequently stabilized  Patient notes that appetite has increased, weight is stable at 214 pounds    *Chronic left greater than right lower extremity edema with subsequent left inguinal/lower extremity hematoma following TAVR  Patient developed exacerbation of chronic edema in the lower extremities, left slightly greater than right.    Bilateral lower extremity Doppler on 5/9/2023 was negative, showed bilateral popliteal fluid collections.  Patient with evidence of left inguinal/left lower extremity hematoma following TAVR with ecchymosis tracking into the distal portion of the left lower extremity and ecchymosis laterally in the left hip.  Likely explains decline in hemoglobin and low haptoglobin post procedure.  PET scan 9/5/2023 with evidence of fluid collection/blood in the left inguinal region.  3/26/24 Patient notes that she did not take her Lasix 2 days ago nor today.  Edema has increased in the lower extremities slightly, remains asymmetric left rater than right.  I did recommend that we repeat lower  extremity Doppler to rule out DVT  4/23/24 patient has not yet gone for the Doppler ultrasound as she states she has had difficulty with transportation.  We discussed concerns of possible blood clot and the risks with this and the patient states she is aware.  She does not think her symptoms have worsened since last office visit.  She states she will not go today for the Doppler as they have transportation issues once again.  She has an appointment scheduled for Thursday that she states she will keep.  She is to present to the emergency department for any increased edema, redness, or pain in either leg or new onset increased shortness of breath or pain with deep breath.    *Hypothyroidism exacerbated by immunotherapy  Hypothyroidism managed by PCP Dr. Danyelle Brush  Labs on 5/30/2023 prior to beginning immunotherapy with evidence of hypothyroidism with TSH 10.5, free T4 0.54.  Levothyroxine dose increased by Dr. Brush from 125 up to 150 mcg daily  Continue to monitor thyroid function studies every 8 weeks on durvalumab  Patient with persistent borderline abnormal thyroid function studies on Durvalumab, referred to endocrinology  On 1/2/2024, significant increase in TSH to 19.7 with free T40.8.  On 1/5/2024 increased levothyroxine dose from 150 up to 175 mcg daily.  Patient is now followed by endocrinology for management of hypothyroidism  Last 3/26/2024 with normal TSH 0.573 and normal free T4 of 1.61.    *Chemotherapy-induced leukopenia  WBC today 5.17 with normal differential    *Chemotherapy-induced thrombocytopenia  Platelet count prior to chemotherapy was normal  Platelet count has gradually improved since completion of concurrent chemoradiation  Platelet count on 11/7/2023 declined to 101,000.  Additional labs with IPF 5.5%, B12 3 and 42, folate 8.99  Platelet count today remains stable at 125,000.  B12 and folate checked at the last office visit in normal range.  Patient continues on oral B12  daily.    *Chemotherapy-induced anemia  Hemoglobin did decline into the high 10-low 11 range on concurrent chemoradiation  Hemoglobin subsequently improved and normalized as of 7/28/2023 with hemoglobin of 13.4  Patient underwent TAVR on 8/1/2023 with significant decline in hemoglobin thereafter, reached a low of 7.1 on 8/8/2023.  Additional labs with cardiology on 8/8/2023 with iron 78, iron saturation 22%, TIBC 350.  Patient was started on oral iron although no evidence of iron deficiency.    Labs on 8/15/2023 with hemoglobin 8.7, iron 129, ferritin 120, iron saturation 39%, TIBC 355, B12 288, folate 7.15, haptoglobin less than 10.  Oral iron discontinued as patient was constipated, had no evidence of iron deficiency.  Initiated oral B12 1000 mcg daily.  Postprocedure anemia and low haptoglobin related to left inguinal/left lower extremity hematoma.  3/26/2024 B12, folate, iron levels repeated and all within normal ranges.  Patient continues oral B12 1000 mcg daily.  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  Recommended CTA exam for more definitive evaluation.  Patient was referred to vascular surgery, had to postpone the visit        PLAN:  Continue definitive treatment for stage IIIA non-small cell lung cancer.  Patient completed concurrent chemoradiation with weekly single agent carboplatin (completed on 5/12/2023).  Patient is continuing on durvalumab single agent initiated 5/30/2023 with 10 mg/kg dose every 2 weeks.  With cycle 4 on 7/18/2023 transitioned to 1500 mg every 4 weeks.  Proceed with cycle 14 Durvalumab today, this will be her final treatment.  Patient has bilateral lower extremity Doppler on Thursday of this week, she will not go today as I suggested we could get her worked in today  Patient continues on levothyroxine currently at 175 mcg daily   Continue oral B12 1000  "mcg daily  Patient referred to ENT to evaluate PET activity noted floor of mouth and base of tongue, awaiting appointment, we will reach out to them to see if this is been scheduled or if they accept referral.  In 3 weeks PET scan  In 4 weeks MD visit with CBC, CMP, TSH, free T4 and port flush.  We will review PET scan results.      The patient is continuing on high risk medication requiring intensive monitoring.    ADDENDUM: Per Dr AGEE with endocrinology \"Will decrease the dose of levothyroxine to 150 mcg daily  Repeat TFT in 8 weeks  Will also check cortisol and ACTH in 8 weeks will schedule a follow-up appointment\"                  "

## 2024-04-25 ENCOUNTER — HOSPITAL ENCOUNTER (OUTPATIENT)
Dept: CARDIOLOGY | Facility: HOSPITAL | Age: 69
Discharge: HOME | End: 2024-04-25
Admitting: INTERNAL MEDICINE
Payer: MEDICARE

## 2024-04-25 ENCOUNTER — TELEPHONE (OUTPATIENT)
Dept: ENDOCRINOLOGY | Age: 69
End: 2024-04-25
Payer: MEDICARE

## 2024-04-25 DIAGNOSIS — E03.9 ACQUIRED HYPOTHYROIDISM: Primary | ICD-10-CM

## 2024-04-25 DIAGNOSIS — Z29.89 IMMUNOTHERAPY: ICD-10-CM

## 2024-04-25 LAB

## 2024-04-25 PROCEDURE — 93970 EXTREMITY STUDY: CPT

## 2024-04-25 RX ORDER — LEVOTHYROXINE SODIUM 0.15 MG/1
150 TABLET ORAL DAILY
Qty: 90 TABLET | Refills: 3 | Status: SHIPPED | OUTPATIENT
Start: 2024-04-25 | End: 2025-04-25

## 2024-04-25 NOTE — TELEPHONE ENCOUNTER
----- Message from CHENG Russell sent at 4/24/2024 10:14 AM EDT -----  Regarding: please review  Please review her most recent thyroid studies and let us know if you would like her levothyroxine adjusted.  I know that she was supposed to see you in March and canceled the appointment.  If your office wants to reach out to her instead, just let me know.  Thank you.  ----- Message -----  From: Carmelo Lombardi Jr., MD  Sent: 4/23/2024  10:42 PM EDT  To: CHENG Russell    Looks like patient may be becoming slightly hyperthyroid.  Can you forward these to endocrinology to see if they want to change her levothyroxine dose?  Thanks!  ----- Message -----  From: Lab, Background User  Sent: 4/23/2024   8:43 AM EDT  To: Carmelo Lombardi Jr., MD

## 2024-04-25 NOTE — TELEPHONE ENCOUNTER
Called and discussed the results  TSH is low and free T4 is elevated  Denies palpitation or weight loss  Will decrease the dose of levothyroxine to 150 mcg daily  Repeat TFT in 8 weeks  Will also check cortisol and ACTH in 8 weeks will schedule a follow-up appointment

## 2024-05-01 ENCOUNTER — TELEPHONE (OUTPATIENT)
Dept: ONCOLOGY | Facility: CLINIC | Age: 69
End: 2024-05-01

## 2024-05-01 NOTE — TELEPHONE ENCOUNTER
"  Caller: Gabby Acosta \"GALE\"    Relationship: Self    Best call back number:     [phone]     What is the best time to reach you: ASAP    Who are you requesting to speak with (clinical staff, provider,  specific staff member): CLINICAL    What was the call regarding: PT RECEIVED A CALL FROM DR HARTLEY'S OFFICE. THE RECORDING STATED THAT THE PT HAS AND APPT TOMORROW. THE PT CALLED THE OFFICE BACK TO CONFIRM THAT APPT BECAUSE SHE THOUGHT HER APPT WAS ON 05/07/24 AND WHEN SHE CALLED BACK THEY STATED THEY DIDN'T HAVE ANYTHING DOWN FOR HER AS A PATIENT AT ALL.    PT IS CONFUSED.    PHONE NUMBER PT CALLED WAS VERIFIED WITH WHAT WE HAVE.     COULD WE CALL THE OFFICE TO CHECK ON THIS AND ADVISE PT.     IF THE APPT HAS NOT BEEN MADE AND WE WERE TO MAKE AN APPT FOR HER THEN THE ONLY APPT THAT SHE COULD NOT DO WOULD BE 05/06/24    Is it okay if the provider responds through [facility]: NO - PLEASE CALL BACK TO ADVISE.       "

## 2024-05-02 ENCOUNTER — TELEPHONE (OUTPATIENT)
Dept: ONCOLOGY | Facility: CLINIC | Age: 69
End: 2024-05-02

## 2024-05-02 NOTE — TELEPHONE ENCOUNTER
"  Caller: Gabby Acosta \"GALVAN\"    Relationship: Self    Best call back number: [phone]    What is the best time to reach you: ASAP    Who are you requesting to speak with (clinical staff, provider,  specific staff member): DR. WEISS'S NURSE    What was the call regarding: PATIENT STATES THERE WAS A BIG MISTAKE MADE ON HER PART ABOUT SEEING DR. HARTLEY & NOW HE CANNOT SEE HER UNTIL JULY SO THE OFFICE ASK HER TO CALL US TO SEE IF DR. WEISS WOULD BE OKAY WITH HER SEEING ANOTHER DR. IN THEIR PRACTICE, PLEASE ADVISE.    Is it okay if the provider responds through [facility]: NO      "

## 2024-05-03 ENCOUNTER — TELEPHONE (OUTPATIENT)
Dept: ONCOLOGY | Facility: CLINIC | Age: 69
End: 2024-05-03
Payer: MEDICARE

## 2024-05-03 NOTE — TELEPHONE ENCOUNTER
I called and left a voicemail telling pt it is OK for her to go to any provider at [facility]. I sent a My Chart Message as well.

## 2024-05-16 DIAGNOSIS — Z95.2 S/P TAVR (TRANSCATHETER AORTIC VALVE REPLACEMENT): Primary | ICD-10-CM

## 2024-05-17 ENCOUNTER — HOSPITAL ENCOUNTER (OUTPATIENT)
Dept: PET IMAGING | Facility: HOSPITAL | Age: 69
Discharge: HOME | End: 2024-05-17
Payer: MEDICARE

## 2024-05-17 DIAGNOSIS — D64.9 NORMOCYTIC ANEMIA: ICD-10-CM

## 2024-05-17 DIAGNOSIS — Z79.899 ENCOUNTER FOR LONG-TERM (CURRENT) USE OF HIGH-RISK MEDICATION: ICD-10-CM

## 2024-05-17 DIAGNOSIS — C34.32 MALIGNANT NEOPLASM OF LOWER LOBE OF LEFT LUNG: ICD-10-CM

## 2024-05-17 DIAGNOSIS — E03.8 OTHER SPECIFIED HYPOTHYROIDISM: ICD-10-CM

## 2024-05-17 LAB — GLUCOSE BLDC GLUCOMTR-MCNC: 98 MG/DL (ref 70–130)

## 2024-05-17 PROCEDURE — 0 FLUDEOXYGLUCOSE F18 SOLUTION: Performed by: INTERNAL MEDICINE

## 2024-05-17 PROCEDURE — 78815 PET IMAGE W/CT SKULL-THIGH: CPT

## 2024-05-17 PROCEDURE — A9552 F18 FDG: HCPCS | Performed by: INTERNAL MEDICINE

## 2024-05-17 PROCEDURE — 82948 REAGENT STRIP/BLOOD GLUCOSE: CPT

## 2024-05-17 RX ADMIN — FLUDEOXYGLUCOSE F 18 1 DOSE: 200 INJECTION, SOLUTION INTRAVENOUS at 10:01

## 2024-05-20 NOTE — PROGRESS NOTES
"Chief Complaint  Non-small cell lung cancer, clinical stage IIIA ([id number]), aortic stenosis, cervical spine stenosis, COPD, hepatic steatosis, diabetes mellitus    Subjective        History of Present Illness  Patient returns today in follow-up with laboratory studies to review as well as PET scan having completed her intended course of 1 year Durvalumab following concurrent chemoradiation with weekly low-dose carboplatin and Taxol.  She initiated Durvalumab on 5/30/2023 and received her final 4-week dose with cycle 14 on 4/23/2024.  The patient reports that since her last visit she has not experienced any new symptoms.  She has some very mild fatigue but overall is much more active than she was when she began treatment for her malignancy.  She remains in a wheelchair today although is able to ambulate short distances at home with her walker.  She was never able to connect with ENT to evaluate the area of PET activity that was seen in the floor of mouth and in her larynx due to issues with outstanding balance with 1 practice and difficulties with referral to another practice.  She has no symptoms in this area.  She does report some mild chronic dyspnea on exertion which is unchanged.  She denies any cough.  She did have her levothyroxine dose decreased to 150 mcg daily by endocrinology on 4/25/2024 due to elevated free T4 at 1.94 on 4/23/2024.        Objective   Vital Signs:   /78   Pulse 88   Temp 97.9 °F (36.6 °C) (Oral)   Resp 16   Ht 157 cm (61.81\")   Wt 98.4 kg (217 lb)   SpO2 92%   BMI 39.93 kg/m²     Physical Exam  Constitutional:       Appearance: She is well-developed.      Comments: Patient is using a wheelchair today   Eyes:      Conjunctiva/sclera: Conjunctivae normal.   Neck:      Thyroid: No thyromegaly.   Cardiovascular:      Rate and Rhythm: Normal rate and regular rhythm.      Heart sounds: No murmur heard.     No friction rub. No gallop.   Pulmonary:      Effort: No respiratory " distress.      Breath sounds: Wheezing present.      Comments: Few scattered wheezes on the right  Abdominal:      General: Bowel sounds are normal. There is no distension.      Palpations: Abdomen is soft.      Tenderness: There is no abdominal tenderness.   Musculoskeletal:         General: Swelling present.      Comments: 1+ bilateral lower extremity edema, left greater than right   Lymphadenopathy:      Head:      Right side of head: No submandibular adenopathy.      Cervical: No cervical adenopathy.      Upper Body:      Right upper body: No supraclavicular adenopathy.      Left upper body: No supraclavicular adenopathy.   Skin:     General: Skin is warm and dry.      Findings: No rash.   Neurological:      Mental Status: She is alert and oriented to person, place, and time.      Cranial Nerves: No cranial nerve deficit.      Motor: No abnormal muscle tone.      Deep Tendon Reflexes: Reflexes normal.   Psychiatric:         Behavior: Behavior normal.           Result Review : Reviewed CBC, CMP, TSH, free T4 from today.  Reviewed PET scan 5/17/2024.  Reviewed endocrinology records.       Assessment and Plan     *Non-small cell lung cancer, clinical stage IIIA ([id number])  Patient has history of smoking 1 pack/day x 45 years, quit in November 2021.    Following anterior cervical corpectomy in September 2022, she experienced acute hypoxemic respiratory failure.    CT angiogram chest 9/25/2022 showed a concerning 1.4 cm left lower lobe pulmonary nodule, mediastinal lymphadenopathy with infracarinal lymph node 2.7 cm, left hilar lymph node 1.2 cm, small right hilar lymph nodes up from 1.1 cm and additional enlarged mediastinal nodes in the right paratracheal, precarinal, infracarinal spaces.  There was a wedge-shaped area of consolidation/atelectasis in the right middle lobe base, subpleural density left costophrenic sulcus felt to represent atelectasis.  It was recommended for her to undergo subsequent PET scan by  pulmonary.  The patient has had ongoing follow-up with neurosurgery and on CT scans 11/29/2022 of cervical and thoracic spine, there was concern regarding spinal instability with potential need for posterior cervical fusion.  This was scheduled in December 2022 however was delayed due to upper respiratory infection.  Patient admitted on 1/10/2023 to address multiple medical issues in order to prepare for potential neurosurgical procedure on 3/12/2023.  Patient underwent echocardiogram on 1/11/2023 with ejection fraction greater than 70%, grade 1 diastolic dysfunction, severe aortic stenosis.  Patient is being followed by cardiology with consideration of need for TAVR versus SAVR for moderate to severe aortic stenosis.  Repeat CT chest on 1/12/2023 showed slight increase in size of the left lower lobe pulmonary nodule increased from 1.4 to 1.5 cm.  Groundglass nodule peripheral left lower lobe less conspicuous and resolution of previous reticulonodular opacities right lower lobe.  There was further enlargement of mediastinal lymph nodes with right posterior paratracheal lymph node 1.4 x 2.1 up to 1.5 x 2.3 cm, subcarinal lymph node increased from 2.7 x 5.2 up to 3.2 x 5.8 cm, left hilar lymph node increased from 1.2 up to 1.4 cm, and is stable 1.7 cm left infrahilar lymph node.  There was also comment regarding hepatic steatosis with changes of chronic liver disease and stable indeterminate nodular thickening of the adrenal glands.  CT-guided lung biopsy on 1/13/2023 with pathology that showed invasive poorly differentiated non-small cell carcinoma with focal neuroendocrine differentiation.  Staining pattern and morphology favor poorly differentiated adenocarcinoma however there was focal CD56 staining, could not entirely exclude large cell neuroendocrine carcinoma.  PD-L1 35%, Caris analysis pending.  It was felt that there would be increased risk for bronchoscopy from a cardiac standpoint due to her aortic  stenosis.  Staging MRI brain 1/29/2023 showed no evidence of metastatic disease  Staging PET scan 1/26/2023 showed hypermetabolic left lower lobe pulmonary nodule, size difficult to determine on PET however on recent CT was 1.6 cm (SUV 14.4).  Mediastinal and left hilar hypermetabolic lymphadenopathy (subcarinal lymph node 3.2 cm, SUV 31.7, left paratracheal 2.2 cm lymph node SUV 12.7).  Sub-6 mm pulmonary nodule left upper lobe unchanged and below PET resolution.  Focal uptake posterior right hepatic lobe SUV 7.2 of uncertain significance.  Recommended MRI to evaluate.  Right thyroid activity SUV 5.3, recommended ultrasound.  Long segment uptake distal esophagus consistent with esophagitis.  Uptake posterior larynx, nonspecific, recommended direct visualization.  MRI abdomen 2/6/2023 with no evidence to suggest metastatic disease in the liver.  Subcentimeter nonenhancing lesions favor cyst or hemangiomas.  Patient underwent right and left heart catheterization with cardiology on 2/14/2023.  Identification of coronary artery disease, mild pulmonary hypertension, severe aortic stenosis.  Recommendation per cardiology to proceed with treatment for lung cancer and defer any intervention regarding aortic stenosis.  Concern regarding underlying neurologic issues from cervical stenosis and potential neuropathic effects from Taxol chemotherapy.  Concern regarding patient's overall performance status and ability to tolerate full dose chemotherapy.  Patient discussed at thoracic oncology tumor board with consensus to treat with weekly low-dose carboplatin concurrent with radiation therapy and omit Taxol.  Subsequent plan for 1 year durvalumab following concurrent chemoradiation.  Significant delay in initiating concurrent chemoradiation due to logistics with scheduling Mediport placement, patient canceled appointments and delayed initiation of treatment.  On 3/21/2023 initiated concurrent chemoradiation with weekly  carboplatin (AUC 2) on 2/28/2023.  Week 4 carboplatin held 4/11 and again 4/18/2023 due to thrombocytopenia  Week 4 carboplatin resumed on 4/25/2023 at reduced dose to AUC 1 due to borderline neutropenia  Chemo and radiation held beginning 5/2/2023 due to neutropenia with WBC 1.13, ANC 0.53  Resumed radiation therapy on 5/8/2023 with recovery of WBC.  Received fifth and final weekly dose of concurrent carboplatin (AUC 1) on 5/9/2023.  Patient completed radiation therapy on 5/12/2023.  CT chest abdomen pelvis 5/25/2023 with significant improvement in mediastinal and left hilar lymphadenopathy, decrease in left lower lobe nodule indicating significant response.  On 5/30/2023 initiated treatment with durvalumab 10 mg/kg every 2 weeks x 1 year.  Transitioned to durvalumab 1500 mg every 4-week dosing with cycle 4 on 7/18/2023.  TAVR CT chest abdomen pelvis 7/21/2023 with decreased left lower lobe nodule from 8 down to 6 mm, stable mediastinal lymph nodes (with the exception of 1 small superior mediastinal lymph node increased from 4 to 7 mm of unclear significance).  Upper abdominal lymph nodes with slight increase in the periportal, hepatogastric, peripancreatic regions.  Following 5 cycles durvalumab, PET scan 9/5/2023 with left lower lobe ill-defined consolidation and opacification likely representing postradiation change.  No mediastinal lymphadenopathy nor hypermetabolic activity.  Slight enlargement (0.7 up to 1.0 cm) of anterior pericardial lymph node with new hypermetabolic activity (SUV 5.5.  Stable epigastric adenopathy with mild hypermetabolic activity (SUV 4.4) unchanged from 1/26/2023 scan (although not noted on that report).  Nondisplaced, healing left seventh rib fracture.  Significance of the pericardial lymph node and epigastric lymphadenopathy unclear.  Neither area easily accessible for CT-guided biopsy after discussion with interventional radiology on 9/12/2023.  Elected to monitor on repeat PET scan  at 2-month interval.  PET scan 10/31/2023 with stable mildly hypermetabolic left lower lobe segmental consolidation, indeterminate regarding residual malignancy versus inflammatory change.  Prior epicardial lymph node decreased in size to less than 1 cm with no residual hypermetabolic activity.  Subcentimeter periceliac lymph node with slight increase in SUV from 3.9 up to 6.5, indeterminate.  Plan 3-month interval PET scan  PET scan 1/16/2024 with decrease in extent of left lower lobe irregular nodular pulmonary opacification, SUV decreased slightly from 3.9 down to 3.3.  Stable borderline epigastric, mesenteric, aortocaval lymph nodes with stable activity.  Stable intense uptake base of tongue and floor of mouth.  Stable subcentimeter left upper lobe pulmonary nodule.  PET scan findings felt to be indeterminate, left lower lobe findings likely related to prior radiation therapy, no definitive evidence of residual or recurrent disease.  Plan repeat PET scan at 4-month interval at completion of Durvalumab.  Patient received final planned cycle of Durvalumab (cycle 14) on 4/23/2024.  PET scan following completion of Durvalumab on 5/17/2024 showed new 12 cm x 4.5 cm abnormality in the paramediastinal left lower lobe with bandlike characteristics and low-level SUV (3) consistent with evolving postradiation change.  Hypermetabolic periceliac lymph nodes unchanged (reference lymph node 1.2 cm with SUV 5).  2 hypermetabolic abdominal para-aortic lymph nodes with reference lymph node 1.2 cm, SUV 7 indeterminate, possibly reactive/inflammatory versus metastatic disease.  Patient returns today in follow-up having completed 1 year Durvalumab with cycle 14 administered on 4/23/2024.  She has tolerated immunotherapy very well with no new side effects.  We reviewed her PET scan from 5/17/2024 which showed no definitive evidence of recurrent disease.  The changes in the left lower lobe are in a bandlike pattern with low-level SUV  of 3, morphology consistent with postradiation change.  The periceliac lymph nodes are unchanged, borderline size at 1.2 cm with SUV 5, indeterminant.  There are 2 periaortic lymph nodes, borderline size with increased activity that are indeterminate as well.  We will for now monitor clinically and radiographically for recurrent disease with CT scan to be performed in 3 months.  I will see patient back at that time for follow-up.  She does wish to maintain her Mediport in place with potential risk for recurrent disease.  She will therefore have port flush in 6 weeks and again when I see her in 3 months.  She is aware to contact us in the interval with any new symptoms.    *Aortic stenosis  Patient underwent echocardiogram on 1/11/2023 with ejection fraction greater than 70%, grade 1 diastolic dysfunction, severe aortic stenosis.    Patient followed by cardiology with consideration of need for TAVR versus SAVR for moderate to severe aortic stenosis.  Patient underwent cardiac catheterization 2/14/2023 with identification of coronary artery disease, mild pulmonary hypertension, severe aortic stenosis.  Per cardiology, recommendation to proceed with treatment for lung cancer and defer any consideration of intervention for aortic stenosis.  Patient did undergo TAVR on 8/1/2023  Echocardiogram on 8/2/2023 with ejection fraction 62.4%, TAVR present  Patient continues follow-up with cardiology     *Cervical spine stenosis  Patient with cervical spine stenosis with associated myelopathy.   She underwent surgery on 9/20/2022 with anterior cervical corpectomy with cage.      The patient has had ongoing follow-up with neurosurgery and on CT scans 11/29/2022 of cervical and thoracic spine, there was concern regarding spinal instability with potential need for posterior cervical fusion.  This was scheduled in December 2022 however was delayed due to upper respiratory infection.  Patient admitted on 1/10/2023 to address multiple  medical issues in order to prepare for potential neurosurgical procedure on 3/12/2023.  Patient was previously having severe symptoms related to her cervical stenosis with reduced ability to ambulate previously and significant limitations in movement in her hands, dropping objects.  More recently however she has improved and has been able to walk with the use of a walker, symptoms in the upper extremities have improved and her dexterity is better.    It appears that her neurosurgical procedure will need to be delayed until we can at least complete the concurrent chemoradiation portion of her treatment.  Surgery would be feasible while continuing on immunotherapy in the future.  Neurosurgical follow-up has been placed on hold.  Patient improvement over time in mobility and symptoms in her extremities.  She does not intend to proceed with further surgery in the future.     *COPD  Patient has history of smoking 1 pack/day x 45 years, quit in November 2021.  Patient has not undergone formal PFTs  Patient being followed by pulmonary, currently using albuterol inhaler as needed     *Hepatic steatosis  Identified on prior scans  PET scan 1/26/2023 with questionable area of activity seen but no corresponding CT abnormality.    MRI abdomen 2/6/2023 with no evidence to suggest metastatic disease in the liver.  Subcentimeter nonenhancing lesions favor cyst or hemangiomas.  LFTs remain normal     *Diabetes mellitus    *Anxiety/depression  Patient currently continuing on Wellbutrin  mg daily, Lexapro 10 mg daily    *Right thyroid uptake on PET scan 1/26/2023  Patient was scheduled for thyroid ultrasound however did not feel that she was able to undergo the procedure due to her neck issues and therefore was canceled, consider at future date  No mention of thyroid uptake on PET scan 9/5/2023 and on 10/31/2023.  No uptake on PET scan 1/16/2024, thyroid appeared atrophic  PET scan on 5/17/2024 with no mention of activity in  the thyroid    *Laryngeal uptake on PET scan 1/26/2023, subsequent PET uptake and floor of mouth and base of tongue 1/16/2024  Patient was seen by ENT on 3/28/2023, laryngoscopy showed mild edema in the posterior glottis consistent with laryngeal pharyngeal reflux, no evidence of malignancy.  No mention of laryngeal uptake on PET scan 9/5/2023 nor on 10/31/2023  PET scan 1/16/2024 showed persistent significant uptake floor of mouth and base of tongue.  The area was visualized today on exam with no abnormal findings.  She does wear dentures that do not fit properly, unclear whether this could be contributing to the activity.    Patient was referred to ENT, had outstanding balance with 1 practice, difficulty with referral to another practice and was never seen  Patient returns today in follow-up not having been seen in the interval by ENT.  Current PET scan shows no significant abnormal activity in the head or neck areas.  There are no abnormal findings on exam.  At this time we will forego referral to ENT.    *Esophageal uptake on PET scan 1/26/2023  [place] to be consistent with esophagitis in the distal esophagus.  We will hold on referral for EGD given the multitude of additional procedures and consults required above.  No mention of esophageal uptake on PET scan 9/5/2023 nor on 10/31/2023 no oral on 1/16/2024  No mention of esophageal uptake on PET scan 5/17/2024    *Venous access  Mediport placed by Dr. Shook on 3/2/2023  We will maintain Mediport in place after completion of patient's planned treatment due to risk of current disease, plan port flushes every 4 to 6 weeks.    *Nutrition/weight loss  Patient has been seen by oncology nutrition  Patient overall has lost a significant amount of weight however weight subsequently stabilized  Appetite and weight have increased, currently up to 217 pounds    *Chronic left greater than right lower extremity edema with subsequent left inguinal/lower extremity hematoma  following TAVR  Patient developed exacerbation of chronic edema in the lower extremities, left slightly greater than right.    Bilateral lower extremity Doppler on 5/9/2023 was negative, showed bilateral popliteal fluid collections.  Patient with evidence of left inguinal/left lower extremity hematoma following TAVR with ecchymosis tracking into the distal portion of the left lower extremity and ecchymosis laterally in the left hip.  Likely explains decline in hemoglobin and low haptoglobin post procedure.  PET scan 9/5/2023 with evidence of fluid collection/blood in the left inguinal region.  Edema increased, asymmetric with left greater than right.  Lower extremity Doppler 4/25/2024 with bilateral popliteal fluid collections, no evidence of DVT.  Patient did not take her Lasix today due to travel to her appointment.  Edema has increased in the lower extremities slightly, remains asymmetric left greater than right.    *Hypothyroidism exacerbated by immunotherapy  Hypothyroidism managed by PCP Dr. Danyelle Brush  Labs on 5/30/2023 prior to beginning immunotherapy with evidence of hypothyroidism with TSH 10.5, free T4 0.54.  Levothyroxine dose increased by Dr. Brush from 125 up to 150 mcg daily  Continue to monitor thyroid function studies every 8 weeks on durvalumab  Patient with persistent borderline abnormal thyroid function studies on Durvalumab, referred to endocrinology  On 1/2/2024, significant increase in TSH to 19.7 with free T40.8.  On 1/5/2024 increased levothyroxine dose from 150 up to 175 mcg daily.  Patient is now followed by endocrinology for management of hypothyroidism  Labs on 4/23/2024 with normal TSH suppressed at 0.155 and free T4 increased to 1.94.  Levothyroxine dose decreased by endocrinology to 150 mcg daily.  Labs today with TSH 0.122 and free T4 improved into the normal range at 1.69.  Recheck in 3 months at return visit.  Patient continues follow-up with  endocrinology.    *Chemotherapy-induced leukopenia  WBC today 6.18 with normal differential    *Chemotherapy-induced thrombocytopenia  Platelet count prior to chemotherapy was normal  Platelet count has gradually improved since completion of concurrent chemoradiation  Platelet count on 11/7/2023 declined to 101,000.  Additional labs with IPF 5.5%, B12 342, folate 8.99  Labs on 3/26/2024 with B12 453, folate 12.1, IPF 3.9%, platelet count 108,000  Platelet count today normal at 152,000    *Chemotherapy-induced anemia  Hemoglobin did decline into the high 10-low 11 range on concurrent chemoradiation  Hemoglobin subsequently improved and normalized as of 7/28/2023 with hemoglobin of 13.4  Patient underwent TAVR on 8/1/2023 with significant decline in hemoglobin thereafter, reached a low of 7.1 on 8/8/2023.  Additional labs with cardiology on 8/8/2023 with iron 78, iron saturation 22%, TIBC 350.  Patient was started on oral iron although no evidence of iron deficiency.    Labs on 8/15/2023 with hemoglobin 8.7, iron 129, ferritin 120, iron saturation 39%, TIBC 355, B12 288, folate 7.15, haptoglobin less than 10.  Oral iron discontinued as patient was constipated, had no evidence of iron deficiency.  Initiated oral B12 1000 mcg daily.  Postprocedure anemia and low haptoglobin related to left inguinal/left lower extremity hematoma.  Hemoglobin on 3/26/2024 declined to 11.8.  Additional labs with iron 90, ferritin 102, iron saturation 26%, TIBC 343, B12 453, folate 12.1  Hemoglobin today normal at 12.8.    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  Recommended CTA exam for more definitive evaluation.  Patient was referred to vascular surgery, had to postpone the visit        PLAN:  The patient has completed definitive treatment for stage IIIA non-small cell lung cancer.  She received concurrent chemoradiation with weekly single agent carboplatin  (completed on 5/12/2023).  She subsequently received durvalumab single agent initiated 5/30/2023 with 10 mg/kg dose every 2 weeks.  With cycle 4 on 7/18/2023 transitioned to 1500 mg every 4 weeks.  Received 14th and final cycle of Durvalumab on 4/23/2024.  PET scan 5/17/2024 with no definitive evidence of residual disease, abdominal lymph nodes indeterminate.  Continue levothyroxine currently at 150 mcg daily (dose decreased last on 4/25/2024 by endocrinology).  Continue oral B12 1000 mcg daily  We will forego referral to ENT, no evidence of PET activity in the oropharynx or larynx currently  In 6 weeks port flush  MD visit in 3 months with CBC, CMP, TSH, free T4 and port flush due.  1 week prior CT chest abdomen pelvis.      I did spend 40 minutes total time caring for the patient today, time spent in review of records, face-to-face consultation, coordination of care, placement of orders, completion of documentation.

## 2024-05-21 ENCOUNTER — OFFICE VISIT (OUTPATIENT)
Dept: ONCOLOGY | Facility: CLINIC | Age: 69
End: 2024-05-21
Payer: MEDICARE

## 2024-05-21 ENCOUNTER — INFUSION (OUTPATIENT)
Dept: ONCOLOGY | Facility: HOSPITAL | Age: 69
End: 2024-05-21
Payer: MEDICARE

## 2024-05-21 VITALS
TEMPERATURE: 97.9 F | WEIGHT: 217 LBS | HEART RATE: 88 BPM | BODY MASS INDEX: 39.93 KG/M2 | RESPIRATION RATE: 16 BRPM | OXYGEN SATURATION: 92 % | DIASTOLIC BLOOD PRESSURE: 78 MMHG | SYSTOLIC BLOOD PRESSURE: 144 MMHG | HEIGHT: 62 IN

## 2024-05-21 DIAGNOSIS — C34.32 MALIGNANT NEOPLASM OF LOWER LOBE OF LEFT LUNG: ICD-10-CM

## 2024-05-21 DIAGNOSIS — D64.9 NORMOCYTIC ANEMIA: ICD-10-CM

## 2024-05-21 DIAGNOSIS — E03.9 ACQUIRED HYPOTHYROIDISM: ICD-10-CM

## 2024-05-21 DIAGNOSIS — E03.8 OTHER SPECIFIED HYPOTHYROIDISM: ICD-10-CM

## 2024-05-21 DIAGNOSIS — C34.32 MALIGNANT NEOPLASM OF LOWER LOBE OF LEFT LUNG: Primary | ICD-10-CM

## 2024-05-21 DIAGNOSIS — Z79.899 ENCOUNTER FOR LONG-TERM (CURRENT) USE OF HIGH-RISK MEDICATION: ICD-10-CM

## 2024-05-21 DIAGNOSIS — Z45.2 ENCOUNTER FOR ADJUSTMENT OR MANAGEMENT OF VASCULAR ACCESS DEVICE: Primary | ICD-10-CM

## 2024-05-21 LAB
ALBUMIN SERPL-MCNC: 3.7 G/DL (ref 3.5–5.2)
ALBUMIN/GLOB SERPL: 1.3 G/DL
ALP SERPL-CCNC: 80 U/L (ref 39–117)
ALT SERPL W P-5'-P-CCNC: 9 U/L (ref 1–33)
ANION GAP SERPL CALCULATED.3IONS-SCNC: 11.3 MMOL/L (ref 5–15)
AST SERPL-CCNC: 18 U/L (ref 1–32)
BASOPHILS # BLD AUTO: 0.04 10*3/MM3 (ref 0–0.2)
BASOPHILS NFR BLD AUTO: 0.6 % (ref 0–1.5)
BILIRUB SERPL-MCNC: 0.2 MG/DL (ref 0–1.2)
BUN SERPL-MCNC: 18 MG/DL (ref 8–23)
BUN/CREAT SERPL: 19.1 (ref 7–25)
CALCIUM SPEC-SCNC: 9 MG/DL (ref 8.6–10.5)
CHLORIDE SERPL-SCNC: 99 MMOL/L (ref 98–107)
CO2 SERPL-SCNC: 26.7 MMOL/L (ref 22–29)
CREAT SERPL-MCNC: 0.94 MG/DL (ref 0.57–1)
DEPRECATED RDW RBC AUTO: 43.3 FL (ref 37–54)
EGFRCR SERPLBLD CKD-EPI 2021: 66.2 ML/MIN/1.73
EOSINOPHIL # BLD AUTO: 0.34 10*3/MM3 (ref 0–0.4)
EOSINOPHIL NFR BLD AUTO: 5.5 % (ref 0.3–6.2)
ERYTHROCYTE [DISTWIDTH] IN BLOOD BY AUTOMATED COUNT: 13.9 % (ref 12.3–15.4)
GLOBULIN UR ELPH-MCNC: 2.8 GM/DL
GLUCOSE SERPL-MCNC: 120 MG/DL (ref 65–99)
HCT VFR BLD AUTO: 39.6 % (ref 34–46.6)
HGB BLD-MCNC: 12.8 G/DL (ref 12–15.9)
IMM GRANULOCYTES # BLD AUTO: 0.03 10*3/MM3 (ref 0–0.05)
IMM GRANULOCYTES NFR BLD AUTO: 0.5 % (ref 0–0.5)
LYMPHOCYTES # BLD AUTO: 0.72 10*3/MM3 (ref 0.7–3.1)
LYMPHOCYTES NFR BLD AUTO: 11.7 % (ref 19.6–45.3)
MCH RBC QN AUTO: 27.8 PG (ref 26.6–33)
MCHC RBC AUTO-ENTMCNC: 32.3 G/DL (ref 31.5–35.7)
MCV RBC AUTO: 86.1 FL (ref 79–97)
MONOCYTES # BLD AUTO: 0.62 10*3/MM3 (ref 0.1–0.9)
MONOCYTES NFR BLD AUTO: 10 % (ref 5–12)
NEUTROPHILS NFR BLD AUTO: 4.43 10*3/MM3 (ref 1.7–7)
NEUTROPHILS NFR BLD AUTO: 71.7 % (ref 42.7–76)
NRBC BLD AUTO-RTO: 0 /100 WBC (ref 0–0.2)
PLATELET # BLD AUTO: 152 10*3/MM3 (ref 140–450)
PMV BLD AUTO: 9.8 FL (ref 6–12)
POTASSIUM SERPL-SCNC: 4.1 MMOL/L (ref 3.5–5.2)
PROT SERPL-MCNC: 6.5 G/DL (ref 6–8.5)
RBC # BLD AUTO: 4.6 10*6/MM3 (ref 3.77–5.28)
SODIUM SERPL-SCNC: 137 MMOL/L (ref 136–145)
T4 FREE SERPL-MCNC: 1.69 NG/DL (ref 0.93–1.7)
TSH SERPL DL<=0.05 MIU/L-ACNC: 0.12 UIU/ML (ref 0.27–4.2)
WBC NRBC COR # BLD AUTO: 6.18 10*3/MM3 (ref 3.4–10.8)

## 2024-05-21 PROCEDURE — 85025 COMPLETE CBC W/AUTO DIFF WBC: CPT

## 2024-05-21 PROCEDURE — 25010000002 HEPARIN LOCK FLUSH PER 10 UNITS: Performed by: INTERNAL MEDICINE

## 2024-05-21 PROCEDURE — 84439 ASSAY OF FREE THYROXINE: CPT | Performed by: INTERNAL MEDICINE

## 2024-05-21 PROCEDURE — 1126F AMNT PAIN NOTED NONE PRSNT: CPT | Performed by: INTERNAL MEDICINE

## 2024-05-21 PROCEDURE — 1159F MED LIST DOCD IN RCRD: CPT | Performed by: INTERNAL MEDICINE

## 2024-05-21 PROCEDURE — 36591 DRAW BLOOD OFF VENOUS DEVICE: CPT

## 2024-05-21 PROCEDURE — 80053 COMPREHEN METABOLIC PANEL: CPT

## 2024-05-21 PROCEDURE — 84443 ASSAY THYROID STIM HORMONE: CPT | Performed by: INTERNAL MEDICINE

## 2024-05-21 PROCEDURE — 99215 OFFICE O/P EST HI 40 MIN: CPT | Performed by: INTERNAL MEDICINE

## 2024-05-21 PROCEDURE — 3077F SYST BP >= 140 MM HG: CPT | Performed by: INTERNAL MEDICINE

## 2024-05-21 PROCEDURE — 3078F DIAST BP <80 MM HG: CPT | Performed by: INTERNAL MEDICINE

## 2024-05-21 RX ORDER — HEPARIN SODIUM (PORCINE) LOCK FLUSH IV SOLN 100 UNIT/ML 100 UNIT/ML
500 SOLUTION INTRAVENOUS AS NEEDED
Status: DISCONTINUED | OUTPATIENT
Start: 2024-05-21 | End: 2024-05-21 | Stop reason: HOSPADM

## 2024-05-21 RX ORDER — SODIUM CHLORIDE 0.9 % (FLUSH) 0.9 %
10 SYRINGE (ML) INJECTION AS NEEDED
Status: DISCONTINUED | OUTPATIENT
Start: 2024-05-21 | End: 2024-05-21 | Stop reason: HOSPADM

## 2024-05-21 RX ORDER — ALBUTEROL SULFATE 90 UG/1
2 AEROSOL, METERED RESPIRATORY (INHALATION) EVERY 4 HOURS PRN
Qty: 8 G | Refills: 2 | Status: SHIPPED | OUTPATIENT
Start: 2024-05-21

## 2024-05-21 RX ADMIN — Medication 10 ML: at 15:02

## 2024-05-21 RX ADMIN — Medication 500 UNITS: at 15:02

## 2024-06-14 ENCOUNTER — TELEPHONE (OUTPATIENT)
Dept: CARDIOLOGY | Facility: CLINIC | Age: 69
End: 2024-06-14
Payer: MEDICARE

## 2024-06-18 ENCOUNTER — TELEPHONE (OUTPATIENT)
Dept: CARDIOLOGY | Facility: HOSPITAL | Age: 69
End: 2024-06-18

## 2024-06-18 NOTE — TELEPHONE ENCOUNTER
Pt rescheduled her 1-year TAVR follow up appts.  I called pt to check in & see how she is doing. She is currently rescheduled to 9/11/24. RTRN

## 2024-06-19 ENCOUNTER — TELEPHONE (OUTPATIENT)
Dept: ENDOCRINOLOGY | Age: 69
End: 2024-06-19

## 2024-06-19 DIAGNOSIS — R73.9 HYPERGLYCEMIA: ICD-10-CM

## 2024-06-19 DIAGNOSIS — E03.9 ACQUIRED HYPOTHYROIDISM: ICD-10-CM

## 2024-06-19 DIAGNOSIS — Z29.89 IMMUNOTHERAPY: Primary | ICD-10-CM

## 2024-06-19 DIAGNOSIS — E55.9 VITAMIN D DEFICIENCY: ICD-10-CM

## 2024-06-19 NOTE — TELEPHONE ENCOUNTER
Provider: DR [id number]    Caller: DOMITILA KELLY    Relationship to Patient: SELF    Reason for Call: PATIENT WOULD LIKE TO KNOW IF SHE SHOULD MAKE FOLLOW UP BEFORE OR AFTER HER CT SCAN ON 8/6/24 ? PLEASE ADVISE

## 2024-07-02 ENCOUNTER — INFUSION (OUTPATIENT)
Dept: ONCOLOGY | Facility: HOSPITAL | Age: 69
End: 2024-07-02
Payer: MEDICARE

## 2024-07-02 DIAGNOSIS — Z45.2 ENCOUNTER FOR ADJUSTMENT OR MANAGEMENT OF VASCULAR ACCESS DEVICE: Primary | ICD-10-CM

## 2024-07-02 PROCEDURE — 25010000002 HEPARIN LOCK FLUSH PER 10 UNITS: Performed by: INTERNAL MEDICINE

## 2024-07-02 PROCEDURE — 96523 IRRIG DRUG DELIVERY DEVICE: CPT

## 2024-07-02 RX ORDER — HEPARIN SODIUM (PORCINE) LOCK FLUSH IV SOLN 100 UNIT/ML 100 UNIT/ML
500 SOLUTION INTRAVENOUS AS NEEDED
Status: DISCONTINUED | OUTPATIENT
Start: 2024-07-02 | End: 2024-07-02 | Stop reason: HOSPADM

## 2024-07-02 RX ORDER — SODIUM CHLORIDE 0.9 % (FLUSH) 0.9 %
10 SYRINGE (ML) INJECTION AS NEEDED
Status: DISCONTINUED | OUTPATIENT
Start: 2024-07-02 | End: 2024-07-02 | Stop reason: HOSPADM

## 2024-07-02 RX ADMIN — Medication 500 UNITS: at 13:41

## 2024-07-02 RX ADMIN — Medication 10 ML: at 13:38

## 2024-07-29 RX ORDER — ALBUTEROL SULFATE 90 UG/1
AEROSOL, METERED RESPIRATORY (INHALATION)
Qty: 18 G | Refills: 2 | Status: SHIPPED | OUTPATIENT
Start: 2024-07-29

## 2024-08-06 ENCOUNTER — HOSPITAL ENCOUNTER (OUTPATIENT)
Facility: HOSPITAL | Age: 69
Discharge: HOME | End: 2024-08-06
Admitting: INTERNAL MEDICINE
Payer: MEDICARE

## 2024-08-06 DIAGNOSIS — E03.9 ACQUIRED HYPOTHYROIDISM: ICD-10-CM

## 2024-08-06 DIAGNOSIS — C34.32 MALIGNANT NEOPLASM OF LOWER LOBE OF LEFT LUNG: ICD-10-CM

## 2024-08-06 PROCEDURE — 25510000001 IOPAMIDOL 61 % SOLUTION: Performed by: INTERNAL MEDICINE

## 2024-08-06 PROCEDURE — 74177 CT ABD & PELVIS W/CONTRAST: CPT

## 2024-08-06 PROCEDURE — 71260 CT THORAX DX C+: CPT

## 2024-08-06 PROCEDURE — 0 DIATRIZOATE MEGLUMINE & SODIUM PER 1 ML: Performed by: INTERNAL MEDICINE

## 2024-08-06 RX ADMIN — DIATRIZOATE MEGLUMINE AND DIATRIZOATE SODIUM 30 ML: 600; 100 SOLUTION ORAL; RECTAL at 13:45

## 2024-08-06 RX ADMIN — IOPAMIDOL 100 ML: 612 INJECTION, SOLUTION INTRAVENOUS at 14:58

## 2024-08-12 NOTE — PROGRESS NOTES
"Chief Complaint  Non-small cell lung cancer, clinical stage IIIA ([id number]), aortic stenosis, cervical spine stenosis, COPD, hepatic steatosis, diabetes mellitus    Subjective        History of Present Illness  Patient returns today in follow-up with CT scans and laboratory studies to review continuing on a course of observation/surveillance in regards to her non-small cell lung cancer.  She received treatment for her stage IIIA disease with concurrent chemoradiation with low-dose weekly carboplatin and Taxol and subsequent Durvalumab, received her final dose of Durvalumab on 4/23/2024.  Since her last visit here, she reports that overall she has done reasonably well.  She has been more active, ambulating with use of a walker at home, did require wheelchair for longer distance into the office today.  She reports that she had been doing well in terms of her respiratory status until she contracted COVID-19 infection on 7/22/2024.  Overall her symptoms have improved however she has continued with increased dyspnea on exertion beyond her previous baseline and persistent cough with some greenish sputum recently.  She has been using her inhaler more.  She does note some difficulty with allergic symptoms as well.  She maintains ECOG performance status of 1-2.  She notes a normal appetite.  Weight is stable at 217 pounds        Objective   Vital Signs:   /80   Pulse 87   Temp 98.2 °F (36.8 °C) (Skin)   Resp 18   Ht 157 cm (61.81\")   Wt 98.4 kg (217 lb)   SpO2 98%   BMI 39.93 kg/m²     Physical Exam  Constitutional:       Appearance: She is well-developed.      Comments: Patient was able to ambulate without difficulty in the office, did use a wheelchair for longer distance.   Eyes:      Conjunctiva/sclera: Conjunctivae normal.   Neck:      Thyroid: No thyromegaly.   Cardiovascular:      Rate and Rhythm: Normal rate and regular rhythm.      Heart sounds: No murmur heard.     No friction rub. No gallop. "   Pulmonary:      Effort: No respiratory distress.      Breath sounds: Wheezing present.      Comments: A few scattered rhonchi in the left base  Abdominal:      General: Bowel sounds are normal. There is no distension.      Palpations: Abdomen is soft.      Tenderness: There is no abdominal tenderness.   Musculoskeletal:         General: Swelling present.      Comments: 1+ bilateral lower extremity edema, left greater than right   Lymphadenopathy:      Head:      Right side of head: No submandibular adenopathy.      Cervical: No cervical adenopathy.      Upper Body:      Right upper body: No supraclavicular adenopathy.      Left upper body: No supraclavicular adenopathy.   Skin:     General: Skin is warm and dry.      Findings: No rash.   Neurological:      Mental Status: She is alert and oriented to person, place, and time.      Cranial Nerves: No cranial nerve deficit.      Motor: No abnormal muscle tone.      Deep Tendon Reflexes: Reflexes normal.   Psychiatric:         Behavior: Behavior normal.           Result Review : Reviewed CBC, CMP, TSH, free T4 from today.  Reviewed CT chest abdomen pelvis 8/6/2024.       Assessment and Plan     *Non-small cell lung cancer, clinical stage IIIA ([id number])  Patient has history of smoking 1 pack/day x 45 years, quit in November 2021.    Following anterior cervical corpectomy in September 2022, she experienced acute hypoxemic respiratory failure.    CT angiogram chest 9/25/2022 showed a concerning 1.4 cm left lower lobe pulmonary nodule, mediastinal lymphadenopathy with infracarinal lymph node 2.7 cm, left hilar lymph node 1.2 cm, small right hilar lymph nodes up from 1.1 cm and additional enlarged mediastinal nodes in the right paratracheal, precarinal, infracarinal spaces.  There was a wedge-shaped area of consolidation/atelectasis in the right middle lobe base, subpleural density left costophrenic sulcus felt to represent atelectasis.  It was recommended for her to  undergo subsequent PET scan by pulmonary.  The patient has had ongoing follow-up with neurosurgery and on CT scans 11/29/2022 of cervical and thoracic spine, there was concern regarding spinal instability with potential need for posterior cervical fusion.  This was scheduled in December 2022 however was delayed due to upper respiratory infection.  Patient admitted on 1/10/2023 to address multiple medical issues in order to prepare for potential neurosurgical procedure on 3/12/2023.  Patient underwent echocardiogram on 1/11/2023 with ejection fraction greater than 70%, grade 1 diastolic dysfunction, severe aortic stenosis.  Patient is being followed by cardiology with consideration of need for TAVR versus SAVR for moderate to severe aortic stenosis.  Repeat CT chest on 1/12/2023 showed slight increase in size of the left lower lobe pulmonary nodule increased from 1.4 to 1.5 cm.  Groundglass nodule peripheral left lower lobe less conspicuous and resolution of previous reticulonodular opacities right lower lobe.  There was further enlargement of mediastinal lymph nodes with right posterior paratracheal lymph node 1.4 x 2.1 up to 1.5 x 2.3 cm, subcarinal lymph node increased from 2.7 x 5.2 up to 3.2 x 5.8 cm, left hilar lymph node increased from 1.2 up to 1.4 cm, and is stable 1.7 cm left infrahilar lymph node.  There was also comment regarding hepatic steatosis with changes of chronic liver disease and stable indeterminate nodular thickening of the adrenal glands.  CT-guided lung biopsy on 1/13/2023 with pathology that showed invasive poorly differentiated non-small cell carcinoma with focal neuroendocrine differentiation.  Staining pattern and morphology favor poorly differentiated adenocarcinoma however there was focal CD56 staining, could not entirely exclude large cell neuroendocrine carcinoma.  PD-L1 35%, Caris analysis pending.  It was felt that there would be increased risk for bronchoscopy from a cardiac  standpoint due to her aortic stenosis.  Staging MRI brain 1/29/2023 showed no evidence of metastatic disease  Staging PET scan 1/26/2023 showed hypermetabolic left lower lobe pulmonary nodule, size difficult to determine on PET however on recent CT was 1.6 cm (SUV 14.4).  Mediastinal and left hilar hypermetabolic lymphadenopathy (subcarinal lymph node 3.2 cm, SUV 31.7, left paratracheal 2.2 cm lymph node SUV 12.7).  Sub-6 mm pulmonary nodule left upper lobe unchanged and below PET resolution.  Focal uptake posterior right hepatic lobe SUV 7.2 of uncertain significance.  Recommended MRI to evaluate.  Right thyroid activity SUV 5.3, recommended ultrasound.  Long segment uptake distal esophagus consistent with esophagitis.  Uptake posterior larynx, nonspecific, recommended direct visualization.  MRI abdomen 2/6/2023 with no evidence to suggest metastatic disease in the liver.  Subcentimeter nonenhancing lesions favor cyst or hemangiomas.  Patient underwent right and left heart catheterization with cardiology on 2/14/2023.  Identification of coronary artery disease, mild pulmonary hypertension, severe aortic stenosis.  Recommendation per cardiology to proceed with treatment for lung cancer and defer any intervention regarding aortic stenosis.  Concern regarding underlying neurologic issues from cervical stenosis and potential neuropathic effects from Taxol chemotherapy.  Concern regarding patient's overall performance status and ability to tolerate full dose chemotherapy.  Patient discussed at thoracic oncology tumor board with consensus to treat with weekly low-dose carboplatin concurrent with radiation therapy and omit Taxol.  Subsequent plan for 1 year durvalumab following concurrent chemoradiation.  Significant delay in initiating concurrent chemoradiation due to logistics with scheduling Mediport placement, patient canceled appointments and delayed initiation of treatment.  On 3/21/2023 initiated concurrent  chemoradiation with weekly carboplatin (AUC 2) on 2/28/2023.  Week 4 carboplatin held 4/11 and again 4/18/2023 due to thrombocytopenia  Week 4 carboplatin resumed on 4/25/2023 at reduced dose to AUC 1 due to borderline neutropenia  Chemo and radiation held beginning 5/2/2023 due to neutropenia with WBC 1.13, ANC 0.53  Resumed radiation therapy on 5/8/2023 with recovery of WBC.  Received fifth and final weekly dose of concurrent carboplatin (AUC 1) on 5/9/2023.  Patient completed radiation therapy on 5/12/2023.  CT chest abdomen pelvis 5/25/2023 with significant improvement in mediastinal and left hilar lymphadenopathy, decrease in left lower lobe nodule indicating significant response.  On 5/30/2023 initiated treatment with durvalumab 10 mg/kg every 2 weeks x 1 year.  Transitioned to durvalumab 1500 mg every 4-week dosing with cycle 4 on 7/18/2023.  TAVR CT chest abdomen pelvis 7/21/2023 with decreased left lower lobe nodule from 8 down to 6 mm, stable mediastinal lymph nodes (with the exception of 1 small superior mediastinal lymph node increased from 4 to 7 mm of unclear significance).  Upper abdominal lymph nodes with slight increase in the periportal, hepatogastric, peripancreatic regions.  Following 5 cycles durvalumab, PET scan 9/5/2023 with left lower lobe ill-defined consolidation and opacification likely representing postradiation change.  No mediastinal lymphadenopathy nor hypermetabolic activity.  Slight enlargement (0.7 up to 1.0 cm) of anterior pericardial lymph node with new hypermetabolic activity (SUV 5.5.  Stable epigastric adenopathy with mild hypermetabolic activity (SUV 4.4) unchanged from 1/26/2023 scan (although not noted on that report).  Nondisplaced, healing left seventh rib fracture.  Significance of the pericardial lymph node and epigastric lymphadenopathy unclear.  Neither area easily accessible for CT-guided biopsy after discussion with interventional radiology on 9/12/2023.  Elected to  monitor on repeat PET scan at 2-month interval.  PET scan 10/31/2023 with stable mildly hypermetabolic left lower lobe segmental consolidation, indeterminate regarding residual malignancy versus inflammatory change.  Prior epicardial lymph node decreased in size to less than 1 cm with no residual hypermetabolic activity.  Subcentimeter periceliac lymph node with slight increase in SUV from 3.9 up to 6.5, indeterminate.  Plan 3-month interval PET scan  PET scan 1/16/2024 with decrease in extent of left lower lobe irregular nodular pulmonary opacification, SUV decreased slightly from 3.9 down to 3.3.  Stable borderline epigastric, mesenteric, aortocaval lymph nodes with stable activity.  Stable intense uptake base of tongue and floor of mouth.  Stable subcentimeter left upper lobe pulmonary nodule.  PET scan findings felt to be indeterminate, left lower lobe findings likely related to prior radiation therapy, no definitive evidence of residual or recurrent disease.  Plan repeat PET scan at 4-month interval at completion of Durvalumab.  Patient received final planned cycle of Durvalumab (cycle 14) on 4/23/2024.  PET scan following completion of Durvalumab on 5/17/2024 showed new 12 cm x 4.5 cm abnormality in the paramediastinal left lower lobe with bandlike characteristics and low-level SUV (3) consistent with evolving postradiation change.  Hypermetabolic periceliac lymph nodes unchanged (reference lymph node 1.2 cm with SUV 5).  2 hypermetabolic abdominal para-aortic lymph nodes with reference lymph node 1.2 cm, SUV 7 indeterminate, possibly reactive/inflammatory versus metastatic disease.  CT chest abdomen pelvis on 8/6/2024 showed more organized consolidation left lower lobe with appearance consistent with evolving postradiation change.  Vague micronodules left lower lobe felt to be infectious/inflammatory.  Stable retroperitoneal and gastrohepatic lymph nodes.  Patient returns today in follow-up continuing the  course of observation/surveillance after completion of concurrent chemoradiation with low-dose weekly carboplatin and Taxol followed by 1 year of Durvalumab (last cycle administered on 4/23/2024).  CT chest abdomen pelvis was reviewed from 8/6/2024 as noted above.  This appears to show evolving postradiation change in the left lung, no evidence to suggest recurrent or progressive malignancy.  Patient did experience recent COVID-19 infection, tested positive on 7/22/2024.  Her symptoms have improved however she does note a lingering cough productive of greenish sputum with slight increase in her chronic dyspnea on exertion.  Given the changes on CT scan with some punctate nodules in the left lower lobe, we will go ahead and treat her with a course of doxycycline 100 mg twice daily x 7 days.  She will notify us if the symptoms do not resolve.  We discussed repeating a further CT chest abdomen pelvis in 3 months and I will see her back at that time.  If the scan shows stable findings we will discuss transitioning to 6-month interval CT scan evaluation and note that we will plan to continue on that schedule until she is 5 years out from completion of treatment.  Patient is aware to notify us of any issues in the interval.  She would like to maintain her Mediport for now and will have port flush in 6 weeks and again in 3 months at return visit.    *Aortic stenosis  Patient underwent echocardiogram on 1/11/2023 with ejection fraction greater than 70%, grade 1 diastolic dysfunction, severe aortic stenosis.    Patient followed by cardiology with consideration of need for TAVR versus SAVR for moderate to severe aortic stenosis.  Patient underwent cardiac catheterization 2/14/2023 with identification of coronary artery disease, mild pulmonary hypertension, severe aortic stenosis.  Per cardiology, recommendation to proceed with treatment for lung cancer and defer any consideration of intervention for aortic stenosis.  Patient  did undergo TAVR on 8/1/2023  Echocardiogram on 8/2/2023 with ejection fraction 62.4%, TAVR present  Patient continues follow-up with cardiology     *Cervical spine stenosis  Patient with cervical spine stenosis with associated myelopathy.   She underwent surgery on 9/20/2022 with anterior cervical corpectomy with cage.      The patient has had ongoing follow-up with neurosurgery and on CT scans 11/29/2022 of cervical and thoracic spine, there was concern regarding spinal instability with potential need for posterior cervical fusion.  This was scheduled in December 2022 however was delayed due to upper respiratory infection.  Patient admitted on 1/10/2023 to address multiple medical issues in order to prepare for potential neurosurgical procedure on 3/12/2023.  Patient was previously having severe symptoms related to her cervical stenosis with reduced ability to ambulate previously and significant limitations in movement in her hands, dropping objects.  More recently however she has improved and has been able to walk with the use of a walker, symptoms in the upper extremities have improved and her dexterity is better.    It appears that her neurosurgical procedure will need to be delayed until we can at least complete the concurrent chemoradiation portion of her treatment.  Surgery would be feasible while continuing on immunotherapy in the future.  Neurosurgical follow-up has been placed on hold.  Patient improvement over time in mobility and symptoms in her extremities.  She does not intend to proceed with further surgery in the future.     *COPD  Patient has history of smoking 1 pack/day x 45 years, quit in November 2021.  Patient has not undergone formal PFTs  Patient being followed by pulmonary, currently using albuterol inhaler as needed  As above, patient notes recent COVID-19 infection testing positive on 7/22/2024.  Symptoms have improved however she does have some persistent mild dyspnea on exertion and  residual productive cough of greenish sputum.  We are treating her with doxycycline 100 mg twice daily x 7 days.     *Hepatic steatosis  Identified on prior scans  PET scan 1/26/2023 with questionable area of activity seen but no corresponding CT abnormality.    MRI abdomen 2/6/2023 with no evidence to suggest metastatic disease in the liver.  Subcentimeter nonenhancing lesions favor cyst or hemangiomas.  LFTs remain normal     *Diabetes mellitus    *Anxiety/depression  Patient currently continuing on Wellbutrin  mg daily, Lexapro 10 mg daily    *Right thyroid uptake on PET scan 1/26/2023  Patient was scheduled for thyroid ultrasound however did not feel that she was able to undergo the procedure due to her neck issues and therefore was canceled, consider at future date  No mention of thyroid uptake on PET scan 9/5/2023 and on 10/31/2023.  No uptake on PET scan 1/16/2024, thyroid appeared atrophic  PET scan on 5/17/2024 with no mention of activity in the thyroid    *Laryngeal uptake on PET scan 1/26/2023, subsequent PET uptake and floor of mouth and base of tongue 1/16/2024  Patient was seen by ENT on 3/28/2023, laryngoscopy showed mild edema in the posterior glottis consistent with laryngeal pharyngeal reflux, no evidence of malignancy.  No mention of laryngeal uptake on PET scan 9/5/2023 nor on 10/31/2023  PET scan 1/16/2024 showed persistent significant uptake floor of mouth and base of tongue.  The area was visualized today on exam with no abnormal findings.  She does wear dentures that do not fit properly, unclear whether this could be contributing to the activity.    Patient was referred to ENT, had outstanding balance with 1 practice, difficulty with referral to another practice and was never seen  Patient returns today in follow-up not having been seen in the interval by ENT.  Current PET scan shows no significant abnormal activity in the head or neck areas.  There are no abnormal findings on exam.  At  this time we will forego referral to ENT.    *Esophageal uptake on PET scan 1/26/2023  [place] to be consistent with esophagitis in the distal esophagus.  We will hold on referral for EGD given the multitude of additional procedures and consults required above.  No mention of esophageal uptake on PET scan 9/5/2023 nor on 10/31/2023 no oral on 1/16/2024  No mention of esophageal uptake on PET scan 5/17/2024    *Venous access  Mediport placed by Dr. Shook on 3/2/2023  We will maintain Mediport in place after completion of patient's planned treatment due to risk of recurrent disease, plan port flushes every 4 to 6 weeks.    *Nutrition/weight loss  Patient has been seen by oncology nutrition  Patient overall has lost a significant amount of weight however weight subsequently stabilized  Appetite and weight have improved.  Weight today stable at 217 pounds.    *Chronic left greater than right lower extremity edema with subsequent left inguinal/lower extremity hematoma following TAVR  Patient developed exacerbation of chronic edema in the lower extremities, left slightly greater than right.    Bilateral lower extremity Doppler on 5/9/2023 was negative, showed bilateral popliteal fluid collections.  Patient with evidence of left inguinal/left lower extremity hematoma following TAVR with ecchymosis tracking into the distal portion of the left lower extremity and ecchymosis laterally in the left hip.  Likely explains decline in hemoglobin and low haptoglobin post procedure.  PET scan 9/5/2023 with evidence of fluid collection/blood in the left inguinal region.  Edema increased, asymmetric with left greater than right.  Lower extremity Doppler 4/25/2024 with bilateral popliteal fluid collections, no evidence of DVT.  Patient with persistent mild lower extremity edema, chronic asymmetry with left greater than right    *Hypothyroidism exacerbated by immunotherapy  Hypothyroidism managed by PCP Dr. Basstet Scales  Labs on 5/30/2023  prior to beginning immunotherapy with evidence of hypothyroidism with TSH 10.5, free T4 0.54.  Levothyroxine dose increased by Dr. Brush from 125 up to 150 mcg daily  Continue to monitor thyroid function studies every 8 weeks on durvalumab  Patient with persistent borderline abnormal thyroid function studies on Durvalumab, referred to endocrinology  On 1/2/2024, significant increase in TSH to 19.7 with free T40.8.  On 1/5/2024 increased levothyroxine dose from 150 up to 175 mcg daily.  Patient is now followed by endocrinology for management of hypothyroidism  Labs on 4/23/2024 with normal TSH suppressed at 0.155 and free T4 increased to 1.94.  Levothyroxine dose decreased by endocrinology to 150 mcg daily.  Labs on 5/21/2024 with TSH 0.122 and free T4 improved into the normal range at 1.69.    Labs today with TSH 0.247, free T41.97.  We will forward labs to endocrinology in regards to any further adjustment in levothyroxine dose.  Patient with follow-up visit on 9/27/2024.    *Chemotherapy-induced leukopenia  WBC today 6.21 with normal differential    *Chemotherapy-induced thrombocytopenia  Platelet count prior to chemotherapy was normal  Platelet count has gradually improved since completion of concurrent chemoradiation  Platelet count on 11/7/2023 declined to 101,000.  Additional labs with IPF 5.5%, B12 342, folate 8.99  Labs on 3/26/2024 with B12 453, folate 12.1, IPF 3.9%, platelet count 108,000  Platelet count today normal at 141,000    *Chemotherapy-induced anemia  Hemoglobin did decline into the high 10-low 11 range on concurrent chemoradiation  Hemoglobin subsequently improved and normalized as of 7/28/2023 with hemoglobin of 13.4  Patient underwent TAVR on 8/1/2023 with significant decline in hemoglobin thereafter, reached a low of 7.1 on 8/8/2023.  Additional labs with cardiology on 8/8/2023 with iron 78, iron saturation 22%, TIBC 350.  Patient was started on oral iron although no evidence of iron  deficiency.    Labs on 8/15/2023 with hemoglobin 8.7, iron 129, ferritin 120, iron saturation 39%, TIBC 355, B12 288, folate 7.15, haptoglobin less than 10.  Oral iron discontinued as patient was constipated, had no evidence of iron deficiency.  Initiated oral B12 1000 mcg daily.  Postprocedure anemia and low haptoglobin related to left inguinal/left lower extremity hematoma.  Hemoglobin on 3/26/2024 declined to 11.8.  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  Recommended CTA exam for more definitive evaluation.  Patient was referred to vascular surgery, had to postpone the visit        PLAN:  The patient continues on a course of observation/surveillance having completed definitive treatment for stage IIIA non-small cell lung cancer.  She received concurrent chemoradiation with weekly single agent carboplatin (completed on 5/12/2023).  She subsequently received durvalumab x 1 year, final cycle received on 4/23/2024.  Continue levothyroxine currently at 150 mcg daily (dose decreased last on 4/25/2024 by endocrinology).  Forward current labs to endocrinology regarding potential need for adjustment in levothyroxine dose.  Continue oral B12 1000 mcg daily  Prescription sent today for doxycycline 100 mg twice daily x 7 days  Prescription sent today for potassium chloride 10 mEq daily  In 6 weeks port flush  MD visit in 3 months with CBC, CMP, TSH, free T4 and port flush due.  1 week prior CT chest abdomen pelvis.

## 2024-08-13 ENCOUNTER — INFUSION (OUTPATIENT)
Dept: ONCOLOGY | Facility: HOSPITAL | Age: 69
End: 2024-08-13
Payer: MEDICARE

## 2024-08-13 ENCOUNTER — OFFICE VISIT (OUTPATIENT)
Dept: ONCOLOGY | Facility: CLINIC | Age: 69
End: 2024-08-13
Payer: MEDICARE

## 2024-08-13 VITALS
DIASTOLIC BLOOD PRESSURE: 80 MMHG | SYSTOLIC BLOOD PRESSURE: 146 MMHG | TEMPERATURE: 98.2 F | BODY MASS INDEX: 39.93 KG/M2 | HEIGHT: 62 IN | RESPIRATION RATE: 18 BRPM | WEIGHT: 217 LBS | HEART RATE: 87 BPM | OXYGEN SATURATION: 98 %

## 2024-08-13 DIAGNOSIS — Z45.2 ENCOUNTER FOR ADJUSTMENT OR MANAGEMENT OF VASCULAR ACCESS DEVICE: Primary | ICD-10-CM

## 2024-08-13 DIAGNOSIS — E03.9 ACQUIRED HYPOTHYROIDISM: ICD-10-CM

## 2024-08-13 DIAGNOSIS — C34.32 MALIGNANT NEOPLASM OF LOWER LOBE OF LEFT LUNG: Primary | ICD-10-CM

## 2024-08-13 DIAGNOSIS — C34.32 MALIGNANT NEOPLASM OF LOWER LOBE OF LEFT LUNG: ICD-10-CM

## 2024-08-13 LAB
ALBUMIN SERPL-MCNC: 3.6 G/DL (ref 3.5–5.2)
ALBUMIN/GLOB SERPL: 1.2 G/DL
ALP SERPL-CCNC: 76 U/L (ref 39–117)
ALT SERPL W P-5'-P-CCNC: <5 U/L (ref 1–33)
ANION GAP SERPL CALCULATED.3IONS-SCNC: 8.5 MMOL/L (ref 5–15)
AST SERPL-CCNC: 17 U/L (ref 1–32)
BASOPHILS # BLD AUTO: 0.04 10*3/MM3 (ref 0–0.2)
BASOPHILS NFR BLD AUTO: 0.6 % (ref 0–1.5)
BILIRUB SERPL-MCNC: 0.3 MG/DL (ref 0–1.2)
BUN SERPL-MCNC: 19 MG/DL (ref 8–23)
BUN/CREAT SERPL: 19.2 (ref 7–25)
CALCIUM SPEC-SCNC: 8.8 MG/DL (ref 8.6–10.5)
CHLORIDE SERPL-SCNC: 96 MMOL/L (ref 98–107)
CO2 SERPL-SCNC: 32.5 MMOL/L (ref 22–29)
CREAT SERPL-MCNC: 0.99 MG/DL (ref 0.57–1)
DEPRECATED RDW RBC AUTO: 40.9 FL (ref 37–54)
EGFRCR SERPLBLD CKD-EPI 2021: 62.2 ML/MIN/1.73
EOSINOPHIL # BLD AUTO: 0.14 10*3/MM3 (ref 0–0.4)
EOSINOPHIL NFR BLD AUTO: 2.3 % (ref 0.3–6.2)
ERYTHROCYTE [DISTWIDTH] IN BLOOD BY AUTOMATED COUNT: 13.2 % (ref 12.3–15.4)
GLOBULIN UR ELPH-MCNC: 3 GM/DL
GLUCOSE SERPL-MCNC: 122 MG/DL (ref 65–99)
HCT VFR BLD AUTO: 41.9 % (ref 34–46.6)
HGB BLD-MCNC: 13.5 G/DL (ref 12–15.9)
IMM GRANULOCYTES # BLD AUTO: 0.02 10*3/MM3 (ref 0–0.05)
IMM GRANULOCYTES NFR BLD AUTO: 0.3 % (ref 0–0.5)
LYMPHOCYTES # BLD AUTO: 0.98 10*3/MM3 (ref 0.7–3.1)
LYMPHOCYTES NFR BLD AUTO: 15.8 % (ref 19.6–45.3)
MCH RBC QN AUTO: 27.6 PG (ref 26.6–33)
MCHC RBC AUTO-ENTMCNC: 32.2 G/DL (ref 31.5–35.7)
MCV RBC AUTO: 85.7 FL (ref 79–97)
MONOCYTES # BLD AUTO: 0.75 10*3/MM3 (ref 0.1–0.9)
MONOCYTES NFR BLD AUTO: 12.1 % (ref 5–12)
NEUTROPHILS NFR BLD AUTO: 4.28 10*3/MM3 (ref 1.7–7)
NEUTROPHILS NFR BLD AUTO: 68.9 % (ref 42.7–76)
NRBC BLD AUTO-RTO: 0 /100 WBC (ref 0–0.2)
PLATELET # BLD AUTO: 141 10*3/MM3 (ref 140–450)
PMV BLD AUTO: 10 FL (ref 6–12)
POTASSIUM SERPL-SCNC: 3.2 MMOL/L (ref 3.5–5.2)
PROT SERPL-MCNC: 6.6 G/DL (ref 6–8.5)
RBC # BLD AUTO: 4.89 10*6/MM3 (ref 3.77–5.28)
SODIUM SERPL-SCNC: 137 MMOL/L (ref 136–145)
T4 FREE SERPL-MCNC: 1.97 NG/DL (ref 0.92–1.68)
TSH SERPL DL<=0.05 MIU/L-ACNC: 0.25 UIU/ML (ref 0.27–4.2)
WBC NRBC COR # BLD AUTO: 6.21 10*3/MM3 (ref 3.4–10.8)

## 2024-08-13 PROCEDURE — 85025 COMPLETE CBC W/AUTO DIFF WBC: CPT

## 2024-08-13 PROCEDURE — 3079F DIAST BP 80-89 MM HG: CPT | Performed by: INTERNAL MEDICINE

## 2024-08-13 PROCEDURE — 1126F AMNT PAIN NOTED NONE PRSNT: CPT | Performed by: INTERNAL MEDICINE

## 2024-08-13 PROCEDURE — 1159F MED LIST DOCD IN RCRD: CPT | Performed by: INTERNAL MEDICINE

## 2024-08-13 PROCEDURE — 25010000002 HEPARIN LOCK FLUSH PER 10 UNITS: Performed by: INTERNAL MEDICINE

## 2024-08-13 PROCEDURE — 1160F RVW MEDS BY RX/DR IN RCRD: CPT | Performed by: INTERNAL MEDICINE

## 2024-08-13 PROCEDURE — 84443 ASSAY THYROID STIM HORMONE: CPT | Performed by: INTERNAL MEDICINE

## 2024-08-13 PROCEDURE — 84439 ASSAY OF FREE THYROXINE: CPT | Performed by: INTERNAL MEDICINE

## 2024-08-13 PROCEDURE — 3077F SYST BP >= 140 MM HG: CPT | Performed by: INTERNAL MEDICINE

## 2024-08-13 PROCEDURE — 99214 OFFICE O/P EST MOD 30 MIN: CPT | Performed by: INTERNAL MEDICINE

## 2024-08-13 PROCEDURE — 80053 COMPREHEN METABOLIC PANEL: CPT

## 2024-08-13 PROCEDURE — 36591 DRAW BLOOD OFF VENOUS DEVICE: CPT

## 2024-08-13 RX ORDER — HEPARIN SODIUM (PORCINE) LOCK FLUSH IV SOLN 100 UNIT/ML 100 UNIT/ML
500 SOLUTION INTRAVENOUS AS NEEDED
Status: DISCONTINUED | OUTPATIENT
Start: 2024-08-13 | End: 2024-08-13 | Stop reason: HOSPADM

## 2024-08-13 RX ORDER — DOXYCYCLINE HYCLATE 100 MG
100 TABLET ORAL 2 TIMES DAILY
Qty: 14 TABLET | Refills: 0 | Status: SHIPPED | OUTPATIENT
Start: 2024-08-13 | End: 2024-08-20

## 2024-08-13 RX ORDER — SODIUM CHLORIDE 0.9 % (FLUSH) 0.9 %
10 SYRINGE (ML) INJECTION AS NEEDED
Status: DISCONTINUED | OUTPATIENT
Start: 2024-08-13 | End: 2024-08-13 | Stop reason: HOSPADM

## 2024-08-13 RX ORDER — POTASSIUM CHLORIDE 750 MG/1
10 TABLET, FILM COATED, EXTENDED RELEASE ORAL DAILY
Qty: 30 TABLET | Refills: 11 | Status: SHIPPED | OUTPATIENT
Start: 2024-08-13

## 2024-08-13 RX ADMIN — Medication 10 ML: at 14:51

## 2024-08-13 RX ADMIN — Medication 500 UNITS: at 14:51

## 2024-08-13 NOTE — LETTER
August 13, 2024       No Recipients    Patient: Gabby Acosta   YOB: 1955   Date of Visit: 8/13/2024     Dear Mahrokh Nokhbehzaeim, MD:       Thank you for referring Gabby Acosta to me for evaluation. Below are the relevant portions of my assessment and plan of care.    If you have questions, please do not hesitate to call me. I look forward to following Gabby along with you.         Sincerely,        Carmelo Lombardi MD        CC:   No Recipients    Carmelo Lombardi Jr., MD  08/13/24 2310  Sign when Signing Visit  Chief Complaint  Non-small cell lung cancer, clinical stage IIIA ([id number]), aortic stenosis, cervical spine stenosis, COPD, hepatic steatosis, diabetes mellitus    Subjective       History of Present Illness  Patient returns today in follow-up with CT scans and laboratory studies to review continuing on a course of observation/surveillance in regards to her non-small cell lung cancer.  She received treatment for her stage IIIA disease with concurrent chemoradiation with low-dose weekly carboplatin and Taxol and subsequent Durvalumab, received her final dose of Durvalumab on 4/23/2024.  Since her last visit here, she reports that overall she has done reasonably well.  She has been more active, ambulating with use of a walker at home, did require wheelchair for longer distance into the office today.  She reports that she had been doing well in terms of her respiratory status until she contracted COVID-19 infection on 7/22/2024.  Overall her symptoms have improved however she has continued with increased dyspnea on exertion beyond her previous baseline and persistent cough with some greenish sputum recently.  She has been using her inhaler more.  She does note some difficulty with allergic symptoms as well.  She maintains ECOG performance status of 1-2.  She notes a normal appetite.  Weight is stable at 217 pounds        Objective  Vital Signs:   /80   Pulse 87   Temp 98.2 °F (36.8 °C) (Skin)   " Resp 18   Ht 157 cm (61.81\")   Wt 98.4 kg (217 lb)   SpO2 98%   BMI 39.93 kg/m²     Physical Exam  Constitutional:       Appearance: She is well-developed.      Comments: Patient was able to ambulate without difficulty in the office, did use a wheelchair for longer distance.   Eyes:      Conjunctiva/sclera: Conjunctivae normal.   Neck:      Thyroid: No thyromegaly.   Cardiovascular:      Rate and Rhythm: Normal rate and regular rhythm.      Heart sounds: No murmur heard.     No friction rub. No gallop.   Pulmonary:      Effort: No respiratory distress.      Breath sounds: Wheezing present.      Comments: A few scattered rhonchi in the left base  Abdominal:      General: Bowel sounds are normal. There is no distension.      Palpations: Abdomen is soft.      Tenderness: There is no abdominal tenderness.   Musculoskeletal:         General: Swelling present.      Comments: 1+ bilateral lower extremity edema, left greater than right   Lymphadenopathy:      Head:      Right side of head: No submandibular adenopathy.      Cervical: No cervical adenopathy.      Upper Body:      Right upper body: No supraclavicular adenopathy.      Left upper body: No supraclavicular adenopathy.   Skin:     General: Skin is warm and dry.      Findings: No rash.   Neurological:      Mental Status: She is alert and oriented to person, place, and time.      Cranial Nerves: No cranial nerve deficit.      Motor: No abnormal muscle tone.      Deep Tendon Reflexes: Reflexes normal.   Psychiatric:         Behavior: Behavior normal.           Result Review: Reviewed CBC, CMP, TSH, free T4 from today.  Reviewed CT chest abdomen pelvis 8/6/2024.       Assessment and Plan     *Non-small cell lung cancer, clinical stage IIIA ([id number])  Patient has history of smoking 1 pack/day x 45 years, quit in November 2021.    Following anterior cervical corpectomy in September 2022, she experienced acute hypoxemic respiratory failure.    CT angiogram chest " 9/25/2022 showed a concerning 1.4 cm left lower lobe pulmonary nodule, mediastinal lymphadenopathy with infracarinal lymph node 2.7 cm, left hilar lymph node 1.2 cm, small right hilar lymph nodes up from 1.1 cm and additional enlarged mediastinal nodes in the right paratracheal, precarinal, infracarinal spaces.  There was a wedge-shaped area of consolidation/atelectasis in the right middle lobe base, subpleural density left costophrenic sulcus felt to represent atelectasis.  It was recommended for her to undergo subsequent PET scan by pulmonary.  The patient has had ongoing follow-up with neurosurgery and on CT scans 11/29/2022 of cervical and thoracic spine, there was concern regarding spinal instability with potential need for posterior cervical fusion.  This was scheduled in December 2022 however was delayed due to upper respiratory infection.  Patient admitted on 1/10/2023 to address multiple medical issues in order to prepare for potential neurosurgical procedure on 3/12/2023.  Patient underwent echocardiogram on 1/11/2023 with ejection fraction greater than 70%, grade 1 diastolic dysfunction, severe aortic stenosis.  Patient is being followed by cardiology with consideration of need for TAVR versus SAVR for moderate to severe aortic stenosis.  Repeat CT chest on 1/12/2023 showed slight increase in size of the left lower lobe pulmonary nodule increased from 1.4 to 1.5 cm.  Groundglass nodule peripheral left lower lobe less conspicuous and resolution of previous reticulonodular opacities right lower lobe.  There was further enlargement of mediastinal lymph nodes with right posterior paratracheal lymph node 1.4 x 2.1 up to 1.5 x 2.3 cm, subcarinal lymph node increased from 2.7 x 5.2 up to 3.2 x 5.8 cm, left hilar lymph node increased from 1.2 up to 1.4 cm, and is stable 1.7 cm left infrahilar lymph node.  There was also comment regarding hepatic steatosis with changes of chronic liver disease and stable  indeterminate nodular thickening of the adrenal glands.  CT-guided lung biopsy on 1/13/2023 with pathology that showed invasive poorly differentiated non-small cell carcinoma with focal neuroendocrine differentiation.  Staining pattern and morphology favor poorly differentiated adenocarcinoma however there was focal CD56 staining, could not entirely exclude large cell neuroendocrine carcinoma.  PD-L1 35%, Caris analysis pending.  It was felt that there would be increased risk for bronchoscopy from a cardiac standpoint due to her aortic stenosis.  Staging MRI brain 1/29/2023 showed no evidence of metastatic disease  Staging PET scan 1/26/2023 showed hypermetabolic left lower lobe pulmonary nodule, size difficult to determine on PET however on recent CT was 1.6 cm (SUV 14.4).  Mediastinal and left hilar hypermetabolic lymphadenopathy (subcarinal lymph node 3.2 cm, SUV 31.7, left paratracheal 2.2 cm lymph node SUV 12.7).  Sub-6 mm pulmonary nodule left upper lobe unchanged and below PET resolution.  Focal uptake posterior right hepatic lobe SUV 7.2 of uncertain significance.  Recommended MRI to evaluate.  Right thyroid activity SUV 5.3, recommended ultrasound.  Long segment uptake distal esophagus consistent with esophagitis.  Uptake posterior larynx, nonspecific, recommended direct visualization.  MRI abdomen 2/6/2023 with no evidence to suggest metastatic disease in the liver.  Subcentimeter nonenhancing lesions favor cyst or hemangiomas.  Patient underwent right and left heart catheterization with cardiology on 2/14/2023.  Identification of coronary artery disease, mild pulmonary hypertension, severe aortic stenosis.  Recommendation per cardiology to proceed with treatment for lung cancer and defer any intervention regarding aortic stenosis.  Concern regarding underlying neurologic issues from cervical stenosis and potential neuropathic effects from Taxol chemotherapy.  Concern regarding patient's overall  performance status and ability to tolerate full dose chemotherapy.  Patient discussed at thoracic oncology tumor board with consensus to treat with weekly low-dose carboplatin concurrent with radiation therapy and omit Taxol.  Subsequent plan for 1 year durvalumab following concurrent chemoradiation.  Significant delay in initiating concurrent chemoradiation due to logistics with scheduling Mediport placement, patient canceled appointments and delayed initiation of treatment.  On 3/21/2023 initiated concurrent chemoradiation with weekly carboplatin (AUC 2) on 2/28/2023.  Week 4 carboplatin held 4/11 and again 4/18/2023 due to thrombocytopenia  Week 4 carboplatin resumed on 4/25/2023 at reduced dose to AUC 1 due to borderline neutropenia  Chemo and radiation held beginning 5/2/2023 due to neutropenia with WBC 1.13, ANC 0.53  Resumed radiation therapy on 5/8/2023 with recovery of WBC.  Received fifth and final weekly dose of concurrent carboplatin (AUC 1) on 5/9/2023.  Patient completed radiation therapy on 5/12/2023.  CT chest abdomen pelvis 5/25/2023 with significant improvement in mediastinal and left hilar lymphadenopathy, decrease in left lower lobe nodule indicating significant response.  On 5/30/2023 initiated treatment with durvalumab 10 mg/kg every 2 weeks x 1 year.  Transitioned to durvalumab 1500 mg every 4-week dosing with cycle 4 on 7/18/2023.  TAVR CT chest abdomen pelvis 7/21/2023 with decreased left lower lobe nodule from 8 down to 6 mm, stable mediastinal lymph nodes (with the exception of 1 small superior mediastinal lymph node increased from 4 to 7 mm of unclear significance).  Upper abdominal lymph nodes with slight increase in the periportal, hepatogastric, peripancreatic regions.  Following 5 cycles durvalumab, PET scan 9/5/2023 with left lower lobe ill-defined consolidation and opacification likely representing postradiation change.  No mediastinal lymphadenopathy nor hypermetabolic activity.   Slight enlargement (0.7 up to 1.0 cm) of anterior pericardial lymph node with new hypermetabolic activity (SUV 5.5.  Stable epigastric adenopathy with mild hypermetabolic activity (SUV 4.4) unchanged from 1/26/2023 scan (although not noted on that report).  Nondisplaced, healing left seventh rib fracture.  Significance of the pericardial lymph node and epigastric lymphadenopathy unclear.  Neither area easily accessible for CT-guided biopsy after discussion with interventional radiology on 9/12/2023.  Elected to monitor on repeat PET scan at 2-month interval.  PET scan 10/31/2023 with stable mildly hypermetabolic left lower lobe segmental consolidation, indeterminate regarding residual malignancy versus inflammatory change.  Prior epicardial lymph node decreased in size to less than 1 cm with no residual hypermetabolic activity.  Subcentimeter periceliac lymph node with slight increase in SUV from 3.9 up to 6.5, indeterminate.  Plan 3-month interval PET scan  PET scan 1/16/2024 with decrease in extent of left lower lobe irregular nodular pulmonary opacification, SUV decreased slightly from 3.9 down to 3.3.  Stable borderline epigastric, mesenteric, aortocaval lymph nodes with stable activity.  Stable intense uptake base of tongue and floor of mouth.  Stable subcentimeter left upper lobe pulmonary nodule.  PET scan findings felt to be indeterminate, left lower lobe findings likely related to prior radiation therapy, no definitive evidence of residual or recurrent disease.  Plan repeat PET scan at 4-month interval at completion of Durvalumab.  Patient received final planned cycle of Durvalumab (cycle 14) on 4/23/2024.  PET scan following completion of Durvalumab on 5/17/2024 showed new 12 cm x 4.5 cm abnormality in the paramediastinal left lower lobe with bandlike characteristics and low-level SUV (3) consistent with evolving postradiation change.  Hypermetabolic periceliac lymph nodes unchanged (reference lymph node  1.2 cm with SUV 5).  2 hypermetabolic abdominal para-aortic lymph nodes with reference lymph node 1.2 cm, SUV 7 indeterminate, possibly reactive/inflammatory versus metastatic disease.  CT chest abdomen pelvis on 8/6/2024 showed more organized consolidation left lower lobe with appearance consistent with evolving postradiation change.  Vague micronodules left lower lobe felt to be infectious/inflammatory.  Stable retroperitoneal and gastrohepatic lymph nodes.  Patient returns today in follow-up continuing the course of observation/surveillance after completion of concurrent chemoradiation with low-dose weekly carboplatin and Taxol followed by 1 year of Durvalumab (last cycle administered on 4/23/2024).  CT chest abdomen pelvis was reviewed from 8/6/2024 as noted above.  This appears to show evolving postradiation change in the left lung, no evidence to suggest recurrent or progressive malignancy.  Patient did experience recent COVID-19 infection, tested positive on 7/22/2024.  Her symptoms have improved however she does note a lingering cough productive of greenish sputum with slight increase in her chronic dyspnea on exertion.  Given the changes on CT scan with some punctate nodules in the left lower lobe, we will go ahead and treat her with a course of doxycycline 100 mg twice daily x 7 days.  She will notify us if the symptoms do not resolve.  We discussed repeating a further CT chest abdomen pelvis in 3 months and I will see her back at that time.  If the scan shows stable findings we will discuss transitioning to 6-month interval CT scan evaluation and note that we will plan to continue on that schedule until she is 5 years out from completion of treatment.  Patient is aware to notify us of any issues in the interval.  She would like to maintain her Mediport for now and will have port flush in 6 weeks and again in 3 months at return visit.    *Aortic stenosis  Patient underwent echocardiogram on 1/11/2023 with  ejection fraction greater than 70%, grade 1 diastolic dysfunction, severe aortic stenosis.    Patient followed by cardiology with consideration of need for TAVR versus SAVR for moderate to severe aortic stenosis.  Patient underwent cardiac catheterization 2/14/2023 with identification of coronary artery disease, mild pulmonary hypertension, severe aortic stenosis.  Per cardiology, recommendation to proceed with treatment for lung cancer and defer any consideration of intervention for aortic stenosis.  Patient did undergo TAVR on 8/1/2023  Echocardiogram on 8/2/2023 with ejection fraction 62.4%, TAVR present  Patient continues follow-up with cardiology     *Cervical spine stenosis  Patient with cervical spine stenosis with associated myelopathy.   She underwent surgery on 9/20/2022 with anterior cervical corpectomy with cage.      The patient has had ongoing follow-up with neurosurgery and on CT scans 11/29/2022 of cervical and thoracic spine, there was concern regarding spinal instability with potential need for posterior cervical fusion.  This was scheduled in December 2022 however was delayed due to upper respiratory infection.  Patient admitted on 1/10/2023 to address multiple medical issues in order to prepare for potential neurosurgical procedure on 3/12/2023.  Patient was previously having severe symptoms related to her cervical stenosis with reduced ability to ambulate previously and significant limitations in movement in her hands, dropping objects.  More recently however she has improved and has been able to walk with the use of a walker, symptoms in the upper extremities have improved and her dexterity is better.    It appears that her neurosurgical procedure will need to be delayed until we can at least complete the concurrent chemoradiation portion of her treatment.  Surgery would be feasible while continuing on immunotherapy in the future.  Neurosurgical follow-up has been placed on hold.  Patient  improvement over time in mobility and symptoms in her extremities.  She does not intend to proceed with further surgery in the future.     *COPD  Patient has history of smoking 1 pack/day x 45 years, quit in November 2021.  Patient has not undergone formal PFTs  Patient being followed by pulmonary, currently using albuterol inhaler as needed  As above, patient notes recent COVID-19 infection testing positive on 7/22/2024.  Symptoms have improved however she does have some persistent mild dyspnea on exertion and residual productive cough of greenish sputum.  We are treating her with doxycycline 100 mg twice daily x 7 days.     *Hepatic steatosis  Identified on prior scans  PET scan 1/26/2023 with questionable area of activity seen but no corresponding CT abnormality.    MRI abdomen 2/6/2023 with no evidence to suggest metastatic disease in the liver.  Subcentimeter nonenhancing lesions favor cyst or hemangiomas.  LFTs remain normal     *Diabetes mellitus    *Anxiety/depression  Patient currently continuing on Wellbutrin  mg daily, Lexapro 10 mg daily    *Right thyroid uptake on PET scan 1/26/2023  Patient was scheduled for thyroid ultrasound however did not feel that she was able to undergo the procedure due to her neck issues and therefore was canceled, consider at future date  No mention of thyroid uptake on PET scan 9/5/2023 and on 10/31/2023.  No uptake on PET scan 1/16/2024, thyroid appeared atrophic  PET scan on 5/17/2024 with no mention of activity in the thyroid    *Laryngeal uptake on PET scan 1/26/2023, subsequent PET uptake and floor of mouth and base of tongue 1/16/2024  Patient was seen by ENT on 3/28/2023, laryngoscopy showed mild edema in the posterior glottis consistent with laryngeal pharyngeal reflux, no evidence of malignancy.  No mention of laryngeal uptake on PET scan 9/5/2023 nor on 10/31/2023  PET scan 1/16/2024 showed persistent significant uptake floor of mouth and base of tongue.  The  area was visualized today on exam with no abnormal findings.  She does wear dentures that do not fit properly, unclear whether this could be contributing to the activity.    Patient was referred to ENT, had outstanding balance with 1 practice, difficulty with referral to another practice and was never seen  Patient returns today in follow-up not having been seen in the interval by ENT.  Current PET scan shows no significant abnormal activity in the head or neck areas.  There are no abnormal findings on exam.  At this time we will forego referral to ENT.    *Esophageal uptake on PET scan 1/26/2023  [place] to be consistent with esophagitis in the distal esophagus.  We will hold on referral for EGD given the multitude of additional procedures and consults required above.  No mention of esophageal uptake on PET scan 9/5/2023 nor on 10/31/2023 no oral on 1/16/2024  No mention of esophageal uptake on PET scan 5/17/2024    *Venous access  Mediport placed by Dr. Shook on 3/2/2023  We will maintain Mediport in place after completion of patient's planned treatment due to risk of recurrent disease, plan port flushes every 4 to 6 weeks.    *Nutrition/weight loss  Patient has been seen by oncology nutrition  Patient overall has lost a significant amount of weight however weight subsequently stabilized  Appetite and weight have improved.  Weight today stable at 217 pounds.    *Chronic left greater than right lower extremity edema with subsequent left inguinal/lower extremity hematoma following TAVR  Patient developed exacerbation of chronic edema in the lower extremities, left slightly greater than right.    Bilateral lower extremity Doppler on 5/9/2023 was negative, showed bilateral popliteal fluid collections.  Patient with evidence of left inguinal/left lower extremity hematoma following TAVR with ecchymosis tracking into the distal portion of the left lower extremity and ecchymosis laterally in the left hip.  Likely explains  decline in hemoglobin and low haptoglobin post procedure.  PET scan 9/5/2023 with evidence of fluid collection/blood in the left inguinal region.  Edema increased, asymmetric with left greater than right.  Lower extremity Doppler 4/25/2024 with bilateral popliteal fluid collections, no evidence of DVT.  Patient with persistent mild lower extremity edema, chronic asymmetry with left greater than right    *Hypothyroidism exacerbated by immunotherapy  Hypothyroidism managed by PCP Dr. Danyelle Brush  Labs on 5/30/2023 prior to beginning immunotherapy with evidence of hypothyroidism with TSH 10.5, free T4 0.54.  Levothyroxine dose increased by Dr. Brush from 125 up to 150 mcg daily  Continue to monitor thyroid function studies every 8 weeks on durvalumab  Patient with persistent borderline abnormal thyroid function studies on Durvalumab, referred to endocrinology  On 1/2/2024, significant increase in TSH to 19.7 with free T40.8.  On 1/5/2024 increased levothyroxine dose from 150 up to 175 mcg daily.  Patient is now followed by endocrinology for management of hypothyroidism  Labs on 4/23/2024 with normal TSH suppressed at 0.155 and free T4 increased to 1.94.  Levothyroxine dose decreased by endocrinology to 150 mcg daily.  Labs on 5/21/2024 with TSH 0.122 and free T4 improved into the normal range at 1.69.    Labs today with TSH 0.247, free T41.97.  We will forward labs to endocrinology in regards to any further adjustment in levothyroxine dose.  Patient with follow-up visit on 9/27/2024.    *Chemotherapy-induced leukopenia  WBC today 6.21 with normal differential    *Chemotherapy-induced thrombocytopenia  Platelet count prior to chemotherapy was normal  Platelet count has gradually improved since completion of concurrent chemoradiation  Platelet count on 11/7/2023 declined to 101,000.  Additional labs with IPF 5.5%, B12 342, folate 8.99  Labs on 3/26/2024 with B12 453, folate 12.1, IPF 3.9%, platelet count  108,000  Platelet count today normal at 141,000    *Chemotherapy-induced anemia  Hemoglobin did decline into the high 10-low 11 range on concurrent chemoradiation  Hemoglobin subsequently improved and normalized as of 7/28/2023 with hemoglobin of 13.4  Patient underwent TAVR on 8/1/2023 with significant decline in hemoglobin thereafter, reached a low of 7.1 on 8/8/2023.  Additional labs with cardiology on 8/8/2023 with iron 78, iron saturation 22%, TIBC 350.  Patient was started on oral iron although no evidence of iron deficiency.    Labs on 8/15/2023 with hemoglobin 8.7, iron 129, ferritin 120, iron saturation 39%, TIBC 355, B12 288, folate 7.15, haptoglobin less than 10.  Oral iron discontinued as patient was constipated, had no evidence of iron deficiency.  Initiated oral B12 1000 mcg daily.  Postprocedure anemia and low haptoglobin related to left inguinal/left lower extremity hematoma.  Hemoglobin on 3/26/2024 declined to 11.8.  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  Recommended CTA exam for more definitive evaluation.  Patient was referred to vascular surgery, had to postpone the visit        PLAN:  The patient continues on a course of observation/surveillance having completed definitive treatment for stage IIIA non-small cell lung cancer.  She received concurrent chemoradiation with weekly single agent carboplatin (completed on 5/12/2023).  She subsequently received durvalumab x 1 year, final cycle received on 4/23/2024.  Continue levothyroxine currently at 150 mcg daily (dose decreased last on 4/25/2024 by endocrinology).  Forward current labs to endocrinology regarding potential need for adjustment in levothyroxine dose.  Continue oral B12 1000 mcg daily  Prescription sent today for doxycycline  100 mg twice daily x 7 days  Prescription sent today for potassium chloride 10 mEq daily  In 6 weeks port flush  MD visit in 3 months with CBC, CMP, TSH, free T4 and port flush due.  1 week prior CT chest abdomen pelvis.

## 2024-08-14 ENCOUNTER — TELEPHONE (OUTPATIENT)
Dept: ENDOCRINOLOGY | Age: 69
End: 2024-08-14
Payer: MEDICARE

## 2024-08-14 DIAGNOSIS — E03.9 ACQUIRED HYPOTHYROIDISM: Primary | ICD-10-CM

## 2024-08-14 RX ORDER — LEVOTHYROXINE SODIUM 137 UG/1
137 TABLET ORAL
Qty: 90 TABLET | Refills: 1 | Status: SHIPPED | OUTPATIENT
Start: 2024-08-14

## 2024-08-14 NOTE — TELEPHONE ENCOUNTER
TFT was checked by oncologist and result was forwarded  TSH is low free T4 is 1.97  Called and discussed the results with the patient  Currently takes levothyroxine 150 mcg daily, will decrease the dose to levothyroxine 137 mcg daily and repeat labs in 6 weeks.  Patient expressed understanding of the information provided   none

## 2024-09-12 ENCOUNTER — TELEPHONE (OUTPATIENT)
Dept: CARDIOLOGY | Facility: HOSPITAL | Age: 69
End: 2024-09-12
Payer: MEDICARE

## 2024-09-12 NOTE — TELEPHONE ENCOUNTER
Gabby returned my call. She denies any SOA, reports minimal fatigue that limits her, and edema that is controlled with her diuretics. She reports her main limiting factor is due to her neck issues. She also states she had COVID in July which she has recovered well. She was unable to keep her appts on 9/11 & her TTE is rescheduled to 9/20/24. I have requested an appt with Dr Mendoza as well for a check up. [facility] completed with pt. RTRN

## 2024-09-16 RX ORDER — ALBUTEROL SULFATE 90 UG/1
AEROSOL, METERED RESPIRATORY (INHALATION)
Qty: 18 G | Refills: 2 | Status: SHIPPED | OUTPATIENT
Start: 2024-09-16

## 2024-09-19 ENCOUNTER — TELEPHONE (OUTPATIENT)
Dept: CARDIOLOGY | Facility: CLINIC | Age: 69
End: 2024-09-19

## 2024-09-20 ENCOUNTER — HOSPITAL ENCOUNTER (OUTPATIENT)
Dept: CARDIOLOGY | Facility: HOSPITAL | Age: 69
Discharge: HOME | End: 2024-09-20
Payer: MEDICARE

## 2024-09-20 VITALS
DIASTOLIC BLOOD PRESSURE: 80 MMHG | SYSTOLIC BLOOD PRESSURE: 140 MMHG | BODY MASS INDEX: 40.97 KG/M2 | HEIGHT: 61 IN | HEART RATE: 70 BPM | WEIGHT: 217 LBS

## 2024-09-20 DIAGNOSIS — Z95.2 S/P TAVR (TRANSCATHETER AORTIC VALVE REPLACEMENT): ICD-10-CM

## 2024-09-20 LAB
AORTIC DIMENSIONLESS INDEX: 0.5 (DI)
BH CV ECHO AV AORTIC VALVE AT ACCEL TIME CALCULATED: 37 MSEC
BH CV ECHO MEAS - AI P1/2T: 749.5 MSEC
BH CV ECHO MEAS - AO MAX PG: 24.8 MMHG
BH CV ECHO MEAS - AO MEAN PG: 14.3 MMHG
BH CV ECHO MEAS - AO ROOT DIAM: 3 CM
BH CV ECHO MEAS - AO V2 MAX: 249 CM/SEC
BH CV ECHO MEAS - AO V2 VTI: 49 CM
BH CV ECHO MEAS - AT: 0.04 SEC
BH CV ECHO MEAS - AVA(I,D): 1.37 CM2
BH CV ECHO MEAS - EDV(CUBED): 117.6 ML
BH CV ECHO MEAS - EDV(MOD-SP2): 71 ML
BH CV ECHO MEAS - EDV(MOD-SP4): 60 ML
BH CV ECHO MEAS - EF(MOD-BP): 60.3 %
BH CV ECHO MEAS - EF(MOD-SP2): 60.6 %
BH CV ECHO MEAS - EF(MOD-SP4): 61.7 %
BH CV ECHO MEAS - ESV(CUBED): 33.7 ML
BH CV ECHO MEAS - ESV(MOD-SP2): 28 ML
BH CV ECHO MEAS - ESV(MOD-SP4): 23 ML
BH CV ECHO MEAS - FS: 34.1 %
BH CV ECHO MEAS - IVS/LVPW: 0.9 CM
BH CV ECHO MEAS - IVSD: 0.9 CM
BH CV ECHO MEAS - LAT PEAK E' VEL: 7.8 CM/SEC
BH CV ECHO MEAS - LV DIASTOLIC VOL/BSA (35-75): 30.7 CM2
BH CV ECHO MEAS - LV MASS(C)D: 164.3 GRAMS
BH CV ECHO MEAS - LV MAX PG: 4 MMHG
BH CV ECHO MEAS - LV MEAN PG: 2.49 MMHG
BH CV ECHO MEAS - LV SYSTOLIC VOL/BSA (12-30): 11.8 CM2
BH CV ECHO MEAS - LV V1 MAX: 100.1 CM/SEC
BH CV ECHO MEAS - LV V1 VTI: 23.6 CM
BH CV ECHO MEAS - LVIDD: 4.9 CM
BH CV ECHO MEAS - LVIDS: 3.2 CM
BH CV ECHO MEAS - LVOT AREA: 2.9 CM2
BH CV ECHO MEAS - LVOT DIAM: 1.91 CM
BH CV ECHO MEAS - LVPWD: 1 CM
BH CV ECHO MEAS - MED PEAK E' VEL: 9 CM/SEC
BH CV ECHO MEAS - MV A DUR: 0.16 SEC
BH CV ECHO MEAS - MV A MAX VEL: 99 CM/SEC
BH CV ECHO MEAS - MV DEC SLOPE: 313.3 CM/SEC2
BH CV ECHO MEAS - MV DEC TIME: 0.22 SEC
BH CV ECHO MEAS - MV E MAX VEL: 79.1 CM/SEC
BH CV ECHO MEAS - MV E/A: 0.8
BH CV ECHO MEAS - MV MAX PG: 4.8 MMHG
BH CV ECHO MEAS - MV MEAN PG: 2.29 MMHG
BH CV ECHO MEAS - MV P1/2T: 84 MSEC
BH CV ECHO MEAS - MV V2 VTI: 25 CM
BH CV ECHO MEAS - MVA(P1/2T): 2.6 CM2
BH CV ECHO MEAS - MVA(VTI): 2.7 CM2
BH CV ECHO MEAS - PA ACC TIME: 0.16 SEC
BH CV ECHO MEAS - PA V2 MAX: 58.7 CM/SEC
BH CV ECHO MEAS - PULM A REVS DUR: 0.15 SEC
BH CV ECHO MEAS - PULM A REVS VEL: 31.9 CM/SEC
BH CV ECHO MEAS - PULM DIAS VEL: 45.6 CM/SEC
BH CV ECHO MEAS - PULM S/D: 0.89
BH CV ECHO MEAS - PULM SYS VEL: 40.6 CM/SEC
BH CV ECHO MEAS - QP/QS: 0.37
BH CV ECHO MEAS - RAP SYSTOLE: 3 MMHG
BH CV ECHO MEAS - RV MAX PG: 0.83 MMHG
BH CV ECHO MEAS - RV V1 MAX: 45.6 CM/SEC
BH CV ECHO MEAS - RV V1 VTI: 9.1 CM
BH CV ECHO MEAS - RVOT DIAM: 1.86 CM
BH CV ECHO MEAS - RVSP: 15 MMHG
BH CV ECHO MEAS - SV(LVOT): 67.3 ML
BH CV ECHO MEAS - SV(MOD-SP2): 43 ML
BH CV ECHO MEAS - SV(MOD-SP4): 37 ML
BH CV ECHO MEAS - SV(RVOT): 24.9 ML
BH CV ECHO MEAS - SVI(LVOT): 34.4 ML/M2
BH CV ECHO MEAS - SVI(MOD-SP2): 22 ML/M2
BH CV ECHO MEAS - SVI(MOD-SP4): 18.9 ML/M2
BH CV ECHO MEAS - TAPSE (>1.6): 1.6 CM
BH CV ECHO MEAS - TR MAX PG: 11.5 MMHG
BH CV ECHO MEAS - TR MAX VEL: 169.4 CM/SEC
BH CV ECHO MEASUREMENTS AVERAGE E/E' RATIO: 9.42
BH CV XLRA - RV BASE: 3 CM
BH CV XLRA - RV LENGTH: 8 CM
BH CV XLRA - RV MID: 2.5 CM
BH CV XLRA - TDI S': 13.4 CM/SEC
LEFT ATRIUM VOLUME INDEX: 19.4 ML/M2
SINUS: 2.9 CM

## 2024-09-20 PROCEDURE — 93306 TTE W/DOPPLER COMPLETE: CPT

## 2024-09-23 ENCOUNTER — TELEPHONE (OUTPATIENT)
Age: 69
End: 2024-09-23
Payer: MEDICARE

## 2024-10-02 ENCOUNTER — INFUSION (OUTPATIENT)
Dept: ONCOLOGY | Facility: HOSPITAL | Age: 69
End: 2024-10-02
Payer: MEDICARE

## 2024-10-02 ENCOUNTER — OFFICE VISIT (OUTPATIENT)
Dept: ENDOCRINOLOGY | Age: 69
End: 2024-10-02
Payer: MEDICARE

## 2024-10-02 ENCOUNTER — LAB (OUTPATIENT)
Facility: HOSPITAL | Age: 69
End: 2024-10-02
Payer: MEDICARE

## 2024-10-02 VITALS
OXYGEN SATURATION: 91 % | DIASTOLIC BLOOD PRESSURE: 80 MMHG | TEMPERATURE: 97.7 F | HEART RATE: 67 BPM | WEIGHT: 211.4 LBS | HEIGHT: 62 IN | SYSTOLIC BLOOD PRESSURE: 150 MMHG | BODY MASS INDEX: 38.9 KG/M2

## 2024-10-02 DIAGNOSIS — Z45.2 ENCOUNTER FOR ADJUSTMENT OR MANAGEMENT OF VASCULAR ACCESS DEVICE: Primary | ICD-10-CM

## 2024-10-02 DIAGNOSIS — Z29.89 IMMUNOTHERAPY: ICD-10-CM

## 2024-10-02 DIAGNOSIS — E03.9 ACQUIRED HYPOTHYROIDISM: Primary | ICD-10-CM

## 2024-10-02 LAB
T4 FREE SERPL-MCNC: 1.58 NG/DL (ref 0.92–1.68)
TSH SERPL DL<=0.05 MIU/L-ACNC: 0.33 UIU/ML (ref 0.27–4.2)

## 2024-10-02 PROCEDURE — 25010000002 HEPARIN LOCK FLUSH PER 10 UNITS: Performed by: INTERNAL MEDICINE

## 2024-10-02 PROCEDURE — 36415 COLL VENOUS BLD VENIPUNCTURE: CPT | Performed by: STUDENT IN AN ORGANIZED HEALTH CARE EDUCATION/TRAINING PROGRAM

## 2024-10-02 PROCEDURE — 84443 ASSAY THYROID STIM HORMONE: CPT | Performed by: STUDENT IN AN ORGANIZED HEALTH CARE EDUCATION/TRAINING PROGRAM

## 2024-10-02 PROCEDURE — 84439 ASSAY OF FREE THYROXINE: CPT | Performed by: STUDENT IN AN ORGANIZED HEALTH CARE EDUCATION/TRAINING PROGRAM

## 2024-10-02 PROCEDURE — 96523 IRRIG DRUG DELIVERY DEVICE: CPT

## 2024-10-02 RX ORDER — HEPARIN SODIUM (PORCINE) LOCK FLUSH IV SOLN 100 UNIT/ML 100 UNIT/ML
500 SOLUTION INTRAVENOUS AS NEEDED
Status: DISCONTINUED | OUTPATIENT
Start: 2024-10-02 | End: 2024-10-02 | Stop reason: HOSPADM

## 2024-10-02 RX ORDER — SODIUM CHLORIDE 0.9 % (FLUSH) 0.9 %
10 SYRINGE (ML) INJECTION AS NEEDED
Status: DISCONTINUED | OUTPATIENT
Start: 2024-10-02 | End: 2024-10-02 | Stop reason: HOSPADM

## 2024-10-02 RX ADMIN — Medication 10 ML: at 12:54

## 2024-10-02 RX ADMIN — Medication 500 UNITS: at 12:54

## 2024-10-02 NOTE — ASSESSMENT & PLAN NOTE
Immunotherapy completed in May 2024  Immunotherapy might be associated with some endocrinopathies including hypothyroidism, hyperthyroidism, adrenal insufficiency and in very rare cases of type 1 diabetes  Will check a.m. cortisol and ACTH    Will continue to monitor

## 2024-10-02 NOTE — PROGRESS NOTES
"Chief Complaint  Hypothyroidism    Subjective        Gabby Acosta presents to [facility] for follow up.     History of Present Illness      Patient was diagnosed with hypothyroidism about 10 years ago.    The current dose of levothyroxine is 137 mcg daily.    Nonsmall cell lung cancer diagnosed in 2023  Completed chemotherapy and radiation in May 2023   Immunotherapy / durvalumab started in May 2023 and completed in May 2024            Component      Latest Ref Rng 8/13/2024   Glucose      65 - 99 mg/dL 122 (H)    BUN      8 - 23 mg/dL 19    Creatinine      0.57 - 1.00 mg/dL 0.99    Sodium      136 - 145 mmol/L 137    Potassium      3.5 - 5.2 mmol/L 3.2 (L)    Chloride      98 - 107 mmol/L 96 (L)    CO2      22.0 - 29.0 mmol/L 32.5 (H)    Calcium      8.6 - 10.5 mg/dL 8.8    Total Protein      6.0 - 8.5 g/dL 6.6    Albumin      3.5 - 5.2 g/dL 3.6    ALT (SGPT)      1 - 33 U/L <5    AST (SGOT)      1 - 32 U/L 17    Alkaline Phosphatase      39 - 117 U/L 76    Total Bilirubin      0.0 - 1.2 mg/dL 0.3    Globulin      gm/dL 3.0    A/G Ratio      g/dL 1.2    BUN/Creatinine Ratio      7.0 - 25.0  19.2    Anion Gap      5.0 - 15.0 mmol/L 8.5    eGFR      >60.0 mL/min/1.73 62.2    TSH Baseline      0.270 - 4.200 uIU/mL 0.247 (L)    Free T4      0.92 - 1.68 ng/dL 1.97 (H)       Legend:  (H) High  (L) Low       Objective   Vital Signs:  /80   Pulse 67   Temp 97.7 °F (36.5 °C) (Oral)   Ht 157 cm (61.81\")   Wt 95.9 kg (211 lb 6.4 oz)   SpO2 91%   BMI 38.90 kg/m²   Estimated body mass index is 38.9 kg/m² as calculated from the following:    Height as of this encounter: 157 cm (61.81\").    Weight as of this encounter: 95.9 kg (211 lb 6.4 oz).           Physical Exam  Constitutional:       Appearance: She is obese.   Eyes:      Extraocular Movements: Extraocular movements intact.   Cardiovascular:      Rate and Rhythm: Normal rate.   Abdominal:      Palpations: Abdomen is soft.      " Tenderness: There is no abdominal tenderness.   Musculoskeletal:         General: No swelling.      Cervical back: Neck supple. No tenderness.   Neurological:      Mental Status: She is alert and oriented to person, place, and time.   Psychiatric:         Mood and Affect: Mood normal.        Result Review :  The following data was reviewed by: Mahrokh Nokhbehzaeim, MD on 10/02/2024:  Common labs          4/23/2024    08:22 5/21/2024    14:55 8/13/2024    14:55   Common Labs   Glucose 121  120  122    BUN 21  18  19    Creatinine 0.87  0.94  0.99    Sodium 135  137  137    Potassium 4.4  4.1  3.2    Chloride 100  99  96    Calcium 9.4  9.0  8.8    Albumin 3.8  3.7  3.6    Total Bilirubin 0.3  0.2  0.3    Alkaline Phosphatase 71  80  76    AST (SGOT) 17  18  17    ALT (SGPT) <5  9  <5    WBC 5.17  6.18  6.21    Hemoglobin 12.7  12.8  13.5    Hematocrit 39.3  39.6  41.9    Platelets 125  152  141      CMP          4/23/2024    08:22 5/21/2024    14:55 8/13/2024    14:55   CMP   Glucose 121  120  122    BUN 21  18  19    Creatinine 0.87  0.94  0.99    EGFR 72.7  66.2  62.2    Sodium 135  137  137    Potassium 4.4  4.1  3.2    Chloride 100  99  96    Calcium 9.4  9.0  8.8    Total Protein 6.4  6.5  6.6    Albumin 3.8  3.7  3.6    Globulin 2.6  2.8  3.0    Total Bilirubin 0.3  0.2  0.3    Alkaline Phosphatase 71  80  76    AST (SGOT) 17  18  17    ALT (SGPT) <5  9  <5    Albumin/Globulin Ratio 1.5  1.3  1.2    BUN/Creatinine Ratio 24.1  19.1  19.2    Anion Gap 9.3  11.3  8.5      CMP          4/23/2024    08:22 5/21/2024    14:55 8/13/2024    14:55   CMP   Glucose 121  120  122    BUN 21  18  19    Creatinine 0.87  0.94  0.99    EGFR 72.7  66.2  62.2    Sodium 135  137  137    Potassium 4.4  4.1  3.2    Chloride 100  99  96    Calcium 9.4  9.0  8.8    Total Protein 6.4  6.5  6.6    Albumin 3.8  3.7  3.6    Globulin 2.6  2.8  3.0    Total Bilirubin 0.3  0.2  0.3    Alkaline Phosphatase 71  80  76    AST (SGOT) 17  18  17   "  ALT (SGPT) <5  9  <5    Albumin/Globulin Ratio 1.5  1.3  1.2    BUN/Creatinine Ratio 24.1  19.1  19.2    Anion Gap 9.3  11.3  8.5       TSH          4/23/2024    08:22 5/21/2024    14:55 8/13/2024    14:55   TSH   TSH 0.155  0.122  0.247       Free T4   Date Value Ref Range Status   08/13/2024 1.97 (H) 0.92 - 1.68 ng/dL Final      No results found for: \"ACTH\"   Cortisol   Date Value Ref Range Status   11/07/2023 10.60   mcg/dL Final                   Assessment and Plan   Diagnoses and all orders for this visit:    1. Acquired hypothyroidism (Primary)  Assessment & Plan:  Recheck TFT today and adjust the dose of levothyroxine accordingly    Orders:  -     TSH  -     T4, Free    2. Immunotherapy  Assessment & Plan:  Immunotherapy completed in May 2024  Immunotherapy might be associated with some endocrinopathies including hypothyroidism, hyperthyroidism, adrenal insufficiency and in very rare cases of type 1 diabetes  Will check a.m. cortisol and ACTH    Will continue to monitor    Orders:  -     Cortisol; Future  -     ACTH; Future             Follow Up   Return in about 6 months (around 4/2/2025).  Patient was given instructions and counseling regarding her condition or for health maintenance advice. Please see specific information pulled into the AVS if appropriate.       "

## 2024-10-03 ENCOUNTER — TELEPHONE (OUTPATIENT)
Dept: ENDOCRINOLOGY | Age: 69
End: 2024-10-03
Payer: MEDICARE

## 2024-10-03 NOTE — TELEPHONE ENCOUNTER
Called and left patient a message to call back.       or hub may relay message to pt.       ----- Message from Mahrokh Nokhbehzaeim sent at 10/2/2024  5:02 PM EDT -----  Please let her know thyroid function test is normal,continue the current dose of levothyroxine 137 mcg daily.  I would like to check her morning cortisol levels, she can go to the [facility] lab this week or next week.  Thanks  Dr. AGEE

## 2024-10-30 RX ORDER — METOPROLOL TARTRATE 25 MG/1
TABLET, FILM COATED ORAL
Qty: 180 TABLET | Refills: 3 | Status: SHIPPED | OUTPATIENT
Start: 2024-10-30

## 2024-10-30 NOTE — TELEPHONE ENCOUNTER
Failed Protocol     NOV 1/2/2025 (LAR)  LOV  9/6/2023 (VIOLETTE)      Plan:      Plan   Ms. Acosta is a 68 y.o. woman with past medical history notable for heart murmur since age 15, nonrheumatic aortic stenosis, COPD, diabetes, hypertension, prior tobacco use, and cervical spine stenoses with prior surgery, and recently diagnosed non-small cell lung cancer for which she is undergoing chemotherapy who presents for follow-up due to recent TAVR.  Overall she is doing great no changes are needed at this time we will plan on seeing back in 3-month.  She showed up late for her appointment and did not want to stick around for her echo we will work on rescheduling clinically valve sounds great though        Nonrheumatic aortic stenosis:  S/P 23 mm Dominik Ultra 8/1  NYHA class II symptoms  Continue aspirin  Echocardiogram today was not done patient showed up late even to her office appointment she did not want to stick around for echo we will work on rescheduling in the coming weeks.     Essential hypertension:  Blood pressure reasonably controlled continue the medical therapy         Follow up:  3 Months        Pan Mendoza MD  [facility]  09/06/23  10:47 EDT

## 2024-11-06 ENCOUNTER — HOSPITAL ENCOUNTER (OUTPATIENT)
Facility: HOSPITAL | Age: 69
Discharge: HOME | End: 2024-11-06
Admitting: INTERNAL MEDICINE
Payer: MEDICARE

## 2024-11-06 DIAGNOSIS — C34.32 MALIGNANT NEOPLASM OF LOWER LOBE OF LEFT LUNG: ICD-10-CM

## 2024-11-06 PROCEDURE — 74177 CT ABD & PELVIS W/CONTRAST: CPT

## 2024-11-06 PROCEDURE — 25510000001 IOPAMIDOL 61 % SOLUTION: Performed by: INTERNAL MEDICINE

## 2024-11-06 PROCEDURE — 0 DIATRIZOATE MEGLUMINE & SODIUM PER 1 ML: Performed by: INTERNAL MEDICINE

## 2024-11-06 PROCEDURE — 71260 CT THORAX DX C+: CPT

## 2024-11-06 RX ORDER — DIATRIZOATE MEGLUMINE AND DIATRIZOATE SODIUM 660; 100 MG/ML; MG/ML
30 SOLUTION ORAL; RECTAL
Status: COMPLETED | OUTPATIENT
Start: 2024-11-06 | End: 2024-11-06

## 2024-11-06 RX ORDER — IOPAMIDOL 612 MG/ML
100 INJECTION, SOLUTION INTRAVASCULAR
Status: COMPLETED | OUTPATIENT
Start: 2024-11-06 | End: 2024-11-06

## 2024-11-06 RX ADMIN — DIATRIZOATE MEGLUMINE AND DIATRIZOATE SODIUM 30 ML: 600; 100 SOLUTION ORAL; RECTAL at 09:05

## 2024-11-06 RX ADMIN — IOPAMIDOL 85 ML: 612 INJECTION, SOLUTION INTRAVENOUS at 10:06

## 2024-11-11 RX ORDER — AMLODIPINE BESYLATE 5 MG/1
5 TABLET ORAL DAILY
Qty: 90 TABLET | Refills: 3 | Status: SHIPPED | OUTPATIENT
Start: 2024-11-11

## 2024-11-12 RX ORDER — HEPARIN SODIUM (PORCINE) LOCK FLUSH IV SOLN 100 UNIT/ML 100 UNIT/ML
500 SOLUTION INTRAVENOUS AS NEEDED
Status: CANCELLED | OUTPATIENT
Start: 2024-11-12

## 2024-11-12 RX ORDER — SODIUM CHLORIDE 0.9 % (FLUSH) 0.9 %
10 SYRINGE (ML) INJECTION AS NEEDED
Status: CANCELLED | OUTPATIENT
Start: 2024-11-12

## 2024-11-12 NOTE — PROGRESS NOTES
"Chief Complaint  Non-small cell lung cancer, clinical stage IIIA ([id number]), aortic stenosis, cervical spine stenosis, COPD, hepatic steatosis, diabetes mellitus    Subjective        History of Present Illness  Patient returns today in follow-up with CT scans and laboratory studies to review continuing on a course of observation/surveillance in regards to her non-small cell lung cancer.  She received treatment for her stage IIIA disease with concurrent chemoradiation with low-dose weekly carboplatin and Taxol and subsequent Durvalumab, received her final dose of Durvalumab on 4/23/2024.  Since her last visit here, the patient reports that she has had some ongoing fatigue.  She feels that this may be related to boredom as she has been unable to get out of the house very much.  She cannot ride in their SUV and has to rely on other family members to take her in a more accessible vehicle.  She notes a normal appetite and has gained weight.  She has been more mobile, is in a wheelchair today but ambulates around her house with minimal difficulty, does have chronic difficulty with her balance and has to hold onto the walls when she ambulates.  She notes increasing difficulty recently with panic attacks.  She is requesting some assistance with this issue.  She does continue on Wellbutrin  mg daily, had previously been on Lexapro however stopped this somewhere around a year ago as it was not refilled by her PCP.  Panic attacks are stimulated by multiple issues, some of which are insignificant.  She does continue to smoke.        Objective   Vital Signs:   /94   Pulse 75   Temp 98.1 °F (36.7 °C) (Oral)   Resp 16   Ht 157 cm (61.81\")   Wt 97.3 kg (214 lb 9.6 oz)   SpO2 94%   BMI 39.49 kg/m²     Physical Exam  Constitutional:       Appearance: She is well-developed.      Comments: Patient remains in a wheelchair today in the office   Eyes:      Conjunctiva/sclera: Conjunctivae normal.   Neck:      Thyroid: " No thyromegaly.   Cardiovascular:      Rate and Rhythm: Normal rate and regular rhythm.      Heart sounds: No murmur heard.     No friction rub. No gallop.   Pulmonary:      Effort: No respiratory distress.      Breath sounds: Wheezing present.      Comments: Scattered wheezes  Abdominal:      General: Bowel sounds are normal. There is no distension.      Palpations: Abdomen is soft.      Tenderness: There is no abdominal tenderness.   Musculoskeletal:         General: Swelling present.      Comments: 1+ bilateral lower extremity edema, left chronically greater than right   Lymphadenopathy:      Head:      Right side of head: No submandibular adenopathy.      Cervical: No cervical adenopathy.      Upper Body:      Right upper body: No supraclavicular adenopathy.      Left upper body: No supraclavicular adenopathy.   Skin:     General: Skin is warm and dry.      Findings: No rash.   Neurological:      Mental Status: She is alert and oriented to person, place, and time.      Cranial Nerves: No cranial nerve deficit.      Motor: No abnormal muscle tone.      Deep Tendon Reflexes: Reflexes normal.   Psychiatric:         Behavior: Behavior normal.           Result Review : Reviewed CBC, CMP, TSH, free T4 from today.  Reviewed CT chest abdomen pelvis 11/6/2024.       Assessment and Plan     *Non-small cell lung cancer, clinical stage IIIA ([id number])  Patient has history of smoking 1 pack/day x 45 years, quit in November 2021.    Following anterior cervical corpectomy in September 2022, she experienced acute hypoxemic respiratory failure.    CT angiogram chest 9/25/2022 showed a concerning 1.4 cm left lower lobe pulmonary nodule, mediastinal lymphadenopathy with infracarinal lymph node 2.7 cm, left hilar lymph node 1.2 cm, small right hilar lymph nodes up from 1.1 cm and additional enlarged mediastinal nodes in the right paratracheal, precarinal, infracarinal spaces.  There was a wedge-shaped area of  consolidation/atelectasis in the right middle lobe base, subpleural density left costophrenic sulcus felt to represent atelectasis.  It was recommended for her to undergo subsequent PET scan by pulmonary.  The patient has had ongoing follow-up with neurosurgery and on CT scans 11/29/2022 of cervical and thoracic spine, there was concern regarding spinal instability with potential need for posterior cervical fusion.  This was scheduled in December 2022 however was delayed due to upper respiratory infection.  Patient admitted on 1/10/2023 to address multiple medical issues in order to prepare for potential neurosurgical procedure on 3/12/2023.  Patient underwent echocardiogram on 1/11/2023 with ejection fraction greater than 70%, grade 1 diastolic dysfunction, severe aortic stenosis.  Patient is being followed by cardiology with consideration of need for TAVR versus SAVR for moderate to severe aortic stenosis.  Repeat CT chest on 1/12/2023 showed slight increase in size of the left lower lobe pulmonary nodule increased from 1.4 to 1.5 cm.  Groundglass nodule peripheral left lower lobe less conspicuous and resolution of previous reticulonodular opacities right lower lobe.  There was further enlargement of mediastinal lymph nodes with right posterior paratracheal lymph node 1.4 x 2.1 up to 1.5 x 2.3 cm, subcarinal lymph node increased from 2.7 x 5.2 up to 3.2 x 5.8 cm, left hilar lymph node increased from 1.2 up to 1.4 cm, and is stable 1.7 cm left infrahilar lymph node.  There was also comment regarding hepatic steatosis with changes of chronic liver disease and stable indeterminate nodular thickening of the adrenal glands.  CT-guided lung biopsy on 1/13/2023 with pathology that showed invasive poorly differentiated non-small cell carcinoma with focal neuroendocrine differentiation.  Staining pattern and morphology favor poorly differentiated adenocarcinoma however there was focal CD56 staining, could not entirely  exclude large cell neuroendocrine carcinoma.  PD-L1 35%, Caris analysis pending.  It was felt that there would be increased risk for bronchoscopy from a cardiac standpoint due to her aortic stenosis.  Staging MRI brain 1/29/2023 showed no evidence of metastatic disease  Staging PET scan 1/26/2023 showed hypermetabolic left lower lobe pulmonary nodule, size difficult to determine on PET however on recent CT was 1.6 cm (SUV 14.4).  Mediastinal and left hilar hypermetabolic lymphadenopathy (subcarinal lymph node 3.2 cm, SUV 31.7, left paratracheal 2.2 cm lymph node SUV 12.7).  Sub-6 mm pulmonary nodule left upper lobe unchanged and below PET resolution.  Focal uptake posterior right hepatic lobe SUV 7.2 of uncertain significance.  Recommended MRI to evaluate.  Right thyroid activity SUV 5.3, recommended ultrasound.  Long segment uptake distal esophagus consistent with esophagitis.  Uptake posterior larynx, nonspecific, recommended direct visualization.  MRI abdomen 2/6/2023 with no evidence to suggest metastatic disease in the liver.  Subcentimeter nonenhancing lesions favor cyst or hemangiomas.  Patient underwent right and left heart catheterization with cardiology on 2/14/2023.  Identification of coronary artery disease, mild pulmonary hypertension, severe aortic stenosis.  Recommendation per cardiology to proceed with treatment for lung cancer and defer any intervention regarding aortic stenosis.  Concern regarding underlying neurologic issues from cervical stenosis and potential neuropathic effects from Taxol chemotherapy.  Concern regarding patient's overall performance status and ability to tolerate full dose chemotherapy.  Patient discussed at thoracic oncology tumor board with consensus to treat with weekly low-dose carboplatin concurrent with radiation therapy and omit Taxol.  Subsequent plan for 1 year durvalumab following concurrent chemoradiation.  Significant delay in initiating concurrent chemoradiation  due to logistics with scheduling Mediport placement, patient canceled appointments and delayed initiation of treatment.  On 3/21/2023 initiated concurrent chemoradiation with weekly carboplatin (AUC 2) on 2/28/2023.  Week 4 carboplatin held 4/11 and again 4/18/2023 due to thrombocytopenia  Week 4 carboplatin resumed on 4/25/2023 at reduced dose to AUC 1 due to borderline neutropenia  Chemo and radiation held beginning 5/2/2023 due to neutropenia with WBC 1.13, ANC 0.53  Resumed radiation therapy on 5/8/2023 with recovery of WBC.  Received fifth and final weekly dose of concurrent carboplatin (AUC 1) on 5/9/2023.  Patient completed radiation therapy on 5/12/2023.  CT chest abdomen pelvis 5/25/2023 with significant improvement in mediastinal and left hilar lymphadenopathy, decrease in left lower lobe nodule indicating significant response.  On 5/30/2023 initiated treatment with durvalumab 10 mg/kg every 2 weeks x 1 year.  Transitioned to durvalumab 1500 mg every 4-week dosing with cycle 4 on 7/18/2023.  TAVR CT chest abdomen pelvis 7/21/2023 with decreased left lower lobe nodule from 8 down to 6 mm, stable mediastinal lymph nodes (with the exception of 1 small superior mediastinal lymph node increased from 4 to 7 mm of unclear significance).  Upper abdominal lymph nodes with slight increase in the periportal, hepatogastric, peripancreatic regions.  Following 5 cycles durvalumab, PET scan 9/5/2023 with left lower lobe ill-defined consolidation and opacification likely representing postradiation change.  No mediastinal lymphadenopathy nor hypermetabolic activity.  Slight enlargement (0.7 up to 1.0 cm) of anterior pericardial lymph node with new hypermetabolic activity (SUV 5.5.  Stable epigastric adenopathy with mild hypermetabolic activity (SUV 4.4) unchanged from 1/26/2023 scan (although not noted on that report).  Nondisplaced, healing left seventh rib fracture.  Significance of the pericardial lymph node and  epigastric lymphadenopathy unclear.  Neither area easily accessible for CT-guided biopsy after discussion with interventional radiology on 9/12/2023.  Elected to monitor on repeat PET scan at 2-month interval.  PET scan 10/31/2023 with stable mildly hypermetabolic left lower lobe segmental consolidation, indeterminate regarding residual malignancy versus inflammatory change.  Prior epicardial lymph node decreased in size to less than 1 cm with no residual hypermetabolic activity.  Subcentimeter periceliac lymph node with slight increase in SUV from 3.9 up to 6.5, indeterminate.  Plan 3-month interval PET scan  PET scan 1/16/2024 with decrease in extent of left lower lobe irregular nodular pulmonary opacification, SUV decreased slightly from 3.9 down to 3.3.  Stable borderline epigastric, mesenteric, aortocaval lymph nodes with stable activity.  Stable intense uptake base of tongue and floor of mouth.  Stable subcentimeter left upper lobe pulmonary nodule.  PET scan findings felt to be indeterminate, left lower lobe findings likely related to prior radiation therapy, no definitive evidence of residual or recurrent disease.  Plan repeat PET scan at 4-month interval at completion of Durvalumab.  Patient received final planned cycle of Durvalumab (cycle 14) on 4/23/2024.  PET scan following completion of Durvalumab on 5/17/2024 showed new 12 cm x 4.5 cm abnormality in the paramediastinal left lower lobe with bandlike characteristics and low-level SUV (3) consistent with evolving postradiation change.  Hypermetabolic periceliac lymph nodes unchanged (reference lymph node 1.2 cm with SUV 5).  2 hypermetabolic abdominal para-aortic lymph nodes with reference lymph node 1.2 cm, SUV 7 indeterminate, possibly reactive/inflammatory versus metastatic disease.  CT chest abdomen pelvis on 8/6/2024 showed more organized consolidation left lower lobe with appearance consistent with evolving postradiation change.  Vague micronodules  left lower lobe felt to be infectious/inflammatory.  Stable retroperitoneal and gastrohepatic lymph nodes.  CT chest abdomen pelvis 11/6/2024 showed left perihilar consolidation extending to the left lower lobe less pronounced.  Areas of curvilinear density left lower lobe suspected related to mucous plugging and atelectasis.  Enlarging aortocaval and left periaortic lymph nodes, aortocaval lymph node increased from 1.3 up to 2.3 cm, left periaortic lymph node increased from 1.3 up to 2.1 cm.  Patient returns today in follow-up continuing the course of observation/surveillance after completion of concurrent chemoradiation with low-dose weekly carboplatin and Taxol followed by 1 year of Durvalumab (last cycle administered on 4/23/2024).  We reviewed her CT scan from 11/6/2024.  This shows decreasing size of the area of radiation-induced scar in the left lung and areas of presumed mucous plugging/atelectasis.  The only concerning finding on the scan is in regards to enlarging aortocaval and left paraparetic lymph nodes.  Lymph nodes are now above 2 cm in size, unclear whether they are reactive or could represent evidence of metastatic disease or another process.  We discussed uncertainty regarding this finding.  I recommended that we pursue PET scan for further evaluation.  I will see her back in 2 weeks to review the PET scan results and we will determine whether to pursue biopsy.  Patient expressed understanding.    *Aortic stenosis  Patient underwent echocardiogram on 1/11/2023 with ejection fraction greater than 70%, grade 1 diastolic dysfunction, severe aortic stenosis.    Patient followed by cardiology with consideration of need for TAVR versus SAVR for moderate to severe aortic stenosis.  Patient underwent cardiac catheterization 2/14/2023 with identification of coronary artery disease, mild pulmonary hypertension, severe aortic stenosis.  Per cardiology, recommendation to proceed with treatment for lung cancer  and defer any consideration of intervention for aortic stenosis.  Patient did undergo TAVR on 8/1/2023  Echocardiogram on 8/2/2023 with ejection fraction 62.4%, TAVR present  Patient continues follow-up with cardiology     *Cervical spine stenosis  Patient with cervical spine stenosis with associated myelopathy.   She underwent surgery on 9/20/2022 with anterior cervical corpectomy with cage.      The patient has had ongoing follow-up with neurosurgery and on CT scans 11/29/2022 of cervical and thoracic spine, there was concern regarding spinal instability with potential need for posterior cervical fusion.  This was scheduled in December 2022 however was delayed due to upper respiratory infection.  Patient admitted on 1/10/2023 to address multiple medical issues in order to prepare for potential neurosurgical procedure on 3/12/2023.  Patient was previously having severe symptoms related to her cervical stenosis with reduced ability to ambulate previously and significant limitations in movement in her hands, dropping objects.  More recently however she has improved and has been able to walk with the use of a walker, symptoms in the upper extremities have improved and her dexterity is better.    It appears that her neurosurgical procedure will need to be delayed until we can at least complete the concurrent chemoradiation portion of her treatment.  Surgery would be feasible while continuing on immunotherapy in the future.  Neurosurgical follow-up has been placed on hold.  Patient improvement over time in mobility and symptoms in her extremities.  She does not intend to proceed with further surgery in the future.     *COPD  Patient has history of smoking 1 pack/day x 45 years, quit in November 2021.  Patient has not undergone formal PFTs  Patient being followed by pulmonary, currently using albuterol inhaler as needed  Patient with recent stable respiratory symptoms     *Hepatic steatosis  Identified on prior  scans  PET scan 1/26/2023 with questionable area of activity seen but no corresponding CT abnormality.    MRI abdomen 2/6/2023 with no evidence to suggest metastatic disease in the liver.  Subcentimeter nonenhancing lesions favor cyst or hemangiomas.  LFTs remain normal     *Diabetes mellitus    *Anxiety/depression  Patient currently continuing on Wellbutrin  mg daily  Patient today notes that she has been experiencing increasing frequency and severity of panic attacks.  She had previously been on Lexapro in addition to Wellbutrin however notes that she has been off that medication for approximately a year as it was not refilled by her PCP.  I did suggest referral back to supportive oncology for recommendations on further treatment.  She does note that she has been treated with multiple antidepressants, some of which have been ineffective.  Consider possibly resuming Lexapro, will defer this decision to supportive oncology.    *Right thyroid uptake on PET scan 1/26/2023  Patient was scheduled for thyroid ultrasound however did not feel that she was able to undergo the procedure due to her neck issues and therefore was canceled, consider at future date  No mention of thyroid uptake on PET scan 9/5/2023 and on 10/31/2023.  No uptake on PET scan 1/16/2024, thyroid appeared atrophic  PET scan on 5/17/2024 with no mention of activity in the thyroid    *Laryngeal uptake on PET scan 1/26/2023, subsequent PET uptake and floor of mouth and base of tongue 1/16/2024  Patient was seen by ENT on 3/28/2023, laryngoscopy showed mild edema in the posterior glottis consistent with laryngeal pharyngeal reflux, no evidence of malignancy.  No mention of laryngeal uptake on PET scan 9/5/2023 nor on 10/31/2023  PET scan 1/16/2024 showed persistent significant uptake floor of mouth and base of tongue.  The area was visualized today on exam with no abnormal findings.  She does wear dentures that do not fit properly, unclear whether  this could be contributing to the activity.    Patient was referred to ENT, had outstanding balance with 1 practice, difficulty with referral to another practice and was never seen  Patient was ultimately not seen by ENT.  Subsequent PET scan with no significant abnormal activity in the head or neck areas and no abnormal findings on exam.  Elected to forego referral to ENT.    *Esophageal uptake on PET scan 1/26/2023  [place] to be consistent with esophagitis in the distal esophagus.  We will hold on referral for EGD given the multitude of additional procedures and consults required above.  No mention of esophageal uptake on PET scan 9/5/2023 nor on 10/31/2023 no oral on 1/16/2024  No mention of esophageal uptake on PET scan 5/17/2024    *Venous access  Mediport placed by Dr. Shook on 3/2/2023  We will maintain Mediport in place after completion of patient's planned treatment due to risk of recurrent disease, plan port flushes every 4 to 6 weeks.    *Nutrition/weight loss  Patient has been seen by oncology nutrition  Patient overall has lost a significant amount of weight however weight subsequently stabilized  Appetite and weight have improved.  Weight has fluctuated, currently increased to 214 pounds    *Chronic left greater than right lower extremity edema with subsequent left inguinal/lower extremity hematoma following TAVR  Patient developed exacerbation of chronic edema in the lower extremities, left slightly greater than right.    Bilateral lower extremity Doppler on 5/9/2023 was negative, showed bilateral popliteal fluid collections.  Patient with evidence of left inguinal/left lower extremity hematoma following TAVR with ecchymosis tracking into the distal portion of the left lower extremity and ecchymosis laterally in the left hip.  Likely explains decline in hemoglobin and low haptoglobin post procedure.  PET scan 9/5/2023 with evidence of fluid collection/blood in the left inguinal region.  Edema  increased, asymmetric with left greater than right.  Lower extremity Doppler 4/25/2024 with bilateral popliteal fluid collections, no evidence of DVT.  Patient with persistent mild lower extremity edema, chronic asymmetry with left greater than right.  Patient did not take her Lasix today, notes that edema is slightly increased    *Hypothyroidism exacerbated by immunotherapy  Hypothyroidism managed by PCP Dr. Danyelle Brush  Labs on 5/30/2023 prior to beginning immunotherapy with evidence of hypothyroidism with TSH 10.5, free T4 0.54.  Levothyroxine dose increased by Dr. Brush from 125 up to 150 mcg daily  Continue to monitor thyroid function studies every 8 weeks on durvalumab  Patient with persistent borderline abnormal thyroid function studies on Durvalumab, referred to endocrinology  On 1/2/2024, significant increase in TSH to 19.7 with free T40.8.  On 1/5/2024 increased levothyroxine dose from 150 up to 175 mcg daily.  Patient is now followed by endocrinology for management of hypothyroidism  Labs on 4/23/2024 with normal TSH suppressed at 0.155 and free T4 increased to 1.94.  Levothyroxine dose decreased by endocrinology to 150 mcg daily.  Labs on 5/21/2024 with TSH 0.122 and free T4 improved into the normal range at 1.69.    Labs on 8/13/2024 with TSH of 0.247 and free T4 elevated at 1.97.  Endocrinology decreased levothyroxine dose to 137 mcg daily  Labs today with TSH 1.63, free T4 of 1.44.  Patient continues follow-up with endocrinology    *Chemotherapy-induced leukopenia  WBC today 6.46 with normal differential    *Chemotherapy-induced thrombocytopenia  Platelet count prior to chemotherapy was normal  Platelet count has gradually improved since completion of concurrent chemoradiation  Platelet count on 11/7/2023 declined to 101,000.  Additional labs with IPF 5.5%, B12 342, folate 8.99  Labs on 3/26/2024 with B12 453, folate 12.1, IPF 3.9%, platelet count 108,000  Platelet count today normal at  154,000    *Chemotherapy-induced anemia  Hemoglobin did decline into the high 10-low 11 range on concurrent chemoradiation  Hemoglobin subsequently improved and normalized as of 7/28/2023 with hemoglobin of 13.4  Patient underwent TAVR on 8/1/2023 with significant decline in hemoglobin thereafter, reached a low of 7.1 on 8/8/2023.  Additional labs with cardiology on 8/8/2023 with iron 78, iron saturation 22%, TIBC 350.  Patient was started on oral iron although no evidence of iron deficiency.    Labs on 8/15/2023 with hemoglobin 8.7, iron 129, ferritin 120, iron saturation 39%, TIBC 355, B12 288, folate 7.15, haptoglobin less than 10.  Oral iron discontinued as patient was constipated, had no evidence of iron deficiency.  Initiated oral B12 1000 mcg daily.  Postprocedure anemia and low haptoglobin related to left inguinal/left lower extremity hematoma.  Hemoglobin on 3/26/2024 declined to 11.8.  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  Recommended CTA exam for more definitive evaluation.  Patient was referred to vascular surgery, had to postpone the visit        PLAN:  The patient continues on a course of observation/surveillance having completed definitive treatment for stage IIIA non-small cell lung cancer.  She received concurrent chemoradiation with weekly single agent carboplatin (completed on 5/12/2023).  She subsequently received durvalumab x 1 year, final cycle received on 4/23/2024.  Continue oral B12 1000 mcg daily  Referral to supportive oncology regarding recent panic attacks  In 1 week PET scan  In 2 weeks MD visit with CBC, CMP    I did spend 45 minutes total time caring for the patient today, time spent in review of records, face-to-face consultation, coordination of care, placement of  orders, completion of documentation.

## 2024-11-13 ENCOUNTER — OFFICE VISIT (OUTPATIENT)
Dept: ONCOLOGY | Facility: CLINIC | Age: 69
End: 2024-11-13
Payer: MEDICARE

## 2024-11-13 ENCOUNTER — INFUSION (OUTPATIENT)
Dept: ONCOLOGY | Facility: HOSPITAL | Age: 69
End: 2024-11-13
Payer: MEDICARE

## 2024-11-13 VITALS
OXYGEN SATURATION: 94 % | SYSTOLIC BLOOD PRESSURE: 175 MMHG | HEART RATE: 75 BPM | TEMPERATURE: 98.1 F | WEIGHT: 214.6 LBS | DIASTOLIC BLOOD PRESSURE: 94 MMHG | HEIGHT: 62 IN | RESPIRATION RATE: 16 BRPM | BODY MASS INDEX: 39.49 KG/M2

## 2024-11-13 DIAGNOSIS — F41.9 ANXIETY: ICD-10-CM

## 2024-11-13 DIAGNOSIS — E03.9 ACQUIRED HYPOTHYROIDISM: ICD-10-CM

## 2024-11-13 DIAGNOSIS — C34.32 MALIGNANT NEOPLASM OF LOWER LOBE OF LEFT LUNG: ICD-10-CM

## 2024-11-13 DIAGNOSIS — C34.32 MALIGNANT NEOPLASM OF LOWER LOBE OF LEFT LUNG: Primary | ICD-10-CM

## 2024-11-13 DIAGNOSIS — Z45.2 ENCOUNTER FOR ADJUSTMENT OR MANAGEMENT OF VASCULAR ACCESS DEVICE: Primary | ICD-10-CM

## 2024-11-13 LAB
ALBUMIN SERPL-MCNC: 3.9 G/DL (ref 3.5–5.2)
ALBUMIN/GLOB SERPL: 1.3 G/DL
ALP SERPL-CCNC: 79 U/L (ref 39–117)
ALT SERPL W P-5'-P-CCNC: 10 U/L (ref 1–33)
ANION GAP SERPL CALCULATED.3IONS-SCNC: 16.5 MMOL/L (ref 5–15)
AST SERPL-CCNC: 17 U/L (ref 1–32)
BASOPHILS # BLD AUTO: 0.02 10*3/MM3 (ref 0–0.2)
BASOPHILS NFR BLD AUTO: 0.3 % (ref 0–1.5)
BILIRUB SERPL-MCNC: 0.2 MG/DL (ref 0–1.2)
BUN SERPL-MCNC: 21 MG/DL (ref 8–23)
BUN/CREAT SERPL: 21.6 (ref 7–25)
CALCIUM SPEC-SCNC: 9.2 MG/DL (ref 8.6–10.5)
CHLORIDE SERPL-SCNC: 100 MMOL/L (ref 98–107)
CO2 SERPL-SCNC: 23.5 MMOL/L (ref 22–29)
CREAT SERPL-MCNC: 0.97 MG/DL (ref 0.57–1)
DEPRECATED RDW RBC AUTO: 51.9 FL (ref 37–54)
EGFRCR SERPLBLD CKD-EPI 2021: 63.8 ML/MIN/1.73
EOSINOPHIL # BLD AUTO: 0.19 10*3/MM3 (ref 0–0.4)
EOSINOPHIL NFR BLD AUTO: 2.9 % (ref 0.3–6.2)
ERYTHROCYTE [DISTWIDTH] IN BLOOD BY AUTOMATED COUNT: 15.9 % (ref 12.3–15.4)
GLOBULIN UR ELPH-MCNC: 3 GM/DL
GLUCOSE SERPL-MCNC: 91 MG/DL (ref 65–99)
HCT VFR BLD AUTO: 43.2 % (ref 34–46.6)
HGB BLD-MCNC: 14.2 G/DL (ref 12–15.9)
IMM GRANULOCYTES # BLD AUTO: 0.02 10*3/MM3 (ref 0–0.05)
IMM GRANULOCYTES NFR BLD AUTO: 0.3 % (ref 0–0.5)
LYMPHOCYTES # BLD AUTO: 1.1 10*3/MM3 (ref 0.7–3.1)
LYMPHOCYTES NFR BLD AUTO: 17 % (ref 19.6–45.3)
MCH RBC QN AUTO: 29 PG (ref 26.6–33)
MCHC RBC AUTO-ENTMCNC: 32.9 G/DL (ref 31.5–35.7)
MCV RBC AUTO: 88.3 FL (ref 79–97)
MONOCYTES # BLD AUTO: 0.67 10*3/MM3 (ref 0.1–0.9)
MONOCYTES NFR BLD AUTO: 10.4 % (ref 5–12)
NEUTROPHILS NFR BLD AUTO: 4.46 10*3/MM3 (ref 1.7–7)
NEUTROPHILS NFR BLD AUTO: 69.1 % (ref 42.7–76)
NRBC BLD AUTO-RTO: 0 /100 WBC (ref 0–0.2)
PLATELET # BLD AUTO: 154 10*3/MM3 (ref 140–450)
PMV BLD AUTO: 10.2 FL (ref 6–12)
POTASSIUM SERPL-SCNC: 4.3 MMOL/L (ref 3.5–5.2)
PROT SERPL-MCNC: 6.9 G/DL (ref 6–8.5)
RBC # BLD AUTO: 4.89 10*6/MM3 (ref 3.77–5.28)
SODIUM SERPL-SCNC: 140 MMOL/L (ref 136–145)
T4 FREE SERPL-MCNC: 1.44 NG/DL (ref 0.92–1.68)
TSH SERPL DL<=0.05 MIU/L-ACNC: 1.63 UIU/ML (ref 0.27–4.2)
WBC NRBC COR # BLD AUTO: 6.46 10*3/MM3 (ref 3.4–10.8)

## 2024-11-13 PROCEDURE — 84439 ASSAY OF FREE THYROXINE: CPT | Performed by: INTERNAL MEDICINE

## 2024-11-13 PROCEDURE — 80053 COMPREHEN METABOLIC PANEL: CPT

## 2024-11-13 PROCEDURE — 84443 ASSAY THYROID STIM HORMONE: CPT | Performed by: INTERNAL MEDICINE

## 2024-11-13 PROCEDURE — 85025 COMPLETE CBC W/AUTO DIFF WBC: CPT

## 2024-11-13 PROCEDURE — 36591 DRAW BLOOD OFF VENOUS DEVICE: CPT

## 2024-11-13 PROCEDURE — 25010000002 HEPARIN LOCK FLUSH PER 10 UNITS: Performed by: INTERNAL MEDICINE

## 2024-11-13 RX ORDER — HEPARIN SODIUM (PORCINE) LOCK FLUSH IV SOLN 100 UNIT/ML 100 UNIT/ML
500 SOLUTION INTRAVENOUS AS NEEDED
Status: DISCONTINUED | OUTPATIENT
Start: 2024-11-13 | End: 2024-11-13 | Stop reason: HOSPADM

## 2024-11-13 RX ORDER — SODIUM CHLORIDE 0.9 % (FLUSH) 0.9 %
10 SYRINGE (ML) INJECTION AS NEEDED
OUTPATIENT
Start: 2024-11-13

## 2024-11-13 RX ORDER — HEPARIN SODIUM (PORCINE) LOCK FLUSH IV SOLN 100 UNIT/ML 100 UNIT/ML
500 SOLUTION INTRAVENOUS AS NEEDED
OUTPATIENT
Start: 2024-11-13

## 2024-11-13 RX ORDER — SODIUM CHLORIDE 0.9 % (FLUSH) 0.9 %
10 SYRINGE (ML) INJECTION AS NEEDED
Status: DISCONTINUED | OUTPATIENT
Start: 2024-11-13 | End: 2024-11-13 | Stop reason: HOSPADM

## 2024-11-13 RX ADMIN — Medication 10 ML: at 15:24

## 2024-11-13 RX ADMIN — Medication 500 UNITS: at 15:24

## 2024-11-15 RX ORDER — ALBUTEROL SULFATE 90 UG/1
INHALANT RESPIRATORY (INHALATION)
Qty: 18 G | Refills: 2 | Status: SHIPPED | OUTPATIENT
Start: 2024-11-15

## 2024-11-18 ENCOUNTER — TELEPHONE (OUTPATIENT)
Dept: PSYCHIATRY | Facility: HOSPITAL | Age: 69
End: 2024-11-18
Payer: MEDICARE

## 2024-11-18 NOTE — TELEPHONE ENCOUNTER
Patient was scheduled to see Cherelle ANAND with supportive care on 11-.  Patient called today 11- to cancel appt stating that she did not feel like she needed appt at this time but will call back if wishes to schedule in the future.  Patient has direct number to call back and schedule.[phone]

## 2024-11-20 ENCOUNTER — HOSPITAL ENCOUNTER (OUTPATIENT)
Dept: PET IMAGING | Facility: HOSPITAL | Age: 69
Discharge: HOME | End: 2024-11-20
Payer: MEDICARE

## 2024-11-20 DIAGNOSIS — C34.32 MALIGNANT NEOPLASM OF LOWER LOBE OF LEFT LUNG: ICD-10-CM

## 2024-11-20 LAB — GLUCOSE BLDC GLUCOMTR-MCNC: 110 MG/DL (ref 70–130)

## 2024-11-20 PROCEDURE — 34310000005 FLUDEOXYGLUCOSE F18 SOLUTION: Performed by: INTERNAL MEDICINE

## 2024-11-20 PROCEDURE — 78815 PET IMAGE W/CT SKULL-THIGH: CPT

## 2024-11-20 PROCEDURE — A9552 F18 FDG: HCPCS | Performed by: INTERNAL MEDICINE

## 2024-11-20 PROCEDURE — 82948 REAGENT STRIP/BLOOD GLUCOSE: CPT

## 2024-11-20 RX ADMIN — FLUDEOXYGLUCOSE F 18 1 DOSE: 200 INJECTION, SOLUTION INTRAVENOUS at 09:01

## 2024-11-26 NOTE — PROGRESS NOTES
"Chief Complaint  Non-small cell lung cancer, clinical stage IIIA ([id number]), aortic stenosis, cervical spine stenosis, COPD, hepatic steatosis, diabetes mellitus    Subjective        History of Present Illness  Returns today in short interval follow-up visit with labs and PET scan review.  She is very anxious regarding her PET scan results today.  She has no new complaints since her last visit here, does have chronic sinus congestion is unchanged.  The patient did not follow through with an appointment with supportive oncology after referral at her last visit.        Objective   Vital Signs:   /76   Pulse 80   Temp 97.4 °F (36.3 °C) (Oral)   Resp 16   Ht 157 cm (61.81\")   Wt 98.4 kg (216 lb 14.4 oz)   SpO2 96%   BMI 39.91 kg/m²     Physical Exam  Constitutional:       Appearance: She is well-developed.      Comments: Patient remains in a wheelchair today in the office   Eyes:      Conjunctiva/sclera: Conjunctivae normal.   Neck:      Thyroid: No thyromegaly.   Cardiovascular:      Rate and Rhythm: Normal rate and regular rhythm.      Heart sounds: No murmur heard.     No friction rub. No gallop.   Pulmonary:      Effort: No respiratory distress.      Breath sounds: Wheezing present.      Comments: Scattered wheezes  Abdominal:      General: Bowel sounds are normal. There is no distension.      Palpations: Abdomen is soft.      Tenderness: There is no abdominal tenderness.   Musculoskeletal:         General: Swelling present.      Comments: 1+ bilateral lower extremity edema, left chronically greater than right   Lymphadenopathy:      Head:      Right side of head: No submandibular adenopathy.      Cervical: No cervical adenopathy.      Upper Body:      Right upper body: No supraclavicular adenopathy.      Left upper body: No supraclavicular adenopathy.   Skin:     General: Skin is warm and dry.      Findings: No rash.   Neurological:      Mental Status: She is alert and oriented to person, place, and " time.      Cranial Nerves: No cranial nerve deficit.      Motor: No abnormal muscle tone.      Deep Tendon Reflexes: Reflexes normal.   Psychiatric:         Behavior: Behavior normal.       Patient was examined today, unchanged from above    Result Review : Reviewed CBC, CMP from today.  Reviewed PET scan 11/20/2024       Assessment and Plan     *Non-small cell lung cancer, clinical stage IIIA ([id number])  Patient has history of smoking 1 pack/day x 45 years, quit in November 2021.    Following anterior cervical corpectomy in September 2022, she experienced acute hypoxemic respiratory failure.    CT angiogram chest 9/25/2022 showed a concerning 1.4 cm left lower lobe pulmonary nodule, mediastinal lymphadenopathy with infracarinal lymph node 2.7 cm, left hilar lymph node 1.2 cm, small right hilar lymph nodes up from 1.1 cm and additional enlarged mediastinal nodes in the right paratracheal, precarinal, infracarinal spaces.  There was a wedge-shaped area of consolidation/atelectasis in the right middle lobe base, subpleural density left costophrenic sulcus felt to represent atelectasis.  It was recommended for her to undergo subsequent PET scan by pulmonary.  The patient has had ongoing follow-up with neurosurgery and on CT scans 11/29/2022 of cervical and thoracic spine, there was concern regarding spinal instability with potential need for posterior cervical fusion.  This was scheduled in December 2022 however was delayed due to upper respiratory infection.  Patient admitted on 1/10/2023 to address multiple medical issues in order to prepare for potential neurosurgical procedure on 3/12/2023.  Patient underwent echocardiogram on 1/11/2023 with ejection fraction greater than 70%, grade 1 diastolic dysfunction, severe aortic stenosis.  Patient is being followed by cardiology with consideration of need for TAVR versus SAVR for moderate to severe aortic stenosis.  Repeat CT chest on 1/12/2023 showed slight increase in  size of the left lower lobe pulmonary nodule increased from 1.4 to 1.5 cm.  Groundglass nodule peripheral left lower lobe less conspicuous and resolution of previous reticulonodular opacities right lower lobe.  There was further enlargement of mediastinal lymph nodes with right posterior paratracheal lymph node 1.4 x 2.1 up to 1.5 x 2.3 cm, subcarinal lymph node increased from 2.7 x 5.2 up to 3.2 x 5.8 cm, left hilar lymph node increased from 1.2 up to 1.4 cm, and is stable 1.7 cm left infrahilar lymph node.  There was also comment regarding hepatic steatosis with changes of chronic liver disease and stable indeterminate nodular thickening of the adrenal glands.  CT-guided lung biopsy on 1/13/2023 with pathology that showed invasive poorly differentiated non-small cell carcinoma with focal neuroendocrine differentiation.  Staining pattern and morphology favor poorly differentiated adenocarcinoma however there was focal CD56 staining, could not entirely exclude large cell neuroendocrine carcinoma.  PD-L1 35%, Caris analysis: PD-L1 10% (), no targetable Tatian's  It was felt that there would be increased risk for bronchoscopy from a cardiac standpoint due to her aortic stenosis.  Staging MRI brain 1/29/2023 showed no evidence of metastatic disease  Staging PET scan 1/26/2023 showed hypermetabolic left lower lobe pulmonary nodule, size difficult to determine on PET however on recent CT was 1.6 cm (SUV 14.4).  Mediastinal and left hilar hypermetabolic lymphadenopathy (subcarinal lymph node 3.2 cm, SUV 31.7, left paratracheal 2.2 cm lymph node SUV 12.7).  Sub-6 mm pulmonary nodule left upper lobe unchanged and below PET resolution.  Focal uptake posterior right hepatic lobe SUV 7.2 of uncertain significance.  Recommended MRI to evaluate.  Right thyroid activity SUV 5.3, recommended ultrasound.  Long segment uptake distal esophagus consistent with esophagitis.  Uptake posterior larynx, nonspecific, recommended direct  visualization.  MRI abdomen 2/6/2023 with no evidence to suggest metastatic disease in the liver.  Subcentimeter nonenhancing lesions favor cyst or hemangiomas.  Patient underwent right and left heart catheterization with cardiology on 2/14/2023.  Identification of coronary artery disease, mild pulmonary hypertension, severe aortic stenosis.  Recommendation per cardiology to proceed with treatment for lung cancer and defer any intervention regarding aortic stenosis.  Concern regarding underlying neurologic issues from cervical stenosis and potential neuropathic effects from Taxol chemotherapy.  Concern regarding patient's overall performance status and ability to tolerate full dose chemotherapy.  Patient discussed at thoracic oncology tumor board with consensus to treat with weekly low-dose carboplatin concurrent with radiation therapy and omit Taxol.  Subsequent plan for 1 year durvalumab following concurrent chemoradiation.  Significant delay in initiating concurrent chemoradiation due to logistics with scheduling Mediport placement, patient canceled appointments and delayed initiation of treatment.  On 3/21/2023 initiated concurrent chemoradiation with weekly carboplatin (AUC 2) on 2/28/2023.  Week 4 carboplatin held 4/11 and again 4/18/2023 due to thrombocytopenia  Week 4 carboplatin resumed on 4/25/2023 at reduced dose to AUC 1 due to borderline neutropenia  Chemo and radiation held beginning 5/2/2023 due to neutropenia with WBC 1.13, ANC 0.53  Resumed radiation therapy on 5/8/2023 with recovery of WBC.  Received fifth and final weekly dose of concurrent carboplatin (AUC 1) on 5/9/2023.  Patient completed radiation therapy on 5/12/2023.  CT chest abdomen pelvis 5/25/2023 with significant improvement in mediastinal and left hilar lymphadenopathy, decrease in left lower lobe nodule indicating significant response.  On 5/30/2023 initiated treatment with durvalumab 10 mg/kg every 2 weeks x 1 year.  Transitioned to  durvalumab 1500 mg every 4-week dosing with cycle 4 on 7/18/2023.  TAVR CT chest abdomen pelvis 7/21/2023 with decreased left lower lobe nodule from 8 down to 6 mm, stable mediastinal lymph nodes (with the exception of 1 small superior mediastinal lymph node increased from 4 to 7 mm of unclear significance).  Upper abdominal lymph nodes with slight increase in the periportal, hepatogastric, peripancreatic regions.  Following 5 cycles durvalumab, PET scan 9/5/2023 with left lower lobe ill-defined consolidation and opacification likely representing postradiation change.  No mediastinal lymphadenopathy nor hypermetabolic activity.  Slight enlargement (0.7 up to 1.0 cm) of anterior pericardial lymph node with new hypermetabolic activity (SUV 5.5.  Stable epigastric adenopathy with mild hypermetabolic activity (SUV 4.4) unchanged from 1/26/2023 scan (although not noted on that report).  Nondisplaced, healing left seventh rib fracture.  Significance of the pericardial lymph node and epigastric lymphadenopathy unclear.  Neither area easily accessible for CT-guided biopsy after discussion with interventional radiology on 9/12/2023.  Elected to monitor on repeat PET scan at 2-month interval.  PET scan 10/31/2023 with stable mildly hypermetabolic left lower lobe segmental consolidation, indeterminate regarding residual malignancy versus inflammatory change.  Prior epicardial lymph node decreased in size to less than 1 cm with no residual hypermetabolic activity.  Subcentimeter periceliac lymph node with slight increase in SUV from 3.9 up to 6.5, indeterminate.  Plan 3-month interval PET scan  PET scan 1/16/2024 with decrease in extent of left lower lobe irregular nodular pulmonary opacification, SUV decreased slightly from 3.9 down to 3.3.  Stable borderline epigastric, mesenteric, aortocaval lymph nodes with stable activity.  Stable intense uptake base of tongue and floor of mouth.  Stable subcentimeter left upper lobe  pulmonary nodule.  PET scan findings felt to be indeterminate, left lower lobe findings likely related to prior radiation therapy, no definitive evidence of residual or recurrent disease.  Plan repeat PET scan at 4-month interval at completion of Durvalumab.  Patient received final planned cycle of Durvalumab (cycle 14) on 4/23/2024.  PET scan following completion of Durvalumab on 5/17/2024 showed new 12 cm x 4.5 cm abnormality in the paramediastinal left lower lobe with bandlike characteristics and low-level SUV (3) consistent with evolving postradiation change.  Hypermetabolic periceliac lymph nodes unchanged (reference lymph node 1.2 cm with SUV 5).  2 hypermetabolic abdominal para-aortic lymph nodes with reference lymph node 1.2 cm, SUV 7 indeterminate, possibly reactive/inflammatory versus metastatic disease.  CT chest abdomen pelvis on 8/6/2024 showed more organized consolidation left lower lobe with appearance consistent with evolving postradiation change.  Vague micronodules left lower lobe felt to be infectious/inflammatory.  Stable retroperitoneal and gastrohepatic lymph nodes.  CT chest abdomen pelvis 11/6/2024 showed left perihilar consolidation extending to the left lower lobe less pronounced.  Areas of curvilinear density left lower lobe suspected related to mucous plugging and atelectasis.  Enlarging aortocaval and left periaortic lymph nodes, aortocaval lymph node increased from 1.3 up to 2.3 cm, left periaortic lymph node increased from 1.3 up to 2.1 cm.  PET scan 11/20/2024 showed masslike consolidation in the perihilar aspect left lower lobe to have decreased in size.  Small focus of uptake over left calvin mildly above other areas of consolidation, indeterminate (SUV 3.8).  Intensely hypermetabolic abdominal and pelvic lymphadenopathy.  Aortocaval lymph node index lesion near duodenum increased from 1.2 up to 1.7 cm (SUV 32.9 (, index lymph node or portal vein and inferior vena cava with increased  from 0.7 up to 1 cm with SUV 8.8.   Patient returns today in follow-up with the above PET scan to review.  The findings in the chest appear to be improving and likely represents radiation fibrosis.  The enlarging hypermetabolic abdominal lymph nodes are highly concerning for recurrent malignancy.  I did contact interventional radiology with indication that the left retroperitoneal lymph node would be accessible for CT-guided biopsy.  Patient is in agreement to proceed.  Will have biopsy scheduled next week.  We will need to allow adequate time for pathology results including PD-L1 analysis to be performed.  I will see her back in 2 to 3 weeks to review results.  If we do identify evidence of metastatic non-small cell lung cancer, we will further discuss options for palliative systemic therapy.    *Aortic stenosis  Patient underwent echocardiogram on 1/11/2023 with ejection fraction greater than 70%, grade 1 diastolic dysfunction, severe aortic stenosis.    Patient followed by cardiology with consideration of need for TAVR versus SAVR for moderate to severe aortic stenosis.  Patient underwent cardiac catheterization 2/14/2023 with identification of coronary artery disease, mild pulmonary hypertension, severe aortic stenosis.  Per cardiology, recommendation to proceed with treatment for lung cancer and defer any consideration of intervention for aortic stenosis.  Patient did undergo TAVR on 8/1/2023  Echocardiogram on 8/2/2023 with ejection fraction 62.4%, TAVR present  Patient continues follow-up with cardiology     *Cervical spine stenosis  Patient with cervical spine stenosis with associated myelopathy.   She underwent surgery on 9/20/2022 with anterior cervical corpectomy with cage.      The patient has had ongoing follow-up with neurosurgery and on CT scans 11/29/2022 of cervical and thoracic spine, there was concern regarding spinal instability with potential need for posterior cervical fusion.  This was  scheduled in December 2022 however was delayed due to upper respiratory infection.  Patient admitted on 1/10/2023 to address multiple medical issues in order to prepare for potential neurosurgical procedure on 3/12/2023.  Patient was previously having severe symptoms related to her cervical stenosis with reduced ability to ambulate previously and significant limitations in movement in her hands, dropping objects.  More recently however she has improved and has been able to walk with the use of a walker, symptoms in the upper extremities have improved and her dexterity is better.    It appears that her neurosurgical procedure will need to be delayed until we can at least complete the concurrent chemoradiation portion of her treatment.  Surgery would be feasible while continuing on immunotherapy in the future.  Neurosurgical follow-up has been placed on hold.  Patient improvement over time in mobility and symptoms in her extremities.  She does not intend to proceed with further surgery in the future.     *COPD  Patient has history of smoking 1 pack/day x 45 years, quit in November 2021.  Patient has not undergone formal PFTs  Patient being followed by pulmonary, currently using albuterol inhaler as needed  Patient with recent stable respiratory symptoms     *Hepatic steatosis  Identified on prior scans  PET scan 1/26/2023 with questionable area of activity seen but no corresponding CT abnormality.    MRI abdomen 2/6/2023 with no evidence to suggest metastatic disease in the liver.  Subcentimeter nonenhancing lesions favor cyst or hemangiomas.  LFTs remain normal     *Diabetes mellitus    *Anxiety/depression  Patient currently continuing on Wellbutrin  mg daily  Patient reported increasing frequency and severity of panic attacks.  She had previously been on Lexapro in addition to Wellbutrin however notes that she has been off Lexapro for approximately a year as it was not refilled by her PCP.  Patient was  referred back to supportive oncology for recommendations on further treatment.  Patient indicated that she has been treated with multiple antidepressants, some of which were ineffective.  Consider possibly resuming Lexapro.  Patient did not follow through with referral to supportive oncology.  She currently reports that symptoms are stable    *Right thyroid uptake on PET scan 1/26/2023  Patient was scheduled for thyroid ultrasound however did not feel that she was able to undergo the procedure due to her neck issues and therefore was canceled, consider at future date  No mention of thyroid uptake on PET scan 9/5/2023 and on 10/31/2023.  No uptake on PET scan 1/16/2024, thyroid appeared atrophic  PET scan on 5/17/2024 with no mention of activity in the thyroid  PET scan 11/20/2024 with no thyroid uptake identified    *Laryngeal uptake on PET scan 1/26/2023, subsequent PET uptake and floor of mouth and base of tongue 1/16/2024  Patient was seen by ENT on 3/28/2023, laryngoscopy showed mild edema in the posterior glottis consistent with laryngeal pharyngeal reflux, no evidence of malignancy.  No mention of laryngeal uptake on PET scan 9/5/2023 nor on 10/31/2023  PET scan 1/16/2024 showed persistent significant uptake floor of mouth and base of tongue.  The area was visualized today on exam with no abnormal findings.  She does wear dentures that do not fit properly, unclear whether this could be contributing to the activity.    Patient was referred to ENT, had outstanding balance with 1 practice, difficulty with referral to another practice and was never seen  Patient was ultimately not seen by ENT.  Subsequent PET scan with no significant abnormal activity in the head or neck areas and no abnormal findings on exam.  Elected to forego referral to ENT.    *Esophageal uptake on PET scan 1/26/2023  [place] to be consistent with esophagitis in the distal esophagus.  We will hold on referral for EGD given the multitude of  additional procedures and consults required above.  No mention of esophageal uptake on PET scan 9/5/2023 nor on 10/31/2023 no oral on 1/16/2024  No mention of esophageal uptake on PET scan 5/17/2024  No mention of uptake in the esophagus and PET scan 11/20/2024.    *Venous access  Mediport placed by Dr. Shook on 3/2/2023  We will maintain Mediport in place after completion of patient's planned treatment due to risk of recurrent disease, plan port flushes every 4 to 6 weeks.    *Nutrition/weight loss  Patient has been seen by oncology nutrition  Patient overall has lost a significant amount of weight however weight subsequently stabilized  Appetite and weight have improved.  Weight has fluctuated, currently increased to 216 pounds    *Chronic left greater than right lower extremity edema with subsequent left inguinal/lower extremity hematoma following TAVR  Patient developed exacerbation of chronic edema in the lower extremities, left slightly greater than right.    Bilateral lower extremity Doppler on 5/9/2023 was negative, showed bilateral popliteal fluid collections.  Patient with evidence of left inguinal/left lower extremity hematoma following TAVR with ecchymosis tracking into the distal portion of the left lower extremity and ecchymosis laterally in the left hip.  Likely explains decline in hemoglobin and low haptoglobin post procedure.  PET scan 9/5/2023 with evidence of fluid collection/blood in the left inguinal region.  Edema increased, asymmetric with left greater than right.  Lower extremity Doppler 4/25/2024 with bilateral popliteal fluid collections, no evidence of DVT.  Patient with persistent mild lower extremity edema, chronic asymmetry with left greater than right.      *Hypothyroidism exacerbated by immunotherapy  Hypothyroidism managed by PCP Dr. Bassett Scales  Labs on 5/30/2023 prior to beginning immunotherapy with evidence of hypothyroidism with TSH 10.5, free T4 0.54.  Levothyroxine dose  increased by Dr. Brush from 125 up to 150 mcg daily  Continue to monitor thyroid function studies every 8 weeks on durvalumab  Patient with persistent borderline abnormal thyroid function studies on Durvalumab, referred to endocrinology  On 1/2/2024, significant increase in TSH to 19.7 with free T40.8.  On 1/5/2024 increased levothyroxine dose from 150 up to 175 mcg daily.  Patient is now followed by endocrinology for management of hypothyroidism  Labs on 4/23/2024 with normal TSH suppressed at 0.155 and free T4 increased to 1.94.  Levothyroxine dose decreased by endocrinology to 150 mcg daily.  Labs on 5/21/2024 with TSH 0.122 and free T4 improved into the normal range at 1.69.    Labs on 8/13/2024 with TSH of 0.247 and free T4 elevated at 1.97.  Endocrinology decreased levothyroxine dose to 137 mcg daily  Labs on 11/13/2024 with TSH 1.63, free T4 of 1.44.  Patient continues follow-up with endocrinology    *Chemotherapy-induced leukopenia  WBC today 5.81 with normal differential    *Chemotherapy-induced thrombocytopenia  Platelet count prior to chemotherapy was normal  Platelet count has gradually improved since completion of concurrent chemoradiation  Platelet count on 11/7/2023 declined to 101,000.  Additional labs with IPF 5.5%, B12 342, folate 8.99  Labs on 3/26/2024 with B12 453, folate 12.1, IPF 3.9%, platelet count 108,000  Platelet count today borderline low at 138,000    *Chemotherapy-induced anemia  Hemoglobin did decline into the high 10-low 11 range on concurrent chemoradiation  Hemoglobin subsequently improved and normalized as of 7/28/2023 with hemoglobin of 13.4  Patient underwent TAVR on 8/1/2023 with significant decline in hemoglobin thereafter, reached a low of 7.1 on 8/8/2023.  Additional labs with cardiology on 8/8/2023 with iron 78, iron saturation 22%, TIBC 350.  Patient was started on oral iron although no evidence of iron deficiency.    Labs on 8/15/2023 with hemoglobin 8.7, iron 129,  ferritin 120, iron saturation 39%, TIBC 355, B12 288, folate 7.15, haptoglobin less than 10.  Oral iron discontinued as patient was constipated, had no evidence of iron deficiency.  Initiated oral B12 1000 mcg daily.  Postprocedure anemia and low haptoglobin related to left inguinal/left lower extremity hematoma.  Hemoglobin on 3/26/2024 declined to 11.8.  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  Recommended CTA exam for more definitive evaluation.  Patient was referred to vascular surgery, had to postpone the visit        PLAN:  The patient continues on a course of observation/surveillance having completed definitive treatment for stage IIIA non-small cell lung cancer.  She received concurrent chemoradiation with weekly single agent carboplatin (completed on 5/12/2023).  She subsequently received durvalumab x 1 year, final cycle received on 4/23/2024.  Continue oral B12 1000 mcg daily  Schedule CT-guided biopsy left hypermetabolic retroperitoneal lymph node next week to be sent for pathologic review  Patient ultimately declined referral to supportive oncology  MD visit in 2 to 3 weeks with CBC, CMP    I did spend 40 minutes total time caring for the patient today, time spent in review of records, face-to-face consultation, coordination of care, placement of orders, completion of documentation.

## 2024-11-26 NOTE — H&P (VIEW-ONLY)
"Chief Complaint  Non-small cell lung cancer, clinical stage IIIA ([id number]), aortic stenosis, cervical spine stenosis, COPD, hepatic steatosis, diabetes mellitus    Subjective        History of Present Illness  Returns today in short interval follow-up visit with labs and PET scan review.  She is very anxious regarding her PET scan results today.  She has no new complaints since her last visit here, does have chronic sinus congestion is unchanged.  The patient did not follow through with an appointment with supportive oncology after referral at her last visit.        Objective   Vital Signs:   /76   Pulse 80   Temp 97.4 °F (36.3 °C) (Oral)   Resp 16   Ht 157 cm (61.81\")   Wt 98.4 kg (216 lb 14.4 oz)   SpO2 96%   BMI 39.91 kg/m²     Physical Exam  Constitutional:       Appearance: She is well-developed.      Comments: Patient remains in a wheelchair today in the office   Eyes:      Conjunctiva/sclera: Conjunctivae normal.   Neck:      Thyroid: No thyromegaly.   Cardiovascular:      Rate and Rhythm: Normal rate and regular rhythm.      Heart sounds: No murmur heard.     No friction rub. No gallop.   Pulmonary:      Effort: No respiratory distress.      Breath sounds: Wheezing present.      Comments: Scattered wheezes  Abdominal:      General: Bowel sounds are normal. There is no distension.      Palpations: Abdomen is soft.      Tenderness: There is no abdominal tenderness.   Musculoskeletal:         General: Swelling present.      Comments: 1+ bilateral lower extremity edema, left chronically greater than right   Lymphadenopathy:      Head:      Right side of head: No submandibular adenopathy.      Cervical: No cervical adenopathy.      Upper Body:      Right upper body: No supraclavicular adenopathy.      Left upper body: No supraclavicular adenopathy.   Skin:     General: Skin is warm and dry.      Findings: No rash.   Neurological:      Mental Status: She is alert and oriented to person, place, and " time.      Cranial Nerves: No cranial nerve deficit.      Motor: No abnormal muscle tone.      Deep Tendon Reflexes: Reflexes normal.   Psychiatric:         Behavior: Behavior normal.       Patient was examined today, unchanged from above    Result Review : Reviewed CBC, CMP from today.  Reviewed PET scan 11/20/2024       Assessment and Plan     *Non-small cell lung cancer, clinical stage IIIA ([id number])  Patient has history of smoking 1 pack/day x 45 years, quit in November 2021.    Following anterior cervical corpectomy in September 2022, she experienced acute hypoxemic respiratory failure.    CT angiogram chest 9/25/2022 showed a concerning 1.4 cm left lower lobe pulmonary nodule, mediastinal lymphadenopathy with infracarinal lymph node 2.7 cm, left hilar lymph node 1.2 cm, small right hilar lymph nodes up from 1.1 cm and additional enlarged mediastinal nodes in the right paratracheal, precarinal, infracarinal spaces.  There was a wedge-shaped area of consolidation/atelectasis in the right middle lobe base, subpleural density left costophrenic sulcus felt to represent atelectasis.  It was recommended for her to undergo subsequent PET scan by pulmonary.  The patient has had ongoing follow-up with neurosurgery and on CT scans 11/29/2022 of cervical and thoracic spine, there was concern regarding spinal instability with potential need for posterior cervical fusion.  This was scheduled in December 2022 however was delayed due to upper respiratory infection.  Patient admitted on 1/10/2023 to address multiple medical issues in order to prepare for potential neurosurgical procedure on 3/12/2023.  Patient underwent echocardiogram on 1/11/2023 with ejection fraction greater than 70%, grade 1 diastolic dysfunction, severe aortic stenosis.  Patient is being followed by cardiology with consideration of need for TAVR versus SAVR for moderate to severe aortic stenosis.  Repeat CT chest on 1/12/2023 showed slight increase in  size of the left lower lobe pulmonary nodule increased from 1.4 to 1.5 cm.  Groundglass nodule peripheral left lower lobe less conspicuous and resolution of previous reticulonodular opacities right lower lobe.  There was further enlargement of mediastinal lymph nodes with right posterior paratracheal lymph node 1.4 x 2.1 up to 1.5 x 2.3 cm, subcarinal lymph node increased from 2.7 x 5.2 up to 3.2 x 5.8 cm, left hilar lymph node increased from 1.2 up to 1.4 cm, and is stable 1.7 cm left infrahilar lymph node.  There was also comment regarding hepatic steatosis with changes of chronic liver disease and stable indeterminate nodular thickening of the adrenal glands.  CT-guided lung biopsy on 1/13/2023 with pathology that showed invasive poorly differentiated non-small cell carcinoma with focal neuroendocrine differentiation.  Staining pattern and morphology favor poorly differentiated adenocarcinoma however there was focal CD56 staining, could not entirely exclude large cell neuroendocrine carcinoma.  PD-L1 35%, Caris analysis: PD-L1 10% (), no targetable Tatian's  It was felt that there would be increased risk for bronchoscopy from a cardiac standpoint due to her aortic stenosis.  Staging MRI brain 1/29/2023 showed no evidence of metastatic disease  Staging PET scan 1/26/2023 showed hypermetabolic left lower lobe pulmonary nodule, size difficult to determine on PET however on recent CT was 1.6 cm (SUV 14.4).  Mediastinal and left hilar hypermetabolic lymphadenopathy (subcarinal lymph node 3.2 cm, SUV 31.7, left paratracheal 2.2 cm lymph node SUV 12.7).  Sub-6 mm pulmonary nodule left upper lobe unchanged and below PET resolution.  Focal uptake posterior right hepatic lobe SUV 7.2 of uncertain significance.  Recommended MRI to evaluate.  Right thyroid activity SUV 5.3, recommended ultrasound.  Long segment uptake distal esophagus consistent with esophagitis.  Uptake posterior larynx, nonspecific, recommended direct  visualization.  MRI abdomen 2/6/2023 with no evidence to suggest metastatic disease in the liver.  Subcentimeter nonenhancing lesions favor cyst or hemangiomas.  Patient underwent right and left heart catheterization with cardiology on 2/14/2023.  Identification of coronary artery disease, mild pulmonary hypertension, severe aortic stenosis.  Recommendation per cardiology to proceed with treatment for lung cancer and defer any intervention regarding aortic stenosis.  Concern regarding underlying neurologic issues from cervical stenosis and potential neuropathic effects from Taxol chemotherapy.  Concern regarding patient's overall performance status and ability to tolerate full dose chemotherapy.  Patient discussed at thoracic oncology tumor board with consensus to treat with weekly low-dose carboplatin concurrent with radiation therapy and omit Taxol.  Subsequent plan for 1 year durvalumab following concurrent chemoradiation.  Significant delay in initiating concurrent chemoradiation due to logistics with scheduling Mediport placement, patient canceled appointments and delayed initiation of treatment.  On 3/21/2023 initiated concurrent chemoradiation with weekly carboplatin (AUC 2) on 2/28/2023.  Week 4 carboplatin held 4/11 and again 4/18/2023 due to thrombocytopenia  Week 4 carboplatin resumed on 4/25/2023 at reduced dose to AUC 1 due to borderline neutropenia  Chemo and radiation held beginning 5/2/2023 due to neutropenia with WBC 1.13, ANC 0.53  Resumed radiation therapy on 5/8/2023 with recovery of WBC.  Received fifth and final weekly dose of concurrent carboplatin (AUC 1) on 5/9/2023.  Patient completed radiation therapy on 5/12/2023.  CT chest abdomen pelvis 5/25/2023 with significant improvement in mediastinal and left hilar lymphadenopathy, decrease in left lower lobe nodule indicating significant response.  On 5/30/2023 initiated treatment with durvalumab 10 mg/kg every 2 weeks x 1 year.  Transitioned to  durvalumab 1500 mg every 4-week dosing with cycle 4 on 7/18/2023.  TAVR CT chest abdomen pelvis 7/21/2023 with decreased left lower lobe nodule from 8 down to 6 mm, stable mediastinal lymph nodes (with the exception of 1 small superior mediastinal lymph node increased from 4 to 7 mm of unclear significance).  Upper abdominal lymph nodes with slight increase in the periportal, hepatogastric, peripancreatic regions.  Following 5 cycles durvalumab, PET scan 9/5/2023 with left lower lobe ill-defined consolidation and opacification likely representing postradiation change.  No mediastinal lymphadenopathy nor hypermetabolic activity.  Slight enlargement (0.7 up to 1.0 cm) of anterior pericardial lymph node with new hypermetabolic activity (SUV 5.5.  Stable epigastric adenopathy with mild hypermetabolic activity (SUV 4.4) unchanged from 1/26/2023 scan (although not noted on that report).  Nondisplaced, healing left seventh rib fracture.  Significance of the pericardial lymph node and epigastric lymphadenopathy unclear.  Neither area easily accessible for CT-guided biopsy after discussion with interventional radiology on 9/12/2023.  Elected to monitor on repeat PET scan at 2-month interval.  PET scan 10/31/2023 with stable mildly hypermetabolic left lower lobe segmental consolidation, indeterminate regarding residual malignancy versus inflammatory change.  Prior epicardial lymph node decreased in size to less than 1 cm with no residual hypermetabolic activity.  Subcentimeter periceliac lymph node with slight increase in SUV from 3.9 up to 6.5, indeterminate.  Plan 3-month interval PET scan  PET scan 1/16/2024 with decrease in extent of left lower lobe irregular nodular pulmonary opacification, SUV decreased slightly from 3.9 down to 3.3.  Stable borderline epigastric, mesenteric, aortocaval lymph nodes with stable activity.  Stable intense uptake base of tongue and floor of mouth.  Stable subcentimeter left upper lobe  pulmonary nodule.  PET scan findings felt to be indeterminate, left lower lobe findings likely related to prior radiation therapy, no definitive evidence of residual or recurrent disease.  Plan repeat PET scan at 4-month interval at completion of Durvalumab.  Patient received final planned cycle of Durvalumab (cycle 14) on 4/23/2024.  PET scan following completion of Durvalumab on 5/17/2024 showed new 12 cm x 4.5 cm abnormality in the paramediastinal left lower lobe with bandlike characteristics and low-level SUV (3) consistent with evolving postradiation change.  Hypermetabolic periceliac lymph nodes unchanged (reference lymph node 1.2 cm with SUV 5).  2 hypermetabolic abdominal para-aortic lymph nodes with reference lymph node 1.2 cm, SUV 7 indeterminate, possibly reactive/inflammatory versus metastatic disease.  CT chest abdomen pelvis on 8/6/2024 showed more organized consolidation left lower lobe with appearance consistent with evolving postradiation change.  Vague micronodules left lower lobe felt to be infectious/inflammatory.  Stable retroperitoneal and gastrohepatic lymph nodes.  CT chest abdomen pelvis 11/6/2024 showed left perihilar consolidation extending to the left lower lobe less pronounced.  Areas of curvilinear density left lower lobe suspected related to mucous plugging and atelectasis.  Enlarging aortocaval and left periaortic lymph nodes, aortocaval lymph node increased from 1.3 up to 2.3 cm, left periaortic lymph node increased from 1.3 up to 2.1 cm.  PET scan 11/20/2024 showed masslike consolidation in the perihilar aspect left lower lobe to have decreased in size.  Small focus of uptake over left calvin mildly above other areas of consolidation, indeterminate (SUV 3.8).  Intensely hypermetabolic abdominal and pelvic lymphadenopathy.  Aortocaval lymph node index lesion near duodenum increased from 1.2 up to 1.7 cm (SUV 32.9 (, index lymph node or portal vein and inferior vena cava with increased  from 0.7 up to 1 cm with SUV 8.8.   Patient returns today in follow-up with the above PET scan to review.  The findings in the chest appear to be improving and likely represents radiation fibrosis.  The enlarging hypermetabolic abdominal lymph nodes are highly concerning for recurrent malignancy.  I did contact interventional radiology with indication that the left retroperitoneal lymph node would be accessible for CT-guided biopsy.  Patient is in agreement to proceed.  Will have biopsy scheduled next week.  We will need to allow adequate time for pathology results including PD-L1 analysis to be performed.  I will see her back in 2 to 3 weeks to review results.  If we do identify evidence of metastatic non-small cell lung cancer, we will further discuss options for palliative systemic therapy.    *Aortic stenosis  Patient underwent echocardiogram on 1/11/2023 with ejection fraction greater than 70%, grade 1 diastolic dysfunction, severe aortic stenosis.    Patient followed by cardiology with consideration of need for TAVR versus SAVR for moderate to severe aortic stenosis.  Patient underwent cardiac catheterization 2/14/2023 with identification of coronary artery disease, mild pulmonary hypertension, severe aortic stenosis.  Per cardiology, recommendation to proceed with treatment for lung cancer and defer any consideration of intervention for aortic stenosis.  Patient did undergo TAVR on 8/1/2023  Echocardiogram on 8/2/2023 with ejection fraction 62.4%, TAVR present  Patient continues follow-up with cardiology     *Cervical spine stenosis  Patient with cervical spine stenosis with associated myelopathy.   She underwent surgery on 9/20/2022 with anterior cervical corpectomy with cage.      The patient has had ongoing follow-up with neurosurgery and on CT scans 11/29/2022 of cervical and thoracic spine, there was concern regarding spinal instability with potential need for posterior cervical fusion.  This was  scheduled in December 2022 however was delayed due to upper respiratory infection.  Patient admitted on 1/10/2023 to address multiple medical issues in order to prepare for potential neurosurgical procedure on 3/12/2023.  Patient was previously having severe symptoms related to her cervical stenosis with reduced ability to ambulate previously and significant limitations in movement in her hands, dropping objects.  More recently however she has improved and has been able to walk with the use of a walker, symptoms in the upper extremities have improved and her dexterity is better.    It appears that her neurosurgical procedure will need to be delayed until we can at least complete the concurrent chemoradiation portion of her treatment.  Surgery would be feasible while continuing on immunotherapy in the future.  Neurosurgical follow-up has been placed on hold.  Patient improvement over time in mobility and symptoms in her extremities.  She does not intend to proceed with further surgery in the future.     *COPD  Patient has history of smoking 1 pack/day x 45 years, quit in November 2021.  Patient has not undergone formal PFTs  Patient being followed by pulmonary, currently using albuterol inhaler as needed  Patient with recent stable respiratory symptoms     *Hepatic steatosis  Identified on prior scans  PET scan 1/26/2023 with questionable area of activity seen but no corresponding CT abnormality.    MRI abdomen 2/6/2023 with no evidence to suggest metastatic disease in the liver.  Subcentimeter nonenhancing lesions favor cyst or hemangiomas.  LFTs remain normal     *Diabetes mellitus    *Anxiety/depression  Patient currently continuing on Wellbutrin  mg daily  Patient reported increasing frequency and severity of panic attacks.  She had previously been on Lexapro in addition to Wellbutrin however notes that she has been off Lexapro for approximately a year as it was not refilled by her PCP.  Patient was  referred back to supportive oncology for recommendations on further treatment.  Patient indicated that she has been treated with multiple antidepressants, some of which were ineffective.  Consider possibly resuming Lexapro.  Patient did not follow through with referral to supportive oncology.  She currently reports that symptoms are stable    *Right thyroid uptake on PET scan 1/26/2023  Patient was scheduled for thyroid ultrasound however did not feel that she was able to undergo the procedure due to her neck issues and therefore was canceled, consider at future date  No mention of thyroid uptake on PET scan 9/5/2023 and on 10/31/2023.  No uptake on PET scan 1/16/2024, thyroid appeared atrophic  PET scan on 5/17/2024 with no mention of activity in the thyroid  PET scan 11/20/2024 with no thyroid uptake identified    *Laryngeal uptake on PET scan 1/26/2023, subsequent PET uptake and floor of mouth and base of tongue 1/16/2024  Patient was seen by ENT on 3/28/2023, laryngoscopy showed mild edema in the posterior glottis consistent with laryngeal pharyngeal reflux, no evidence of malignancy.  No mention of laryngeal uptake on PET scan 9/5/2023 nor on 10/31/2023  PET scan 1/16/2024 showed persistent significant uptake floor of mouth and base of tongue.  The area was visualized today on exam with no abnormal findings.  She does wear dentures that do not fit properly, unclear whether this could be contributing to the activity.    Patient was referred to ENT, had outstanding balance with 1 practice, difficulty with referral to another practice and was never seen  Patient was ultimately not seen by ENT.  Subsequent PET scan with no significant abnormal activity in the head or neck areas and no abnormal findings on exam.  Elected to forego referral to ENT.    *Esophageal uptake on PET scan 1/26/2023  Humble to be consistent with esophagitis in the distal esophagus.  We will hold on referral for EGD given the multitude of  additional procedures and consults required above.  No mention of esophageal uptake on PET scan 9/5/2023 nor on 10/31/2023 no oral on 1/16/2024  No mention of esophageal uptake on PET scan 5/17/2024  No mention of uptake in the esophagus and PET scan 11/20/2024.    *Venous access  Mediport placed by Dr. Shook on 3/2/2023  We will maintain Mediport in place after completion of patient's planned treatment due to risk of recurrent disease, plan port flushes every 4 to 6 weeks.    *Nutrition/weight loss  Patient has been seen by oncology nutrition  Patient overall has lost a significant amount of weight however weight subsequently stabilized  Appetite and weight have improved.  Weight has fluctuated, currently increased to 216 pounds    *Chronic left greater than right lower extremity edema with subsequent left inguinal/lower extremity hematoma following TAVR  Patient developed exacerbation of chronic edema in the lower extremities, left slightly greater than right.    Bilateral lower extremity Doppler on 5/9/2023 was negative, showed bilateral popliteal fluid collections.  Patient with evidence of left inguinal/left lower extremity hematoma following TAVR with ecchymosis tracking into the distal portion of the left lower extremity and ecchymosis laterally in the left hip.  Likely explains decline in hemoglobin and low haptoglobin post procedure.  PET scan 9/5/2023 with evidence of fluid collection/blood in the left inguinal region.  Edema increased, asymmetric with left greater than right.  Lower extremity Doppler 4/25/2024 with bilateral popliteal fluid collections, no evidence of DVT.  Patient with persistent mild lower extremity edema, chronic asymmetry with left greater than right.      *Hypothyroidism exacerbated by immunotherapy  Hypothyroidism managed by PCP Dr. Bassett Scales  Labs on 5/30/2023 prior to beginning immunotherapy with evidence of hypothyroidism with TSH 10.5, free T4 0.54.  Levothyroxine dose  increased by Dr. Brush from 125 up to 150 mcg daily  Continue to monitor thyroid function studies every 8 weeks on durvalumab  Patient with persistent borderline abnormal thyroid function studies on Durvalumab, referred to endocrinology  On 1/2/2024, significant increase in TSH to 19.7 with free T40.8.  On 1/5/2024 increased levothyroxine dose from 150 up to 175 mcg daily.  Patient is now followed by endocrinology for management of hypothyroidism  Labs on 4/23/2024 with normal TSH suppressed at 0.155 and free T4 increased to 1.94.  Levothyroxine dose decreased by endocrinology to 150 mcg daily.  Labs on 5/21/2024 with TSH 0.122 and free T4 improved into the normal range at 1.69.    Labs on 8/13/2024 with TSH of 0.247 and free T4 elevated at 1.97.  Endocrinology decreased levothyroxine dose to 137 mcg daily  Labs on 11/13/2024 with TSH 1.63, free T4 of 1.44.  Patient continues follow-up with endocrinology    *Chemotherapy-induced leukopenia  WBC today 5.81 with normal differential    *Chemotherapy-induced thrombocytopenia  Platelet count prior to chemotherapy was normal  Platelet count has gradually improved since completion of concurrent chemoradiation  Platelet count on 11/7/2023 declined to 101,000.  Additional labs with IPF 5.5%, B12 342, folate 8.99  Labs on 3/26/2024 with B12 453, folate 12.1, IPF 3.9%, platelet count 108,000  Platelet count today borderline low at 138,000    *Chemotherapy-induced anemia  Hemoglobin did decline into the high 10-low 11 range on concurrent chemoradiation  Hemoglobin subsequently improved and normalized as of 7/28/2023 with hemoglobin of 13.4  Patient underwent TAVR on 8/1/2023 with significant decline in hemoglobin thereafter, reached a low of 7.1 on 8/8/2023.  Additional labs with cardiology on 8/8/2023 with iron 78, iron saturation 22%, TIBC 350.  Patient was started on oral iron although no evidence of iron deficiency.    Labs on 8/15/2023 with hemoglobin 8.7, iron 129,  ferritin 120, iron saturation 39%, TIBC 355, B12 288, folate 7.15, haptoglobin less than 10.  Oral iron discontinued as patient was constipated, had no evidence of iron deficiency.  Initiated oral B12 1000 mcg daily.  Postprocedure anemia and low haptoglobin related to left inguinal/left lower extremity hematoma.  Hemoglobin on 3/26/2024 declined to 11.8.  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  Recommended CTA exam for more definitive evaluation.  Patient was referred to vascular surgery, had to postpone the visit        PLAN:  The patient continues on a course of observation/surveillance having completed definitive treatment for stage IIIA non-small cell lung cancer.  She received concurrent chemoradiation with weekly single agent carboplatin (completed on 5/12/2023).  She subsequently received durvalumab x 1 year, final cycle received on 4/23/2024.  Continue oral B12 1000 mcg daily  Schedule CT-guided biopsy left hypermetabolic retroperitoneal lymph node next week to be sent for pathologic review  Patient ultimately declined referral to supportive oncology  MD visit in 2 to 3 weeks with CBC, CMP    I did spend 40 minutes total time caring for the patient today, time spent in review of records, face-to-face consultation, coordination of care, placement of orders, completion of documentation.

## 2024-11-27 ENCOUNTER — OFFICE VISIT (OUTPATIENT)
Dept: ONCOLOGY | Facility: CLINIC | Age: 69
End: 2024-11-27
Payer: MEDICARE

## 2024-11-27 ENCOUNTER — INFUSION (OUTPATIENT)
Dept: ONCOLOGY | Facility: HOSPITAL | Age: 69
End: 2024-11-27
Payer: MEDICARE

## 2024-11-27 VITALS
WEIGHT: 216.9 LBS | TEMPERATURE: 97.4 F | SYSTOLIC BLOOD PRESSURE: 147 MMHG | HEIGHT: 62 IN | RESPIRATION RATE: 16 BRPM | DIASTOLIC BLOOD PRESSURE: 76 MMHG | OXYGEN SATURATION: 96 % | BODY MASS INDEX: 39.91 KG/M2 | HEART RATE: 80 BPM

## 2024-11-27 DIAGNOSIS — C34.32 MALIGNANT NEOPLASM OF LOWER LOBE OF LEFT LUNG: ICD-10-CM

## 2024-11-27 DIAGNOSIS — Z45.2 ENCOUNTER FOR ADJUSTMENT OR MANAGEMENT OF VASCULAR ACCESS DEVICE: Primary | ICD-10-CM

## 2024-11-27 DIAGNOSIS — C34.32 MALIGNANT NEOPLASM OF LOWER LOBE OF LEFT LUNG: Primary | ICD-10-CM

## 2024-11-27 DIAGNOSIS — R94.8 ABNORMAL POSITRON EMISSION TOMOGRAPHY (PET) SCAN: ICD-10-CM

## 2024-11-27 LAB
ALBUMIN SERPL-MCNC: 3.7 G/DL (ref 3.5–5.2)
ALBUMIN/GLOB SERPL: 1.2 G/DL
ALP SERPL-CCNC: 76 U/L (ref 39–117)
ALT SERPL W P-5'-P-CCNC: 10 U/L (ref 1–33)
ANION GAP SERPL CALCULATED.3IONS-SCNC: 10.3 MMOL/L (ref 5–15)
AST SERPL-CCNC: 19 U/L (ref 1–32)
BASOPHILS # BLD AUTO: 0.02 10*3/MM3 (ref 0–0.2)
BASOPHILS NFR BLD AUTO: 0.3 % (ref 0–1.5)
BILIRUB SERPL-MCNC: 0.2 MG/DL (ref 0–1.2)
BUN SERPL-MCNC: 25 MG/DL (ref 8–23)
BUN/CREAT SERPL: 20.2 (ref 7–25)
CALCIUM SPEC-SCNC: 8.6 MG/DL (ref 8.6–10.5)
CHLORIDE SERPL-SCNC: 102 MMOL/L (ref 98–107)
CO2 SERPL-SCNC: 27.7 MMOL/L (ref 22–29)
CREAT SERPL-MCNC: 1.24 MG/DL (ref 0.57–1)
DEPRECATED RDW RBC AUTO: 50.3 FL (ref 37–54)
EGFRCR SERPLBLD CKD-EPI 2021: 47.5 ML/MIN/1.73
EOSINOPHIL # BLD AUTO: 0.17 10*3/MM3 (ref 0–0.4)
EOSINOPHIL NFR BLD AUTO: 2.9 % (ref 0.3–6.2)
ERYTHROCYTE [DISTWIDTH] IN BLOOD BY AUTOMATED COUNT: 15.5 % (ref 12.3–15.4)
GLOBULIN UR ELPH-MCNC: 3 GM/DL
GLUCOSE SERPL-MCNC: 113 MG/DL (ref 65–99)
HCT VFR BLD AUTO: 40.8 % (ref 34–46.6)
HGB BLD-MCNC: 13.6 G/DL (ref 12–15.9)
IMM GRANULOCYTES # BLD AUTO: 0.02 10*3/MM3 (ref 0–0.05)
IMM GRANULOCYTES NFR BLD AUTO: 0.3 % (ref 0–0.5)
LYMPHOCYTES # BLD AUTO: 0.92 10*3/MM3 (ref 0.7–3.1)
LYMPHOCYTES NFR BLD AUTO: 15.8 % (ref 19.6–45.3)
MCH RBC QN AUTO: 29.6 PG (ref 26.6–33)
MCHC RBC AUTO-ENTMCNC: 33.3 G/DL (ref 31.5–35.7)
MCV RBC AUTO: 88.7 FL (ref 79–97)
MONOCYTES # BLD AUTO: 0.61 10*3/MM3 (ref 0.1–0.9)
MONOCYTES NFR BLD AUTO: 10.5 % (ref 5–12)
NEUTROPHILS NFR BLD AUTO: 4.07 10*3/MM3 (ref 1.7–7)
NEUTROPHILS NFR BLD AUTO: 70.2 % (ref 42.7–76)
NRBC BLD AUTO-RTO: 0 /100 WBC (ref 0–0.2)
PLATELET # BLD AUTO: 138 10*3/MM3 (ref 140–450)
PMV BLD AUTO: 10 FL (ref 6–12)
POTASSIUM SERPL-SCNC: 4.2 MMOL/L (ref 3.5–5.2)
PROT SERPL-MCNC: 6.7 G/DL (ref 6–8.5)
RBC # BLD AUTO: 4.6 10*6/MM3 (ref 3.77–5.28)
SODIUM SERPL-SCNC: 140 MMOL/L (ref 136–145)
WBC NRBC COR # BLD AUTO: 5.81 10*3/MM3 (ref 3.4–10.8)

## 2024-11-27 PROCEDURE — 80053 COMPREHEN METABOLIC PANEL: CPT

## 2024-11-27 PROCEDURE — 85025 COMPLETE CBC W/AUTO DIFF WBC: CPT

## 2024-11-27 PROCEDURE — 25010000002 HEPARIN LOCK FLUSH PER 10 UNITS: Performed by: INTERNAL MEDICINE

## 2024-11-27 PROCEDURE — 36591 DRAW BLOOD OFF VENOUS DEVICE: CPT

## 2024-11-27 RX ORDER — SODIUM CHLORIDE 0.9 % (FLUSH) 0.9 %
10 SYRINGE (ML) INJECTION AS NEEDED
OUTPATIENT
Start: 2024-11-27

## 2024-11-27 RX ORDER — SODIUM CHLORIDE 0.9 % (FLUSH) 0.9 %
10 SYRINGE (ML) INJECTION AS NEEDED
Status: DISCONTINUED | OUTPATIENT
Start: 2024-11-27 | End: 2024-11-27 | Stop reason: HOSPADM

## 2024-11-27 RX ORDER — HEPARIN SODIUM (PORCINE) LOCK FLUSH IV SOLN 100 UNIT/ML 100 UNIT/ML
500 SOLUTION INTRAVENOUS AS NEEDED
Status: DISCONTINUED | OUTPATIENT
Start: 2024-11-27 | End: 2024-11-27 | Stop reason: HOSPADM

## 2024-11-27 RX ORDER — HEPARIN SODIUM (PORCINE) LOCK FLUSH IV SOLN 100 UNIT/ML 100 UNIT/ML
500 SOLUTION INTRAVENOUS AS NEEDED
OUTPATIENT
Start: 2024-11-27

## 2024-11-27 RX ADMIN — Medication 500 UNITS: at 12:51

## 2024-11-27 RX ADMIN — Medication 10 ML: at 12:51

## 2024-12-05 ENCOUNTER — HOSPITAL ENCOUNTER (OUTPATIENT)
Dept: CT IMAGING | Facility: HOSPITAL | Age: 69
Discharge: HOME | End: 2024-12-05
Payer: MEDICARE

## 2024-12-05 DIAGNOSIS — C34.32 MALIGNANT NEOPLASM OF LOWER LOBE OF LEFT LUNG: ICD-10-CM

## 2024-12-05 DIAGNOSIS — R94.8 ABNORMAL POSITRON EMISSION TOMOGRAPHY (PET) SCAN: ICD-10-CM

## 2024-12-05 LAB — PLATELET # BLD AUTO: 140 10*3/MM3 (ref 140–450)

## 2024-12-05 PROCEDURE — 25010000002 FENTANYL CITRATE (PF) 50 MCG/ML SOLUTION: Performed by: RADIOLOGY

## 2024-12-05 PROCEDURE — 85610 PROTHROMBIN TIME: CPT

## 2024-12-05 PROCEDURE — 25010000002 LIDOCAINE 1 % SOLUTION: Performed by: RADIOLOGY

## 2024-12-05 PROCEDURE — 77012 CT SCAN FOR NEEDLE BIOPSY: CPT

## 2024-12-05 PROCEDURE — 88305 TISSUE EXAM BY PATHOLOGIST: CPT | Performed by: INTERNAL MEDICINE

## 2024-12-05 PROCEDURE — 88342 IMHCHEM/IMCYTCHM 1ST ANTB: CPT | Performed by: INTERNAL MEDICINE

## 2024-12-05 PROCEDURE — 25010000002 MIDAZOLAM PER 1 MG: Performed by: RADIOLOGY

## 2024-12-05 PROCEDURE — 88341 IMHCHEM/IMCYTCHM EA ADD ANTB: CPT | Performed by: INTERNAL MEDICINE

## 2024-12-05 PROCEDURE — 99152 MOD SED SAME PHYS/QHP 5/>YRS: CPT

## 2024-12-05 PROCEDURE — 25010000002 ONDANSETRON PER 1 MG: Performed by: RADIOLOGY

## 2024-12-05 PROCEDURE — 85049 AUTOMATED PLATELET COUNT: CPT | Performed by: RADIOLOGY

## 2024-12-05 RX ORDER — SODIUM CHLORIDE 9 MG/ML
25 INJECTION, SOLUTION INTRAVENOUS ONCE
Status: DISCONTINUED | OUTPATIENT
Start: 2024-12-05 | End: 2024-12-06 | Stop reason: HOSPADM

## 2024-12-05 RX ORDER — FENTANYL CITRATE 50 UG/ML
INJECTION, SOLUTION INTRAMUSCULAR; INTRAVENOUS AS NEEDED
Status: COMPLETED | OUTPATIENT
Start: 2024-12-05 | End: 2024-12-05

## 2024-12-05 RX ORDER — MIDAZOLAM HYDROCHLORIDE 1 MG/ML
0.5 INJECTION, SOLUTION INTRAMUSCULAR; INTRAVENOUS ONCE AS NEEDED
Status: DISCONTINUED | OUTPATIENT
Start: 2024-12-05 | End: 2024-12-06 | Stop reason: HOSPADM

## 2024-12-05 RX ORDER — MIDAZOLAM HYDROCHLORIDE 1 MG/ML
INJECTION, SOLUTION INTRAMUSCULAR; INTRAVENOUS AS NEEDED
Status: COMPLETED | OUTPATIENT
Start: 2024-12-05 | End: 2024-12-05

## 2024-12-05 RX ORDER — LIDOCAINE HYDROCHLORIDE 10 MG/ML
20 INJECTION, SOLUTION INFILTRATION; PERINEURAL ONCE
Status: COMPLETED | OUTPATIENT
Start: 2024-12-05 | End: 2024-12-05

## 2024-12-05 RX ORDER — SODIUM CHLORIDE 0.9 % (FLUSH) 0.9 %
10 SYRINGE (ML) INJECTION AS NEEDED
Status: DISCONTINUED | OUTPATIENT
Start: 2024-12-05 | End: 2024-12-06 | Stop reason: HOSPADM

## 2024-12-05 RX ORDER — ONDANSETRON 2 MG/ML
4 INJECTION INTRAMUSCULAR; INTRAVENOUS ONCE
Status: COMPLETED | OUTPATIENT
Start: 2024-12-05 | End: 2024-12-05

## 2024-12-05 RX ORDER — IBUPROFEN 200 MG
200 TABLET ORAL EVERY 6 HOURS PRN
COMMUNITY

## 2024-12-05 RX ADMIN — MIDAZOLAM 1 MG: 1 INJECTION INTRAMUSCULAR; INTRAVENOUS at 08:47

## 2024-12-05 RX ADMIN — FENTANYL CITRATE 50 MCG: 50 INJECTION, SOLUTION INTRAMUSCULAR; INTRAVENOUS at 08:47

## 2024-12-05 RX ADMIN — LIDOCAINE HYDROCHLORIDE 20 ML: 10 INJECTION, SOLUTION INFILTRATION; PERINEURAL at 08:47

## 2024-12-05 RX ADMIN — ONDANSETRON 4 MG: 2 INJECTION, SOLUTION INTRAMUSCULAR; INTRAVENOUS at 09:23

## 2024-12-05 NOTE — NURSING NOTE
Patient vomited a small amount of bile colored emesis after ambulating to the bathroom. Gave patient an emesis bag and cold compress. Zofran 4mg IV was ordered and given.

## 2024-12-05 NOTE — DISCHARGE INSTRUCTIONS
EDUCATION /DISCHARGE INSTRUCTIONS  CT/US guided biopsy:  A biopsy is a procedure done to remove tissue for further analysis.  Before images are taken to locate the target area.  Images can be obtained using ultrasound, CT or MRI.  A physician will clean your skin with antiseptic soap, place a sterile towel around the site and administer a local anesthetic to numb the area.  The physician will then insert a special needle.  Sometimes images are taken of the needle after it is inserted to ensure the needle is in the correct area to be biopsied.   A sample is obtained and sent to the laboratory for study.  Occasionally the laboratory is unable to make a diagnosis from the sample and the procedure may need to be repeated.  Within a week the radiologist will send a report to your physician.  A pathologist will also examine the tissue and send a report.    Risks of the procedure include but are not limited to:   *  Bleeding    *  Infection   *  Puncture of surrounding organs *  Death     *  Lung collapse if the biopsy is near the chest which may require insertion of a      chest tube to re-inflate the lung if severe.    Benefits of the procedure:  Using x-ray helps to locate the area that requires a biopsy. The procedure is less invasive than a surgical procedure, there are no large incisions and it does not require anesthesia.    Alternatives to the procedure:  A biopsy can be performed surgically.  Risks of a surgical biopsy include exposure to anesthesia, infection, excessive bleeding and injury to abdominal organs.  A benefit of surgical biopsy is the ability to see the area to be biopsied and remove of a larger piece of tissue.    THIS EDUCATION INFORMATION WAS REVIEWED PRIOR TO PROCEDURE AND CONSENT.    Post Procedure:    *  Expect the biopsy site may be tender up to one week.    *  Rest today (no pushing pulling or straining).   *  Slowly increase activity tomorrow.    *  If you received sedation do not drive for  24 hours.   *  Keep dressing clean and dry.   *  Leave dressing on puncture site for 24 hours.    *  You may shower when dressing removed.  Call your doctor if experiencing:   *  Signs of infection such as redness, swelling, excessive pain and / or foul        smelling drainage from the puncture site.   *  Chills or fever over 101 degrees (by mouth).   *  Unrelieved pain.   *  Any new or severe symptoms.   *  If experiencing sudden / severe shortness of breath or chest pain go to the       nearest emergency room.   Following the procedure:     Follow-up with the ordering physician as directed.    Continue to take other medications as directed by your physician unless    otherwise instructed.   If applicable, resume taking your blood thinners or Aspirin on ____12/6_______.    If you have any concerns please call the Radiology Nurses Desk at [phone].  You are the most important factor in your recovery.  Follow the above instructions carefully.

## 2024-12-05 NOTE — POST-PROCEDURE NOTE
POST PROCEDURE NOTE    Procedure: ct left retroperitoneal LN biopsy    Pre-Procedure Diagnosis: retroperitoneal lymphadenopathy    Post-procedure Diagnosis: same    Findings: technically successful ct guided left retroperitoneal LN biopsy    Complications: no immediate    Blood loss: none    Specimen Removed: two 18 gauge cores    Disposition:   Discharge home

## 2024-12-06 ENCOUNTER — TELEPHONE (OUTPATIENT)
Dept: INTERVENTIONAL RADIOLOGY/VASCULAR | Facility: HOSPITAL | Age: 69
End: 2024-12-06
Payer: MEDICARE

## 2024-12-06 LAB
INR PPP: 0.9 (ref 0.8–1.2)
PROTHROMBIN TIME: 9.6 SECONDS (ref 12.8–15.2)

## 2024-12-09 ENCOUNTER — TELEPHONE (OUTPATIENT)
Dept: ENDOCRINOLOGY | Age: 69
End: 2024-12-09
Payer: MEDICARE

## 2024-12-09 VITALS
SYSTOLIC BLOOD PRESSURE: 132 MMHG | HEART RATE: 67 BPM | BODY MASS INDEX: 39.56 KG/M2 | HEIGHT: 62 IN | WEIGHT: 215 LBS | RESPIRATION RATE: 16 BRPM | OXYGEN SATURATION: 94 % | TEMPERATURE: 97.1 F | DIASTOLIC BLOOD PRESSURE: 100 MMHG

## 2024-12-09 NOTE — TELEPHONE ENCOUNTER
Called and spoke to pt to schedule follow up appointment , she said to touch back up with her on the 17th

## 2024-12-12 LAB
CYTO UR: NORMAL
DX PRELIMINARY: NORMAL
LAB AP CASE REPORT: NORMAL
LAB AP CLINICAL INFORMATION: NORMAL
LAB AP SPECIAL STAINS: NORMAL
PATH REPORT.FINAL DX SPEC: NORMAL
PATH REPORT.GROSS SPEC: NORMAL

## 2024-12-16 NOTE — PROGRESS NOTES
"Chief Complaint  Non-small cell lung cancer, clinical stage IIIA ([id number]), aortic stenosis, cervical spine stenosis, COPD, hepatic steatosis, diabetes mellitus    Subjective        History of Present Illness  Patient returns today for short interval follow-up visit to review results from CT-guided biopsy retroperitoneal lymph node performed on 12/5/2024.  She did well with the procedure.  She has no new complaints today.  She is quite nervous about biopsy results.  She reports stable mild dyspnea on exertion.  She remains in a wheelchair today but does ambulate at home short distances.  Appetite is normal, weight increased to 219 pounds.        Objective   Vital Signs:   /87 Comment: pt is super nervous  Pulse 68   Temp 97.7 °F (36.5 °C) (Oral)   Ht 157.5 cm (62.01\")   Wt 99.4 kg (219 lb 1.6 oz)   SpO2 91%   BMI 40.06 kg/m²     Physical Exam  Constitutional:       Appearance: She is well-developed.      Comments: Patient remains in a wheelchair today in the office   Eyes:      Conjunctiva/sclera: Conjunctivae normal.   Neck:      Thyroid: No thyromegaly.   Cardiovascular:      Rate and Rhythm: Normal rate and regular rhythm.      Heart sounds: No murmur heard.     No friction rub. No gallop.   Pulmonary:      Effort: No respiratory distress.      Breath sounds: Wheezing present.      Comments: Scattered wheezes  Abdominal:      General: Bowel sounds are normal. There is no distension.      Palpations: Abdomen is soft.      Tenderness: There is no abdominal tenderness.   Musculoskeletal:         General: Swelling present.      Comments: 1+ bilateral lower extremity edema, left chronically greater than right   Lymphadenopathy:      Head:      Right side of head: No submandibular adenopathy.      Cervical: No cervical adenopathy.      Upper Body:      Right upper body: No supraclavicular adenopathy.      Left upper body: No supraclavicular adenopathy.   Skin:     General: Skin is warm and dry.      " Findings: No rash.   Neurological:      Mental Status: She is alert and oriented to person, place, and time.      Cranial Nerves: No cranial nerve deficit.      Motor: No abnormal muscle tone.      Deep Tendon Reflexes: Reflexes normal.   Psychiatric:         Behavior: Behavior normal.       Patient was examined today, unchanged from above    Result Review : Reviewed CBC, CMP from today.  Reviewed CT-guided biopsy report and pathology from 12/5/2024       Assessment and Plan     *Non-small cell lung cancer, clinical stage IIIA ([id number])  Patient has history of smoking 1 pack/day x 45 years, quit in November 2021.    Following anterior cervical corpectomy in September 2022, she experienced acute hypoxemic respiratory failure.    CT angiogram chest 9/25/2022 showed a concerning 1.4 cm left lower lobe pulmonary nodule, mediastinal lymphadenopathy with infracarinal lymph node 2.7 cm, left hilar lymph node 1.2 cm, small right hilar lymph nodes up from 1.1 cm and additional enlarged mediastinal nodes in the right paratracheal, precarinal, infracarinal spaces.  There was a wedge-shaped area of consolidation/atelectasis in the right middle lobe base, subpleural density left costophrenic sulcus felt to represent atelectasis.  It was recommended for her to undergo subsequent PET scan by pulmonary.  The patient has had ongoing follow-up with neurosurgery and on CT scans 11/29/2022 of cervical and thoracic spine, there was concern regarding spinal instability with potential need for posterior cervical fusion.  This was scheduled in December 2022 however was delayed due to upper respiratory infection.  Patient admitted on 1/10/2023 to address multiple medical issues in order to prepare for potential neurosurgical procedure on 3/12/2023.  Patient underwent echocardiogram on 1/11/2023 with ejection fraction greater than 70%, grade 1 diastolic dysfunction, severe aortic stenosis.  Patient is being followed by cardiology with  consideration of need for TAVR versus SAVR for moderate to severe aortic stenosis.  Repeat CT chest on 1/12/2023 showed slight increase in size of the left lower lobe pulmonary nodule increased from 1.4 to 1.5 cm.  Groundglass nodule peripheral left lower lobe less conspicuous and resolution of previous reticulonodular opacities right lower lobe.  There was further enlargement of mediastinal lymph nodes with right posterior paratracheal lymph node 1.4 x 2.1 up to 1.5 x 2.3 cm, subcarinal lymph node increased from 2.7 x 5.2 up to 3.2 x 5.8 cm, left hilar lymph node increased from 1.2 up to 1.4 cm, and is stable 1.7 cm left infrahilar lymph node.  There was also comment regarding hepatic steatosis with changes of chronic liver disease and stable indeterminate nodular thickening of the adrenal glands.  CT-guided lung biopsy on 1/13/2023 with pathology that showed invasive poorly differentiated non-small cell carcinoma with focal neuroendocrine differentiation.  Staining pattern and morphology favor poorly differentiated adenocarcinoma however there was focal CD56 staining, could not entirely exclude large cell neuroendocrine carcinoma.  PD-L1 35%, Caris analysis: PD-L1 10% (), no targetable Tatian's  It was felt that there would be increased risk for bronchoscopy from a cardiac standpoint due to her aortic stenosis.  Staging MRI brain 1/29/2023 showed no evidence of metastatic disease  Staging PET scan 1/26/2023 showed hypermetabolic left lower lobe pulmonary nodule, size difficult to determine on PET however on recent CT was 1.6 cm (SUV 14.4).  Mediastinal and left hilar hypermetabolic lymphadenopathy (subcarinal lymph node 3.2 cm, SUV 31.7, left paratracheal 2.2 cm lymph node SUV 12.7).  Sub-6 mm pulmonary nodule left upper lobe unchanged and below PET resolution.  Focal uptake posterior right hepatic lobe SUV 7.2 of uncertain significance.  Recommended MRI to evaluate.  Right thyroid activity SUV 5.3,  recommended ultrasound.  Long segment uptake distal esophagus consistent with esophagitis.  Uptake posterior larynx, nonspecific, recommended direct visualization.  MRI abdomen 2/6/2023 with no evidence to suggest metastatic disease in the liver.  Subcentimeter nonenhancing lesions favor cyst or hemangiomas.  Patient underwent right and left heart catheterization with cardiology on 2/14/2023.  Identification of coronary artery disease, mild pulmonary hypertension, severe aortic stenosis.  Recommendation per cardiology to proceed with treatment for lung cancer and defer any intervention regarding aortic stenosis.  Concern regarding underlying neurologic issues from cervical stenosis and potential neuropathic effects from Taxol chemotherapy.  Concern regarding patient's overall performance status and ability to tolerate full dose chemotherapy.  Patient discussed at thoracic oncology tumor board with consensus to treat with weekly low-dose carboplatin concurrent with radiation therapy and omit Taxol.  Subsequent plan for 1 year durvalumab following concurrent chemoradiation.  Significant delay in initiating concurrent chemoradiation due to logistics with scheduling Mediport placement, patient canceled appointments and delayed initiation of treatment.  On 3/21/2023 initiated concurrent chemoradiation with weekly carboplatin (AUC 2) on 2/28/2023.  Week 4 carboplatin held 4/11 and again 4/18/2023 due to thrombocytopenia  Week 4 carboplatin resumed on 4/25/2023 at reduced dose to AUC 1 due to borderline neutropenia  Chemo and radiation held beginning 5/2/2023 due to neutropenia with WBC 1.13, ANC 0.53  Resumed radiation therapy on 5/8/2023 with recovery of WBC.  Received fifth and final weekly dose of concurrent carboplatin (AUC 1) on 5/9/2023.  Patient completed radiation therapy on 5/12/2023.  CT chest abdomen pelvis 5/25/2023 with significant improvement in mediastinal and left hilar lymphadenopathy, decrease in left  lower lobe nodule indicating significant response.  On 5/30/2023 initiated treatment with durvalumab 10 mg/kg every 2 weeks x 1 year.  Transitioned to durvalumab 1500 mg every 4-week dosing with cycle 4 on 7/18/2023.  TAVR CT chest abdomen pelvis 7/21/2023 with decreased left lower lobe nodule from 8 down to 6 mm, stable mediastinal lymph nodes (with the exception of 1 small superior mediastinal lymph node increased from 4 to 7 mm of unclear significance).  Upper abdominal lymph nodes with slight increase in the periportal, hepatogastric, peripancreatic regions.  Following 5 cycles durvalumab, PET scan 9/5/2023 with left lower lobe ill-defined consolidation and opacification likely representing postradiation change.  No mediastinal lymphadenopathy nor hypermetabolic activity.  Slight enlargement (0.7 up to 1.0 cm) of anterior pericardial lymph node with new hypermetabolic activity (SUV 5.5.  Stable epigastric adenopathy with mild hypermetabolic activity (SUV 4.4) unchanged from 1/26/2023 scan (although not noted on that report).  Nondisplaced, healing left seventh rib fracture.  Significance of the pericardial lymph node and epigastric lymphadenopathy unclear.  Neither area easily accessible for CT-guided biopsy after discussion with interventional radiology on 9/12/2023.  Elected to monitor on repeat PET scan at 2-month interval.  PET scan 10/31/2023 with stable mildly hypermetabolic left lower lobe segmental consolidation, indeterminate regarding residual malignancy versus inflammatory change.  Prior epicardial lymph node decreased in size to less than 1 cm with no residual hypermetabolic activity.  Subcentimeter periceliac lymph node with slight increase in SUV from 3.9 up to 6.5, indeterminate.  Plan 3-month interval PET scan  PET scan 1/16/2024 with decrease in extent of left lower lobe irregular nodular pulmonary opacification, SUV decreased slightly from 3.9 down to 3.3.  Stable borderline epigastric,  mesenteric, aortocaval lymph nodes with stable activity.  Stable intense uptake base of tongue and floor of mouth.  Stable subcentimeter left upper lobe pulmonary nodule.  PET scan findings felt to be indeterminate, left lower lobe findings likely related to prior radiation therapy, no definitive evidence of residual or recurrent disease.  Plan repeat PET scan at 4-month interval at completion of Durvalumab.  Patient received final planned cycle of Durvalumab (cycle 14) on 4/23/2024.  PET scan following completion of Durvalumab on 5/17/2024 showed new 12 cm x 4.5 cm abnormality in the paramediastinal left lower lobe with bandlike characteristics and low-level SUV (3) consistent with evolving postradiation change.  Hypermetabolic periceliac lymph nodes unchanged (reference lymph node 1.2 cm with SUV 5).  2 hypermetabolic abdominal para-aortic lymph nodes with reference lymph node 1.2 cm, SUV 7 indeterminate, possibly reactive/inflammatory versus metastatic disease.  CT chest abdomen pelvis on 8/6/2024 showed more organized consolidation left lower lobe with appearance consistent with evolving postradiation change.  Vague micronodules left lower lobe felt to be infectious/inflammatory.  Stable retroperitoneal and gastrohepatic lymph nodes.  CT chest abdomen pelvis 11/6/2024 showed left perihilar consolidation extending to the left lower lobe less pronounced.  Areas of curvilinear density left lower lobe suspected related to mucous plugging and atelectasis.  Enlarging aortocaval and left periaortic lymph nodes, aortocaval lymph node increased from 1.3 up to 2.3 cm, left periaortic lymph node increased from 1.3 up to 2.1 cm.  PET scan 11/20/2024 showed masslike consolidation in the perihilar aspect left lower lobe to have decreased in size.  Small focus of uptake over left calvin mildly above other areas of consolidation, indeterminate (SUV 3.8).  Intensely hypermetabolic abdominal and pelvic lymphadenopathy.  Aortocaval  lymph node index lesion near duodenum increased from 1.2 up to 1.7 cm (SUV 32.9 (, index lymph node or portal vein and inferior vena cava with increased from 0.7 up to 1 cm with SUV 8.8.   CT-guided biopsy left retroperitoneal lymph node on 12/5/2024 with pathology that showed benign lymph node with focal calcification, no evidence of malignancy.  Patient returns today in follow-up with pathology results to review from CT-guided lymph node biopsy left retroperitoneal node on 12/5/2024.  Fortunately the pathology shows a benign lymph node with focal calcification.  Unclear whether this is a reactive lymph node.  We did discuss sampling error that can occur with needle biopsy and that malignancy could have been missed.  For now with a negative biopsy result we will continue a course of surveillance and the patient agrees.  I did suggest that we perform a 3-month interval scan which will be due 2 months from now with CT chest abdomen pelvis.    *Aortic stenosis  Patient underwent echocardiogram on 1/11/2023 with ejection fraction greater than 70%, grade 1 diastolic dysfunction, severe aortic stenosis.    Patient followed by cardiology with consideration of need for TAVR versus SAVR for moderate to severe aortic stenosis.  Patient underwent cardiac catheterization 2/14/2023 with identification of coronary artery disease, mild pulmonary hypertension, severe aortic stenosis.  Per cardiology, recommendation to proceed with treatment for lung cancer and defer any consideration of intervention for aortic stenosis.  Patient did undergo TAVR on 8/1/2023  Echocardiogram on 8/2/2023 with ejection fraction 62.4%, TAVR present  Patient continues follow-up with cardiology     *Cervical spine stenosis  Patient with cervical spine stenosis with associated myelopathy.   She underwent surgery on 9/20/2022 with anterior cervical corpectomy with cage.      The patient has had ongoing follow-up with neurosurgery and on CT scans 11/29/2022  of cervical and thoracic spine, there was concern regarding spinal instability with potential need for posterior cervical fusion.  This was scheduled in December 2022 however was delayed due to upper respiratory infection.  Patient admitted on 1/10/2023 to address multiple medical issues in order to prepare for potential neurosurgical procedure on 3/12/2023.  Patient was previously having severe symptoms related to her cervical stenosis with reduced ability to ambulate previously and significant limitations in movement in her hands, dropping objects.  More recently however she has improved and has been able to walk with the use of a walker, symptoms in the upper extremities have improved and her dexterity is better.    It appears that her neurosurgical procedure will need to be delayed until we can at least complete the concurrent chemoradiation portion of her treatment.  Surgery would be feasible while continuing on immunotherapy in the future.  Neurosurgical follow-up has been placed on hold.  Patient improvement over time in mobility and symptoms in her extremities.  She does not intend to proceed with further surgery in the future.     *COPD  Patient has history of smoking 1 pack/day x 45 years, quit in November 2021.  Patient has not undergone formal PFTs  Patient being followed by pulmonary, currently using albuterol inhaler as needed  Patient with recent stable respiratory symptoms     *Hepatic steatosis  Identified on prior scans  PET scan 1/26/2023 with questionable area of activity seen but no corresponding CT abnormality.    MRI abdomen 2/6/2023 with no evidence to suggest metastatic disease in the liver.  Subcentimeter nonenhancing lesions favor cyst or hemangiomas.  LFTs remain normal     *Diabetes mellitus    *Anxiety/depression  Patient currently continuing on Wellbutrin  mg daily  Patient reported increasing frequency and severity of panic attacks.  She had previously been on Lexapro in  addition to Wellbutrin however notes that she has been off Lexapro for approximately a year as it was not refilled by her PCP.  Patient was referred back to supportive oncology for recommendations on further treatment.  Patient indicated that she has been treated with multiple antidepressants, some of which were ineffective.  Consider possibly resuming Lexapro.  Patient did not follow through with referral to supportive oncology.  She currently reports that symptoms are stable    *Right thyroid uptake on PET scan 1/26/2023  Patient was scheduled for thyroid ultrasound however did not feel that she was able to undergo the procedure due to her neck issues and therefore was canceled, consider at future date  No mention of thyroid uptake on PET scan 9/5/2023 and on 10/31/2023.  No uptake on PET scan 1/16/2024, thyroid appeared atrophic  PET scan on 5/17/2024 with no mention of activity in the thyroid  PET scan 11/20/2024 with no thyroid uptake identified    *Laryngeal uptake on PET scan 1/26/2023, subsequent PET uptake and floor of mouth and base of tongue 1/16/2024  Patient was seen by ENT on 3/28/2023, laryngoscopy showed mild edema in the posterior glottis consistent with laryngeal pharyngeal reflux, no evidence of malignancy.  No mention of laryngeal uptake on PET scan 9/5/2023 nor on 10/31/2023  PET scan 1/16/2024 showed persistent significant uptake floor of mouth and base of tongue.  The area was visualized today on exam with no abnormal findings.  She does wear dentures that do not fit properly, unclear whether this could be contributing to the activity.    Patient was referred to ENT, had outstanding balance with 1 practice, difficulty with referral to another practice and was never seen  Patient was ultimately not seen by ENT.  Subsequent PET scan with no significant abnormal activity in the head or neck areas and no abnormal findings on exam.  Elected to forego referral to ENT.    *Esophageal uptake on PET  scan 1/26/2023  Felt to be consistent with esophagitis in the distal esophagus.  We will hold on referral for EGD given the multitude of additional procedures and consults required above.  No mention of esophageal uptake on PET scan 9/5/2023 nor on 10/31/2023 no oral on 1/16/2024  No mention of esophageal uptake on PET scan 5/17/2024  No mention of uptake in the esophagus and PET scan 11/20/2024.    *Venous access  Mediport placed by Dr. Shook on 3/2/2023  We will maintain Mediport in place after completion of patient's planned treatment due to risk of recurrent disease, plan port flushes every 4 to 6 weeks.    *Nutrition/weight loss  Patient has been seen by oncology nutrition  Patient overall has lost a significant amount of weight however weight subsequently stabilized  Appetite and weight have improved.  Weight has fluctuated, currently increased to 219 pounds    *Chronic left greater than right lower extremity edema with subsequent left inguinal/lower extremity hematoma following TAVR  Patient developed exacerbation of chronic edema in the lower extremities, left slightly greater than right.    Bilateral lower extremity Doppler on 5/9/2023 was negative, showed bilateral popliteal fluid collections.  Patient with evidence of left inguinal/left lower extremity hematoma following TAVR with ecchymosis tracking into the distal portion of the left lower extremity and ecchymosis laterally in the left hip.  Likely explains decline in hemoglobin and low haptoglobin post procedure.  PET scan 9/5/2023 with evidence of fluid collection/blood in the left inguinal region.  Edema increased, asymmetric with left greater than right.  Lower extremity Doppler 4/25/2024 with bilateral popliteal fluid collections, no evidence of DVT.  Patient with persistent mild lower extremity edema, chronic asymmetry with left greater than right.      *Hypothyroidism exacerbated by immunotherapy  Hypothyroidism managed by PCP Dr. Bassett  [place]  Labs on 5/30/2023 prior to beginning immunotherapy with evidence of hypothyroidism with TSH 10.5, free T4 0.54.  Levothyroxine dose increased by Dr. Brush from 125 up to 150 mcg daily  Continue to monitor thyroid function studies every 8 weeks on durvalumab  Patient with persistent borderline abnormal thyroid function studies on Durvalumab, referred to endocrinology  On 1/2/2024, significant increase in TSH to 19.7 with free T40.8.  On 1/5/2024 increased levothyroxine dose from 150 up to 175 mcg daily.  Patient is now followed by endocrinology for management of hypothyroidism  Labs on 4/23/2024 with normal TSH suppressed at 0.155 and free T4 increased to 1.94.  Levothyroxine dose decreased by endocrinology to 150 mcg daily.  Labs on 5/21/2024 with TSH 0.122 and free T4 improved into the normal range at 1.69.    Labs on 8/13/2024 with TSH of 0.247 and free T4 elevated at 1.97.  Endocrinology decreased levothyroxine dose to 137 mcg daily  Labs on 11/13/2024 with TSH 1.63, free T4 of 1.44.  Patient continues follow-up with endocrinology    *Chemotherapy-induced leukopenia  WBC today 6.33 with normal differential    *Chemotherapy-induced thrombocytopenia  Platelet count prior to chemotherapy was normal  Platelet count has gradually improved since completion of concurrent chemoradiation  Platelet count on 11/7/2023 declined to 101,000.  Additional labs with IPF 5.5%, B12 342, folate 8.99  Labs on 3/26/2024 with B12 453, folate 12.1, IPF 3.9%, platelet count 108,000  Platelet count today normal at 147,000    *Chemotherapy-induced anemia  Hemoglobin did decline into the high 10-low 11 range on concurrent chemoradiation  Hemoglobin subsequently improved and normalized as of 7/28/2023 with hemoglobin of 13.4  Patient underwent TAVR on 8/1/2023 with significant decline in hemoglobin thereafter, reached a low of 7.1 on 8/8/2023.  Additional labs with cardiology on 8/8/2023 with iron 78, iron saturation 22%, TIBC 350.   Patient was started on oral iron although no evidence of iron deficiency.    Labs on 8/15/2023 with hemoglobin 8.7, iron 129, ferritin 120, iron saturation 39%, TIBC 355, B12 288, folate 7.15, haptoglobin less than 10.  Oral iron discontinued as patient was constipated, had no evidence of iron deficiency.  Initiated oral B12 1000 mcg daily.  Postprocedure anemia and low haptoglobin related to left inguinal/left lower extremity hematoma.  Hemoglobin on 3/26/2024 declined to 11.8.  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  Recommended CTA exam for more definitive evaluation.  Patient was referred to vascular surgery, had to postpone the visit        PLAN:  The patient continues on a course of observation/surveillance having completed definitive treatment for stage IIIA non-small cell lung cancer.  She received concurrent chemoradiation with weekly single agent carboplatin (completed on 5/12/2023).  She subsequently received durvalumab x 1 year, final cycle received on 4/23/2024.  CT-guided biopsy retroperitoneal lymph node 12/5/2024 with benign findings, suspect reactive lymph node  Continue oral B12 1000 mcg daily  Port flush was performed today  MD visit in 2 months with CBC, CMP and port flush.  1 week prior CT chest abdomen pelvis    I did spend 40 minutes total time caring for the patient today, time spent in review of records, face-to-face consultation, coordination of care, placement of orders, completion of documentation.

## 2024-12-17 ENCOUNTER — OFFICE VISIT (OUTPATIENT)
Dept: ONCOLOGY | Facility: CLINIC | Age: 69
End: 2024-12-17
Payer: MEDICARE

## 2024-12-17 ENCOUNTER — LAB (OUTPATIENT)
Dept: ONCOLOGY | Facility: HOSPITAL | Age: 69
End: 2024-12-17
Payer: MEDICARE

## 2024-12-17 VITALS
SYSTOLIC BLOOD PRESSURE: 163 MMHG | TEMPERATURE: 97.7 F | DIASTOLIC BLOOD PRESSURE: 87 MMHG | HEIGHT: 62 IN | OXYGEN SATURATION: 91 % | BODY MASS INDEX: 40.32 KG/M2 | HEART RATE: 68 BPM | WEIGHT: 219.1 LBS

## 2024-12-17 DIAGNOSIS — C34.32 MALIGNANT NEOPLASM OF LOWER LOBE OF LEFT LUNG: Primary | ICD-10-CM

## 2024-12-17 DIAGNOSIS — C34.32 MALIGNANT NEOPLASM OF LOWER LOBE OF LEFT LUNG: ICD-10-CM

## 2024-12-17 DIAGNOSIS — Z45.2 ENCOUNTER FOR ADJUSTMENT OR MANAGEMENT OF VASCULAR ACCESS DEVICE: Primary | ICD-10-CM

## 2024-12-17 LAB
ALBUMIN SERPL-MCNC: 3.8 G/DL (ref 3.5–5.2)
ALBUMIN/GLOB SERPL: 1.2 G/DL
ALP SERPL-CCNC: 89 U/L (ref 39–117)
ALT SERPL W P-5'-P-CCNC: 13 U/L (ref 1–33)
ANION GAP SERPL CALCULATED.3IONS-SCNC: 10 MMOL/L (ref 5–15)
AST SERPL-CCNC: 18 U/L (ref 1–32)
BASOPHILS # BLD AUTO: 0.04 10*3/MM3 (ref 0–0.2)
BASOPHILS NFR BLD AUTO: 0.6 % (ref 0–1.5)
BILIRUB SERPL-MCNC: 0.3 MG/DL (ref 0–1.2)
BUN SERPL-MCNC: 19 MG/DL (ref 8–23)
BUN/CREAT SERPL: 17.1 (ref 7–25)
CALCIUM SPEC-SCNC: 8.9 MG/DL (ref 8.6–10.5)
CHLORIDE SERPL-SCNC: 98 MMOL/L (ref 98–107)
CO2 SERPL-SCNC: 28 MMOL/L (ref 22–29)
CREAT SERPL-MCNC: 1.11 MG/DL (ref 0.57–1)
DEPRECATED RDW RBC AUTO: 41.4 FL (ref 37–54)
EGFRCR SERPLBLD CKD-EPI 2021: 54.3 ML/MIN/1.73
EOSINOPHIL # BLD AUTO: 0.24 10*3/MM3 (ref 0–0.4)
EOSINOPHIL NFR BLD AUTO: 3.8 % (ref 0.3–6.2)
ERYTHROCYTE [DISTWIDTH] IN BLOOD BY AUTOMATED COUNT: 13.1 % (ref 12.3–15.4)
GLOBULIN UR ELPH-MCNC: 3.2 GM/DL
GLUCOSE SERPL-MCNC: 108 MG/DL (ref 65–99)
HCT VFR BLD AUTO: 40.3 % (ref 34–46.6)
HGB BLD-MCNC: 13.5 G/DL (ref 12–15.9)
IMM GRANULOCYTES # BLD AUTO: 0.03 10*3/MM3 (ref 0–0.05)
IMM GRANULOCYTES NFR BLD AUTO: 0.5 % (ref 0–0.5)
LYMPHOCYTES # BLD AUTO: 0.84 10*3/MM3 (ref 0.7–3.1)
LYMPHOCYTES NFR BLD AUTO: 13.3 % (ref 19.6–45.3)
MCH RBC QN AUTO: 29.3 PG (ref 26.6–33)
MCHC RBC AUTO-ENTMCNC: 33.5 G/DL (ref 31.5–35.7)
MCV RBC AUTO: 87.4 FL (ref 79–97)
MONOCYTES # BLD AUTO: 0.78 10*3/MM3 (ref 0.1–0.9)
MONOCYTES NFR BLD AUTO: 12.3 % (ref 5–12)
NEUTROPHILS NFR BLD AUTO: 4.4 10*3/MM3 (ref 1.7–7)
NEUTROPHILS NFR BLD AUTO: 69.5 % (ref 42.7–76)
NRBC BLD AUTO-RTO: 0 /100 WBC (ref 0–0.2)
PLATELET # BLD AUTO: 147 10*3/MM3 (ref 140–450)
PMV BLD AUTO: 9.2 FL (ref 6–12)
POTASSIUM SERPL-SCNC: 4.2 MMOL/L (ref 3.5–5.2)
PROT SERPL-MCNC: 7 G/DL (ref 6–8.5)
RBC # BLD AUTO: 4.61 10*6/MM3 (ref 3.77–5.28)
SODIUM SERPL-SCNC: 136 MMOL/L (ref 136–145)
WBC NRBC COR # BLD AUTO: 6.33 10*3/MM3 (ref 3.4–10.8)

## 2024-12-17 PROCEDURE — 85025 COMPLETE CBC W/AUTO DIFF WBC: CPT

## 2024-12-17 PROCEDURE — 36591 DRAW BLOOD OFF VENOUS DEVICE: CPT

## 2024-12-17 PROCEDURE — 80053 COMPREHEN METABOLIC PANEL: CPT

## 2024-12-17 PROCEDURE — 25010000002 HEPARIN LOCK FLUSH PER 10 UNITS: Performed by: INTERNAL MEDICINE

## 2024-12-17 RX ORDER — SODIUM CHLORIDE 0.9 % (FLUSH) 0.9 %
10 SYRINGE (ML) INJECTION AS NEEDED
OUTPATIENT
Start: 2024-12-17

## 2024-12-17 RX ORDER — SODIUM CHLORIDE 0.9 % (FLUSH) 0.9 %
10 SYRINGE (ML) INJECTION AS NEEDED
Status: DISCONTINUED | OUTPATIENT
Start: 2024-12-17 | End: 2024-12-17 | Stop reason: HOSPADM

## 2024-12-17 RX ORDER — HEPARIN SODIUM (PORCINE) LOCK FLUSH IV SOLN 100 UNIT/ML 100 UNIT/ML
500 SOLUTION INTRAVENOUS AS NEEDED
Status: DISCONTINUED | OUTPATIENT
Start: 2024-12-17 | End: 2024-12-17 | Stop reason: HOSPADM

## 2024-12-17 RX ORDER — HEPARIN SODIUM (PORCINE) LOCK FLUSH IV SOLN 100 UNIT/ML 100 UNIT/ML
500 SOLUTION INTRAVENOUS AS NEEDED
OUTPATIENT
Start: 2024-12-17

## 2024-12-17 RX ADMIN — Medication 500 UNITS: at 14:50

## 2024-12-17 RX ADMIN — Medication 10 ML: at 14:50

## 2025-01-01 ENCOUNTER — TELEPHONE (OUTPATIENT)
Dept: ONCOLOGY | Facility: CLINIC | Age: 70
End: 2025-01-01
Payer: MEDICARE

## 2025-01-01 ENCOUNTER — APPOINTMENT (OUTPATIENT)
Dept: GENERAL RADIOLOGY | Facility: HOSPITAL | Age: 70
End: 2025-01-01
Payer: MEDICARE

## 2025-01-01 ENCOUNTER — APPOINTMENT (OUTPATIENT)
Dept: CARDIOLOGY | Facility: HOSPITAL | Age: 70
End: 2025-01-01
Payer: MEDICARE

## 2025-01-01 ENCOUNTER — HOSPITAL ENCOUNTER (INPATIENT)
Facility: HOSPITAL | Age: 70
LOS: 1 days | End: 2025-04-30
Attending: STUDENT IN AN ORGANIZED HEALTH CARE EDUCATION/TRAINING PROGRAM | Admitting: HOSPITALIST
Payer: MEDICARE

## 2025-01-01 VITALS — BODY MASS INDEX: 43.37 KG/M2 | WEIGHT: 220.9 LBS | HEIGHT: 60 IN | TEMPERATURE: 95.9 F

## 2025-01-01 DIAGNOSIS — C34.90 NON-SMALL CELL LUNG CANCER, UNSPECIFIED LATERALITY: ICD-10-CM

## 2025-01-01 DIAGNOSIS — J96.01 ACUTE RESPIRATORY FAILURE WITH HYPOXIA: ICD-10-CM

## 2025-01-01 DIAGNOSIS — N17.9 AKI (ACUTE KIDNEY INJURY): ICD-10-CM

## 2025-01-01 DIAGNOSIS — D69.6 THROMBOCYTOPENIA: ICD-10-CM

## 2025-01-01 DIAGNOSIS — R65.21 SEPTIC SHOCK: Primary | ICD-10-CM

## 2025-01-01 DIAGNOSIS — D72.819 LEUKOPENIA, UNSPECIFIED TYPE: ICD-10-CM

## 2025-01-01 DIAGNOSIS — A41.9 SEPTIC SHOCK: Primary | ICD-10-CM

## 2025-01-01 DIAGNOSIS — J18.9 COMMUNITY ACQUIRED PNEUMONIA OF RIGHT LOWER LOBE OF LUNG: ICD-10-CM

## 2025-01-01 LAB
ABO GROUP BLD: NORMAL
ALBUMIN SERPL-MCNC: 2.5 G/DL (ref 3.5–5.2)
ALBUMIN SERPL-MCNC: 2.7 G/DL (ref 3.5–5.2)
ALBUMIN SERPL-MCNC: 3 G/DL (ref 3.5–5.2)
ALBUMIN/GLOB SERPL: 0.7 G/DL
ALBUMIN/GLOB SERPL: 0.8 G/DL
ALP SERPL-CCNC: 100 U/L (ref 39–117)
ALP SERPL-CCNC: 71 U/L (ref 39–117)
ALP SERPL-CCNC: 78 U/L (ref 39–117)
ALT SERPL W P-5'-P-CCNC: 16 U/L (ref 1–33)
ALT SERPL W P-5'-P-CCNC: 17 U/L (ref 1–33)
ALT SERPL W P-5'-P-CCNC: 19 U/L (ref 1–33)
ANION GAP SERPL CALCULATED.3IONS-SCNC: 11.1 MMOL/L (ref 5–15)
ANION GAP SERPL CALCULATED.3IONS-SCNC: 14.9 MMOL/L (ref 5–15)
ANION GAP SERPL CALCULATED.3IONS-SCNC: 17.5 MMOL/L (ref 5–15)
ANION GAP SERPL CALCULATED.3IONS-SCNC: 19 MMOL/L (ref 5–15)
ANION GAP SERPL CALCULATED.3IONS-SCNC: 19.4 MMOL/L (ref 5–15)
ANION GAP SERPL CALCULATED.3IONS-SCNC: 21.3 MMOL/L (ref 5–15)
ANION GAP SERPL CALCULATED.3IONS-SCNC: 28.6 MMOL/L (ref 5–15)
ANISOCYTOSIS BLD QL: ABNORMAL
APTT PPP: 28.7 SECONDS (ref 22.7–35.4)
APTT PPP: 29.9 SECONDS (ref 22.7–35.4)
ARTERIAL PATENCY WRIST A: POSITIVE
AST SERPL-CCNC: 16 U/L (ref 1–32)
AST SERPL-CCNC: 18 U/L (ref 1–32)
AST SERPL-CCNC: 19 U/L (ref 1–32)
ATMOSPHERIC PRESS: 749.9 MMHG
ATMOSPHERIC PRESS: 750.9 MMHG
ATMOSPHERIC PRESS: 752.1 MMHG
B PARAPERT DNA SPEC QL NAA+PROBE: NOT DETECTED
B PERT DNA SPEC QL NAA+PROBE: NOT DETECTED
BACTERIA UR QL AUTO: ABNORMAL /HPF
BASE EXCESS BLDA CALC-SCNC: -11.8 MMOL/L (ref 0–2)
BASE EXCESS BLDA CALC-SCNC: -2.5 MMOL/L (ref 0–2)
BASE EXCESS BLDA CALC-SCNC: -6.2 MMOL/L (ref 0–2)
BDY SITE: ABNORMAL
BH BB BLOOD EXPIRATION DATE: NORMAL
BH BB BLOOD TYPE BARCODE: 6200
BH BB DISPENSE STATUS: NORMAL
BH BB PRODUCT CODE: NORMAL
BH BB UNIT NUMBER: NORMAL
BILIRUB CONJ SERPL-MCNC: 0.7 MG/DL (ref 0–0.3)
BILIRUB INDIRECT SERPL-MCNC: 0.2 MG/DL
BILIRUB SERPL-MCNC: 0.7 MG/DL (ref 0–1.2)
BILIRUB SERPL-MCNC: 0.8 MG/DL (ref 0–1.2)
BILIRUB SERPL-MCNC: 0.9 MG/DL (ref 0–1.2)
BILIRUB UR QL STRIP: NEGATIVE
BLD GP AB SCN SERPL QL: NEGATIVE
BUN SERPL-MCNC: 60 MG/DL (ref 8–23)
BUN SERPL-MCNC: 61 MG/DL (ref 8–23)
BUN SERPL-MCNC: 67 MG/DL (ref 8–23)
BUN SERPL-MCNC: 71 MG/DL (ref 8–23)
BUN SERPL-MCNC: 74 MG/DL (ref 8–23)
BUN SERPL-MCNC: 77 MG/DL (ref 8–23)
BUN SERPL-MCNC: 94 MG/DL (ref 8–23)
BUN/CREAT SERPL: 22.9 (ref 7–25)
BUN/CREAT SERPL: 27.6 (ref 7–25)
BUN/CREAT SERPL: 28 (ref 7–25)
BUN/CREAT SERPL: 28.1 (ref 7–25)
BUN/CREAT SERPL: 34.4 (ref 7–25)
BUN/CREAT SERPL: 35.5 (ref 7–25)
BUN/CREAT SERPL: 44.1 (ref 7–25)
BURR CELLS BLD QL SMEAR: ABNORMAL
C PNEUM DNA NPH QL NAA+NON-PROBE: NOT DETECTED
C-ANCA TITR SER IF: NORMAL TITER
CALCIUM SPEC-SCNC: 6.3 MG/DL (ref 8.6–10.5)
CALCIUM SPEC-SCNC: 7.1 MG/DL (ref 8.6–10.5)
CALCIUM SPEC-SCNC: 7.4 MG/DL (ref 8.6–10.5)
CALCIUM SPEC-SCNC: 7.5 MG/DL (ref 8.6–10.5)
CALCIUM SPEC-SCNC: 7.7 MG/DL (ref 8.6–10.5)
CENTROMERE B AB SER-ACNC: <0.2 AI (ref 0–0.9)
CHLORIDE SERPL-SCNC: 102 MMOL/L (ref 98–107)
CHLORIDE SERPL-SCNC: 102 MMOL/L (ref 98–107)
CHLORIDE SERPL-SCNC: 105 MMOL/L (ref 98–107)
CHLORIDE SERPL-SCNC: 85 MMOL/L (ref 98–107)
CHLORIDE SERPL-SCNC: 95 MMOL/L (ref 98–107)
CHLORIDE SERPL-SCNC: 95 MMOL/L (ref 98–107)
CHLORIDE SERPL-SCNC: 97 MMOL/L (ref 98–107)
CHROMATIN AB SERPL-ACNC: <0.2 AI (ref 0–0.9)
CLARITY UR: CLEAR
CO2 SERPL-SCNC: 14 MMOL/L (ref 22–29)
CO2 SERPL-SCNC: 14.4 MMOL/L (ref 22–29)
CO2 SERPL-SCNC: 14.7 MMOL/L (ref 22–29)
CO2 SERPL-SCNC: 16.6 MMOL/L (ref 22–29)
CO2 SERPL-SCNC: 18.5 MMOL/L (ref 22–29)
CO2 SERPL-SCNC: 21.9 MMOL/L (ref 22–29)
CO2 SERPL-SCNC: 22.1 MMOL/L (ref 22–29)
COAG MIXING STUDY INTERP: ABNORMAL
COLOR UR: YELLOW
CORTIS SERPL-MCNC: 62.2 MCG/DL
CREAT SERPL-MCNC: 1.36 MG/DL (ref 0.57–1)
CREAT SERPL-MCNC: 1.72 MG/DL (ref 0.57–1)
CREAT SERPL-MCNC: 1.95 MG/DL (ref 0.57–1)
CREAT SERPL-MCNC: 2.54 MG/DL (ref 0.57–1)
CREAT SERPL-MCNC: 2.68 MG/DL (ref 0.57–1)
CREAT SERPL-MCNC: 2.74 MG/DL (ref 0.57–1)
CREAT SERPL-MCNC: 4.11 MG/DL (ref 0.57–1)
CREAT UR-MCNC: 48.3 MG/DL
D-LACTATE SERPL-SCNC: 0.9 MMOL/L (ref 0.5–2)
D-LACTATE SERPL-SCNC: 2.2 MMOL/L (ref 0.5–2)
DEPRECATED RDW RBC AUTO: 41.3 FL (ref 37–54)
DEPRECATED RDW RBC AUTO: 42 FL (ref 37–54)
DEPRECATED RDW RBC AUTO: 42.3 FL (ref 37–54)
DEPRECATED RDW RBC AUTO: 44.1 FL (ref 37–54)
DEPRECATED RDW RBC AUTO: 46.3 FL (ref 37–54)
DEVICE COMMENT 2: ABNORMAL
DEVICE COMMENT: ABNORMAL
DEVICE COMMENT: ABNORMAL
DSDNA AB SER-ACNC: <1 IU/ML (ref 0–9)
EGFRCR SERPLBLD CKD-EPI 2021: 11.2 ML/MIN/1.73
EGFRCR SERPLBLD CKD-EPI 2021: 18.2 ML/MIN/1.73
EGFRCR SERPLBLD CKD-EPI 2021: 18.7 ML/MIN/1.73
EGFRCR SERPLBLD CKD-EPI 2021: 20 ML/MIN/1.73
EGFRCR SERPLBLD CKD-EPI 2021: 27.4 ML/MIN/1.73
EGFRCR SERPLBLD CKD-EPI 2021: 31.9 ML/MIN/1.73
EGFRCR SERPLBLD CKD-EPI 2021: 42.3 ML/MIN/1.73
ENA JO1 AB SER-ACNC: <0.2 AI (ref 0–0.9)
ENA RNP AB SER-ACNC: 0.2 AI (ref 0–0.9)
ENA SCL70 AB SER-ACNC: 0.3 AI (ref 0–0.9)
ENA SM AB SER-ACNC: <0.2 AI (ref 0–0.9)
ENA SS-A AB SER-ACNC: <0.2 AI (ref 0–0.9)
ENA SS-B AB SER-ACNC: <0.2 AI (ref 0–0.9)
ERYTHROCYTE [DISTWIDTH] IN BLOOD BY AUTOMATED COUNT: 14.3 % (ref 12.3–15.4)
ERYTHROCYTE [DISTWIDTH] IN BLOOD BY AUTOMATED COUNT: 14.6 % (ref 12.3–15.4)
ERYTHROCYTE [DISTWIDTH] IN BLOOD BY AUTOMATED COUNT: 14.7 % (ref 12.3–15.4)
ERYTHROCYTE [DISTWIDTH] IN BLOOD BY AUTOMATED COUNT: 15.3 % (ref 12.3–15.4)
ERYTHROCYTE [DISTWIDTH] IN BLOOD BY AUTOMATED COUNT: 15.5 % (ref 12.3–15.4)
FIBRINOGEN PPP-MCNC: 1020 MG/DL (ref 219–464)
FLUAV SUBTYP SPEC NAA+PROBE: NOT DETECTED
FLUBV RNA ISLT QL NAA+PROBE: NOT DETECTED
FSP PPP LA-ACNC: ABNORMAL
GBM AB SER IA-ACNC: <0.2 UNITS (ref 0–0.9)
GEN 5 1HR TROPONIN T REFLEX: 41 NG/L
GLOBULIN UR ELPH-MCNC: 3.4 GM/DL
GLOBULIN UR ELPH-MCNC: 3.9 GM/DL
GLUCOSE BLDC GLUCOMTR-MCNC: 102 MG/DL (ref 70–130)
GLUCOSE BLDC GLUCOMTR-MCNC: 105 MG/DL (ref 70–130)
GLUCOSE BLDC GLUCOMTR-MCNC: 136 MG/DL (ref 70–130)
GLUCOSE BLDC GLUCOMTR-MCNC: 161 MG/DL (ref 70–130)
GLUCOSE BLDC GLUCOMTR-MCNC: 181 MG/DL (ref 70–130)
GLUCOSE SERPL-MCNC: 119 MG/DL (ref 65–99)
GLUCOSE SERPL-MCNC: 131 MG/DL (ref 65–99)
GLUCOSE SERPL-MCNC: 131 MG/DL (ref 65–99)
GLUCOSE SERPL-MCNC: 167 MG/DL (ref 65–99)
GLUCOSE SERPL-MCNC: 170 MG/DL (ref 65–99)
GLUCOSE SERPL-MCNC: 183 MG/DL (ref 65–99)
GLUCOSE SERPL-MCNC: 89 MG/DL (ref 65–99)
GLUCOSE UR STRIP-MCNC: NEGATIVE MG/DL
HADV DNA SPEC NAA+PROBE: NOT DETECTED
HBV SURFACE AG SERPL QL IA: NORMAL
HCO3 BLDA-SCNC: 14 MMOL/L (ref 22–28)
HCO3 BLDA-SCNC: 20.6 MMOL/L (ref 22–28)
HCO3 BLDA-SCNC: 24.3 MMOL/L (ref 22–28)
HCOV 229E RNA SPEC QL NAA+PROBE: NOT DETECTED
HCOV HKU1 RNA SPEC QL NAA+PROBE: NOT DETECTED
HCOV NL63 RNA SPEC QL NAA+PROBE: NOT DETECTED
HCOV OC43 RNA SPEC QL NAA+PROBE: NOT DETECTED
HCT VFR BLD AUTO: 21.1 % (ref 34–46.6)
HCT VFR BLD AUTO: 22.2 % (ref 34–46.6)
HCT VFR BLD AUTO: 24.9 % (ref 34–46.6)
HCT VFR BLD AUTO: 25.6 % (ref 34–46.6)
HCT VFR BLD AUTO: 27.7 % (ref 34–46.6)
HEMODILUTION: NO
HGB BLD-MCNC: 7.5 G/DL (ref 12–15.9)
HGB BLD-MCNC: 7.7 G/DL (ref 12–15.9)
HGB BLD-MCNC: 8.4 G/DL (ref 12–15.9)
HGB BLD-MCNC: 9 G/DL (ref 12–15.9)
HGB BLD-MCNC: 9.6 G/DL (ref 12–15.9)
HGB UR QL STRIP.AUTO: ABNORMAL
HMPV RNA NPH QL NAA+NON-PROBE: NOT DETECTED
HOLD SPECIMEN: NORMAL
HOLD SPECIMEN: NORMAL
HPIV1 RNA ISLT QL NAA+PROBE: NOT DETECTED
HPIV2 RNA SPEC QL NAA+PROBE: NOT DETECTED
HPIV3 RNA NPH QL NAA+PROBE: NOT DETECTED
HPIV4 P GENE NPH QL NAA+PROBE: NOT DETECTED
HYALINE CASTS UR QL AUTO: ABNORMAL /LPF
HYPOCHROMIA BLD QL: ABNORMAL
INHALED O2 CONCENTRATION: 90 %
INHALED O2 CONCENTRATION: 94 %
INR PPP: 1.34 (ref 0.9–1.1)
KAPPA LC FREE SER-MCNC: 72.2 MG/L (ref 3.3–19.4)
KAPPA LC FREE/LAMBDA FREE SER: 1.87 {RATIO} (ref 0.26–1.65)
KETONES UR QL STRIP: ABNORMAL
LAB AP CASE REPORT: NORMAL
LAMBDA LC FREE SERPL-MCNC: 38.7 MG/L (ref 5.7–26.3)
LDH SERPL-CCNC: 192 U/L (ref 135–214)
LEUKOCYTE ESTERASE UR QL STRIP.AUTO: NEGATIVE
LYMPHOCYTES # BLD MANUAL: 0.04 10*3/MM3 (ref 0.7–3.1)
LYMPHOCYTES # BLD MANUAL: 0.04 10*3/MM3 (ref 0.7–3.1)
LYMPHOCYTES NFR BLD MANUAL: 10 % (ref 5–12)
Lab: ABNORMAL
Lab: NORMAL
M PNEUMO IGG SER IA-ACNC: NOT DETECTED
MAGNESIUM SERPL-MCNC: 1.3 MG/DL (ref 1.6–2.4)
MAGNESIUM SERPL-MCNC: 1.7 MG/DL (ref 1.6–2.4)
MCH RBC QN AUTO: 27.9 PG (ref 26.6–33)
MCH RBC QN AUTO: 28 PG (ref 26.6–33)
MCH RBC QN AUTO: 28.2 PG (ref 26.6–33)
MCH RBC QN AUTO: 28.5 PG (ref 26.6–33)
MCH RBC QN AUTO: 28.5 PG (ref 26.6–33)
MCHC RBC AUTO-ENTMCNC: 33.7 G/DL (ref 31.5–35.7)
MCHC RBC AUTO-ENTMCNC: 34.7 G/DL (ref 31.5–35.7)
MCHC RBC AUTO-ENTMCNC: 34.7 G/DL (ref 31.5–35.7)
MCHC RBC AUTO-ENTMCNC: 35.2 G/DL (ref 31.5–35.7)
MCHC RBC AUTO-ENTMCNC: 35.5 G/DL (ref 31.5–35.7)
MCV RBC AUTO: 80.2 FL (ref 79–97)
MCV RBC AUTO: 80.4 FL (ref 79–97)
MCV RBC AUTO: 81 FL (ref 79–97)
MCV RBC AUTO: 81.2 FL (ref 79–97)
MCV RBC AUTO: 83 FL (ref 79–97)
MODALITY: ABNORMAL
MONOCYTES # BLD: 0.01 10*3/MM3 (ref 0.1–0.9)
MRSA DNA SPEC QL NAA+PROBE: NORMAL
MYELOPEROXIDASE AB SER IA-ACNC: <0.2 UNITS (ref 0–0.9)
NEUTROPHILS # BLD AUTO: 0 10*3/MM3 (ref 1.7–7)
NEUTROPHILS # BLD AUTO: 0.01 10*3/MM3 (ref 1.7–7)
NEUTROPHILS NFR BLD MANUAL: 0 % (ref 42.7–76)
NEUTROPHILS NFR BLD MANUAL: 20 % (ref 42.7–76)
NITRITE UR QL STRIP: POSITIVE
NOTIFIED WHO: ABNORMAL
NRBC SPEC MANUAL: 10 /100 WBC (ref 0–0.2)
NT-PROBNP SERPL-MCNC: 8395 PG/ML (ref 0–900)
O2 A-A PPRESDIFF RESPIRATORY: 0.1 MMHG
O2 A-A PPRESDIFF RESPIRATORY: 0.1 MMHG
OSMOLALITY SERPL: 301 MOSM/KG (ref 280–301)
OSMOLALITY UR: 252 MOSM/KG
P-ANCA ATYPICAL TITR SER IF: NORMAL TITER
P-ANCA TITR SER IF: NORMAL TITER
PATH REPORT.FINAL DX SPEC: NORMAL
PATHOLOGY REVIEW: YES
PCO2 BLDA: 30.5 MM HG (ref 35–45)
PCO2 BLDA: 44.6 MM HG (ref 35–45)
PCO2 BLDA: 52.1 MM HG (ref 35–45)
PH BLDA: 7.27 PH UNITS (ref 7.35–7.45)
PH BLDA: 7.27 PH UNITS (ref 7.35–7.45)
PH BLDA: 7.28 PH UNITS (ref 7.35–7.45)
PH UR STRIP.AUTO: 5.5 [PH] (ref 5–8)
PHOSPHATE SERPL-MCNC: 4.2 MG/DL (ref 2.5–4.5)
PHOSPHATE SERPL-MCNC: 4.9 MG/DL (ref 2.5–4.5)
PHOSPHATE SERPL-MCNC: 6.2 MG/DL (ref 2.5–4.5)
PLAT MORPH BLD: NORMAL
PLATELET # BLD AUTO: 100 10*3/MM3 (ref 140–450)
PLATELET # BLD AUTO: 11 10*3/MM3 (ref 140–450)
PLATELET # BLD AUTO: 20 10*3/MM3 (ref 140–450)
PLATELET # BLD AUTO: 23 10*3/MM3 (ref 140–450)
PLATELET # BLD AUTO: 4 10*3/MM3 (ref 140–450)
PLATELET # BLD AUTO: 42 10*3/MM3 (ref 140–450)
PLATELETS.RETICULATED NFR BLD AUTO: 1.9 % (ref 0.9–6.5)
PMV BLD AUTO: 10.5 FL (ref 6–12)
PMV BLD AUTO: 10.8 FL (ref 6–12)
PMV BLD AUTO: 10.8 FL (ref 6–12)
PMV BLD AUTO: 12.2 FL (ref 6–12)
PO2 BLD: 74 MM[HG] (ref 0–500)
PO2 BLD: 75 MM[HG] (ref 0–500)
PO2 BLDA: 65.9 MM HG (ref 80–100)
PO2 BLDA: 67.2 MM HG (ref 80–100)
PO2 BLDA: 69.5 MM HG (ref 80–100)
POIKILOCYTOSIS BLD QL SMEAR: ABNORMAL
POTASSIUM SERPL-SCNC: 3 MMOL/L (ref 3.5–5.2)
POTASSIUM SERPL-SCNC: 3.2 MMOL/L (ref 3.5–5.2)
POTASSIUM SERPL-SCNC: 3.2 MMOL/L (ref 3.5–5.2)
POTASSIUM SERPL-SCNC: 3.5 MMOL/L (ref 3.5–5.2)
POTASSIUM SERPL-SCNC: 3.5 MMOL/L (ref 3.5–5.2)
POTASSIUM SERPL-SCNC: 3.6 MMOL/L (ref 3.5–5.2)
POTASSIUM SERPL-SCNC: 4.2 MMOL/L (ref 3.5–5.2)
PROCALCITONIN SERPL-MCNC: 21.7 NG/ML (ref 0–0.25)
PROT ?TM UR-MCNC: 69.5 MG/DL
PROT SERPL-MCNC: 5.9 G/DL (ref 6–8.5)
PROT SERPL-MCNC: 6.2 G/DL (ref 6–8.5)
PROT SERPL-MCNC: 6.9 G/DL (ref 6–8.5)
PROT UR QL STRIP: ABNORMAL
PROT/CREAT UR: 1438.9 MG/G CREA (ref 0–200)
PROTEINASE3 AB SER IA-ACNC: <0.2 UNITS (ref 0–0.9)
PROTHROMBIN TIME: 16.6 SECONDS (ref 11.7–14.2)
PROTHROMBIN TIME: 17 SECONDS (ref 11.7–14.2)
QT INTERVAL: 290 MS
QT INTERVAL: 362 MS
QTC INTERVAL: 453 MS
QTC INTERVAL: 479 MS
RBC # BLD AUTO: 2.63 10*6/MM3 (ref 3.77–5.28)
RBC # BLD AUTO: 2.76 10*6/MM3 (ref 3.77–5.28)
RBC # BLD AUTO: 3 10*6/MM3 (ref 3.77–5.28)
RBC # BLD AUTO: 3.16 10*6/MM3 (ref 3.77–5.28)
RBC # BLD AUTO: 3.41 10*6/MM3 (ref 3.77–5.28)
RBC # UR STRIP: ABNORMAL /HPF
READ BACK: YES
REF LAB TEST METHOD: ABNORMAL
RH BLD: POSITIVE
RHINOVIRUS RNA SPEC NAA+PROBE: NOT DETECTED
RSV RNA NPH QL NAA+NON-PROBE: NOT DETECTED
SAO2 % BLDCOA: 89.7 % (ref 92–98.5)
SAO2 % BLDCOA: 90 % (ref 92–98.5)
SAO2 % BLDCOA: 91.4 % (ref 92–98.5)
SARS-COV-2 RNA NPH QL NAA+NON-PROBE: NOT DETECTED
SET MECH RESP RATE: 18
SMALL PLATELETS BLD QL SMEAR: ABNORMAL
SODIUM SERPL-SCNC: 128 MMOL/L (ref 136–145)
SODIUM SERPL-SCNC: 130 MMOL/L (ref 136–145)
SODIUM SERPL-SCNC: 131 MMOL/L (ref 136–145)
SODIUM SERPL-SCNC: 131 MMOL/L (ref 136–145)
SODIUM SERPL-SCNC: 138 MMOL/L (ref 136–145)
SODIUM SERPL-SCNC: 138 MMOL/L (ref 136–145)
SODIUM SERPL-SCNC: 139 MMOL/L (ref 136–145)
SODIUM UR-SCNC: 37 MMOL/L
SP GR UR STRIP: 1.01 (ref 1–1.03)
SPHEROCYTES BLD QL SMEAR: ABNORMAL
SQUAMOUS #/AREA URNS HPF: ABNORMAL /HPF
T&S EXPIRATION DATE: NORMAL
TOTAL RATE: 22 BREATHS/MINUTE
TOTAL RATE: 23 BREATHS/MINUTE
TROPONIN T % DELTA: -16
TROPONIN T NUMERIC DELTA: -8 NG/L
TROPONIN T SERPL HS-MCNC: 49 NG/L
TSH SERPL DL<=0.05 MIU/L-ACNC: 0.58 UIU/ML (ref 0.27–4.2)
UNIT  ABO: NORMAL
UNIT  RH: NORMAL
URATE SERPL-MCNC: 7.5 MG/DL (ref 2.4–5.7)
UROBILINOGEN UR QL STRIP: ABNORMAL
VANCOMYCIN SERPL-MCNC: 10.1 MCG/ML (ref 5–40)
VANCOMYCIN SERPL-MCNC: 16.9 MCG/ML (ref 5–40)
VARIANT LYMPHS NFR BLD MANUAL: 100 % (ref 19.6–45.3)
VARIANT LYMPHS NFR BLD MANUAL: 70 % (ref 19.6–45.3)
VENTILATOR MODE: ABNORMAL
VT ON VENT VENT: 500 ML
WBC # UR STRIP: ABNORMAL /HPF
WBC MORPH BLD: NORMAL
WBC MORPH BLD: NORMAL
WBC NRBC COR # BLD AUTO: 0.04 10*3/MM3 (ref 3.4–10.8)
WBC NRBC COR # BLD AUTO: 0.05 10*3/MM3 (ref 3.4–10.8)
WBC NRBC COR # BLD AUTO: 0.06 10*3/MM3 (ref 3.4–10.8)
WHOLE BLOOD HOLD COAG: NORMAL
WHOLE BLOOD HOLD SPECIMEN: NORMAL

## 2025-01-01 PROCEDURE — 25010000002 MAGNESIUM SULFATE 2 GM/50ML SOLUTION: Performed by: INTERNAL MEDICINE

## 2025-01-01 PROCEDURE — 87040 BLOOD CULTURE FOR BACTERIA: CPT | Performed by: STUDENT IN AN ORGANIZED HEALTH CARE EDUCATION/TRAINING PROGRAM

## 2025-01-01 PROCEDURE — 86235 NUCLEAR ANTIGEN ANTIBODY: CPT

## 2025-01-01 PROCEDURE — 25010000002 MORPHINE PER 10 MG

## 2025-01-01 PROCEDURE — 93005 ELECTROCARDIOGRAM TRACING: CPT

## 2025-01-01 PROCEDURE — 86901 BLOOD TYPING SEROLOGIC RH(D): CPT | Performed by: EMERGENCY MEDICINE

## 2025-01-01 PROCEDURE — 36600 WITHDRAWAL OF ARTERIAL BLOOD: CPT

## 2025-01-01 PROCEDURE — 94761 N-INVAS EAR/PLS OXIMETRY MLT: CPT

## 2025-01-01 PROCEDURE — 86850 RBC ANTIBODY SCREEN: CPT | Performed by: EMERGENCY MEDICINE

## 2025-01-01 PROCEDURE — 94799 UNLISTED PULMONARY SVC/PX: CPT

## 2025-01-01 PROCEDURE — 85362 FIBRIN DEGRADATION PRODUCTS: CPT | Performed by: STUDENT IN AN ORGANIZED HEALTH CARE EDUCATION/TRAINING PROGRAM

## 2025-01-01 PROCEDURE — 80053 COMPREHEN METABOLIC PANEL: CPT

## 2025-01-01 PROCEDURE — 25010000002 POTASSIUM CHLORIDE 10 MEQ/100ML SOLUTION

## 2025-01-01 PROCEDURE — 83516 IMMUNOASSAY NONANTIBODY: CPT

## 2025-01-01 PROCEDURE — 84550 ASSAY OF BLOOD/URIC ACID: CPT

## 2025-01-01 PROCEDURE — 25810000003 LACTATED RINGERS PER 1000 ML

## 2025-01-01 PROCEDURE — 87522 HEPATITIS C REVRS TRNSCRPJ: CPT

## 2025-01-01 PROCEDURE — 83735 ASSAY OF MAGNESIUM: CPT

## 2025-01-01 PROCEDURE — 25810000003 SODIUM CHLORIDE 0.9 % SOLUTION: Performed by: EMERGENCY MEDICINE

## 2025-01-01 PROCEDURE — 84484 ASSAY OF TROPONIN QUANT: CPT | Performed by: STUDENT IN AN ORGANIZED HEALTH CARE EDUCATION/TRAINING PROGRAM

## 2025-01-01 PROCEDURE — 85025 COMPLETE CBC W/AUTO DIFF WBC: CPT

## 2025-01-01 PROCEDURE — 94660 CPAP INITIATION&MGMT: CPT

## 2025-01-01 PROCEDURE — 86037 ANCA TITER EACH ANTIBODY: CPT

## 2025-01-01 PROCEDURE — 84300 ASSAY OF URINE SODIUM: CPT

## 2025-01-01 PROCEDURE — 82570 ASSAY OF URINE CREATININE: CPT

## 2025-01-01 PROCEDURE — 82803 BLOOD GASES ANY COMBINATION: CPT

## 2025-01-01 PROCEDURE — 25010000002 MIDAZOLAM PER 1 MG: Performed by: HOSPITALIST

## 2025-01-01 PROCEDURE — 25010000002 HYDROCORTISONE SOD SUC (PF) 100 MG RECONSTITUTED SOLUTION: Performed by: INTERNAL MEDICINE

## 2025-01-01 PROCEDURE — 84145 PROCALCITONIN (PCT): CPT | Performed by: STUDENT IN AN ORGANIZED HEALTH CARE EDUCATION/TRAINING PROGRAM

## 2025-01-01 PROCEDURE — 25010000002 CALCIUM GLUCONATE 2-0.675 GM/100ML-% SOLUTION

## 2025-01-01 PROCEDURE — 83615 LACTATE (LD) (LDH) ENZYME: CPT | Performed by: HOSPITALIST

## 2025-01-01 PROCEDURE — 83605 ASSAY OF LACTIC ACID: CPT | Performed by: STUDENT IN AN ORGANIZED HEALTH CARE EDUCATION/TRAINING PROGRAM

## 2025-01-01 PROCEDURE — 86900 BLOOD TYPING SEROLOGIC ABO: CPT | Performed by: EMERGENCY MEDICINE

## 2025-01-01 PROCEDURE — 71045 X-RAY EXAM CHEST 1 VIEW: CPT

## 2025-01-01 PROCEDURE — 80202 ASSAY OF VANCOMYCIN: CPT | Performed by: HOSPITALIST

## 2025-01-01 PROCEDURE — 85007 BL SMEAR W/DIFF WBC COUNT: CPT | Performed by: STUDENT IN AN ORGANIZED HEALTH CARE EDUCATION/TRAINING PROGRAM

## 2025-01-01 PROCEDURE — 25010000002 DIAZEPAM PER 5 MG

## 2025-01-01 PROCEDURE — 83880 ASSAY OF NATRIURETIC PEPTIDE: CPT | Performed by: STUDENT IN AN ORGANIZED HEALTH CARE EDUCATION/TRAINING PROGRAM

## 2025-01-01 PROCEDURE — 82533 TOTAL CORTISOL: CPT | Performed by: INTERNAL MEDICINE

## 2025-01-01 PROCEDURE — 82948 REAGENT STRIP/BLOOD GLUCOSE: CPT

## 2025-01-01 PROCEDURE — 86334 IMMUNOFIX E-PHORESIS SERUM: CPT

## 2025-01-01 PROCEDURE — 5A09357 ASSISTANCE WITH RESPIRATORY VENTILATION, LESS THAN 24 CONSECUTIVE HOURS, CONTINUOUS POSITIVE AIRWAY PRESSURE: ICD-10-PCS | Performed by: HOSPITALIST

## 2025-01-01 PROCEDURE — 80053 COMPREHEN METABOLIC PANEL: CPT | Performed by: STUDENT IN AN ORGANIZED HEALTH CARE EDUCATION/TRAINING PROGRAM

## 2025-01-01 PROCEDURE — 81001 URINALYSIS AUTO W/SCOPE: CPT | Performed by: STUDENT IN AN ORGANIZED HEALTH CARE EDUCATION/TRAINING PROGRAM

## 2025-01-01 PROCEDURE — 25010000002 MORPHINE PER 10 MG: Performed by: HOSPITALIST

## 2025-01-01 PROCEDURE — 85055 RETICULATED PLATELET ASSAY: CPT | Performed by: STUDENT IN AN ORGANIZED HEALTH CARE EDUCATION/TRAINING PROGRAM

## 2025-01-01 PROCEDURE — 25810000003 DEXTROSE 5 % AND SODIUM CHLORIDE 0.9 % 5-0.9 % SOLUTION 1,000 ML FLEX CONT: Performed by: HOSPITALIST

## 2025-01-01 PROCEDURE — 83930 ASSAY OF BLOOD OSMOLALITY: CPT

## 2025-01-01 PROCEDURE — 25010000002 METHYLPREDNISOLONE PER 125 MG: Performed by: STUDENT IN AN ORGANIZED HEALTH CARE EDUCATION/TRAINING PROGRAM

## 2025-01-01 PROCEDURE — 86225 DNA ANTIBODY NATIVE: CPT

## 2025-01-01 PROCEDURE — 36430 TRANSFUSION BLD/BLD COMPNT: CPT

## 2025-01-01 PROCEDURE — 63710000001 INSULIN REGULAR HUMAN PER 5 UNITS

## 2025-01-01 PROCEDURE — 25010000002 CEFEPIME PER 500 MG

## 2025-01-01 PROCEDURE — 85384 FIBRINOGEN ACTIVITY: CPT | Performed by: STUDENT IN AN ORGANIZED HEALTH CARE EDUCATION/TRAINING PROGRAM

## 2025-01-01 PROCEDURE — 99222 1ST HOSP IP/OBS MODERATE 55: CPT | Performed by: INTERNAL MEDICINE

## 2025-01-01 PROCEDURE — 25010000002 VANCOMYCIN 10 G RECONSTITUTED SOLUTION: Performed by: EMERGENCY MEDICINE

## 2025-01-01 PROCEDURE — 83935 ASSAY OF URINE OSMOLALITY: CPT

## 2025-01-01 PROCEDURE — 25010000002 CEFEPIME PER 500 MG: Performed by: EMERGENCY MEDICINE

## 2025-01-01 PROCEDURE — 84100 ASSAY OF PHOSPHORUS: CPT

## 2025-01-01 PROCEDURE — 36415 COLL VENOUS BLD VENIPUNCTURE: CPT | Performed by: STUDENT IN AN ORGANIZED HEALTH CARE EDUCATION/TRAINING PROGRAM

## 2025-01-01 PROCEDURE — 25010000002 DIGOXIN PER 500 MCG

## 2025-01-01 PROCEDURE — 93005 ELECTROCARDIOGRAM TRACING: CPT | Performed by: STUDENT IN AN ORGANIZED HEALTH CARE EDUCATION/TRAINING PROGRAM

## 2025-01-01 PROCEDURE — P9035 PLATELET PHERES LEUKOREDUCED: HCPCS

## 2025-01-01 PROCEDURE — 85611 PROTHROMBIN TEST: CPT | Performed by: STUDENT IN AN ORGANIZED HEALTH CARE EDUCATION/TRAINING PROGRAM

## 2025-01-01 PROCEDURE — 94664 DEMO&/EVAL PT USE INHALER: CPT

## 2025-01-01 PROCEDURE — 87340 HEPATITIS B SURFACE AG IA: CPT

## 2025-01-01 PROCEDURE — 25810000003 LACTATED RINGERS SOLUTION

## 2025-01-01 PROCEDURE — 82248 BILIRUBIN DIRECT: CPT

## 2025-01-01 PROCEDURE — 25810000003 SODIUM CHLORIDE 0.9 % SOLUTION: Performed by: STUDENT IN AN ORGANIZED HEALTH CARE EDUCATION/TRAINING PROGRAM

## 2025-01-01 PROCEDURE — 85730 THROMBOPLASTIN TIME PARTIAL: CPT | Performed by: STUDENT IN AN ORGANIZED HEALTH CARE EDUCATION/TRAINING PROGRAM

## 2025-01-01 PROCEDURE — P9100 PATHOGEN TEST FOR PLATELETS: HCPCS

## 2025-01-01 PROCEDURE — 85025 COMPLETE CBC W/AUTO DIFF WBC: CPT | Performed by: STUDENT IN AN ORGANIZED HEALTH CARE EDUCATION/TRAINING PROGRAM

## 2025-01-01 PROCEDURE — 36415 COLL VENOUS BLD VENIPUNCTURE: CPT

## 2025-01-01 PROCEDURE — 99232 SBSQ HOSP IP/OBS MODERATE 35: CPT | Performed by: STUDENT IN AN ORGANIZED HEALTH CARE EDUCATION/TRAINING PROGRAM

## 2025-01-01 PROCEDURE — 25010000002 FILGRASTIM 480 MCG/0.8ML SOLUTION PREFILLED SYRINGE: Performed by: STUDENT IN AN ORGANIZED HEALTH CARE EDUCATION/TRAINING PROGRAM

## 2025-01-01 PROCEDURE — 25010000002 HYDROMORPHONE PER 4 MG

## 2025-01-01 PROCEDURE — 85610 PROTHROMBIN TIME: CPT | Performed by: STUDENT IN AN ORGANIZED HEALTH CARE EDUCATION/TRAINING PROGRAM

## 2025-01-01 PROCEDURE — 85027 COMPLETE CBC AUTOMATED: CPT

## 2025-01-01 PROCEDURE — 99291 CRITICAL CARE FIRST HOUR: CPT

## 2025-01-01 PROCEDURE — 85027 COMPLETE CBC AUTOMATED: CPT | Performed by: HOSPITALIST

## 2025-01-01 PROCEDURE — 87076 CULTURE ANAEROBE IDENT EACH: CPT | Performed by: STUDENT IN AN ORGANIZED HEALTH CARE EDUCATION/TRAINING PROGRAM

## 2025-01-01 PROCEDURE — 84156 ASSAY OF PROTEIN URINE: CPT

## 2025-01-01 PROCEDURE — 25010000002 FUROSEMIDE PER 20 MG: Performed by: HOSPITALIST

## 2025-01-01 PROCEDURE — 25010000002 POTASSIUM CHLORIDE 10 MEQ/100ML SOLUTION: Performed by: INTERNAL MEDICINE

## 2025-01-01 PROCEDURE — 85732 THROMBOPLASTIN TIME PARTIAL: CPT | Performed by: STUDENT IN AN ORGANIZED HEALTH CARE EDUCATION/TRAINING PROGRAM

## 2025-01-01 PROCEDURE — 0202U NFCT DS 22 TRGT SARS-COV-2: CPT | Performed by: STUDENT IN AN ORGANIZED HEALTH CARE EDUCATION/TRAINING PROGRAM

## 2025-01-01 PROCEDURE — 82784 ASSAY IGA/IGD/IGG/IGM EACH: CPT

## 2025-01-01 PROCEDURE — 25010000002 FENTANYL CITRATE (PF) 50 MCG/ML SOLUTION: Performed by: EMERGENCY MEDICINE

## 2025-01-01 PROCEDURE — 93010 ELECTROCARDIOGRAM REPORT: CPT | Performed by: INTERNAL MEDICINE

## 2025-01-01 PROCEDURE — 94640 AIRWAY INHALATION TREATMENT: CPT

## 2025-01-01 PROCEDURE — 84443 ASSAY THYROID STIM HORMONE: CPT | Performed by: INTERNAL MEDICINE

## 2025-01-01 PROCEDURE — 83521 IG LIGHT CHAINS FREE EACH: CPT

## 2025-01-01 PROCEDURE — 87641 MR-STAPH DNA AMP PROBE: CPT | Performed by: HOSPITALIST

## 2025-01-01 PROCEDURE — 99222 1ST HOSP IP/OBS MODERATE 55: CPT | Performed by: STUDENT IN AN ORGANIZED HEALTH CARE EDUCATION/TRAINING PROGRAM

## 2025-01-01 PROCEDURE — 25010000002 FAMOTIDINE 10 MG/ML SOLUTION

## 2025-01-01 PROCEDURE — 93010 ELECTROCARDIOGRAM REPORT: CPT | Performed by: STUDENT IN AN ORGANIZED HEALTH CARE EDUCATION/TRAINING PROGRAM

## 2025-01-01 RX ORDER — VANCOMYCIN 2 GRAM/500 ML IN 0.9 % SODIUM CHLORIDE INTRAVENOUS
20 ONCE
Status: COMPLETED | OUTPATIENT
Start: 2025-01-01 | End: 2025-01-01

## 2025-01-01 RX ORDER — MAGNESIUM SULFATE HEPTAHYDRATE 40 MG/ML
2 INJECTION, SOLUTION INTRAVENOUS ONCE
Status: COMPLETED | OUTPATIENT
Start: 2025-01-01 | End: 2025-01-01

## 2025-01-01 RX ORDER — SODIUM CHLORIDE 0.9 % (FLUSH) 0.9 %
10 SYRINGE (ML) INJECTION EVERY 12 HOURS SCHEDULED
Status: DISCONTINUED | OUTPATIENT
Start: 2025-01-01 | End: 2025-01-01 | Stop reason: HOSPADM

## 2025-01-01 RX ORDER — SODIUM CHLORIDE 0.9 % (FLUSH) 0.9 %
10 SYRINGE (ML) INJECTION AS NEEDED
Status: DISCONTINUED | OUTPATIENT
Start: 2025-01-01 | End: 2025-01-01 | Stop reason: HOSPADM

## 2025-01-01 RX ORDER — CALCIUM GLUCONATE 20 MG/ML
2000 INJECTION, SOLUTION INTRAVENOUS ONCE
Status: COMPLETED | OUTPATIENT
Start: 2025-01-01 | End: 2025-01-01

## 2025-01-01 RX ORDER — DIAZEPAM 10 MG/2ML
5 INJECTION, SOLUTION INTRAMUSCULAR; INTRAVENOUS EVERY 6 HOURS PRN
Status: DISCONTINUED | OUTPATIENT
Start: 2025-01-01 | End: 2025-01-01 | Stop reason: HOSPADM

## 2025-01-01 RX ORDER — SODIUM CHLORIDE 0.9 % (FLUSH) 0.9 %
20 SYRINGE (ML) INJECTION AS NEEDED
Status: DISCONTINUED | OUTPATIENT
Start: 2025-01-01 | End: 2025-01-01 | Stop reason: HOSPADM

## 2025-01-01 RX ORDER — FAMOTIDINE 10 MG/ML
20 INJECTION, SOLUTION INTRAVENOUS EVERY 12 HOURS SCHEDULED
Status: DISCONTINUED | OUTPATIENT
Start: 2025-01-01 | End: 2025-01-01

## 2025-01-01 RX ORDER — DIGOXIN 0.25 MG/ML
250 INJECTION INTRAMUSCULAR; INTRAVENOUS ONCE
Status: COMPLETED | OUTPATIENT
Start: 2025-01-01 | End: 2025-01-01

## 2025-01-01 RX ORDER — NITROGLYCERIN 0.4 MG/1
0.4 TABLET SUBLINGUAL
Status: DISCONTINUED | OUTPATIENT
Start: 2025-01-01 | End: 2025-01-01 | Stop reason: HOSPADM

## 2025-01-01 RX ORDER — BUDESONIDE AND FORMOTEROL FUMARATE DIHYDRATE 160; 4.5 UG/1; UG/1
2 AEROSOL RESPIRATORY (INHALATION)
Status: DISCONTINUED | OUTPATIENT
Start: 2025-01-01 | End: 2025-01-01

## 2025-01-01 RX ORDER — ONDANSETRON 2 MG/ML
4 INJECTION INTRAMUSCULAR; INTRAVENOUS EVERY 6 HOURS PRN
Status: DISCONTINUED | OUTPATIENT
Start: 2025-01-01 | End: 2025-01-01 | Stop reason: HOSPADM

## 2025-01-01 RX ORDER — PHENYLEPHRINE HCL IN 0.9% NACL 0.5 MG/5ML
.5-3 SYRINGE (ML) INTRAVENOUS
Status: DISCONTINUED | OUTPATIENT
Start: 2025-01-01 | End: 2025-01-01

## 2025-01-01 RX ORDER — FUROSEMIDE 10 MG/ML
40 INJECTION INTRAMUSCULAR; INTRAVENOUS ONCE
Status: COMPLETED | OUTPATIENT
Start: 2025-01-01 | End: 2025-01-01

## 2025-01-01 RX ORDER — NICOTINE POLACRILEX 4 MG
15 LOZENGE BUCCAL
Status: DISCONTINUED | OUTPATIENT
Start: 2025-01-01 | End: 2025-01-01 | Stop reason: HOSPADM

## 2025-01-01 RX ORDER — POLYETHYLENE GLYCOL 3350 17 G/17G
17 POWDER, FOR SOLUTION ORAL DAILY PRN
Status: DISCONTINUED | OUTPATIENT
Start: 2025-01-01 | End: 2025-01-01 | Stop reason: HOSPADM

## 2025-01-01 RX ORDER — ALBUTEROL SULFATE 0.83 MG/ML
2.5 SOLUTION RESPIRATORY (INHALATION)
Status: COMPLETED | OUTPATIENT
Start: 2025-01-01 | End: 2025-01-01

## 2025-01-01 RX ORDER — MORPHINE SULFATE 2 MG/ML
INJECTION, SOLUTION INTRAMUSCULAR; INTRAVENOUS
Status: COMPLETED
Start: 2025-01-01 | End: 2025-01-01

## 2025-01-01 RX ORDER — HYDRALAZINE HYDROCHLORIDE 20 MG/ML
10 INJECTION INTRAMUSCULAR; INTRAVENOUS
Status: DISCONTINUED | OUTPATIENT
Start: 2025-01-01 | End: 2025-01-01

## 2025-01-01 RX ORDER — POTASSIUM CHLORIDE 7.45 MG/ML
10 INJECTION INTRAVENOUS
Status: COMPLETED | OUTPATIENT
Start: 2025-01-01 | End: 2025-01-01

## 2025-01-01 RX ORDER — IBUPROFEN 600 MG/1
1 TABLET ORAL
Status: DISCONTINUED | OUTPATIENT
Start: 2025-01-01 | End: 2025-01-01 | Stop reason: HOSPADM

## 2025-01-01 RX ORDER — HYDROCORTISONE SODIUM SUCCINATE 100 MG/2ML
50 INJECTION INTRAMUSCULAR; INTRAVENOUS EVERY 6 HOURS
Status: DISCONTINUED | OUTPATIENT
Start: 2025-01-01 | End: 2025-01-01 | Stop reason: HOSPADM

## 2025-01-01 RX ORDER — HYDROMORPHONE HYDROCHLORIDE 1 MG/ML
0.5 INJECTION, SOLUTION INTRAMUSCULAR; INTRAVENOUS; SUBCUTANEOUS
Status: DISCONTINUED | OUTPATIENT
Start: 2025-01-01 | End: 2025-01-01 | Stop reason: HOSPADM

## 2025-01-01 RX ORDER — BISACODYL 10 MG
10 SUPPOSITORY, RECTAL RECTAL DAILY PRN
Status: DISCONTINUED | OUTPATIENT
Start: 2025-01-01 | End: 2025-01-01 | Stop reason: HOSPADM

## 2025-01-01 RX ORDER — MIDAZOLAM HYDROCHLORIDE 1 MG/ML
2 INJECTION, SOLUTION INTRAMUSCULAR; INTRAVENOUS
Status: DISCONTINUED | OUTPATIENT
Start: 2025-01-01 | End: 2025-01-01 | Stop reason: HOSPADM

## 2025-01-01 RX ORDER — SODIUM CHLORIDE, SODIUM LACTATE, POTASSIUM CHLORIDE, CALCIUM CHLORIDE 600; 310; 30; 20 MG/100ML; MG/100ML; MG/100ML; MG/100ML
100 INJECTION, SOLUTION INTRAVENOUS CONTINUOUS
Status: DISCONTINUED | OUTPATIENT
Start: 2025-01-01 | End: 2025-01-01

## 2025-01-01 RX ORDER — HEPARIN SODIUM (PORCINE) LOCK FLUSH IV SOLN 100 UNIT/ML 100 UNIT/ML
5 SOLUTION INTRAVENOUS AS NEEDED
Status: DISCONTINUED | OUTPATIENT
Start: 2025-01-01 | End: 2025-01-01 | Stop reason: HOSPADM

## 2025-01-01 RX ORDER — MORPHINE SULFATE 2 MG/ML
2 INJECTION, SOLUTION INTRAMUSCULAR; INTRAVENOUS
Status: DISCONTINUED | OUTPATIENT
Start: 2025-01-01 | End: 2025-01-01 | Stop reason: HOSPADM

## 2025-01-01 RX ORDER — NOREPINEPHRINE BITARTRATE 0.03 MG/ML
.02-.3 INJECTION, SOLUTION INTRAVENOUS
Status: DISCONTINUED | OUTPATIENT
Start: 2025-01-01 | End: 2025-01-01

## 2025-01-01 RX ORDER — POTASSIUM CHLORIDE 7.45 MG/ML
10 INJECTION INTRAVENOUS
Status: DISPENSED | OUTPATIENT
Start: 2025-01-01 | End: 2025-01-01

## 2025-01-01 RX ORDER — ACETAMINOPHEN 325 MG/1
650 TABLET ORAL EVERY 4 HOURS PRN
Status: DISCONTINUED | OUTPATIENT
Start: 2025-01-01 | End: 2025-01-01 | Stop reason: HOSPADM

## 2025-01-01 RX ORDER — MORPHINE SULFATE 2 MG/ML
1 INJECTION, SOLUTION INTRAMUSCULAR; INTRAVENOUS ONCE
Status: COMPLETED | OUTPATIENT
Start: 2025-01-01 | End: 2025-01-01

## 2025-01-01 RX ORDER — IPRATROPIUM BROMIDE AND ALBUTEROL SULFATE 2.5; .5 MG/3ML; MG/3ML
3 SOLUTION RESPIRATORY (INHALATION)
Status: DISCONTINUED | OUTPATIENT
Start: 2025-01-01 | End: 2025-01-01

## 2025-01-01 RX ORDER — FAMOTIDINE 10 MG/ML
20 INJECTION, SOLUTION INTRAVENOUS DAILY
Status: DISCONTINUED | OUTPATIENT
Start: 2025-01-01 | End: 2025-01-01 | Stop reason: HOSPADM

## 2025-01-01 RX ORDER — LEVOTHYROXINE SODIUM 175 UG/1
175 TABLET ORAL
Status: DISCONTINUED | OUTPATIENT
Start: 2025-01-01 | End: 2025-01-01 | Stop reason: HOSPADM

## 2025-01-01 RX ORDER — HYDROCODONE BITARTRATE AND ACETAMINOPHEN 5; 325 MG/1; MG/1
1 TABLET ORAL EVERY 6 HOURS PRN
Refills: 0 | Status: DISCONTINUED | OUTPATIENT
Start: 2025-01-01 | End: 2025-01-01 | Stop reason: HOSPADM

## 2025-01-01 RX ORDER — AMOXICILLIN 250 MG
2 CAPSULE ORAL 2 TIMES DAILY
Status: DISCONTINUED | OUTPATIENT
Start: 2025-01-01 | End: 2025-01-01 | Stop reason: HOSPADM

## 2025-01-01 RX ORDER — DEXMEDETOMIDINE HYDROCHLORIDE 4 UG/ML
.2-1.5 INJECTION, SOLUTION INTRAVENOUS
Status: DISCONTINUED | OUTPATIENT
Start: 2025-01-01 | End: 2025-01-01

## 2025-01-01 RX ORDER — SODIUM CHLORIDE 9 MG/ML
40 INJECTION, SOLUTION INTRAVENOUS AS NEEDED
Status: DISCONTINUED | OUTPATIENT
Start: 2025-01-01 | End: 2025-01-01 | Stop reason: HOSPADM

## 2025-01-01 RX ORDER — ACETAMINOPHEN 650 MG/1
650 SUPPOSITORY RECTAL EVERY 4 HOURS PRN
Status: DISCONTINUED | OUTPATIENT
Start: 2025-01-01 | End: 2025-01-01 | Stop reason: HOSPADM

## 2025-01-01 RX ORDER — BISACODYL 5 MG/1
5 TABLET, DELAYED RELEASE ORAL DAILY PRN
Status: DISCONTINUED | OUTPATIENT
Start: 2025-01-01 | End: 2025-01-01 | Stop reason: HOSPADM

## 2025-01-01 RX ORDER — METHYLPREDNISOLONE SODIUM SUCCINATE 125 MG/2ML
125 INJECTION, POWDER, LYOPHILIZED, FOR SOLUTION INTRAMUSCULAR; INTRAVENOUS ONCE
Status: COMPLETED | OUTPATIENT
Start: 2025-01-01 | End: 2025-01-01

## 2025-01-01 RX ORDER — DEXTROSE MONOHYDRATE 25 G/50ML
25 INJECTION, SOLUTION INTRAVENOUS
Status: DISCONTINUED | OUTPATIENT
Start: 2025-01-01 | End: 2025-01-01 | Stop reason: HOSPADM

## 2025-01-01 RX ORDER — FENTANYL CITRATE 50 UG/ML
50 INJECTION, SOLUTION INTRAMUSCULAR; INTRAVENOUS ONCE
Refills: 0 | Status: COMPLETED | OUTPATIENT
Start: 2025-01-01 | End: 2025-01-01

## 2025-01-01 RX ADMIN — INSULIN HUMAN 2 UNITS: 100 INJECTION, SOLUTION PARENTERAL at 17:18

## 2025-01-01 RX ADMIN — HYDROCORTISONE SODIUM SUCCINATE 50 MG: 100 INJECTION, POWDER, FOR SOLUTION INTRAMUSCULAR; INTRAVENOUS at 00:26

## 2025-01-01 RX ADMIN — MUPIROCIN 1 APPLICATION: 20 OINTMENT TOPICAL at 08:20

## 2025-01-01 RX ADMIN — METHYLPREDNISOLONE SODIUM SUCCINATE 125 MG: 125 INJECTION, POWDER, FOR SOLUTION INTRAMUSCULAR; INTRAVENOUS at 23:02

## 2025-01-01 RX ADMIN — Medication 10 ML: at 00:51

## 2025-01-01 RX ADMIN — IPRATROPIUM BROMIDE AND ALBUTEROL SULFATE 3 ML: .5; 3 SOLUTION RESPIRATORY (INHALATION) at 01:54

## 2025-01-01 RX ADMIN — HYDROCORTISONE SODIUM SUCCINATE 50 MG: 100 INJECTION, POWDER, FOR SOLUTION INTRAMUSCULAR; INTRAVENOUS at 19:51

## 2025-01-01 RX ADMIN — VANCOMYCIN HYDROCHLORIDE 2000 MG: 10 INJECTION, POWDER, LYOPHILIZED, FOR SOLUTION INTRAVENOUS at 00:52

## 2025-01-01 RX ADMIN — IPRATROPIUM BROMIDE AND ALBUTEROL SULFATE 3 ML: .5; 3 SOLUTION RESPIRATORY (INHALATION) at 12:31

## 2025-01-01 RX ADMIN — SODIUM CHLORIDE 2000 MG: 9 INJECTION, SOLUTION INTRAVENOUS at 02:47

## 2025-01-01 RX ADMIN — DEXMEDETOMIDINE HYDROCHLORIDE 1.5 MCG/KG/HR: 400 INJECTION INTRAVENOUS at 09:07

## 2025-01-01 RX ADMIN — SODIUM CHLORIDE, SODIUM LACTATE, POTASSIUM CHLORIDE, CALCIUM CHLORIDE 1980 ML: 20; 30; 600; 310 INJECTION, SOLUTION INTRAVENOUS at 23:04

## 2025-01-01 RX ADMIN — DIAZEPAM 5 MG: 5 INJECTION INTRAMUSCULAR; INTRAVENOUS at 13:54

## 2025-01-01 RX ADMIN — Medication 10 ML: at 08:33

## 2025-01-01 RX ADMIN — HYDROCORTISONE SODIUM SUCCINATE 50 MG: 100 INJECTION, POWDER, FOR SOLUTION INTRAMUSCULAR; INTRAVENOUS at 06:15

## 2025-01-01 RX ADMIN — MUPIROCIN 1 APPLICATION: 20 OINTMENT TOPICAL at 20:01

## 2025-01-01 RX ADMIN — POTASSIUM CHLORIDE 10 MEQ: 7.46 INJECTION, SOLUTION INTRAVENOUS at 10:38

## 2025-01-01 RX ADMIN — DEXMEDETOMIDINE HYDROCHLORIDE 0.4 MCG/KG/HR: 400 INJECTION INTRAVENOUS at 14:24

## 2025-01-01 RX ADMIN — CALCIUM GLUCONATE 2000 MG: 20 INJECTION, SOLUTION INTRAVENOUS at 09:34

## 2025-01-01 RX ADMIN — DEXMEDETOMIDINE HYDROCHLORIDE 0.6 MCG/KG/HR: 400 INJECTION INTRAVENOUS at 21:58

## 2025-01-01 RX ADMIN — DEXMEDETOMIDINE HYDROCHLORIDE 1.5 MCG/KG/HR: 400 INJECTION INTRAVENOUS at 00:26

## 2025-01-01 RX ADMIN — NOREPINEPHRINE BITARTRATE 0.02 MCG/KG/MIN: 0.03 INJECTION, SOLUTION INTRAVENOUS at 00:00

## 2025-01-01 RX ADMIN — SODIUM CHLORIDE 1000 ML: 9 INJECTION, SOLUTION INTRAVENOUS at 22:29

## 2025-01-01 RX ADMIN — SODIUM BICARBONATE: 84 INJECTION, SOLUTION INTRAVENOUS at 00:25

## 2025-01-01 RX ADMIN — Medication 0.5 MCG/KG/MIN: at 02:48

## 2025-01-01 RX ADMIN — POTASSIUM CHLORIDE 10 MEQ: 7.46 INJECTION, SOLUTION INTRAVENOUS at 01:35

## 2025-01-01 RX ADMIN — Medication 1.8 MCG/KG/MIN: at 14:25

## 2025-01-01 RX ADMIN — MORPHINE SULFATE 2 MG: 2 INJECTION, SOLUTION INTRAMUSCULAR; INTRAVENOUS at 11:37

## 2025-01-01 RX ADMIN — POTASSIUM CHLORIDE 10 MEQ: 7.46 INJECTION, SOLUTION INTRAVENOUS at 23:33

## 2025-01-01 RX ADMIN — NOREPINEPHRINE BITARTRATE 0.08 MCG/KG/MIN: 0.03 INJECTION, SOLUTION INTRAVENOUS at 01:11

## 2025-01-01 RX ADMIN — Medication 10 ML: at 20:01

## 2025-01-01 RX ADMIN — ALBUTEROL SULFATE 2.5 MG: 2.5 SOLUTION RESPIRATORY (INHALATION) at 23:28

## 2025-01-01 RX ADMIN — IPRATROPIUM BROMIDE AND ALBUTEROL SULFATE 3 ML: .5; 3 SOLUTION RESPIRATORY (INHALATION) at 07:16

## 2025-01-01 RX ADMIN — DIAZEPAM 5 MG: 5 INJECTION INTRAMUSCULAR; INTRAVENOUS at 02:56

## 2025-01-01 RX ADMIN — POTASSIUM CHLORIDE 10 MEQ: 7.46 INJECTION, SOLUTION INTRAVENOUS at 00:29

## 2025-01-01 RX ADMIN — SODIUM CHLORIDE, POTASSIUM CHLORIDE, SODIUM LACTATE AND CALCIUM CHLORIDE 1000 ML: 600; 310; 30; 20 INJECTION, SOLUTION INTRAVENOUS at 03:11

## 2025-01-01 RX ADMIN — Medication 1.8 MCG/KG/MIN: at 09:31

## 2025-01-01 RX ADMIN — DEXMEDETOMIDINE HYDROCHLORIDE 0.2 MCG/KG/HR: 400 INJECTION INTRAVENOUS at 03:26

## 2025-01-01 RX ADMIN — DIAZEPAM 5 MG: 5 INJECTION INTRAMUSCULAR; INTRAVENOUS at 04:18

## 2025-01-01 RX ADMIN — FUROSEMIDE 40 MG: 10 INJECTION, SOLUTION INTRAMUSCULAR; INTRAVENOUS at 09:07

## 2025-01-01 RX ADMIN — POTASSIUM CHLORIDE 10 MEQ: 7.46 INJECTION, SOLUTION INTRAVENOUS at 08:01

## 2025-01-01 RX ADMIN — POTASSIUM CHLORIDE 10 MEQ: 7.46 INJECTION, SOLUTION INTRAVENOUS at 21:57

## 2025-01-01 RX ADMIN — MORPHINE SULFATE 1 MG: 2 INJECTION, SOLUTION INTRAMUSCULAR; INTRAVENOUS at 08:49

## 2025-01-01 RX ADMIN — IPRATROPIUM BROMIDE AND ALBUTEROL SULFATE 3 ML: .5; 3 SOLUTION RESPIRATORY (INHALATION) at 07:23

## 2025-01-01 RX ADMIN — CEFEPIME 2000 MG: 2 INJECTION, POWDER, FOR SOLUTION INTRAVENOUS at 00:15

## 2025-01-01 RX ADMIN — DEXMEDETOMIDINE HYDROCHLORIDE 1.5 MCG/KG/HR: 400 INJECTION INTRAVENOUS at 03:31

## 2025-01-01 RX ADMIN — FENTANYL CITRATE 50 MCG: 50 INJECTION, SOLUTION INTRAMUSCULAR; INTRAVENOUS at 23:56

## 2025-01-01 RX ADMIN — Medication 2 MCG/KG/MIN: at 19:51

## 2025-01-01 RX ADMIN — IPRATROPIUM BROMIDE AND ALBUTEROL SULFATE 3 ML: .5; 3 SOLUTION RESPIRATORY (INHALATION) at 20:03

## 2025-01-01 RX ADMIN — HYDROMORPHONE HYDROCHLORIDE 0.5 MG: 1 INJECTION, SOLUTION INTRAMUSCULAR; INTRAVENOUS; SUBCUTANEOUS at 14:10

## 2025-01-01 RX ADMIN — INSULIN HUMAN 2 UNITS: 100 INJECTION, SOLUTION PARENTERAL at 05:18

## 2025-01-01 RX ADMIN — POTASSIUM CHLORIDE 10 MEQ: 7.46 INJECTION, SOLUTION INTRAVENOUS at 11:38

## 2025-01-01 RX ADMIN — Medication 0.5 MCG/KG/MIN: at 03:10

## 2025-01-01 RX ADMIN — HYDROMORPHONE HYDROCHLORIDE 0.5 MG: 1 INJECTION, SOLUTION INTRAMUSCULAR; INTRAVENOUS; SUBCUTANEOUS at 03:31

## 2025-01-01 RX ADMIN — NOREPINEPHRINE BITARTRATE 0.04 MCG/KG/MIN: 0.03 INJECTION, SOLUTION INTRAVENOUS at 00:51

## 2025-01-01 RX ADMIN — DEXMEDETOMIDINE HYDROCHLORIDE 1.5 MCG/KG/HR: 400 INJECTION INTRAVENOUS at 06:08

## 2025-01-01 RX ADMIN — HYDROCORTISONE SODIUM SUCCINATE 50 MG: 100 INJECTION, POWDER, FOR SOLUTION INTRAMUSCULAR; INTRAVENOUS at 08:16

## 2025-01-01 RX ADMIN — FILGRASTIM 480 MCG: 480 INJECTION, SOLUTION INTRAVENOUS; SUBCUTANEOUS at 13:37

## 2025-01-01 RX ADMIN — ZINC OXIDE 1 APPLICATION: 200 OINTMENT TOPICAL at 20:01

## 2025-01-01 RX ADMIN — Medication 10 ML: at 09:14

## 2025-01-01 RX ADMIN — POTASSIUM CHLORIDE 10 MEQ: 7.46 INJECTION, SOLUTION INTRAVENOUS at 05:19

## 2025-01-01 RX ADMIN — SODIUM CHLORIDE, POTASSIUM CHLORIDE, SODIUM LACTATE AND CALCIUM CHLORIDE 1000 ML: 600; 310; 30; 20 INJECTION, SOLUTION INTRAVENOUS at 02:14

## 2025-01-01 RX ADMIN — INSULIN HUMAN 2 UNITS: 100 INJECTION, SOLUTION PARENTERAL at 23:40

## 2025-01-01 RX ADMIN — MORPHINE SULFATE 2 MG: 2 INJECTION, SOLUTION INTRAMUSCULAR; INTRAVENOUS at 09:58

## 2025-01-01 RX ADMIN — HYDROCORTISONE SODIUM SUCCINATE 50 MG: 100 INJECTION, POWDER, FOR SOLUTION INTRAMUSCULAR; INTRAVENOUS at 13:38

## 2025-01-01 RX ADMIN — DIGOXIN 250 MCG: 0.25 INJECTION INTRAMUSCULAR; INTRAVENOUS at 03:26

## 2025-01-01 RX ADMIN — FAMOTIDINE 20 MG: 10 INJECTION, SOLUTION INTRAVENOUS at 02:18

## 2025-01-01 RX ADMIN — SODIUM CHLORIDE, POTASSIUM CHLORIDE, SODIUM LACTATE AND CALCIUM CHLORIDE 100 ML/HR: 600; 310; 30; 20 INJECTION, SOLUTION INTRAVENOUS at 08:49

## 2025-01-01 RX ADMIN — SODIUM CHLORIDE 2000 MG: 9 INJECTION, SOLUTION INTRAVENOUS at 02:20

## 2025-01-01 RX ADMIN — POTASSIUM CHLORIDE 10 MEQ: 7.46 INJECTION, SOLUTION INTRAVENOUS at 06:15

## 2025-01-01 RX ADMIN — MUPIROCIN 1 APPLICATION: 20 OINTMENT TOPICAL at 02:23

## 2025-01-01 RX ADMIN — MORPHINE SULFATE 2 MG: 2 INJECTION, SOLUTION INTRAMUSCULAR; INTRAVENOUS at 13:54

## 2025-01-01 RX ADMIN — SODIUM BICARBONATE: 84 INJECTION, SOLUTION INTRAVENOUS at 12:16

## 2025-01-01 RX ADMIN — MAGNESIUM SULFATE IN WATER FOR 2 G: 40 INJECTION INTRAVENOUS at 22:17

## 2025-01-01 RX ADMIN — ALBUTEROL SULFATE 2.5 MG: 2.5 SOLUTION RESPIRATORY (INHALATION) at 23:13

## 2025-01-01 RX ADMIN — MIDAZOLAM 2 MG: 1 INJECTION INTRAMUSCULAR; INTRAVENOUS at 09:50

## 2025-01-20 RX ORDER — ALBUTEROL SULFATE 90 UG/1
INHALANT RESPIRATORY (INHALATION)
Qty: 18 G | Refills: 2 | Status: SHIPPED | OUTPATIENT
Start: 2025-01-20

## 2025-02-05 DIAGNOSIS — E03.9 ACQUIRED HYPOTHYROIDISM: ICD-10-CM

## 2025-02-05 RX ORDER — LEVOTHYROXINE SODIUM 137 UG/1
TABLET ORAL
Qty: 90 TABLET | Refills: 1 | Status: SHIPPED | OUTPATIENT
Start: 2025-02-05

## 2025-02-24 ENCOUNTER — HOSPITAL ENCOUNTER (OUTPATIENT)
Dept: CT IMAGING | Facility: HOSPITAL | Age: 70
Discharge: HOME | End: 2025-02-24
Admitting: INTERNAL MEDICINE
Payer: MEDICARE

## 2025-02-24 DIAGNOSIS — C34.32 MALIGNANT NEOPLASM OF LOWER LOBE OF LEFT LUNG: ICD-10-CM

## 2025-02-24 PROCEDURE — 71260 CT THORAX DX C+: CPT

## 2025-02-24 PROCEDURE — 25510000001 IOPAMIDOL 61 % SOLUTION: Performed by: INTERNAL MEDICINE

## 2025-02-24 PROCEDURE — 74177 CT ABD & PELVIS W/CONTRAST: CPT

## 2025-02-24 RX ORDER — IOPAMIDOL 612 MG/ML
100 INJECTION, SOLUTION INTRAVASCULAR
Status: COMPLETED | OUTPATIENT
Start: 2025-02-24 | End: 2025-02-24

## 2025-02-24 RX ADMIN — IOPAMIDOL 85 ML: 612 INJECTION, SOLUTION INTRAVENOUS at 11:33

## 2025-02-25 NOTE — PROGRESS NOTES
"Chief Complaint  Non-small cell lung cancer, clinical stage IIIA ([id number]), aortic stenosis, cervical spine stenosis, COPD, hepatic steatosis, diabetes mellitus    Subjective        History of Present Illness  Patient returns today in follow-up with CT scans and laboratory studies to review.  In the interval since her last visit, the patient reports that she has done relatively well.  She remains in a wheelchair today which she uses for longer distances, ambulates short distances.  She has some mild chronic dyspnea on exertion that is unchanged.  She denies any current back pain, does report that she had a fall in her bathroom just after Christmas holiday and had fairly intense pain for a few days afterwards however this subsequently resolved.  She reports a normal appetite, weight today is fairly stable at 215 pounds.        Objective   Vital Signs:   /81   Pulse 68   Temp 97.5 °F (36.4 °C) (Oral)   Ht 157.5 cm (62.01\")   Wt 97.7 kg (215 lb 6.4 oz)   SpO2 93%   BMI 39.39 kg/m²     Physical Exam  Constitutional:       Appearance: She is well-developed.      Comments: Patient remains in a wheelchair today in the office   Eyes:      Conjunctiva/sclera: Conjunctivae normal.   Neck:      Thyroid: No thyromegaly.   Cardiovascular:      Rate and Rhythm: Normal rate and regular rhythm.      Heart sounds: No murmur heard.     No friction rub. No gallop.   Pulmonary:      Effort: No respiratory distress.      Breath sounds: No wheezing.      Comments: Diminished breath sounds bilaterally  Abdominal:      General: Bowel sounds are normal. There is no distension.      Palpations: Abdomen is soft.      Tenderness: There is no abdominal tenderness.   Musculoskeletal:         General: Swelling present.      Comments: 1+ bilateral lower extremity edema, left chronically greater than right   Lymphadenopathy:      Head:      Right side of head: No submandibular adenopathy.      Cervical: No cervical adenopathy.      " Upper Body:      Right upper body: No supraclavicular adenopathy.      Left upper body: No supraclavicular adenopathy.   Skin:     General: Skin is warm and dry.      Findings: No rash.   Neurological:      Mental Status: She is alert and oriented to person, place, and time.      Cranial Nerves: No cranial nerve deficit.      Motor: No abnormal muscle tone.      Deep Tendon Reflexes: Reflexes normal.   Psychiatric:         Behavior: Behavior normal.           Result Review : Reviewed CBC, CMP from today.  Reviewed CT chest abdomen pelvis 2/20/2025       Assessment and Plan     *Non-small cell lung cancer, clinical stage IIIA ([id number])  Patient has history of smoking 1 pack/day x 45 years, quit in November 2021.    Following anterior cervical corpectomy in September 2022, she experienced acute hypoxemic respiratory failure.    CT angiogram chest 9/25/2022 showed a concerning 1.4 cm left lower lobe pulmonary nodule, mediastinal lymphadenopathy with infracarinal lymph node 2.7 cm, left hilar lymph node 1.2 cm, small right hilar lymph nodes up from 1.1 cm and additional enlarged mediastinal nodes in the right paratracheal, precarinal, infracarinal spaces.  There was a wedge-shaped area of consolidation/atelectasis in the right middle lobe base, subpleural density left costophrenic sulcus felt to represent atelectasis.  It was recommended for her to undergo subsequent PET scan by pulmonary.  The patient has had ongoing follow-up with neurosurgery and on CT scans 11/29/2022 of cervical and thoracic spine, there was concern regarding spinal instability with potential need for posterior cervical fusion.  This was scheduled in December 2022 however was delayed due to upper respiratory infection.  Patient admitted on 1/10/2023 to address multiple medical issues in order to prepare for potential neurosurgical procedure on 3/12/2023.  Patient underwent echocardiogram on 1/11/2023 with ejection fraction greater than 70%, grade  1 diastolic dysfunction, severe aortic stenosis.  Patient is being followed by cardiology with consideration of need for TAVR versus SAVR for moderate to severe aortic stenosis.  Repeat CT chest on 1/12/2023 showed slight increase in size of the left lower lobe pulmonary nodule increased from 1.4 to 1.5 cm.  Groundglass nodule peripheral left lower lobe less conspicuous and resolution of previous reticulonodular opacities right lower lobe.  There was further enlargement of mediastinal lymph nodes with right posterior paratracheal lymph node 1.4 x 2.1 up to 1.5 x 2.3 cm, subcarinal lymph node increased from 2.7 x 5.2 up to 3.2 x 5.8 cm, left hilar lymph node increased from 1.2 up to 1.4 cm, and is stable 1.7 cm left infrahilar lymph node.  There was also comment regarding hepatic steatosis with changes of chronic liver disease and stable indeterminate nodular thickening of the adrenal glands.  CT-guided lung biopsy on 1/13/2023 with pathology that showed invasive poorly differentiated non-small cell carcinoma with focal neuroendocrine differentiation.  Staining pattern and morphology favor poorly differentiated adenocarcinoma however there was focal CD56 staining, could not entirely exclude large cell neuroendocrine carcinoma.  PD-L1 35%, Caris analysis: PD-L1 10% (), no targetable Tatian's  It was felt that there would be increased risk for bronchoscopy from a cardiac standpoint due to her aortic stenosis.  Staging MRI brain 1/29/2023 showed no evidence of metastatic disease  Staging PET scan 1/26/2023 showed hypermetabolic left lower lobe pulmonary nodule, size difficult to determine on PET however on recent CT was 1.6 cm (SUV 14.4).  Mediastinal and left hilar hypermetabolic lymphadenopathy (subcarinal lymph node 3.2 cm, SUV 31.7, left paratracheal 2.2 cm lymph node SUV 12.7).  Sub-6 mm pulmonary nodule left upper lobe unchanged and below PET resolution.  Focal uptake posterior right hepatic lobe SUV 7.2 of  uncertain significance.  Recommended MRI to evaluate.  Right thyroid activity SUV 5.3, recommended ultrasound.  Long segment uptake distal esophagus consistent with esophagitis.  Uptake posterior larynx, nonspecific, recommended direct visualization.  MRI abdomen 2/6/2023 with no evidence to suggest metastatic disease in the liver.  Subcentimeter nonenhancing lesions favor cyst or hemangiomas.  Patient underwent right and left heart catheterization with cardiology on 2/14/2023.  Identification of coronary artery disease, mild pulmonary hypertension, severe aortic stenosis.  Recommendation per cardiology to proceed with treatment for lung cancer and defer any intervention regarding aortic stenosis.  Concern regarding underlying neurologic issues from cervical stenosis and potential neuropathic effects from Taxol chemotherapy.  Concern regarding patient's overall performance status and ability to tolerate full dose chemotherapy.  Patient discussed at thoracic oncology tumor board with consensus to treat with weekly low-dose carboplatin concurrent with radiation therapy and omit Taxol.  Subsequent plan for 1 year durvalumab following concurrent chemoradiation.  Significant delay in initiating concurrent chemoradiation due to logistics with scheduling Mediport placement, patient canceled appointments and delayed initiation of treatment.  On 3/21/2023 initiated concurrent chemoradiation with weekly carboplatin (AUC 2) on 2/28/2023.  Week 4 carboplatin held 4/11 and again 4/18/2023 due to thrombocytopenia  Week 4 carboplatin resumed on 4/25/2023 at reduced dose to AUC 1 due to borderline neutropenia  Chemo and radiation held beginning 5/2/2023 due to neutropenia with WBC 1.13, ANC 0.53  Resumed radiation therapy on 5/8/2023 with recovery of WBC.  Received fifth and final weekly dose of concurrent carboplatin (AUC 1) on 5/9/2023.  Patient completed radiation therapy on 5/12/2023.  CT chest abdomen pelvis 5/25/2023 with  significant improvement in mediastinal and left hilar lymphadenopathy, decrease in left lower lobe nodule indicating significant response.  On 5/30/2023 initiated treatment with durvalumab 10 mg/kg every 2 weeks x 1 year.  Transitioned to durvalumab 1500 mg every 4-week dosing with cycle 4 on 7/18/2023.  TAVR CT chest abdomen pelvis 7/21/2023 with decreased left lower lobe nodule from 8 down to 6 mm, stable mediastinal lymph nodes (with the exception of 1 small superior mediastinal lymph node increased from 4 to 7 mm of unclear significance).  Upper abdominal lymph nodes with slight increase in the periportal, hepatogastric, peripancreatic regions.  Following 5 cycles durvalumab, PET scan 9/5/2023 with left lower lobe ill-defined consolidation and opacification likely representing postradiation change.  No mediastinal lymphadenopathy nor hypermetabolic activity.  Slight enlargement (0.7 up to 1.0 cm) of anterior pericardial lymph node with new hypermetabolic activity (SUV 5.5.  Stable epigastric adenopathy with mild hypermetabolic activity (SUV 4.4) unchanged from 1/26/2023 scan (although not noted on that report).  Nondisplaced, healing left seventh rib fracture.  Significance of the pericardial lymph node and epigastric lymphadenopathy unclear.  Neither area easily accessible for CT-guided biopsy after discussion with interventional radiology on 9/12/2023.  Elected to monitor on repeat PET scan at 2-month interval.  PET scan 10/31/2023 with stable mildly hypermetabolic left lower lobe segmental consolidation, indeterminate regarding residual malignancy versus inflammatory change.  Prior epicardial lymph node decreased in size to less than 1 cm with no residual hypermetabolic activity.  Subcentimeter periceliac lymph node with slight increase in SUV from 3.9 up to 6.5, indeterminate.  Plan 3-month interval PET scan  PET scan 1/16/2024 with decrease in extent of left lower lobe irregular nodular pulmonary  opacification, SUV decreased slightly from 3.9 down to 3.3.  Stable borderline epigastric, mesenteric, aortocaval lymph nodes with stable activity.  Stable intense uptake base of tongue and floor of mouth.  Stable subcentimeter left upper lobe pulmonary nodule.  PET scan findings felt to be indeterminate, left lower lobe findings likely related to prior radiation therapy, no definitive evidence of residual or recurrent disease.  Plan repeat PET scan at 4-month interval at completion of Durvalumab.  Patient received final planned cycle of Durvalumab (cycle 14) on 4/23/2024.  PET scan following completion of Durvalumab on 5/17/2024 showed new 12 cm x 4.5 cm abnormality in the paramediastinal left lower lobe with bandlike characteristics and low-level SUV (3) consistent with evolving postradiation change.  Hypermetabolic periceliac lymph nodes unchanged (reference lymph node 1.2 cm with SUV 5).  2 hypermetabolic abdominal para-aortic lymph nodes with reference lymph node 1.2 cm, SUV 7 indeterminate, possibly reactive/inflammatory versus metastatic disease.  CT chest abdomen pelvis on 8/6/2024 showed more organized consolidation left lower lobe with appearance consistent with evolving postradiation change.  Vague micronodules left lower lobe felt to be infectious/inflammatory.  Stable retroperitoneal and gastrohepatic lymph nodes.  CT chest abdomen pelvis 11/6/2024 showed left perihilar consolidation extending to the left lower lobe less pronounced.  Areas of curvilinear density left lower lobe suspected related to mucous plugging and atelectasis.  Enlarging aortocaval and left periaortic lymph nodes, aortocaval lymph node increased from 1.3 up to 2.3 cm, left periaortic lymph node increased from 1.3 up to 2.1 cm.  PET scan 11/20/2024 showed masslike consolidation in the perihilar aspect left lower lobe to have decreased in size.  Small focus of uptake over left calvin mildly above other areas of consolidation,  indeterminate (SUV 3.8).  Intensely hypermetabolic abdominal and pelvic lymphadenopathy.  Aortocaval lymph node index lesion near duodenum increased from 1.2 up to 1.7 cm (SUV 32.9 (, index lymph node or portal vein and inferior vena cava with increased from 0.7 up to 1 cm with SUV 8.8.   CT-guided biopsy left retroperitoneal lymph node on 12/5/2024 with pathology that showed benign lymph node with focal calcification, no evidence of malignancy.  CT chest abdomen pelvis on 2/24/2025 showed new compression fracture at T2.  Increase in abdominal lymph nodes (aortocaval 2 up to 2.8 cm, gastrohepatic 0.9 up to 1.5 cm).  Slight increase in normal size mediastinal lymph nodes (0.6 up to 0.9 cm, 0.5 up to 0.7 cm).  Stable residual left lower lobe mass 6.1 x 3.4 x 3.5 cm.  Possible 0.6 cm hypodense lesion posterior pancreatic body.  Lobulated liver and stable spleen mildly enlarged at 13.4 cm.  Returns today with 3-month interval CT scan to review.  There has been several development of a fracture.  Patient does recall a fall after [place] in her bathroom with resulting pain that lasted for a number of days and then resolved, likely because of the compression fracture.  There is no evidence on CT scan to suggest that this is a pathologic fracture.  I did recommend that we obtain MRI thoracic spine.  There was enlargement of previous hypermetabolic abdominal lymph nodes that is concerning despite the previous negative CT-guided biopsy result.  We did discuss obtaining follow-up PET scan and subsequent consideration of need for repeat biopsy.  Noted that the patient does have underlying lobulated liver and splenomegaly suggestive of cirrhosis.  Can often see mild reactive lymphadenopathy sclerosis however would not expect this to have as significant degree of hypermetabolism as seen on prior PET scan.  Finally, there was notation of a possible 0.6 cm mass in the pancreatic body, indeterminant.  We will obtain MRI abdomen to  further delineate this area as well as PET scan.  I will see patient back in 2 weeks to review the MRI studies and PET scan and we will then determine whether we should proceed with further biopsy.    *Aortic stenosis  Patient underwent echocardiogram on 1/11/2023 with ejection fraction greater than 70%, grade 1 diastolic dysfunction, severe aortic stenosis.    Patient followed by cardiology with consideration of need for TAVR versus SAVR for moderate to severe aortic stenosis.  Patient underwent cardiac catheterization 2/14/2023 with identification of coronary artery disease, mild pulmonary hypertension, severe aortic stenosis.  Per cardiology, recommendation to proceed with treatment for lung cancer and defer any consideration of intervention for aortic stenosis.  Patient did undergo TAVR on 8/1/2023  Echocardiogram on 8/2/2023 with ejection fraction 62.4%, TAVR present  Patient continues follow-up with cardiology     *Cervical spine stenosis  Patient with cervical spine stenosis with associated myelopathy.   She underwent surgery on 9/20/2022 with anterior cervical corpectomy with cage.      The patient has had ongoing follow-up with neurosurgery and on CT scans 11/29/2022 of cervical and thoracic spine, there was concern regarding spinal instability with potential need for posterior cervical fusion.  This was scheduled in December 2022 however was delayed due to upper respiratory infection.  Patient admitted on 1/10/2023 to address multiple medical issues in order to prepare for potential neurosurgical procedure on 3/12/2023.  Patient was previously having severe symptoms related to her cervical stenosis with reduced ability to ambulate previously and significant limitations in movement in her hands, dropping objects.  More recently however she has improved and has been able to walk with the use of a walker, symptoms in the upper extremities have improved and her dexterity is better.    It appears that her  neurosurgical procedure will need to be delayed until we can at least complete the concurrent chemoradiation portion of her treatment.  Surgery would be feasible while continuing on immunotherapy in the future.  Neurosurgical follow-up has been placed on hold.  Patient improvement over time in mobility and symptoms in her extremities.  She does not intend to proceed with further surgery in the future.     *COPD  Patient has history of smoking 1 pack/day x 45 years, quit in November 2021.  Patient has not undergone formal PFTs  Patient being followed by pulmonary, currently using albuterol inhaler as needed  Patient with recent stable respiratory symptoms     *Hepatic steatosis  Identified on prior scans  PET scan 1/26/2023 with questionable area of activity seen but no corresponding CT abnormality.    MRI abdomen 2/6/2023 with no evidence to suggest metastatic disease in the liver.  Subcentimeter nonenhancing lesions favor cyst or hemangiomas.  CT abdomen on 2/24/2025 with evidence of lobulated liver and stable mild splenomegaly to 13.4 cm, suggestive of underlying cirrhosis and hypersplenism  LFTs remain normal     *Diabetes mellitus    *Anxiety/depression  Patient currently continuing on Wellbutrin  mg daily  Patient reported increasing frequency and severity of panic attacks.  She had previously been on Lexapro in addition to Wellbutrin however notes that she has been off Lexapro for approximately a year as it was not refilled by her PCP.  Patient was referred back to supportive oncology for recommendations on further treatment.  Patient indicated that she has been treated with multiple antidepressants, some of which were ineffective.  Consider possibly resuming Lexapro.  Patient did not follow through with referral to supportive oncology.  She currently reports that symptoms are stable    *Right thyroid uptake on PET scan 1/26/2023  Patient was scheduled for thyroid ultrasound however did not feel that she  was able to undergo the procedure due to her neck issues and therefore was canceled, consider at future date  No mention of thyroid uptake on PET scan 9/5/2023 and on 10/31/2023.  No uptake on PET scan 1/16/2024, thyroid appeared atrophic  PET scan on 5/17/2024 with no mention of activity in the thyroid  PET scan 11/20/2024 with no thyroid uptake identified    *Laryngeal uptake on PET scan 1/26/2023, subsequent PET uptake and floor of mouth and base of tongue 1/16/2024  Patient was seen by ENT on 3/28/2023, laryngoscopy showed mild edema in the posterior glottis consistent with laryngeal pharyngeal reflux, no evidence of malignancy.  No mention of laryngeal uptake on PET scan 9/5/2023 nor on 10/31/2023  PET scan 1/16/2024 showed persistent significant uptake floor of mouth and base of tongue.  The area was visualized today on exam with no abnormal findings.  She does wear dentures that do not fit properly, unclear whether this could be contributing to the activity.    Patient was referred to ENT, had outstanding balance with 1 practice, difficulty with referral to another practice and was never seen  Patient was ultimately not seen by ENT.  Subsequent PET scan with no significant abnormal activity in the head or neck areas and no abnormal findings on exam.  Elected to forego referral to ENT.    *Esophageal uptake on PET scan 1/26/2023  Waldo to be consistent with esophagitis in the distal esophagus.  We will hold on referral for EGD given the multitude of additional procedures and consults required above.  No mention of esophageal uptake on PET scan 9/5/2023 nor on 10/31/2023 no oral on 1/16/2024  No mention of esophageal uptake on PET scan 5/17/2024  No mention of uptake in the esophagus and PET scan 11/20/2024.    *Venous access  Mediport placed by Dr. Shook on 3/2/2023  We will maintain Mediport in place after completion of patient's planned treatment due to risk of recurrent disease, plan port flushes every 4  to 6 weeks.    *Nutrition/weight loss  Patient has been seen by oncology nutrition  Patient overall has lost a significant amount of weight however weight subsequently stabilized  Appetite and weight have improved.  Weight has fluctuated, today slightly decreased at 215 pounds    *Chronic left greater than right lower extremity edema with subsequent left inguinal/lower extremity hematoma following TAVR  Patient developed exacerbation of chronic edema in the lower extremities, left slightly greater than right.    Bilateral lower extremity Doppler on 5/9/2023 was negative, showed bilateral popliteal fluid collections.  Patient with evidence of left inguinal/left lower extremity hematoma following TAVR with ecchymosis tracking into the distal portion of the left lower extremity and ecchymosis laterally in the left hip.  Likely explains decline in hemoglobin and low haptoglobin post procedure.  PET scan 9/5/2023 with evidence of fluid collection/blood in the left inguinal region.  Edema increased, asymmetric with left greater than right.  Lower extremity Doppler 4/25/2024 with bilateral popliteal fluid collections, no evidence of DVT.  Patient with persistent mild lower extremity edema, chronic asymmetry with left greater than right.      *Hypothyroidism exacerbated by immunotherapy  Hypothyroidism managed by PCP Dr. Danyelle Brush  Labs on 5/30/2023 prior to beginning immunotherapy with evidence of hypothyroidism with TSH 10.5, free T4 0.54.  Levothyroxine dose increased by Dr. Brush from 125 up to 150 mcg daily  Continue to monitor thyroid function studies every 8 weeks on durvalumab  Patient with persistent borderline abnormal thyroid function studies on Durvalumab, referred to endocrinology  On 1/2/2024, significant increase in TSH to 19.7 with free T40.8.  On 1/5/2024 increased levothyroxine dose from 150 up to 175 mcg daily.  Patient is now followed by endocrinology for management of hypothyroidism  Labs on  4/23/2024 with normal TSH suppressed at 0.155 and free T4 increased to 1.94.  Levothyroxine dose decreased by endocrinology to 150 mcg daily.  Labs on 5/21/2024 with TSH 0.122 and free T4 improved into the normal range at 1.69.    Labs on 8/13/2024 with TSH of 0.247 and free T4 elevated at 1.97.  Endocrinology decreased levothyroxine dose to 137 mcg daily  Labs on 11/13/2024 with TSH 1.63, free T4 of 1.44.  Patient continues follow-up with endocrinology    *Chemotherapy-induced leukopenia  WBC today 5.73 with normal differential    *Chemotherapy-induced thrombocytopenia  Platelet count prior to chemotherapy was normal  Platelet count has gradually improved since completion of concurrent chemoradiation  Platelet count on 11/7/2023 declined to 101,000.  Additional labs with IPF 5.5%, B12 342, folate 8.99  Labs on 3/26/2024 with B12 453, folate 12.1, IPF 3.9%, platelet count 108,000  Platelet count today normal at 143,000    *Chemotherapy-induced anemia  Hemoglobin did decline into the high 10-low 11 range on concurrent chemoradiation  Hemoglobin subsequently improved and normalized as of 7/28/2023 with hemoglobin of 13.4  Patient underwent TAVR on 8/1/2023 with significant decline in hemoglobin thereafter, reached a low of 7.1 on 8/8/2023.  Additional labs with cardiology on 8/8/2023 with iron 78, iron saturation 22%, TIBC 350.  Patient was started on oral iron although no evidence of iron deficiency.    Labs on 8/15/2023 with hemoglobin 8.7, iron 129, ferritin 120, iron saturation 39%, TIBC 355, B12 288, folate 7.15, haptoglobin less than 10.  Oral iron discontinued as patient was constipated, had no evidence of iron deficiency.  Initiated oral B12 1000 mcg daily.  Postprocedure anemia and low haptoglobin related to left inguinal/left lower extremity hematoma.  Hemoglobin on 3/26/2024 declined to 11.8.  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  Recommended CTA exam for more definitive evaluation.  Patient was referred to vascular surgery, had to postpone the visit     *T2 compression fracture  CT 2/20/2025 with notation of T2 compression fracture, 20-25% loss of height.  No underlying bone lesion identified  Patient today is asymptomatic from her compression fracture.  Patient does recall a fall after the Christmas holiday in her bathroom with resulting pain for a number of days afterwards that resolved completely.  Presumably this was the cause of her compression fracture.  We will obtain MRI and PET scan to further evaluate for possibility of pathologic fracture.     PLAN:  The patient continues on a course of observation/surveillance having completed definitive treatment for stage IIIA non-small cell lung cancer.  She received concurrent chemoradiation with weekly single agent carboplatin (completed on 5/12/2023).  She subsequently received durvalumab x 1 year, final cycle received on 4/23/2024.  CT-guided biopsy retroperitoneal lymph node 12/5/2024 with benign findings, suspect reactive lymph node  Continue oral B12 1000 mcg daily  Week of 3/10/2025 (per patient request) schedule PET scan, MRI thoracic spine, MRI abdomen  MD visit in 3 weeks with CBC, CMP and we will review above scan results and consider whether repeat CT-guided biopsy is needed    I did spend 40 minutes total time caring for the patient today, time spent in review of records, face-to-face consultation, coordination of care, placement of orders, completion of documentation.

## 2025-02-26 ENCOUNTER — INFUSION (OUTPATIENT)
Dept: ONCOLOGY | Facility: HOSPITAL | Age: 70
End: 2025-02-26
Payer: MEDICARE

## 2025-02-26 ENCOUNTER — OFFICE VISIT (OUTPATIENT)
Dept: ONCOLOGY | Facility: CLINIC | Age: 70
End: 2025-02-26
Payer: MEDICARE

## 2025-02-26 VITALS
OXYGEN SATURATION: 93 % | BODY MASS INDEX: 39.64 KG/M2 | HEART RATE: 68 BPM | SYSTOLIC BLOOD PRESSURE: 159 MMHG | TEMPERATURE: 97.5 F | HEIGHT: 62 IN | DIASTOLIC BLOOD PRESSURE: 81 MMHG | WEIGHT: 215.4 LBS

## 2025-02-26 DIAGNOSIS — Z45.2 ENCOUNTER FOR ADJUSTMENT OR MANAGEMENT OF VASCULAR ACCESS DEVICE: Primary | ICD-10-CM

## 2025-02-26 DIAGNOSIS — C34.32 MALIGNANT NEOPLASM OF LOWER LOBE OF LEFT LUNG: Primary | ICD-10-CM

## 2025-02-26 DIAGNOSIS — C34.32 MALIGNANT NEOPLASM OF LOWER LOBE OF LEFT LUNG: ICD-10-CM

## 2025-02-26 LAB
ALBUMIN SERPL-MCNC: 3.7 G/DL (ref 3.5–5.2)
ALBUMIN/GLOB SERPL: 1.3 G/DL
ALP SERPL-CCNC: 73 U/L (ref 39–117)
ALT SERPL W P-5'-P-CCNC: 9 U/L (ref 1–33)
ANION GAP SERPL CALCULATED.3IONS-SCNC: 12.5 MMOL/L (ref 5–15)
AST SERPL-CCNC: 20 U/L (ref 1–32)
BASOPHILS # BLD AUTO: 0.04 10*3/MM3 (ref 0–0.2)
BASOPHILS NFR BLD AUTO: 0.7 % (ref 0–1.5)
BILIRUB SERPL-MCNC: 0.2 MG/DL (ref 0–1.2)
BUN SERPL-MCNC: 21 MG/DL (ref 8–23)
BUN/CREAT SERPL: 21.6 (ref 7–25)
CALCIUM SPEC-SCNC: 9 MG/DL (ref 8.6–10.5)
CHLORIDE SERPL-SCNC: 102 MMOL/L (ref 98–107)
CO2 SERPL-SCNC: 26.5 MMOL/L (ref 22–29)
CREAT SERPL-MCNC: 0.97 MG/DL (ref 0.57–1)
DEPRECATED RDW RBC AUTO: 47 FL (ref 37–54)
EGFRCR SERPLBLD CKD-EPI 2021: 63.4 ML/MIN/1.73
EOSINOPHIL # BLD AUTO: 0.15 10*3/MM3 (ref 0–0.4)
EOSINOPHIL NFR BLD AUTO: 2.6 % (ref 0.3–6.2)
ERYTHROCYTE [DISTWIDTH] IN BLOOD BY AUTOMATED COUNT: 14 % (ref 12.3–15.4)
GLOBULIN UR ELPH-MCNC: 2.9 GM/DL
GLUCOSE SERPL-MCNC: 101 MG/DL (ref 65–99)
HCT VFR BLD AUTO: 41.6 % (ref 34–46.6)
HGB BLD-MCNC: 13.4 G/DL (ref 12–15.9)
IMM GRANULOCYTES # BLD AUTO: 0.02 10*3/MM3 (ref 0–0.05)
IMM GRANULOCYTES NFR BLD AUTO: 0.3 % (ref 0–0.5)
LYMPHOCYTES # BLD AUTO: 0.88 10*3/MM3 (ref 0.7–3.1)
LYMPHOCYTES NFR BLD AUTO: 15.4 % (ref 19.6–45.3)
MCH RBC QN AUTO: 29.3 PG (ref 26.6–33)
MCHC RBC AUTO-ENTMCNC: 32.2 G/DL (ref 31.5–35.7)
MCV RBC AUTO: 91 FL (ref 79–97)
MONOCYTES # BLD AUTO: 0.68 10*3/MM3 (ref 0.1–0.9)
MONOCYTES NFR BLD AUTO: 11.9 % (ref 5–12)
NEUTROPHILS NFR BLD AUTO: 3.96 10*3/MM3 (ref 1.7–7)
NEUTROPHILS NFR BLD AUTO: 69.1 % (ref 42.7–76)
NRBC BLD AUTO-RTO: 0 /100 WBC (ref 0–0.2)
PLATELET # BLD AUTO: 143 10*3/MM3 (ref 140–450)
PMV BLD AUTO: 9.6 FL (ref 6–12)
POTASSIUM SERPL-SCNC: 4.4 MMOL/L (ref 3.5–5.2)
PROT SERPL-MCNC: 6.6 G/DL (ref 6–8.5)
RBC # BLD AUTO: 4.57 10*6/MM3 (ref 3.77–5.28)
SODIUM SERPL-SCNC: 141 MMOL/L (ref 136–145)
WBC NRBC COR # BLD AUTO: 5.73 10*3/MM3 (ref 3.4–10.8)

## 2025-02-26 PROCEDURE — 25010000002 HEPARIN LOCK FLUSH PER 10 UNITS: Performed by: INTERNAL MEDICINE

## 2025-02-26 PROCEDURE — 36591 DRAW BLOOD OFF VENOUS DEVICE: CPT

## 2025-02-26 PROCEDURE — 80053 COMPREHEN METABOLIC PANEL: CPT

## 2025-02-26 PROCEDURE — 85025 COMPLETE CBC W/AUTO DIFF WBC: CPT

## 2025-02-26 RX ORDER — HEPARIN SODIUM (PORCINE) LOCK FLUSH IV SOLN 100 UNIT/ML 100 UNIT/ML
500 SOLUTION INTRAVENOUS AS NEEDED
Status: DISCONTINUED | OUTPATIENT
Start: 2025-02-26 | End: 2025-02-26 | Stop reason: HOSPADM

## 2025-02-26 RX ORDER — SODIUM CHLORIDE 0.9 % (FLUSH) 0.9 %
10 SYRINGE (ML) INJECTION AS NEEDED
OUTPATIENT
Start: 2025-02-26

## 2025-02-26 RX ORDER — SODIUM CHLORIDE 0.9 % (FLUSH) 0.9 %
10 SYRINGE (ML) INJECTION AS NEEDED
Status: DISCONTINUED | OUTPATIENT
Start: 2025-02-26 | End: 2025-02-26 | Stop reason: HOSPADM

## 2025-02-26 RX ORDER — HEPARIN SODIUM (PORCINE) LOCK FLUSH IV SOLN 100 UNIT/ML 100 UNIT/ML
500 SOLUTION INTRAVENOUS AS NEEDED
OUTPATIENT
Start: 2025-02-26

## 2025-02-26 RX ADMIN — Medication 10 ML: at 14:23

## 2025-02-26 RX ADMIN — Medication 500 UNITS: at 14:23

## 2025-03-06 ENCOUNTER — TELEPHONE (OUTPATIENT)
Age: 70
End: 2025-03-06

## 2025-03-06 NOTE — TELEPHONE ENCOUNTER
"  Caller: Gabby Acosta \"ALE\"    Relationship: Self    Best call back number: [phone]      PATIENT IS REQUESTING A TELEHEALTH APPT RO RESCHEDULE 1.22.25 MISSED FOLLOW UP  "

## 2025-03-10 NOTE — TELEPHONE ENCOUNTER
Reviewed recommendations with Gabby Acosta and the patient verbalized understanding of the recommendations.  She is agreeable to see Dr. Mendoza in the [place] office on 3/27/25 and has been scheduled.      Thank you,  Kia MARIANO RN  Triage Nurse [facility] [place]  03/10/25  15:17 EDT

## 2025-03-10 NOTE — TELEPHONE ENCOUNTER
Patient does need to be seen in the office if it is hard for her to get to our office at the main campus we can see if one of the satellite offices is better for her such as [facility] or [facility].  Let me know if there is better options for her but would be appropriate to see her in the office at some point

## 2025-03-10 NOTE — TELEPHONE ENCOUNTER
Chart review shows multiple cancelled appointments by Gabby Acosta.  Appears patient was due to f/u December 2024, but has cancelled appointments scheduled on 1/2/25 and 1/22/25:  Telephone with Pan Mendoza MD (09/23/2024)     LOV: 9/6/2023 with Dr. Mendoza    Patient is requesting a telehealth visit.    Please let me know how you would like to proceed.    Thank you,  Kia MARIANO RN  Triage Nurse [facility] [place]  03/10/25  12:52 EDT

## 2025-03-13 ENCOUNTER — HOSPITAL ENCOUNTER (OUTPATIENT)
Dept: PET IMAGING | Facility: HOSPITAL | Age: 70
Discharge: HOME | End: 2025-03-13
Payer: MEDICARE

## 2025-03-13 DIAGNOSIS — C34.32 MALIGNANT NEOPLASM OF LOWER LOBE OF LEFT LUNG: ICD-10-CM

## 2025-03-13 LAB — GLUCOSE BLDC GLUCOMTR-MCNC: 122 MG/DL (ref 70–130)

## 2025-03-13 PROCEDURE — 34310000005 FLUDEOXYGLUCOSE F18 SOLUTION: Performed by: INTERNAL MEDICINE

## 2025-03-13 PROCEDURE — 78815 PET IMAGE W/CT SKULL-THIGH: CPT

## 2025-03-13 PROCEDURE — 82948 REAGENT STRIP/BLOOD GLUCOSE: CPT

## 2025-03-13 PROCEDURE — A9552 F18 FDG: HCPCS | Performed by: INTERNAL MEDICINE

## 2025-03-13 RX ADMIN — FLUDEOXYGLUCOSE F 18 1 DOSE: 200 INJECTION, SOLUTION INTRAVENOUS at 08:48

## 2025-03-17 ENCOUNTER — HOSPITAL ENCOUNTER (OUTPATIENT)
Dept: MRI IMAGING | Facility: HOSPITAL | Age: 70
Discharge: HOME | End: 2025-03-17
Payer: MEDICAID

## 2025-03-17 DIAGNOSIS — C34.32 MALIGNANT NEOPLASM OF LOWER LOBE OF LEFT LUNG: ICD-10-CM

## 2025-03-17 PROCEDURE — A9577 INJ MULTIHANCE: HCPCS | Performed by: INTERNAL MEDICINE

## 2025-03-17 PROCEDURE — 72157 MRI CHEST SPINE W/O & W/DYE: CPT

## 2025-03-17 PROCEDURE — 74183 MRI ABD W/O CNTR FLWD CNTR: CPT

## 2025-03-17 PROCEDURE — 25510000002 GADOBENATE DIMEGLUMINE 529 MG/ML SOLUTION: Performed by: INTERNAL MEDICINE

## 2025-03-17 RX ADMIN — GADOBENATE DIMEGLUMINE 19 ML: 529 INJECTION, SOLUTION INTRAVENOUS at 16:27

## 2025-03-19 NOTE — H&P (VIEW-ONLY)
"Chief Complaint  Non-small cell lung cancer, clinical stage IIIA ([id number]), aortic stenosis, cervical spine stenosis, COPD, hepatic steatosis, diabetes mellitus    Subjective        History of Present Illness  Patient returns today in short interval follow-up with laboratory studies, PET scan, MRI thoracic spine and MRI abdomen to review.  We continue with significant concerned that she has recurrent lung cancer however previous retroperitoneal lymph node biopsy 12/5/2024 was negative.  Patient remains in a wheelchair today which she uses for longer distances, ambulates short distances.  She has some mild chronic dyspnea on exertion that is unchanged.  She denies any current back pain, does report that she had a fall in her bathroom just after [place] holiday and had fairly intense pain for a few days afterwards however this subsequently resolved.  She reports a normal appetite, weight today stable at 216 pounds.  She has ECOG performance status of 1.        Objective   Vital Signs:   /78   Pulse 67   Temp 97.6 °F (36.4 °C) (Oral)   Ht 157.5 cm (62.01\")   Wt 98 kg (216 lb)   SpO2 93%   BMI 39.50 kg/m²     Physical Exam  Constitutional:       Appearance: She is well-developed.      Comments: Patient remains in a wheelchair today in the office   Eyes:      Conjunctiva/sclera: Conjunctivae normal.   Neck:      Thyroid: No thyromegaly.   Cardiovascular:      Rate and Rhythm: Normal rate and regular rhythm.      Heart sounds: No murmur heard.     No friction rub. No gallop.   Pulmonary:      Effort: No respiratory distress.      Breath sounds: No wheezing.      Comments: Diminished breath sounds bilaterally  Abdominal:      General: Bowel sounds are normal. There is no distension.      Palpations: Abdomen is soft.      Tenderness: There is no abdominal tenderness.   Musculoskeletal:         General: Swelling present.      Comments: 1+ bilateral lower extremity edema, left chronically greater than right "   Lymphadenopathy:      Head:      Right side of head: No submandibular adenopathy.      Cervical: No cervical adenopathy.      Upper Body:      Right upper body: No supraclavicular adenopathy.      Left upper body: No supraclavicular adenopathy.   Skin:     General: Skin is warm and dry.      Findings: No rash.   Neurological:      Mental Status: She is alert and oriented to person, place, and time.      Cranial Nerves: No cranial nerve deficit.      Motor: No abnormal muscle tone.      Deep Tendon Reflexes: Reflexes normal.   Psychiatric:         Behavior: Behavior normal.       Patient was examined today, unchanged from above    Result Review : Reviewed CBC, CMP from today.  Reviewed PET scan 3/13/2025.  Reviewed MRI thoracic spine and MRI abdomen from 3/17/2025.       Assessment and Plan     *Non-small cell lung cancer, clinical stage IIIA ([id number])  Patient has history of smoking 1 pack/day x 45 years, quit in November 2021.    Following anterior cervical corpectomy in September 2022, she experienced acute hypoxemic respiratory failure.    CT angiogram chest 9/25/2022 showed a concerning 1.4 cm left lower lobe pulmonary nodule, mediastinal lymphadenopathy with infracarinal lymph node 2.7 cm, left hilar lymph node 1.2 cm, small right hilar lymph nodes up from 1.1 cm and additional enlarged mediastinal nodes in the right paratracheal, precarinal, infracarinal spaces.  There was a wedge-shaped area of consolidation/atelectasis in the right middle lobe base, subpleural density left costophrenic sulcus felt to represent atelectasis.  It was recommended for her to undergo subsequent PET scan by pulmonary.  The patient has had ongoing follow-up with neurosurgery and on CT scans 11/29/2022 of cervical and thoracic spine, there was concern regarding spinal instability with potential need for posterior cervical fusion.  This was scheduled in December 2022 however was delayed due to upper respiratory infection.  Patient  admitted on 1/10/2023 to address multiple medical issues in order to prepare for potential neurosurgical procedure on 3/12/2023.  Patient underwent echocardiogram on 1/11/2023 with ejection fraction greater than 70%, grade 1 diastolic dysfunction, severe aortic stenosis.  Patient is being followed by cardiology with consideration of need for TAVR versus SAVR for moderate to severe aortic stenosis.  Repeat CT chest on 1/12/2023 showed slight increase in size of the left lower lobe pulmonary nodule increased from 1.4 to 1.5 cm.  Groundglass nodule peripheral left lower lobe less conspicuous and resolution of previous reticulonodular opacities right lower lobe.  There was further enlargement of mediastinal lymph nodes with right posterior paratracheal lymph node 1.4 x 2.1 up to 1.5 x 2.3 cm, subcarinal lymph node increased from 2.7 x 5.2 up to 3.2 x 5.8 cm, left hilar lymph node increased from 1.2 up to 1.4 cm, and is stable 1.7 cm left infrahilar lymph node.  There was also comment regarding hepatic steatosis with changes of chronic liver disease and stable indeterminate nodular thickening of the adrenal glands.  CT-guided lung biopsy on 1/13/2023 with pathology that showed invasive poorly differentiated non-small cell carcinoma with focal neuroendocrine differentiation.  Staining pattern and morphology favor poorly differentiated adenocarcinoma however there was focal CD56 staining, could not entirely exclude large cell neuroendocrine carcinoma.  PD-L1 35%, [facility] analysis: PD-L1 10% (), no targetable mutations  It was felt that there would be increased risk for bronchoscopy from a cardiac standpoint due to her aortic stenosis.  Staging MRI brain 1/29/2023 showed no evidence of metastatic disease  Staging PET scan 1/26/2023 showed hypermetabolic left lower lobe pulmonary nodule, size difficult to determine on PET however on recent CT was 1.6 cm (SUV 14.4).  Mediastinal and left hilar hypermetabolic  lymphadenopathy (subcarinal lymph node 3.2 cm, SUV 31.7, left paratracheal 2.2 cm lymph node SUV 12.7).  Sub-6 mm pulmonary nodule left upper lobe unchanged and below PET resolution.  Focal uptake posterior right hepatic lobe SUV 7.2 of uncertain significance.  Recommended MRI to evaluate.  Right thyroid activity SUV 5.3, recommended ultrasound.  Long segment uptake distal esophagus consistent with esophagitis.  Uptake posterior larynx, nonspecific, recommended direct visualization.  MRI abdomen 2/6/2023 with no evidence to suggest metastatic disease in the liver.  Subcentimeter nonenhancing lesions favor cyst or hemangiomas.  Patient underwent right and left heart catheterization with cardiology on 2/14/2023.  Identification of coronary artery disease, mild pulmonary hypertension, severe aortic stenosis.  Recommendation per cardiology to proceed with treatment for lung cancer and defer any intervention regarding aortic stenosis.  Concern regarding underlying neurologic issues from cervical stenosis and potential neuropathic effects from Taxol chemotherapy.  Concern regarding patient's overall performance status and ability to tolerate full dose chemotherapy.  Patient discussed at thoracic oncology tumor board with consensus to treat with weekly low-dose carboplatin concurrent with radiation therapy and omit Taxol.  Subsequent plan for 1 year durvalumab following concurrent chemoradiation.  Significant delay in initiating concurrent chemoradiation due to logistics with scheduling Mediport placement, patient canceled appointments and delayed initiation of treatment.  On 3/21/2023 initiated concurrent chemoradiation with weekly carboplatin (AUC 2) on 2/28/2023.  Week 4 carboplatin held 4/11 and again 4/18/2023 due to thrombocytopenia  Week 4 carboplatin resumed on 4/25/2023 at reduced dose to AUC 1 due to borderline neutropenia  Chemo and radiation held beginning 5/2/2023 due to neutropenia with WBC 1.13, ANC  0.53  Resumed radiation therapy on 5/8/2023 with recovery of WBC.  Received fifth and final weekly dose of concurrent carboplatin (AUC 1) on 5/9/2023.  Patient completed radiation therapy on 5/12/2023.  CT chest abdomen pelvis 5/25/2023 with significant improvement in mediastinal and left hilar lymphadenopathy, decrease in left lower lobe nodule indicating significant response.  On 5/30/2023 initiated treatment with durvalumab 10 mg/kg every 2 weeks x 1 year.  Transitioned to durvalumab 1500 mg every 4-week dosing with cycle 4 on 7/18/2023.  TAVR CT chest abdomen pelvis 7/21/2023 with decreased left lower lobe nodule from 8 down to 6 mm, stable mediastinal lymph nodes (with the exception of 1 small superior mediastinal lymph node increased from 4 to 7 mm of unclear significance).  Upper abdominal lymph nodes with slight increase in the periportal, hepatogastric, peripancreatic regions.  Following 5 cycles durvalumab, PET scan 9/5/2023 with left lower lobe ill-defined consolidation and opacification likely representing postradiation change.  No mediastinal lymphadenopathy nor hypermetabolic activity.  Slight enlargement (0.7 up to 1.0 cm) of anterior pericardial lymph node with new hypermetabolic activity (SUV 5.5.  Stable epigastric adenopathy with mild hypermetabolic activity (SUV 4.4) unchanged from 1/26/2023 scan (although not noted on that report).  Nondisplaced, healing left seventh rib fracture.  Significance of the pericardial lymph node and epigastric lymphadenopathy unclear.  Neither area easily accessible for CT-guided biopsy after discussion with interventional radiology on 9/12/2023.  Elected to monitor on repeat PET scan at 2-month interval.  PET scan 10/31/2023 with stable mildly hypermetabolic left lower lobe segmental consolidation, indeterminate regarding residual malignancy versus inflammatory change.  Prior epicardial lymph node decreased in size to less than 1 cm with no residual hypermetabolic  activity.  Subcentimeter periceliac lymph node with slight increase in SUV from 3.9 up to 6.5, indeterminate.  Plan 3-month interval PET scan  PET scan 1/16/2024 with decrease in extent of left lower lobe irregular nodular pulmonary opacification, SUV decreased slightly from 3.9 down to 3.3.  Stable borderline epigastric, mesenteric, aortocaval lymph nodes with stable activity.  Stable intense uptake base of tongue and floor of mouth.  Stable subcentimeter left upper lobe pulmonary nodule.  PET scan findings felt to be indeterminate, left lower lobe findings likely related to prior radiation therapy, no definitive evidence of residual or recurrent disease.  Plan repeat PET scan at 4-month interval at completion of Durvalumab.  Patient received final planned cycle of Durvalumab (cycle 14) on 4/23/2024.  PET scan following completion of Durvalumab on 5/17/2024 showed new 12 cm x 4.5 cm abnormality in the paramediastinal left lower lobe with bandlike characteristics and low-level SUV (3) consistent with evolving postradiation change.  Hypermetabolic periceliac lymph nodes unchanged (reference lymph node 1.2 cm with SUV 5).  2 hypermetabolic abdominal para-aortic lymph nodes with reference lymph node 1.2 cm, SUV 7 indeterminate, possibly reactive/inflammatory versus metastatic disease.  CT chest abdomen pelvis on 8/6/2024 showed more organized consolidation left lower lobe with appearance consistent with evolving postradiation change.  Vague micronodules left lower lobe felt to be infectious/inflammatory.  Stable retroperitoneal and gastrohepatic lymph nodes.  CT chest abdomen pelvis 11/6/2024 showed left perihilar consolidation extending to the left lower lobe less pronounced.  Areas of curvilinear density left lower lobe suspected related to mucous plugging and atelectasis.  Enlarging aortocaval and left periaortic lymph nodes, aortocaval lymph node increased from 1.3 up to 2.3 cm, left periaortic lymph node increased  from 1.3 up to 2.1 cm.  PET scan 11/20/2024 showed masslike consolidation in the perihilar aspect left lower lobe to have decreased in size.  Small focus of uptake over left calvin mildly above other areas of consolidation, indeterminate (SUV 3.8).  Intensely hypermetabolic abdominal and pelvic lymphadenopathy.  Aortocaval lymph node index lesion near duodenum increased from 1.2 up to 1.7 cm (SUV 32.9 (, index lymph node or portal vein and inferior vena cava with increased from 0.7 up to 1 cm with SUV 8.8.   CT-guided biopsy left retroperitoneal lymph node on 12/5/2024 with pathology that showed benign lymph node with focal calcification, no evidence of malignancy.  CT chest abdomen pelvis on 2/24/2025 showed new compression fracture at T2.  Increase in abdominal lymph nodes (aortocaval 2 up to 2.8 cm, gastrohepatic 0.9 up to 1.5 cm).  Slight increase in normal size mediastinal lymph nodes (0.6 up to 0.9 cm, 0.5 up to 0.7 cm).  Stable residual left lower lobe mass 6.1 x 3.4 x 3.5 cm.  Possible 0.6 cm hypodense lesion posterior pancreatic body.  Lobulated liver and stable spleen mildly enlarged at 13.4 cm.  PET scan 3/13/2025 showed significant increase in abdominal and pelvic lymphadenopathy with index aortocaval lymph node increased from 2 cm up to 2.8 cm with SUV increased from 32.9 up to 41.1.  Left periaortic jonn conglomerate increased from 1.5 up to 3.1 cm with SUV of 36.  Hypermetabolic lower mediastinal lymphadenopathy above the level of the diaphragm with index lymph node with SUV 18.1 at 0.7 cm (previously 0.3 cm with no hypermetabolism) and lymph node anterior to the distal esophagus with measurement of 1 cm with SUV 12.7 (previously 0.6 cm with no activity).  Asymmetric masslike consolidation left upper and lower lobes with heterogeneous uptake SUV 3.9 measuring 6.7 x 3.3 cm overall increased compared to 1/16/2024 but possibly representing radiation fibrosis.  Persistent long segment uptake right pharynx  and larynx extending to the paravertebral musculature as seen on prior scans.  Asymmetric uptake left vocal cord and adjacent left arytenoid cartilage (SUV 5.3).  No suspicious activity at T2 compression deformity with underlying sclerosis, indeterminant, follow-up recommended to exclude metastatic disease.  MRI thoracic spine 3/17/2025 with T2 sclerosis and mild compression deformity, appearance most consistent with mild acute to subacute or subacute compression fracture, no convincing evidence of metastatic disease in the thoracic spine.  MRI abdomen 3/17/2025 with noncirrhotic liver morphology, no pancreatic lesion identified.  Patient returns today in follow-up with the above scans to review.  CT scan showed significant further increase in retroperitoneal lymphadenopathy.  PET scan now confirms very high SUV with enlarging lymph nodes in this region certainly related to malignancy at this point.  There are some small hypermetabolic nodes in the lower mediastinum as well.  It does not appear that there is any definitive evidence to suggest metastatic disease in the area of the T2 compression fracture and this may be a benign/traumatic fracture after her fall in December.  Abdominal MRI did not show any evidence of the pancreatic abnormality as questioned on the CT.  High suspicion for recurrent/metastatic non-small cell lung cancer and the patient and her  are aware of this.  We discussed the need to pursue repeat CT-guided biopsy of lymph node which we will facilitate in the next week or so.  We will need to send this for PD-L1 analysis and NGS analysis.  In regards to the persistent activity in the right pharynx and larynx as well as asymmetric left vocal cord and left arytenoid activity, we will send the patient back to Dr. Powers in ENT for repeat exam.  Patient and her  expressed understanding.  Given the high suspicion for recurrent disease we will obtain peripheral blood [id number] analysis  today as well.  I will see her back in 2 to 3 weeks to review pathology results, guardant results, ENT recommendations.  If we confirm metastatic non-small cell lung cancer, we have discussed today options for palliative treatment with chemoimmunotherapy.  Patient does wish to consider active treatment for her disease if recurrence confirmed.    *Aortic stenosis  Patient underwent echocardiogram on 1/11/2023 with ejection fraction greater than 70%, grade 1 diastolic dysfunction, severe aortic stenosis.    Patient followed by cardiology with consideration of need for TAVR versus SAVR for moderate to severe aortic stenosis.  Patient underwent cardiac catheterization 2/14/2023 with identification of coronary artery disease, mild pulmonary hypertension, severe aortic stenosis.  Per cardiology, recommendation to proceed with treatment for lung cancer and defer any consideration of intervention for aortic stenosis.  Patient did undergo TAVR on 8/1/2023  Echocardiogram on 8/2/2023 with ejection fraction 62.4%, TAVR present  Patient continues follow-up with cardiology     *Cervical spine stenosis  Patient with cervical spine stenosis with associated myelopathy.   She underwent surgery on 9/20/2022 with anterior cervical corpectomy with cage.      The patient has had ongoing follow-up with neurosurgery and on CT scans 11/29/2022 of cervical and thoracic spine, there was concern regarding spinal instability with potential need for posterior cervical fusion.  This was scheduled in December 2022 however was delayed due to upper respiratory infection.  Patient admitted on 1/10/2023 to address multiple medical issues in order to prepare for potential neurosurgical procedure on 3/12/2023.  Patient was previously having severe symptoms related to her cervical stenosis with reduced ability to ambulate previously and significant limitations in movement in her hands, dropping objects.  More recently however she has improved and has  been able to walk with the use of a walker, symptoms in the upper extremities have improved and her dexterity is better.    It appears that her neurosurgical procedure will need to be delayed until we can at least complete the concurrent chemoradiation portion of her treatment.  Surgery would be feasible while continuing on immunotherapy in the future.  Neurosurgical follow-up has been placed on hold.  Patient improvement over time in mobility and symptoms in her extremities.  She does not intend to proceed with further surgery in the future.     *COPD  Patient has history of smoking 1 pack/day x 45 years, quit in November 2021.  Patient has not undergone formal PFTs  Patient being followed by pulmonary, currently using albuterol inhaler as needed  Patient with recent stable respiratory symptoms     *Hepatic steatosis  Identified on prior scans  PET scan 1/26/2023 with questionable area of activity seen but no corresponding CT abnormality.    MRI abdomen 2/6/2023 with no evidence to suggest metastatic disease in the liver.  Subcentimeter nonenhancing lesions favor cyst or hemangiomas.  CT abdomen on 2/24/2025 with evidence of lobulated liver and stable mild splenomegaly to 13.4 cm, suggestive of underlying cirrhosis and hypersplenism  MRI abdomen on 3/17/2025 with noncirrhotic liver morphology noted  LFTs remain normal     *Diabetes mellitus    *Anxiety/depression  Patient currently continuing on Wellbutrin  mg daily  Patient reported increasing frequency and severity of panic attacks.  She had previously been on Lexapro in addition to Wellbutrin however notes that she has been off Lexapro for approximately a year as it was not refilled by her PCP.  Patient was referred back to supportive oncology for recommendations on further treatment.  Patient indicated that she has been treated with multiple antidepressants, some of which were ineffective.  Consider possibly resuming Lexapro.  Patient did not follow  through with referral to supportive oncology.  She currently reports that symptoms are stable    *Right thyroid uptake on PET scan 1/26/2023  Patient was scheduled for thyroid ultrasound however did not feel that she was able to undergo the procedure due to her neck issues and therefore was canceled, consider at future date  No mention of thyroid uptake on PET scan 9/5/2023 and on 10/31/2023.  No uptake on PET scan 1/16/2024, thyroid appeared atrophic  PET scan on 5/17/2024 with no mention of activity in the thyroid  PET scan 11/20/2024 with no thyroid uptake identified    *Laryngeal uptake on PET scan 1/26/2023, subsequent PET uptake and floor of mouth and base of tongue 1/16/2024  Patient was seen by ENT on 3/28/2023, laryngoscopy showed mild edema in the posterior glottis consistent with laryngeal pharyngeal reflux, no evidence of malignancy.  No mention of laryngeal uptake on PET scan 9/5/2023 nor on 10/31/2023  PET scan 1/16/2024 showed persistent significant uptake floor of mouth and base of tongue.  The area was visualized today on exam with no abnormal findings.  She does wear dentures that do not fit properly, unclear whether this could be contributing to the activity.    Patient was referred to ENT, had outstanding balance with 1 practice, difficulty with referral to another practice and was never seen  Patient was ultimately not seen by ENT.  Patient elected to forego referral to ENT.  PET scan 3/13/2025 showed persistent hypermetabolic activity in the right pharynx and larynx extending to the paravertebral musculature as seen previously.  There was also asymmetric uptake in the left vocal cord and adjacent left arytenoid cartilage with SUV 5.3.  Patient is asymptomatic.  We will refer back to Dr. Powers in ENT for repeat evaluation.    *Esophageal uptake on PET scan 1/26/2023  [place] to be consistent with esophagitis in the distal esophagus.  We will hold on referral for EGD given the multitude of  additional procedures and consults required above.  No mention of esophageal uptake on PET scan 9/5/2023 nor on 10/31/2023 no oral on 1/16/2024  No mention of esophageal uptake on PET scan 5/17/2024  No mention of uptake in the esophagus and PET scan 11/20/2024.  PET scan on 3/13/2025 with no significant uptake noted within the esophagus.    *Venous access  Mediport placed by Dr. Shook on 3/2/2023  We will maintain Mediport in place after completion of patient's planned treatment due to risk of recurrent disease, plan port flushes every 4 to 6 weeks.    *Nutrition/weight loss  Patient has been seen by oncology nutrition  Patient overall has lost a significant amount of weight however weight subsequently stabilized  Appetite and weight have improved.  Weight has fluctuated, today stable at 216 pounds    *Chronic left greater than right lower extremity edema with subsequent left inguinal/lower extremity hematoma following TAVR  Patient developed exacerbation of chronic edema in the lower extremities, left slightly greater than right.    Bilateral lower extremity Doppler on 5/9/2023 was negative, showed bilateral popliteal fluid collections.  Patient with evidence of left inguinal/left lower extremity hematoma following TAVR with ecchymosis tracking into the distal portion of the left lower extremity and ecchymosis laterally in the left hip.  Likely explains decline in hemoglobin and low haptoglobin post procedure.  PET scan 9/5/2023 with evidence of fluid collection/blood in the left inguinal region.  Edema increased, asymmetric with left greater than right.  Lower extremity Doppler 4/25/2024 with bilateral popliteal fluid collections, no evidence of DVT.  Patient with persistent mild lower extremity edema, chronic asymmetry with left greater than right.      *Hypothyroidism exacerbated by immunotherapy  Hypothyroidism managed by PCP Dr. Danyelle Argueta on 5/30/2023 prior to beginning immunotherapy with evidence  of hypothyroidism with TSH 10.5, free T4 0.54.  Levothyroxine dose increased by Dr. Brush from 125 up to 150 mcg daily  Continue to monitor thyroid function studies every 8 weeks on durvalumab  Patient with persistent borderline abnormal thyroid function studies on Durvalumab, referred to endocrinology  On 1/2/2024, significant increase in TSH to 19.7 with free T40.8.  On 1/5/2024 increased levothyroxine dose from 150 up to 175 mcg daily.  Patient is now followed by endocrinology for management of hypothyroidism  Labs on 4/23/2024 with normal TSH suppressed at 0.155 and free T4 increased to 1.94.  Levothyroxine dose decreased by endocrinology to 150 mcg daily.  Labs on 5/21/2024 with TSH 0.122 and free T4 improved into the normal range at 1.69.    Labs on 8/13/2024 with TSH of 0.247 and free T4 elevated at 1.97.  Endocrinology decreased levothyroxine dose to 137 mcg daily  Labs on 11/13/2024 with TSH 1.63, free T4 of 1.44.  Patient continues follow-up with endocrinology    *Chemotherapy-induced leukopenia  WBC today 5.23 with normal differential    *Chemotherapy-induced thrombocytopenia  Platelet count prior to chemotherapy was normal  Platelet count has gradually improved since completion of concurrent chemoradiation  Platelet count on 11/7/2023 declined to 101,000.  Additional labs with IPF 5.5%, B12 342, folate 8.99  Labs on 3/26/2024 with B12 453, folate 12.1, IPF 3.9%, platelet count 108,000  Platelet count today declined to 116,000.  We will check additional labs with B12, folate, IPF    *Chemotherapy-induced anemia  Hemoglobin did decline into the high 10-low 11 range on concurrent chemoradiation  Hemoglobin subsequently improved and normalized as of 7/28/2023 with hemoglobin of 13.4  Patient underwent TAVR on 8/1/2023 with significant decline in hemoglobin thereafter, reached a low of 7.1 on 8/8/2023.  Additional labs with cardiology on 8/8/2023 with iron 78, iron saturation 22%, TIBC 350.  Patient was  started on oral iron although no evidence of iron deficiency.    Labs on 8/15/2023 with hemoglobin 8.7, iron 129, ferritin 120, iron saturation 39%, TIBC 355, B12 288, folate 7.15, haptoglobin less than 10.  Oral iron discontinued as patient was constipated, had no evidence of iron deficiency.  Initiated oral B12 1000 mcg daily.  Postprocedure anemia and low haptoglobin related to left inguinal/left lower extremity hematoma.  Hemoglobin on 3/26/2024 declined to 11.8.  Additional labs with iron 90, ferritin 102, iron saturation 26%, TIBC 343, B12 453, folate 12.1  Hemoglobin today normal at 12.7    *SMA stenosis on CT  CT 5/25/2023 with suboptimal evaluation, appears to be 50% focal stenosis at the origin of the SMA.  Recommended CTA exam for more definitive evaluation.  Patient was referred to vascular surgery, had to postpone the visit     *T2 compression fracture  CT 2/24/2025 with notation of T2 compression fracture, 20-25% loss of height.  No underlying bone lesion identified  Patient asymptomatic from her compression fracture.    PET scan 3/13/2025 with subtle compression deformity T2 with underlying sclerosis.  No significant hypermetabolic activity  MRI thoracic spine 3/17/2025 with T2 mild acute to subacute or subacute compression fracture.  No convincing metastatic disease in the thoracic spine.  Therefore, it appears that the patient's compression fracture is traumatic/benign with no convincing evidence to suggest a pathologic fracture.     PLAN:  Patient previously received treatment for clinical stage IIIA ([id number]) non-small cell lung cancer with concurrent chemoradiation with weekly single agent carboplatin (completed on 5/12/2023).  She subsequently received durvalumab x 1 year, final cycle received on 4/23/2024.  High suspicion for recurrent lung cancer based on current PET scan findings  Patient appears to have benign/traumatic compression fracture of T2  Continue oral B12 1000 mcg  daily  Additional labs today with IPF, B12, folate  Schedule CT-guided retroperitoneal lymph node biopsy next week to be sent for pathology and flow cytometry.  If pathology confirms non-small cell lung cancer we will need to send immediately for PD-L1 analysis and then send for [facility] NGS analysis  We will draw additional blood today for [id number] liquid NGS analysis  Referral back to ENT Dr. Powers due to recent PET scan findings with new asymmetric uptake left vocal cord as well as persistent asymmetric uptake in the right pharynx and larynx.  MD visit in 2-3 weeks with CBC, CMP and we will review pathology results, [id number] result.  If recurrent non-small cell lung cancer is identified we will discuss options for palliative systemic therapy, patient aware of high suspicion for recurrent malignancy.    I did spend 60 minutes total time caring for the patient today, time spent in review of records, face-to-face consultation, coordination of care, placement of orders, completion of documentation.

## 2025-03-19 NOTE — PROGRESS NOTES
"Chief Complaint  Non-small cell lung cancer, clinical stage IIIA ([id number]), aortic stenosis, cervical spine stenosis, COPD, hepatic steatosis, diabetes mellitus    Subjective        History of Present Illness  Patient returns today in short interval follow-up with laboratory studies, PET scan, MRI thoracic spine and MRI abdomen to review.  We continue with significant concerned that she has recurrent lung cancer however previous retroperitoneal lymph node biopsy 12/5/2024 was negative.  Patient remains in a wheelchair today which she uses for longer distances, ambulates short distances.  She has some mild chronic dyspnea on exertion that is unchanged.  She denies any current back pain, does report that she had a fall in her bathroom just after [place] holiday and had fairly intense pain for a few days afterwards however this subsequently resolved.  She reports a normal appetite, weight today stable at 216 pounds.  She has ECOG performance status of 1.        Objective   Vital Signs:   /78   Pulse 67   Temp 97.6 °F (36.4 °C) (Oral)   Ht 157.5 cm (62.01\")   Wt 98 kg (216 lb)   SpO2 93%   BMI 39.50 kg/m²     Physical Exam  Constitutional:       Appearance: She is well-developed.      Comments: Patient remains in a wheelchair today in the office   Eyes:      Conjunctiva/sclera: Conjunctivae normal.   Neck:      Thyroid: No thyromegaly.   Cardiovascular:      Rate and Rhythm: Normal rate and regular rhythm.      Heart sounds: No murmur heard.     No friction rub. No gallop.   Pulmonary:      Effort: No respiratory distress.      Breath sounds: No wheezing.      Comments: Diminished breath sounds bilaterally  Abdominal:      General: Bowel sounds are normal. There is no distension.      Palpations: Abdomen is soft.      Tenderness: There is no abdominal tenderness.   Musculoskeletal:         General: Swelling present.      Comments: 1+ bilateral lower extremity edema, left chronically greater than right "   Lymphadenopathy:      Head:      Right side of head: No submandibular adenopathy.      Cervical: No cervical adenopathy.      Upper Body:      Right upper body: No supraclavicular adenopathy.      Left upper body: No supraclavicular adenopathy.   Skin:     General: Skin is warm and dry.      Findings: No rash.   Neurological:      Mental Status: She is alert and oriented to person, place, and time.      Cranial Nerves: No cranial nerve deficit.      Motor: No abnormal muscle tone.      Deep Tendon Reflexes: Reflexes normal.   Psychiatric:         Behavior: Behavior normal.       Patient was examined today, unchanged from above    Result Review : Reviewed CBC, CMP from today.  Reviewed PET scan 3/13/2025.  Reviewed MRI thoracic spine and MRI abdomen from 3/17/2025.       Assessment and Plan     *Non-small cell lung cancer, clinical stage IIIA ([id number])  Patient has history of smoking 1 pack/day x 45 years, quit in November 2021.    Following anterior cervical corpectomy in September 2022, she experienced acute hypoxemic respiratory failure.    CT angiogram chest 9/25/2022 showed a concerning 1.4 cm left lower lobe pulmonary nodule, mediastinal lymphadenopathy with infracarinal lymph node 2.7 cm, left hilar lymph node 1.2 cm, small right hilar lymph nodes up from 1.1 cm and additional enlarged mediastinal nodes in the right paratracheal, precarinal, infracarinal spaces.  There was a wedge-shaped area of consolidation/atelectasis in the right middle lobe base, subpleural density left costophrenic sulcus felt to represent atelectasis.  It was recommended for her to undergo subsequent PET scan by pulmonary.  The patient has had ongoing follow-up with neurosurgery and on CT scans 11/29/2022 of cervical and thoracic spine, there was concern regarding spinal instability with potential need for posterior cervical fusion.  This was scheduled in December 2022 however was delayed due to upper respiratory infection.  Patient  admitted on 1/10/2023 to address multiple medical issues in order to prepare for potential neurosurgical procedure on 3/12/2023.  Patient underwent echocardiogram on 1/11/2023 with ejection fraction greater than 70%, grade 1 diastolic dysfunction, severe aortic stenosis.  Patient is being followed by cardiology with consideration of need for TAVR versus SAVR for moderate to severe aortic stenosis.  Repeat CT chest on 1/12/2023 showed slight increase in size of the left lower lobe pulmonary nodule increased from 1.4 to 1.5 cm.  Groundglass nodule peripheral left lower lobe less conspicuous and resolution of previous reticulonodular opacities right lower lobe.  There was further enlargement of mediastinal lymph nodes with right posterior paratracheal lymph node 1.4 x 2.1 up to 1.5 x 2.3 cm, subcarinal lymph node increased from 2.7 x 5.2 up to 3.2 x 5.8 cm, left hilar lymph node increased from 1.2 up to 1.4 cm, and is stable 1.7 cm left infrahilar lymph node.  There was also comment regarding hepatic steatosis with changes of chronic liver disease and stable indeterminate nodular thickening of the adrenal glands.  CT-guided lung biopsy on 1/13/2023 with pathology that showed invasive poorly differentiated non-small cell carcinoma with focal neuroendocrine differentiation.  Staining pattern and morphology favor poorly differentiated adenocarcinoma however there was focal CD56 staining, could not entirely exclude large cell neuroendocrine carcinoma.  PD-L1 35%, [facility] analysis: PD-L1 10% (), no targetable mutations  It was felt that there would be increased risk for bronchoscopy from a cardiac standpoint due to her aortic stenosis.  Staging MRI brain 1/29/2023 showed no evidence of metastatic disease  Staging PET scan 1/26/2023 showed hypermetabolic left lower lobe pulmonary nodule, size difficult to determine on PET however on recent CT was 1.6 cm (SUV 14.4).  Mediastinal and left hilar hypermetabolic  lymphadenopathy (subcarinal lymph node 3.2 cm, SUV 31.7, left paratracheal 2.2 cm lymph node SUV 12.7).  Sub-6 mm pulmonary nodule left upper lobe unchanged and below PET resolution.  Focal uptake posterior right hepatic lobe SUV 7.2 of uncertain significance.  Recommended MRI to evaluate.  Right thyroid activity SUV 5.3, recommended ultrasound.  Long segment uptake distal esophagus consistent with esophagitis.  Uptake posterior larynx, nonspecific, recommended direct visualization.  MRI abdomen 2/6/2023 with no evidence to suggest metastatic disease in the liver.  Subcentimeter nonenhancing lesions favor cyst or hemangiomas.  Patient underwent right and left heart catheterization with cardiology on 2/14/2023.  Identification of coronary artery disease, mild pulmonary hypertension, severe aortic stenosis.  Recommendation per cardiology to proceed with treatment for lung cancer and defer any intervention regarding aortic stenosis.  Concern regarding underlying neurologic issues from cervical stenosis and potential neuropathic effects from Taxol chemotherapy.  Concern regarding patient's overall performance status and ability to tolerate full dose chemotherapy.  Patient discussed at thoracic oncology tumor board with consensus to treat with weekly low-dose carboplatin concurrent with radiation therapy and omit Taxol.  Subsequent plan for 1 year durvalumab following concurrent chemoradiation.  Significant delay in initiating concurrent chemoradiation due to logistics with scheduling Mediport placement, patient canceled appointments and delayed initiation of treatment.  On 3/21/2023 initiated concurrent chemoradiation with weekly carboplatin (AUC 2) on 2/28/2023.  Week 4 carboplatin held 4/11 and again 4/18/2023 due to thrombocytopenia  Week 4 carboplatin resumed on 4/25/2023 at reduced dose to AUC 1 due to borderline neutropenia  Chemo and radiation held beginning 5/2/2023 due to neutropenia with WBC 1.13, ANC  0.53  Resumed radiation therapy on 5/8/2023 with recovery of WBC.  Received fifth and final weekly dose of concurrent carboplatin (AUC 1) on 5/9/2023.  Patient completed radiation therapy on 5/12/2023.  CT chest abdomen pelvis 5/25/2023 with significant improvement in mediastinal and left hilar lymphadenopathy, decrease in left lower lobe nodule indicating significant response.  On 5/30/2023 initiated treatment with durvalumab 10 mg/kg every 2 weeks x 1 year.  Transitioned to durvalumab 1500 mg every 4-week dosing with cycle 4 on 7/18/2023.  TAVR CT chest abdomen pelvis 7/21/2023 with decreased left lower lobe nodule from 8 down to 6 mm, stable mediastinal lymph nodes (with the exception of 1 small superior mediastinal lymph node increased from 4 to 7 mm of unclear significance).  Upper abdominal lymph nodes with slight increase in the periportal, hepatogastric, peripancreatic regions.  Following 5 cycles durvalumab, PET scan 9/5/2023 with left lower lobe ill-defined consolidation and opacification likely representing postradiation change.  No mediastinal lymphadenopathy nor hypermetabolic activity.  Slight enlargement (0.7 up to 1.0 cm) of anterior pericardial lymph node with new hypermetabolic activity (SUV 5.5.  Stable epigastric adenopathy with mild hypermetabolic activity (SUV 4.4) unchanged from 1/26/2023 scan (although not noted on that report).  Nondisplaced, healing left seventh rib fracture.  Significance of the pericardial lymph node and epigastric lymphadenopathy unclear.  Neither area easily accessible for CT-guided biopsy after discussion with interventional radiology on 9/12/2023.  Elected to monitor on repeat PET scan at 2-month interval.  PET scan 10/31/2023 with stable mildly hypermetabolic left lower lobe segmental consolidation, indeterminate regarding residual malignancy versus inflammatory change.  Prior epicardial lymph node decreased in size to less than 1 cm with no residual hypermetabolic  activity.  Subcentimeter periceliac lymph node with slight increase in SUV from 3.9 up to 6.5, indeterminate.  Plan 3-month interval PET scan  PET scan 1/16/2024 with decrease in extent of left lower lobe irregular nodular pulmonary opacification, SUV decreased slightly from 3.9 down to 3.3.  Stable borderline epigastric, mesenteric, aortocaval lymph nodes with stable activity.  Stable intense uptake base of tongue and floor of mouth.  Stable subcentimeter left upper lobe pulmonary nodule.  PET scan findings felt to be indeterminate, left lower lobe findings likely related to prior radiation therapy, no definitive evidence of residual or recurrent disease.  Plan repeat PET scan at 4-month interval at completion of Durvalumab.  Patient received final planned cycle of Durvalumab (cycle 14) on 4/23/2024.  PET scan following completion of Durvalumab on 5/17/2024 showed new 12 cm x 4.5 cm abnormality in the paramediastinal left lower lobe with bandlike characteristics and low-level SUV (3) consistent with evolving postradiation change.  Hypermetabolic periceliac lymph nodes unchanged (reference lymph node 1.2 cm with SUV 5).  2 hypermetabolic abdominal para-aortic lymph nodes with reference lymph node 1.2 cm, SUV 7 indeterminate, possibly reactive/inflammatory versus metastatic disease.  CT chest abdomen pelvis on 8/6/2024 showed more organized consolidation left lower lobe with appearance consistent with evolving postradiation change.  Vague micronodules left lower lobe felt to be infectious/inflammatory.  Stable retroperitoneal and gastrohepatic lymph nodes.  CT chest abdomen pelvis 11/6/2024 showed left perihilar consolidation extending to the left lower lobe less pronounced.  Areas of curvilinear density left lower lobe suspected related to mucous plugging and atelectasis.  Enlarging aortocaval and left periaortic lymph nodes, aortocaval lymph node increased from 1.3 up to 2.3 cm, left periaortic lymph node increased  from 1.3 up to 2.1 cm.  PET scan 11/20/2024 showed masslike consolidation in the perihilar aspect left lower lobe to have decreased in size.  Small focus of uptake over left calvin mildly above other areas of consolidation, indeterminate (SUV 3.8).  Intensely hypermetabolic abdominal and pelvic lymphadenopathy.  Aortocaval lymph node index lesion near duodenum increased from 1.2 up to 1.7 cm (SUV 32.9 (, index lymph node or portal vein and inferior vena cava with increased from 0.7 up to 1 cm with SUV 8.8.   CT-guided biopsy left retroperitoneal lymph node on 12/5/2024 with pathology that showed benign lymph node with focal calcification, no evidence of malignancy.  CT chest abdomen pelvis on 2/24/2025 showed new compression fracture at T2.  Increase in abdominal lymph nodes (aortocaval 2 up to 2.8 cm, gastrohepatic 0.9 up to 1.5 cm).  Slight increase in normal size mediastinal lymph nodes (0.6 up to 0.9 cm, 0.5 up to 0.7 cm).  Stable residual left lower lobe mass 6.1 x 3.4 x 3.5 cm.  Possible 0.6 cm hypodense lesion posterior pancreatic body.  Lobulated liver and stable spleen mildly enlarged at 13.4 cm.  PET scan 3/13/2025 showed significant increase in abdominal and pelvic lymphadenopathy with index aortocaval lymph node increased from 2 cm up to 2.8 cm with SUV increased from 32.9 up to 41.1.  Left periaortic jonn conglomerate increased from 1.5 up to 3.1 cm with SUV of 36.  Hypermetabolic lower mediastinal lymphadenopathy above the level of the diaphragm with index lymph node with SUV 18.1 at 0.7 cm (previously 0.3 cm with no hypermetabolism) and lymph node anterior to the distal esophagus with measurement of 1 cm with SUV 12.7 (previously 0.6 cm with no activity).  Asymmetric masslike consolidation left upper and lower lobes with heterogeneous uptake SUV 3.9 measuring 6.7 x 3.3 cm overall increased compared to 1/16/2024 but possibly representing radiation fibrosis.  Persistent long segment uptake right pharynx  and larynx extending to the paravertebral musculature as seen on prior scans.  Asymmetric uptake left vocal cord and adjacent left arytenoid cartilage (SUV 5.3).  No suspicious activity at T2 compression deformity with underlying sclerosis, indeterminant, follow-up recommended to exclude metastatic disease.  MRI thoracic spine 3/17/2025 with T2 sclerosis and mild compression deformity, appearance most consistent with mild acute to subacute or subacute compression fracture, no convincing evidence of metastatic disease in the thoracic spine.  MRI abdomen 3/17/2025 with noncirrhotic liver morphology, no pancreatic lesion identified.  Patient returns today in follow-up with the above scans to review.  CT scan showed significant further increase in retroperitoneal lymphadenopathy.  PET scan now confirms very high SUV with enlarging lymph nodes in this region certainly related to malignancy at this point.  There are some small hypermetabolic nodes in the lower mediastinum as well.  It does not appear that there is any definitive evidence to suggest metastatic disease in the area of the T2 compression fracture and this may be a benign/traumatic fracture after her fall in December.  Abdominal MRI did not show any evidence of the pancreatic abnormality as questioned on the CT.  High suspicion for recurrent/metastatic non-small cell lung cancer and the patient and her  are aware of this.  We discussed the need to pursue repeat CT-guided biopsy of lymph node which we will facilitate in the next week or so.  We will need to send this for PD-L1 analysis and NGS analysis.  In regards to the persistent activity in the right pharynx and larynx as well as asymmetric left vocal cord and left arytenoid activity, we will send the patient back to Dr. Powers in ENT for repeat exam.  Patient and her  expressed understanding.  Given the high suspicion for recurrent disease we will obtain peripheral blood [id number] analysis  today as well.  I will see her back in 2 to 3 weeks to review pathology results, guardant results, ENT recommendations.  If we confirm metastatic non-small cell lung cancer, we have discussed today options for palliative treatment with chemoimmunotherapy.  Patient does wish to consider active treatment for her disease if recurrence confirmed.    *Aortic stenosis  Patient underwent echocardiogram on 1/11/2023 with ejection fraction greater than 70%, grade 1 diastolic dysfunction, severe aortic stenosis.    Patient followed by cardiology with consideration of need for TAVR versus SAVR for moderate to severe aortic stenosis.  Patient underwent cardiac catheterization 2/14/2023 with identification of coronary artery disease, mild pulmonary hypertension, severe aortic stenosis.  Per cardiology, recommendation to proceed with treatment for lung cancer and defer any consideration of intervention for aortic stenosis.  Patient did undergo TAVR on 8/1/2023  Echocardiogram on 8/2/2023 with ejection fraction 62.4%, TAVR present  Patient continues follow-up with cardiology     *Cervical spine stenosis  Patient with cervical spine stenosis with associated myelopathy.   She underwent surgery on 9/20/2022 with anterior cervical corpectomy with cage.      The patient has had ongoing follow-up with neurosurgery and on CT scans 11/29/2022 of cervical and thoracic spine, there was concern regarding spinal instability with potential need for posterior cervical fusion.  This was scheduled in December 2022 however was delayed due to upper respiratory infection.  Patient admitted on 1/10/2023 to address multiple medical issues in order to prepare for potential neurosurgical procedure on 3/12/2023.  Patient was previously having severe symptoms related to her cervical stenosis with reduced ability to ambulate previously and significant limitations in movement in her hands, dropping objects.  More recently however she has improved and has  been able to walk with the use of a walker, symptoms in the upper extremities have improved and her dexterity is better.    It appears that her neurosurgical procedure will need to be delayed until we can at least complete the concurrent chemoradiation portion of her treatment.  Surgery would be feasible while continuing on immunotherapy in the future.  Neurosurgical follow-up has been placed on hold.  Patient improvement over time in mobility and symptoms in her extremities.  She does not intend to proceed with further surgery in the future.     *COPD  Patient has history of smoking 1 pack/day x 45 years, quit in November 2021.  Patient has not undergone formal PFTs  Patient being followed by pulmonary, currently using albuterol inhaler as needed  Patient with recent stable respiratory symptoms     *Hepatic steatosis  Identified on prior scans  PET scan 1/26/2023 with questionable area of activity seen but no corresponding CT abnormality.    MRI abdomen 2/6/2023 with no evidence to suggest metastatic disease in the liver.  Subcentimeter nonenhancing lesions favor cyst or hemangiomas.  CT abdomen on 2/24/2025 with evidence of lobulated liver and stable mild splenomegaly to 13.4 cm, suggestive of underlying cirrhosis and hypersplenism  MRI abdomen on 3/17/2025 with noncirrhotic liver morphology noted  LFTs remain normal     *Diabetes mellitus    *Anxiety/depression  Patient currently continuing on Wellbutrin  mg daily  Patient reported increasing frequency and severity of panic attacks.  She had previously been on Lexapro in addition to Wellbutrin however notes that she has been off Lexapro for approximately a year as it was not refilled by her PCP.  Patient was referred back to supportive oncology for recommendations on further treatment.  Patient indicated that she has been treated with multiple antidepressants, some of which were ineffective.  Consider possibly resuming Lexapro.  Patient did not follow  through with referral to supportive oncology.  She currently reports that symptoms are stable    *Right thyroid uptake on PET scan 1/26/2023  Patient was scheduled for thyroid ultrasound however did not feel that she was able to undergo the procedure due to her neck issues and therefore was canceled, consider at future date  No mention of thyroid uptake on PET scan 9/5/2023 and on 10/31/2023.  No uptake on PET scan 1/16/2024, thyroid appeared atrophic  PET scan on 5/17/2024 with no mention of activity in the thyroid  PET scan 11/20/2024 with no thyroid uptake identified    *Laryngeal uptake on PET scan 1/26/2023, subsequent PET uptake and floor of mouth and base of tongue 1/16/2024  Patient was seen by ENT on 3/28/2023, laryngoscopy showed mild edema in the posterior glottis consistent with laryngeal pharyngeal reflux, no evidence of malignancy.  No mention of laryngeal uptake on PET scan 9/5/2023 nor on 10/31/2023  PET scan 1/16/2024 showed persistent significant uptake floor of mouth and base of tongue.  The area was visualized today on exam with no abnormal findings.  She does wear dentures that do not fit properly, unclear whether this could be contributing to the activity.    Patient was referred to ENT, had outstanding balance with 1 practice, difficulty with referral to another practice and was never seen  Patient was ultimately not seen by ENT.  Patient elected to forego referral to ENT.  PET scan 3/13/2025 showed persistent hypermetabolic activity in the right pharynx and larynx extending to the paravertebral musculature as seen previously.  There was also asymmetric uptake in the left vocal cord and adjacent left arytenoid cartilage with SUV 5.3.  Patient is asymptomatic.  We will refer back to Dr. Powers in ENT for repeat evaluation.    *Esophageal uptake on PET scan 1/26/2023  [place] to be consistent with esophagitis in the distal esophagus.  We will hold on referral for EGD given the multitude of  additional procedures and consults required above.  No mention of esophageal uptake on PET scan 9/5/2023 nor on 10/31/2023 no oral on 1/16/2024  No mention of esophageal uptake on PET scan 5/17/2024  No mention of uptake in the esophagus and PET scan 11/20/2024.  PET scan on 3/13/2025 with no significant uptake noted within the esophagus.    *Venous access  Mediport placed by Dr. Shook on 3/2/2023  We will maintain Mediport in place after completion of patient's planned treatment due to risk of recurrent disease, plan port flushes every 4 to 6 weeks.    *Nutrition/weight loss  Patient has been seen by oncology nutrition  Patient overall has lost a significant amount of weight however weight subsequently stabilized  Appetite and weight have improved.  Weight has fluctuated, today stable at 216 pounds    *Chronic left greater than right lower extremity edema with subsequent left inguinal/lower extremity hematoma following TAVR  Patient developed exacerbation of chronic edema in the lower extremities, left slightly greater than right.    Bilateral lower extremity Doppler on 5/9/2023 was negative, showed bilateral popliteal fluid collections.  Patient with evidence of left inguinal/left lower extremity hematoma following TAVR with ecchymosis tracking into the distal portion of the left lower extremity and ecchymosis laterally in the left hip.  Likely explains decline in hemoglobin and low haptoglobin post procedure.  PET scan 9/5/2023 with evidence of fluid collection/blood in the left inguinal region.  Edema increased, asymmetric with left greater than right.  Lower extremity Doppler 4/25/2024 with bilateral popliteal fluid collections, no evidence of DVT.  Patient with persistent mild lower extremity edema, chronic asymmetry with left greater than right.      *Hypothyroidism exacerbated by immunotherapy  Hypothyroidism managed by PCP Dr. Danyelle Argueta on 5/30/2023 prior to beginning immunotherapy with evidence  of hypothyroidism with TSH 10.5, free T4 0.54.  Levothyroxine dose increased by Dr. Brush from 125 up to 150 mcg daily  Continue to monitor thyroid function studies every 8 weeks on durvalumab  Patient with persistent borderline abnormal thyroid function studies on Durvalumab, referred to endocrinology  On 1/2/2024, significant increase in TSH to 19.7 with free T40.8.  On 1/5/2024 increased levothyroxine dose from 150 up to 175 mcg daily.  Patient is now followed by endocrinology for management of hypothyroidism  Labs on 4/23/2024 with normal TSH suppressed at 0.155 and free T4 increased to 1.94.  Levothyroxine dose decreased by endocrinology to 150 mcg daily.  Labs on 5/21/2024 with TSH 0.122 and free T4 improved into the normal range at 1.69.    Labs on 8/13/2024 with TSH of 0.247 and free T4 elevated at 1.97.  Endocrinology decreased levothyroxine dose to 137 mcg daily  Labs on 11/13/2024 with TSH 1.63, free T4 of 1.44.  Patient continues follow-up with endocrinology    *Chemotherapy-induced leukopenia  WBC today 5.23 with normal differential    *Chemotherapy-induced thrombocytopenia  Platelet count prior to chemotherapy was normal  Platelet count has gradually improved since completion of concurrent chemoradiation  Platelet count on 11/7/2023 declined to 101,000.  Additional labs with IPF 5.5%, B12 342, folate 8.99  Labs on 3/26/2024 with B12 453, folate 12.1, IPF 3.9%, platelet count 108,000  Platelet count today declined to 116,000.  We will check additional labs with B12, folate, IPF    *Chemotherapy-induced anemia  Hemoglobin did decline into the high 10-low 11 range on concurrent chemoradiation  Hemoglobin subsequently improved and normalized as of 7/28/2023 with hemoglobin of 13.4  Patient underwent TAVR on 8/1/2023 with significant decline in hemoglobin thereafter, reached a low of 7.1 on 8/8/2023.  Additional labs with cardiology on 8/8/2023 with iron 78, iron saturation 22%, TIBC 350.  Patient was  started on oral iron although no evidence of iron deficiency.    Labs on 8/15/2023 with hemoglobin 8.7, iron 129, ferritin 120, iron saturation 39%, TIBC 355, B12 288, folate 7.15, haptoglobin less than 10.  Oral iron discontinued as patient was constipated, had no evidence of iron deficiency.  Initiated oral B12 1000 mcg daily.  Postprocedure anemia and low haptoglobin related to left inguinal/left lower extremity hematoma.  Hemoglobin on 3/26/2024 declined to 11.8.  Additional labs with iron 90, ferritin 102, iron saturation 26%, TIBC 343, B12 453, folate 12.1  Hemoglobin today normal at 12.7    *SMA stenosis on CT  CT 5/25/2023 with suboptimal evaluation, appears to be 50% focal stenosis at the origin of the SMA.  Recommended CTA exam for more definitive evaluation.  Patient was referred to vascular surgery, had to postpone the visit     *T2 compression fracture  CT 2/24/2025 with notation of T2 compression fracture, 20-25% loss of height.  No underlying bone lesion identified  Patient asymptomatic from her compression fracture.    PET scan 3/13/2025 with subtle compression deformity T2 with underlying sclerosis.  No significant hypermetabolic activity  MRI thoracic spine 3/17/2025 with T2 mild acute to subacute or subacute compression fracture.  No convincing metastatic disease in the thoracic spine.  Therefore, it appears that the patient's compression fracture is traumatic/benign with no convincing evidence to suggest a pathologic fracture.     PLAN:  Patient previously received treatment for clinical stage IIIA ([id number]) non-small cell lung cancer with concurrent chemoradiation with weekly single agent carboplatin (completed on 5/12/2023).  She subsequently received durvalumab x 1 year, final cycle received on 4/23/2024.  High suspicion for recurrent lung cancer based on current PET scan findings  Patient appears to have benign/traumatic compression fracture of T2  Continue oral B12 1000 mcg  daily  Additional labs today with IPF, B12, folate  Schedule CT-guided retroperitoneal lymph node biopsy next week to be sent for pathology and flow cytometry.  If pathology confirms non-small cell lung cancer we will need to send immediately for PD-L1 analysis and then send for [facility] NGS analysis  We will draw additional blood today for [id number] liquid NGS analysis  Referral back to ENT Dr. Powers due to recent PET scan findings with new asymmetric uptake left vocal cord as well as persistent asymmetric uptake in the right pharynx and larynx.  MD visit in 2-3 weeks with CBC, CMP and we will review pathology results, [id number] result.  If recurrent non-small cell lung cancer is identified we will discuss options for palliative systemic therapy, patient aware of high suspicion for recurrent malignancy.    I did spend 60 minutes total time caring for the patient today, time spent in review of records, face-to-face consultation, coordination of care, placement of orders, completion of documentation.

## 2025-03-20 ENCOUNTER — OFFICE VISIT (OUTPATIENT)
Dept: ONCOLOGY | Facility: CLINIC | Age: 70
End: 2025-03-20
Payer: MEDICAID

## 2025-03-20 ENCOUNTER — INFUSION (OUTPATIENT)
Dept: ONCOLOGY | Facility: HOSPITAL | Age: 70
End: 2025-03-20
Payer: MEDICARE

## 2025-03-20 VITALS
BODY MASS INDEX: 39.75 KG/M2 | WEIGHT: 216 LBS | DIASTOLIC BLOOD PRESSURE: 78 MMHG | HEART RATE: 67 BPM | OXYGEN SATURATION: 93 % | TEMPERATURE: 97.6 F | HEIGHT: 62 IN | SYSTOLIC BLOOD PRESSURE: 171 MMHG

## 2025-03-20 DIAGNOSIS — R94.8 ABNORMAL POSITRON EMISSION TOMOGRAPHY (PET) SCAN: ICD-10-CM

## 2025-03-20 DIAGNOSIS — Z45.2 ENCOUNTER FOR ADJUSTMENT OR MANAGEMENT OF VASCULAR ACCESS DEVICE: Primary | ICD-10-CM

## 2025-03-20 DIAGNOSIS — C34.32 MALIGNANT NEOPLASM OF LOWER LOBE OF LEFT LUNG: Primary | ICD-10-CM

## 2025-03-20 DIAGNOSIS — D69.6 THROMBOCYTOPENIA: ICD-10-CM

## 2025-03-20 DIAGNOSIS — C34.32 MALIGNANT NEOPLASM OF LOWER LOBE OF LEFT LUNG: ICD-10-CM

## 2025-03-20 LAB
ALBUMIN SERPL-MCNC: 3.4 G/DL (ref 3.5–5.2)
ALBUMIN/GLOB SERPL: 1.1 G/DL
ALP SERPL-CCNC: 65 U/L (ref 39–117)
ALT SERPL W P-5'-P-CCNC: 12 U/L (ref 1–33)
ANION GAP SERPL CALCULATED.3IONS-SCNC: 7.3 MMOL/L (ref 5–15)
AST SERPL-CCNC: 16 U/L (ref 1–32)
BASOPHILS # BLD AUTO: 0.02 10*3/MM3 (ref 0–0.2)
BASOPHILS NFR BLD AUTO: 0.4 % (ref 0–1.5)
BILIRUB SERPL-MCNC: 0.3 MG/DL (ref 0–1.2)
BUN SERPL-MCNC: 20 MG/DL (ref 8–23)
BUN/CREAT SERPL: 18.2 (ref 7–25)
CALCIUM SPEC-SCNC: 8.9 MG/DL (ref 8.6–10.5)
CHLORIDE SERPL-SCNC: 103 MMOL/L (ref 98–107)
CO2 SERPL-SCNC: 28.7 MMOL/L (ref 22–29)
CREAT SERPL-MCNC: 1.1 MG/DL (ref 0.57–1)
DEPRECATED RDW RBC AUTO: 42.4 FL (ref 37–54)
EGFRCR SERPLBLD CKD-EPI 2021: 54.5 ML/MIN/1.73
EOSINOPHIL # BLD AUTO: 0.14 10*3/MM3 (ref 0–0.4)
EOSINOPHIL NFR BLD AUTO: 2.7 % (ref 0.3–6.2)
ERYTHROCYTE [DISTWIDTH] IN BLOOD BY AUTOMATED COUNT: 13.3 % (ref 12.3–15.4)
FOLATE SERPL-MCNC: 7.8 NG/ML (ref 4.78–24.2)
GLOBULIN UR ELPH-MCNC: 3.2 GM/DL
GLUCOSE SERPL-MCNC: 200 MG/DL (ref 65–99)
HCT VFR BLD AUTO: 39.5 % (ref 34–46.6)
HGB BLD-MCNC: 12.7 G/DL (ref 12–15.9)
IMM GRANULOCYTES # BLD AUTO: 0.02 10*3/MM3 (ref 0–0.05)
IMM GRANULOCYTES NFR BLD AUTO: 0.4 % (ref 0–0.5)
LYMPHOCYTES # BLD AUTO: 0.52 10*3/MM3 (ref 0.7–3.1)
LYMPHOCYTES NFR BLD AUTO: 9.9 % (ref 19.6–45.3)
MCH RBC QN AUTO: 27.9 PG (ref 26.6–33)
MCHC RBC AUTO-ENTMCNC: 32.2 G/DL (ref 31.5–35.7)
MCV RBC AUTO: 86.6 FL (ref 79–97)
MONOCYTES # BLD AUTO: 0.39 10*3/MM3 (ref 0.1–0.9)
MONOCYTES NFR BLD AUTO: 7.5 % (ref 5–12)
NEUTROPHILS NFR BLD AUTO: 4.14 10*3/MM3 (ref 1.7–7)
NEUTROPHILS NFR BLD AUTO: 79.1 % (ref 42.7–76)
NRBC BLD AUTO-RTO: 0 /100 WBC (ref 0–0.2)
PLATELET # BLD AUTO: 116 10*3/MM3 (ref 140–450)
PLATELETS.RETICULATED NFR BLD AUTO: 3 % (ref 0.9–6.5)
PMV BLD AUTO: 9.9 FL (ref 6–12)
POTASSIUM SERPL-SCNC: 3.7 MMOL/L (ref 3.5–5.2)
PROT SERPL-MCNC: 6.6 G/DL (ref 6–8.5)
RBC # BLD AUTO: 4.56 10*6/MM3 (ref 3.77–5.28)
SODIUM SERPL-SCNC: 139 MMOL/L (ref 136–145)
VIT B12 BLD-MCNC: 362 PG/ML (ref 211–946)
WBC NRBC COR # BLD AUTO: 5.23 10*3/MM3 (ref 3.4–10.8)

## 2025-03-20 PROCEDURE — 25010000002 HEPARIN LOCK FLUSH PER 10 UNITS: Performed by: INTERNAL MEDICINE

## 2025-03-20 PROCEDURE — 36591 DRAW BLOOD OFF VENOUS DEVICE: CPT

## 2025-03-20 PROCEDURE — 80053 COMPREHEN METABOLIC PANEL: CPT | Performed by: INTERNAL MEDICINE

## 2025-03-20 PROCEDURE — 82607 VITAMIN B-12: CPT | Performed by: INTERNAL MEDICINE

## 2025-03-20 PROCEDURE — 85055 RETICULATED PLATELET ASSAY: CPT | Performed by: INTERNAL MEDICINE

## 2025-03-20 PROCEDURE — 82746 ASSAY OF FOLIC ACID SERUM: CPT | Performed by: INTERNAL MEDICINE

## 2025-03-20 PROCEDURE — 85025 COMPLETE CBC W/AUTO DIFF WBC: CPT

## 2025-03-20 RX ORDER — HEPARIN SODIUM (PORCINE) LOCK FLUSH IV SOLN 100 UNIT/ML 100 UNIT/ML
500 SOLUTION INTRAVENOUS AS NEEDED
OUTPATIENT
Start: 2025-03-20

## 2025-03-20 RX ORDER — SODIUM CHLORIDE 0.9 % (FLUSH) 0.9 %
10 SYRINGE (ML) INJECTION AS NEEDED
Status: DISCONTINUED | OUTPATIENT
Start: 2025-03-20 | End: 2025-03-20 | Stop reason: HOSPADM

## 2025-03-20 RX ORDER — HEPARIN SODIUM (PORCINE) LOCK FLUSH IV SOLN 100 UNIT/ML 100 UNIT/ML
500 SOLUTION INTRAVENOUS AS NEEDED
Status: CANCELLED | OUTPATIENT
Start: 2025-03-20

## 2025-03-20 RX ORDER — HEPARIN SODIUM (PORCINE) LOCK FLUSH IV SOLN 100 UNIT/ML 100 UNIT/ML
500 SOLUTION INTRAVENOUS AS NEEDED
Status: DISCONTINUED | OUTPATIENT
Start: 2025-03-20 | End: 2025-03-20 | Stop reason: HOSPADM

## 2025-03-20 RX ORDER — SODIUM CHLORIDE 0.9 % (FLUSH) 0.9 %
10 SYRINGE (ML) INJECTION AS NEEDED
OUTPATIENT
Start: 2025-03-20

## 2025-03-20 RX ORDER — SODIUM CHLORIDE 0.9 % (FLUSH) 0.9 %
10 SYRINGE (ML) INJECTION AS NEEDED
Status: CANCELLED | OUTPATIENT
Start: 2025-03-20

## 2025-03-20 RX ADMIN — Medication 10 ML: at 09:14

## 2025-03-20 RX ADMIN — Medication 500 UNITS: at 09:14

## 2025-03-20 RX ADMIN — Medication 10 ML: at 10:53

## 2025-03-20 RX ADMIN — Medication 500 UNITS: at 10:53

## 2025-03-21 RX ORDER — ALBUTEROL SULFATE 90 UG/1
INHALANT RESPIRATORY (INHALATION)
Qty: 18 G | Refills: 2 | Status: SHIPPED | OUTPATIENT
Start: 2025-03-21

## 2025-03-24 ENCOUNTER — HOSPITAL ENCOUNTER (OUTPATIENT)
Dept: CT IMAGING | Facility: HOSPITAL | Age: 70
Discharge: HOME | End: 2025-03-24
Admitting: INTERNAL MEDICINE
Payer: MEDICARE

## 2025-03-24 VITALS
RESPIRATION RATE: 18 BRPM | WEIGHT: 216 LBS | HEIGHT: 60 IN | TEMPERATURE: 97.3 F | HEART RATE: 64 BPM | SYSTOLIC BLOOD PRESSURE: 172 MMHG | OXYGEN SATURATION: 100 % | BODY MASS INDEX: 42.41 KG/M2 | DIASTOLIC BLOOD PRESSURE: 85 MMHG

## 2025-03-24 DIAGNOSIS — R94.8 ABNORMAL POSITRON EMISSION TOMOGRAPHY (PET) SCAN: ICD-10-CM

## 2025-03-24 DIAGNOSIS — C34.32 MALIGNANT NEOPLASM OF LOWER LOBE OF LEFT LUNG: ICD-10-CM

## 2025-03-24 LAB
INR PPP: 1 (ref 0.8–1.2)
PROTHROMBIN TIME: 9.8 SECONDS (ref 12.8–15.2)

## 2025-03-24 PROCEDURE — 88305 TISSUE EXAM BY PATHOLOGIST: CPT | Performed by: INTERNAL MEDICINE

## 2025-03-24 PROCEDURE — 88360 TUMOR IMMUNOHISTOCHEM/MANUAL: CPT | Performed by: INTERNAL MEDICINE

## 2025-03-24 PROCEDURE — 25010000002 LIDOCAINE 1 % SOLUTION: Performed by: INTERNAL MEDICINE

## 2025-03-24 PROCEDURE — 77012 CT SCAN FOR NEEDLE BIOPSY: CPT

## 2025-03-24 PROCEDURE — 88342 IMHCHEM/IMCYTCHM 1ST ANTB: CPT | Performed by: INTERNAL MEDICINE

## 2025-03-24 PROCEDURE — 88341 IMHCHEM/IMCYTCHM EA ADD ANTB: CPT | Performed by: INTERNAL MEDICINE

## 2025-03-24 PROCEDURE — 25010000002 FENTANYL CITRATE (PF) 50 MCG/ML SOLUTION: Performed by: RADIOLOGY

## 2025-03-24 PROCEDURE — 25010000002 MIDAZOLAM PER 1 MG: Performed by: RADIOLOGY

## 2025-03-24 PROCEDURE — 99152 MOD SED SAME PHYS/QHP 5/>YRS: CPT

## 2025-03-24 PROCEDURE — 99153 MOD SED SAME PHYS/QHP EA: CPT

## 2025-03-24 PROCEDURE — 85610 PROTHROMBIN TIME: CPT

## 2025-03-24 RX ORDER — SODIUM CHLORIDE 9 MG/ML
25 INJECTION, SOLUTION INTRAVENOUS ONCE
Status: DISCONTINUED | OUTPATIENT
Start: 2025-03-24 | End: 2025-03-25 | Stop reason: HOSPADM

## 2025-03-24 RX ORDER — MIDAZOLAM HYDROCHLORIDE 1 MG/ML
INJECTION, SOLUTION INTRAMUSCULAR; INTRAVENOUS AS NEEDED
Status: COMPLETED | OUTPATIENT
Start: 2025-03-24 | End: 2025-03-24

## 2025-03-24 RX ORDER — FENTANYL CITRATE 50 UG/ML
INJECTION, SOLUTION INTRAMUSCULAR; INTRAVENOUS AS NEEDED
Status: COMPLETED | OUTPATIENT
Start: 2025-03-24 | End: 2025-03-24

## 2025-03-24 RX ORDER — SODIUM CHLORIDE 0.9 % (FLUSH) 0.9 %
10 SYRINGE (ML) INJECTION AS NEEDED
Status: DISCONTINUED | OUTPATIENT
Start: 2025-03-24 | End: 2025-03-25 | Stop reason: HOSPADM

## 2025-03-24 RX ORDER — SODIUM CHLORIDE 0.9 % (FLUSH) 0.9 %
3 SYRINGE (ML) INJECTION EVERY 12 HOURS SCHEDULED
Status: DISCONTINUED | OUTPATIENT
Start: 2025-03-24 | End: 2025-03-25 | Stop reason: HOSPADM

## 2025-03-24 RX ORDER — LIDOCAINE HYDROCHLORIDE 10 MG/ML
20 INJECTION, SOLUTION INFILTRATION; PERINEURAL ONCE
Status: COMPLETED | OUTPATIENT
Start: 2025-03-24 | End: 2025-03-24

## 2025-03-24 RX ADMIN — LIDOCAINE HYDROCHLORIDE 20 ML: 10 INJECTION, SOLUTION INFILTRATION; PERINEURAL at 11:00

## 2025-03-24 RX ADMIN — FENTANYL CITRATE 50 MCG: 50 INJECTION, SOLUTION INTRAMUSCULAR; INTRAVENOUS at 10:54

## 2025-03-24 RX ADMIN — MIDAZOLAM 1 MG: 1 INJECTION INTRAMUSCULAR; INTRAVENOUS at 10:54

## 2025-03-24 NOTE — POST-PROCEDURE NOTE
POST PROCEDURE NOTE    Procedure: LN bx    Pre-Procedure Diagnosis: LAD    Post-procedure Diagnosis: same    Findings: successful bx    Complications: none    Blood loss: min    Specimen Removed: mult.20g core biopsies    Disposition:   Discharge home

## 2025-03-24 NOTE — DISCHARGE INSTRUCTIONS
EDUCATION /DISCHARGE INSTRUCTIONS  CT/US guided biopsy:  A biopsy is a procedure done to remove tissue for further analysis.  Before images are taken to locate the target area.  Images can be obtained using ultrasound, CT or MRI.  A physician will clean your skin with antiseptic soap, place a sterile towel around the site and administer a local anesthetic to numb the area.  The physician will then insert a special needle.  Sometimes images are taken of the needle after it is inserted to ensure the needle is in the correct area to be biopsied.   A sample is obtained and sent to the laboratory for study.  Occasionally the laboratory is unable to make a diagnosis from the sample and the procedure may need to be repeated.  Within a week the radiologist will send a report to your physician.  A pathologist will also examine the tissue and send a report.    Risks of the procedure include but are not limited to:   *  Bleeding    *  Infection   *  Puncture of surrounding organs *  Death     *  Lung collapse if the biopsy is near the chest which may require insertion of a      chest tube to re-inflate the lung if severe.    Benefits of the procedure:  Using x-ray helps to locate the area that requires a biopsy. The procedure is less invasive than a surgical procedure, there are no large incisions and it does not require anesthesia.    Alternatives to the procedure:  A biopsy can be performed surgically.  Risks of a surgical biopsy include exposure to anesthesia, infection, excessive bleeding and injury to abdominal organs.  A benefit of surgical biopsy is the ability to see the area to be biopsied and remove of a larger piece of tissue.    THIS EDUCATION INFORMATION WAS REVIEWED PRIOR TO PROCEDURE AND CONSENT. Patient initials__________________Time___________________    Post Procedure:    *  Expect the biopsy site may be tender up to one week.    *  Rest today (no pushing pulling or straining).   *  Slowly increase activity  tomorrow.    *  If you received sedation do not drive for 24 hours.   *  Keep dressing clean and dry.   *  Leave dressing on puncture site for 24 hours.    *  You may shower when dressing removed.  Call your doctor if experiencing:   *  Signs of infection such as redness, swelling, excessive pain and / or foul        smelling drainage from the puncture site.   *  Chills or fever over 101 degrees (by mouth).   *  Unrelieved pain.   *  Any new or severe symptoms.   *  If experiencing sudden / severe shortness of breath or chest pain go to the       nearest emergency room.   Following the procedure:     Follow-up with the ordering physician as directed.    Continue to take other medications as directed by your physician unless    otherwise instructed.   If applicable, resume taking your blood thinners or Aspirin on ___3/24/25________.    If you have any concerns please call the Radiology Nurses Desk at [phone].  You are the most important factor in your recovery.  Follow the above instructions carefully.

## 2025-03-27 LAB
LAB GUARDANT ONC MSI-HIGH: NOT DETECTED
LAB GUARDANT ONC TMB: 29.42 MUT/MB

## 2025-03-28 LAB
CYTO UR: NORMAL
DX PRELIMINARY: NORMAL
LAB AP CASE REPORT: NORMAL
LAB AP CLINICAL INFORMATION: NORMAL
LAB AP DIAGNOSIS COMMENT: NORMAL
LAB AP FLOW CYTOMETRY SUMMARY: NORMAL
LAB AP SPECIAL STAINS: NORMAL
PATH REPORT.ADDENDUM SPEC: NORMAL
PATH REPORT.FINAL DX SPEC: NORMAL
PATH REPORT.GROSS SPEC: NORMAL

## 2025-04-07 ENCOUNTER — TELEPHONE (OUTPATIENT)
Dept: ENDOCRINOLOGY | Age: 70
End: 2025-04-07
Payer: MEDICARE

## 2025-04-07 NOTE — PROGRESS NOTES
"Chief Complaint  Non-small cell lung cancer, clinical stage IIIA ([id number]), aortic stenosis, cervical spine stenosis, COPD, hepatic steatosis, diabetes mellitus    Subjective        History of Present Illness  Patient returns today in short interval follow-up to review results from CT-guided biopsy retroperitoneal lymph node that was performed on 3/20/2025.  Patient tolerated the procedure well with no complications.  She has no new complaints today, does feel very anxious regarding her results.  She does note that she continues with chronic significant peripheral neuropathy involving her hands and feet with effect on dexterity as well as balance.  She remains in a wheelchair today.  She has ECOG performance status of 1.        Objective   Vital Signs:   /66   Pulse 76   Temp 97.7 °F (36.5 °C) (Oral)   Resp 17   Ht 152.4 cm (60\")   Wt 97.8 kg (215 lb 11.2 oz)   SpO2 94%   BMI 42.13 kg/m²     Physical Exam  Constitutional:       Appearance: She is well-developed.      Comments: Patient remains in a wheelchair today in the office   Eyes:      Conjunctiva/sclera: Conjunctivae normal.   Neck:      Thyroid: No thyromegaly.   Cardiovascular:      Rate and Rhythm: Normal rate and regular rhythm.      Heart sounds: No murmur heard.     No friction rub. No gallop.   Pulmonary:      Effort: No respiratory distress.      Breath sounds: No wheezing.      Comments: Diminished breath sounds bilaterally  Abdominal:      General: Bowel sounds are normal. There is no distension.      Palpations: Abdomen is soft.      Tenderness: There is no abdominal tenderness.   Musculoskeletal:         General: Swelling present.      Comments: 1+ bilateral lower extremity edema, left chronically greater than right   Lymphadenopathy:      Head:      Right side of head: No submandibular adenopathy.      Cervical: No cervical adenopathy.      Upper Body:      Right upper body: No supraclavicular adenopathy.      Left upper body: No " supraclavicular adenopathy.   Skin:     General: Skin is warm and dry.      Findings: No rash.   Neurological:      Mental Status: She is alert and oriented to person, place, and time.      Cranial Nerves: No cranial nerve deficit.      Motor: No abnormal muscle tone.      Deep Tendon Reflexes: Reflexes normal.   Psychiatric:         Behavior: Behavior normal.       Patient was examined today, unchanged from above    Result Review : Reviewed CBC, CMP from today.  Reviewed pathology from CT-guided retroperitoneal lymph node biopsy 3/20/2025.  Reviewed [id number] results from 3/20/2025.       Assessment and Plan     *Non-small cell lung cancer, clinical stage IIIA ([id number])  Patient has history of smoking 1 pack/day x 45 years, quit in November 2021.    Following anterior cervical corpectomy in September 2022, she experienced acute hypoxemic respiratory failure.    CT angiogram chest 9/25/2022 showed a concerning 1.4 cm left lower lobe pulmonary nodule, mediastinal lymphadenopathy with infracarinal lymph node 2.7 cm, left hilar lymph node 1.2 cm, small right hilar lymph nodes up from 1.1 cm and additional enlarged mediastinal nodes in the right paratracheal, precarinal, infracarinal spaces.  There was a wedge-shaped area of consolidation/atelectasis in the right middle lobe base, subpleural density left costophrenic sulcus felt to represent atelectasis.  It was recommended for her to undergo subsequent PET scan by pulmonary.  The patient has had ongoing follow-up with neurosurgery and on CT scans 11/29/2022 of cervical and thoracic spine, there was concern regarding spinal instability with potential need for posterior cervical fusion.  This was scheduled in December 2022 however was delayed due to upper respiratory infection.  Patient admitted on 1/10/2023 to address multiple medical issues in order to prepare for potential neurosurgical procedure on 3/12/2023.  Patient underwent echocardiogram on 1/11/2023 with  ejection fraction greater than 70%, grade 1 diastolic dysfunction, severe aortic stenosis.  Patient is being followed by cardiology with consideration of need for TAVR versus SAVR for moderate to severe aortic stenosis.  Repeat CT chest on 1/12/2023 showed slight increase in size of the left lower lobe pulmonary nodule increased from 1.4 to 1.5 cm.  Groundglass nodule peripheral left lower lobe less conspicuous and resolution of previous reticulonodular opacities right lower lobe.  There was further enlargement of mediastinal lymph nodes with right posterior paratracheal lymph node 1.4 x 2.1 up to 1.5 x 2.3 cm, subcarinal lymph node increased from 2.7 x 5.2 up to 3.2 x 5.8 cm, left hilar lymph node increased from 1.2 up to 1.4 cm, and is stable 1.7 cm left infrahilar lymph node.  There was also comment regarding hepatic steatosis with changes of chronic liver disease and stable indeterminate nodular thickening of the adrenal glands.  CT-guided lung biopsy on 1/13/2023 with pathology that showed invasive poorly differentiated non-small cell carcinoma with focal neuroendocrine differentiation.  Staining pattern and morphology favor poorly differentiated adenocarcinoma however there was focal CD56 staining, could not entirely exclude large cell neuroendocrine carcinoma.  PD-L1 35%, Caris analysis: PD-L1 10% (), no targetable mutations  It was felt that there would be increased risk for bronchoscopy from a cardiac standpoint due to her aortic stenosis.  Staging MRI brain 1/29/2023 showed no evidence of metastatic disease  Staging PET scan 1/26/2023 showed hypermetabolic left lower lobe pulmonary nodule, size difficult to determine on PET however on recent CT was 1.6 cm (SUV 14.4).  Mediastinal and left hilar hypermetabolic lymphadenopathy (subcarinal lymph node 3.2 cm, SUV 31.7, left paratracheal 2.2 cm lymph node SUV 12.7).  Sub-6 mm pulmonary nodule left upper lobe unchanged and below PET resolution.  Focal  uptake posterior right hepatic lobe SUV 7.2 of uncertain significance.  Recommended MRI to evaluate.  Right thyroid activity SUV 5.3, recommended ultrasound.  Long segment uptake distal esophagus consistent with esophagitis.  Uptake posterior larynx, nonspecific, recommended direct visualization.  MRI abdomen 2/6/2023 with no evidence to suggest metastatic disease in the liver.  Subcentimeter nonenhancing lesions favor cyst or hemangiomas.  Patient underwent right and left heart catheterization with cardiology on 2/14/2023.  Identification of coronary artery disease, mild pulmonary hypertension, severe aortic stenosis.  Recommendation per cardiology to proceed with treatment for lung cancer and defer any intervention regarding aortic stenosis.  Concern regarding underlying neurologic issues from cervical stenosis and potential neuropathic effects from Taxol chemotherapy.  Concern regarding patient's overall performance status and ability to tolerate full dose chemotherapy.  Patient discussed at thoracic oncology tumor board with consensus to treat with weekly low-dose carboplatin concurrent with radiation therapy and omit Taxol.  Subsequent plan for 1 year durvalumab following concurrent chemoradiation.  Significant delay in initiating concurrent chemoradiation due to logistics with scheduling Mediport placement, patient canceled appointments and delayed initiation of treatment.  On 3/21/2023 initiated concurrent chemoradiation with weekly carboplatin (AUC 2) on 2/28/2023.  Week 4 carboplatin held 4/11 and again 4/18/2023 due to thrombocytopenia  Week 4 carboplatin resumed on 4/25/2023 at reduced dose to AUC 1 due to borderline neutropenia  Chemo and radiation held beginning 5/2/2023 due to neutropenia with WBC 1.13, ANC 0.53  Resumed radiation therapy on 5/8/2023 with recovery of WBC.  Received fifth and final weekly dose of concurrent carboplatin (AUC 1) on 5/9/2023.  Patient completed radiation therapy on  5/12/2023.  CT chest abdomen pelvis 5/25/2023 with significant improvement in mediastinal and left hilar lymphadenopathy, decrease in left lower lobe nodule indicating significant response.  On 5/30/2023 initiated treatment with durvalumab 10 mg/kg every 2 weeks x 1 year.  Transitioned to durvalumab 1500 mg every 4-week dosing with cycle 4 on 7/18/2023.  TAVR CT chest abdomen pelvis 7/21/2023 with decreased left lower lobe nodule from 8 down to 6 mm, stable mediastinal lymph nodes (with the exception of 1 small superior mediastinal lymph node increased from 4 to 7 mm of unclear significance).  Upper abdominal lymph nodes with slight increase in the periportal, hepatogastric, peripancreatic regions.  Following 5 cycles durvalumab, PET scan 9/5/2023 with left lower lobe ill-defined consolidation and opacification likely representing postradiation change.  No mediastinal lymphadenopathy nor hypermetabolic activity.  Slight enlargement (0.7 up to 1.0 cm) of anterior pericardial lymph node with new hypermetabolic activity (SUV 5.5.  Stable epigastric adenopathy with mild hypermetabolic activity (SUV 4.4) unchanged from 1/26/2023 scan (although not noted on that report).  Nondisplaced, healing left seventh rib fracture.  Significance of the pericardial lymph node and epigastric lymphadenopathy unclear.  Neither area easily accessible for CT-guided biopsy after discussion with interventional radiology on 9/12/2023.  Elected to monitor on repeat PET scan at 2-month interval.  PET scan 10/31/2023 with stable mildly hypermetabolic left lower lobe segmental consolidation, indeterminate regarding residual malignancy versus inflammatory change.  Prior epicardial lymph node decreased in size to less than 1 cm with no residual hypermetabolic activity.  Subcentimeter periceliac lymph node with slight increase in SUV from 3.9 up to 6.5, indeterminate.  Plan 3-month interval PET scan  PET scan 1/16/2024 with decrease in extent of  left lower lobe irregular nodular pulmonary opacification, SUV decreased slightly from 3.9 down to 3.3.  Stable borderline epigastric, mesenteric, aortocaval lymph nodes with stable activity.  Stable intense uptake base of tongue and floor of mouth.  Stable subcentimeter left upper lobe pulmonary nodule.  PET scan findings felt to be indeterminate, left lower lobe findings likely related to prior radiation therapy, no definitive evidence of residual or recurrent disease.  Plan repeat PET scan at 4-month interval at completion of Durvalumab.  Patient received final planned cycle of Durvalumab (cycle 14) on 4/23/2024.  PET scan following completion of Durvalumab on 5/17/2024 showed new 12 cm x 4.5 cm abnormality in the paramediastinal left lower lobe with bandlike characteristics and low-level SUV (3) consistent with evolving postradiation change.  Hypermetabolic periceliac lymph nodes unchanged (reference lymph node 1.2 cm with SUV 5).  2 hypermetabolic abdominal para-aortic lymph nodes with reference lymph node 1.2 cm, SUV 7 indeterminate, possibly reactive/inflammatory versus metastatic disease.  CT chest abdomen pelvis on 8/6/2024 showed more organized consolidation left lower lobe with appearance consistent with evolving postradiation change.  Vague micronodules left lower lobe felt to be infectious/inflammatory.  Stable retroperitoneal and gastrohepatic lymph nodes.  CT chest abdomen pelvis 11/6/2024 showed left perihilar consolidation extending to the left lower lobe less pronounced.  Areas of curvilinear density left lower lobe suspected related to mucous plugging and atelectasis.  Enlarging aortocaval and left periaortic lymph nodes, aortocaval lymph node increased from 1.3 up to 2.3 cm, left periaortic lymph node increased from 1.3 up to 2.1 cm.  PET scan 11/20/2024 showed masslike consolidation in the perihilar aspect left lower lobe to have decreased in size.  Small focus of uptake over left calvin mildly  above other areas of consolidation, indeterminate (SUV 3.8).  Intensely hypermetabolic abdominal and pelvic lymphadenopathy.  Aortocaval lymph node index lesion near duodenum increased from 1.2 up to 1.7 cm (SUV 32.9 (, index lymph node or portal vein and inferior vena cava with increased from 0.7 up to 1 cm with SUV 8.8.   CT-guided biopsy left retroperitoneal lymph node on 12/5/2024 with pathology that showed benign lymph node with focal calcification, no evidence of malignancy.  CT chest abdomen pelvis on 2/24/2025 showed new compression fracture at T2.  Increase in abdominal lymph nodes (aortocaval 2 up to 2.8 cm, gastrohepatic 0.9 up to 1.5 cm).  Slight increase in normal size mediastinal lymph nodes (0.6 up to 0.9 cm, 0.5 up to 0.7 cm).  Stable residual left lower lobe mass 6.1 x 3.4 x 3.5 cm.  Possible 0.6 cm hypodense lesion posterior pancreatic body.  Lobulated liver and stable spleen mildly enlarged at 13.4 cm.  PET scan 3/13/2025 showed significant increase in abdominal and pelvic lymphadenopathy with index aortocaval lymph node increased from 2 cm up to 2.8 cm with SUV increased from 32.9 up to 41.1.  Left periaortic jonn conglomerate increased from 1.5 up to 3.1 cm with SUV of 36.  Hypermetabolic lower mediastinal lymphadenopathy above the level of the diaphragm with index lymph node with SUV 18.1 at 0.7 cm (previously 0.3 cm with no hypermetabolism) and lymph node anterior to the distal esophagus with measurement of 1 cm with SUV 12.7 (previously 0.6 cm with no activity).  Asymmetric masslike consolidation left upper and lower lobes with heterogeneous uptake SUV 3.9 measuring 6.7 x 3.3 cm overall increased compared to 1/16/2024 but possibly representing radiation fibrosis.  Persistent long segment uptake right pharynx and larynx extending to the paravertebral musculature as seen on prior scans.  Asymmetric uptake left vocal cord and adjacent left arytenoid cartilage (SUV 5.3).  No suspicious activity  at T2 compression deformity with underlying sclerosis, indeterminant, follow-up recommended to exclude metastatic disease.  MRI thoracic spine 3/17/2025 with T2 sclerosis and mild compression deformity, appearance most consistent with mild acute to subacute or subacute compression fracture, no convincing evidence of metastatic disease in the thoracic spine.  MRI abdomen 3/17/2025 with noncirrhotic liver morphology, no pancreatic lesion identified.  Peripheral blood [id number] analysis on 3/20/2025: MADELYN, TMB 29 muts/Mb, tumor fraction 17.7%, CHEK2 mutation, ESR 1 amplification.  CT guided retroperitoneal lymph node biopsy on 3/24/2025 with pathology that showed metastatic poorly differentiated carcinoma.  This was noted to be similar to prior lung biopsy from 2023 in terms of IHC and morphology.  Favored to represent metastasis from original lung tumor.  Caris analysis requested however quantity insufficient.  Patient therefore with metastatic non-small cell lung cancer involving retroperitoneal lymph nodes and left perihilar lymph nodes.  Previous histology (1/13/2023) favored poorly differentiated adenocarcinoma, could not exclude large cells with neuroendocrine differentiation.  Previous PD-L1 10% and 35% on 2 different analyses of the same specimen.  No detectable targetable mutations.  Patient is not a candidate for neuropathy causing chemotherapy with underlying severe neuropathy related to pre-existing spinal disease involving hands and feet  Recommendation to proceed with palliative systemic therapy with carboplatin, pemetrexed, pembrolizumab.  Treatment regimen: Carboplatin AUC 5 on day 1, pemetrexed 500 mg/m² on day 1, pembrolizumab 200 mg on day 1.  Neulasta support on day 2 via on body injector.  Patient returns today in follow-up after recent retroperitoneal lymph node biopsy that was performed on 3/24/2025.  Patient with hypermetabolic progressive retroperitoneal lymphadenopathy.  Unfortunately biopsy  confirms presence of metastatic non-small cell lung cancer.  As above, the biopsy showed metastatic poorly differentiated carcinoma that was similar to previous biopsy from 1/13/2023 both in morphology and an IHC pattern.  That specimen noted poorly differentiated adenocarcinoma with possible focal large cells with neuroendocrine differentiation.  On the previous specimen, PD-L1 was 10% on 1 analysis and 35% on another analysis.  We did request Caris analysis of the current biopsy however quantity was insufficient for testing.  We did send off peripheral blood [id number] analysis from 3/20/2025 which did show TMB 29%, mutation in CHEK2 but no targetable mutation for initial therapy for metastatic disease.  We will check an [facility] germline panel test with labs next week due to the presence of CHEK2 mutation on the [id number] analysis.  The CHEK2 mutation could indicate usefulness of PARP inhibitor in the future.  We reviewed the now metastatic nature of her disease and palliative noncurative intent of further therapy.  The patient and her  expressed understanding.  We discussed options for treatment.  The patient has underlying severe peripheral neuropathy involving her hands and feet related to her nonmalignant pre-existing spinal disease.  Therefore she is not a good candidate for further use of Taxol.  Given the previous pathology that indicated adenocarcinoma differentiation, we discussed the use of carboplatin, pemetrexed, and pembrolizumab.  We reviewed side effects of treatment.  We will plan on use of growth factor support with Neulasta on day 2 beginning with cycle 1.  Patient will undergo formal education early next week.  She will receive B12 injection today and begin oral folic acid supplementation today.  She will return in 1 week for NP visit to begin cycle 1.  In the interval we will obtain baseline CT chest abdomen pelvis since it will have been 1 month out from her prior PET scan.  We will  also check MRI brain to rule out presence of metastatic disease prior to initiation of chemotherapy.    *Aortic stenosis  Patient underwent echocardiogram on 1/11/2023 with ejection fraction greater than 70%, grade 1 diastolic dysfunction, severe aortic stenosis.    Patient followed by cardiology with consideration of need for TAVR versus SAVR for moderate to severe aortic stenosis.  Patient underwent cardiac catheterization 2/14/2023 with identification of coronary artery disease, mild pulmonary hypertension, severe aortic stenosis.  Per cardiology, recommendation to proceed with treatment for lung cancer and defer any consideration of intervention for aortic stenosis.  Patient did undergo TAVR on 8/1/2023  Echocardiogram on 8/2/2023 with ejection fraction 62.4%, TAVR present  Patient continues follow-up with cardiology     *Cervical spine stenosis  Patient with cervical spine stenosis with associated myelopathy.   She underwent surgery on 9/20/2022 with anterior cervical corpectomy with cage.      The patient has had ongoing follow-up with neurosurgery and on CT scans 11/29/2022 of cervical and thoracic spine, there was concern regarding spinal instability with potential need for posterior cervical fusion.  This was scheduled in December 2022 however was delayed due to upper respiratory infection.  Patient admitted on 1/10/2023 to address multiple medical issues in order to prepare for potential neurosurgical procedure on 3/12/2023.  Patient was previously having severe symptoms related to her cervical stenosis with reduced ability to ambulate previously and significant limitations in movement in her hands, dropping objects.  More recently however she has improved and has been able to walk with the use of a walker, symptoms in the upper extremities have improved and her dexterity is better.    It appears that her neurosurgical procedure will need to be delayed until we can at least complete the concurrent  chemoradiation portion of her treatment.  Surgery would be feasible while continuing on immunotherapy in the future.  Neurosurgical follow-up has been placed on hold.  Patient improvement over time in mobility and symptoms in her extremities.  She does not intend to proceed with further surgery in the future.  Patient with ongoing neuropathy symptoms involving her hands and feet with effect on dexterity     *COPD  Patient has history of smoking 1 pack/day x 45 years, quit in November 2021.  Patient has not undergone formal PFTs  Patient being followed by pulmonary, currently using albuterol inhaler as needed  Patient with recent stable respiratory symptoms     *Hepatic steatosis  Identified on prior scans  PET scan 1/26/2023 with questionable area of activity seen but no corresponding CT abnormality.    MRI abdomen 2/6/2023 with no evidence to suggest metastatic disease in the liver.  Subcentimeter nonenhancing lesions favor cyst or hemangiomas.  CT abdomen on 2/24/2025 with evidence of lobulated liver and stable mild splenomegaly to 13.4 cm, suggestive of underlying cirrhosis and hypersplenism  MRI abdomen on 3/17/2025 with noncirrhotic liver morphology noted  LFTs remain normal     *Diabetes mellitus    *Anxiety/depression  Patient currently continuing on Wellbutrin  mg daily  Patient reported increasing frequency and severity of panic attacks.  She had previously been on Lexapro in addition to Wellbutrin however notes that she has been off Lexapro for approximately a year as it was not refilled by her PCP.  Patient was referred back to supportive oncology for recommendations on further treatment.  Patient indicated that she has been treated with multiple antidepressants, some of which were ineffective.  Consider possibly resuming Lexapro.  Patient did not follow through with referral to supportive oncology.    Patient with significant increase in anxiety with diagnosis of metastatic disease.  She does  currently continue on Wellbutrin 300 mg daily.  Prescription sent today for temporary use of Xanax 0.5 mg 3 times daily as needed (#60).  She is being referred to supportive oncology.    *Right thyroid uptake on PET scan 1/26/2023  Patient was scheduled for thyroid ultrasound however did not feel that she was able to undergo the procedure due to her neck issues and therefore was canceled, consider at future date  No mention of thyroid uptake on PET scan 9/5/2023 and on 10/31/2023.  No uptake on PET scan 1/16/2024, thyroid appeared atrophic  PET scan on 5/17/2024 with no mention of activity in the thyroid  PET scan 11/20/2024 with no thyroid uptake identified   PET scan 3/13/2025 with no evidence mentioned of thyroid uptake    *Laryngeal uptake on PET scan 1/26/2023, subsequent PET uptake and floor of mouth and base of tongue 1/16/2024  Patient was seen by ENT on 3/28/2023, laryngoscopy showed mild edema in the posterior glottis consistent with laryngeal pharyngeal reflux, no evidence of malignancy.  No mention of laryngeal uptake on PET scan 9/5/2023 nor on 10/31/2023  PET scan 1/16/2024 showed persistent significant uptake floor of mouth and base of tongue.  The area was visualized today on exam with no abnormal findings.  She does wear dentures that do not fit properly, unclear whether this could be contributing to the activity.    Patient was referred to ENT, had outstanding balance with 1 practice, difficulty with referral to another practice and was never seen  Patient was ultimately not seen by ENT.  Patient elected to forego referral to ENT.  PET scan 3/13/2025 showed persistent hypermetabolic activity in the right pharynx and larynx extending to the paravertebral musculature as seen previously.  There was also asymmetric uptake in the left vocal cord and adjacent left arytenoid cartilage with SUV 5.3.  Patient is asymptomatic.  Patient is being referred to ENT for evaluation.    *Esophageal uptake on PET  scan 1/26/2023  Felt to be consistent with esophagitis in the distal esophagus.  We will hold on referral for EGD given the multitude of additional procedures and consults required above.  No mention of esophageal uptake on PET scan 9/5/2023 nor on 10/31/2023 no oral on 1/16/2024  No mention of esophageal uptake on PET scan 5/17/2024  No mention of uptake in the esophagus and PET scan 11/20/2024.  PET scan on 3/13/2025 with no significant uptake noted within the esophagus.    *Venous access  Mediport placed by Dr. Shook on 3/2/2023    *Nutrition/weight loss  Patient has been seen by oncology nutrition  Patient overall has lost a significant amount of weight however weight subsequently stabilized  Appetite and weight improved.  Weight is currently stable    *Chronic left greater than right lower extremity edema with subsequent left inguinal/lower extremity hematoma following TAVR  Patient developed exacerbation of chronic edema in the lower extremities, left slightly greater than right.    Bilateral lower extremity Doppler on 5/9/2023 was negative, showed bilateral popliteal fluid collections.  Patient with evidence of left inguinal/left lower extremity hematoma following TAVR with ecchymosis tracking into the distal portion of the left lower extremity and ecchymosis laterally in the left hip.  Likely explains decline in hemoglobin and low haptoglobin post procedure.  PET scan 9/5/2023 with evidence of fluid collection/blood in the left inguinal region.  Edema increased, asymmetric with left greater than right.  Lower extremity Doppler 4/25/2024 with bilateral popliteal fluid collections, no evidence of DVT.  Patient with persistent mild lower extremity edema, chronic asymmetry with left greater than right.      *Hypothyroidism exacerbated by immunotherapy  Hypothyroidism managed by PCP Dr. Bassett Scales  Labs on 5/30/2023 prior to beginning immunotherapy with evidence of hypothyroidism with TSH 10.5, free T4 0.54.   Levothyroxine dose increased by Dr. Brush from 125 up to 150 mcg daily  Continue to monitor thyroid function studies every 8 weeks on durvalumab  Patient with persistent borderline abnormal thyroid function studies on Durvalumab, referred to endocrinology  On 1/2/2024, significant increase in TSH to 19.7 with free T40.8.  On 1/5/2024 increased levothyroxine dose from 150 up to 175 mcg daily.  Patient is now followed by endocrinology for management of hypothyroidism  Labs on 4/23/2024 with normal TSH suppressed at 0.155 and free T4 increased to 1.94.  Levothyroxine dose decreased by endocrinology to 150 mcg daily.  Labs on 5/21/2024 with TSH 0.122 and free T4 improved into the normal range at 1.69.    Labs on 8/13/2024 with TSH of 0.247 and free T4 elevated at 1.97.  Endocrinology decreased levothyroxine dose to 137 mcg daily  Labs on 11/13/2024 with TSH 1.63, free T4 of 1.44.  Patient continues follow-up with endocrinology  We will evaluate thyroid function per protocol with upcoming immunotherapy.    *Chemotherapy-induced leukopenia  WBC today 5.19 with normal differential.  We will need to monitor counts on upcoming chemotherapy.  Planned use of Neulasta support on day to be on body injector beginning with cycle 1 chemotherapy    *Chemotherapy-induced thrombocytopenia  Platelet count prior to chemotherapy was normal  Platelet count has gradually improved since completion of concurrent chemoradiation  Platelet count on 11/7/2023 declined to 101,000.  Additional labs with IPF 5.5%, B12 342, folate 8.99  Labs on 3/26/2024 with B12 453, folate 12.1, IPF 3.9%, platelet count 108,000  Platelet count on 3/20/2025 declined to 116,000.  Additional labs with IPF 3%, B12 362, folate 7.8  Platelet count today 113,000.  We will need to closely monitor platelet count on chemotherapy with pre-existing thrombocytopenia prior to treatment    *Chemotherapy-induced anemia  Hemoglobin did decline into the high 10-low 11 range on  concurrent chemoradiation  Hemoglobin subsequently improved and normalized as of 7/28/2023 with hemoglobin of 13.4  Patient underwent TAVR on 8/1/2023 with significant decline in hemoglobin thereafter, reached a low of 7.1 on 8/8/2023.  Additional labs with cardiology on 8/8/2023 with iron 78, iron saturation 22%, TIBC 350.  Patient was started on oral iron although no evidence of iron deficiency.    Labs on 8/15/2023 with hemoglobin 8.7, iron 129, ferritin 120, iron saturation 39%, TIBC 355, B12 288, folate 7.15, haptoglobin less than 10.  Oral iron discontinued as patient was constipated, had no evidence of iron deficiency.  Initiated oral B12 1000 mcg daily.  Postprocedure anemia and low haptoglobin related to left inguinal/left lower extremity hematoma.  Hemoglobin on 3/26/2024 declined to 11.8.  Additional labs with iron 90, ferritin 102, iron saturation 26%, TIBC 343, B12 453, folate 12.1  Hemoglobin today normal at 13.4    *SMA stenosis on CT  CT 5/25/2023 with suboptimal evaluation, appears to be 50% focal stenosis at the origin of the SMA.  Recommended CTA exam for more definitive evaluation.  Patient was referred to vascular surgery, had to postpone the visit     *T2 compression fracture  CT 2/24/2025 with notation of T2 compression fracture, 20-25% loss of height.  No underlying bone lesion identified  Patient asymptomatic from her compression fracture.    PET scan 3/13/2025 with subtle compression deformity T2 with underlying sclerosis.  No significant hypermetabolic activity  MRI thoracic spine 3/17/2025 with T2 mild acute to subacute or subacute compression fracture.  No convincing metastatic disease in the thoracic spine.  Therefore, it appears that the patient's compression fracture is traumatic/benign with no convincing evidence to suggest a pathologic fracture.     PLAN:  Patient with metastatic non-small cell lung cancer, prior histology with poorly differentiated adenocarcinoma with focal  neuroendocrine differentiation  Continue oral B12 1000 mcg daily  B12 1000 mcg IM injection x 1 today  Begin oral folic acid 1 mg daily  Prescription sent today for Xanax 0.5 mg 3 times daily as needed (#60)  Referral to supportive oncology  New baseline CT chest abdomen pelvis next week  Staging MRI brain next week  Referral to ENT due to recent PET scan findings with new asymmetric uptake left vocal cord as well as persistent asymmetric uptake in the right pharynx and larynx.  Chemo education next week for palliative carboplatin, pemetrexed, pembrolizumab  NP visit in 1 week with CBC, CMP, TSH, free T4, and draw [employer] 70 gene germline panel test.  Begin treatment that day with palliative carboplatin, pemetrexed, pembrolizumab.  Treatment regimen: Carboplatin AUC 5-day 1, pemetrexed 500 mg/m² day 1, pembrolizumab 200 mg day 1 on a 21-day cycle with Neulasta support on day 2 via on body injector.  In 2 weeks and again in 3 weeks NP visit with CBC, CMP and toxicity check  MD visit in 4 weeks with CBC, CMP and begin cycle 2 palliative carboplatin, pemetrexed, pembrolizumab.  Plan repeat scans at the end of cycle 2 to assess response.    I did spend 70 minutes total time caring for the patient today, time spent in review of records, face-to-face consultation, coordination of care, placement of orders, completion of documentation.

## 2025-04-07 NOTE — TELEPHONE ENCOUNTER
Called and spoke with patient to reschedule a follow up appointment. She said that she goes to see her other doctor on Friday and asked if we could call back and schedule an appointment next Monday.

## 2025-04-09 LAB
CYTO UR: NORMAL
DX PRELIMINARY: NORMAL
LAB AP CASE REPORT: NORMAL
LAB AP CLINICAL INFORMATION: NORMAL
LAB AP DIAGNOSIS COMMENT: NORMAL
LAB AP FLOW CYTOMETRY SUMMARY: NORMAL
LAB AP SPECIAL STAINS: NORMAL
Lab: NORMAL
PATH REPORT.ADDENDUM SPEC: NORMAL
PATH REPORT.FINAL DX SPEC: NORMAL
PATH REPORT.GROSS SPEC: NORMAL

## 2025-04-11 ENCOUNTER — OFFICE VISIT (OUTPATIENT)
Dept: ONCOLOGY | Facility: CLINIC | Age: 70
End: 2025-04-11
Payer: MEDICARE

## 2025-04-11 ENCOUNTER — INFUSION (OUTPATIENT)
Dept: ONCOLOGY | Facility: HOSPITAL | Age: 70
End: 2025-04-11
Payer: MEDICARE

## 2025-04-11 VITALS
TEMPERATURE: 97.7 F | SYSTOLIC BLOOD PRESSURE: 119 MMHG | BODY MASS INDEX: 42.35 KG/M2 | RESPIRATION RATE: 17 BRPM | DIASTOLIC BLOOD PRESSURE: 66 MMHG | HEART RATE: 76 BPM | HEIGHT: 60 IN | OXYGEN SATURATION: 94 % | WEIGHT: 215.7 LBS

## 2025-04-11 DIAGNOSIS — R94.8 ABNORMAL POSITRON EMISSION TOMOGRAPHY (PET) SCAN: ICD-10-CM

## 2025-04-11 DIAGNOSIS — Z45.2 ENCOUNTER FOR ADJUSTMENT OR MANAGEMENT OF VASCULAR ACCESS DEVICE: Primary | ICD-10-CM

## 2025-04-11 DIAGNOSIS — Z79.899 ENCOUNTER FOR LONG-TERM (CURRENT) USE OF HIGH-RISK MEDICATION: ICD-10-CM

## 2025-04-11 DIAGNOSIS — C34.32 MALIGNANT NEOPLASM OF LOWER LOBE OF LEFT LUNG: Primary | ICD-10-CM

## 2025-04-11 DIAGNOSIS — C34.32 MALIGNANT NEOPLASM OF LOWER LOBE OF LEFT LUNG: ICD-10-CM

## 2025-04-11 LAB
ALBUMIN SERPL-MCNC: 3.7 G/DL (ref 3.5–5.2)
ALBUMIN/GLOB SERPL: 1.3 G/DL
ALP SERPL-CCNC: 64 U/L (ref 39–117)
ALT SERPL W P-5'-P-CCNC: 16 U/L (ref 1–33)
ANION GAP SERPL CALCULATED.3IONS-SCNC: 11.8 MMOL/L (ref 5–15)
AST SERPL-CCNC: 23 U/L (ref 1–32)
BASOPHILS # BLD AUTO: 0.02 10*3/MM3 (ref 0–0.2)
BASOPHILS NFR BLD AUTO: 0.4 % (ref 0–1.5)
BILIRUB SERPL-MCNC: 0.3 MG/DL (ref 0–1.2)
BUN SERPL-MCNC: 17 MG/DL (ref 8–23)
BUN/CREAT SERPL: 19.5 (ref 7–25)
CALCIUM SPEC-SCNC: 9.1 MG/DL (ref 8.6–10.5)
CHLORIDE SERPL-SCNC: 99 MMOL/L (ref 98–107)
CO2 SERPL-SCNC: 25.2 MMOL/L (ref 22–29)
CREAT SERPL-MCNC: 0.87 MG/DL (ref 0.57–1)
DEPRECATED RDW RBC AUTO: 43 FL (ref 37–54)
EGFRCR SERPLBLD CKD-EPI 2021: 72.2 ML/MIN/1.73
EOSINOPHIL # BLD AUTO: 0.1 10*3/MM3 (ref 0–0.4)
EOSINOPHIL NFR BLD AUTO: 1.9 % (ref 0.3–6.2)
ERYTHROCYTE [DISTWIDTH] IN BLOOD BY AUTOMATED COUNT: 13.7 % (ref 12.3–15.4)
GLOBULIN UR ELPH-MCNC: 2.9 GM/DL
GLUCOSE SERPL-MCNC: 137 MG/DL (ref 65–99)
HCT VFR BLD AUTO: 41.2 % (ref 34–46.6)
HGB BLD-MCNC: 13.4 G/DL (ref 12–15.9)
IMM GRANULOCYTES # BLD AUTO: 0.02 10*3/MM3 (ref 0–0.05)
IMM GRANULOCYTES NFR BLD AUTO: 0.4 % (ref 0–0.5)
LYMPHOCYTES # BLD AUTO: 0.87 10*3/MM3 (ref 0.7–3.1)
LYMPHOCYTES NFR BLD AUTO: 16.8 % (ref 19.6–45.3)
MCH RBC QN AUTO: 27.9 PG (ref 26.6–33)
MCHC RBC AUTO-ENTMCNC: 32.5 G/DL (ref 31.5–35.7)
MCV RBC AUTO: 85.8 FL (ref 79–97)
MONOCYTES # BLD AUTO: 0.64 10*3/MM3 (ref 0.1–0.9)
MONOCYTES NFR BLD AUTO: 12.3 % (ref 5–12)
NEUTROPHILS NFR BLD AUTO: 3.54 10*3/MM3 (ref 1.7–7)
NEUTROPHILS NFR BLD AUTO: 68.2 % (ref 42.7–76)
NRBC BLD AUTO-RTO: 0 /100 WBC (ref 0–0.2)
PLATELET # BLD AUTO: 113 10*3/MM3 (ref 140–450)
PMV BLD AUTO: 10.2 FL (ref 6–12)
POTASSIUM SERPL-SCNC: 4 MMOL/L (ref 3.5–5.2)
PROT SERPL-MCNC: 6.6 G/DL (ref 6–8.5)
RBC # BLD AUTO: 4.8 10*6/MM3 (ref 3.77–5.28)
SODIUM SERPL-SCNC: 136 MMOL/L (ref 136–145)
WBC NRBC COR # BLD AUTO: 5.19 10*3/MM3 (ref 3.4–10.8)

## 2025-04-11 PROCEDURE — 80053 COMPREHEN METABOLIC PANEL: CPT

## 2025-04-11 PROCEDURE — 36591 DRAW BLOOD OFF VENOUS DEVICE: CPT

## 2025-04-11 PROCEDURE — 96372 THER/PROPH/DIAG INJ SC/IM: CPT

## 2025-04-11 PROCEDURE — 85025 COMPLETE CBC W/AUTO DIFF WBC: CPT

## 2025-04-11 PROCEDURE — 25010000002 HEPARIN LOCK FLUSH PER 10 UNITS: Performed by: INTERNAL MEDICINE

## 2025-04-11 PROCEDURE — 25010000002 CYANOCOBALAMIN PER 1000 MCG: Performed by: INTERNAL MEDICINE

## 2025-04-11 RX ORDER — HEPARIN SODIUM (PORCINE) LOCK FLUSH IV SOLN 100 UNIT/ML 100 UNIT/ML
500 SOLUTION INTRAVENOUS AS NEEDED
OUTPATIENT
Start: 2025-04-11

## 2025-04-11 RX ORDER — SODIUM CHLORIDE 0.9 % (FLUSH) 0.9 %
10 SYRINGE (ML) INJECTION AS NEEDED
Status: DISCONTINUED | OUTPATIENT
Start: 2025-04-11 | End: 2025-04-11 | Stop reason: HOSPADM

## 2025-04-11 RX ORDER — ALPRAZOLAM 0.5 MG
0.5 TABLET ORAL 3 TIMES DAILY PRN
Qty: 60 TABLET | Refills: 0 | Status: SHIPPED | OUTPATIENT
Start: 2025-04-11

## 2025-04-11 RX ORDER — FOLIC ACID 1 MG/1
1 TABLET ORAL DAILY
Qty: 30 TABLET | Refills: 6 | Status: SHIPPED | OUTPATIENT
Start: 2025-04-11

## 2025-04-11 RX ORDER — CYANOCOBALAMIN 1000 UG/ML
1000 INJECTION, SOLUTION INTRAMUSCULAR; SUBCUTANEOUS ONCE
Status: COMPLETED | OUTPATIENT
Start: 2025-04-11 | End: 2025-04-11

## 2025-04-11 RX ORDER — HEPARIN SODIUM (PORCINE) LOCK FLUSH IV SOLN 100 UNIT/ML 100 UNIT/ML
500 SOLUTION INTRAVENOUS AS NEEDED
Status: DISCONTINUED | OUTPATIENT
Start: 2025-04-11 | End: 2025-04-11 | Stop reason: HOSPADM

## 2025-04-11 RX ORDER — SODIUM CHLORIDE 0.9 % (FLUSH) 0.9 %
10 SYRINGE (ML) INJECTION AS NEEDED
OUTPATIENT
Start: 2025-04-11

## 2025-04-11 RX ORDER — ALPRAZOLAM 0.5 MG
0.5 TABLET ORAL 3 TIMES DAILY PRN
Qty: 60 TABLET | Refills: 0 | Status: SHIPPED | OUTPATIENT
Start: 2025-04-11 | End: 2025-04-11

## 2025-04-11 RX ADMIN — Medication 10 ML: at 10:19

## 2025-04-11 RX ADMIN — CYANOCOBALAMIN 1000 MCG: 1000 INJECTION, SOLUTION INTRAMUSCULAR at 11:30

## 2025-04-11 RX ADMIN — Medication 500 UNITS: at 10:19

## 2025-04-14 ENCOUNTER — TELEPHONE (OUTPATIENT)
Dept: ONCOLOGY | Facility: CLINIC | Age: 70
End: 2025-04-14
Payer: MEDICARE

## 2025-04-14 ENCOUNTER — HOSPITAL ENCOUNTER (OUTPATIENT)
Dept: MRI IMAGING | Facility: HOSPITAL | Age: 70
Discharge: HOME | End: 2025-04-14
Payer: MEDICARE

## 2025-04-14 DIAGNOSIS — C34.32 MALIGNANT NEOPLASM OF LOWER LOBE OF LEFT LUNG: ICD-10-CM

## 2025-04-14 NOTE — TELEPHONE ENCOUNTER
Caller: Yolanda Amanda    Relationship: Emergency Contact    Best call back number: [phone] DOMITILA    What is the best time to reach you: ANY    Who are you requesting to speak with (clinical staff, provider,  specific staff member): CLINICAL      What was the call regarding: DOMITILA IS WANTING TO KNOW HOW MANY XANAX SHE SHOULD  TAKE BEFORE HER MRI    SHE HAS TO RESCHEDULE TODAY'S TEST DO TO CLAUSTROPHOBIA     PLEASE ADVISE

## 2025-04-15 RX ORDER — LIDOCAINE AND PRILOCAINE 25; 25 MG/G; MG/G
CREAM TOPICAL
Qty: 30 G | Refills: 2 | Status: SHIPPED | OUTPATIENT
Start: 2025-04-15

## 2025-04-15 NOTE — PROGRESS NOTES
[facility] Hematology/Oncology Treatment Plan Summary    Name: Gabby Acosta  [facility]# [id number]  MD: Dr. Lombardi    Diagnosis:     ICD-10-CM ICD-9-CM   1. Malignant neoplasm of lower lobe of left lung [C34.32]  C34.32 162.5     Goal of treatment: palliative    Treatment Medication(s):   Carboplatin   Pemetrexed (Alimta)   Pembrolizumab (Keytruda)     Frequency: Every 3 weeks    Number of cycles: 4    Starting on: 4/18/2025    Repeat after 2 cycles: CT Scan    Items for home use: Senokot-S (for constipation), Milk of Magnesia (for constipation), Imodium AD (for diarrhea), Tylenol (for fever and/or pain), and Thermometer    Rx written for: [x] Nausea    [] Pre-Treatment   dexamethasone 4 mg by mouth twice daily on the day before, day of, and day after treatment, folic acid 1 mg daily by mouth, olanzapine 5mg nightly on days as directed, and ondansetron 8 mg by mouth every 8 hours as needed for nausea.   -Patient is already taking folic acid once a day, therefore a new prescription was not called in.     Notes:     Next Steps: Treatment start on 4/18/2025     Completing Provider: CHENG Alcala           Date/time: 04/16/2025      Please note: You will be seen by a provider frequently with your treatment plan. This plan may change depending on many factors, if so, this will be discussed with you by your physician.  Last update 03/2022.

## 2025-04-15 NOTE — TELEPHONE ENCOUNTER
Left message for patient advising her to take 1 Xanax about 30-60 minutes prior to her MRI, and if it is not effective, to take 1 additional tablet. We will facilitate getting her MRI rescheduled. Encouraged patient to call back with additional questions.

## 2025-04-15 NOTE — PROGRESS NOTES
TREATMENT  PREPARATION    Gabby Acosta  [id number]  1955    Chief Complaint: Treatment preparation and needs assessment    History of present illness:  Gabby Acosta is a 70 y.o. year old female who is here today for treatment preparation and needs assessment.  The patient has been diagnosed with   Encounter Diagnosis   Name Primary?    Malignant neoplasm of lower lobe of left lung [C34.32] Yes    and is scheduled to begin treatment with:     Oncology History:    Oncology/Hematology History   Malignant neoplasm of lower lobe of left lung   2/16/2023 Initial Diagnosis    Malignant neoplasm of lower lobe of left lung (HCC)     3/21/2023 - 5/9/2023 Chemotherapy    OP LUNG PACLitaxel / CARBOplatin AUC=2 (Weekly) + XRT      5/30/2023 - 4/23/2024 Chemotherapy    OP LUNG Durvalumab     4/18/2025 -  Chemotherapy    OP LUNG Pembrolizumab 200 mg / Pemetrexed / Carboplatin AUC=5         The current medication list and allergy list were reviewed and reconciled.     Past Medical History, Past Surgical History, Social History, Family History have been reviewed and are without significant changes except as mentioned.    Physical Exam:  Vitals:    04/16/25 0746   PainSc: 0-No pain     Physical Exam  Neurological:      Mental Status: She is alert and oriented to person, place, and time.           NEEDS ASSESSMENTS  Psychosocial and Barriers to care  The patient has completed a PHQ-9 Depression Screening and the Distress Thermometer (DT) today.  PHQ-9 results show PHQ-2 Total Score:   PHQ-9 Total Score:        The patient scored their distress today as Distress Level: 8 on a scale of 0-10 with 0 being no distress and 10 being extreme distress. Problems marked by the patient as being an issue for them within the last week include Practical Problems  : No  Housing/Utilities: No  Transportation: Yes  Treatment decisions: No  Family Concerns  Ability to have children: No  Physical concerns  Fatigue: No  Pain: No  Sleep:  "No  Substance abuse: No .      Results were reviewed along with psychosocial resources offered by our cancer center.  Our Supportive Oncology team will be flagged for a score of 4 or above, and a same day call will be made for a score of 9 or 10. A mental health referral is offered at that time. Patients who score less than 4 have been educated on our support services and can be referred to our  upon request.  The patient will not be referred to our .     Nutrition  The patient has completed the malnutrition screening today. They scored Malnutrition Screening Tool  Have you recently lost weight without trying?  If yes, how much weight have you lost?: 0--> No  Have you been eating poorly because of a decreased appetite?: 0--> No  MST score: 0   with a score of 0-1 meaning not at risk in a score of 2 or greater meaning at risk.  Patients with a score of 3 or higher will be referred to our oncology dietitian for support. Patients beginning at risk treatment regimens or who have dietary concerns will also be referred to our oncology dietitian. The patient will not be referred.    Intravenous Access Assessment  The patient and I discussed planned intravenous chemo/biotherapy as well as other IV treatments that are often needed throughout the course of treatment. These may include, but are not limited to blood transfusions, antibiotics, and IV hydration. Discussed that depending on selected treatment and vein assessment, patient may require venous access device (VAD) which could include but not limited to a Mediport or PICC line. Risks and benefits of VADs reviewed. The patient will be treated via Port.      Advanced Care Planning  Advance Care Planning   The patient and I discussed advanced care planning, \"Conversations that Matter\".   This service is offered for development of advance directives with a certified ACP facilitator.  The patient does not have an up-to-date advanced directive. This " document is not on file with our office. The patient is not interested in an appointment with one of our facilitators to create or update their advanced directives.      Smoking cessation  Tobacco Use: Medium Risk (4/11/2025)    Patient History     Smoking Tobacco Use: Former     Smokeless Tobacco Use: Never     Passive Exposure: Not on file       Survivorship   When appropriate, we discussed that we will refer the patient to survivorship clinic to discuss next steps following completion of planned treatment.  Reviewed this visit will include assessment of your physical, psychological, functional, and spiritual needs as a survivor and the need at attend this visit when scheduled.    TREATMENT EDUCATION    Today I met with the patient to discuss the chemo/biotherapy regimen recommended for treatment of Malignant neoplasm of lower lobe of left lung [C34.32]  .  The patient was given explanation of treatment premed side effects including office policy that prohibits patients to drive if sedating medications are administered, MD explanation given regarding benefits, side effects, toxicities and goals of treatment.  The patient received a Chemotherapy/Biotherapy Plan Summary including diagnosis and explanation of specific treatment plan.    SIDE EFFECTS:  Common side effects were discussed with the patient and/or significant other.  Discussion included where applicable hair loss/discoloration, anemia/fatigue, infection/chills/fever, appetite, bleeding risk/precautions, constipation, diarrhea, mouth sores, taste alteration, loss of appetite, nausea/vomiting, peripheral neuropathy, skin/nail changes, rash, muscle aches/weakness, photosensitivity, weight gain/loss, hearing loss, dizziness, menopausal symptoms, menstrual irregularity, sterility, high blood pressure, heart damage, liver damage, lung damage, kidney damage, DVT/PE risk, fluid retention, pleural/pericardial effusion, somnolence, electrolyte/LFT imbalance, vein  exercises and/or the possible need for vascular access/port placement.  The patient was advised that although uncommon, leakage of an infused medication from the vein or venous access device may lead to skin breakdown and/or other tissue damage.  The patient was advised that he/she may have pain, bleeding, and/or bruising from the insertion of a needle in their vein or venous access device (port).  The patient was further advised that, in spite of proper technique, infection with redness and irritation may rarely occur at the site where the needle was inserted.  The patient was advised that if complications occur, additional medical treatment is available.  Finally, where applicable we have reviewed rare but potential immune mediated side effects including shortness of breath, cough, chest pain (pneumonitis), abdominal pain, diarrhea (colitis), thyroiditis (hypothyroid or hyperthyroid), hepatitis and liver dysfunction, nephritis and renal dysfunction.    Discussion also included side effects specific to drugs in the treatment plan, specifically:    Treatment Plans       Name Type Plan Dates Plan Provider         Active    OP LUNG Pembrolizumab 200 mg / Pemetrexed / Carboplatin AUC=5 ONCOLOGY TREATMENT 4/16/2025 - Present Carmelo Lombardi Jr., MD                      Questions answered and additional information discussed on topics including:  Anemia, Thrombocytopenia, Neutropenia, Nutrition and appetite changes, Constipation, Diarrhea, Nausea & vomiting, Mouth sores, and Organ toxicities       Assessment and Plan:    Diagnoses and all orders for this visit:    1. Malignant neoplasm of lower lobe of left lung [C34.32] (Primary)      No orders of the defined types were placed in this encounter.    The patient and I have reviewed their diagnosis and scheduled treatment plan. Needs assessment was completed where applicable including genetics, psychosocial needs, barriers to care, VAD evaluation, advanced care planning,  survivorship, and palliative care services where indicated. Referrals have been ordered as appropriate based upon evaluation today and patient desires.   Chemo/biotherapy teaching was completed today and consent obtained. See separate documentation for further details.  Adequate time was given to answer questions.  Patient made aware of their care team members and contact information if they have questions or problems throughout the treatment course.  Discussion held and written information provided describing frequency of office visits and ongoing monitoring throughout the treatment plan.     Reviewed with patient any prescribed medication sent to pharmacy.  Education provided regarding proper storage, safe handling, and proper disposal of unused medication.  Proper handling of body fluids and waste discussed and written information provided.  If appropriate, patient had pretreatment labs drawn today.    Learning assessment completed at initial patient encounter. See separate flowsheet. Chemo/biotherapy education comprehension assessed at today's visit.    I spent 35 minutes caring for Gabby on this date of service. This time includes time spent by me in the following activities: preparing for the visit, reviewing tests, obtaining and/or reviewing a separately obtained history, performing a medically appropriate examination and/or evaluation, counseling and educating the patient/family/caregiver, ordering medications, tests, or procedures, referring and communicating with other health care professionals, documenting information in the medical record, independently interpreting results and communicating that information with the patient/family/caregiver, and care coordination.     Chantale Hall, CHENG   04/15/25    Mode of Visit: Telephone  Visit Platform: Telephone Options: [employer]  Location of patient: home  Location of provider: [facility]  You have chosen to receive care through a telehealth visit.  Does the patient  consent to use a video/audio connection for your medical care today? Yes  The visit included audio and video interaction. No technical issues occurred during this visit.

## 2025-04-16 ENCOUNTER — INFUSION (OUTPATIENT)
Dept: ONCOLOGY | Facility: HOSPITAL | Age: 70
End: 2025-04-16
Payer: MEDICARE

## 2025-04-16 ENCOUNTER — TELEMEDICINE (OUTPATIENT)
Dept: ONCOLOGY | Facility: CLINIC | Age: 70
End: 2025-04-16
Payer: MEDICARE

## 2025-04-16 ENCOUNTER — HOSPITAL ENCOUNTER (OUTPATIENT)
Dept: PET IMAGING | Facility: HOSPITAL | Age: 70
Discharge: HOME | End: 2025-04-16
Admitting: INTERNAL MEDICINE
Payer: MEDICARE

## 2025-04-16 DIAGNOSIS — C34.32 MALIGNANT NEOPLASM OF LOWER LOBE OF LEFT LUNG: Primary | ICD-10-CM

## 2025-04-16 DIAGNOSIS — C34.32 MALIGNANT NEOPLASM OF LOWER LOBE OF LEFT LUNG: ICD-10-CM

## 2025-04-16 DIAGNOSIS — Z79.899 ENCOUNTER FOR LONG-TERM (CURRENT) USE OF HIGH-RISK MEDICATION: ICD-10-CM

## 2025-04-16 PROCEDURE — 71260 CT THORAX DX C+: CPT

## 2025-04-16 PROCEDURE — 25510000002 DIATRIZOATE MEGLUMINE & SODIUM PER 1 ML: Performed by: INTERNAL MEDICINE

## 2025-04-16 PROCEDURE — 25510000001 IOPAMIDOL 61 % SOLUTION: Performed by: INTERNAL MEDICINE

## 2025-04-16 PROCEDURE — 74177 CT ABD & PELVIS W/CONTRAST: CPT

## 2025-04-16 RX ORDER — DEXAMETHASONE 4 MG/1
TABLET ORAL
Qty: 6 TABLET | Refills: 3 | Status: SHIPPED | OUTPATIENT
Start: 2025-04-16

## 2025-04-16 RX ORDER — OLANZAPINE 5 MG/1
5 TABLET ORAL NIGHTLY
Qty: 4 TABLET | Refills: 3 | Status: SHIPPED | OUTPATIENT
Start: 2025-04-16

## 2025-04-16 RX ORDER — IOPAMIDOL 612 MG/ML
85 INJECTION, SOLUTION INTRAVASCULAR
Status: COMPLETED | OUTPATIENT
Start: 2025-04-16 | End: 2025-04-16

## 2025-04-16 RX ORDER — ONDANSETRON 8 MG/1
8 TABLET, FILM COATED ORAL 3 TIMES DAILY PRN
Qty: 30 TABLET | Refills: 3 | Status: SHIPPED | OUTPATIENT
Start: 2025-04-16

## 2025-04-16 RX ORDER — DIATRIZOATE MEGLUMINE AND DIATRIZOATE SODIUM 660; 100 MG/ML; MG/ML
30 SOLUTION ORAL; RECTAL ONCE
Status: COMPLETED | OUTPATIENT
Start: 2025-04-16 | End: 2025-04-16

## 2025-04-16 RX ADMIN — DIATRIZOATE MEGLUMINE AND DIATRIZOATE SODIUM 30 ML: 660; 100 LIQUID ORAL; RECTAL at 12:25

## 2025-04-16 RX ADMIN — IOPAMIDOL 85 ML: 612 INJECTION, SOLUTION INTRAVENOUS at 13:15

## 2025-04-16 NOTE — PROGRESS NOTES
"Chief Complaint  Non-small cell lung cancer, clinical stage IIIA ([id number]), aortic stenosis, cervical spine stenosis, COPD, hepatic steatosis, diabetes mellitus    Subjective        History of Present Illness  She returns to clinic today for Cycle 1 Keytruda, carboplatin and alimta. She continues to experience neuropathy and pain due to fibromyalgia. She denies new concerns today such as worsening shortness of breath, chest pain, rash, nausea, vomiting, diarrhea or worsening fatigue. She reports taking the dexamethasone yesterday as prescribed. She is very reluctant to undergo MRI brain due to claustrophobia. Discussed with her that this information is important to have as lesions left untreated in the brain can lead to serious complications. She is willing to possibly give it a try and take ativan prior to. She reports that since getting a new prescription for her glasses her headaches are less frequent.     Objective   /68   Pulse 70   Temp 98 °F (36.7 °C) (Oral)   Resp 17   Ht 152.4 cm (60\")   Wt 96.7 kg (213 lb 3.2 oz)   SpO2 92%   BMI 41.64 kg/m²       Physical Exam  Constitutional:       Appearance: She is well-developed.      Comments: Remains in wheelchair.    Eyes:      Conjunctiva/sclera: Conjunctivae normal.   Neck:      Thyroid: No thyromegaly.   Cardiovascular:      Rate and Rhythm: Normal rate and regular rhythm.   Pulmonary:      Effort: Pulmonary effort is normal.      Comments: Diminished breath sounds bilaterally  Abdominal:      General: Bowel sounds are normal.      Palpations: Abdomen is soft.   Musculoskeletal:         General: Swelling present.      Comments: 1+ bilateral lower extremity edema, left chronically greater than right   Lymphadenopathy:      Head:      Right side of head: No submandibular adenopathy.      Upper Body:      Right upper body: No supraclavicular adenopathy.      Left upper body: No supraclavicular adenopathy.   Skin:     General: Skin is warm and dry.     "  Findings: No rash.   Neurological:      Mental Status: She is alert and oriented to person, place, and time.      Motor: No abnormal muscle tone.   Psychiatric:         Mood and Affect: Mood normal.         Behavior: Behavior normal.         Result Review : Reviewed CBC and CMP. TSH and free T4 pending.        Assessment and Plan   *Non-small cell lung cancer, clinical stage IIIA ([id number])  Patient has history of smoking 1 pack/day x 45 years, quit in November 2021.    Following anterior cervical corpectomy in September 2022, she experienced acute hypoxemic respiratory failure.    CT angiogram chest 9/25/2022 showed a concerning 1.4 cm left lower lobe pulmonary nodule, mediastinal lymphadenopathy with infracarinal lymph node 2.7 cm, left hilar lymph node 1.2 cm, small right hilar lymph nodes up from 1.1 cm and additional enlarged mediastinal nodes in the right paratracheal, precarinal, infracarinal spaces.  There was a wedge-shaped area of consolidation/atelectasis in the right middle lobe base, subpleural density left costophrenic sulcus felt to represent atelectasis.  It was recommended for her to undergo subsequent PET scan by pulmonary.  The patient has had ongoing follow-up with neurosurgery and on CT scans 11/29/2022 of cervical and thoracic spine, there was concern regarding spinal instability with potential need for posterior cervical fusion.  This was scheduled in December 2022 however was delayed due to upper respiratory infection.  Patient admitted on 1/10/2023 to address multiple medical issues in order to prepare for potential neurosurgical procedure on 3/12/2023.  Patient underwent echocardiogram on 1/11/2023 with ejection fraction greater than 70%, grade 1 diastolic dysfunction, severe aortic stenosis.  Patient is being followed by cardiology with consideration of need for TAVR versus SAVR for moderate to severe aortic stenosis.  Repeat CT chest on 1/12/2023 showed slight increase in size of the  left lower lobe pulmonary nodule increased from 1.4 to 1.5 cm.  Groundglass nodule peripheral left lower lobe less conspicuous and resolution of previous reticulonodular opacities right lower lobe.  There was further enlargement of mediastinal lymph nodes with right posterior paratracheal lymph node 1.4 x 2.1 up to 1.5 x 2.3 cm, subcarinal lymph node increased from 2.7 x 5.2 up to 3.2 x 5.8 cm, left hilar lymph node increased from 1.2 up to 1.4 cm, and is stable 1.7 cm left infrahilar lymph node.  There was also comment regarding hepatic steatosis with changes of chronic liver disease and stable indeterminate nodular thickening of the adrenal glands.  CT-guided lung biopsy on 1/13/2023 with pathology that showed invasive poorly differentiated non-small cell carcinoma with focal neuroendocrine differentiation.  Staining pattern and morphology favor poorly differentiated adenocarcinoma however there was focal CD56 staining, could not entirely exclude large cell neuroendocrine carcinoma.  PD-L1 35%, Caris analysis: PD-L1 10% (), no targetable mutations  It was felt that there would be increased risk for bronchoscopy from a cardiac standpoint due to her aortic stenosis.  Staging MRI brain 1/29/2023 showed no evidence of metastatic disease  Staging PET scan 1/26/2023 showed hypermetabolic left lower lobe pulmonary nodule, size difficult to determine on PET however on recent CT was 1.6 cm (SUV 14.4).  Mediastinal and left hilar hypermetabolic lymphadenopathy (subcarinal lymph node 3.2 cm, SUV 31.7, left paratracheal 2.2 cm lymph node SUV 12.7).  Sub-6 mm pulmonary nodule left upper lobe unchanged and below PET resolution.  Focal uptake posterior right hepatic lobe SUV 7.2 of uncertain significance.  Recommended MRI to evaluate.  Right thyroid activity SUV 5.3, recommended ultrasound.  Long segment uptake distal esophagus consistent with esophagitis.  Uptake posterior larynx, nonspecific, recommended direct  visualization.  MRI abdomen 2/6/2023 with no evidence to suggest metastatic disease in the liver.  Subcentimeter nonenhancing lesions favor cyst or hemangiomas.  Patient underwent right and left heart catheterization with cardiology on 2/14/2023.  Identification of coronary artery disease, mild pulmonary hypertension, severe aortic stenosis.  Recommendation per cardiology to proceed with treatment for lung cancer and defer any intervention regarding aortic stenosis.  Concern regarding underlying neurologic issues from cervical stenosis and potential neuropathic effects from Taxol chemotherapy.  Concern regarding patient's overall performance status and ability to tolerate full dose chemotherapy.  Patient discussed at thoracic oncology tumor board with consensus to treat with weekly low-dose carboplatin concurrent with radiation therapy and omit Taxol.  Subsequent plan for 1 year durvalumab following concurrent chemoradiation.  Significant delay in initiating concurrent chemoradiation due to logistics with scheduling Mediport placement, patient canceled appointments and delayed initiation of treatment.  On 3/21/2023 initiated concurrent chemoradiation with weekly carboplatin (AUC 2) on 2/28/2023.  Week 4 carboplatin held 4/11 and again 4/18/2023 due to thrombocytopenia  Week 4 carboplatin resumed on 4/25/2023 at reduced dose to AUC 1 due to borderline neutropenia  Chemo and radiation held beginning 5/2/2023 due to neutropenia with WBC 1.13, ANC 0.53  Resumed radiation therapy on 5/8/2023 with recovery of WBC.  Received fifth and final weekly dose of concurrent carboplatin (AUC 1) on 5/9/2023.  Patient completed radiation therapy on 5/12/2023.  CT chest abdomen pelvis 5/25/2023 with significant improvement in mediastinal and left hilar lymphadenopathy, decrease in left lower lobe nodule indicating significant response.  On 5/30/2023 initiated treatment with durvalumab 10 mg/kg every 2 weeks x 1 year.  Transitioned to  durvalumab 1500 mg every 4-week dosing with cycle 4 on 7/18/2023.  TAVR CT chest abdomen pelvis 7/21/2023 with decreased left lower lobe nodule from 8 down to 6 mm, stable mediastinal lymph nodes (with the exception of 1 small superior mediastinal lymph node increased from 4 to 7 mm of unclear significance).  Upper abdominal lymph nodes with slight increase in the periportal, hepatogastric, peripancreatic regions.  Following 5 cycles durvalumab, PET scan 9/5/2023 with left lower lobe ill-defined consolidation and opacification likely representing postradiation change.  No mediastinal lymphadenopathy nor hypermetabolic activity.  Slight enlargement (0.7 up to 1.0 cm) of anterior pericardial lymph node with new hypermetabolic activity (SUV 5.5.  Stable epigastric adenopathy with mild hypermetabolic activity (SUV 4.4) unchanged from 1/26/2023 scan (although not noted on that report).  Nondisplaced, healing left seventh rib fracture.  Significance of the pericardial lymph node and epigastric lymphadenopathy unclear.  Neither area easily accessible for CT-guided biopsy after discussion with interventional radiology on 9/12/2023.  Elected to monitor on repeat PET scan at 2-month interval.  PET scan 10/31/2023 with stable mildly hypermetabolic left lower lobe segmental consolidation, indeterminate regarding residual malignancy versus inflammatory change.  Prior epicardial lymph node decreased in size to less than 1 cm with no residual hypermetabolic activity.  Subcentimeter periceliac lymph node with slight increase in SUV from 3.9 up to 6.5, indeterminate.  Plan 3-month interval PET scan  PET scan 1/16/2024 with decrease in extent of left lower lobe irregular nodular pulmonary opacification, SUV decreased slightly from 3.9 down to 3.3.  Stable borderline epigastric, mesenteric, aortocaval lymph nodes with stable activity.  Stable intense uptake base of tongue and floor of mouth.  Stable subcentimeter left upper lobe  pulmonary nodule.  PET scan findings felt to be indeterminate, left lower lobe findings likely related to prior radiation therapy, no definitive evidence of residual or recurrent disease.  Plan repeat PET scan at 4-month interval at completion of Durvalumab.  Patient received final planned cycle of Durvalumab (cycle 14) on 4/23/2024.  PET scan following completion of Durvalumab on 5/17/2024 showed new 12 cm x 4.5 cm abnormality in the paramediastinal left lower lobe with bandlike characteristics and low-level SUV (3) consistent with evolving postradiation change.  Hypermetabolic periceliac lymph nodes unchanged (reference lymph node 1.2 cm with SUV 5).  2 hypermetabolic abdominal para-aortic lymph nodes with reference lymph node 1.2 cm, SUV 7 indeterminate, possibly reactive/inflammatory versus metastatic disease.  CT chest abdomen pelvis on 8/6/2024 showed more organized consolidation left lower lobe with appearance consistent with evolving postradiation change.  Vague micronodules left lower lobe felt to be infectious/inflammatory.  Stable retroperitoneal and gastrohepatic lymph nodes.  CT chest abdomen pelvis 11/6/2024 showed left perihilar consolidation extending to the left lower lobe less pronounced.  Areas of curvilinear density left lower lobe suspected related to mucous plugging and atelectasis.  Enlarging aortocaval and left periaortic lymph nodes, aortocaval lymph node increased from 1.3 up to 2.3 cm, left periaortic lymph node increased from 1.3 up to 2.1 cm.  PET scan 11/20/2024 showed masslike consolidation in the perihilar aspect left lower lobe to have decreased in size.  Small focus of uptake over left calvin mildly above other areas of consolidation, indeterminate (SUV 3.8).  Intensely hypermetabolic abdominal and pelvic lymphadenopathy.  Aortocaval lymph node index lesion near duodenum increased from 1.2 up to 1.7 cm (SUV 32.9 (, index lymph node or portal vein and inferior vena cava with increased  from 0.7 up to 1 cm with SUV 8.8.   CT-guided biopsy left retroperitoneal lymph node on 12/5/2024 with pathology that showed benign lymph node with focal calcification, no evidence of malignancy.  CT chest abdomen pelvis on 2/24/2025 showed new compression fracture at T2.  Increase in abdominal lymph nodes (aortocaval 2 up to 2.8 cm, gastrohepatic 0.9 up to 1.5 cm).  Slight increase in normal size mediastinal lymph nodes (0.6 up to 0.9 cm, 0.5 up to 0.7 cm).  Stable residual left lower lobe mass 6.1 x 3.4 x 3.5 cm.  Possible 0.6 cm hypodense lesion posterior pancreatic body.  Lobulated liver and stable spleen mildly enlarged at 13.4 cm.  PET scan 3/13/2025 showed significant increase in abdominal and pelvic lymphadenopathy with index aortocaval lymph node increased from 2 cm up to 2.8 cm with SUV increased from 32.9 up to 41.1.  Left periaortic jonn conglomerate increased from 1.5 up to 3.1 cm with SUV of 36.  Hypermetabolic lower mediastinal lymphadenopathy above the level of the diaphragm with index lymph node with SUV 18.1 at 0.7 cm (previously 0.3 cm with no hypermetabolism) and lymph node anterior to the distal esophagus with measurement of 1 cm with SUV 12.7 (previously 0.6 cm with no activity).  Asymmetric masslike consolidation left upper and lower lobes with heterogeneous uptake SUV 3.9 measuring 6.7 x 3.3 cm overall increased compared to 1/16/2024 but possibly representing radiation fibrosis.  Persistent long segment uptake right pharynx and larynx extending to the paravertebral musculature as seen on prior scans.  Asymmetric uptake left vocal cord and adjacent left arytenoid cartilage (SUV 5.3).  No suspicious activity at T2 compression deformity with underlying sclerosis, indeterminant, follow-up recommended to exclude metastatic disease.  MRI thoracic spine 3/17/2025 with T2 sclerosis and mild compression deformity, appearance most consistent with mild acute to subacute or subacute compression  fracture, no convincing evidence of metastatic disease in the thoracic spine.  MRI abdomen 3/17/2025 with noncirrhotic liver morphology, no pancreatic lesion identified.  Peripheral blood [id number] analysis on 3/20/2025: MADELYN, TMB 29 muts/Mb, tumor fraction 17.7%, CHEK2 mutation, ESR 1 amplification.  CT guided retroperitoneal lymph node biopsy on 3/24/2025 with pathology that showed metastatic poorly differentiated carcinoma.  This was noted to be similar to prior lung biopsy from 2023 in terms of IHC and morphology.  Favored to represent metastasis from original lung tumor.  Caris analysis requested however quantity insufficient.  Patient therefore with metastatic non-small cell lung cancer involving retroperitoneal lymph nodes and left perihilar lymph nodes.  Previous histology (1/13/2023) favored poorly differentiated adenocarcinoma, could not exclude large cells with neuroendocrine differentiation.  Previous PD-L1 10% and 35% on 2 different analyses of the same specimen.  No detectable targetable mutations.  Patient is not a candidate for neuropathy causing chemotherapy with underlying severe neuropathy related to pre-existing spinal disease involving hands and feet  Recommendation to proceed with palliative systemic therapy with carboplatin, pemetrexed, pembrolizumab.  Treatment regimen: Carboplatin AUC 5 on day 1, pemetrexed 500 mg/m² on day 1, pembrolizumab 200 mg on day 1.  Neulasta support on day 2 via on body injector.  Patient returns today in follow-up after recent retroperitoneal lymph node biopsy that was performed on 3/24/2025.  Patient with hypermetabolic progressive retroperitoneal lymphadenopathy.  Unfortunately biopsy confirms presence of metastatic non-small cell lung cancer.  As above, the biopsy showed metastatic poorly differentiated carcinoma that was similar to previous biopsy from 1/13/2023 both in morphology and an IHC pattern.  That specimen noted poorly differentiated adenocarcinoma  with possible focal large cells with neuroendocrine differentiation.  On the previous specimen, PD-L1 was 10% on 1 analysis and 35% on another analysis.  We did request Caris analysis of the current biopsy however quantity was insufficient for testing.  We did send off peripheral blood [id number] analysis from 3/20/2025 which did show TMB 29%, mutation in CHEK2 but no targetable mutation for initial therapy for metastatic disease.  We will check an [facility] germline panel test with labs next week due to the presence of CHEK2 mutation on the [id number] analysis.  The CHEK2 mutation could indicate usefulness of PARP inhibitor in the future.  We reviewed the now metastatic nature of her disease and palliative noncurative intent of further therapy.  The patient and her  expressed understanding.  We discussed options for treatment.  The patient has underlying severe peripheral neuropathy involving her hands and feet related to her nonmalignant pre-existing spinal disease.  Therefore she is not a good candidate for further use of Taxol.  Given the previous pathology that indicated adenocarcinoma differentiation, we discussed the use of carboplatin, pemetrexed, and pembrolizumab.  We reviewed side effects of treatment.  We will plan on use of growth factor support with Neulasta on day 2 beginning with cycle 1.  Patient will undergo formal education early next week.  She will receive B12 injection today and begin oral folic acid supplementation today.  She will return in 1 week for NP visit to begin cycle 1.  In the interval we will obtain baseline CT chest abdomen pelvis since it will have been 1 month out from her prior PET scan.  We will also check MRI brain to rule out presence of metastatic disease prior to initiation of chemotherapy.  4/18/2025: Proceed today with Cycle 1 Keytruda, carboplatin and alimta.  She will return to clinic in 1 week for NP visit with toxicity check.  She is scheduled for a MRI on  5/2/2025,, due to claustrophobia and anxiety she has not decided whether she wants to proceed with this again or not.  We will send in a prescription for Ativan as she does feel optimistic she may be able to do it with this on board.    *Aortic stenosis  Patient underwent echocardiogram on 1/11/2023 with ejection fraction greater than 70%, grade 1 diastolic dysfunction, severe aortic stenosis.    Patient followed by cardiology with consideration of need for TAVR versus SAVR for moderate to severe aortic stenosis.  Patient underwent cardiac catheterization 2/14/2023 with identification of coronary artery disease, mild pulmonary hypertension, severe aortic stenosis.  Per cardiology, recommendation to proceed with treatment for lung cancer and defer any consideration of intervention for aortic stenosis.  Patient did undergo TAVR on 8/1/2023  Echocardiogram on 8/2/2023 with ejection fraction 62.4%, TAVR present  Patient continues follow-up with cardiology     *Cervical spine stenosis  Patient with cervical spine stenosis with associated myelopathy.   She underwent surgery on 9/20/2022 with anterior cervical corpectomy with cage.      The patient has had ongoing follow-up with neurosurgery and on CT scans 11/29/2022 of cervical and thoracic spine, there was concern regarding spinal instability with potential need for posterior cervical fusion.  This was scheduled in December 2022 however was delayed due to upper respiratory infection.  Patient admitted on 1/10/2023 to address multiple medical issues in order to prepare for potential neurosurgical procedure on 3/12/2023.  Patient was previously having severe symptoms related to her cervical stenosis with reduced ability to ambulate previously and significant limitations in movement in her hands, dropping objects.  More recently however she has improved and has been able to walk with the use of a walker, symptoms in the upper extremities have improved and her dexterity is  better.    It appears that her neurosurgical procedure will need to be delayed until we can at least complete the concurrent chemoradiation portion of her treatment.  Surgery would be feasible while continuing on immunotherapy in the future.  Neurosurgical follow-up has been placed on hold.  Patient improvement over time in mobility and symptoms in her extremities.  She does not intend to proceed with further surgery in the future.  Patient with ongoing neuropathy symptoms involving her hands and feet with effect on dexterity     *COPD  Patient has history of smoking 1 pack/day x 45 years, quit in November 2021.  Patient has not undergone formal PFTs  Patient being followed by pulmonary, currently using albuterol inhaler as needed  Patient with recent stable respiratory symptoms     *Hepatic steatosis  Identified on prior scans  PET scan 1/26/2023 with questionable area of activity seen but no corresponding CT abnormality.    MRI abdomen 2/6/2023 with no evidence to suggest metastatic disease in the liver.  Subcentimeter nonenhancing lesions favor cyst or hemangiomas.  CT abdomen on 2/24/2025 with evidence of lobulated liver and stable mild splenomegaly to 13.4 cm, suggestive of underlying cirrhosis and hypersplenism  MRI abdomen on 3/17/2025 with noncirrhotic liver morphology noted  LFTs remain normal     *Diabetes mellitus  4/18/2025:  glucose 224 today likely secondary to dexamethasone.  Continue to monitor.    *Anxiety/depression  Patient currently continuing on Wellbutrin  mg daily  Patient reported increasing frequency and severity of panic attacks.  She had previously been on Lexapro in addition to Wellbutrin however notes that she has been off Lexapro for approximately a year as it was not refilled by her PCP.  Patient was referred back to supportive oncology for recommendations on further treatment.  Patient indicated that she has been treated with multiple antidepressants, some of which were  ineffective.  Consider possibly resuming Lexapro.  Patient did not follow through with referral to supportive oncology.    Patient with significant increase in anxiety with diagnosis of metastatic disease.  She does currently continue on Wellbutrin 300 mg daily.  Prescription sent today for temporary use of Xanax 0.5 mg 3 times daily as needed (#60).  She is being referred to supportive oncology.  4/18/2025: She is scheduled for a visit with Cherelle Kimbrough on 4/22/2025    *Right thyroid uptake on PET scan 1/26/2023  Patient was scheduled for thyroid ultrasound however did not feel that she was able to undergo the procedure due to her neck issues and therefore was canceled, consider at future date  No mention of thyroid uptake on PET scan 9/5/2023 and on 10/31/2023.  No uptake on PET scan 1/16/2024, thyroid appeared atrophic  PET scan on 5/17/2024 with no mention of activity in the thyroid  PET scan 11/20/2024 with no thyroid uptake identified   PET scan 3/13/2025 with no evidence mentioned of thyroid uptake    *Laryngeal uptake on PET scan 1/26/2023, subsequent PET uptake and floor of mouth and base of tongue 1/16/2024  Patient was seen by ENT on 3/28/2023, laryngoscopy showed mild edema in the posterior glottis consistent with laryngeal pharyngeal reflux, no evidence of malignancy.  No mention of laryngeal uptake on PET scan 9/5/2023 nor on 10/31/2023  PET scan 1/16/2024 showed persistent significant uptake floor of mouth and base of tongue.  The area was visualized today on exam with no abnormal findings.  She does wear dentures that do not fit properly, unclear whether this could be contributing to the activity.    Patient was referred to ENT, had outstanding balance with 1 practice, difficulty with referral to another practice and was never seen  Patient was ultimately not seen by ENT.  Patient elected to forego referral to ENT.  PET scan 3/13/2025 showed persistent hypermetabolic activity in the right pharynx  and larynx extending to the paravertebral musculature as seen previously.  There was also asymmetric uptake in the left vocal cord and adjacent left arytenoid cartilage with SUV 5.3.  Patient is asymptomatic.  Patient is being referred to ENT for evaluation.    *Esophageal uptake on PET scan 1/26/2023  [place] to be consistent with esophagitis in the distal esophagus.  We will hold on referral for EGD given the multitude of additional procedures and consults required above.  No mention of esophageal uptake on PET scan 9/5/2023 nor on 10/31/2023 no oral on 1/16/2024  No mention of esophageal uptake on PET scan 5/17/2024  No mention of uptake in the esophagus and PET scan 11/20/2024.  PET scan on 3/13/2025 with no significant uptake noted within the esophagus.    *Venous access  Mediport placed by Dr. Shook on 3/2/2023    *Nutrition/weight loss  Patient has been seen by oncology nutrition  Patient overall has lost a significant amount of weight however weight subsequently stabilized  Appetite and weight improved.  Weight is currently stable    *Chronic left greater than right lower extremity edema with subsequent left inguinal/lower extremity hematoma following TAVR  Patient developed exacerbation of chronic edema in the lower extremities, left slightly greater than right.    Bilateral lower extremity Doppler on 5/9/2023 was negative, showed bilateral popliteal fluid collections.  Patient with evidence of left inguinal/left lower extremity hematoma following TAVR with ecchymosis tracking into the distal portion of the left lower extremity and ecchymosis laterally in the left hip.  Likely explains decline in hemoglobin and low haptoglobin post procedure.  PET scan 9/5/2023 with evidence of fluid collection/blood in the left inguinal region.  Edema increased, asymmetric with left greater than right.  Lower extremity Doppler 4/25/2024 with bilateral popliteal fluid collections, no evidence of DVT.  Patient with persistent  mild lower extremity edema, chronic asymmetry with left greater than right.      *Hypothyroidism exacerbated by immunotherapy  Hypothyroidism managed by PCP Dr. Danyelle Brush  Labs on 5/30/2023 prior to beginning immunotherapy with evidence of hypothyroidism with TSH 10.5, free T4 0.54.  Levothyroxine dose increased by Dr. Brush from 125 up to 150 mcg daily  Continue to monitor thyroid function studies every 8 weeks on durvalumab  Patient with persistent borderline abnormal thyroid function studies on Durvalumab, referred to endocrinology  On 1/2/2024, significant increase in TSH to 19.7 with free T40.8.  On 1/5/2024 increased levothyroxine dose from 150 up to 175 mcg daily.  Patient is now followed by endocrinology for management of hypothyroidism  Labs on 4/23/2024 with normal TSH suppressed at 0.155 and free T4 increased to 1.94.  Levothyroxine dose decreased by endocrinology to 150 mcg daily.  Labs on 5/21/2024 with TSH 0.122 and free T4 improved into the normal range at 1.69.    Labs on 8/13/2024 with TSH of 0.247 and free T4 elevated at 1.97.  Endocrinology decreased levothyroxine dose to 137 mcg daily  Labs on 11/13/2024 with TSH 1.63, free T4 of 1.44.  Patient continues follow-up with endocrinology  We will evaluate thyroid function per protocol with upcoming immunotherapy.    *Chemotherapy-induced leukopenia  WBC today 5.19 with normal differential.  We will need to monitor counts on upcoming chemotherapy.  Planned use of Neulasta support on day to be on body injector beginning with cycle 1 chemotherapy  4/18/2025: WBC 6.00.     *Chemotherapy-induced thrombocytopenia  Platelet count prior to chemotherapy was normal  Platelet count has gradually improved since completion of concurrent chemoradiation  Platelet count on 11/7/2023 declined to 101,000.  Additional labs with IPF 5.5%, B12 342, folate 8.99  Labs on 3/26/2024 with B12 453, folate 12.1, IPF 3.9%, platelet count 108,000  Platelet count on 3/20/2025  declined to 116,000.  Additional labs with IPF 3%, B12 362, folate 7.8  Platelet count today 113,000.  We will need to closely monitor platelet count on chemotherapy with pre-existing thrombocytopenia prior to treatment  4/18/2025: Platelets 140,000    *Chemotherapy-induced anemia  Hemoglobin did decline into the high 10-low 11 range on concurrent chemoradiation  Hemoglobin subsequently improved and normalized as of 7/28/2023 with hemoglobin of 13.4  Patient underwent TAVR on 8/1/2023 with significant decline in hemoglobin thereafter, reached a low of 7.1 on 8/8/2023.  Additional labs with cardiology on 8/8/2023 with iron 78, iron saturation 22%, TIBC 350.  Patient was started on oral iron although no evidence of iron deficiency.    Labs on 8/15/2023 with hemoglobin 8.7, iron 129, ferritin 120, iron saturation 39%, TIBC 355, B12 288, folate 7.15, haptoglobin less than 10.  Oral iron discontinued as patient was constipated, had no evidence of iron deficiency.  Initiated oral B12 1000 mcg daily.  Postprocedure anemia and low haptoglobin related to left inguinal/left lower extremity hematoma.  Hemoglobin on 3/26/2024 declined to 11.8.  Additional labs with iron 90, ferritin 102, iron saturation 26%, TIBC 343, B12 453, folate 12.1  Hemoglobin today normal at 13.4  4/18/2025: Hemoglobin 12.7 today.     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  Recommended CTA exam for more definitive evaluation.  Patient was referred to vascular surgery, had to postpone the visit     *T2 compression fracture  CT 2/24/2025 with notation of T2 compression fracture, 20-25% loss of height.  No underlying bone lesion identified  Patient asymptomatic from her compression fracture.    PET scan 3/13/2025 with subtle compression deformity T2 with underlying sclerosis.  No significant hypermetabolic activity  MRI thoracic spine 3/17/2025 with T2 mild acute to subacute  or subacute compression fracture.  No convincing metastatic disease in the thoracic spine.  Therefore, it appears that the patient's compression fracture is traumatic/benign with no convincing evidence to suggest a pathologic fracture.     PLAN:  Proceed today with cycle 1 Keytruda, carboplatin, and Alimta.  Treatment regimen: Carboplatin AUC 5-day 1, pemetrexed 500 mg/m² day 1, pembrolizumab 200 mg day 1 on a 21-day cycle with Neulasta support on day 2 via on body injector.  Patient with metastatic non-small cell lung cancer, prior histology with poorly differentiated adenocarcinoma with focal neuroendocrine differentiation  Continue vitamin B12 1000 mcg daily.  Next injection due 5/30/2025.   Continue folic acid 1 mg daily  Prescription sent for Ativan 1mg prior to undergoing MRI brain on 5/2/2025  Continue follow-up with supportive oncology, appt scheduled on 4/22/2025.   Reiterated importance of getting in with ENT for uptake seen on PET scan of the left vocal cord and persistent asymmetric uptake in the right pharynx and larynx.  Referral placed to social work for ongoing caregiver needs and support  Return to clinic in 1 week for NP visit and toxicity check with CBC and CMP  Return to clinic in 3 weeks for MD visit, cycle 2 palliative carboplatin, pemetrexed, pembrolizumab, with labs per protocol.  Plan to repeat scans at the end of cycle 2 to assess response.  Instructed her to reach out sooner with any concerns.     CHENG Alcala

## 2025-04-18 ENCOUNTER — INFUSION (OUTPATIENT)
Dept: ONCOLOGY | Facility: HOSPITAL | Age: 70
End: 2025-04-18
Payer: MEDICARE

## 2025-04-18 ENCOUNTER — OFFICE VISIT (OUTPATIENT)
Dept: ONCOLOGY | Facility: CLINIC | Age: 70
End: 2025-04-18
Payer: MEDICARE

## 2025-04-18 ENCOUNTER — DOCUMENTATION (OUTPATIENT)
Dept: PSYCHIATRY | Facility: HOSPITAL | Age: 70
End: 2025-04-18
Payer: MEDICARE

## 2025-04-18 VITALS
BODY MASS INDEX: 41.86 KG/M2 | OXYGEN SATURATION: 92 % | SYSTOLIC BLOOD PRESSURE: 114 MMHG | HEART RATE: 70 BPM | TEMPERATURE: 98 F | DIASTOLIC BLOOD PRESSURE: 68 MMHG | RESPIRATION RATE: 17 BRPM | WEIGHT: 213.2 LBS | HEIGHT: 60 IN

## 2025-04-18 DIAGNOSIS — C34.32 MALIGNANT NEOPLASM OF LOWER LOBE OF LEFT LUNG: ICD-10-CM

## 2025-04-18 DIAGNOSIS — Z79.899 ENCOUNTER FOR LONG-TERM (CURRENT) USE OF HIGH-RISK MEDICATION: ICD-10-CM

## 2025-04-18 DIAGNOSIS — Z79.899 ENCOUNTER FOR LONG-TERM (CURRENT) USE OF HIGH-RISK MEDICATION: Primary | ICD-10-CM

## 2025-04-18 DIAGNOSIS — C34.32 MALIGNANT NEOPLASM OF LOWER LOBE OF LEFT LUNG: Primary | ICD-10-CM

## 2025-04-18 LAB
ALBUMIN SERPL-MCNC: 3.5 G/DL (ref 3.5–5.2)
ALBUMIN/GLOB SERPL: 1.3 G/DL
ALP SERPL-CCNC: 73 U/L (ref 39–117)
ALT SERPL W P-5'-P-CCNC: 15 U/L (ref 1–33)
ANION GAP SERPL CALCULATED.3IONS-SCNC: 11.6 MMOL/L (ref 5–15)
AST SERPL-CCNC: 21 U/L (ref 1–32)
BASOPHILS # BLD AUTO: 0.01 10*3/MM3 (ref 0–0.2)
BASOPHILS NFR BLD AUTO: 0.2 % (ref 0–1.5)
BILIRUB SERPL-MCNC: 0.2 MG/DL (ref 0–1.2)
BUN SERPL-MCNC: 18 MG/DL (ref 8–23)
BUN/CREAT SERPL: 21.2 (ref 7–25)
CALCIUM SPEC-SCNC: 9.2 MG/DL (ref 8.6–10.5)
CHLORIDE SERPL-SCNC: 100 MMOL/L (ref 98–107)
CO2 SERPL-SCNC: 24.4 MMOL/L (ref 22–29)
CREAT SERPL-MCNC: 0.85 MG/DL (ref 0.57–1)
DEPRECATED RDW RBC AUTO: 45.1 FL (ref 37–54)
EGFRCR SERPLBLD CKD-EPI 2021: 74.3 ML/MIN/1.73
EOSINOPHIL # BLD AUTO: 0.02 10*3/MM3 (ref 0–0.4)
EOSINOPHIL NFR BLD AUTO: 0.3 % (ref 0.3–6.2)
ERYTHROCYTE [DISTWIDTH] IN BLOOD BY AUTOMATED COUNT: 14.2 % (ref 12.3–15.4)
GLOBULIN UR ELPH-MCNC: 2.8 GM/DL
GLUCOSE SERPL-MCNC: 224 MG/DL (ref 65–99)
HCT VFR BLD AUTO: 40.1 % (ref 34–46.6)
HGB BLD-MCNC: 12.7 G/DL (ref 12–15.9)
IMM GRANULOCYTES # BLD AUTO: 0.02 10*3/MM3 (ref 0–0.05)
IMM GRANULOCYTES NFR BLD AUTO: 0.3 % (ref 0–0.5)
LYMPHOCYTES # BLD AUTO: 0.69 10*3/MM3 (ref 0.7–3.1)
LYMPHOCYTES NFR BLD AUTO: 11.5 % (ref 19.6–45.3)
MCH RBC QN AUTO: 27.7 PG (ref 26.6–33)
MCHC RBC AUTO-ENTMCNC: 31.7 G/DL (ref 31.5–35.7)
MCV RBC AUTO: 87.6 FL (ref 79–97)
MONOCYTES # BLD AUTO: 0.56 10*3/MM3 (ref 0.1–0.9)
MONOCYTES NFR BLD AUTO: 9.3 % (ref 5–12)
NEUTROPHILS NFR BLD AUTO: 4.7 10*3/MM3 (ref 1.7–7)
NEUTROPHILS NFR BLD AUTO: 78.4 % (ref 42.7–76)
NRBC BLD AUTO-RTO: 0 /100 WBC (ref 0–0.2)
PLATELET # BLD AUTO: 140 10*3/MM3 (ref 140–450)
PMV BLD AUTO: 10.6 FL (ref 6–12)
POTASSIUM SERPL-SCNC: 4 MMOL/L (ref 3.5–5.2)
PROT SERPL-MCNC: 6.3 G/DL (ref 6–8.5)
RBC # BLD AUTO: 4.58 10*6/MM3 (ref 3.77–5.28)
SODIUM SERPL-SCNC: 136 MMOL/L (ref 136–145)
T4 FREE SERPL-MCNC: 1.5 NG/DL (ref 0.92–1.68)
TSH SERPL DL<=0.05 MIU/L-ACNC: 0.37 UIU/ML (ref 0.27–4.2)
WBC NRBC COR # BLD AUTO: 6 10*3/MM3 (ref 3.4–10.8)

## 2025-04-18 PROCEDURE — 85025 COMPLETE CBC W/AUTO DIFF WBC: CPT

## 2025-04-18 PROCEDURE — 25810000003 SODIUM CHLORIDE 0.9 % SOLUTION: Performed by: INTERNAL MEDICINE

## 2025-04-18 PROCEDURE — 84443 ASSAY THYROID STIM HORMONE: CPT | Performed by: INTERNAL MEDICINE

## 2025-04-18 PROCEDURE — 25010000002 PEMBROLIZUMAB 100 MG/4ML SOLUTION 4 ML VIAL: Performed by: INTERNAL MEDICINE

## 2025-04-18 PROCEDURE — 96413 CHEMO IV INFUSION 1 HR: CPT

## 2025-04-18 PROCEDURE — 96377 APPLICATON ON-BODY INJECTOR: CPT

## 2025-04-18 PROCEDURE — 96411 CHEMO IV PUSH ADDL DRUG: CPT

## 2025-04-18 PROCEDURE — 96417 CHEMO IV INFUS EACH ADDL SEQ: CPT

## 2025-04-18 PROCEDURE — 25010000002 PEMETREXED PER 10 MG: Performed by: INTERNAL MEDICINE

## 2025-04-18 PROCEDURE — 25010000002 CARBOPLATIN PER 50 MG: Performed by: INTERNAL MEDICINE

## 2025-04-18 PROCEDURE — 25010000002 FOSAPREPITANT PER 1 MG: Performed by: INTERNAL MEDICINE

## 2025-04-18 PROCEDURE — 96375 TX/PRO/DX INJ NEW DRUG ADDON: CPT

## 2025-04-18 PROCEDURE — 80053 COMPREHEN METABOLIC PANEL: CPT

## 2025-04-18 PROCEDURE — 25810000003 SODIUM CHLORIDE 0.9 % SOLUTION 250 ML FLEX CONT: Performed by: INTERNAL MEDICINE

## 2025-04-18 PROCEDURE — 25010000002 PEGFILGRASTIM 6 MG/0.6ML PREFILLED SYRINGE KIT: Performed by: INTERNAL MEDICINE

## 2025-04-18 PROCEDURE — 84439 ASSAY OF FREE THYROXINE: CPT | Performed by: INTERNAL MEDICINE

## 2025-04-18 PROCEDURE — 96367 TX/PROPH/DG ADDL SEQ IV INF: CPT

## 2025-04-18 PROCEDURE — 25010000002 PALONOSETRON PER 25 MCG: Performed by: INTERNAL MEDICINE

## 2025-04-18 RX ORDER — SODIUM CHLORIDE 9 MG/ML
20 INJECTION, SOLUTION INTRAVENOUS ONCE
Status: COMPLETED | OUTPATIENT
Start: 2025-04-18 | End: 2025-04-18

## 2025-04-18 RX ORDER — LORAZEPAM 1 MG/1
1 TABLET ORAL ONCE
Qty: 1 TABLET | Refills: 0 | Status: SHIPPED | OUTPATIENT
Start: 2025-04-18 | End: 2025-04-18

## 2025-04-18 RX ORDER — HYDROCORTISONE SODIUM SUCCINATE 100 MG/2ML
100 INJECTION INTRAMUSCULAR; INTRAVENOUS AS NEEDED
Status: CANCELLED | OUTPATIENT
Start: 2025-04-18

## 2025-04-18 RX ORDER — DIPHENHYDRAMINE HYDROCHLORIDE 50 MG/ML
50 INJECTION, SOLUTION INTRAMUSCULAR; INTRAVENOUS AS NEEDED
Status: CANCELLED | OUTPATIENT
Start: 2025-04-18

## 2025-04-18 RX ORDER — FAMOTIDINE 10 MG/ML
20 INJECTION, SOLUTION INTRAVENOUS AS NEEDED
Status: CANCELLED | OUTPATIENT
Start: 2025-04-18

## 2025-04-18 RX ORDER — OLANZAPINE 5 MG/1
5 TABLET ORAL ONCE
Status: CANCELLED | OUTPATIENT
Start: 2025-04-18 | End: 2025-04-18

## 2025-04-18 RX ORDER — SODIUM CHLORIDE 9 MG/ML
20 INJECTION, SOLUTION INTRAVENOUS ONCE
Status: CANCELLED | OUTPATIENT
Start: 2025-04-18

## 2025-04-18 RX ORDER — PALONOSETRON 0.05 MG/ML
0.25 INJECTION, SOLUTION INTRAVENOUS ONCE
Status: CANCELLED | OUTPATIENT
Start: 2025-04-18

## 2025-04-18 RX ORDER — PALONOSETRON 0.05 MG/ML
0.25 INJECTION, SOLUTION INTRAVENOUS ONCE
Status: COMPLETED | OUTPATIENT
Start: 2025-04-18 | End: 2025-04-18

## 2025-04-18 RX ADMIN — PEMETREXED DISODIUM 970 MG: 500 INJECTION, POWDER, LYOPHILIZED, FOR SOLUTION INTRAVENOUS at 10:31

## 2025-04-18 RX ADMIN — SODIUM CHLORIDE 200 MG: 900 INJECTION, SOLUTION INTRAVENOUS at 09:56

## 2025-04-18 RX ADMIN — PALONOSETRON 0.25 MG: 0.05 INJECTION, SOLUTION INTRAVENOUS at 09:20

## 2025-04-18 RX ADMIN — CARBOPLATIN 600 MG: 10 INJECTION INTRAVENOUS at 10:47

## 2025-04-18 RX ADMIN — SODIUM CHLORIDE 20 ML/HR: 9 INJECTION, SOLUTION INTRAVENOUS at 09:20

## 2025-04-18 RX ADMIN — FOSAPREPITANT 100 ML: 150 INJECTION, POWDER, LYOPHILIZED, FOR SOLUTION INTRAVENOUS at 09:22

## 2025-04-18 RX ADMIN — PEGFILGRASTIM 6 MG: KIT SUBCUTANEOUS at 11:20

## 2025-04-18 NOTE — NURSING NOTE
Pt here for first time keytruda/alimta/carbo. Pt had zyprexa as premedication. R/w Dr. Lombardi pt does need to take zyprexa as premedication, pt is to take it nightly on nights 1-4 starting the night of tx.

## 2025-04-18 NOTE — PROGRESS NOTES
Oncology Support Services    The patient's daughter and son-in-law stopped by the clinic and asked to speak to social work about the [employer] ([facility] [employer]) program that would pay them to care for the patient, as family members.  OSW was called to the  to meet with the couple.    OSW met with Yolanda and Chris, the patient's daughter and son-in-law.  The couple shared how the patient is no longer in remission, is Stage IV and in need of daily assistance/transportation.  They want to and appreciate the opportunity to care for the patient, but it is a hardship financially for them.  The patient needs to be lifted at times and Chris is needed for that task to assist Yolanda.  They feel they will need documentation for [facility] that they are caring for the patient.  OSW listened supportively and encouraged the couple in sharing about the patient.      OSW learned that the couple plans to go to [facility], in [place], to apply.  OSW provided contact information and requested that once they have applied and their  there lets them know specifically what is needed, to please call OSW.  They verbalized understanding and agreement.    One of the challenges, for the couple, has been for gas for appointments.  OSW asked if they would like a referral to be made to [employer], for a gas card.  They stated that would be very helpful.  OSW went to the [facility] to meet with the patient and get her approval for the [employer] referral.  The patient verbalized appreciation.  OSW will make the referral.    Giovanna Baker, MSW, [facility]  Oncology Social Worker

## 2025-04-21 NOTE — PROGRESS NOTES
Oncology Support Services    OSW placed a [employer] referral for a gas card to assist with the financial burden of transportation for medical appointments.  Patient verbalized understanding and will be aware she may be contacted by the organization.    Giovanna Baker MSW, [facility]  Oncology Social Worker

## 2025-04-22 ENCOUNTER — TELEPHONE (OUTPATIENT)
Dept: PSYCHIATRY | Facility: HOSPITAL | Age: 70
End: 2025-04-22

## 2025-04-22 NOTE — TELEPHONE ENCOUNTER
Patient was scheduled to see Cherelle ANAND supportive oncology service 4-22-25 MY CHART VIDEO which patient requested. Called patient regarding missed appt and she stated she could not figure it out.  Offered patient an in person appt vs another attempt at MY CHART.  Patient wanted to know if Cherelle ANAND could just call her.  I explained to patient that this appt had to be a face to face appt due to Cherelle being a prescriber.  Patient will connect with daughter and see when she will be able to come over and help her.  Patient has by direct number to call back and schedule.  [phone]./SP

## 2025-04-28 NOTE — TELEPHONE ENCOUNTER
Provider: Harjit  Caller: Yolanda  Relationship to Patient: daughter  Call Back Phone Number: [phone] or [phone]   Reason for Call: Calling about getting pt something for pain.

## 2025-04-28 NOTE — TELEPHONE ENCOUNTER
Returned call, spoke with patient's significant other Fidel. Fidel reported that patient has been in bed for multiple days, is very weak, and has not been eating or drinking much. She sleeps much of the time. She is weak and requires assistance to ambulate. Per his report, patient's breathing is concerning. She is requiring more frequent use of her inhaler. Patient requested to speak with this RN during the conversation. Patient said she was falling asleep while talking, but repeatedly stated she was having trouble sleeping. This RN requested to speak with Fidel again. Due to the multitude of issues patient is experiencing, urged Fidel to call EMS to take patient to emergency department. Patient wants to wait until tomorrow to go to the hospital. Patient refusing to go to ER today due to needing to get cleaned up. Per Fidel, patient has been incontinent. Again expressed importance of calling EMS to transport her to ER today. Fidel tearfully stated he did not want to make patient more upset about the issue tonight and wants to respect her wishes of waiting until she can get cleaned up. Discussed with Fidel that if things worsen this evening, he needs to go ahead and call EMS to transport patient to ER. Fidel v/bianca.

## 2025-04-29 PROBLEM — A41.9 SEPTIC SHOCK: Status: ACTIVE | Noted: 2025-01-01

## 2025-04-29 PROBLEM — R65.21 SEPTIC SHOCK: Status: ACTIVE | Noted: 2025-01-01

## 2025-04-29 NOTE — CONSULTS
[facility] INITIAL INPATIENT CONSULTATION NOTE    REASON FOR CONSULTATION:      history of lung cancer, low platelets       HISTORY OF PRESENT ILLNESS:  Gabby Acosta is a 70 y.o. female who we are asked to see today in consultation for metastatic non-small cell lung cancer.  Follows with Dr. Lombardi from our group.Her most recent treatment has been carboplatin pemetrexed and pembrolizumab cycle 1 day 1 with carboplatin/pemetrexed/pembrolizumab was on 4/18/2025.  Past medical history of aortic stenosis s/p TAVR in August 2023; cervical spine stenosis status post anterior cervical corpectomy with cage in September 2022; COPD; hepatic steatosis; anxiety/depression; diabetes type 2; hypothyroidism; SMA stenosis; T2 compression fracture seen on MRI T-spine from 3/17/2025 without evidence of pathology fracture.   Patient is accompanied by her family at bedside today.  Family notes patient had been having nausea and vomiting and not eating or drinking much especially since Friday.  They also noted patient was getting progressively weak.  She is currently hospitalized with septic shock and hypoxia.  She was found to be severely thrombocytopenic with a platelet count of 4.  She was given 1 unit platelet transfusion this morning with improvement in platelet count to 100.  She was also found to have severe leukopenia with a white cell count of 0.04 today from 0.06 on admission.  Our service has been consulted for further evaluation and management of acute onset leukopenia and thrombocytopenia.  Patient        Past Medical History:   Diagnosis Date    Anxiety     Cervical spondylosis with myelopathy     Cervical stenosis of spine     COPD (chronic obstructive pulmonary disease)     Diabetes mellitus     Fibromyalgia     Hepatic steatosis     Hyperlipidemia     Hypertension     Hypothyroidism     Lung cancer 02/2023    Stage IIIA non-small cell lung cancer - LAST RADIATION AND CHEMO 5/2023, CURRENT IMMUNOTHERAPY -  "FOLLWOED BY [facility]    Osteoarthritis     Severe anxiety about anesthesia and home medications     PT STATES HAS HALLUCINATIONS, SEVERE ANXIETY, AND \"BLACK OUT\" EPISODES REGARDING ANESTHESIA - STATES WORKING WITH A THERAPIST    Severe aortic stenosis     Slow to wake up after anesthesia        Past Surgical History:   Procedure Laterality Date    ANTERIOR CHANNEL VERTEBRECTOMY/CORPECTOMY N/A 09/20/2022    Procedure: Anterior cervical corpectomy, cervical four/five/six, with cage and plate from cervical three to cervical seven;  Surgeon: David Cheung MD;  Location: [facility] OR;  Service: Neurosurgery;  Laterality: N/A;    AORTIC VALVE REPAIR/REPLACEMENT N/A 8/1/2023    Procedure: TTE TRANSFEMORAL TRANSCATHETER AORTIC VALVE REPLACEMENT PERCUTANEOUS APPROACH;  Surgeon: Darryl James MD;  Location: [facility];  Service: Cardiothoracic;  Laterality: N/A;    AORTIC VALVE REPAIR/REPLACEMENT N/A 8/1/2023    Procedure: Transfemoral Transcatheter Aortic Valve Replacement with intraoperative TTE and possible open surgical rescue;  Surgeon: Pan Mendoza MD;  Location: [facility];  Service: Cardiovascular;  Laterality: N/A;    CARDIAC CATHETERIZATION N/A 02/14/2023    Procedure: Right and Left Heart Cath;  Surgeon: Kostas David MD;  Location: [facility] INVASIVE LOCATION;  Service: Cardiovascular;  Laterality: N/A;    CARDIAC CATHETERIZATION N/A 02/14/2023    Procedure: Coronary angiography;  Surgeon: Kostas David MD;  Location: [facility] CATH INVASIVE LOCATION;  Service: Cardiovascular;  Laterality: N/A;    CARDIAC CATHETERIZATION N/A 02/14/2023    Procedure: Left ventriculography;  Surgeon: Kostas David MD;  Location: [facility] CATH INVASIVE LOCATION;  Service: Cardiovascular;  Laterality: N/A;    CARDIAC CATHETERIZATION N/A 02/14/2023    Procedure: Resting Full Cycle Ratio;  Surgeon: Kostas David MD;  Location: [facility] CATH INVASIVE LOCATION;  Service: " "Cardiovascular;  Laterality: N/A;    CATARACT EXTRACTION Bilateral      SECTION      X1    CHOLECYSTECTOMY      VENOUS ACCESS DEVICE (PORT) INSERTION Left 2023    Procedure: INSERTION OF PORTACATH;  Surgeon: Collin Shook MD;  Location: [facility];  Service: General;  Laterality: Left;       SOCIAL HISTORY:   reports that she quit smoking about 3 years ago. Her smoking use included cigarettes. She started smoking about 48 years ago. She has a 45 pack-year smoking history. She has never used smokeless tobacco. She reports that she does not currently use alcohol. She reports current drug use. Drug: Marijuana.    FAMILY HISTORY:  family history includes Cancer in her brother, father, paternal grandfather, and paternal grandmother; Diabetes in her brother and maternal grandmother; Heart disease in her brother; Hypertension in her brother.    ALLERGIES:  No Known Allergies    MEDICATIONS:  As listed in the electronic medical record.    Review of Systems  Limited review of systems was performed given patient appeared confused at times during our conversation  Does endorse to feeling \"ill\".  Denies pain.  Denies chest pain/bleeding/shortness of air      Vitals:    25 0900 25 0915 25 0930 25 0945   BP: 106/62 119/72 104/63 114/67   Pulse: 77 79 78 79   Resp:       Temp:       TempSrc:       SpO2: 97% 97% 98% 97%   Weight:       Height:           Physical Exam  Constitutional:       Appearance: She is ill-appearing.   HENT:      Head: Normocephalic and atraumatic.   Cardiovascular:      Rate and Rhythm: Normal rate and regular rhythm.      Heart sounds: Normal heart sounds.   Pulmonary:      Effort: Pulmonary effort is normal. No respiratory distress.         DIAGNOSTIC DATA:    Results from last 7 days   Lab Units 25  0529 25  2248   WBC 10*3/mm3 0.04* 0.06*   HEMOGLOBIN g/dL 8.4* 9.6*   HEMATOCRIT % 24.9* 27.7*   PLATELETS 10*3/mm3 100* 4*      Results from last 7 " days   Lab Units 04/29/25  0907 04/29/25  0632 04/28/25  2159   SODIUM mmol/L 131* 130* 128*   POTASSIUM mmol/L 3.6 3.5 4.2   CHLORIDE mmol/L 95* 97* 85*   CO2 mmol/L 14.7* 14.0* 14.4*   BUN mg/dL 77* 71* 94*   CREATININE mg/dL 2.74* 2.54* 4.11*   CALCIUM mg/dL 7.1* 6.3* 7.5*   BILIRUBIN mg/dL  --   --  0.8   ALK PHOS U/L  --   --  100   ALT (SGPT) U/L  --   --  19   AST (SGOT) U/L  --   --  19   GLUCOSE mg/dL 131* 119* 89          Assessment & Plan   ASSESSMENT:  This is a 70 y.o. female with:    *Chemotherapy induced severe thrombocytopenia  She is status post carboplatin/pemetrexed/pembrolizumab cycle 1 day 1 on 4/18/2025 4/29/2025 platelets 100 from 4.  Status post 1 unit platelet transfusion this a.m.  Workup-IPF 1.9, PT 17, PTT 28.7, fibrinogen pending, FDP greater than equal to 5 but less than 20 mcg/mL.  Path review of peripheral smear currently pending    *Chemotherapy-induced severe leukopenia  4/29/2025 WBC 0.04 from 0.06.  Will initiate G-CSF    *Septic shock  *WARD  *Hypoxia  *COPD  Management per primary team    *Metastatic non-small cell lung cancer  Follows with Dr. Lombardi from our group.  Please see his note for extensive details  3/24/2025 CT-guided retroperitoneal lymph node biopsy revealed metastatic poorly differentiated carcinoma.  This was noted to be similar to prior lung biopsy from 2023 in terms of IHC and morphology.  Favored to represent metastasis from additional lung cancer.  Caris testing with QNS  [id number]%, mutation in CHEK2 but no targetable mutations.  Inv[facility]ae testing was sent off  4/18/2025: Palliative intent C1 D1 carboplatin/pemetrexed/pembrolizumab.  Patient not Taxol candidate due to pre-existing neuropathy related to her severe spinal disease involving hands and feet.    *Other medical comorbidities-aortic stenosis s/p TAVR in August 2023; cervical spine stenosis status post anterior cervical corpectomy with cage in September 2022; COPD; hepatic steatosis;  anxiety/depression; diabetes type 2; hypothyroidism; SMA stenosis; T2 compression fracture seen on MRI T-spine from 3/17/2025 without evidence of pathology fracture    *CODE STATUS  Patient is currently a full code.  Palliative care consult to assist in further clarification of code and goals of care has been placed by primary team    RECOMMENDATIONS/PLAN:   Start Neupogen 480 mcg given severe chemo induced neutropenia and septic shock  As needed platelet transfusion for platelet count less than 20   Would avoid aspirin and/or anticoagulants given platelets less than 50,000  CBC with differential daily.  We will follow along    Patient critically ill.  Very poor prognosis.    Kaitlin Marx MD

## 2025-04-29 NOTE — NURSING NOTE
IV therapy was called to assess pts. port. Nurse was unable to obtain blood return. At the current time it is infusing 3 different meds without any occlusion alarms. Upon pulling up the latest chest xray, you can clearly see a curve at the tip of the port's tubing. At this time I would not recommend using cath fitz .The pt's  stated in the last couple of times at doctors office they had had trouble with it as well. Dicussed with nurse at bedside.

## 2025-04-29 NOTE — PROGRESS NOTES
"[facility] Clinical Pharmacy Services: Vancomycin Pharmacokinetic Initial Consult Note    Gabby Acosta is a 70 y.o. female who is on day 1 of pharmacy to dose vancomycin.    Indication: Empiric  Consulting Provider: Estefani Sterling NP  Planned Duration of Therapy: 7 days   Loading Dose Ordered or Given: 2000 mg on 04/29 at 0052  MRSA PCR performed: No  Culture/Source: Blood/urine pending   Target: -600 mg/L.hr     Other Antimicrobials: Cefepime 2GM IV Q24H     Vitals/Labs  Ht: 152.4 cm (60\"); Wt: 95.8 kg (211 lb 3.2 oz)  Temp Readings from Last 1 Encounters:   04/28/25 98.1 °F (36.7 °C) (Oral)    Estimated Creatinine Clearance: 13.4 mL/min (A) (by C-G formula based on SCr of 4.11 mg/dL (H)).       Results from last 7 days   Lab Units 04/28/25  2248 04/28/25  2159   CREATININE mg/dL  --  4.11*   WBC 10*3/mm3 0.06*  --      Assessment/Plan:    Vancomycin loading dose of 2000mg IV x 1 dose given on 04/29 at 0052  Vanc Random has been ordered for 04/29 at 0900  AM Clinical to follow with additional dosing regimen     Pharmacy will follow patient's kidney function and will adjust doses and obtain levels as necessary. Thank you for involving pharmacy in this patient's care. Please contact pharmacy with any questions or concerns.                           Lexa Duggan PharmD   Clinical Pharmacist   "

## 2025-04-29 NOTE — PROGRESS NOTES
"[facility] Clinical Pharmacy Services: Cefepime Consult    Pt Name: Gabby Acosta   : 1955  Weight: 96.7 kg (213 lb 3 oz)  Antibiotic: Cefepime  Indication: Empiric/Septic Shock    Relevant clinical data and objective history reviewed:    Past Medical History:   Diagnosis Date    Anxiety     Cervical spondylosis with myelopathy     Cervical stenosis of spine     COPD (chronic obstructive pulmonary disease)     Diabetes mellitus     Fibromyalgia     Hepatic steatosis     Hyperlipidemia     Hypertension     Hypothyroidism     Lung cancer 2023    Stage IIIA non-small cell lung cancer - LAST RADIATION AND CHEMO 2023, CURRENT IMMUNOTHERAPY - FOLLWOED BY [facility]    Osteoarthritis     Severe anxiety about anesthesia and home medications     PT STATES HAS HALLUCINATIONS, SEVERE ANXIETY, AND \"BLACK OUT\" EPISODES REGARDING ANESTHESIA - STATES WORKING WITH A THERAPIST    Severe aortic stenosis     Slow to wake up after anesthesia      Creatinine   Date Value Ref Range Status   2025 4.11 (H) 0.57 - 1.00 mg/dL Final   2025 0.85 0.57 - 1.00 mg/dL Final   2025 0.87 0.57 - 1.00 mg/dL Final   2023 0.90 0.60 - 1.30 mg/dL Final     Comment:     Serial Number: [id number]:  [id number]   2023 1.10 0.60 - 1.30 mg/dL Final     Comment:     Serial Number: [id number]:  [id number]   2022 0.94 0.55 - 1.02 mg/dL Final   2021 0.8 0.7 - 1.5 mg/dL Final   2020 0.8 0.7 - 1.5 mg/dL Final     BUN   Date Value Ref Range Status   2025 94 (H) 8 - 23 mg/dL Final   2022 21 (H) 10 - 20 mg/dL Final     Estimated Creatinine Clearance: 13.5 mL/min (A) (by C-G formula based on SCr of 4.11 mg/dL (H)).    Lab Results   Component Value Date    WBC 0.06 (C) 2025     Temp Readings from Last 3 Encounters:   25 98.1 °F (36.7 °C) (Oral)   25 98 °F (36.7 °C) (Oral)   25 97.7 °F (36.5 °C) (Oral)      Assessment/Plan    Ordered Cefepime 2GM IV Q24H for a total of 7 days. " Will monitor and adjust if culture data or pertinent lab values indicate this is best for the patient.     Thank you for this consult and please contact pharmacy with any questions or concerns.     Lexa Duggan PharmD   Clinical Pharmacist

## 2025-04-29 NOTE — CONSULTS
Purpose of the visit was to evaluate for: goals of care/advanced care planning, support for patient/family, hospice referral/discussion, and pain/symptom management. Spoke with MD and RN as well as patient and family and discussed palliative care, goals of care, care options, resuscitation status, and [facility].        Assessment:  Patient is palliative care appropriate for inpatient care given shock, acute hypoxic respiratory failure, acute renal failure, metastatic non-small cell cancer, and COPD, along with other comorbid conditions. PPS 10%      Cultural and spiritual needs have been assessed.      Recommendations/Plan: Change CODE status to DNI.       Other Comments: I visited with patient, her 2 daughters, son-in-law, and significant other at bedside. Family is tearful and demonstrates a general understanding of prognosis. They are hopeful that antibiotics could treat an underlying infection and the patient could get well enough to go home. We discussed CODE status at length. Patient is certain she does not want to be intubated. That said, she was still undecided about CPR when I left our consult. She did verbalize understanding that she would require intubation if she needs CPR. At this point, patient and family are not ready to make any changes to goals of care. We discussed the option for comfort care, [facility], and [facility]. They all agree that if the patient does not improve and/or gets worse, comfort care might be the best course. Palliative care will continue to follow.

## 2025-04-29 NOTE — CONSULTS
[facility] Consult Note       Encounter Date:25  Patient:Gabby Acosta  :1955  MRN:[id number]    Date of Admission: 2025  Date of Encounter Visit: 25  Encounter Provider: Pan Mendoza MD  Referring Provider: No ref. provider found  [facility]  Patient Care Team:  Danyelle Brush MD as PCP - General (Pediatrics)  Carmelo Lombardi Jr., MD as Consulting Physician (Hematology and Oncology)  Justin Diaz Jr., MD as Referring Physician (Pulmonary Disease)  Hawk Zimmerman MD as Consulting Physician (Radiation Oncology)  Pan Mendoza MD as Consulting Physician (Cardiology)      Consulted for: Atrial fibrillation    Chief Complaint: Weakness and fatigue      History of Presenting Illness:      Ms. Acosta is a 70 y.o. woman with past medical history notable for heart murmur since age 15, nonrheumatic aortic stenosis, COPD, diabetes, hypertension, prior tobacco use, and cervical spine stenoses with prior surgery, and recently diagnosed non-small cell lung cancer for which she is undergoing chemotherapy who presents to the hospital with worsening fatigue poor oral intake after recent chemotherapy.  Unfortunately she has had recurrence of her non-small cell lung cancer she had been undergoing repeat chemotherapy and over the last few days really has not eaten or drinking much according to her .  Patient is fairly obtunded and unable to provide much of a history.  She came to the hospital was found to be septic and multiorgan failure while here on pressors she did go on to have brief episode of atrial fibrillation but converted out of it with digoxin.  She remains critically ill she made some improvement but still overall is critically ill.      Review of Systems:  Review of Systems   Unable to perform ROS: Mental status change       Medications:    Current Facility-Administered Medications:     acetaminophen (TYLENOL) tablet 650 mg,  650 mg, Oral, Q4H PRN **OR** acetaminophen (TYLENOL) suppository 650 mg, 650 mg, Rectal, Q4H PRN, Estefani Sterling APRN    sennosides-docusate (PERICOLACE) 8.6-50 MG per tablet 2 tablet, 2 tablet, Oral, BID **AND** polyethylene glycol (MIRALAX) packet 17 g, 17 g, Oral, Daily PRN **AND** bisacodyl (DULCOLAX) EC tablet 5 mg, 5 mg, Oral, Daily PRN **AND** bisacodyl (DULCOLAX) suppository 10 mg, 10 mg, Rectal, Daily PRN, Estefani Sterling APRN    budesonide-formoterol (SYMBICORT) 160-4.5 MCG/ACT inhaler 2 puff, 2 puff, Inhalation, BID - RT, Estefani Sterling APRN    cefepime 2000 mg IVPB in 100 mL NS (MBP), 2,000 mg, Intravenous, Q24H, Estefani Sterling APRN, 2,000 mg at 04/29/25 0220    dexmedetomidine (PRECEDEX) 400 mcg in 100 mL NS infusion, 0.2-1.5 mcg/kg/hr, Intravenous, Titrated, Estefani Sterling APRN, Last Rate: 9.6 mL/hr at 04/29/25 0350, 0.4 mcg/kg/hr at 04/29/25 0350    dextrose (D50W) (25 g/50 mL) IV injection 25 g, 25 g, Intravenous, Q15 Min PRN, Estefani Sterling APRN    dextrose (GLUTOSE) oral gel 15 g, 15 g, Oral, Q15 Min PRN, Estefani Sterling APRN    dextrose 5 % and sodium chloride 0.9 % 925 mL with sodium bicarbonate 8.4 % 75 mEq infusion, , Intravenous, Continuous, Gualberto Wilcox MD    diazePAM (VALIUM) injection 5 mg, 5 mg, Intravenous, Q6H PRN, Estefani Sterling APRN, 5 mg at 04/29/25 0256    [START ON 4/30/2025] famotidine (PEPCID) injection 20 mg, 20 mg, Intravenous, Daily, Ernesto Shen MD    Filgrastim (NEUPOGEN) injection 480 mcg, 480 mcg, Subcutaneous, Q PM, Kaitlin Marx MD    glucagon (GLUCAGEN) injection 1 mg, 1 mg, Intramuscular, Q15 Min PRN, Estefani Sterling, APRN    heparin injection 500 Units, 5 mL, Intravenous, PRN, Kia Franco MD    HYDROcodone-acetaminophen (NORCO) 5-325 MG per tablet 1 tablet, 1 tablet, Oral, Q6H PRN, Estefani Sterling, APRN    Hydrocortisone Sod Suc (PF) (Solu-CORTEF) injection 50 mg, 50 mg, Intravenous, Q6H, Nathan Ring MD, 50 mg at  04/29/25 0816    HYDROmorphone (DILAUDID) injection 0.5 mg, 0.5 mg, Intravenous, Q3H PRN, Estefani Sterling APRN, 0.5 mg at 04/29/25 0331    insulin regular (humuLIN R,novoLIN R) injection 2-9 Units, 2-9 Units, Subcutaneous, Q6H, Estefani Sterling APRN    ipratropium-albuterol (DUO-NEB) nebulizer solution 3 mL, 3 mL, Nebulization, Q6H - RT, Estefani Sterling APRN, 3 mL at 04/29/25 0723    levothyroxine (SYNTHROID, LEVOTHROID) tablet 175 mcg, 175 mcg, Oral, Q AM, Nathan Ring MD    mupirocin (BACTROBAN) 2 % nasal ointment 1 Application, 1 Application, Each Nare, BID, Estefani Sterling APRN, 1 Application at 04/29/25 0820    nitroglycerin (NITROSTAT) SL tablet 0.4 mg, 0.4 mg, Sublingual, Q5 Min PRN, Estefani Sterling APRN    norepinephrine (LEVOPHED) 8 mg in 250 mL NS infusion (premix), 0.02-0.3 mcg/kg/min, Intravenous, Titrated, Kia Franco MD, Stopped at 04/29/25 0310    ondansetron (ZOFRAN) injection 4 mg, 4 mg, Intravenous, Q6H PRN, Estefani Sterling APRN    Pharmacy to dose vancomycin, , Not Applicable, Continuous PRN, Estefani Sterling APRN    phenylephrine (MARGRET-SYNEPHRINE) 50 mg in 250 mL NS infusion, 0.5-3 mcg/kg/min, Intravenous, Titrated, Estefani Sterling APRN, Last Rate: 51.7 mL/hr at 04/29/25 0931, 1.8 mcg/kg/min at 04/29/25 0931    potassium chloride 10 mEq in 100 mL IVPB, 10 mEq, Intravenous, Q1H, Curtis Kang APRN, Last Rate: 100 mL/hr at 04/29/25 1138, 10 mEq at 04/29/25 1138    sodium chloride 0.9 % flush 10 mL, 10 mL, Intravenous, PRN, Sin Tapia MD    Access VAD, , , Once **AND** sodium chloride 0.9 % flush 10 mL, 10 mL, Intravenous, PRN, Kia Franco MD    sodium chloride 0.9 % flush 10 mL, 10 mL, Intravenous, Q12H, Kia Franco MD, 10 mL at 04/29/25 0833    sodium chloride 0.9 % flush 10 mL, 10 mL, Intravenous, Salvador BARBOZA Jessica J, MD    sodium chloride 0.9 % flush 20 mL, 20 mL, Intravenous, Salvador BRABOZA Jessica J, MD    sodium chloride 0.9 % infusion 40 mL, 40 mL,  "Intravenous, PRN, Kia Franco MD    No Known Allergies    Past Medical History:   Diagnosis Date    Anxiety     Cervical spondylosis with myelopathy     Cervical stenosis of spine     COPD (chronic obstructive pulmonary disease)     Diabetes mellitus     Fibromyalgia     Hepatic steatosis     Hyperlipidemia     Hypertension     Hypothyroidism     Lung cancer 02/2023    Stage IIIA non-small cell lung cancer - LAST RADIATION AND CHEMO 5/2023, CURRENT IMMUNOTHERAPY - FOLLWOED BY [facility]    Osteoarthritis     Severe anxiety about anesthesia and home medications     PT STATES HAS HALLUCINATIONS, SEVERE ANXIETY, AND \"BLACK OUT\" EPISODES REGARDING ANESTHESIA - STATES WORKING WITH A THERAPIST    Severe aortic stenosis     Slow to wake up after anesthesia        Past Surgical History:   Procedure Laterality Date    ANTERIOR CHANNEL VERTEBRECTOMY/CORPECTOMY N/A 09/20/2022    Procedure: Anterior cervical corpectomy, cervical four/five/six, with cage and plate from cervical three to cervical seven;  Surgeon: David Cheung MD;  [facility];  Service: Neurosurgery;  Laterality: N/A;    AORTIC VALVE REPAIR/REPLACEMENT N/A 8/1/2023    Procedure: TTE TRANSFEMORAL TRANSCATHETER AORTIC VALVE REPLACEMENT PERCUTANEOUS APPROACH;  Surgeon: Darryl James MD;  Location: [facility];  Service: Cardiothoracic;  Laterality: N/A;    AORTIC VALVE REPAIR/REPLACEMENT N/A 8/1/2023    Procedure: Transfemoral Transcatheter Aortic Valve Replacement with intraoperative TTE and possible open surgical rescue;  Surgeon: Pan Mendoza MD;  Location: [facility];  Service: Cardiovascular;  Laterality: N/A;    CARDIAC CATHETERIZATION N/A 02/14/2023    Procedure: Right and Left Heart Cath;  Surgeon: Kostas David MD;  Location: [facility] CATH INVASIVE LOCATION;  Service: Cardiovascular;  Laterality: N/A;    CARDIAC CATHETERIZATION N/A 02/14/2023    Procedure: Coronary angiography;  Surgeon: Kostas David" MD;  Location:  [facility] CATH INVASIVE LOCATION;  Service: Cardiovascular;  Laterality: N/A;    CARDIAC CATHETERIZATION N/A 2023    Procedure: Left ventriculography;  Surgeon: Kostas David MD;  [facility];  Service: Cardiovascular;  Laterality: N/A;    CARDIAC CATHETERIZATION N/A 2023    Procedure: Resting Full Cycle Ratio;  Surgeon: Kostas David MD;  Location:  [facility] CATH INVASIVE LOCATION;  Service: Cardiovascular;  Laterality: N/A;    CATARACT EXTRACTION Bilateral      SECTION      X1    CHOLECYSTECTOMY      VENOUS ACCESS DEVICE (PORT) INSERTION Left 2023    Procedure: INSERTION OF PORTACATH;  Surgeon: Collin Shook MD;  Location: [facility] MAIN OR;  Service: General;  Laterality: Left;       Social History     Socioeconomic History    Marital status:    Tobacco Use    Smoking status: Former     Current packs/day: 0.00     Average packs/day: 1 pack/day for 45.0 years (45.0 ttl pk-yrs)     Types: Cigarettes     Start date: 1976     Quit date: 2021     Years since quitting: 3.4    Smokeless tobacco: Never    Tobacco comments:     Caffeine use    Vaping Use    Vaping status: Never Used   Substance and Sexual Activity    Alcohol use: Not Currently    Drug use: Yes     Types: Marijuana     Comment: GUMMIES ON OCCASION    Sexual activity: Yes       Family History   Problem Relation Age of Onset    Cancer Father     Heart disease Brother     Hypertension Brother     Diabetes Brother     Cancer Brother     Diabetes Maternal Grandmother     Cancer Paternal Grandmother     Cancer Paternal Grandfather     Malig Hyperthermia Neg Hx        The following portions of the patient's history were reviewed and updated as appropriate: allergies, current medications, past family history, past medical history, past social history, past surgical history and problem list.         Objective:      Temp:  [97.9 °F (36.6 °C)-98.1 °F (36.7 °C)] 98 °F  "(36.7 °C)  Heart Rate:  [] 79  Resp:  [18-24] 18  BP: ()/() 114/67     Intake/Output Summary (Last 24 hours) at 4/29/2025 1143  Last data filed at 4/29/2025 0552  Gross per 24 hour   Intake 3268.05 ml   Output 500 ml   Net 2768.05 ml     Body mass index is 41.25 kg/m².      04/28/25  2137 04/29/25  0213   Weight: 96.7 kg (213 lb 3 oz) 95.8 kg (211 lb 3.2 oz)           Physical Exam:  Constitutional: Acute on chronically ill-appearing, frail, no acute distress   HENT: Oropharynx clear and membrane dry  Eyes: Normal conjunctiva, no sclera icterus.  Neck: Supple, no carotid bruit bilaterally.  Cardiovascular: Regular rate and rhythm, Early peaking systolic murmur over the right upper sternal border, Trace bilateral lower extremity edema.  Pulmonary: Normal respiratory effort, normal lung sounds, no wheezing.  Abdominal: Soft, nontender, no hepatosplenomegaly, liver is non-pulsatile.  Neurological: Lethargic  Skin: Warm, dry, no ecchymosis, no rash.   Psych: Unable to assess.         Lab Review:   Results from last 7 days   Lab Units 04/29/25  0907 04/29/25  0632 04/28/25 2159   SODIUM mmol/L 131* 130* 128*   POTASSIUM mmol/L 3.6 3.5 4.2   CHLORIDE mmol/L 95* 97* 85*   CO2 mmol/L 14.7* 14.0* 14.4*   BUN mg/dL 77* 71* 94*   CREATININE mg/dL 2.74* 2.54* 4.11*   GLUCOSE mg/dL 131* 119* 89   CALCIUM mg/dL 7.1* 6.3* 7.5*   AST (SGOT) U/L  --   --  19   ALT (SGPT) U/L  --   --  19     Results from last 7 days   Lab Units 04/29/25  0123 04/28/25  2159   HSTROP T ng/L 41* 49*     Results from last 7 days   Lab Units 04/29/25  0529 04/28/25  2248   WBC 10*3/mm3 0.04* 0.06*   HEMOGLOBIN g/dL 8.4* 9.6*   HEMATOCRIT % 24.9* 27.7*   PLATELETS 10*3/mm3 100* 4*                   Invalid input(s): \"LDLCALC\"  The ASCVD Risk score (Dottie ABRAHAM, et al., 2019) failed to calculate for the following reasons:    Cannot find a previous HDL lab    Cannot find a previous total cholesterol lab     Results from last 7 days   Lab " Units 04/28/25  2159   PROBNP pg/mL 8,395.0*     Results from last 7 days   Lab Units 04/29/25  0123   TSH uIU/mL 0.577     ECG 4/29/2025 tracings reviewed by myself:  Atrial fibrillation is replaced sinus rhythm.     Echocardiogram 8/2/2023:  Shows normal valve function of the limited images.  No valvular regurgitation or paravalvular regurgitation with normal gradients.  Hyperdynamic LV with small IVC consistent with volume depletion     TAVR 8/1/2023:  Successful placement of 23 mm GILDARDO ultra aortic valve bioprosthesis with trace paravalvular leak.  Bedside echocardiogram identified a calculated valve area of 1.4 cm2 with a mean gradient of 8 mmHg across aortic valve     Cardiac catheterization 12/14/2023:  Left main: Large-caliber vessel that bifurcates to an LAD and circumflex.  Normal  LAD: Medium caliber vessel that gives rise to a small caliber diagonal branch in the midsegment.  There is moderate to severe calcification in the proximal to mid LAD with a sequential discrete 60% and 50% stenosis associated with severe tortuosity.  LCX: Medium caliber vessel that gives rise to a small caliber OM1 and medium caliber OM 2 branch.  The OM 2 branch has a diffuse 20% proximal stenosis  RCA: Large caliber, severely calcified vessel that gives rise to a medium caliber PDA and small caliber RPL branch.  The RCA has a 30% mid vessel stenosis  RFR assessment LAD 0.91 not hemodynamically flow-limiting  Aortic valve area 0.8 cm2  Peak to peak gradient 38 mmHg  Stroke-volume index 26 mL/m2     Echocardiogram 1/11/2023:  Left ventricular ejection fraction appears to be greater than 70%.  Left ventricular wall thickness is consistent with mild concentric hypertrophy.  Left ventricular diastolic function is consistent with (grade I) impaired relaxation.  Normal right ventricular cavity size noted. Hyperdynamic right ventricular systolic function noted.  The aortic valve leaflets are not well visualized, although appear  heavily calcified  Severe aortic valve stenosis is present. Aortic valve area is 0.99 cm2. Aortic valve maximum pressure gradient is 77.5 mmHg. Aortic valve mean pressure gradient is 39.2 mmHg.  Calculated right ventricular systolic pressure from tricuspid regurgitation is 21 mmHg.  There is no evidence of pericardial effusion. . There is evidence of a fat pad present.          Assessment:           Septic shock         Plan:       Ms. Acosta is a 70 y.o. woman with past medical history notable for heart murmur since age 15, nonrheumatic aortic stenosis, COPD, diabetes, hypertension, prior tobacco use, and cervical spine stenoses with prior surgery, and recently diagnosed non-small cell lung cancer for which she is undergoing chemotherapy who presents to the hospital with worsening fatigue poor oral intake after recent chemotherapy.  Unfortunately patient is critically ill related to likely sepsis in the setting of neutropenia and metastatic cancer.  We have been asked to follow along given her known valvular heart disease and TAVR little over 2 years ago and new onset atrial fibrillation this admission.  Fortunately she converted back to sinus rhythm with digoxin her pressors were changed from Levophed over to lucía in the hopes of minimizing rhythm disturbances I agree with.  I would probably not redosed her with digoxin given her renal dysfunction.  Amiodarone would be an option if you are having troubles in the future but we try and avoid given multiorgan failure if possible.  Obviously beta-blocker currently is not a great option given hypotension.  Will follow-up on repeat echocardiogram to make sure there is no cardiac issues that are contributing but this seems to be clearly related to sepsis and her oncologic issues.  Try and do our best to support her through this but obviously is critically ill sounds like the critical care team were having discussions regarding DNR/DNI status which is reasonable but again  we will see how she progresses over the next 24 to 48 hours.      Paroxysmal atrial fibrillation:  Brief episode of atrial fibrillation in setting of pressor use has since converted to sinus rhythm  Received 250 mcg 4/29  Could redosed if need be but would not give too much more given poor renal function and clearance  Could consider amiodarone if need be LFTs are stable bili is stable.  Would not give blood thinners given severe thrombocytopenia on arrival we will have to reassess if any improvement overall clinically but would hold off on anticoagulation at this juncture    Nonrheumatic aortic stenosis:  S/P 23 mm Dominik Ultra 8/1  Can hold ASA give low PLT  Echocardiogram 9/2023 show stable valve function repeat echocardiogram pending          Thank you for allowing me to participate in the care of Gabby LIBBY Ortegalonnie. Feel free to contact me directly with any further questions or concerns.    Pan Mendoza MD  [facility]  04/29/25  11:43 EDT

## 2025-04-29 NOTE — ED NOTES
Nursing report ED to floor  Gabby Acosta  70 y.o.  female    HPI :  HPI  Stated Reason for Visit: PT PRESENTS TO THE ER FROM HOME VIA EMS - C/O SOA X 2 DAYS AND WEAKNESS SINCE HER CHEMO TX THIS PAST FRIDAY.  PT STATES SHE THINKS SHE MIGHT ALSO HAVE A UTI.  History Obtained From: patient, EMS  Precipitating Event(s): recent illness  Duration (Days): 3    Chief Complaint  Chief Complaint   Patient presents with    Shortness of Breath    Weakness - Generalized       Admitting doctor:   Nathan Ring MD    Admitting diagnosis:   The primary encounter diagnosis was Septic shock. Diagnoses of Community acquired pneumonia of right lower lobe of lung, WARD (acute kidney injury), Leukopenia, unspecified type, Non-small cell lung cancer, unspecified laterality, Acute respiratory failure with hypoxia, and Thrombocytopenia were also pertinent to this visit.    Code status:   Current Code Status       Date Active Code Status Order ID Comments User Context       Prior            Allergies:   Patient has no known allergies.    Isolation:   No active isolations    Intake and Output  No intake or output data in the 24 hours ending 04/29/25 0118    Weight:       04/28/25  2137   Weight: 96.7 kg (213 lb 3 oz)       Most recent vitals:   Vitals:    04/29/25 0051 04/29/25 0056 04/29/25 0102 04/29/25 0106   BP: 105/53 105/73  96/41   Pulse:  93 97 104   Resp:       Temp:       TempSrc:       SpO2:  (!) 89% 94% 99%   Weight:       Height:           Active LDAs/IV Access:   Lines, Drains & Airways       Active LDAs       Name Placement date Placement time Site Days    Peripheral IV 04/28/25 2228 20 G Anterior;Left Forearm 04/28/25 2228  Forearm  less than 1    Peripheral IV 04/29/25 0000 20 G Anterior;Distal;Right;Upper Arm 04/29/25  0000  Arm  less than 1    Single Lumen Implantable Port 03/02/23 Left Chest 03/02/23  1424  Chest  788                    Labs (abnormal labs have a star):   Labs Reviewed   COMPREHENSIVE METABOLIC PANEL -  Abnormal; Notable for the following components:       Result Value    BUN 94 (*)     Creatinine 4.11 (*)     Sodium 128 (*)     Chloride 85 (*)     CO2 14.4 (*)     Calcium 7.5 (*)     Albumin 3.0 (*)     Anion Gap 28.6 (*)     eGFR 11.2 (*)     All other components within normal limits    Narrative:     GFR Categories in Chronic Kidney Disease (CKD)      GFR Category          GFR (mL/min/1.73)    Interpretation  G1                     90 or greater         Normal or high (1)  G2                      60-89                Mild decrease (1)  G3a                   45-59                Mild to moderate decrease  G3b                   30-44                Moderate to severe decrease  G4                    15-29                Severe decrease  G5                    14 or less           Kidney failure          (1)In the absence of evidence of kidney disease, neither GFR category G1 or G2 fulfill the criteria for CKD.    eGFR calculation 2021 CKD-EPI creatinine equation, which does not include race as a factor   BNP (IN-HOUSE) - Abnormal; Notable for the following components:    proBNP 8,395.0 (*)     All other components within normal limits    Narrative:     This assay is used as an aid in the diagnosis of individuals suspected of having heart failure. It can be used as an aid in the diagnosis of acute decompensated heart failure (ADHF) in patients presenting with signs and symptoms of ADHF to the emergency department (ED). In addition, NT-proBNP of <300 pg/mL indicates ADHF is not likely.    Age Range Result Interpretation  NT-proBNP Concentration (pg/mL:      <50             Positive            >450                   Gray                 300-450                    Negative             <300    50-75           Positive            >900                  Gray                300-900                  Negative            <300      >75             Positive            >1800                  Gray                300-1800             "      Negative            <300   TROPONIN - Abnormal; Notable for the following components:    HS Troponin T 49 (*)     All other components within normal limits    Narrative:     High Sensitive Troponin T Reference Range:  <14.0 ng/L- Negative Female for AMI  <22.0 ng/L- Negative Male for AMI  >=14 - Abnormal Female indicating possible myocardial injury.  >=22 - Abnormal Male indicating possible myocardial injury.   Clinicians would have to utilize clinical acumen, EKG, Troponin, and serial changes to determine if it is an Acute Myocardial Infarction or myocardial injury due to an underlying chronic condition.        CBC WITH AUTO DIFFERENTIAL - Abnormal; Notable for the following components:    WBC 0.06 (*)     RBC 3.41 (*)     Hemoglobin 9.6 (*)     Hematocrit 27.7 (*)     Platelets 4 (*)     All other components within normal limits   LACTIC ACID, PLASMA - Abnormal; Notable for the following components:    Lactate 2.2 (*)     All other components within normal limits   PROCALCITONIN - Abnormal; Notable for the following components:    Procalcitonin 21.70 (*)     All other components within normal limits    Narrative:     As a Marker for Sepsis (Non-Neonates):    1. <0.5 ng/mL represents a low risk of severe sepsis and/or septic shock.  2. >2 ng/mL represents a high risk of severe sepsis and/or septic shock.    As a Marker for Lower Respiratory Tract Infections that require antibiotic therapy:    PCT on Admission    Antibiotic Therapy       6-12 Hrs later    >0.5                Strongly Recommended  >0.25 - <0.5        Recommended   0.1 - 0.25          Discouraged              Remeasure/reassess PCT  <0.1                Strongly Discouraged     Remeasure/reassess PCT    As 28 day mortality risk marker: \"Change in Procalcitonin Result\" (>80% or <=80%) if Day 0 (or Day 1) and Day 4 values are available. Refer to http://www.[email or web address]-pct-calculator.com    Change in PCT <=80%  A decrease of PCT levels below or equal to " 80% defines a positive change in PCT test result representing a higher risk for 28-day all-cause mortality of patients diagnosed with severe sepsis for septic shock.    Change in PCT >80%  A decrease of PCT levels of more than 80% defines a negative change in PCT result representing a lower risk for 28-day all-cause mortality of patients diagnosed with severe sepsis or septic shock.      URINALYSIS W/ CULTURE IF INDICATED - Abnormal; Notable for the following components:    Ketones, UA Trace (*)     Blood, UA Small (1+) (*)     Protein,  mg/dL (2+) (*)     Nitrite, UA Positive (*)     All other components within normal limits    Narrative:     In absence of clinical symptoms, the presence of pyuria, bacteria, and/or nitrites on the urinalysis result does not correlate with infection.   URINALYSIS, MICROSCOPIC ONLY - Abnormal; Notable for the following components:    RBC, UA 3-5 (*)     WBC, UA 3-5 (*)     Bacteria, UA 2+ (*)     Squamous Epithelial Cells, UA 13-20 (*)     All other components within normal limits   RESPIRATORY PANEL PCR W/ COVID-19 (SARS-COV-2), NP SWAB IN UTM/VTP, 2 HR TAT - Normal    Narrative:     In the setting of a positive respiratory panel with a viral infection PLUS a negative procalcitonin without other underlying concern for bacterial infection, consider observing off antibiotics or discontinuation of antibiotics and continue supportive care. If the respiratory panel is positive for atypical bacterial infection (Bordetella pertussis, Chlamydophila pneumoniae, or Mycoplasma pneumoniae), consider antibiotic de-escalation to target atypical bacterial infection.   BLOOD CULTURE   BLOOD CULTURE   RAINBOW DRAW    Narrative:     The following orders were created for panel order [place] Draw.  Procedure                               Abnormality         Status                     ---------                               -----------         ------                     Green Top (Gel)[[id number]]                                   Final result               Lavender Top[[id number]]                                     Final result               Gold Top - SST[[id number]]                                   Final result               Light Blue Top[[id number]]                                   Final result                 Please view results for these tests on the individual orders.   LACTIC ACID, REFLEX   HIGH SENSITIVITIY TROPONIN T 1HR   MANUAL DIFFERENTIAL   BLOOD GAS, ARTERIAL   PREPARE PLATELET PHERESIS   TYPE AND SCREEN   CBC AND DIFFERENTIAL    Narrative:     The following orders were created for panel order CBC & Differential.  Procedure                               Abnormality         Status                     ---------                               -----------         ------                     CBC Auto Differential[[id number]]        Abnormal            Final result                 Please view results for these tests on the individual orders.   GREEN TOP   LAVENDER TOP   GOLD TOP - SST   LIGHT BLUE TOP       EKG:   ECG 12 Lead ED Triage Standing Order; SOA   Preliminary Result   HEART DNVZ=584  bpm   RR Grdvtpev=703  ms   IL Zfwwvxak=476  ms   P Horizontal Axis=-10  deg   P Front Axis=34  deg   QRSD Interval=91  ms   QT Biruzcqm=234  ms   DIiT=497  ms   QRS Axis=3  deg   T Wave Axis=94  deg   - BORDERLINE ECG -   Sinus tachycardia with irregular rate   Borderline repolarization abnormality   Date and Time of Study:2025-04-28 22:24:25          Meds given in ED:   Medications   sodium chloride 0.9 % flush 10 mL (has no administration in time range)   vancomycin IVPB 2000 mg in 0.9% Sodium Chloride 500 mL (2,000 mg Intravenous New Bag 4/29/25 0052)   norepinephrine (LEVOPHED) 8 mg in 250 mL NS infusion (premix) (0.08 mcg/kg/min × 96.7 kg Intravenous Rate/Dose Change 4/29/25 0117)   sodium chloride 0.9 % flush 10 mL (has no administration in time range)   sodium chloride 0.9 % flush 10 mL (10 mL Intravenous  Given 4/29/25 0051)   sodium chloride 0.9 % flush 10 mL (has no administration in time range)   sodium chloride 0.9 % flush 20 mL (has no administration in time range)   sodium chloride 0.9 % infusion 40 mL (has no administration in time range)   heparin injection 500 Units (has no administration in time range)   albuterol (PROVENTIL) nebulizer solution 0.083% 2.5 mg/3mL (2.5 mg Nebulization Given 4/28/25 2328)   methylPREDNISolone sodium succinate (SOLU-Medrol) injection 125 mg (125 mg Intravenous Given 4/28/25 2302)   sodium chloride 0.9 % bolus 1,000 mL (0 mL Intravenous Stopped 4/28/25 2302)   sepsis fluid LR bolus 1,980 mL (0 mL Intravenous Stopped 4/28/25 2356)   cefepime 2000 mg IVPB in 100 mL NS (MBP) (0 mg Intravenous Stopped 4/29/25 0052)   fentaNYL citrate (PF) (SUBLIMAZE) injection 50 mcg (50 mcg Intravenous Given 4/28/25 2356)       Imaging results:  XR Chest 1 View  Result Date: 4/28/2025  Suspected right-sided pneumonia and small right pleural effusion.  This report was finalized on 4/28/2025 10:49 PM by Dr. Karin Rutledge M.D on Workstation: [id number]        Ambulatory status:   - bedrest    Social issues:   Social History     Socioeconomic History    Marital status:    Tobacco Use    Smoking status: Former     Current packs/day: 0.00     Average packs/day: 1 pack/day for 45.0 years (45.0 ttl pk-yrs)     Types: Cigarettes     Start date: 11/1/1976     Quit date: 11/1/2021     Years since quitting: 3.4    Smokeless tobacco: Never    Tobacco comments:     Caffeine use    Vaping Use    Vaping status: Never Used   Substance and Sexual Activity    Alcohol use: Not Currently    Drug use: Yes     Types: Marijuana     Comment: GUMMIES ON OCCASION    Sexual activity: Yes       Peripheral Neurovascular  Peripheral Neurovascular (Adult)  Peripheral Neurovascular WDL: WDL    Neuro Cognitive  Neuro Cognitive (Adult)  Cognitive/Neuro/Behavioral WDL: .WDL except, level of consciousness  Level of  Consciousness: Confusion    Learning  Learning Assessment  Learning Readiness and Ability: no barriers identified    Respiratory  Respiratory  Airway WDL: WDL  Respiratory WDL  Respiratory WDL: .WDL except, expansion/retractions, rhythm/pattern  Rhythm/Pattern, Respiratory: shallow, tachypneic, shortness of breath, labored  Expansion/Accessory Muscles/Retractions: abdominal muscle use  Cough Frequency: no cough  Breath Sounds  All Lung Fields Breath Sounds: All Fields  All Lung Fields Breath Sounds: Anterior:, coarse    Abdominal Pain       Pain Assessments  Pain (Adult)  (0-10) Pain Rating: Rest: 8  (0-10) Pain Rating: Activity: 8  Pain Location: other (see comments)  Pain Side/Orientation: generalized    NIH Stroke Scale       Makayla Kaiser RN  04/29/25 01:18 EDT

## 2025-04-29 NOTE — PROGRESS NOTES
"  Intensive Care Unit Daily Progress Note.   [facility] INTENSIVE CARE  4/29/2025    Patient Name:  Gabby Acosta  MRN:  [id number]  YOB: 1955  Age: 70 y.o.  Sex: female         Reason for Admission / Chief Complaint:  Shortness of breath    Hospital Course:   70-year-old female with a history of metastatic non-small cell lung cancer, chronic obstructive pulmonary disease who presented to the hospital with short of breath and found to be in shock requiring vasopressor support and acute hypoxic respiratory failure requiring Optiflow.  Admitted to the ICU for management.    Interval History:  Admitted overnight for shortness of breath  Found to be in shock requiring vasopressor support  Pancytopenic, received transfusion of platelets  Acute renal failure  Feels very weak and tired at evaluation  Some shortness of breath  Denies any chest pain  No nausea or vomiting  Family is at bedside    Physical Exam:  /67   Pulse 79   Temp 98 °F (36.7 °C) (Axillary)   Resp 18   Ht 152.4 cm (60\")   Wt 95.8 kg (211 lb 3.2 oz)   SpO2 97%   BMI 41.25 kg/m²   Body mass index is 41.25 kg/m².    Intake/Output    Intake/Output Summary (Last 24 hours) at 4/29/2025 1048  Last data filed at 4/29/2025 0552  Gross per 24 hour   Intake 3268.05 ml   Output 500 ml   Net 2768.05 ml     General: Alert, weak appearing  HEENT: NC/AT, EOMI, MMM  Neck: Supple, trachea midline  Cardiac: RRR, no murmur, gallops, rubs  Pulmonary: Clear to auscultation bilaterally, no adventitious breath sounds, normal respiratory effort  GI: Soft, non-tender, non-distended, normal bowel sounds  Extremities: Warm, well perfused, no LE edema  Skin: no visible rash  Neuro: CN II - XII grossly intact  Psychiatry: Normal mood and affect      Data Review:  Notable Labs:  Results from last 7 days   Lab Units 04/29/25  0529 04/28/25  2248   WBC 10*3/mm3 0.04* 0.06*   HEMOGLOBIN g/dL 8.4* 9.6*   PLATELETS 10*3/mm3 100* 4*     Results from " last 7 days   Lab Units 04/29/25  0907 04/29/25  0632 04/28/25  2159   SODIUM mmol/L 131* 130* 128*   POTASSIUM mmol/L 3.6 3.5 4.2   CHLORIDE mmol/L 95* 97* 85*   CO2 mmol/L 14.7* 14.0* 14.4*   BUN mg/dL 77* 71* 94*   CREATININE mg/dL 2.74* 2.54* 4.11*   GLUCOSE mg/dL 131* 119* 89   CALCIUM mg/dL 7.1* 6.3* 7.5*   Estimated Creatinine Clearance: 20.1 mL/min (A) (by C-G formula based on SCr of 2.74 mg/dL (H)).    Results from last 7 days   Lab Units 04/29/25  0632 04/29/25  0529 04/28/25 2248 04/28/25  2159   LDH U/L 192  --   --   --    AST (SGOT) U/L  --   --   --  19   ALT (SGPT) U/L  --   --   --  19   PROCALCITONIN ng/mL  --   --   --  21.70*   LACTATE mmol/L  --   --   --  2.2*   PLATELETS 10*3/mm3  --  100* 4*  --        Results from last 7 days   Lab Units 04/29/25  0126   PH, ARTERIAL pH units 7.269*   PCO2, ARTERIAL mm Hg 30.5*   PO2 ART mm Hg 69.5*   HCO3 ART mmol/L 14.0*       Result Review:  I have personally reviewed the results from the time of this admission to 4/29/2025 10:48 EDT and agree with these findings:  [x]  Laboratory list / accordion  [x]  Microbiology  [x]  Radiology  [x]  EKG/Telemetry   [x]  Cardiology/Vascular   []  Pathology  [x]  Old records  []  Other:    Imaging:  Reviewed chest images personally from past 3 days    ASSESSMENT  /  PLAN:    Shock, septic versus hypovolemic  Acute hypoxic respiratory failure  Urinary tract infection  Pneumonia  Acute renal failure  Metastatic non-small cell cancer  Pancytopenia  Anion gap metabolic acidosis  Thrombocytopenia  Elevated troponin  DM2  Aortic stenosis s/p TAVR  COPD  Hyponatremia    -Patient presented to the hospital with shortness of breath and found to be in acute hypoxic respiratory failure and shock.  - Acute hypoxic respiratory failure requiring Optiflow.  SpO2 goal 88 to 92%  - Chest x-ray with right-sided infiltrate, she was unable to lay flat for CT of the chest  - Empiric antibiotics with vancomycin and cefepime.  Suspect  pneumonia and possible urinary tract infection  - Continue vasopressor support, MAP goal greater than 65  -Hydrocortisone 50 every 6 for septic shock  - Pancytopenic with severe leukopenia.  Started on Neupogen by oncology.  - Transfused platelets, goal greater than 20  - Holding off on anticoagulation and antiplatelets  - Bronchodilator therapy with Symbicort and DuoNebs  - Palliative consulted  - Extensive discussion had with patient and multiple family members at bedside including  regarding her poor prognosis.  They would like to elect for DNR/DNI status.  At this time we will continue current management and if she does not demonstrate any improvement we will pursue hospice measures.  They attest that she has suffered extensively over the past several months.    All issues new to me today.  Prior hospital course, labs and imaging reviewed.    GI prophylaxis: Famotidine  DVT prophylaxis: SCDs  Kang catheter: External  Bowel regimen: Ordered  Diet: N.p.o.    Discharge: Continue to monitor in the ICU due to critical status    Ernesto Shen MD  [facility]  Pulmonary and Critical Care Medicine, Interventional Pulmonology    Parts of this note may be an electronic transcription/translation of spoken language to printed text using the Dragon dictation system.

## 2025-04-29 NOTE — CONSULTS
Chp visiting Pt and family during ICU Rounds. Chp met with family in the waiting room, daughter and SO present. Chp sat with family to provide grief support and a supportive presence. Chp joined family in room to provide support to Pt and prayer. Pt and Family indicated use of spirituality for coping and support. Chp provided spiritual support through prayer and theological affirmation. Family indicated they were Alevism and wanted a  to perform an anointing of the sick for Pt. Chp reached out through [facility] hotline and  should be coming this afternoon. Family welcomed Chp presence and Chp remains available to Pt as needed.

## 2025-04-29 NOTE — CONSULTS
[facility] Consult Note      Patient Name: Gabby Acosta  : 1955  MRN: [id number]  Primary Care Physician:  Danyelle Brush MD  Referring Physician: No ref. provider found  Date of admission: 2025    Subjective     Reason for Consult: WARD, acidosis, and electrolyte derangement    HPI:   Gabby Acosta is a 70 y.o. female with normal renal function at baseline, metastatic non-small cell carcinoma on chemo, DM2, hepatic steatosis, HTN, hypothyroidism, and severe aortic stenosis, who presented to the hospital yesterday for SOA, N/V/D, and altered mental status x 3 days, found to be hypotensive and hypoxic upon arrival.  On , patient was started on cycle 1 Keytruda, carboplatin, and Alimta due to metastatic retroperitoneal lymph nodes via biopsy (3/24/2025). In the ER, sodium 128, SCR 4.11, CO2 14.4 with AG 28.6, lactate 2.2.  Patient was given 3 L of IVF, started on vasopressors and Vapotherm.  Today, sodium 130, CO2 14, AG 19, SCR 2.54.  UOP last night 500 cc +2 unmeasured occurrences.  Patient is obtunded, unable to provide any reliable medical history.  ROS was obtained from family at bedside.  Per family, patient has been progressively eating less with low appetite since last chemo; Also has been taking ibuprofen regularly.    Review of Systems:   14 point review of systems is otherwise negative except for mentioned above on HPI    Personal History     Past Medical History:   Diagnosis Date    Anxiety     Cervical spondylosis with myelopathy     Cervical stenosis of spine     COPD (chronic obstructive pulmonary disease)     Diabetes mellitus     Fibromyalgia     Hepatic steatosis     Hyperlipidemia     Hypertension     Hypothyroidism     Lung cancer 2023    Stage IIIA non-small cell lung cancer - LAST RADIATION AND CHEMO 2023, CURRENT IMMUNOTHERAPY - FOLLWOED BY [facility]    Osteoarthritis     Severe anxiety about anesthesia and home medications     PT STATES HAS  "HALLUCINATIONS, SEVERE ANXIETY, AND \"BLACK OUT\" EPISODES REGARDING ANESTHESIA - STATES WORKING WITH A THERAPIST    Severe aortic stenosis     Slow to wake up after anesthesia        Past Surgical History:   Procedure Laterality Date    ANTERIOR CHANNEL VERTEBRECTOMY/CORPECTOMY N/A 2022    Procedure: Anterior cervical corpectomy, cervical four/five/six, with cage and plate from cervical three to cervical seven;  Surgeon: David Cheung MD;  Location: [facility] OR;  Service: Neurosurgery;  Laterality: N/A;    AORTIC VALVE REPAIR/REPLACEMENT N/A 2023    Procedure: TTE TRANSFEMORAL TRANSCATHETER AORTIC VALVE REPLACEMENT PERCUTANEOUS APPROACH;  Surgeon: Darryl James MD;  Location: [facility] CVOR;  Service: Cardiothoracic;  Laterality: N/A;    AORTIC VALVE REPAIR/REPLACEMENT N/A 2023    Procedure: Transfemoral Transcatheter Aortic Valve Replacement with intraoperative TTE and possible open surgical rescue;  Surgeon: Pan Mendoza MD;  Location: [facility];  Service: Cardiovascular;  Laterality: N/A;    CARDIAC CATHETERIZATION N/A 2023    Procedure: Right and Left Heart Cath;  Surgeon: Kostas David MD;  Location: [facility] CATH INVASIVE LOCATION;  Service: Cardiovascular;  Laterality: N/A;    CARDIAC CATHETERIZATION N/A 2023    Procedure: Coronary angiography;  Surgeon: Kostas David MD;  Location: [facility] CATH INVASIVE LOCATION;  Service: Cardiovascular;  Laterality: N/A;    CARDIAC CATHETERIZATION N/A 2023    Procedure: Left ventriculography;  Surgeon: Kostas David MD;  Location: [facility] CATH INVASIVE LOCATION;  Service: Cardiovascular;  Laterality: N/A;    CARDIAC CATHETERIZATION N/A 2023    Procedure: Resting Full Cycle Ratio;  Surgeon: Kostas David MD;  Location: [facility] CATH INVASIVE LOCATION;  Service: Cardiovascular;  Laterality: N/A;    CATARACT EXTRACTION Bilateral      SECTION      X1    CHOLECYSTECTOMY      VENOUS " ACCESS DEVICE (PORT) INSERTION Left 03/02/2023    Procedure: INSERTION OF PORTACATH;  Surgeon: Collin Shook MD;  Location: [facility];  Service: General;  Laterality: Left;       Family History: family history includes Cancer in her brother, father, paternal grandfather, and paternal grandmother; Diabetes in her brother and maternal grandmother; Heart disease in her brother; Hypertension in her brother.    Social History:  reports that she quit smoking about 3 years ago. Her smoking use included cigarettes. She started smoking about 48 years ago. She has a 45 pack-year smoking history. She has never used smokeless tobacco. She reports that she does not currently use alcohol. She reports current drug use. Drug: Marijuana.    Home Medications:  Prior to Admission medications    Medication Sig Start Date End Date Taking? Authorizing Provider   albuterol sulfate  (90 Base) MCG/ACT inhaler INHALE TWO (2) PUFFS EVERY FOUR (4) HOURS AS NEEDED FOR WHEEZING. 3/21/25  Yes Carmelo Lombardi Jr., MD   ALPRAZolam (Xanax) 0.5 MG tablet Take 1 tablet by mouth 3 (Three) Times a Day As Needed for Anxiety for up to 60 doses. 4/11/25  Yes Carmelo Lombardi Jr., MD   aspirin 81 MG EC tablet Take 1 tablet by mouth Daily.   Yes ProviderHawa MD   dexAMETHasone (DECADRON) 4 MG tablet Take 1 tablet twice daily starting the day before chemo, day of chemo, and day after chemo.  Take with food. 4/16/25  Yes Carmelo Lombardi Jr., MD   diphenhydrAMINE (BENADRYL) 25 mg capsule Take 1 capsule by mouth Every 6 (Six) Hours As Needed for Allergies.   Yes ProviderHawa MD   fluticasone (FLONASE) 50 MCG/ACT nasal spray Administer 2 sprays into the nostril(s) as directed by provider As Needed. 8/10/22  Yes Hawa Benavidez MD   OLANZapine (zyPREXA) 5 MG tablet Take 1 tablet by mouth Every Night. Take nightly x 4 starting night of chemotherapy. 4/16/25  Yes Carmelo Lombardi Jr., MD   ondansetron (ZOFRAN) 8 MG tablet Take 1 tablet  by mouth 3 (Three) Times a Day As Needed for Nausea or Vomiting. 4/16/25  Yes Carmelo Lombardi Jr., MD   potassium chloride 10 MEQ CR tablet Take 1 tablet by mouth Daily. 8/13/24  Yes Carmelo Lombardi Jr., MD   folic acid (FOLVITE) 1 MG tablet Take 1 tablet by mouth Daily. 4/11/25   Carmelo Lombardi Jr., MD   ibuprofen (ADVIL,MOTRIN) 200 MG tablet Take 1 tablet by mouth Every 6 (Six) Hours As Needed for Mild Pain.    Hawa Benavidez MD   levothyroxine (SYNTHROID, LEVOTHROID) 137 MCG tablet TAKE ONE (1) TABLET BY MOUTH EVERY MORNING. 2/5/25   Nokhbehzaeim, Mahrokh, MD   lidocaine-prilocaine (EMLA) 2.5-2.5 % cream Apply maria g-sized amount to Mediport site 30 min prior to port access. Do not rub in. 4/15/25   Carmelo Lombardi Jr., MD   losartan (COZAAR) 100 MG tablet Take 1 tablet by mouth Every Night. 6/8/22   Hawa Benavidez MD   pyridoxine (VITAMIN B-6) 100 MG tablet Take 1 tablet by mouth Daily. 4/30/22   Hawa Benavidez MD   vitamin D3 125 MCG (5000 UT) capsule capsule Take 1 capsule by mouth Daily.    Hawa Benavidez MD   amLODIPine (NORVASC) 5 MG tablet TAKE 1 TABLET BY MOUTH EVERY DAY 11/11/24 4/29/25  Pan Mendoza MD   buPROPion XL (WELLBUTRIN XL) 300 MG 24 hr tablet Take 1 tablet by mouth Daily. 6/15/23 4/29/25  Hawa Benavidez MD   furosemide (LASIX) 40 MG tablet Take 1 tablet by mouth Daily. 8/9/23 4/29/25  Adela Villaseñor APRN   guaiFENesin (MUCINEX) 600 MG 12 hr tablet Take 1 tablet by mouth Daily As Needed for Congestion. 8/2/23 4/29/25  Monet Spencer APRN   hydrOXYzine (ATARAX) 10 MG tablet Take 0.5 tablets by mouth As Needed for Itching or Anxiety.  4/29/25  Hawa Benavidez MD   lovastatin (MEVACOR) 40 MG tablet Take 1 tablet by mouth Every Night. 7/6/22 4/29/25  Provider, MD Hawa   metoprolol tartrate (LOPRESSOR) 25 MG tablet TAKE ONE (1) TABLET BY MOUTH EVERY 12 HOURS 10/30/24 4/29/25  Pan Mendoza MD       Allergies:  No Known  Allergies    Objective     Vitals:   Temp:  [97.9 °F (36.6 °C)-98.1 °F (36.7 °C)] 98 °F (36.7 °C)  Heart Rate:  [] 79  Resp:  [18-24] 18  BP: ()/() 114/67  Flow (L/min) (Oxygen Therapy):  [4-60] 60    Intake/Output Summary (Last 24 hours) at 4/29/2025 1013  Last data filed at 4/29/2025 0552  Gross per 24 hour   Intake 3268.05 ml   Output 500 ml   Net 2768.05 ml       Physical Exam:   Constitutional: Obtunded, chronically ill, thin, NAD  HEENT: Sclera anicteric, no conjunctival injection  Neck: No JVD  Respiratory: Diminished anteriorly, nonlabored respiration on Vapotherm   Cardiovascular: RRR, no murmurs, no rubs   Gastrointestinal: BS +, abdomen is soft, nondistended, no guarding or grimacing  : No palpable bladder  Musculoskeletal: No edema, no clubbing or cyanosis  Neurologic: Unable to assess  Skin: Warm and dry       Scheduled Meds:     budesonide-formoterol, 2 puff, Inhalation, BID - RT  cefepime, 2,000 mg, Intravenous, Q24H  [START ON 4/30/2025] famotidine, 20 mg, Intravenous, Daily  hydrocortisone sodium succinate, 50 mg, Intravenous, Q6H  insulin regular, 2-9 Units, Subcutaneous, Q6H  ipratropium-albuterol, 3 mL, Nebulization, Q6H - RT  levothyroxine, 175 mcg, Oral, Q AM  mupirocin, 1 Application, Each Nare, BID  senna-docusate sodium, 2 tablet, Oral, BID  sodium chloride, 10 mL, Intravenous, Q12H      IV Meds:   dexmedetomidine, 0.2-1.5 mcg/kg/hr, Last Rate: 0.4 mcg/kg/hr (04/29/25 0350)  lactated ringers, 100 mL/hr, Last Rate: 100 mL/hr (04/29/25 0849)  norepinephrine, 0.02-0.3 mcg/kg/min, Last Rate: Stopped (04/29/25 0310)  Pharmacy to dose vancomycin,   phenylephrine, 0.5-3 mcg/kg/min, Last Rate: 1.8 mcg/kg/min (04/29/25 0931)        Results Reviewed:   I have personally reviewed the results from the time of this admission to 4/29/2025 10:13 EDT     Lab Results   Component Value Date    GLUCOSE 131 (H) 04/29/2025    CALCIUM 7.1 (L) 04/29/2025     (L) 04/29/2025    K 3.6  04/29/2025    CO2 14.7 (L) 04/29/2025    CL 95 (L) 04/29/2025    BUN 77 (H) 04/29/2025    CREATININE 2.74 (H) 04/29/2025    BCR 28.1 (H) 04/29/2025    ANIONGAP 21.3 (H) 04/29/2025      Lab Results   Component Value Date    MG 2.1 01/12/2023    PHOS 4.4 01/12/2023    ALBUMIN 3.0 (L) 04/28/2025           Assessment / Plan       Septic shock      ASSESSMENT:  Nonoliguric WARD   Normal renal function at baseline.  SCr up to 1.11 on admission, multifactorial: Septic shock, N/V/D, impaired renal autoregulation with ARB use, concurrent insulin use, and recent chemo.  Hypovolemic; has multiple electrolyte derangement.  CT abdomen/pelvis 4/16 negative for hydro.  Presence of thrombocytopenia is concerning and definitely TTP is on differential diagnosis especially with low haptoglobin.  Urinalysis showing hematuria concerning for possible GN  Hypovolemic hyponatremia, exacerbated by poor oral intake with N/V/D  Hypocalcemia, suspect secondary to platinum based chemotherapy  Hypokalemia, in association with platinum based chemotherapy and recent diarrhea  AGMA, secondary to WARD, lactic acidosis, diarrhea, and possible ketosis  Septic shock, received IVF and IV antibiotics, blood cultures pending, now on vasopressor, managed by pulmonology  Acute respiratory failure with possible pneumonia, requiring Vapotherm, managed by pulmonology  Metastatic non-small cell carcinoma, started on cycle 1 Keytruda, carboplatin, and Alimta on 4/18.  Follows Dr. Lombardi outpatient  Leukopenia, chemotherapy-induced, awaiting oncology evaluation  Anemia, low haptoglobin, awaiting oncology evaluation  Thrombocytopenia, received 2 units of platelets, awaiting oncology evaluation  UTI, on IV antibiotics, cultures pending  Severe aortic stenosis s/p TAVR, echo on 9/20/2024 showed EF 60.3%, repeat echo pending  Anxiety/depression        PLAN:  No indication for dialysis yet; family wants to respect patient's wish and okay with dialysis if situation  worsens  Given metabolic acidosis and hypovolemia we will start IV bicarbonate  IV calcium gluconate 2 g x 1  Check serum osmolality, LDH, and uric acid  Check serologies, peripheral smear to rule out schistocytes  Check Elaine, UPCR, and osmolality  Repeat panel at noon  Palliative consult in progress  Consult hematology  Surveillance labs  Discussed with family at bedside    Thank you for involving us in the care of Gabby Acosta.  Please feel free to call with any questions.    Gualberto Wilcox MD  04/29/25  10:13 EDT    [facility]  [phone]      Please note that portions of this note were completed with a voice recognition program.

## 2025-04-29 NOTE — PROGRESS NOTES
[facility] Clinical Pharmacy Services: Vancomycin Level Monitoring Note    Gabby Acosta is a 70 y.o. female who is on day 1/7 of pharmacy to dose vancomycin for Empiric.    Estimated Creatinine Clearance: 20.5 mL/min (A) (by C-G formula based on SCr of 2.68 mg/dL (H)).    Current Vanc Dose: intermittent dosing  Results from last 7 days   Lab Units 04/29/25  1135   VANCOMYCIN RM mcg/mL 16.90   Continue intermittent dosing no doses planned 4/29  Next Level Date and Time: Vanc Random on 4/30 @ 0600    No changes at this time. Pharmacy is continuing to monitor and will adjust as needed.    Nadya Puckett, PharmD  Clinical Pharmacist

## 2025-04-29 NOTE — PROGRESS NOTES
[facility] Clinical Pharmacy Services: Renal Dose Adjustment    Famotidine has been appropriately renally dose adjusted for kidney function based on P&T approved guideline and procedure.     Nadya Puckett, PharmD  Clinical Pharmacist

## 2025-04-29 NOTE — ED PROVIDER NOTES
EMERGENCY DEPARTMENT ENCOUNTER    History  Chief Complaint   Patient presents with    Shortness of Breath    Weakness - Generalized       History provided by: Patient and Relative     HPI:  Context: Gabby Acosta is a 70 y.o. female with a medical history of hypertension, hyperlipidemia, lung cancer, aortic valve replacement, diabetes who presents to the ED c/o acute dyspnea.  She is currently being treated for lung cancer.  Over the past few days she has had shortness of breath and generalized weakness.  She has not been eating very much, and has been complaining of diffuse body wide pain, and consistent with her known metastatic cancer.  She has seemed very short of breath, with extreme dyspnea on exertion.  No real leg swelling.  Not having any fever, but has been having some chills and sweats.       Past Medical History:  Active Ambulatory Problems     Diagnosis Date Noted    Acute UTI 09/14/2022    HTN (hypertension) 09/15/2022    Cervical spondylosis with myelopathy 06/27/2022    Fibromyalgia 05/30/2014    Fatty liver 09/15/2022    Hypothyroidism 08/13/2012    Hyperlipidemia associated with type 2 diabetes mellitus 02/22/2016    Hypertrophic polyarthritis 09/15/2022    Proteinuria 01/30/2014    Obesity, Class III, BMI 40-49.9 (morbid obesity) 09/15/2022    Hypokalemia 09/26/2022    Hypophosphatemia 09/26/2022    History of fusion of cervical spine 11/16/2022    Displacement of internal fixation device of vertebrae 12/05/2022    Spinal instability of cervicothoracic region 12/05/2022    Cervical stenosis of spinal canal 01/10/2023    Nonrheumatic aortic valve stenosis 01/12/2023    Postprocedural pneumothorax 01/14/2023    Malignant neoplasm of lower lobe of left lung 02/16/2023    Encounter for adjustment or management of vascular access device 03/06/2023    Superior mesenteric artery stenosis 05/30/2023    Aortic stenosis 08/01/2023    S/P TAVR (transcatheter aortic valve replacement) 08/08/2023    Normocytic  anemia 08/15/2023    Encounter for long-term (current) use of high-risk medication 11/29/2023    Immunotherapy 01/24/2024    Vitamin D deficiency 01/24/2024    Hyperglycemia 01/24/2024     Resolved Ambulatory Problems     Diagnosis Date Noted    No Resolved Ambulatory Problems     Past Medical History:   Diagnosis Date    Anxiety     Cervical stenosis of spine     COPD (chronic obstructive pulmonary disease)     Diabetes mellitus     Hepatic steatosis     Hyperlipidemia     Hypertension     Lung cancer 02/2023    Osteoarthritis     Severe anxiety about anesthesia and home medications     Severe aortic stenosis     Slow to wake up after anesthesia        Past Surgical History:  Past Surgical History:   Procedure Laterality Date    ANTERIOR CHANNEL VERTEBRECTOMY/CORPECTOMY N/A 09/20/2022    Procedure: Anterior cervical corpectomy, cervical four/five/six, with cage and plate from cervical three to cervical seven;  Surgeon: David Cheung MD;  Location: [facility];  Service: Neurosurgery;  Laterality: N/A;    AORTIC VALVE REPAIR/REPLACEMENT N/A 8/1/2023    Procedure: TTE TRANSFEMORAL TRANSCATHETER AORTIC VALVE REPLACEMENT PERCUTANEOUS APPROACH;  Surgeon: Darryl James MD;  Location: [facility];  Service: Cardiothoracic;  Laterality: N/A;    AORTIC VALVE REPAIR/REPLACEMENT N/A 8/1/2023    Procedure: Transfemoral Transcatheter Aortic Valve Replacement with intraoperative TTE and possible open surgical rescue;  Surgeon: Pan Mendoza MD;  Location: [facility];  Service: Cardiovascular;  Laterality: N/A;    CARDIAC CATHETERIZATION N/A 02/14/2023    Procedure: Right and Left Heart Cath;  Surgeon: Kostas David MD;  Location: [facility] CATH INVASIVE LOCATION;  Service: Cardiovascular;  Laterality: N/A;    CARDIAC CATHETERIZATION N/A 02/14/2023    Procedure: Coronary angiography;  Surgeon: Kostas David MD;  Location: [facility] CATH INVASIVE LOCATION;  Service: Cardiovascular;  Laterality:  N/A;    CARDIAC CATHETERIZATION N/A 2023    Procedure: Left ventriculography;  Surgeon: Kostas David MD;  Location:  [facility] CATH INVASIVE LOCATION;  Service: Cardiovascular;  Laterality: N/A;    CARDIAC CATHETERIZATION N/A 2023    Procedure: Resting Full Cycle Ratio;  Surgeon: Kostas David MD;  Location:  [facility] CATH INVASIVE LOCATION;  Service: Cardiovascular;  Laterality: N/A;    CATARACT EXTRACTION Bilateral      SECTION      X1    CHOLECYSTECTOMY      VENOUS ACCESS DEVICE (PORT) INSERTION Left 2023    Procedure: INSERTION OF PORTACATH;  Surgeon: Collin Shook MD;  Location:  [facility];  Service: General;  Laterality: Left;         Family History:  Family History   Problem Relation Age of Onset    Cancer Father     Heart disease Brother     Hypertension Brother     Diabetes Brother     Cancer Brother     Diabetes Maternal Grandmother     Cancer Paternal Grandmother     Cancer Paternal Grandfather     Malig Hyperthermia Neg Hx          Social History:  Social History     Socioeconomic History    Marital status:    Tobacco Use    Smoking status: Former     Current packs/day: 0.00     Average packs/day: 1 pack/day for 45.0 years (45.0 ttl pk-yrs)     Types: Cigarettes     Start date: 1976     Quit date: 2021     Years since quitting: 3.4    Smokeless tobacco: Never    Tobacco comments:     Caffeine use    Vaping Use    Vaping status: Never Used   Substance and Sexual Activity    Alcohol use: Not Currently    Drug use: Yes     Types: Marijuana     Comment: GUMMIES ON OCCASION    Sexual activity: Yes         Allergies:  Patient has no known allergies.        Physical Exam  ED Triage Vitals [25 2137]   Temp Heart Rate Resp BP SpO2   98.1 °F (36.7 °C) 98 24 (!) 180/130 92 %      Temp src Heart Rate Source Patient Position BP Location FiO2 (%)   Oral Monitor Lying Right arm --     Physical Exam  Constitutional:       Appearance: She is  ill-appearing and toxic-appearing.   HENT:      Head: Normocephalic and atraumatic.   Cardiovascular:      Rate and Rhythm: Normal rate and regular rhythm.      Heart sounds: Murmur heard.   Pulmonary:      Effort: Tachypnea, accessory muscle usage and respiratory distress present.      Breath sounds: Wheezing and rhonchi present.   Abdominal:      Tenderness: There is no abdominal tenderness. There is no guarding or rebound.   Skin:     General: Skin is warm and dry.      Capillary Refill: Capillary refill takes 2 to 3 seconds.   Neurological:      General: No focal deficit present.         PPE: The patient wore: No PPE and I wore:mask, gloves, and eye protection  throughout the entire patient encounter.    Medications Given in ER:   Medications   sodium chloride 0.9 % flush 10 mL (has no administration in time range)   vancomycin IVPB 2000 mg in 0.9% Sodium Chloride 500 mL (2,000 mg Intravenous New Bag 4/29/25 0052)   norepinephrine (LEVOPHED) 8 mg in 250 mL NS infusion (premix) (0.04 mcg/kg/min × 96.7 kg Intravenous New Bag 4/29/25 0051)   sodium chloride 0.9 % flush 10 mL (has no administration in time range)   sodium chloride 0.9 % flush 10 mL (10 mL Intravenous Given 4/29/25 0051)   sodium chloride 0.9 % flush 10 mL (has no administration in time range)   sodium chloride 0.9 % flush 20 mL (has no administration in time range)   sodium chloride 0.9 % infusion 40 mL (has no administration in time range)   heparin injection 500 Units (has no administration in time range)   albuterol (PROVENTIL) nebulizer solution 0.083% 2.5 mg/3mL (2.5 mg Nebulization Given 4/28/25 2328)   methylPREDNISolone sodium succinate (SOLU-Medrol) injection 125 mg (125 mg Intravenous Given 4/28/25 2302)   sodium chloride 0.9 % bolus 1,000 mL (0 mL Intravenous Stopped 4/28/25 2302)   sepsis fluid LR bolus 1,980 mL (0 mL Intravenous Stopped 4/28/25 2356)   cefepime 2000 mg IVPB in 100 mL NS (MBP) (0 mg Intravenous Stopped 4/29/25 0052)    fentaNYL citrate (PF) (SUBLIMAZE) injection 50 mcg (50 mcg Intravenous Given 4/28/25 6034)         Orders Placed:  Orders Placed This Encounter   Procedures    Blood Culture - Blood,    Blood Culture - Blood,    Respiratory Panel PCR w/COVID-19(SARS-CoV-2) [facility] In-House, NP Swab in UTM/VTM, 2 HR TAT - Swab, Nasopharynx    XR Chest 1 View    [place] Draw    Comprehensive Metabolic Panel    BNP    High Sensitivity Troponin T    CBC Auto Differential    Lactic Acid, Plasma    Procalcitonin    Urinalysis With Culture If Indicated - Urine, Catheter    STAT Lactic Acid, Reflex    High Sensitivity Troponin T 1Hr    Manual Differential    Blood Gas, Arterial -    NPO Diet NPO Type: Strict NPO    Undress & Gown    Continuous Pulse Oximetry    Vital Signs    Connectors / Hubs Must Be Scrubbed 15 Seconds Using 70% Alcohol Before Access - Allow to Dry Before Accessing Line    Change Dressing to IV Site As Needed When Damp, Loose or Soiled    Change Needleless Connectors    Change Lombardi Needle & Transparent Dressing Every 7 Days    Change Lombardi Needle As Needed    Daily CHG Bath While Central Line in Place    Verify Informed Consent    Pulmonology (on-call MD unless specified)    Oxygen Therapy- Nasal Cannula; Titrate 1-6 LPM Per SpO2; 90 - 95%    Oxygen Therapy- Heated High Flow Nasal Cannula; Titrate 30-60 LPM Per SpO2; 90 - 95%    ECG 12 Lead ED Triage Standing Order; SOA    Prepare Platelet Pheresis, 2 Units    Type & Screen    Insert Peripheral IV    Access VAD    Inpatient Admission    CBC & Differential    Green Top (Gel)    Lavender Top    Gold Top - SST    Light Blue Top         Outpatient Medication Management:   Current Facility-Administered Medications Ordered in Epic   Medication Dose Route Frequency Provider Last Rate Last Admin    heparin injection 500 Units  5 mL Intravenous PRN Kia Franco MD        norepinephrine (LEVOPHED) 8 mg in 250 mL NS infusion (premix)  0.02-0.3 mcg/kg/min  Intravenous Titrated iKa Franco MD 7.25 mL/hr at 04/29/25 0051 0.04 mcg/kg/min at 04/29/25 0051    sodium chloride 0.9 % flush 10 mL  10 mL Intravenous PRN Sin Tapia MD        sodium chloride 0.9 % flush 10 mL  10 mL Intravenous PRN Kia Franco MD        sodium chloride 0.9 % flush 10 mL  10 mL Intravenous Q12H Kia Franco MD   10 mL at 04/29/25 0051    sodium chloride 0.9 % flush 10 mL  10 mL Intravenous PRN Kia Franco MD        sodium chloride 0.9 % flush 20 mL  20 mL Intravenous PRN Kia Franco MD        sodium chloride 0.9 % infusion 40 mL  40 mL Intravenous PRN Kia Franco MD        vancomycin IVPB 2000 mg in 0.9% Sodium Chloride 500 mL  20 mg/kg Intravenous Once Kia Franco  mL/hr at 04/29/25 0052 2,000 mg at 04/29/25 0052     Current Outpatient Medications Ordered in Epic   Medication Sig Dispense Refill    albuterol sulfate  (90 Base) MCG/ACT inhaler INHALE TWO (2) PUFFS EVERY FOUR (4) HOURS AS NEEDED FOR WHEEZING. 18 g 2    ALPRAZolam (Xanax) 0.5 MG tablet Take 1 tablet by mouth 3 (Three) Times a Day As Needed for Anxiety for up to 60 doses. 60 tablet 0    amLODIPine (NORVASC) 5 MG tablet TAKE 1 TABLET BY MOUTH EVERY DAY 90 tablet 3    aspirin 81 MG EC tablet Take 1 tablet by mouth Daily.      buPROPion XL (WELLBUTRIN XL) 300 MG 24 hr tablet Take 1 tablet by mouth Daily.      dexAMETHasone (DECADRON) 4 MG tablet Take 1 tablet twice daily starting the day before chemo, day of chemo, and day after chemo.  Take with food. 6 tablet 3    diphenhydrAMINE (BENADRYL) 25 mg capsule Take 1 capsule by mouth Every 6 (Six) Hours As Needed for Allergies.      fluticasone (FLONASE) 50 MCG/ACT nasal spray Administer 2 sprays into the nostril(s) as directed by provider As Needed.      folic acid (FOLVITE) 1 MG tablet Take 1 tablet by mouth Daily. 30 tablet 6    furosemide (LASIX) 40 MG tablet Take 1 tablet by mouth Daily. 30 tablet 11    guaiFENesin (MUCINEX)  600 MG 12 hr tablet Take 1 tablet by mouth Daily As Needed for Congestion.      hydrOXYzine (ATARAX) 10 MG tablet Take 0.5 tablets by mouth As Needed for Itching or Anxiety.      ibuprofen (ADVIL,MOTRIN) 200 MG tablet Take 1 tablet by mouth Every 6 (Six) Hours As Needed for Mild Pain.      levothyroxine (SYNTHROID, LEVOTHROID) 137 MCG tablet TAKE ONE (1) TABLET BY MOUTH EVERY MORNING. 90 tablet 1    lidocaine-prilocaine (EMLA) 2.5-2.5 % cream Apply maria g-sized amount to Mediport site 30 min prior to port access. Do not rub in. 30 g 2    losartan (COZAAR) 100 MG tablet Take 1 tablet by mouth Every Night.      lovastatin (MEVACOR) 40 MG tablet Take 1 tablet by mouth Every Night.      metoprolol tartrate (LOPRESSOR) 25 MG tablet TAKE ONE (1) TABLET BY MOUTH EVERY 12 HOURS 180 tablet 3    OLANZapine (zyPREXA) 5 MG tablet Take 1 tablet by mouth Every Night. Take nightly x 4 starting night of chemotherapy. 4 tablet 3    ondansetron (ZOFRAN) 8 MG tablet Take 1 tablet by mouth 3 (Three) Times a Day As Needed for Nausea or Vomiting. 30 tablet 3    potassium chloride 10 MEQ CR tablet Take 1 tablet by mouth Daily. 30 tablet 11    pyridoxine (VITAMIN B-6) 100 MG tablet Take 1 tablet by mouth Daily.      vitamin D3 125 MCG (5000 UT) capsule capsule Take 1 capsule by mouth Daily.             Medical Decision Making:  All labs have been independently interpreted by me.  All radiology studies have been reviewed by me. All EKG's have been independently viewed and interpreted by me.  Discussion below represents my analysis of pertinent findings related to patient's condition, differential diagnosis, treatment plan and final disposition.    Differential Diagnosis includes but not limited to: Pneumonia, electrolyte disturbance, arrhythmia, pulmonary embolism, sepsis, bacteremia, pneumothorax    Independent Historian Required Due To: Contributes additional historical details    Review of prior external notes (non-ED) -and- Review of  prior external test results outside of this encounter:  I reviewed the oncology office visit note from 4/18/2025.  Patient is being treated for non-small cell lung cancer and is being treated with Keytruda, carboplatin, Alimta    Labs Results:  Recent Results (from the past 24 hours)   Comprehensive Metabolic Panel    Collection Time: 04/28/25  9:59 PM    Specimen: Blood   Result Value Ref Range    Glucose 89 65 - 99 mg/dL    BUN 94 (H) 8 - 23 mg/dL    Creatinine 4.11 (H) 0.57 - 1.00 mg/dL    Sodium 128 (L) 136 - 145 mmol/L    Potassium 4.2 3.5 - 5.2 mmol/L    Chloride 85 (L) 98 - 107 mmol/L    CO2 14.4 (L) 22.0 - 29.0 mmol/L    Calcium 7.5 (L) 8.6 - 10.5 mg/dL    Total Protein 6.9 6.0 - 8.5 g/dL    Albumin 3.0 (L) 3.5 - 5.2 g/dL    ALT (SGPT) 19 1 - 33 U/L    AST (SGOT) 19 1 - 32 U/L    Alkaline Phosphatase 100 39 - 117 U/L    Total Bilirubin 0.8 0.0 - 1.2 mg/dL    Globulin 3.9 gm/dL    A/G Ratio 0.8 g/dL    BUN/Creatinine Ratio 22.9 7.0 - 25.0    Anion Gap 28.6 (H) 5.0 - 15.0 mmol/L    eGFR 11.2 (L) >60.0 mL/min/1.73   BNP    Collection Time: 04/28/25  9:59 PM    Specimen: Blood   Result Value Ref Range    proBNP 8,395.0 (H) 0.0 - 900.0 pg/mL   High Sensitivity Troponin T    Collection Time: 04/28/25  9:59 PM    Specimen: Blood   Result Value Ref Range    HS Troponin T 49 (H) <14 ng/L   Green Top (Gel)    Collection Time: 04/28/25  9:59 PM   Result Value Ref Range    Extra Tube Hold for add-ons.    Lavender Top    Collection Time: 04/28/25  9:59 PM   Result Value Ref Range    Extra Tube hold for add-on    Gold Top - SST    Collection Time: 04/28/25  9:59 PM   Result Value Ref Range    Extra Tube Hold for add-ons.    Light Blue Top    Collection Time: 04/28/25  9:59 PM   Result Value Ref Range    Extra Tube Hold for add-ons.    Lactic Acid, Plasma    Collection Time: 04/28/25  9:59 PM    Specimen: Blood   Result Value Ref Range    Lactate 2.2 (C) 0.5 - 2.0 mmol/L   Procalcitonin    Collection Time: 04/28/25  9:59  PM    Specimen: Blood   Result Value Ref Range    Procalcitonin 21.70 (H) 0.00 - 0.25 ng/mL   ECG 12 Lead ED Triage Standing Order; SOA    Collection Time: 04/28/25 10:24 PM   Result Value Ref Range    QT Interval 362 ms    QTC Interval 479 ms   Respiratory Panel PCR w/COVID-19(SARS-CoV-2) [facility] In-House, NP Swab in UTM/VTM, 2 HR TAT - Swab, Nasopharynx    Collection Time: 04/28/25 10:30 PM    Specimen: Nasopharynx; Swab   Result Value Ref Range    ADENOVIRUS, PCR Not Detected Not Detected    Coronavirus 229E Not Detected Not Detected    Coronavirus HKU1 Not Detected Not Detected    Coronavirus NL63 Not Detected Not Detected    Coronavirus OC43 Not Detected Not Detected    COVID19 Not Detected Not Detected - Ref. Range    Human Metapneumovirus Not Detected Not Detected    Human Rhinovirus/Enterovirus Not Detected Not Detected    Influenza A PCR Not Detected Not Detected    Influenza B PCR Not Detected Not Detected    Parainfluenza Virus 1 Not Detected Not Detected    Parainfluenza Virus 2 Not Detected Not Detected    Parainfluenza Virus 3 Not Detected Not Detected    Parainfluenza Virus 4 Not Detected Not Detected    RSV, PCR Not Detected Not Detected    Bordetella pertussis pcr Not Detected Not Detected    Bordetella parapertussis PCR Not Detected Not Detected    Chlamydophila pneumoniae PCR Not Detected Not Detected    Mycoplasma pneumo by PCR Not Detected Not Detected   CBC Auto Differential    Collection Time: 04/28/25 10:48 PM    Specimen: Blood   Result Value Ref Range    WBC 0.06 (C) 3.40 - 10.80 10*3/mm3    RBC 3.41 (L) 3.77 - 5.28 10*6/mm3    Hemoglobin 9.6 (L) 12.0 - 15.9 g/dL    Hematocrit 27.7 (L) 34.0 - 46.6 %    MCV 81.2 79.0 - 97.0 fL    MCH 28.2 26.6 - 33.0 pg    MCHC 34.7 31.5 - 35.7 g/dL    RDW 15.3 12.3 - 15.4 %    RDW-SD 44.1 37.0 - 54.0 fl    Platelets 4 (C) 140 - 450 10*3/mm3     Lab Comments:  My independent interpretation of the above labs: Metabolic acidosis, acute kidney  injury, leukopenia, thrombocytopenia, anemia      Radiology:  XR Chest 1 View  Result Date: 4/28/2025  SINGLE VIEW OF THE CHEST  HISTORY: Shortness of air  COMPARISON: April 16, 2025  FINDINGS: Left subclavian Mediport is noted. It terminates within the superior vena cava. There is a loop within the proximal catheter, similar to recent CT. The heart size is within normal limits. Patient is status post TAVR. No pneumothorax is seen. Since prior study, the patient has developed a dense infiltrate at the right lung base, concerning for pneumonia. Small right pleural effusion is also likely present. Left lung appears clear, other than some chronic scarring.      Suspected right-sided pneumonia and small right pleural effusion.  This report was finalized on 4/28/2025 10:49 PM by Dr. Karin Rutledge M.D on Workstation: [id number]      Radiology Comments:  I ordered the above imaging and reviewed the results.    My independent interpretation of the cxr: Right lung opacity    EKG Interpreted by me: Sinus tachycardia, APC, nonspecific repolarization changes      Rationale:  This is a ill-appearing 70-year-old female who is currently being treated for metastatic non-small cell carcinoma who is presenting with complaints of dyspnea.  She presents initially afebrile with fairly unremarkable vital signs, but quickly developed into hypotension, tachycardia and tachypnea.    When I evaluated her, she is on nasal cannula with mild hypoxia.  We are going to change her over to Airvo given that she is on 6 L by nasal cannula.  An ABG was obtained, which did demonstrate a mixed acidosis.     She was evaluated with an EKG which does not demonstrate any evidence of acute ischemia.  A troponin level is elevated, but I suspect this is more given her severe respiratory distress and underlying illness.  I do not really have any suspicion for acute coronary syndrome.  This can be trended.    She was evaluated with a chest x-ray, which is  concerning for a right lower lobe pneumonia.  Given the patient's hypotension, tachycardia, tachypnea, she does meet SIRS criteria.  She has a MAP less than 65 and will be treated with 30 ml/kilogram fluid bolus.  She will be given broad-spectrum antibiotics.  I will closely assess response and will add on pressors as needed.     Labs are markedly abnormal.  She has new hyponatremia, acute kidney injury with a creatinine of 4 from less than 1 with associated uremia and anion gap metabolic acidosis.  Her lactic acid level is minimally elevated.  Her CBC differential is pending, but she has severe leukopenia and I suspect neutropenia given her history of cancer treatment.  She has thrombocytopenia and anemia, all new from previous.  I suspect this is sequela of her underlying neoplasm.  Will give platelet transfusion.    UA is contaminated and she is really not having any urinary symptoms.  I do suspect that her pneumonia is far more likely etiology of her presentation today, but antibiotics will cover urine.     I had initially intended on obtaining a CT angiogram of her chest to evaluate for venous thromboembolism.  However, the patient's respiratory status coupled with her WARD do prevents this.  If she deteriorates further, would recommend stat echocardiogram to assess for right heart strain plus or minus VQ scan    Clinical Scores:                                   Progress Notes:  11:35 PM EDT: Patient remains with hypotension despite 30 mL/kg fluid bolus.  Levophed will be initiated.    1:04 AM EDT: SEPTIC SHOCK FOCUSED EXAM    *Must be completed by a licensed independent Practitioner within 6 hours of diagnosis for the following conditions -- Septic Shock or Severe Sepsis with Lactate >/= 4.    Fluid bolus must be completed prior to assessment.      Diagnosis: Septic Shock     Vital Signs (Attestation) Reviewed Temp, HR, RR, BP, SpO2   Shock Index (HR/SBP) (Attestation) Reviewed    Urine Output  oliguria    General ill appearing   Respiratory respiratory distress and accessory muscle use   Cardiac/Chest regular rate, rhythm   Skin (documentation of skin color is required) pink mucosa and warm/dry   Capillary Refill <3 seconds   Peripheral Pulses Checked                          peripheral  palpable     † Septic shock is defined by CMS as severe sepsis plus one of the following: persistent hypotension after fluid bolus OR tissue hypoperfusion (Lactic Acid >=4)  †† ONE OF THE FOLLOWING can be done in lieu of the Focused Exam: Measure CVP; Measure ScVO2; Echocardiogram (cardiac echo or cardiac ultrasound); Dynamic assessment of fluid responsiveness with passive leg raise or fluid challenge.    Kia Franco MD  04/29/25  01:05 EDT    1:10 AM: I spoke with the patient about her ED work up and diagnosis. I informed the patient that she will be admitted for further evaluation/treatment. All questions answered.      Consult:  1:01 AM EDT: Discussed case with CHERELLE Jara with pulmonary.  Reviewed history, exam, results and treatments.  Yg admit to ICU, Dr. Ring      ED Course as of 04/29/25 0105 Mon Apr 28, 2025 2214 First look: Patient presents to the ED with family for evaluation of decline over the past 5 to 7 days.  She has reportedly been stuck in bed.  Having worsening difficulty breathing, not eating or drinking, not able to make it to the bathroom.  Worsening generalized weakness.  She reports first dose of chemo  for metastatic non-small cell lung cancer since it recurred was 2 weeks ago.  Complains of generalized body pain from fibromyalgia.  Denies urinary symptoms.    On exam patient is chronically ill-appearing, lungs with significant expiratory wheeze with rhonchi bilaterally.  Dry mucous membranes.  Afebrile nontoxic    Will obtain basic labs, sepsis eval, chest x-ray, EKG cardiac enzymes.  Give breathing treatment IV steroids as well as fluids.  Patient family agreeable [DN]      ED Course User  Index  [DN] Sin Tapia MD         Critical care provider statement:    Critical care time (minutes): 50.   Critical care time was exclusive of:  Separately billable procedures and treating other patients   Critical care was necessary to treat or prevent imminent or life-threatening deterioration of the following conditions:  Metabolic Failure, Sepsis, and Respiratory Failure   Critical care was time spent personally by me on the following activities:  Development of treatment plan with patient or surrogate, discussions with consultants, evaluation of patient's response to treatment, examination of patient, obtaining history from patient or surrogate, ordering and performing treatments and interventions, ordering and review of laboratory studies, ordering and review of radiographic studies, pulse oximetry, re-evaluation of patient's condition and review of old charts. Critical Care indicators: Acidosis, Dehydration with significant metabolic / chemistry changes, and Sepsis / septicemia Pressors: dobutamine, dopamine, epinephrine, levophed, lopressor, et al.    Complexity of Care:  The patient requires admission.    Diagnosis:  Final diagnoses:   Septic shock   Community acquired pneumonia of right lower lobe of lung   WARD (acute kidney injury)   Leukopenia, unspecified type   Non-small cell lung cancer, unspecified laterality   Acute respiratory failure with hypoxia   Thrombocytopenia           Parts of this note may be an electronic transcription/translation of spoken language to printed text using the Dragon dictation system        Provider Note Signed by:     Kia Franco MD  04/29/25 0141       Kia Franco MD  04/29/25 0142

## 2025-04-29 NOTE — H&P
"Group: [facility]         HISTORY AND PHYSICAL    Patient Identification:  Gabby Acosta  70 y.o.  female  1955  [id number]            CC: Shortness of breath    History of Present Illness:  Patient is a 70-year-old female with past medical history of metastatic non-small cell carcinoma, COPD, aortic stenosis, DM2, former smoker, cervical spine stenosis and hypertension presenting to the ER with complaints of shortness of breath.  Shortness of breath began 6 days ago followed by 2 days of nonbloody vomiting and diarrhea.  She denies cough but then endorsed chest pain due to a coughing fit.  Family reports she has been confused over the last 3 days and she endorses poor memory which is her baseline.  She has had poor p.o. intake since her first chemo on 4/18.  Denies fevers but does endorse sick contact, her  had symptoms of food poisoning beginning Tuesday morning.    She also endorses feeling like she \"might be getting a UTI.\"  Says she has some discomfort with urination as well as frequency but denies hematuria or foul-smelling urine.  Family reports dark color.    She was found to be hypoxic and hypotensive in the ED and was sepsis fluid resuscitated and started on broad-spectrum antibiotics with WBC 0.06 and Pro-Shawn 21. Unfortunately she remained hypotensive and is now requiring Levophed. She is also on Vapotherm with PO2 69.5.    She previously completed concurrent chemoradiation with low-dose weekly carboplatin and Taxol followed by 1 year of durvalumab with her last cycle administered 4/23/2024.  She was under surveillance for some postradiation changes as well as enlarged lymph nodes.She underwent CT-guided biopsy of a retroperitoneal lymph node 3/20/2025 confirming metastatic non-small cell cancer.     CONSTITUTIONAL:  Denies fevers or chills  EYE:  No new vision changes  EAR:  No change in hearing  CARDIAC:  No chest pain  PULMONARY:  No productive cough +shortness of breath  GI:  " "No hematemesis or hematochezia +diarrhea  RENAL:  +dysuria + frequency  MUSCULOSKELETAL:  No musculoskeletal complaints  ENDOCRINE:  No heat or cold intolerance  INTEGUMENTARY: No skin rashes  NEUROLOGICAL:  No dizziness or confusion.  No seizure activity  PSYCHIATRIC:  No new anxiety or depression  12 system review of systems performed and all else negative      Past Medical History:  Past Medical History:   Diagnosis Date    Anxiety     Cervical spondylosis with myelopathy     Cervical stenosis of spine     COPD (chronic obstructive pulmonary disease)     Diabetes mellitus     Fibromyalgia     Hepatic steatosis     Hyperlipidemia     Hypertension     Hypothyroidism     Lung cancer 02/2023    Stage IIIA non-small cell lung cancer - LAST RADIATION AND CHEMO 5/2023, CURRENT IMMUNOTHERAPY - FOLLWOED BY [facility]    Osteoarthritis     Severe anxiety about anesthesia and home medications     PT STATES HAS HALLUCINATIONS, SEVERE ANXIETY, AND \"BLACK OUT\" EPISODES REGARDING ANESTHESIA - STATES WORKING WITH A THERAPIST    Severe aortic stenosis     Slow to wake up after anesthesia        Past Surgical History:  Past Surgical History:   Procedure Laterality Date    ANTERIOR CHANNEL VERTEBRECTOMY/CORPECTOMY N/A 09/20/2022    Procedure: Anterior cervical corpectomy, cervical four/five/six, with cage and plate from cervical three to cervical seven;  Surgeon: David Cheung MD;  Location: [facility];  Service: Neurosurgery;  Laterality: N/A;    AORTIC VALVE REPAIR/REPLACEMENT N/A 8/1/2023    Procedure: TTE TRANSFEMORAL TRANSCATHETER AORTIC VALVE REPLACEMENT PERCUTANEOUS APPROACH;  Surgeon: Darryl James MD;  Location: [facility];  Service: Cardiothoracic;  Laterality: N/A;    AORTIC VALVE REPAIR/REPLACEMENT N/A 8/1/2023    Procedure: Transfemoral Transcatheter Aortic Valve Replacement with intraoperative TTE and possible open surgical rescue;  Surgeon: Pan Mendoza MD;  Location: [facility];  Service: " Cardiovascular;  Laterality: N/A;    CARDIAC CATHETERIZATION N/A 2023    Procedure: Right and Left Heart Cath;  Surgeon: Kostas David MD;  Location: [facility] CATH INVASIVE LOCATION;  Service: Cardiovascular;  Laterality: N/A;    CARDIAC CATHETERIZATION N/A 2023    Procedure: Coronary angiography;  Surgeon: Kostas David MD;  Location: [facility] CATH INVASIVE LOCATION;  Service: Cardiovascular;  Laterality: N/A;    CARDIAC CATHETERIZATION N/A 2023    Procedure: Left ventriculography;  Surgeon: Kostas David MD;  Location: [facility] CATH INVASIVE LOCATION;  Service: Cardiovascular;  Laterality: N/A;    CARDIAC CATHETERIZATION N/A 2023    Procedure: Resting Full Cycle Ratio;  Surgeon: Kostas David MD;  Location: [facility] CATH INVASIVE LOCATION;  Service: Cardiovascular;  Laterality: N/A;    CATARACT EXTRACTION Bilateral      SECTION      X1    CHOLECYSTECTOMY      VENOUS ACCESS DEVICE (PORT) INSERTION Left 2023    Procedure: INSERTION OF PORTACATH;  Surgeon: Collin Shook MD;  Location: [facility];  Service: General;  Laterality: Left;        Home Meds:  (Not in a hospital admission)      Allergies:  No Known Allergies    Social History:   Social History     Socioeconomic History    Marital status:    Tobacco Use    Smoking status: Former     Current packs/day: 0.00     Average packs/day: 1 pack/day for 45.0 years (45.0 ttl pk-yrs)     Types: Cigarettes     Start date: 1976     Quit date: 2021     Years since quitting: 3.4    Smokeless tobacco: Never    Tobacco comments:     Caffeine use    Vaping Use    Vaping status: Never Used   Substance and Sexual Activity    Alcohol use: Not Currently    Drug use: Yes     Types: Marijuana     Comment: GUMMIES ON OCCASION    Sexual activity: Yes       Family History:  Family History   Problem Relation Age of Onset    Cancer Father     Heart disease Brother     Hypertension Brother      "Diabetes Brother     Cancer Brother     Diabetes Maternal Grandmother     Cancer Paternal Grandmother     Cancer Paternal Grandfather     Malig Hyperthermia Neg Hx        Physical Exam:  /72   Pulse 98   Temp 98.1 °F (36.7 °C) (Oral)   Resp 24   Ht 152.4 cm (60\")   Wt 96.7 kg (213 lb 3 oz)   SpO2 98%   BMI 41.63 kg/m²  Body mass index is 41.63 kg/m². 98% 96.7 kg (213 lb 3 oz)    Constitutional: obese female on vapotherm in mild respiratory distress saturations 96%  Head: - NCAT  Eyes: No pallor, Anicteric conjunctiva  ENMT: no oral thrush. Dry MM.   NECK: Trachea midline, No thyromegaly, no palpable cervical LNpathy  Heart: Sinus tachycardia, no murmur. No pedal edema   Lungs: diminished, No wheezes/ crackles heard    Abdomen: Soft. No tenderness, guarding or rigidity. No palpable masses  Extremities: Extremities warm and well perfused. No cyanosis/ clubbing  Neuro: Conscious, answers appropriately, no gross focal neuro deficits  Psych: Mood and affect appropriate    PPE recommended per [facility] [place], operated by [facility] infectious disease Isolation protocol for the current clinical scenario(as mentioned below) was followed.     LABS:  COVID19   Date Value Ref Range Status   04/28/2025 Not Detected Not Detected - Ref. Range Final       Lab Results   Component Value Date    CALCIUM 7.5 (L) 04/28/2025    PHOS 4.4 01/12/2023     Results from last 7 days   Lab Units 04/28/25  2248 04/28/25  2159   SODIUM mmol/L  --  128*   POTASSIUM mmol/L  --  4.2   CHLORIDE mmol/L  --  85*   CO2 mmol/L  --  14.4*   BUN mg/dL  --  94*   CREATININE mg/dL  --  4.11*   GLUCOSE mg/dL  --  89   CALCIUM mg/dL  --  7.5*   WBC 10*3/mm3 0.06*  --    HEMOGLOBIN g/dL 9.6*  --    PLATELETS 10*3/mm3 4*  --    ALT (SGPT) U/L  --  19   AST (SGOT) U/L  --  19   PROBNP pg/mL  --  8,395.0*   PROCALCITONIN ng/mL  --  21.70*     Lab Results   Component Value Date    TROPONINT 49 (H) 04/28/2025     Results from last 7 days   Lab Units 04/28/25  2159   HSTROP T " ng/L 49*         Results from last 7 days   Lab Units 04/28/25  2159   PROCALCITONIN ng/mL 21.70*   LACTATE mmol/L 2.2*     Results from last 7 days   Lab Units 04/29/25  0126   PH, ARTERIAL pH units 7.269*   PCO2, ARTERIAL mm Hg 30.5*   PO2 ART mm Hg 69.5*   O2 SATURATION ART % 91.4*   MODALITY  HFNC     Results from last 7 days   Lab Units 04/28/25 2230   ADENOVIRUS DETECTION BY PCR  Not Detected   CORONAVIRUS 229E  Not Detected   CORONAVIRUS HKU1  Not Detected   CORONAVIRUS NL63  Not Detected   CORONAVIRUS OC43  Not Detected   HUMAN METAPNEUMOVIRUS  Not Detected   HUMAN RHINOVIRUS/ENTEROVIRUS  Not Detected   INFLUENZA B PCR  Not Detected   PARAINFLUENZA 1  Not Detected   PARAINFLUENZA VIRUS 2  Not Detected   PARAINFLUENZA VIRUS 3  Not Detected   PARAINFLUENZA VIRUS 4  Not Detected   BORDETELLA PERTUSSIS PCR  Not Detected   BORDETELLA PARAPERTUSSIS PCR  Not Detected   CHLAMYDOPHILA PNEUMONIAE PCR  Not Detected   MYCOPLAMA PNEUMO PCR  Not Detected   RSV, PCR  Not Detected             Lab Results   Component Value Date    TSH 0.369 04/18/2025     Estimated Creatinine Clearance: 13.5 mL/min (A) (by C-G formula based on SCr of 4.11 mg/dL (H)).  Results from last 7 days   Lab Units 04/29/25  0054   NITRITE UA  Positive*   WBC UA /HPF 3-5*   BACTERIA UA /HPF 2+*   SQUAM EPITHEL UA /HPF 13-20*     Microbiology Results (last 10 days)       Procedure Component Value - Date/Time    Respiratory Panel PCR w/COVID-19(SARS-CoV-2) FATMATA/JULEE/MECHELLE/PAD/COR/PRISCILA In-House, NP Swab in UTM/VTM, 2 HR TAT - Swab, Nasopharynx [[id number]]  (Normal) Collected: 04/28/25 2230    Lab Status: Final result Specimen: Swab from Nasopharynx Updated: 04/28/25 2324     ADENOVIRUS, PCR Not Detected     Coronavirus 229E Not Detected     Coronavirus HKU1 Not Detected     Coronavirus NL63 Not Detected     Coronavirus OC43 Not Detected     COVID19 Not Detected     Human Metapneumovirus Not Detected     Human Rhinovirus/Enterovirus Not Detected     Influenza  A PCR Not Detected     Influenza B PCR Not Detected     Parainfluenza Virus 1 Not Detected     Parainfluenza Virus 2 Not Detected     Parainfluenza Virus 3 Not Detected     Parainfluenza Virus 4 Not Detected     RSV, PCR Not Detected     Bordetella pertussis pcr Not Detected     Bordetella parapertussis PCR Not Detected     Chlamydophila pneumoniae PCR Not Detected     Mycoplasma pneumo by PCR Not Detected    Narrative:      In the setting of a positive respiratory panel with a viral infection PLUS a negative procalcitonin without other underlying concern for bacterial infection, consider observing off antibiotics or discontinuation of antibiotics and continue supportive care. If the respiratory panel is positive for atypical bacterial infection (Bordetella pertussis, Chlamydophila pneumoniae, or Mycoplasma pneumoniae), consider antibiotic de-escalation to target atypical bacterial infection.               Imaging: I personally visualized the images of scans/x-rays performed within last 3 days.      Assessment:  Shock, septic versus hypovolemic  Acute hypoxic respiratory failure  Acute renal failure  Metastatic non-small cell cancer  Pancytopenia  Anion gap metabolic acidosis  Thrombocytopenia  Elevated troponin  DM2  Aortic stenosis s/p TAVR  COPD  Hyponatremia      Recommendations:  -Patient is presenting with shortness of breath, poor p.o. intake, vomiting, diarrhea and urinary symptoms over the last 10 days in the setting of metastatic non-small cell cancer and unfortunately she is not a candidate for other treatments and is now on palliative, non curative treatment  -Shock suspect due to sepsis and complicated by hypovolemia  -Source pulmonary versus urine. Chest x-ray with right lower lobe consolidation.  Urine with nitrates WBC and bacteria  -Lactic 2.2, trend to resolution  -S/p 30 cc/kg IV fluid resuscitation per sepsis protocol.  To get another 1 L as family reported patient has had little to no p.o.  intake since 4/18  -Continue Levophed and wean for MAP greater than 65  -Will continue on broad-spectrum antibiotics with vancomycin and cefepime with pharmacy consult to dose in setting of WARD  -Follow blood cultures and urine cultures and de-escalate antibiotics as able  -Elevated troponin that is downtrending likely due to hypoxia and demand ischemia with proBNP 8395.  Last echo 9/20/2024 with EF 60%.  Repeat echo in a.m.  -Patient is unable to lay completely flat at this time for CTA of the chest to rule out PE as well as poor renal function to tolerate contrast  - Creatinine 10 days ago was normal at 0.85 and is now 4.11 suspect due to hypovolemia and hypotension  - Continue IV fluid resuscitation, repeat BMP in a.m. and consult nephrology  - Hypoxic now requiring Vapotherm and patient is still in mild respiratory distress although she says she is comfortable.  Discussed intubation and patient wants to remain a full code  -Possible that this is COPD exacerbation versus pneumonia but she is not hypercapnic. Received 125 mg of Solu-Medrol in ED and will hold further steroids  -RVP negative  -Started on Symbicort and DuoNebs.  Pulmonary toileting as tolerated  - Given poor prognosis with metastatic disease complicated by shock with multiorgan failure we will consult palliative care for goals of care discussion  -Pancytopenia suspect secondary to chemo 4/18.  She had CBC pretreatment that was normal. Plan for transfusion of 1 unit of platelets  - Sliding scale insulin for goal glucose less than 180  - Routine ICU care    Famotidine for GI prophylaxis  SCDs for VTE prophylaxis  NPO  Full code    Patient was placed in face mask upon entering room and kept mask on throughout our encounter. I wore full protective equipment throughout this patient encounter including a face mask, gown and gloves. Hand hygiene was performed before donning protective equipment and after removal when leaving the room.    Estefani Sterling,  APRN  4/29/2025  01:33 EDT      Much of this encounter note is an electronic transcription/translation of spoken language to printed text using Dragon Software.

## 2025-04-29 NOTE — NURSING NOTE
04/29/25 1426   Wound 04/29/25 0202 Right upper abdomen   Placement Date/Time: 04/29/25 0202   Side: Right  Orientation: upper  Location: abdomen   Dressing Appearance open to air   Base brown;red   Periwound dry   Edges irregular   Drainage Amount none   Dressing Care dressing applied  (male cotton wrap applied between fold)   Wound 04/29/25 0205 coccyx   Placement Date/Time: 04/29/25 0205   Location: coccyx   Dressing Appearance dry;intact   Base brown;pink;red   Periwound dry;pink   Edges irregular   Drainage Amount none     Reason for Visit: WOC Team consult assess buttock and right upper thigh/ abd       Treatment Plan/Recommendations: Patient weighs 211lbs, she states she sweats and develops irritation, will periodically develop cyst. Right upper thigh/abd red/ brown with scattered small areas with slough, appear could be old cyst. Recommend magic barrier cream and apply small cotton wrap. Buttock coccyx red/brown, appears due to moisture, intertrigo not pressure. Recommend magic barrier cream.  Patient needs assist of 2 to turn. Under bilateral breast red/pink due to moisture, apply magic barrier cream.   Wound Team Follow up Plan: She is in unit on progressa mattress, please call if any further needs.

## 2025-04-30 NOTE — PLAN OF CARE
Goal Outcome Evaluation:              Outcome Evaluation: Pt passed at 1422 with family at bedside.

## 2025-04-30 NOTE — PROGRESS NOTES
Family decided to transition patient to comfort care  Nephrology team will sign off please call if you have any questions

## 2025-04-30 NOTE — PROGRESS NOTES
[facility] INPATIENT PROGRESS NOTE    Length of Stay:  1 days    CHIEF COMPLAINT/REASON FOR VISIT:  Metastatic lung cancer  Severe chemotherapy-induced leukopenia and thrombocytopenia    SUBJECTIVE:   Family at bedside.  They are awaiting arrival of patient's son, with plans to transition her to hospice.  Patient on BiPAP, not conversing at the time of my visit    ROS:  All systems reviewed and found to be negative except for that noted above    OBJECTIVE:  Vitals:    04/30/25 0730 04/30/25 0745 04/30/25 0755 04/30/25 0800   BP: 103/78 119/96  95/68   Pulse: 64 64  66   Resp:       Temp:   97.3 °F (36.3 °C)    TempSrc:   Axillary    SpO2: 91% 93%  93%   Weight:       Height:             PHYSICAL EXAMINATION:  General: Ill-appearing female.  On BiPAP-not conversing at the time of my visit  Heart: S1-S2 normal.  No murmurs  Abdomen/GI: Soft nontender.  Bowel sounds present  Extremities: No edema    DIAGNOSTIC DATA:  Results Review:     I reviewed the patient's new clinical results.    Results from last 7 days   Lab Units 04/30/25  0819 04/30/25  0344 04/29/25 2026   WBC 10*3/mm3 0.05* 0.04* 0.04*   HEMOGLOBIN g/dL 7.5* 7.7* 9.0*   HEMATOCRIT % 21.1* 22.2* 25.6*   PLATELETS 10*3/mm3 11* 23* 20*      Results from last 7 days   Lab Units 04/30/25  0819 04/30/25  0344 04/29/25 2026 04/29/25  0632 04/28/25  2159   SODIUM mmol/L 138 139 138   < > 128*   POTASSIUM mmol/L 3.2* 3.2* 3.0*   < > 4.2   CHLORIDE mmol/L 105 102 102   < > 85*   CO2 mmol/L 21.9* 22.1 18.5*   < > 14.4*   BUN mg/dL 60* 61* 67*   < > 94*   CREATININE mg/dL 1.36* 1.72* 1.95*   < > 4.11*   CALCIUM mg/dL 7.4* 7.4* 7.4*   < > 7.5*   BILIRUBIN mg/dL  --  0.7 0.9  --  0.8   ALK PHOS U/L  --  71 78  --  100   ALT (SGPT) U/L  --  17 16  --  19   AST (SGOT) U/L  --  16 18  --  19   GLUCOSE mg/dL 170* 167* 183*   < > 89    < > = values in this interval not displayed.      Lab Results   Component Value Date    NEUTROABS 0.00 (L) 04/29/2025      Results from last 7 days   Lab Units 04/29/25  1135   INR  1.34*   APTT seconds 29.9  28.7     Results from last 7 days   Lab Units 04/30/25  0344   MAGNESIUM mg/dL 1.7     Assessment & Plan   ASSESSMENT/PLAN:  This is a 70 y.o. female with:        *Severe thrombocytopenia  She is status post carboplatin/pemetrexed/pembrolizumab cycle 1 day 1 on 4/18/2025 4/29/2025 platelets 100 from 4.  Status post 1 unit platelet transfusion this a.m.  Workup-IPF 1.9, PT 17, PTT 28.7, fibrinogen 1020, FDP greater than equal to 5 but less than 20 mcg/mL.  Path review of peripheral smear without any evidence of schistocytes.  Unlikely to be DIC.  Etiology-likely multifactorial-chemotherapy as well as septic shock     *Chemotherapy-induced severe leukopenia  4/29/2025 WBC 0.04 from 0.06.  Started on Neupogen 480 mcg  4/30/2025: WBC 0.05     *Septic shock  *WARD  *Hypoxia  *COPD  Management per primary team     *Metastatic non-small cell lung cancer  Follows with Dr. Lombardi from our group.  Please see his note for extensive details  3/24/2025 CT-guided retroperitoneal lymph node biopsy revealed metastatic poorly differentiated carcinoma.  This was noted to be similar to prior lung biopsy from 2023 in terms of IHC and morphology.  Favored to represent metastasis from additional lung cancer.  Caris testing with QNS  [id number] 29%, mutation in CHEK2 but no targetable mutations.  [employer] testing was sent off  4/18/2025: Palliative intent C1 D1 carboplatin/pemetrexed/pembrolizumab.  Patient not Taxol candidate due to pre-existing neuropathy related to her severe spinal disease involving hands and feet.     *Other medical comorbidities-aortic stenosis s/p TAVR in August 2023; cervical spine stenosis status post anterior cervical corpectomy with cage in September 2022; COPD; hepatic steatosis; anxiety/depression; diabetes type 2; hypothyroidism; SMA stenosis; T2 compression fracture seen on MRI T-spine from 3/17/2025 without  evidence of pathology fracture     RECOMMENDATIONS/PLAN:   Platelets worsening.  Etiology likely chemotherapy-induced as well as septic shock.  Patient transitioning to hospice  We will sign off.  Please call with questions     Patient critically ill.  Very poor prognosis.         Kaitlin Marx MD

## 2025-04-30 NOTE — PROGRESS NOTES
Nutrition Services    Patient Name:  Gabby Acosta  YOB: 1955  MRN: [id number]  Admit Date:  4/28/2025    Noted patient with score of 2 on Malnutrition Screening Tool on admission.  Due to respect of patient in comfort measures, will defer visit.  Please feel free to reach out for any needs.      Electronically signed by:  Trung Alexander RD  04/30/25 14:24 EDT

## 2025-04-30 NOTE — PROGRESS NOTES
"  Intensive Care Unit Daily Progress Note.   [facility] INTENSIVE CARE  4/30/2025    Patient Name:  Gabby Acosta  MRN:  [id number]  YOB: 1955  Age: 70 y.o.  Sex: female         Reason for Admission / Chief Complaint:  Shortness of breath    Hospital Course:   70-year-old female with a history of metastatic non-small cell lung cancer, chronic obstructive pulmonary disease who presented to the hospital with short of breath and found to be in shock requiring vasopressor support and acute hypoxic respiratory failure requiring Optiflow.  Admitted to the ICU for management.    Interval History:  Worsening respiratory status overnight requiring initiation of BiPAP therapy  Platelet count dropped again  Blood cultures with gram-positive bacilli  Daughter is at bedside  Patient uncomfortable with BiPAP mask, confused, trying to pull off    Physical Exam:  BP 95/68   Pulse 66   Temp 97.3 °F (36.3 °C) (Axillary)   Resp 28   Ht 152.4 cm (60\")   Wt 100 kg (220 lb 14.4 oz)   SpO2 93%   BMI 43.14 kg/m²   Body mass index is 43.14 kg/m².    Intake/Output    Intake/Output Summary (Last 24 hours) at 4/30/2025 0843  Last data filed at 4/30/2025 0430  Gross per 24 hour   Intake 4135.7 ml   Output 3150 ml   Net 985.7 ml     General: Confused, BiPAP  HEENT: NC/AT, EOMI, MMM  Neck: Supple, trachea midline  Cardiac: RRR, no murmur, gallops, rubs  Pulmonary: Coarse bilateral  GI: Soft, non-tender, non-distended, normal bowel sounds  Extremities: Warm, well perfused, no LE edema  Skin: no visible rash  Neuro: CN II - XII grossly intact      Data Review:  Notable Labs:  Results from last 7 days   Lab Units 04/30/25  0344 04/29/25 2026 04/29/25  1135 04/29/25  0529 04/28/25  2248   WBC 10*3/mm3 0.04* 0.04*  --  0.04* 0.06*   HEMOGLOBIN g/dL 7.7* 9.0*  --  8.4* 9.6*   PLATELETS 10*3/mm3 23* 20* 42* 100* 4*     Results from last 7 days   Lab Units 04/30/25  0344 04/29/25 2026 04/29/25  1135 04/29/25  0907 " 04/29/25  0632 04/28/25  2159   SODIUM mmol/L 139 138 131* 131* 130* 128*   POTASSIUM mmol/L 3.2* 3.0* 3.5 3.6 3.5 4.2   CHLORIDE mmol/L 102 102 95* 95* 97* 85*   CO2 mmol/L 22.1 18.5* 16.6* 14.7* 14.0* 14.4*   BUN mg/dL 61* 67* 74* 77* 71* 94*   CREATININE mg/dL 1.72* 1.95* 2.68* 2.74* 2.54* 4.11*   GLUCOSE mg/dL 167* 183* 131* 131* 119* 89   CALCIUM mg/dL 7.4* 7.4* 7.7* 7.1* 6.3* 7.5*   MAGNESIUM mg/dL 1.7 1.3*  --   --   --   --    PHOSPHORUS mg/dL 4.2 4.9* 6.2*  --   --   --    Estimated Creatinine Clearance: 32.8 mL/min (A) (by C-G formula based on SCr of 1.72 mg/dL (H)).    Results from last 7 days   Lab Units 04/30/25  0344 04/29/25 2026 04/29/25  1135 04/29/25  0632 04/28/25  2248 04/28/25  2159   LDH U/L  --   --   --  192  --   --    AST (SGOT) U/L 16 18  --   --   --  19   ALT (SGPT) U/L 17 16  --   --   --  19   PROCALCITONIN ng/mL  --   --   --   --   --  21.70*   LACTATE mmol/L  --   --  0.9  --   --  2.2*   PLATELETS 10*3/mm3 23* 20* 42*  --    < >  --     < > = values in this interval not displayed.       Results from last 7 days   Lab Units 04/30/25  0505 04/29/25 2007 04/29/25  0126   PH, ARTERIAL pH units 7.276* 7.271* 7.269*   PCO2, ARTERIAL mm Hg 52.1* 44.6 30.5*   PO2 ART mm Hg 67.2* 65.9* 69.5*   HCO3 ART mmol/L 24.3 20.6* 14.0*       Result Review:  I have personally reviewed the results from the time of this admission to 4/30/2025 08:43 EDT and agree with these findings:  [x]  Laboratory list / accordion  [x]  Microbiology  [x]  Radiology  [x]  EKG/Telemetry   [x]  Cardiology/Vascular   []  Pathology  [x]  Old records  []  Other:    Imaging:  Reviewed chest images personally from past 3 days    ASSESSMENT  /  PLAN:    Shock, septic  Gram-positive bacteremia  Acute hypoxic respiratory failure on BiPAP  Acute hypercapnic respiratory failure  Pulmonary edema  Urinary tract infection  Pneumonia  Acute renal failure  Metastatic non-small cell cancer  Pancytopenia  Anion gap metabolic  acidosis  Thrombocytopenia  Elevated troponin  DM2  Aortic stenosis s/p TAVR  COPD  Hyponatremia    -Patient presented to the hospital with shortness of breath and found to be in acute hypoxic respiratory failure and shock.  - Acute hypoxic respiratory failure initially requiring Optiflow and subsequently transition to BiPAP.  - Chest x-ray with worsening infiltrates, suspect pulmonary edema, discontinued IV fluids and given Lasix 40 mg IV now  - Empiric antibiotics with vancomycin and cefepime.  Blood cultures with gram-positive bacilli  - Continue vasopressor support, MAP goal greater than 65  -Hydrocortisone 50 every 6 for septic shock  - Pancytopenic with severe leukopenia.  Started on Neupogen by oncology.  - Transfused platelets, goal greater than 20  - Holding off on anticoagulation and antiplatelets  - Bronchodilator therapy with Symbicort and DuoNebs  - Palliative following  -Patient remains in critical condition with septic shock, respiratory failure on BiPAP therapy, metastatic non-small cell cancer, pancytopenia.  She was made DNR/DNI yesterday, discussed with family today and they understand her grave prognosis.  They are waiting for the family members before making a final decision.  Will likely pursue hospice measures.  They do not want to watch her suffer    GI prophylaxis: Famotidine  DVT prophylaxis: SCDs  Kang catheter: External  Bowel regimen: Ordered  Diet: N.p.o.    Discharge: Continue to monitor in the ICU due to critical status    Critical care time: 37 minutes    Ernesto Shen MD  [facility]  Pulmonary and Critical Care Medicine, Interventional Pulmonology    Parts of this note may be an electronic transcription/translation of spoken language to printed text using the Dragon dictation system.

## 2025-04-30 NOTE — PROGRESS NOTES
Patient is being transferred to hospice care.  Comfort care orders have been placed.  I will sign off.  Please call with additional questions.

## 2025-04-30 NOTE — PROGRESS NOTES
After discussion with further family members, they have elected to transition to hospice care.  Comfort care orders placed.

## 2025-04-30 NOTE — PROGRESS NOTES
[facility]: Vancomycin Level Monitoring Note    Gabby Acosta is a 70 y.o. female who is on day 2/7 of pharmacy to dose vancomycin for Empiric.    Estimated Creatinine Clearance: 32.8 mL/min (A) (by C-G formula based on SCr of 1.72 mg/dL (H)).    Current Vanc Dose:  1250 mg IV every  24  hours  Results from last 7 days   Lab Units 04/30/25  0344 04/29/25  1135   VANCOMYCIN RM mcg/mL 10.10 16.90   1250 q24h gives a predicted AUC of 403  Next Level Date and Time: Vanc Trough on 5/20 0830    Pharmacy is continuing to monitor and will adjust as needed.    Nadya Puckett, PharmD  Clinical Pharmacist

## 2025-05-01 LAB
BACTERIA SPEC AEROBE CULT: ABNORMAL
BACTERIA SPEC AEROBE CULT: ABNORMAL
GRAM STN SPEC: ABNORMAL
HCV RNA SERPL NAA+PROBE-ACNC: NORMAL IU/ML
IGA SERPL-MCNC: 164 MG/DL (ref 87–352)
IGG SERPL-MCNC: 547 MG/DL (ref 586–1602)
IGM SERPL-MCNC: 34 MG/DL (ref 26–217)
ISOLATED FROM: ABNORMAL
ISOLATED FROM: ABNORMAL
PROT PATTERN SERPL IFE-IMP: ABNORMAL
TEST INFORMATION: NORMAL

## 2025-05-01 NOTE — DISCHARGE SUMMARY
PHYSICIAN DISCHARGE SUMMARY                                                                        [facility]    Patient Identification:  Patient Name:  Gabby Acosta  MRN:  [id number]   YOB: 1955  Age: 70 y.o.  Sex: female  Primary Care Physician: Danyelle Brush MD    Admit date: 2025  Discharge date and time: 2025  6:07 PM   Discharged Condition:      Discharge Diagnoses:  Septic shock       Hospital Course: Gabby Acosta was a 70-year-old female with a history of metastatic non-small cell lung cancer, chronic obstructive pulmonary disease who presented to the hospital with shortness of breath and found to be in shock requiring vasopressor support and acute hypoxic respiratory failure requiring Optiflow.  Admitted to the ICU for management.  Patient was found to be in septic shock secondary to gram-positive bacteremia from urinary tract infection, pancytopenic, worsening respiratory failure initially on Optiflow and subsequently requiring BiPAP therapy.  After discussion with family they understand her grave prognosis in light of her metastatic malignancy and inability to tolerate any further treatment.  Patient and family elected for comfort measures.  Patient passed on .    Consults:   IP CONSULT TO NEPHROLOGY  IP CONSULT TO PALLIATIVE CARE TEAM  IP CONSULT TO HEMATOLOGY AND ONCOLOGY  IP CONSULT TO CARDIOLOGY  IP CONSULT TO ADVANCE CARE PLANNING    Significant Diagnostic Studies:   Results from last 7 days   Lab Units 25  0819 25  0344 25  1135 25  0529 25  2248   WBC 10*3/mm3 0.05* 0.04* 0.04*  --  0.04* 0.06*   HEMOGLOBIN g/dL 7.5* 7.7* 9.0*  --  8.4* 9.6*   PLATELETS 10*3/mm3 11* 23* 20* 42* 100* 4*     Results from last 7 days   Lab Units 25  0819 25  0344 25  1135 25  0907 25  0632 25  2159    SODIUM mmol/L 138 139 138 131* 131* 130* 128*   POTASSIUM mmol/L 3.2* 3.2* 3.0* 3.5 3.6 3.5 4.2   CHLORIDE mmol/L 105 102 102 95* 95* 97* 85*   CO2 mmol/L 21.9* 22.1 18.5* 16.6* 14.7* 14.0* 14.4*   BUN mg/dL 60* 61* 67* 74* 77* 71* 94*   CREATININE mg/dL 1.36* 1.72* 1.95* 2.68* 2.74* 2.54* 4.11*   GLUCOSE mg/dL 170* 167* 183* 131* 131* 119* 89   CALCIUM mg/dL 7.4* 7.4* 7.4* 7.7* 7.1* 6.3* 7.5*   MAGNESIUM mg/dL  --  1.7 1.3*  --   --   --   --    PHOSPHORUS mg/dL  --  4.2 4.9* 6.2*  --   --   --    Estimated Creatinine Clearance: 41.5 mL/min (A) (by C-G formula based on SCr of 1.36 mg/dL (H)).    Results from last 7 days   Lab Units 25  0819 25  0344 25  1135 25  0632 25  2248 25  2159   LDH U/L  --   --   --   --  192  --   --    AST (SGOT) U/L  --  16 18  --   --   --  19   ALT (SGPT) U/L  --  17 16  --   --   --  19   PROCALCITONIN ng/mL  --   --   --   --   --   --  21.70*   LACTATE mmol/L  --   --   --  0.9  --   --  2.2*   PLATELETS 10*3/mm3 11* 23* 20* 42*  --    < >  --     < > = values in this interval not displayed.       Results from last 7 days   Lab Units 25  0505 25  0126   PH, ARTERIAL pH units 7.276* 7.271* 7.269*   PCO2, ARTERIAL mm Hg 52.1* 44.6 30.5*   PO2 ART mm Hg 67.2* 65.9* 69.5*   HCO3 ART mmol/L 24.3 20.6* 14.0*         Discharge Exam:      Disposition:   home    Patient Instructions:   Patient        Medication Reconciliation: Please see electronically completed Med Rec.    Total time spent discharging patient including evaluation, medication reconciliation, arranging follow up, and post hospitalization instructions and education total time < 30 minutes.    Signed:  Ernesto Shen MD  2025  07:28 EDT

## 2025-05-01 NOTE — PROGRESS NOTES
Discharge Planning Assessment  [facility]     Patient Name: Gabby Acosta  MRN: [id number]  Today's Date: 2025    Admit Date: 2025    Plan: palliative   Discharge Needs Assessment    No documentation.                  Discharge Plan       Row Name 25 1208       Plan    Plan palliative    Plan Comments The patient transferred to [facility] from ICU on 25 @ 11:54 for palliative care. JOANNE Santos RN, CCP    Final Discharge Disposition Code 20 -     Final Note The patient  on 25 @ 14:22. JOANNE Santos RN, CCP                    Expected Discharge Date and Time       Expected Discharge Date Expected Discharge Time    2025            Demographic Summary    No documentation.                  Functional Status    No documentation.                  Psychosocial    No documentation.                  Abuse/Neglect    No documentation.                  Legal    No documentation.                  Substance Abuse    No documentation.                  Patient Forms    No documentation.                     Angela Santos, RN

## 2025-05-01 NOTE — PAYOR COMM NOTE
"Gabby Acosta \"GALE\" (Dcsd. Female)          NH SUMMARY FOR [id number]     UR F# [phone]         Date of Birth   1955    Social Security Number       Address   [address] [place] [address] [address] [place] [id number]    Home Phone   [phone]    MRN   [id number]       Druze   Confucianist    Marital Status                               Admission Date   2025    Admission Type   Emergency    Admitting Provider   Ernesto Shen MD    Attending Provider       Department, Room/Bed   [facility] [address], [address]1       Discharge Date   2025    Discharge Disposition       Discharge Destination                                 Attending Provider: (none)   Allergies: No Known Allergies    Isolation: None   Infection: None   Code Status: Prior    Ht: 152.4 cm (60\")   Wt: 100 kg (220 lb 14.4 oz)    Admission Cmt: None   Principal Problem: Septic shock [A41.9,R65.21]                   Active Insurance as of 2025       Primary Coverage       Payor Plan Insurance Group Employer/Plan Group    AETNA MEDICARE REPLACEMENT AETNA MEDICARE ADVANTAGE [id number]       Payor Plan Address Payor Plan Phone Number Payor Plan Fax Number Effective Dates    [address] [phone]  3/1/2025 - None Entered    [facility] [place] [address]         Subscriber Name Subscriber Birth Date Member ID       GABBY ACOSTA 1955 [id number]               Secondary Coverage       Payor Plan Insurance Group Employer/Plan Group    [id number]        Payor Plan Address Payor Plan Phone Number Payor Plan Fax Number Effective Dates    [address] - None Entered    [place] [address]         Subscriber Name Subscriber Birth Date Member ID       GABBY ACOSTA 1955 [id number]                     Emergency Contacts        (Rel.) Home Phone Work Phone Mobile Phone    VasiliyYolanda (Daughter) [phone] -- [phone]    VANESSA GUERRERO (Significant " Other) [phone] -- [phone]                 Discharge Summary        Ernesto Shen MD at 25 1807                                                                             PHYSICIAN DISCHARGE SUMMARY                                                                        [facility]    Patient Identification:  Patient Name:  Gabby Acosta  MRN:  [id number]   YOB: 1955  Age: 70 y.o.  Sex: female  Primary Care Physician: Danyelle Brush MD    Admit date: 2025  Discharge date and time: 2025  6:07 PM   Discharged Condition:      Discharge Diagnoses:  Septic shock       Hospital Course: Gabby Acosta was a 70-year-old female with a history of metastatic non-small cell lung cancer, chronic obstructive pulmonary disease who presented to the hospital with shortness of breath and found to be in shock requiring vasopressor support and acute hypoxic respiratory failure requiring Optiflow.  Admitted to the ICU for management.  Patient was found to be in septic shock secondary to gram-positive bacteremia from urinary tract infection, pancytopenic, worsening respiratory failure initially on Optiflow and subsequently requiring BiPAP therapy.  After discussion with family they understand her grave prognosis in light of her metastatic malignancy and inability to tolerate any further treatment.  Patient and family elected for comfort measures.  Patient passed on .    Consults:   IP CONSULT TO NEPHROLOGY  IP CONSULT TO PALLIATIVE CARE TEAM  IP CONSULT TO HEMATOLOGY AND ONCOLOGY  IP CONSULT TO CARDIOLOGY  IP CONSULT TO ADVANCE CARE PLANNING    Significant Diagnostic Studies:   Results from last 7 days   Lab Units 25  0819 25  0344 25  2026 25  1135 25  0529 25  2248   WBC 10*3/mm3 0.05* 0.04* 0.04*  --  0.04* 0.06*   HEMOGLOBIN g/dL 7.5* 7.7* 9.0*  --  8.4* 9.6*   PLATELETS 10*3/mm3 11* 23* 20* 42* 100* 4*     Results from last 7 days    Lab Units 25  0825  0907 25  0632 25   SODIUM mmol/L 138 139 138 131* 131* 130* 128*   POTASSIUM mmol/L 3.2* 3.2* 3.0* 3.5 3.6 3.5 4.2   CHLORIDE mmol/L 105 102 102 95* 95* 97* 85*   CO2 mmol/L 21.9* 22.1 18.5* 16.6* 14.7* 14.0* 14.4*   BUN mg/dL 60* 61* 67* 74* 77* 71* 94*   CREATININE mg/dL 1.36* 1.72* 1.95* 2.68* 2.74* 2.54* 4.11*   GLUCOSE mg/dL 170* 167* 183* 131* 131* 119* 89   CALCIUM mg/dL 7.4* 7.4* 7.4* 7.7* 7.1* 6.3* 7.5*   MAGNESIUM mg/dL  --  1.7 1.3*  --   --   --   --    PHOSPHORUS mg/dL  --  4.2 4.9* 6.2*  --   --   --    Estimated Creatinine Clearance: 41.5 mL/min (A) (by C-G formula based on SCr of 1.36 mg/dL (H)).    Results from last 7 days   Lab Units 25  0632 258 25  2159   LDH U/L  --   --   --   --  192  --   --    AST (SGOT) U/L  --  16 18  --   --   --  19   ALT (SGPT) U/L  --  17 16  --   --   --  19   PROCALCITONIN ng/mL  --   --   --   --   --   --  21.70*   LACTATE mmol/L  --   --   --  0.9  --   --  2.2*   PLATELETS 10*3/mm3 11* 23* 20* 42*  --    < >  --     < > = values in this interval not displayed.       Results from last 7 days   Lab Units 25  0505 25  0126   PH, ARTERIAL pH units 7.276* 7.271* 7.269*   PCO2, ARTERIAL mm Hg 52.1* 44.6 30.5*   PO2 ART mm Hg 67.2* 65.9* 69.5*   HCO3 ART mmol/L 24.3 20.6* 14.0*         Discharge Exam:      Disposition:   home    Patient Instructions:   Patient        Medication Reconciliation: Please see electronically completed Med Rec.    Total time spent discharging patient including evaluation, medication reconciliation, arranging follow up, and post hospitalization instructions and education total time < 30 minutes.    Signed:  Ernesto Shen MD  2025  07:28 EDT      Electronically signed by Ernesto Shen MD at 25  0854

## 2025-05-01 NOTE — PROGRESS NOTES
Case Management Discharge Note      Final Note: The patient  on 25 @ 14:22. BTimo Santos RN, CCP         Selected Continued Care - Discharged on 2025 Admission date: 2025 - Discharge disposition:       Destination    No services have been selected for the patient.                Durable Medical Equipment    No services have been selected for the patient.                Dialysis/Infusion    No services have been selected for the patient.                Home Medical Care    No services have been selected for the patient.                Therapy    No services have been selected for the patient.                Community Resources    No services have been selected for the patient.                Community & DME    No services have been selected for the patient.                         Final Discharge Disposition Code: 20 -

## 2025-05-05 NOTE — PROGRESS NOTES
Enter Query Response Below      Query Response: Bacterial pneumonia unspecified    Electronically signed by Ernesto Shen MD, 05/05/25, 8:37 AM EDT.               If applicable, please update the problem list.

## 2025-05-07 LAB
BACTERIA SPEC AEROBE CULT: ABNORMAL
BACTERIA SPEC AEROBE CULT: ABNORMAL
GRAM STN SPEC: ABNORMAL
ISOLATED FROM: ABNORMAL
ISOLATED FROM: ABNORMAL

## (undated) DEVICE — DEV INDEFLATOR P/N 580289

## (undated) DEVICE — COLR CERV MED/DENS NRW LNG

## (undated) DEVICE — GOWN,NON-REINFORCED,SIRUS,SET IN SLV,XXL: Brand: MEDLINE

## (undated) DEVICE — INTENDED FOR TISSUE SEPARATION, AND OTHER PROCEDURES THAT REQUIRE A SHARP SURGICAL BLADE TO PUNCTURE OR CUT.: Brand: BARD-PARKER ® CARBON RIB-BACK BLADES

## (undated) DEVICE — LOU MINOR PROCEDURE: Brand: MEDLINE INDUSTRIES, INC.

## (undated) DEVICE — GW INQWIRE FC PTFE STR .035IN 150

## (undated) DEVICE — TOWEL,OR,DSP,ST,BLUE,STD,4/PK,20PK/CS: Brand: MEDLINE

## (undated) DEVICE — CATH VENT MIV RADL PIG ST TIP 4F 110CM

## (undated) DEVICE — GLV SURG BIOGEL LTX PF 8 1/2

## (undated) DEVICE — PK NEURO SPINE 40

## (undated) DEVICE — ANTIBACTERIAL UNDYED BRAIDED (POLYGLACTIN 910), SYNTHETIC ABSORBABLE SUTURE: Brand: COATED VICRYL

## (undated) DEVICE — SUT PROLN 3/0 SH D/A 36IN 8522H

## (undated) DEVICE — DECANTER BAG 9": Brand: MEDLINE INDUSTRIES, INC.

## (undated) DEVICE — BALN PRESS WEDGE 5F 110CM

## (undated) DEVICE — LIMB HOLDER, WRIST/ANKLE: Brand: DEROYAL

## (undated) DEVICE — GW PRESSUREWIRE AERIS W/ AGILE TP 175CM

## (undated) DEVICE — DISPOSABLE BIPOLAR FORCEPS 7 3/4" (19.7CM) SCOVILLE BAYONET, 1.5MM TIP AND 12 FT. (3.6M) CABLE: Brand: KIRWAN

## (undated) DEVICE — PATIENT RETURN ELECTRODE, SINGLE-USE, CONTACT QUALITY MONITORING, ADULT, WITH 9FT CORD, FOR PATIENTS WEIGING OVER 33LBS. (15KG): Brand: MEGADYNE

## (undated) DEVICE — DRP MICROSCOPE 4 BINOCULAR CV 54X150IN

## (undated) DEVICE — R2P DESTINATION SLENDER GUIDING SHEATH: Brand: R2P DESTINATION SLENDER

## (undated) DEVICE — Device

## (undated) DEVICE — NEEDLE, QUINCKE 22GX3.5": Brand: MEDLINE INDUSTRIES, INC.

## (undated) DEVICE — NEEDLE, QUINCKE, 20GX3.5": Brand: MEDLINE

## (undated) DEVICE — CATH DIAG IMPULSE FL3.5 5F 100CM

## (undated) DEVICE — TRAP FLD MINIVAC MEGADYNE 100ML

## (undated) DEVICE — SPACER 6287641 PSR LAT PORTS 6X14X11
Type: IMPLANTABLE DEVICE | Site: SPINE CERVICAL | Status: NON-FUNCTIONAL
Brand: VERTE-STACK® SPINAL SYSTEM
Removed: 2022-09-20

## (undated) DEVICE — ADHS SKIN SURG TISS VISC PREMIERPRO EXOFIN HI/VISC FAST/DRY

## (undated) DEVICE — TR BAND RADIAL ARTERY COMPRESSION DEVICE: Brand: TR BAND

## (undated) DEVICE — CATH DIAG IMPULSE PIG 5F 100CM

## (undated) DEVICE — PENCL ES MEGADINE EZ/CLEAN BUTN W/HOLSTR 10FT

## (undated) DEVICE — PK ATS CUST W CARDIOTOMY RESEVOIR

## (undated) DEVICE — PK CATH CARD 40

## (undated) DEVICE — GLV SURG SENSICARE PI MIC PF SZ6.5 LF STRL

## (undated) DEVICE — TOTAL TRAY, 16FR 10ML SIL FOLEY, URN: Brand: MEDLINE

## (undated) DEVICE — 6F .070 XB 3 100CM: Brand: VISTA BRITE TIP

## (undated) DEVICE — CATH DIAG IMPULSE FR5 5F 100CM

## (undated) DEVICE — GLV SURG PREMIERPRO ORTHO LTX PF SZ8 BRN

## (undated) DEVICE — DRILL BIT 7080510 11 MM DRILL BIT S

## (undated) DEVICE — GLIDESHEATH BASIC HYDROPHILIC COATED INTRODUCER SHEATH: Brand: GLIDESHEATH

## (undated) DEVICE — DRP C/ARM 41X74IN

## (undated) DEVICE — GW EMR FIX EXCHG J STD .035 3MM 260CM

## (undated) DEVICE — TOOL MR8-14MH30 MR8 14CM MATCH 3MM: Brand: MIDAS REX MR8

## (undated) DEVICE — SUT VIC 4/0 P3 18IN J494G

## (undated) DEVICE — DRSNG WND GZ PAD BORDERED 4X8IN STRL

## (undated) DEVICE — KT MANIFLD CARDIAC

## (undated) DEVICE — SOL NS 500ML

## (undated) DEVICE — CATH DIAG IMPULSE AL1 6F 100CM

## (undated) DEVICE — SYR LL TP 10ML STRL

## (undated) DEVICE — SYR CONTRL PRESS/LO FIX/M/LL W/THMB/RNG 10ML

## (undated) DEVICE — GLIDESHEATH SLENDER STAINLESS STEEL KIT: Brand: GLIDESHEATH SLENDER

## (undated) DEVICE — TBG PENCL TELESCP MEGADYNE SMOKE EVAC 10FT

## (undated) DEVICE — STPLR SKIN VISISTAT WD 35CT

## (undated) DEVICE — NDL HYPO PRECISIONGLIDE REG 25G 1 1/2